# Patient Record
Sex: FEMALE | Race: WHITE | Employment: UNEMPLOYED | URBAN - METROPOLITAN AREA
[De-identification: names, ages, dates, MRNs, and addresses within clinical notes are randomized per-mention and may not be internally consistent; named-entity substitution may affect disease eponyms.]

---

## 2017-02-27 ENCOUNTER — HOSPITAL ENCOUNTER (OUTPATIENT)
Dept: LAB | Age: 62
Discharge: HOME | End: 2017-02-27
Payer: COMMERCIAL

## 2017-02-27 DIAGNOSIS — R73.09 ABNORMAL GLUCOSE: ICD-10-CM

## 2017-02-27 DIAGNOSIS — I10 ESSENTIAL HYPERTENSION WITH GOAL BLOOD PRESSURE LESS THAN 140/90: ICD-10-CM

## 2017-02-27 DIAGNOSIS — R79.89 ELEVATED LFTS: ICD-10-CM

## 2017-02-27 DIAGNOSIS — E78.5 HYPERLIPIDEMIA, UNSPECIFIED HYPERLIPIDEMIA TYPE: ICD-10-CM

## 2017-02-27 LAB
25(OH)D3 SERPL-MCNC: 26.6 NG/ML (ref 30–100)
ANION GAP BLD CALC-SCNC: 8 MMOL/L (ref 3–18)
APPEARANCE UR: CLEAR
BASOPHILS # BLD AUTO: 0.1 K/UL (ref 0–0.06)
BASOPHILS # BLD: 1 % (ref 0–2)
BILIRUB UR QL: NEGATIVE
BUN SERPL-MCNC: 12 MG/DL (ref 7–18)
BUN/CREAT SERPL: 16 (ref 12–20)
CALCIUM SERPL-MCNC: 9 MG/DL (ref 8.5–10.1)
CHLORIDE SERPL-SCNC: 99 MMOL/L (ref 100–108)
CHOLEST SERPL-MCNC: 230 MG/DL
CO2 SERPL-SCNC: 30 MMOL/L (ref 21–32)
COLOR UR: YELLOW
CREAT SERPL-MCNC: 0.73 MG/DL (ref 0.6–1.3)
CREAT UR-MCNC: 77.2 MG/DL (ref 30–125)
DIFFERENTIAL METHOD BLD: ABNORMAL
EOSINOPHIL # BLD: 0.3 K/UL (ref 0–0.4)
EOSINOPHIL NFR BLD: 4 % (ref 0–5)
EPITH CASTS URNS QL MICRO: NORMAL /LPF (ref 0–5)
ERYTHROCYTE [DISTWIDTH] IN BLOOD BY AUTOMATED COUNT: 12.2 % (ref 11.6–14.5)
GLUCOSE SERPL-MCNC: 111 MG/DL (ref 74–99)
GLUCOSE UR STRIP.AUTO-MCNC: NEGATIVE MG/DL
HBA1C MFR BLD: 6.8 % (ref 4.2–5.6)
HCT VFR BLD AUTO: 43.2 % (ref 35–45)
HDLC SERPL-MCNC: 68 MG/DL (ref 40–60)
HDLC SERPL: 3.4 {RATIO} (ref 0–5)
HGB BLD-MCNC: 14.3 G/DL (ref 12–16)
HGB UR QL STRIP: ABNORMAL
KETONES UR QL STRIP.AUTO: NEGATIVE MG/DL
LDLC SERPL CALC-MCNC: 134 MG/DL (ref 0–100)
LEUKOCYTE ESTERASE UR QL STRIP.AUTO: ABNORMAL
LIPID PROFILE,FLP: ABNORMAL
LYMPHOCYTES # BLD AUTO: 31 % (ref 21–52)
LYMPHOCYTES # BLD: 2.5 K/UL (ref 0.9–3.6)
MCH RBC QN AUTO: 30.2 PG (ref 24–34)
MCHC RBC AUTO-ENTMCNC: 33.1 G/DL (ref 31–37)
MCV RBC AUTO: 91.1 FL (ref 74–97)
MICROALBUMIN UR-MCNC: 0.8 MG/DL (ref 0–3)
MICROALBUMIN/CREAT UR-RTO: 10 MG/G (ref 0–30)
MONOCYTES # BLD: 0.5 K/UL (ref 0.05–1.2)
MONOCYTES NFR BLD AUTO: 6 % (ref 3–10)
NEUTS SEG # BLD: 4.7 K/UL (ref 1.8–8)
NEUTS SEG NFR BLD AUTO: 58 % (ref 40–73)
NITRITE UR QL STRIP.AUTO: NEGATIVE
PH UR STRIP: 6.5 [PH] (ref 5–8)
PLATELET # BLD AUTO: 246 K/UL (ref 135–420)
PMV BLD AUTO: 11.1 FL (ref 9.2–11.8)
POTASSIUM SERPL-SCNC: 3.7 MMOL/L (ref 3.5–5.5)
PROT UR STRIP-MCNC: NEGATIVE MG/DL
RBC # BLD AUTO: 4.74 M/UL (ref 4.2–5.3)
RBC #/AREA URNS HPF: NORMAL /HPF (ref 0–5)
SODIUM SERPL-SCNC: 137 MMOL/L (ref 136–145)
SP GR UR REFRACTOMETRY: 1.01 (ref 1–1.03)
TRIGL SERPL-MCNC: 140 MG/DL (ref ?–150)
UROBILINOGEN UR QL STRIP.AUTO: 0.2 EU/DL (ref 0.2–1)
VLDLC SERPL CALC-MCNC: 28 MG/DL
WBC # BLD AUTO: 8.1 K/UL (ref 4.6–13.2)
WBC URNS QL MICRO: NORMAL /HPF (ref 0–4)

## 2017-02-27 PROCEDURE — 85025 COMPLETE CBC W/AUTO DIFF WBC: CPT | Performed by: INTERNAL MEDICINE

## 2017-02-27 PROCEDURE — 83036 HEMOGLOBIN GLYCOSYLATED A1C: CPT | Performed by: INTERNAL MEDICINE

## 2017-02-27 PROCEDURE — 81001 URINALYSIS AUTO W/SCOPE: CPT | Performed by: INTERNAL MEDICINE

## 2017-02-27 PROCEDURE — 82306 VITAMIN D 25 HYDROXY: CPT | Performed by: INTERNAL MEDICINE

## 2017-02-27 PROCEDURE — 36415 COLL VENOUS BLD VENIPUNCTURE: CPT | Performed by: INTERNAL MEDICINE

## 2017-02-27 PROCEDURE — 80048 BASIC METABOLIC PNL TOTAL CA: CPT | Performed by: INTERNAL MEDICINE

## 2017-02-27 PROCEDURE — 80061 LIPID PANEL: CPT | Performed by: INTERNAL MEDICINE

## 2017-02-27 PROCEDURE — 82570 ASSAY OF URINE CREATININE: CPT | Performed by: INTERNAL MEDICINE

## 2017-02-27 PROCEDURE — 84445 ASSAY OF TSI GLOBULIN: CPT | Performed by: INTERNAL MEDICINE

## 2017-03-03 LAB — TSI ACT/NOR SER: 47 % (ref 0–139)

## 2017-03-21 ENCOUNTER — OFFICE VISIT (OUTPATIENT)
Dept: INTERNAL MEDICINE CLINIC | Age: 62
End: 2017-03-21

## 2017-03-21 VITALS
HEIGHT: 62 IN | HEART RATE: 67 BPM | WEIGHT: 187 LBS | SYSTOLIC BLOOD PRESSURE: 138 MMHG | DIASTOLIC BLOOD PRESSURE: 80 MMHG | TEMPERATURE: 97.9 F | BODY MASS INDEX: 34.41 KG/M2 | OXYGEN SATURATION: 98 %

## 2017-03-21 DIAGNOSIS — E78.5 HYPERLIPIDEMIA, UNSPECIFIED HYPERLIPIDEMIA TYPE: ICD-10-CM

## 2017-03-21 DIAGNOSIS — R79.89 ELEVATED LFTS: ICD-10-CM

## 2017-03-21 DIAGNOSIS — Z23 ENCOUNTER FOR IMMUNIZATION: ICD-10-CM

## 2017-03-21 DIAGNOSIS — I11.9 BENIGN HYPERTENSIVE HEART DISEASE WITHOUT HEART FAILURE: ICD-10-CM

## 2017-03-21 DIAGNOSIS — E55.9 VITAMIN D INSUFFICIENCY: ICD-10-CM

## 2017-03-21 DIAGNOSIS — K21.9 GASTROESOPHAGEAL REFLUX DISEASE WITHOUT ESOPHAGITIS: ICD-10-CM

## 2017-03-21 DIAGNOSIS — E11.9 CONTROLLED TYPE 2 DIABETES MELLITUS WITHOUT COMPLICATION, WITHOUT LONG-TERM CURRENT USE OF INSULIN (HCC): Primary | ICD-10-CM

## 2017-03-21 DIAGNOSIS — J45.20 MILD INTERMITTENT ASTHMA WITHOUT COMPLICATION: ICD-10-CM

## 2017-03-21 DIAGNOSIS — R00.2 PALPITATIONS: ICD-10-CM

## 2017-03-21 DIAGNOSIS — I47.1 PSVT (PAROXYSMAL SUPRAVENTRICULAR TACHYCARDIA) (HCC): ICD-10-CM

## 2017-03-21 RX ORDER — LORAZEPAM 1 MG/1
1 TABLET ORAL
Qty: 30 TAB | Refills: 0 | Status: SHIPPED | OUTPATIENT
Start: 2017-03-21 | End: 2017-06-23 | Stop reason: SDUPTHER

## 2017-03-21 NOTE — PROGRESS NOTES
1. Have you been to the ER, urgent care clinic or hospitalized since your last visit? YES. [facility], bronchitis    2. Have you seen or consulted any other health care providers outside of the [address] [place] [address] since your last visit (Include any pap smears or colon screening)? YES  Gyn, in September    Do you have an Advanced Directive? NO    Would you like information on Advanced Directives?  NO

## 2017-03-21 NOTE — PATIENT INSTRUCTIONS
Begin Vitamin D 2000 U daily. Learning About Diabetes Food Guidelines  Your Care Instructions  Meal planning is important to manage diabetes. It helps keep your blood sugar at a target level (which you set with your doctor). You don't have to eat special foods. You can eat what your family eats, including sweets once in a while. But you do have to pay attention to how often you eat and how much you eat of certain foods. You may want to work with a dietitian or a certified diabetes educator (CDE) to help you plan meals and snacks. A dietitian or CDE can also help you lose weight if that is one of your goals. What should you know about eating carbs? Managing the amount of carbohydrate (carbs) you eat is an important part of healthy meals when you have diabetes. Carbohydrate is found in many foods. · Learn which foods have carbs. And learn the amounts of carbs in different foods. ¨ Bread, cereal, pasta, and rice have about 15 grams of carbs in a serving. A serving is 1 slice of bread (1 ounce), ½ cup of cooked cereal, or 1/3 cup of cooked pasta or rice. ¨ Fruits have 15 grams of carbs in a serving. A serving is 1 small fresh fruit, such as an apple or orange; ½ of a banana; ½ cup of cooked or canned fruit; ½ cup of fruit juice; 1 cup of melon or raspberries; or 2 tablespoons of dried fruit. ¨ Milk and no-sugar-added yogurt have 15 grams of carbs in a serving. A serving is 1 cup of milk or 2/3 cup of no-sugar-added yogurt. ¨ Starchy vegetables have 15 grams of carbs in a serving. A serving is ½ cup of mashed potatoes or sweet potato; 1 cup winter squash; ½ of a small baked potato; ½ cup of cooked beans; or ½ cup cooked corn or green peas. · Learn how much carbs to eat each day and at each meal. A dietitian or CDE can teach you how to keep track of the amount of carbs you eat. This is called carbohydrate counting. · If you are not sure how to count carbohydrate grams, use the Plate Method to plan meals.  It is a good, quick way to make sure that you have a balanced meal. It also helps you spread carbs throughout the day. ¨ Divide your plate by types of foods. Put non-starchy vegetables on half the plate, meat or other protein food on one-quarter of the plate, and a grain or starchy vegetable in the final quarter of the plate. To this you can add a small piece of fruit and 1 cup of milk or yogurt, depending on how many carbs you are supposed to eat at a meal.  · Try to eat about the same amount of carbs at each meal. Do not \"save up\" your daily allowance of carbs to eat at one meal.  · Proteins have very little or no carbs per serving. Examples of proteins are beef, chicken, turkey, fish, eggs, tofu, cheese, cottage cheese, and peanut butter. A serving size of meat is 3 ounces, which is about the size of a deck of cards. Examples of meat substitute serving sizes (equal to 1 ounce of meat) are 1/4 cup of cottage cheese, 1 egg, 1 tablespoon of peanut butter, and ½ cup of tofu. How can you eat out and still eat healthy? · Learn to estimate the serving sizes of foods that have carbohydrate. If you measure food at home, it will be easier to estimate the amount in a serving of restaurant food. · If the meal you order has too much carbohydrate (such as potatoes, corn, or baked beans), ask to have a low-carbohydrate food instead. Ask for a salad or green vegetables. · If you use insulin, check your blood sugar before and after eating out to help you plan how much to eat in the future. · If you eat more carbohydrate at a meal than you had planned, take a walk or do other exercise. This will help lower your blood sugar. What else should you know? · Limit saturated fat, such as the fat from meat and dairy products. This is a healthy choice because people who have diabetes are at higher risk of heart disease. So choose lean cuts of meat and nonfat or low-fat dairy products.  Use olive or canola oil instead of butter or shortening when cooking. · Don't skip meals. Your blood sugar may drop too low if you skip meals and take insulin or certain medicines for diabetes. · Check with your doctor before you drink alcohol. Alcohol can cause your blood sugar to drop too low. Alcohol can also cause a bad reaction if you take certain diabetes medicines. Follow-up care is a key part of your treatment and safety. Be sure to make and go to all appointments, and call your doctor if you are having problems. It's also a good idea to know your test results and keep a list of the medicines you take. Where can you learn more? Go to http://yoselyn-merline.info/. Enter U205 in the search box to learn more about \"Learning About Diabetes Food Guidelines. \"  Current as of: May 23, 2016  Content Version: 11.1  © 2059-7093 [employer]. Care instructions adapted under license by [employer] (which disclaims liability or warranty for this information). If you have questions about a medical condition or this instruction, always ask your healthcare professional. [place] [address] any warranty or liability for your use of this information.

## 2017-03-22 ENCOUNTER — TELEPHONE (OUTPATIENT)
Dept: INTERNAL MEDICINE CLINIC | Age: 62
End: 2017-03-22

## 2017-03-22 NOTE — TELEPHONE ENCOUNTER
Patient was seen yesterday (3/21) and I discussed urinalysis results with her during visit. It showed moderate leukocyte esterase, negative nitrites and 2-4 WBCs. She denied any symptoms, so no further evaluation was felt to be needed. Please inquire if she is having symptoms now or other concerns she may have. Thank you.

## 2017-03-24 ENCOUNTER — TELEPHONE (OUTPATIENT)
Dept: INTERNAL MEDICINE CLINIC | Age: 62
End: 2017-03-24

## 2017-03-24 PROBLEM — E55.9 VITAMIN D INSUFFICIENCY: Status: ACTIVE | Noted: 2017-03-24

## 2017-03-25 NOTE — PROGRESS NOTES
HPI:   Elle Rosales is a 64y.o. year old female who presents today for evaluation of hypertension, hyperlipidemia, paroxysmal SVT, GERD, asthma, elevated transaminases, abnormal glucose, and atypical mycobacterial pneumonia. She reports that she is doing relatively well. She reports that she went to [facility] in 2/2017 and was diagnosed with bronchitis. She states that she has significantly improved with only a mild intermittent lingering cough. She also states that she is under a lot of stress with her mother being diagnosed with pulmonary metastases from a uterine mass and her recently being entered into hospice. She is otherwise without complaints. She has a history of hypertension, treated with metoprolol, and hydrochlorothiazide (+ potassium). She reports that she does not check her blood pressure at home. She does not exercise regularly, but denies any chest pain, shortness of breath at rest or with exertion, lightheadedness, or edema. 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 She has had approximately six severe episodes over the years, prompting presentation to the ED and treatment with IV Adenosine. She currently reports infrequent short episodes of palpitations and is being treated with metoprolol. She is followed by Dr. Radha Ruby. She had an echocardiogram (10/2005) showing normal LV size and function (EF 60-65%), and no valvular pathology. In 11/2012, she underwent an exercise stress echocardiogram, which was normal at maximal exercise. She has a history of hyperlipidemia, treated with simvastatin from 10/2012 to 3/2015 at which time she stopped taking it due to myalgias. She restarted it on 8/2015 and continued to take it without difficulty until 3/2016, when it was noticed that she had transaminase elevation (AST 85/ ) and it was discontinued.  Evaluation included hepatitis A, B, and C levels (negative), iron panel (normal), and RUQ ultrasound (3/23/2016) with limited sonographic window for the liver; only partially visualized but grossly unremarkable. Repeat hepatic panel (4/22/2016) showed AST 75 and ALT 98. Repeat lipid panel showed total chol 215/ / HDL 64/. In 5/13/2016, she had an abdominal CT scan showing the liver to be normal in size with normal parenchymal density; no discrete mass or ascites, and no intrahepatic biliary dilatation. She was subsequently instructed to restart simvastatin. However, she reports that she only took it for one week and then discontinued it. She states that she did not have any side effects, but felt it was not needed. She expressed that she does not wish to restart therapy with a statin. She has a history of asthma and allergic rhinitis and is followed by Dr. Amber Segura. She is receiving immunotherapy once per month, and reports that she has not required any inhalers recently. She states that she does not use Qvar daily, but will take it occasionally. She does use Claritin every day. She does have a history of abnormal glucose dating back to 2012. She denies any polyuria, polydipsia, nocturia, or blurry vision, and has no history of retinopathy, neuropathy, or nephropathy. 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 She was treated with Avelox, and repeat chest x-ray in 1/2012 showed complete resolution of the pneumonia. She denies any cough or shortness of breath. She does report frequent post nasal drainage. She had a screening colonoscopy in 12/2006 by Dr. Orion Cadena showing a 5 mm sessile polyp in the rectum (pathology: hyperplastic). She had a repeat colonoscopy in 12/2016 which showed moderate sigmoid diverticulosis and a 6 mm sessile ascending colon polyp (pathology: serrated adenoma). Follow-up recommended for 5 years.  She denies any abdominal pain, nausea, vomiting, melena, hematochezia, or change in bowel movements. She does take omeprazole occasionally for GERD symptoms. Past Medical History:   Diagnosis Date    Allergic rhinitis     Asthma     Cardiac stress echo, normal 11/02/2012    Normal maximal stress echo study. EF 60%. Ex time 9 min 45 sec. 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  Hyperlipidemia     Hypertension     Menopause     Plantar fasciitis     left    PSVT (paroxysmal supraventricular tachycardia) ([facility])     A-V dharmesh reentrant tachycardia     Past Surgical History:   Procedure Laterality Date    ENDOSCOPY, COLON, DIAGNOSTIC      polyp    HX CERVICAL POLYPECTOMY      HX CYST INCISION AND DRAINAGE Right 10 or more years    HX DILATION AND CURETTAGE      HX GYN      polyp on cervix    HX POLYPECTOMY      from rectum     Current Outpatient Prescriptions   Medication Sig    LORazepam (ATIVAN) 1 mg tablet Take 1 Tab by mouth every eight (8) hours as needed for Anxiety. Max Daily Amount: 3 mg.  albuterol (PROAIR HFA) 90 mcg/actuation inhaler inhale 2 puffs by mouth every 4 hours if needed for wheezing    metoprolol succinate (TOPROL-XL) 50 mg XL tablet take 1 tablet by mouth once daily    potassium chloride (K-DUR, KLOR-CON) 20 mEq tablet take 1 tablet by mouth once daily    carboxymethylcellulose sodium (REFRESH LIQUIGEL) 1 % dlgl Apply  to eye.  hydrochlorothiazide (HYDRODIURIL) 25 mg tablet take 1/2 tablet by mouth once daily    omeprazole (PRILOSEC) 20 mg capsule Take 1 Cap by mouth daily.  clotrimazole-betamethasone (LOTRISONE) topical cream Apply  to both ear canals and affected part of outer ear twice a day with a finger.  fluticasone (FLONASE) 50 mcg/actuation nasal spray 2 Sprays by Both Nostrils route daily.     beclomethasone (QVAR) 40 mcg/actuation inhaler Take 1 Puff by inhalation two (2) times a day.  MULTIVITAMIN PO Take 1 Tab by mouth every other day. No current facility-administered medications for this visit. Allergies and Intolerances: Allergies   Allergen Reactions    Altace [Ramipril] Cough    Penicillins Other (comments)     Hands peel    Sulfur Itching     Family History: She had two aunts who had breast cancer ( her mother's sister and father's sister). She has no FH of colon cancer. Her mother is still alive with HTN. Her father  form metastatic prostate cancer. Family History   Problem Relation Age of Onset   [employer] Arthritis-osteo Mother     Hypertension Mother           Cancer Father      bone cancer    Hypertension Sister     Hypertension Sister     Hypertension Sister     Breast Cancer Maternal Aunt     Breast Cancer Paternal Aunt     Diabetes Other     Stroke Other     Other Sister      twin sister - osteopenia and low vitamin D levels     Social History:   She  reports that she has never smoked. She has never used smokeless tobacco. She is  and has two sons. She was a homemaker, but worked part-time in . She now helps care for her grandchildren and for her elderly mother. History   Alcohol Use No     Immunization History:  Immunization History   Administered Date(s) Administered    Influenza Vaccine (Quad) PF 10/23/2015, 10/19/2016    Influenza Vaccine PF 2013, 10/03/2014    Influenza Vaccine Split 2011, 10/22/2012    Influenza Vaccine Whole 10/29/2010    PPD 2012    Pneumococcal Polysaccharide (PPSV-23) 2017    TB Skin Test (PPD) Intradermal 2014    TDAP Vaccine 2012       Review of Systems:   As above included in HPI.   Otherwise 11 point review of systems negative including constitutional, skin, HENT, eyes, respiratory, cardiovascular, gastrointestinal, genitourinary, musculoskeletal, endocrine, hematologic, allergy, and neurologic. Physical:   Vitals:   BP: 138/80   HR: 67  WT: 187 lb (84.8 kg)  BMI:  34.02 kg/m2    Exam:   Patient appears in no apparent distress. Affect is appropriate. HEENT --Anicteric sclerae, tympanic membranes normal,  ear canals normal.  PERRL, EOMI, conjunctiva and lids normal.   Sinuses were nontender, turbinates normal, hearing normal.  Oropharynx without  erythema, normal tongue, oral mucosa and tonsils. No cervical lymphadenopathy. No thyromegaly, JVD, or bruits. Carotid pulses 2+ with normal upstroke. Lungs --Clear to auscultation. No wheezing or rales. Heart --Regular rate and rhythm, no murmurs, rubs, gallops, or clicks. Breasts -- no masses, skin dimpling, asymmetry, nipple discharge, nodules, axillary lymphadenopathy. Chest wall --Nontender to palpation. PMI normal.  Abdomen -- Soft and nontender, no hepatosplenomegaly or masses. Extremities -- Without cyanosis, clubbing, edema. 2+ pulses equally and bilaterally.   Normal looking digits, ROM intact  Derm - no obvious abnormalities noted, no rash    Foot exam: tuning fork and microfilament test with normal sensation      Review of Data:  Labs:  Hospital Outpatient Visit on 02/27/2017   Component Date Value Ref Range Status    LIPID PROFILE 02/27/2017        Final    Cholesterol, total 02/27/2017 230* <200 MG/DL Final    Triglyceride 02/27/2017 140  <150 MG/DL Final    HDL Cholesterol 02/27/2017 68* 40 - 60 MG/DL Final    LDL, calculated 02/27/2017 134* 0 - 100 MG/DL Final    VLDL, calculated 02/27/2017 28  MG/DL Final    CHOL/HDL Ratio 02/27/2017 3.4  0 - 5.0   Final    Sodium 02/27/2017 137  136 - 145 mmol/L Final    Potassium 02/27/2017 3.7  3.5 - 5.5 mmol/L Final    Chloride 02/27/2017 99* 100 - 108 mmol/L Final    CO2 02/27/2017 30  21 - 32 mmol/L Final    Anion gap 02/27/2017 8  3.0 - 18 mmol/L Final    Glucose 02/27/2017 111* 74 - 99 mg/dL Final    BUN 02/27/2017 12  7.0 - 18 MG/DL Final    Creatinine 02/27/2017 0.73 0.6 - 1.3 MG/DL Final    BUN/Creatinine ratio 02/27/2017 16  12 - 20   Final    GFR est AA 02/27/2017 >60  >60 ml/min/1.73m2 Final    GFR est non-AA 02/27/2017 >60  >60 ml/min/1.73m2 Final    Calcium 02/27/2017 9.0  8.5 - 10.1 MG/DL Final    Hemoglobin A1c 02/27/2017 6.8* 4.2 - 5.6 % Final    WBC 02/27/2017 8.1  4.6 - 13.2 K/uL Final    RBC 02/27/2017 4.74  4.20 - 5.30 M/uL Final    HGB 02/27/2017 14.3  12.0 - 16.0 g/dL Final    HCT 02/27/2017 43.2  35.0 - 45.0 % Final    MCV 02/27/2017 91.1  74.0 - 97.0 FL Final    MCH 02/27/2017 30.2  24.0 - 34.0 PG Final    MCHC 02/27/2017 33.1  31.0 - 37.0 g/dL Final    RDW 02/27/2017 12.2  11.6 - 14.5 % Final    PLATELET 18/21/1439 542  135 - 420 K/uL Final    MPV 02/27/2017 11.1  9.2 - 11.8 FL Final    NEUTROPHILS 02/27/2017 58  40 - 73 % Final    LYMPHOCYTES 02/27/2017 31  21 - 52 % Final    MONOCYTES 02/27/2017 6  3 - 10 % Final    EOSINOPHILS 02/27/2017 4  0 - 5 % Final    BASOPHILS 02/27/2017 1  0 - 2 % Final    ABS. NEUTROPHILS 02/27/2017 4.7  1.8 - 8.0 K/UL Final    ABS. LYMPHOCYTES 02/27/2017 2.5  0.9 - 3.6 K/UL Final    ABS. MONOCYTES 02/27/2017 0.5  0.05 - 1.2 K/UL Final    ABS. EOSINOPHILS 02/27/2017 0.3  0.0 - 0.4 K/UL Final    ABS.  BASOPHILS 02/27/2017 0.1* 0.0 - 0.06 K/UL Final    DF 02/27/2017 AUTOMATED    Final    Thyroid Stim Immunoglobulin 02/27/2017 47  0 - 139 % Final    Vitamin D 25-Hydroxy 02/27/2017 26.6* 30 - 100 ng/mL Final    Microalbumin,urine random 02/27/2017 0.80  0 - 3.0 MG/DL Final    Creatinine, urine 02/27/2017 77.20  30 - 125 mg/dL Final    Microalbumin/Creat ratio (mg/g cre* 02/27/2017 10  0 - 30 mg/g Final    Color 02/27/2017 YELLOW    Final    Appearance 02/27/2017 CLEAR    Final    Specific gravity 02/27/2017 1.010  1.005 - 1.030   Final    pH (UA) 02/27/2017 6.5  5.0 - 8.0   Final    Protein 02/27/2017 NEGATIVE   NEG mg/dL Final    Glucose 02/27/2017 NEGATIVE   NEG mg/dL Final    Ketone 02/27/2017 NEGATIVE   NEG mg/dL Final    Bilirubin 02/27/2017 NEGATIVE   NEG   Final    Blood 02/27/2017 TRACE* NEG   Final    Urobilinogen 02/27/2017 0.2  0.2 - 1.0 EU/dL Final    Nitrites 02/27/2017 NEGATIVE   NEG   Final    Leukocyte Esterase 02/27/2017 MODERATE* NEG   Final    WBC 02/27/2017 2 to 4  0 - 4 /hpf Final    RBC 02/27/2017 1 to 3  0 - 5 /hpf Final    Epithelial cells 02/27/2017 1+  0 - 5 /lpf Final     Calculated 10 year ASCVD risk score:  8.0  %    Health Maintenance:  Screening:    Mammogram: negative (11/2016)   PAP smear: negative (10/2013) Dr. Jenni Spurling. Appointment scheduled 10/2016. Colorectal: colonoscopy (12/2016) serrated adenoma. Dr. Emerita Peralta. Due 2021. Depression: none   DM (HbA1c/FPG): HbA1c 68 (2/2017)   Hepatitis C: negative (3/2016)   Falls: one   DEXA: within normal limits (11/2015)   Glaucoma: unknown   Smoking: none   Vitamin D: 26.6 (2/2017)   Medicare Wellness: N/A      Impression:  Patient Active Problem List   Diagnosis Code    Allergic rhinitis J30.9    Colon polyps K63.5    PSVT (paroxysmal supraventricular tachycardia) (HCC) I47.1    Palpitations R00.2    Hyperlipidemia E78.5    Hypertensive heart disease  I11.9    Asthma J45.909    Bradycardia, sinus R00.1    Dizziness R42    Solitary cyst of breast N60.09    GERD (gastroesophageal reflux disease) K21.9    Elevated LFTs R79.89    Type 2 diabetes mellitus without complication, without long-term current use of insulin (HCC) E11.9    Vitamin D insufficiency E55.9       Plan:  1. Hypertension. Well controlled on current regimen of metoprolol and hydrochlorothiazide. Renal function normal with creatinine 0.73 / eGFR >60. Will repeat today. Continue to follow. 2. Hyperlipidemia. Patient not wishing to restart statin at this time. Did not restart as instructed in 5/2016 after CT scan results reviewed.  Based on current lipid panel, her calculated 10 year ASCVD risk is 8.0 %, which actually does place her in one of the four statin benefit groups as per new AHA/ACC guidelines (primary prevention: 10 year ASCVD risk >7.5%). Thus, would recommend treatment with statin at this time. Also, with new diagnosis of diabetes mellitus which is another indication for use. However, patient unwilling today. Wishing to attempt control through lifestyle modifications. Will recheck lipid panel at next visit and readdress. Will continue to follow. 3. Diabetes mellitus. Abnormal glucose has been present since 2012, although normal in 2/2016. HbA1c now increased to 6.8. Discussed importance of lifestyle modifications, including diet, exercise, and weight loss. Will refer for diabetes education. No evidence of microvascular complications. Not on Ace-I or statin. Will address eye exams at next visit. Foot exam normal and urine microalbumin/ creatinine ratio without evidence of microalbuminuria. Will recheck HbA1c in 3 months to reassess. 4. Elevated LFT's. Significantly improved. Unclear etiology, but doubt secondary to statin. Hepatitis panel and iron studies normal. RUQ ultrasound difficult secondary to body habitus, but abdominal CT scan showed normal liver parenchyma. Will continue to follow and further evaluation if worsens. Repeat at next visit. 5. AV dharmesh reentrant tachycardia. No recent episodes. On metoprolol and no significant palpitations recently. Followed by Dr. Sarina Murray. 6. Asthma/ allergies. Exacerbation one month prior with episode of bronchitis, but now improved. Discussed using Qvar daily and albuterol only as needed. Receiving monthly immunotherapy injections. Followed by Dr. Shraddha Brennan. 7. GERD. Symptoms generally controlled with prn omeprazole. Follow. 8. Health maintenance. Already received influenza vaccine. Will give pneumovax today given asthma history. Discussed Zoster vaccine. Will consider, but not wishing to receive today. Colonoscopy due 2021. Mammogram up to date. Will discuss eye exams at next visit.  Vitamin D level low. Will increase supplement to 2000 U daily and recheck next visit. Patient understands recommendations and agrees with plan. Follow-up in 3 months.

## 2017-03-25 NOTE — TELEPHONE ENCOUNTER
Please request most recent pap smear from Dr. Lindsey Ayala. Patient was scheduled 10/2016. Thank you.

## 2017-03-27 ENCOUNTER — HOSPITAL ENCOUNTER (OUTPATIENT)
Dept: LAB | Age: 62
Discharge: HOME | End: 2017-03-27
Payer: COMMERCIAL

## 2017-03-27 ENCOUNTER — TELEPHONE (OUTPATIENT)
Dept: INTERNAL MEDICINE CLINIC | Age: 62
End: 2017-03-27

## 2017-03-27 ENCOUNTER — LAB ONLY (OUTPATIENT)
Dept: INTERNAL MEDICINE CLINIC | Age: 62
End: 2017-03-27

## 2017-03-27 DIAGNOSIS — R30.9 PAIN PASSING URINE: ICD-10-CM

## 2017-03-27 DIAGNOSIS — R35.0 URINARY FREQUENCY: Primary | ICD-10-CM

## 2017-03-27 DIAGNOSIS — R35.0 URINARY FREQUENCY: ICD-10-CM

## 2017-03-27 PROCEDURE — 87086 URINE CULTURE/COLONY COUNT: CPT | Performed by: INTERNAL MEDICINE

## 2017-03-29 LAB
BACTERIA SPEC CULT: NORMAL
SERVICE CMNT-IMP: NORMAL

## 2017-03-30 ENCOUNTER — TELEPHONE (OUTPATIENT)
Dept: INTERNAL MEDICINE CLINIC | Age: 62
End: 2017-03-30

## 2017-04-04 ENCOUNTER — TELEPHONE (OUTPATIENT)
Dept: INTERNAL MEDICINE CLINIC | Age: 62
End: 2017-04-04

## 2017-04-04 NOTE — TELEPHONE ENCOUNTER
Pt called to spk to nurse about her recent urinalysis, she said she is still having some burning,  Valeriano

## 2017-04-04 NOTE — TELEPHONE ENCOUNTER
Dr. Leonela Wiggins, pt had urine culture done on 3/27/17 with No Growth noted. She is c/o urinary burning, please advise.

## 2017-04-05 NOTE — TELEPHONE ENCOUNTER
Regarding urinary burning, it could be from atrophic vaginitis and she could try an over the counter moisturizer such as Replens. If continues with symptoms, she will need to come in for appointment to be evaluated.

## 2017-04-05 NOTE — TELEPHONE ENCOUNTER
Spoke with patient, given message below and instructed that if recommendation below doesn't help then she needs to come in for an appt. Verbalized understanding.

## 2017-05-10 ENCOUNTER — OFFICE VISIT (OUTPATIENT)
Dept: CARDIOLOGY CLINIC | Age: 62
End: 2017-05-10

## 2017-05-10 VITALS
OXYGEN SATURATION: 98 % | BODY MASS INDEX: 33.13 KG/M2 | HEIGHT: 62 IN | WEIGHT: 180 LBS | SYSTOLIC BLOOD PRESSURE: 122 MMHG | HEART RATE: 72 BPM | DIASTOLIC BLOOD PRESSURE: 80 MMHG

## 2017-05-10 DIAGNOSIS — I11.9 BENIGN HYPERTENSIVE HEART DISEASE WITHOUT HEART FAILURE: ICD-10-CM

## 2017-05-10 DIAGNOSIS — R00.2 PALPITATIONS: Primary | ICD-10-CM

## 2017-05-10 DIAGNOSIS — I47.1 PSVT (PAROXYSMAL SUPRAVENTRICULAR TACHYCARDIA) (HCC): ICD-10-CM

## 2017-05-10 DIAGNOSIS — E78.5 HYPERLIPIDEMIA, UNSPECIFIED HYPERLIPIDEMIA TYPE: ICD-10-CM

## 2017-05-10 NOTE — PROGRESS NOTES
HPI: I saw Erica Chacon in my office today in cardiovascular evaluation regarding her past problems with palpitations and hypercholesterolemia. Ms. 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 She has had about six separate episodes of supraventricular tachycardia in the past, for which she has had to go to the emergency room and get adenosine intravenously in order to break her rhythm to sinus, but generally speaking on just Toprol 50 mg daily she does quite well and has had only occasional palpitations. She comes to the office today and relates that she is doing well without any real palpitations on her Toprol and she denies any other cardiovascular complaints. She did unfortunately pulled back muscle in trying to help her mother who has terminal cancer. Encounter Diagnoses   Name Primary?  Palpitations Yes    PSVT (paroxysmal supraventricular tachycardia) (HCC)     Hypertensive heart disease      Hyperlipidemia, unspecified hyperlipidemia type        Discussion: This lady appears to be doing about as well as we could expect that I really have no recommendations for change at this time. Her palpitations appear to be well-controlled on her Toprol therapy and I am not could make any changes at this time. She does have some history of hypercholesterolemia and her latest lipid profile which was completed on February 27, 2017 showed total cholesterol 230 with triglycerides of 140, HDL of 68, LDL of 134, and VLDL of 28. In view of her age and lack of significant risk factors by the current guidelines this would not normally be treated. She has been considered to be prediabetic and if we consider diabetic we probably would treat her cholesterol.   I did tell her that she could consider doing a coronary calcium score to help make that determination and gave her some information in that regard. Her blood pressure is very well-controlled today0 and her EKG is stable some sublingual plan see her again in several months or as needed if any new cardiovascular symptoms surface in the interim. PCP:   Geeta Allred MD       Past Medical History:   Diagnosis Date    Allergic rhinitis     Asthma     Cardiac stress echo, normal 11/02/2012    Normal maximal stress echo study. EF 60%. Ex time 9 min 45 sec. 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  Hyperlipidemia     Hypertension     Menopause     Plantar fasciitis     left    PSVT (paroxysmal supraventricular tachycardia) ([facility])     A-V dharmesh reentrant tachycardia         Past Surgical History:   Procedure Laterality Date    ENDOSCOPY, COLON, DIAGNOSTIC      polyp    HX CERVICAL POLYPECTOMY      HX CYST INCISION AND DRAINAGE Right 10 or more years    HX DILATION AND CURETTAGE      HX GYN      polyp on cervix    HX POLYPECTOMY      from rectum         Current Outpatient Rx   Name Route Sig Dispense Refill    POTASSIUM CHLORIDE SR 20 MEQ TAB, PARTICLES/CRYSTALS Oral Take 1 Tab by mouth daily. 30 Tab 11    METOPROLOL SR 50 MG 24 HR TAB Oral Take 1 Tab by mouth daily. 90 Tab 3    AZITHROMYCIN 250 MG TAB Oral Take 1 Tab by mouth for 5 days. Take two tablets today then one tablet daily 6 Tab 0    TRIAMCINOLONE ACETONIDE 55 MCG NASAL SPRAY AEROSOL Nasal 2 Sprays by Nasal route daily. Administer to nostrils.  HYDROCHLOROTHIAZIDE 25 MG TAB Oral Take 25 mg by mouth daily.  LORATADINE 10 MG TAB Oral Take 10 mg by mouth daily.  MULTIVITAMIN PO Oral Take  by mouth.            Allergies   Allergen Reactions    Altace [Ramipril] Cough    Penicillins Other (comments)     Hands peel    Sulfur Itching         Social History:   Social History   Substance Use Topics    Smoking status: Never Smoker    Smokeless tobacco: Never Used    Alcohol use No           Family history: family history includes Arthritis-osteo in her mother; Breast Cancer in her maternal aunt and paternal aunt; Cancer in her father; Diabetes in an other family member; Hypertension in her mother, sister, sister, and sister; Other in her sister; Stroke in an other family member. Review of Systems:    Constitutional: Negative. Respiratory: Positive for cough. Negative for sputum production, shortness of breath and wheezing. Cardiovascular: Negative. Gastrointestinal: Negative. Musculoskeletal: Negative. Physical Exam:   The patient is an alert, oriented, well developed, well nourished 58 y.o.  female who was in no acute distress at the time of my examination. Visit Vitals    /80    Pulse 72    Ht 5' 2\" (1.575 m)    Wt 81.6 kg (180 lb)    SpO2 98%    BMI 32.92 kg/m2      BP Readings from Last 3 Encounters:   05/10/17 122/80   03/21/17 138/80   10/18/16 114/62        Wt Readings from Last 3 Encounters:   05/10/17 81.6 kg (180 lb)   03/21/17 84.8 kg (187 lb)   10/18/16 83.3 kg (183 lb 9.6 oz)       HEENT: Conjuctiva white, mucosa moist, no pallor or cyanosis. Neck: Supple without masses, tenderness or thyromegaly. No jugular venous distention. Carotid upstrokes are full bilaterally, without bruits. Cardiovascular: Chest is symmetrical with good excursion. [place] is not displaced. No lifts, heaves or thrills. S1 and S2 are normal, without appreciable murmurs, rubs, clicks or gallops. Lungs: Clear to auscultation in all fields. Abdomen: Soft; no masses, tenderness or organomegaly. Extremities: No edema, with full peripheral pulses.      Review of Data: See PMH and Cardiology and Imaging sections for cardiac testing  Lab Results   Component Value Date/Time    Cholesterol, total 230 02/27/2017 09:00 AM    HDL Cholesterol 68 02/27/2017 09:00 AM    LDL, calculated 134 02/27/2017 09:00 AM    Triglyceride 140 02/27/2017 09:00 AM    CHOL/HDL Ratio 3.4 02/27/2017 09:00 AM       Results for orders placed or performed in visit on 05/10/17   AMB POC EKG ROUTINE W/ 12 LEADS, INTER & REP     Status: None    Narrative    Normal sinus rhythm, rate 72. There is mild diffuse ST-T flattening which is nonspecific and otherwise the tracing is within normal limits. Michael Redd D.O., F.A.C.C. [facility]  [facility]  [address] [place] [address]. [address]    PLEASE NOTE:  This document has been produced using voice recognition software. Unrecognized errors in transcription may be present.

## 2017-05-10 NOTE — MR AVS SNAPSHOT
Visit Information Date & Time Provider Department Dept. Phone Encounter #  
 5/10/2017  1:20 PM Blanca Andino DO [facility] [phone] Your Appointments 6/27/2017 10:30 AM  
Office Visit with Heath Ruffin MD  
[facility] Appt Note: ov 3mos. sarris [address], [address] [facility] [place] [address] [facility]  
  
   
 [address], [address]  
  
    
 11/21/2017  1:00 PM  
Follow Up with Blanca Andino DO [facility] ([facility]) Appt Note: 6 month f.up  
 Jostin Jenkins [id number] [phone] [address] [place] [address] [address] Upcoming Health Maintenance Date Due  
 EYE EXAM RETINAL OR DILATED Q1 3/26/1965 PAP AKA CERVICAL CYTOLOGY 10/18/2016 ZOSTER VACCINE AGE 60> 6/30/2017* INFLUENZA AGE 9 TO ADULT 8/1/2017 HEMOGLOBIN A1C Q6M 8/27/2017 MICROALBUMIN Q1 2/27/2018 LIPID PANEL Q1 2/27/2018 FOOT EXAM Q1 3/21/2018 BREAST CANCER SCRN MAMMOGRAM 11/28/2018 COLONOSCOPY 12/6/2021 DTaP/Tdap/Td series (2 - Td) 2/16/2022 *Topic was postponed. The date shown is not the original due date. Allergies as of 5/10/2017  Review Complete On: 5/10/2017 By: Blanca Andino DO Severity Noted Reaction Type Reactions Altace [Ramipril]  03/17/2011    Cough Penicillins    Other (comments) Hands peel Sulfur    Itching Current Immunizations  Reviewed on 3/17/2015 Name Date Influenza Vaccine (Quad) PF 10/19/2016, 10/23/2015 Influenza Vaccine PF 10/3/2014, 12/12/2013 Influenza Vaccine Split 10/22/2012, 11/2/2011 Influenza Vaccine Whole 10/29/2010 PPD 1/3/2012 Pneumococcal Polysaccharide (PPSV-23) 3/21/2017 TB Skin Test (PPD) Intradermal 2/12/2014 TDAP Vaccine 2/16/2012 Not reviewed this visit You Were Diagnosed With   
  
 Codes Comments Palpitations    -  Primary ICD-10-CM: R00.2 ICD-9-CM: 785.1 PSVT (paroxysmal supraventricular tachycardia) (HCC)     ICD-10-CM: I47.1 ICD-9-CM: 427.0 Benign hypertensive heart disease without heart failure     ICD-10-CM: I11.9 ICD-9-CM: 402.10 Hyperlipidemia, unspecified hyperlipidemia type     ICD-10-CM: E78.5 ICD-9-CM: 272.4 Vitals BP Pulse Height(growth percentile) Weight(growth percentile) LMP SpO2  
 122/80 72 5' 2\" (1.575 m) 180 lb (81.6 kg) (LMP Unknown) 98% BMI OB Status Smoking Status 32.92 kg/m2 Postmenopausal Never Smoker Vitals History BMI and BSA Data Body Mass Index Body Surface Area  
 32.92 kg/m 2 1.89 m 2 Preferred Pharmacy Pharmacy Name Phone [address], [address] [place] [address] [phone] Your Updated Medication List  
  
   
This list is accurate as of: 5/10/17  2:11 PM.  Always use your most recent med list.  
  
  
  
  
 albuterol 90 mcg/actuation inhaler Commonly known as:  PROAIR HFA  
inhale 2 puffs by mouth every 4 hours if needed for wheezing  
  
 clotrimazole-betamethasone topical cream  
Commonly known as:  Sg Yaneli Apply  to both ear canals and affected part of outer ear twice a day with a finger. fluticasone 50 mcg/actuation nasal spray Commonly known as:  [facility] 2 Sprays by Both Nostrils route daily. hydroCHLOROthiazide 25 mg tablet Commonly known as:  HYDRODIURIL  
take 1/2 tablet by mouth once daily LORazepam 1 mg tablet Commonly known as:  ATIVAN Take 1 Tab by mouth every eight (8) hours as needed for Anxiety. Max Daily Amount: 3 mg.  
  
 metoprolol succinate 50 mg XL tablet Commonly known as:  TOPROL-XL  
take 1 tablet by mouth once daily MULTIVITAMIN PO Take 1 Tab by mouth every other day. omeprazole 20 mg capsule Commonly known as:  PriLOSEC  
 Take 1 Cap by mouth daily. potassium chloride 20 mEq tablet Commonly known as:  K-DUR, KLOR-CON  
take 1 tablet by mouth once daily QVAR 40 mcg/actuation [employer] Generic drug:  beclomethasone Take 1 Puff by inhalation two (2) times a day. REFRESH LIQUIGEL 1 % Dlgl Generic drug:  carboxymethylcellulose sodium Apply  to eye. We Performed the Following AMB POC EKG ROUTINE W/ 12 LEADS, INTER & REP [62103 CPT(R)] Introducing [facility]! [employer] introduces [employer] patient portal. Now you can access parts of your medical record, email your doctor's office, and request medication refills online. 1. In your internet browser, go to [email or web address] 2. Click on the First Time User? Click Here link in the Sign In box. You will see the New Member Sign Up page. 3. Enter your Yugma Access Code exactly as it appears below. You will not need to use this code after youve completed the sign-up process. If you do not sign up before the expiration date, you must request a new code. · Yugma Access Code: [email or web address] Expires: 5/28/2017  9:39 AM 
 
4. Enter the last four digits of your Social Security Number (xxxx) and Date of Birth (mm/dd/yyyy) as indicated and click Submit. You will be taken to the next sign-up page. 5. Create a Yugma ID. This will be your Yugma login ID and cannot be changed, so think of one that is secure and easy to remember. 6. Create a Yugma password. You can change your password at any time. 7. Enter your Password Reset Question and Answer. This can be used at a later time if you forget your password. 8. Enter your e-mail address. You will receive e-mail notification when new information is available in [address] 9. Click Sign Up. You can now view and download portions of your medical record. 10. Click the Download Summary menu link to download a portable copy of your medical information. If you have questions, please visit the Frequently Asked Questions section of the [employer] website. Remember, [employer] is NOT to be used for urgent needs. For medical emergencies, dial 911. Now available from your iPhone and Android! Please provide this summary of care documentation to your next provider. Your primary care clinician is listed as Alana Fermin. If you have any questions after today's visit, please call [phone].

## 2017-05-10 NOTE — PROGRESS NOTES
1. Have you been to the ER, urgent care clinic since your last visit? Hospitalized since your last visit? No     2. Have you seen or consulted any other health care providers outside of the [address] [place] [address] since your last visit? Include any pap smears or colon screening.  No

## 2017-05-10 NOTE — PROGRESS NOTES
Review of Systems   Constitutional: Negative. Respiratory: Positive for cough. Negative for sputum production, shortness of breath and wheezing. Cardiovascular: Negative. Gastrointestinal: Negative. Musculoskeletal: Negative.

## 2017-05-19 RX ORDER — FLUTICASONE PROPIONATE 50 MCG
2 SPRAY, SUSPENSION (ML) NASAL DAILY
Qty: 1 BOTTLE | Refills: 5 | Status: SHIPPED | OUTPATIENT
Start: 2017-05-19 | End: 2018-07-21 | Stop reason: SDUPTHER

## 2017-05-30 ENCOUNTER — OFFICE VISIT (OUTPATIENT)
Dept: INTERNAL MEDICINE CLINIC | Age: 62
End: 2017-05-30

## 2017-05-30 VITALS
HEIGHT: 62 IN | WEIGHT: 182.2 LBS | BODY MASS INDEX: 33.53 KG/M2 | TEMPERATURE: 98.1 F | SYSTOLIC BLOOD PRESSURE: 138 MMHG | DIASTOLIC BLOOD PRESSURE: 82 MMHG | OXYGEN SATURATION: 96 % | RESPIRATION RATE: 14 BRPM | HEART RATE: 85 BPM

## 2017-05-30 DIAGNOSIS — M54.9 BILATERAL BACK PAIN, UNSPECIFIED BACK LOCATION, UNSPECIFIED CHRONICITY: Primary | ICD-10-CM

## 2017-05-30 NOTE — PROGRESS NOTES
HPI/History  Yon Proctor is a 58 y.o.  female who presents for evaluation. Pt reports mid to lower back pain for about a month or more. Discomfort is off/on and varies in severity. Coincides with caring for her mother which involves lifting and moving her. Movements exacerbate and currently right is more affected than left. No paresthesias. No radiation into legs. No bowel/urinary incontinence. No other GI or  sxs. Pt usually does not take analgesics and in general only uses tylenol very rarely. Heat seems to help. Denies any other associated sxs or complaints. Patient Active Problem List   Diagnosis Code    Allergic rhinitis J30.9    Colon polyps K63.5    PSVT (paroxysmal supraventricular tachycardia) ([facility]) I47.1    Palpitations R00.2    Hyperlipidemia E78.5    Hypertensive heart disease  I11.9    Asthma J45.909    Bradycardia, sinus R00.1    Dizziness R42    Solitary cyst of breast N60.09    GERD (gastroesophageal reflux disease) K21.9    Elevated LFTs R94.5    Type 2 diabetes mellitus without complication, without long-term current use of insulin (HCC) E11.9    Vitamin D insufficiency E55.9     Past Medical History:   Diagnosis Date    Allergic rhinitis     Asthma     Cardiac stress echo, normal 11/02/2012    Normal maximal stress echo study. EF 60%. Ex time 9 min 45 sec. 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     Hyperlipidemia     Hypertension     Menopause     Plantar fasciitis     left    PSVT (paroxysmal supraventricular tachycardia) ([facility])     A-V dharmesh reentrant tachycardia     Past Surgical History:   Procedure Laterality Date    ENDOSCOPY, COLON, DIAGNOSTIC      polyp    HX CERVICAL POLYPECTOMY      HX CYST INCISION AND DRAINAGE Right 10 or more years    HX DILATION AND CURETTAGE      HX GYN      polyp on cervix    HX POLYPECTOMY      from rectum     Social History     Social History    Marital status:      Spouse name: N/A    Number of children: N/A    Years of education: N/A     Occupational History    Not on file. Social History Main Topics    Smoking status: Never Smoker    Smokeless tobacco: Never Used    Alcohol use No    Drug use: No    Sexual activity: Not on file     Other Topics Concern    Not on file     Social History Narrative     Family History   Problem Relation Age of Onset    Arthritis-osteo Mother     Hypertension Mother           Cancer Father      bone cancer    Hypertension Sister     Hypertension Sister     Hypertension Sister     Breast Cancer Maternal Aunt     Breast Cancer Paternal Aunt     Diabetes Other     Stroke Other     Other Sister      twin sister - osteopenia and low vitamin D levels     Current Outpatient Prescriptions   Medication Sig    fluticasone (FLONASE) 50 mcg/actuation nasal spray 2 Sprays by Both Nostrils route daily.  LORazepam (ATIVAN) 1 mg tablet Take 1 Tab by mouth every eight (8) hours as needed for Anxiety. Max Daily Amount: 3 mg.  albuterol (PROAIR HFA) 90 mcg/actuation inhaler inhale 2 puffs by mouth every 4 hours if needed for wheezing    metoprolol succinate (TOPROL-XL) 50 mg XL tablet take 1 tablet by mouth once daily    potassium chloride (K-DUR, KLOR-CON) 20 mEq tablet take 1 tablet by mouth once daily    carboxymethylcellulose sodium (REFRESH LIQUIGEL) 1 % dlgl Apply  to eye.  hydrochlorothiazide (HYDRODIURIL) 25 mg tablet take 1/2 tablet by mouth once daily    clotrimazole-betamethasone (LOTRISONE) topical cream Apply  to both ear canals and affected part of outer ear twice a day with a finger.  beclomethasone (QVAR) 40 mcg/actuation inhaler Take 1 Puff by inhalation two (2) times a day.  MULTIVITAMIN PO Take 1 Tab by mouth every other day.     omeprazole (PRILOSEC) 20 mg capsule Take 1 Cap by mouth daily. No current facility-administered medications for this visit. Allergies   Allergen Reactions    Altace [Ramipril] Cough    Penicillins Other (comments)     Hands peel    Sulfur Itching       Review of Systems  Pt not needed prilosec as her reflux has not been an issue. No known adverse effects with NSAIDs in the past.  Chronic hx of right ear scaling and intermittently itchy, which is present today. No other issues or signs of infection. Aside from those included above and in HPI, remainder of ROS negative. Physical Examination  Visit Vitals    /82 (BP 1 Location: Right arm, BP Patient Position: Sitting)    Pulse 85    Temp 98.1 °F (36.7 °C) (Oral)    Resp 14    Ht 5' 2\" (1.575 m)    Wt 182 lb 3.2 oz (82.6 kg)    LMP  (LMP Unknown)    SpO2 96%    BMI 33.32 kg/m2     General - Alert and in no acute distress. Pt appears well, comfortable, and in good spirits. Pleasant, engaging. Nontoxic. Not anxious, non-diaphoretic. Mental status - Appropriate mood, behavior, speech content, dress, and thought processes. Ears - Right external meatus with some xerosis/scaling. Canal minimally edematous but no other signs of external or middle ear infection. No other external findings. Pulm - No tachypnea, retractions, or cyanosis. Good respiratory effort. Cardiovascular - Normal rate. Back - Indicates bilat mid to lower back paraspinal muscles as affected areas. No visible deformities or other findings, including rashes/lesions. No significant tenderness; no palpable deformities. Good ROM with occasional discomfort mostly with rotation. No other findings. Assessment and Plan  1. Mid to lower back pain, bilat - Coincides with caring for her mother which includes lifting and moving her mother. Pt will rest and try to avoid/limit these triggers if able and use assistance if possible. She will continue heat/ice.  Discussed light mobility and stretching and provided reference material. Can use NSAIDs with food +/- prilosec if needed or desired. Discussed course and prognosis, including chance of being a recurrent issue if she continues above. Pt happily agrees with plan. PLEASE NOTE:   This document has been produced using voice recognition software. Unrecognized errors in transcription may be present.     [employer] of [address] [place] [address]  [phone]  5/30/2017

## 2017-05-30 NOTE — MR AVS SNAPSHOT
Visit Information Date & Time Provider Department Dept. Phone Encounter #  
 5/30/2017  3:00 PM Jcarlos Davey [place] Internist of [facility] [id number] Your Appointments 6/27/2017 10:30 AM  
Office Visit with Vero Goodson MD  
Internist of [facility] [address] [facility] Appt Note: ov 3mos. sarris [address], [address] [place] [address] [place]  
  
   
 [address], [address]  
  
    
 11/21/2017  1:00 PM  
Follow Up with Tony Hilliard DO [facility] ([address]) Appt Note: 6 month f.up  
 [facility] [address] [address] [address] [phone] [address] [place] [address] St [address] Upcoming Health Maintenance Date Due  
 EYE EXAM RETINAL OR DILATED Q1 3/26/1965 PAP AKA CERVICAL CYTOLOGY 10/18/2016 ZOSTER VACCINE AGE 60> 6/30/2017* INFLUENZA AGE 9 TO ADULT 8/1/2017 HEMOGLOBIN A1C Q6M 8/27/2017 MICROALBUMIN Q1 2/27/2018 LIPID PANEL Q1 2/27/2018 FOOT EXAM Q1 3/21/2018 BREAST CANCER SCRN MAMMOGRAM 11/28/2018 COLONOSCOPY 12/6/2021 DTaP/Tdap/Td series (2 - Td) 2/16/2022 *Topic was postponed. The date shown is not the original due date. Allergies as of 5/30/2017  Review Complete On: 5/30/2017 By: Jayce Rodriguez Severity Noted Reaction Type Reactions Altace [Ramipril]  03/17/2011    Cough Penicillins    Other (comments) Hands peel Sulfur    Itching Current Immunizations  Reviewed on 3/17/2015 Name Date Influenza Vaccine (Quad) PF 10/19/2016, 10/23/2015 Influenza Vaccine PF 10/3/2014, 12/12/2013 Influenza Vaccine Split 10/22/2012, 11/2/2011 Influenza Vaccine Whole 10/29/2010 PPD 1/3/2012 Pneumococcal Polysaccharide (PPSV-23) 3/21/2017 TB Skin Test (PPD) Intradermal 2/12/2014 TDAP Vaccine 2/16/2012 Not reviewed this visit Vitals BP Pulse Temp Resp Height(growth percentile) Weight(growth percentile) 138/82 (BP 1 Location: Right arm, BP Patient Position: Sitting) 85 98.1 °F (36.7 °C) (Oral) 14 5' 2\" (1.575 m) 182 lb 3.2 oz (82.6 kg) LMP SpO2 BMI OB Status Smoking Status (LMP Unknown) 96% 33.32 kg/m2 Postmenopausal Never Smoker Vitals History BMI and BSA Data Body Mass Index Body Surface Area  
 33.32 kg/m 2 1.9 m 2 Preferred Pharmacy Pharmacy Name Phone [address], [address] [place] [address] [phone] Your Updated Medication List  
  
   
This list is accurate as of: 5/30/17  3:12 PM.  Always use your most recent med list.  
  
  
  
  
 albuterol 90 mcg/actuation inhaler Commonly known as:  PROAIR HFA  
inhale 2 puffs by mouth every 4 hours if needed for wheezing  
  
 clotrimazole-betamethasone topical cream  
Commonly known as:  [place] Apply  to both ear canals and affected part of outer ear twice a day with a finger. fluticasone 50 mcg/actuation nasal spray Commonly known as:  [place] Petite 2 Sprays by Both Nostrils route daily. hydroCHLOROthiazide 25 mg tablet Commonly known as:  HYDRODIURIL  
take 1/2 tablet by mouth once daily LORazepam 1 mg tablet Commonly known as:  ATIVAN Take 1 Tab by mouth every eight (8) hours as needed for Anxiety. Max Daily Amount: 3 mg.  
  
 metoprolol succinate 50 mg XL tablet Commonly known as:  TOPROL-XL  
take 1 tablet by mouth once daily MULTIVITAMIN PO Take 1 Tab by mouth every other day. omeprazole 20 mg capsule Commonly known as:  PriLOSEC Take 1 Cap by mouth daily. potassium chloride 20 mEq tablet Commonly known as:  K-DUR, KLOR-CON  
take 1 tablet by mouth once daily QVAR 40 mcg/actuation [email or web address] Generic drug:  beclomethasone Take 1 Puff by inhalation two (2) times a day. REFRESH LIQUIGEL 1 % Dlgl Generic drug:  carboxymethylcellulose sodium Apply  to eye. Introducing [facility] & HEALTH SERVICES! [employer] introduces [employer] patient portal. Now you can access parts of your medical record, email your doctor's office, and request medication refills online. 1. In your internet browser, go to [email or web address] 2. Click on the First Time User? Click Here link in the Sign In box. You will see the New Member Sign Up page. 3. Enter your [employer] Access Code exactly as it appears below. You will not need to use this code after youve completed the sign-up process. If you do not sign up before the expiration date, you must request a new code. · [employer] Access Code: [email or web address] Expires: 8/28/2017  3:12 PM 
 
4. Enter the last four digits of your Social Security Number (xxxx) and Date of Birth (mm/dd/yyyy) as indicated and click Submit. You will be taken to the next sign-up page. 5. Create a [employer] ID. This will be your [employer] login ID and cannot be changed, so think of one that is secure and easy to remember. 6. Create a [employer] password. You can change your password at any time. 7. Enter your Password Reset Question and Answer. This can be used at a later time if you forget your password. 8. Enter your e-mail address. You will receive e-mail notification when new information is available in [address] 9. Click Sign Up. You can now view and download portions of your medical record. 10. Click the Download Summary menu link to download a portable copy of your medical information. If you have questions, please visit the Frequently Asked Questions section of the [employer] website. Remember, [employer] is NOT to be used for urgent needs. For medical emergencies, dial 911. Now available from your iPhone and Android! Please provide this summary of care documentation to your next provider. Your primary care clinician is listed as Brenda Bañuelos.  If you have any questions after today's visit, please call [phone].

## 2017-05-30 NOTE — PROGRESS NOTES
1. Have you been to the ER, urgent care clinic or hospitalized since your last visit? NO.     2. Have you seen or consulted any other health care providers outside of the [address] [place] [address] since your last visit (Include any pap smears or colon screening)? NO      Do you have an Advanced Directive? NO    Would you like information on Advanced Directives?  NO

## 2017-06-23 ENCOUNTER — TELEPHONE (OUTPATIENT)
Dept: INTERNAL MEDICINE CLINIC | Age: 62
End: 2017-06-23

## 2017-06-23 DIAGNOSIS — E11.9 TYPE 2 DIABETES MELLITUS WITHOUT COMPLICATION, WITHOUT LONG-TERM CURRENT USE OF INSULIN (HCC): Primary | ICD-10-CM

## 2017-06-23 DIAGNOSIS — E55.9 VITAMIN D INSUFFICIENCY: ICD-10-CM

## 2017-06-23 DIAGNOSIS — E78.5 HYPERLIPIDEMIA, UNSPECIFIED HYPERLIPIDEMIA TYPE: ICD-10-CM

## 2017-06-23 DIAGNOSIS — I11.9 BENIGN HYPERTENSIVE HEART DISEASE WITHOUT HEART FAILURE: ICD-10-CM

## 2017-06-23 NOTE — TELEPHONE ENCOUNTER
Please explain to the patient that her HbA1c was increased at 3/2017 visit to 6.8 with a new diagnosis of diabetes mellitus. Wished to recheck in 3 months and if not improved, was going to start treatment with metformin. If not wishing to come in next week as scheduled due to the recent death of her mother, she should definitely make an appointment for 6 month follow-up in 9/2017.

## 2017-06-23 NOTE — TELEPHONE ENCOUNTER
Pt cancelled appmnt for 06/27/17, she said she was just recently seen and wants to know if it is necessary for her to come back so soon, she also stated tht since her mother just passed Elina Hernandez) she really doesn't feel up to seeing an dr's at this time, Apolonia Abebe

## 2017-06-26 NOTE — TELEPHONE ENCOUNTER
Pt did reschedule. Please update labs with a current date so she can go to [facility] a week before her ov with Dr. Geno Hurst. Says she has started watching her sugar intake and has lost a few pounds so she is hoping her next numbers will be better.

## 2017-06-27 RX ORDER — LORAZEPAM 1 MG/1
TABLET ORAL
Qty: 30 TAB | Refills: 0 | Status: SHIPPED | OUTPATIENT
Start: 2017-06-27 | End: 2017-12-01 | Stop reason: SDUPTHER

## 2017-06-27 NOTE — TELEPHONE ENCOUNTER
Reviewed report generated by the [place]. Does not demonstrate aberrancies or inconsistencies with regard to the prescribing of controlled medications to this patient by other providers. Last filled 3/21/2017 per .

## 2017-08-10 ENCOUNTER — TELEPHONE (OUTPATIENT)
Dept: INTERNAL MEDICINE CLINIC | Age: 62
End: 2017-08-10

## 2017-08-10 NOTE — TELEPHONE ENCOUNTER
Please call patient and remind her she needs to get labs done prior to her visit on [place] Aug 15th with Dr Karla Malave (see telephone encounters from 6/23/17). No evidence she has had them done.

## 2017-08-15 ENCOUNTER — TELEPHONE (OUTPATIENT)
Dept: INTERNAL MEDICINE CLINIC | Age: 62
End: 2017-08-15

## 2017-08-15 ENCOUNTER — OFFICE VISIT (OUTPATIENT)
Dept: INTERNAL MEDICINE CLINIC | Age: 62
End: 2017-08-15

## 2017-08-15 VITALS
HEART RATE: 78 BPM | OXYGEN SATURATION: 98 % | BODY MASS INDEX: 33.49 KG/M2 | SYSTOLIC BLOOD PRESSURE: 130 MMHG | TEMPERATURE: 98.1 F | WEIGHT: 182 LBS | DIASTOLIC BLOOD PRESSURE: 84 MMHG | HEIGHT: 62 IN

## 2017-08-15 DIAGNOSIS — R79.89 ELEVATED LFTS: ICD-10-CM

## 2017-08-15 DIAGNOSIS — I10 ESSENTIAL HYPERTENSION: Primary | ICD-10-CM

## 2017-08-15 DIAGNOSIS — I47.1 PSVT (PAROXYSMAL SUPRAVENTRICULAR TACHYCARDIA) (HCC): ICD-10-CM

## 2017-08-15 DIAGNOSIS — Z01.419 WELL WOMAN EXAM: ICD-10-CM

## 2017-08-15 DIAGNOSIS — E55.9 VITAMIN D INSUFFICIENCY: ICD-10-CM

## 2017-08-15 DIAGNOSIS — E11.9 TYPE 2 DIABETES MELLITUS WITHOUT COMPLICATION, WITHOUT LONG-TERM CURRENT USE OF INSULIN (HCC): ICD-10-CM

## 2017-08-15 DIAGNOSIS — M54.50 ACUTE RIGHT-SIDED LOW BACK PAIN WITHOUT SCIATICA: ICD-10-CM

## 2017-08-15 DIAGNOSIS — J45.20 MILD INTERMITTENT ASTHMA WITHOUT COMPLICATION: ICD-10-CM

## 2017-08-15 DIAGNOSIS — K21.9 GASTROESOPHAGEAL REFLUX DISEASE WITHOUT ESOPHAGITIS: ICD-10-CM

## 2017-08-15 DIAGNOSIS — E78.5 HYPERLIPIDEMIA, UNSPECIFIED HYPERLIPIDEMIA TYPE: ICD-10-CM

## 2017-08-15 NOTE — PATIENT INSTRUCTIONS
Back Stretches: Exercises  Your Care Instructions  Here are some examples of exercises for stretching your back. Start each exercise slowly. Ease off the exercise if you start to have pain. Your doctor or physical therapist will tell you when you can start these exercises and which ones will work best for you. How to do the exercises  Overhead stretch    1. Stand comfortably with your feet shoulder-width apart. 2. Looking straight ahead, raise both arms over your head and reach toward the ceiling. Do not allow your head to tilt back. 3. Hold for 15 to 30 seconds, then lower your arms to your sides. 4. Repeat 2 to 4 times. Side stretch    1. Stand comfortably with your feet shoulder-width apart. 2. Raise one arm over your head, and then lean to the other side. 3. Slide your hand down your leg as you let the weight of your arm gently stretch your side muscles. Hold for 15 to 30 seconds. 4. Repeat 2 to 4 times on each side. Press-up    1. Lie on your stomach, supporting your body with your forearms. 2. Press your elbows down into the floor to raise your upper back. As you do this, relax your stomach muscles and allow your back to arch without using your back muscles. As your press up, do not let your hips or pelvis come off the floor. 3. Hold for 15 to 30 seconds, then relax. 4. Repeat 2 to 4 times. Relax and rest    1. Lie on your back with a rolled towel under your neck and a pillow under your knees. Extend your arms comfortably to your sides. 2. Relax and breathe normally. 3. Remain in this position for about 10 minutes. 4. If you can, do this 2 or 3 times each day. Follow-up care is a key part of your treatment and safety. Be sure to make and go to all appointments, and call your doctor if you are having problems. It's also a good idea to know your test results and keep a list of the medicines you take. Where can you learn more? Go to http://yoselyn-merline.info/.   Enter F774 in the search box to learn more about \"Back Stretches: Exercises. \"  Current as of: March 21, 2017  Content Version: 11.3  © 5330-4500 [employer]. Care instructions adapted under license by [employer] (which disclaims liability or warranty for this information). If you have questions about a medical condition or this instruction, always ask your healthcare professional. [place] [address] any warranty or liability for your use of this information.

## 2017-08-15 NOTE — MR AVS SNAPSHOT
Visit Information Date & Time Provider Department Dept. Phone Encounter #  
 8/15/2017 11:30 AM Cecilia Beltran MD Internist of [address] [place] [address] [phone] Follow-up Instructions Return if symptoms worsen or fail to improve. Your Appointments 10/5/2017 12:30 PM  
Office Visit with Cecilia Beltran MD  
Internist of [facility] Appt Note: follow up  
 [address] [facility], [address] [facility] [address] [place]  
  
   
 [address]  
  
    
 11/21/2017  1:00 PM  
Follow Up with Marivel Eric DO [facility] ([place]) Appt Note: 6 month f.up  
 [facility] [address] [address] [address] [phone] [address] [place] [address] [address] Upcoming Health Maintenance Date Due  
 EYE EXAM RETINAL OR DILATED Q1 3/26/1965 ZOSTER VACCINE AGE 60> 1/26/2015 INFLUENZA AGE 9 TO ADULT 8/1/2017 HEMOGLOBIN A1C Q6M 8/27/2017 MICROALBUMIN Q1 2/27/2018 LIPID PANEL Q1 2/27/2018 FOOT EXAM Q1 3/21/2018 BREAST CANCER SCRN MAMMOGRAM 11/28/2018 PAP AKA CERVICAL CYTOLOGY 11/3/2021 COLONOSCOPY 12/6/2021 DTaP/Tdap/Td series (2 - Td) 2/16/2022 Allergies as of 8/15/2017  Review Complete On: 5/30/2017 By: AURORA Zhou Severity Noted Reaction Type Reactions Altace [Ramipril]  03/17/2011    Cough Penicillins    Other (comments) Hands peel Sulfur    Itching Current Immunizations  Reviewed on 3/17/2015 Name Date Influenza Vaccine (Quad) PF 10/19/2016, 10/23/2015 Influenza Vaccine PF 10/3/2014, 12/12/2013 Influenza Vaccine Split 10/22/2012, 11/2/2011 Influenza Vaccine Whole 10/29/2010 PPD 1/3/2012 Pneumococcal Polysaccharide (PPSV-23) 3/21/2017 TB Skin Test (PPD) Intradermal 2/12/2014 TDAP Vaccine 2/16/2012 Not reviewed this visit Vitals BP Pulse Temp Height(growth percentile) Weight(growth percentile) LMP  
 130/84 (BP 1 Location: Left arm, BP Patient Position: Sitting) 78 98.1 °F (36.7 °C) (Oral) 5' 2\" (1.575 m) 182 lb (82.6 kg) (LMP Unknown) SpO2 BMI OB Status Smoking Status 98% 33.29 kg/m2 Postmenopausal Never Smoker Vitals History BMI and BSA Data Body Mass Index Body Surface Area  
 33.29 kg/m 2 1.9 m 2 Preferred Pharmacy Pharmacy Name Phone [address], [address] [place] [address] [phone] Your Updated Medication List  
  
   
This list is accurate as of: 8/15/17 12:15 PM.  Always use your most recent med list.  
  
  
  
  
 albuterol 90 mcg/actuation inhaler Commonly known as:  PROAIR HFA  
inhale 2 puffs by mouth every 4 hours if needed for wheezing  
  
 clotrimazole-betamethasone topical cream  
Commonly known as:  [place] Apply  to both ear canals and affected part of outer ear twice a day with a finger. fluticasone 50 mcg/actuation nasal spray Commonly known as:  Rosana Hoot 2 Sprays by Both Nostrils route daily. hydroCHLOROthiazide 25 mg tablet Commonly known as:  HYDRODIURIL  
take 1/2 tablet by mouth once daily LORazepam 1 mg tablet Commonly known as:  ATIVAN  
TAKE 1 TABLET BY MOUTH EVERY 8 HOURS AS NEEDED FOR ANXIETY  
  
 metoprolol succinate 50 mg XL tablet Commonly known as:  TOPROL-XL  
take 1 tablet by mouth once daily MULTIVITAMIN PO Take 1 Tab by mouth every other day. omeprazole 20 mg capsule Commonly known as:  PriLOSEC Take 1 Cap by mouth daily. potassium chloride 20 mEq tablet Commonly known as:  K-DUR, KLOR-CON  
take 1 tablet by mouth once daily QVAR 40 mcg/actuation [employer] Generic drug:  beclomethasone Take 1 Puff by inhalation two (2) times a day. REFRESH LIQUIGEL 1 % Dlgl Generic drug:  carboxymethylcellulose sodium Apply  to eye. Follow-up Instructions Return if symptoms worsen or fail to improve. Patient Instructions Back Stretches: Exercises Your Care Instructions Here are some examples of exercises for stretching your back. Start each exercise slowly. Ease off the exercise if you start to have pain. Your doctor or physical therapist will tell you when you can start these exercises and which ones will work best for you. How to do the exercises Overhead stretch 1. Stand comfortably with your feet shoulder-width apart. 2. Looking straight ahead, raise both arms over your head and reach toward the ceiling. Do not allow your head to tilt back. 3. Hold for 15 to 30 seconds, then lower your arms to your sides. 4. Repeat 2 to 4 times. Side stretch 1. Stand comfortably with your feet shoulder-width apart. 2. Raise one arm over your head, and then lean to the other side. 3. Slide your hand down your leg as you let the weight of your arm gently stretch your side muscles. Hold for 15 to 30 seconds. 4. Repeat 2 to 4 times on each side. Press-up 1. Lie on your stomach, supporting your body with your forearms. 2. Press your elbows down into the floor to raise your upper back. As you do this, relax your stomach muscles and allow your back to arch without using your back muscles. As your press up, do not let your hips or pelvis come off the floor. 3. Hold for 15 to 30 seconds, then relax. 4. Repeat 2 to 4 times. Relax and rest 
 
1. Lie on your back with a rolled towel under your neck and a pillow under your knees. Extend your arms comfortably to your sides. 2. Relax and breathe normally. 3. Remain in this position for about 10 minutes. 4. If you can, do this 2 or 3 times each day. Follow-up care is a key part of your treatment and safety. Be sure to make and go to all appointments, and call your doctor if you are having problems.  It's also a good idea to know your test results and keep a list of the medicines you take. Where can you learn more? Go to http://yoselyn-merline.info/. Enter A481 in the search box to learn more about \"Back Stretches: Exercises. \" Current as of: March 21, 2017 Content Version: 11.3 © 4896-1128 [employer]. Care instructions adapted under license by [employer] (which disclaims liability or warranty for this information). If you have questions about a medical condition or this instruction, always ask your healthcare professional. [place] [address] any warranty or liability for your use of this information. Introducing \[facility]\"" & HEALTH SERVICES! [employer] introduces Ker[employer] patient portal. Now you can access parts of your medical record, email your doctor's office, and request medication refills online. 1. In your internet browser, go to [email or web address] 2. Click on the First Time User? Click Here link in the Sign In box. You will see the New Member Sign Up page. 3. Enter your Kerlink Access Code exactly as it appears below. You will not need to use this code after youve completed the sign-up process. If you do not sign up before the expiration date, you must request a new code. · Kerlink Access Code: [email or web address] Expires: 8/28/2017  3:12 PM 
 
4. Enter the last four digits of your Social Security Number (xxxx) and Date of Birth (mm/dd/yyyy) as indicated and click Submit. You will be taken to the next sign-up page. 5. Create a Reading Trailst ID. This will be your Kerlink login ID and cannot be changed, so think of one that is secure and easy to remember. 6. Create a Kerlink password. You can change your password at any time. 7. Enter your Password Reset Question and Answer. This can be used at a later time if you forget your password. 8. Enter your e-mail address. You will receive e-mail notification when new information is available in [address] 9. Click Sign Up. You can now view and download portions of your medical record. 10. Click the Download Summary menu link to download a portable copy of your medical information. If you have questions, please visit the Frequently Asked Questions section of the Z[employer]i website. Remember, [employer]i is NOT to be used for urgent needs. For medical emergencies, dial 911. Now available from your iPhone and Android! Please provide this summary of care documentation to your next provider. Your primary care clinician is listed as Herrera Proctor. If you have any questions after today's visit, please call [phone].

## 2017-08-15 NOTE — PROGRESS NOTES
1. Have you been to the ER, urgent care clinic or hospitalized since your last visit? YES, [facility] month for a urinary tract infection and was given doxycycline. 2. Have you seen or consulted any other health care providers outside of the [address] [place] [address] since your last visit (Include any pap smears or colon screening)? NO          Health Maintenance Due   Topic Date Due    EYE EXAM RETINAL OR DILATED Q1  03/26/1965    ZOSTER VACCINE AGE 60>  01/26/2015    INFLUENZA AGE 9 TO ADULT  08/01/2017    HEMOGLOBIN A1C Q6M  08/27/2017       Patient complains of right flank soreness that might be due to arthritis or holding a young child. Onset May 2017. Patient states she has not taken anything to eliminate the pain.

## 2017-08-19 NOTE — PROGRESS NOTES
HPI:   Jenn Craig is a 58y.o. year old female who presents today for evaluation of back pain. She has a history of hypertension, hyperlipidemia, paroxysmal SVT, GERD, asthma, elevated transaminases, abnormal glucose, and atypical mycobacterial pneumonia. She reports that she is doing relatively well. She states that she has been having some difficulty adjusting to the death of her mother, but realizes that it will take time. She states that for the last several weeks, she has been experiencing jayson right sided low back pain. She denies any radiation of the pain or symptoms of sciatica. She states that she noted the onset of the pain following lifting her 30 pound granddaughter. She states that she believes that the pain is exacerbated by her continuing to pick her up. She does report a recent fall in her garden, but states that she fell on her left side and bruised her left arm and leg. She states that she has been taking Tylenol for the discomfort with some relief. She denies any fevers, chills, weight change, saddle paresthesia, neurogenic bowel or bladder symptoms, or recent trauma. She has a history of hypertension, treated with metoprolol, and hydrochlorothiazide (+ potassium). She reports that she does not check her blood pressure at home. She does not exercise regularly, but denies any chest pain, shortness of breath at rest or with exertion, lightheadedness, or edema. 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 She has had approximately six severe episodes over the years, prompting presentation to the ED and treatment with IV Adenosine. She currently reports infrequent short episodes of palpitations and is being treated with metoprolol. She is followed by Dr. Ene Hutchinson. She had an echocardiogram (10/2005) showing normal LV size and function (EF 60-65%), and no valvular pathology.  In 11/2012, she underwent an exercise stress echocardiogram, which was normal at maximal exercise. She has a history of hyperlipidemia, treated with simvastatin from 10/2012 to 3/2015 at which time she stopped taking it due to myalgias. She restarted it on 8/2015 and continued to take it without difficulty until 3/2016, when it was noticed that she had transaminase elevation (AST 85/ ) and it was discontinued. Evaluation included hepatitis A, B, and C levels (negative), iron panel (normal), and RUQ ultrasound (3/23/2016) with limited sonographic window for the liver; only partially visualized but grossly unremarkable. Repeat hepatic panel (4/22/2016) showed AST 75 and ALT 98. Repeat lipid panel showed total chol 215/ / HDL 64/. In 5/2016, she had an abdominal CT scan showing the liver to be normal in size with normal parenchymal density; no discrete mass or ascites, and no intrahepatic biliary dilatation. She was subsequently instructed to restart simvastatin. However, she reports that she only took it for one week and then discontinued it. She states that she did not have any side effects, but felt it was not needed. She expressed that she does not wish to restart therapy with a statin. She has a history of asthma and allergic rhinitis and is followed by Dr. Saurabh Blair. She is receiving immunotherapy once per month, and reports that she has not required any inhalers recently. She states that she does not use Qvar daily, but will take it occasionally. She does use Claritin every day. She does have a history of abnormal glucose dating back to 2012. She denies any polyuria, polydipsia, nocturia, or blurry vision, and has no history of retinopathy, neuropathy, or nephropathy. 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 She was treated with Avelox, and repeat chest x-ray in 1/2012 showed complete resolution of the pneumonia.  She denies any cough or shortness of breath. She does report frequent post nasal drainage. She had a screening colonoscopy in 12/2006 by Dr. Ese Mendoza showing a 5 mm sessile polyp in the rectum (pathology: hyperplastic). She had a repeat colonoscopy in 12/2016 which showed moderate sigmoid diverticulosis and a 6 mm sessile ascending colon polyp (pathology: serrated adenoma). Follow-up recommended for 5 years. She denies any abdominal pain, nausea, vomiting, melena, hematochezia, or change in bowel movements. She does take omeprazole occasionally for GERD symptoms. Past Medical History:   Diagnosis Date    Allergic rhinitis     Asthma     Cardiac stress echo, normal 11/02/2012    Normal maximal stress echo study. EF 60%. Ex time 9 min 45 sec. 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  Hyperlipidemia     Hypertension     Menopause     Plantar fasciitis     left    PSVT (paroxysmal supraventricular tachycardia) ([facility])     A-V dharmesh reentrant tachycardia     Past Surgical History:   Procedure Laterality Date    ENDOSCOPY, COLON, DIAGNOSTIC      polyp    HX CERVICAL POLYPECTOMY      HX CYST INCISION AND DRAINAGE Right 10 or more years    HX DILATION AND CURETTAGE      HX GYN      polyp on cervix    HX POLYPECTOMY      from rectum     Current Outpatient Prescriptions   Medication Sig    LORazepam (ATIVAN) 1 mg tablet TAKE 1 TABLET BY MOUTH EVERY 8 HOURS AS NEEDED FOR ANXIETY    fluticasone (FLONASE) 50 mcg/actuation nasal spray 2 Sprays by Both Nostrils route daily.  metoprolol succinate (TOPROL-XL) 50 mg XL tablet take 1 tablet by mouth once daily    potassium chloride (K-DUR, KLOR-CON) 20 mEq tablet take 1 tablet by mouth once daily    carboxymethylcellulose sodium (REFRESH LIQUIGEL) 1 % dlgl Apply  to eye.     hydrochlorothiazide (HYDRODIURIL) 25 mg tablet take 1/2 tablet by mouth once daily    beclomethasone (QVAR) 40 mcg/actuation inhaler Take 1 Puff by inhalation two (2) times a day.  MULTIVITAMIN PO Take 1 Tab by mouth every other day.  albuterol (PROAIR HFA) 90 mcg/actuation inhaler inhale 2 puffs by mouth every 4 hours if needed for wheezing    omeprazole (PRILOSEC) 20 mg capsule Take 1 Cap by mouth daily.  clotrimazole-betamethasone (LOTRISONE) topical cream Apply  to both ear canals and affected part of outer ear twice a day with a finger. No current facility-administered medications for this visit. Allergies and Intolerances: Allergies   Allergen Reactions    Altace [Ramipril] Cough    Penicillins Other (comments)     Hands peel    Sulfur Itching     Family History: She had two aunts who had breast cancer ( her mother's sister and father's sister). She has no FH of colon cancer. Her mother is still alive with HTN. Her father  form metastatic prostate cancer. Family History   Problem Relation Age of Onset   [facility] Arthritis-osteo Mother     Hypertension Mother           Cancer Father      bone cancer    Hypertension Sister     Hypertension Sister     Hypertension Sister     Breast Cancer Maternal Aunt     Breast Cancer Paternal Aunt     Diabetes Other     Stroke Other     Other Sister      twin sister - osteopenia and low vitamin D levels     Social History:   She  reports that she has never smoked. She has never used smokeless tobacco. She is  and has two sons. She was a homemaker, but worked part-time in . She now helps care for her grandchildren.    History   Alcohol Use No     Immunization History:  Immunization History   Administered Date(s) Administered    Influenza Vaccine (Quad) PF 10/23/2015, 10/19/2016    Influenza Vaccine PF 2013, 10/03/2014    Influenza Vaccine Split 2011, 10/22/2012    Influenza Vaccine Whole 10/29/2010    PPD 01/03/2012    Pneumococcal Polysaccharide (PPSV-23) 03/21/2017    TB Skin Test (PPD) Intradermal 02/12/2014    TDAP Vaccine 02/16/2012       Review of Systems:   As above included in HPI. Otherwise 11 point review of systems negative including constitutional, skin, HENT, eyes, respiratory, cardiovascular, gastrointestinal, genitourinary, musculoskeletal, endocrine, hematologic, allergy, and neurologic. Physical:   Vitals:   BP: 130/84   HR: 78  WT: 182 lb (82.6 kg)  BMI:  33.29 kg/m2    Exam:   Pt appears well; alert and oriented x 3; appropriate affect. HEENT: PERRLA, anicteric, oropharynx clear, no JVD, adenopathy or thyromegaly. No carotid bruits or radiated murmur. Lungs: clear to auscultation, no wheezes, rhonchi, or rales. Heart: regular rate and rhythm. No murmur, rubs, gallops  Abdomen: soft, nontender, nondistended, normal bowel sounds, no hepatosplenomegaly or masses. Extremities: without edema. Pulses 1-2+ bilaterally. Back: right side with minimal tenderness in paraspinal muscle. No spinal tenderness. Negative straight leg raises. Review of Data:  Labs:  No visits with results within 1 Month(s) from this visit. Latest known visit with results is:    Hospital Outpatient Visit on 03/27/2017   Component Date Value Ref Range Status    Special Requests: 03/27/2017 NO SPECIAL REQUESTS    Final    Culture result: 03/27/2017 NO GROWTH 2 DAYS    Final     Calculated 10 year ASCVD risk score:  8.0  %    Health Maintenance:  Screening:    Mammogram: negative (11/2016)   PAP smear: negative (10/2016) with negative HPV. Followed by Dr. Aarti Musa. Colorectal: colonoscopy (12/2016) serrated adenoma. Dr. Le Gutierrez. Due 2021.    Depression: none   DM (HbA1c/FPG): HbA1c 68 (2/2017)   Hepatitis C: negative (3/2016)   Falls: one   DEXA: within normal limits (11/2015)   Glaucoma: regular eye exams with Dr. Radha Townsend (last 7/2017)   Smoking: none   Vitamin D: 26.6 (2/2017)   Medicare Wellness: N/A      Impression:  Patient Active Problem List   Diagnosis Code    Allergic rhinitis J30.9    Colon polyps K63.5    PSVT (paroxysmal supraventricular tachycardia) ([facility]) I47.1    Palpitations R00.2    Hyperlipidemia E78.5    Hypertensive heart disease  I11.9    Asthma J45.909    Bradycardia, sinus R00.1    Dizziness R42    Solitary cyst of breast N60.09    GERD (gastroesophageal reflux disease) K21.9    Elevated LFTs R94.5    Type 2 diabetes mellitus without complication, without long-term current use of insulin ([facility]) E11.9    Vitamin D insufficiency E55.9       Plan:  1. Right sided back pain. Appears to be muscle strain, and responding to Tylenol. Discussed proper ways of lifting her grandchild and performing back stretches to help with discomfort. Will reassess at next visit. 2. Hypertension. Well controlled on current regimen of metoprolol and hydrochlorothiazide. Renal function has normal with creatinine 0.73 / eGFR >60. Will reassess at physical next month. Continue to follow. 2. Hyperlipidemia. Patient not wishing to restart statin at this time. Did not restart as instructed in 5/2016 after CT scan results reviewed. Based on current lipid panel, her calculated 10 year ASCVD risk is 8.0 %, which actually does place her in one of the four statin benefit groups as per new AHA/ACC guidelines (primary prevention: 10 year ASCVD risk >7.5%). Thus, would recommend treatment with statin at this time. Also, with new diagnosis of diabetes mellitus which is another indication for use. However, patient unwilling today. Wishing to attempt control through lifestyle modifications. Will recheck lipid panel at next visit and readdress. Will continue to follow. 3. Diabetes mellitus. Abnormal glucose has been present since 2012, although normal in 2/2016. HbA1c had increased to 6.8. Discussed importance of lifestyle modifications, including diet, exercise, and weight loss. Will refer for diabetes education. No evidence of microvascular complications. Not on Ace-I or statin. Followed by Dr. Abeba Hassan for eye exams. Foot exam normal (3/2017) and urine microalbumin/ creatinine ratio without evidence of microalbuminuria. Will recheck HbA1c at physical next month. 4. Elevated LFT's. Significantly improved. Unclear etiology, but doubt secondary to statin. Hepatitis panel and iron studies normal. RUQ ultrasound difficult secondary to body habitus, but abdominal CT scan showed normal liver parenchyma. Will continue to follow and further evaluation if worsens. Repeat at next visit. 5. AV dharmesh reentrant tachycardia. No recent episodes. On metoprolol and no significant palpitations recently. Followed by Dr. Bishop Mayfield. 6. Asthma/ allergies. Exacerbation one month prior with episode of bronchitis, but now improved. Discussed using Qvar daily and albuterol only as needed. Receiving monthly immunotherapy injections. Followed by Dr. Jay Conroy. 7. GERD. Symptoms generally controlled with prn omeprazole. Follow. 8. Health maintenance. Will give influenza vaccine next month at physical. Already received pneumovax given asthma history. Will discuss Zoster vaccine at next visit. Colonoscopy due 2021. Mammogram up to date. Continue regular eye exams with Dr. Abeba Hassan. Vitamin D level has been low. Will recheck next visit. Patient understands recommendations and agrees with plan. Follow-up for physical in 10/2017.

## 2017-08-20 RX ORDER — HYDROCHLOROTHIAZIDE 25 MG/1
TABLET ORAL
Qty: 45 TAB | Refills: 3 | Status: SHIPPED | OUTPATIENT
Start: 2017-08-20 | End: 2018-10-01 | Stop reason: SDUPTHER

## 2017-09-20 ENCOUNTER — HOSPITAL ENCOUNTER (OUTPATIENT)
Dept: LAB | Age: 62
Discharge: HOME | End: 2017-09-20
Payer: COMMERCIAL

## 2017-09-20 DIAGNOSIS — E55.9 VITAMIN D INSUFFICIENCY: ICD-10-CM

## 2017-09-20 DIAGNOSIS — R79.89 ELEVATED LFTS: ICD-10-CM

## 2017-09-20 DIAGNOSIS — I10 ESSENTIAL HYPERTENSION: ICD-10-CM

## 2017-09-20 DIAGNOSIS — E11.9 TYPE 2 DIABETES MELLITUS WITHOUT COMPLICATION, WITHOUT LONG-TERM CURRENT USE OF INSULIN (HCC): ICD-10-CM

## 2017-09-20 DIAGNOSIS — E78.5 HYPERLIPIDEMIA, UNSPECIFIED HYPERLIPIDEMIA TYPE: ICD-10-CM

## 2017-09-20 LAB
25(OH)D3 SERPL-MCNC: 32.3 NG/ML (ref 30–100)
ALBUMIN SERPL-MCNC: 3.7 G/DL (ref 3.4–5)
ALBUMIN/GLOB SERPL: 0.9 {RATIO} (ref 0.8–1.7)
ALP SERPL-CCNC: 79 U/L (ref 45–117)
ALT SERPL-CCNC: 29 U/L (ref 13–56)
ANION GAP SERPL CALC-SCNC: 8 MMOL/L (ref 3–18)
APPEARANCE UR: CLEAR
AST SERPL-CCNC: 23 U/L (ref 15–37)
BACTERIA URNS QL MICRO: ABNORMAL /HPF
BASOPHILS # BLD: 0 K/UL (ref 0–0.06)
BASOPHILS NFR BLD: 0 % (ref 0–2)
BILIRUB SERPL-MCNC: 0.4 MG/DL (ref 0.2–1)
BILIRUB UR QL: NEGATIVE
BUN SERPL-MCNC: 11 MG/DL (ref 7–18)
BUN/CREAT SERPL: 16 (ref 12–20)
CALCIUM SERPL-MCNC: 8.8 MG/DL (ref 8.5–10.1)
CHLORIDE SERPL-SCNC: 100 MMOL/L (ref 100–108)
CHOLEST SERPL-MCNC: 236 MG/DL
CO2 SERPL-SCNC: 30 MMOL/L (ref 21–32)
COLOR UR: YELLOW
CREAT SERPL-MCNC: 0.67 MG/DL (ref 0.6–1.3)
CREAT UR-MCNC: 88.4 MG/DL (ref 30–125)
DIFFERENTIAL METHOD BLD: NORMAL
EOSINOPHIL # BLD: 0.3 K/UL (ref 0–0.4)
EOSINOPHIL NFR BLD: 3 % (ref 0–5)
EPITH CASTS URNS QL MICRO: ABNORMAL /LPF (ref 0–5)
ERYTHROCYTE [DISTWIDTH] IN BLOOD BY AUTOMATED COUNT: 12.2 % (ref 11.6–14.5)
EST. AVERAGE GLUCOSE BLD GHB EST-MCNC: 143 MG/DL
GLOBULIN SER CALC-MCNC: 3.9 G/DL (ref 2–4)
GLUCOSE SERPL-MCNC: 95 MG/DL (ref 74–99)
GLUCOSE UR STRIP.AUTO-MCNC: NEGATIVE MG/DL
HBA1C MFR BLD: 6.6 % (ref 4.2–5.6)
HCT VFR BLD AUTO: 42.4 % (ref 35–45)
HDLC SERPL-MCNC: 68 MG/DL (ref 40–60)
HDLC SERPL: 3.5 {RATIO} (ref 0–5)
HGB BLD-MCNC: 13.9 G/DL (ref 12–16)
HGB UR QL STRIP: ABNORMAL
KETONES UR QL STRIP.AUTO: NEGATIVE MG/DL
LDLC SERPL CALC-MCNC: 139 MG/DL (ref 0–100)
LEUKOCYTE ESTERASE UR QL STRIP.AUTO: ABNORMAL
LIPID PROFILE,FLP: ABNORMAL
LYMPHOCYTES # BLD: 2.8 K/UL (ref 0.9–3.6)
LYMPHOCYTES NFR BLD: 33 % (ref 21–52)
MCH RBC QN AUTO: 29.7 PG (ref 24–34)
MCHC RBC AUTO-ENTMCNC: 32.8 G/DL (ref 31–37)
MCV RBC AUTO: 90.6 FL (ref 74–97)
MICROALBUMIN UR-MCNC: 0.71 MG/DL (ref 0–3)
MICROALBUMIN/CREAT UR-RTO: 8 MG/G (ref 0–30)
MONOCYTES # BLD: 0.4 K/UL (ref 0.05–1.2)
MONOCYTES NFR BLD: 5 % (ref 3–10)
NEUTS SEG # BLD: 4.9 K/UL (ref 1.8–8)
NEUTS SEG NFR BLD: 59 % (ref 40–73)
NITRITE UR QL STRIP.AUTO: NEGATIVE
PH UR STRIP: 7.5 [PH] (ref 5–8)
PLATELET # BLD AUTO: 247 K/UL (ref 135–420)
PMV BLD AUTO: 11 FL (ref 9.2–11.8)
POTASSIUM SERPL-SCNC: 3.9 MMOL/L (ref 3.5–5.5)
PROT SERPL-MCNC: 7.6 G/DL (ref 6.4–8.2)
PROT UR STRIP-MCNC: NEGATIVE MG/DL
RBC # BLD AUTO: 4.68 M/UL (ref 4.2–5.3)
RBC #/AREA URNS HPF: ABNORMAL /HPF (ref 0–5)
SODIUM SERPL-SCNC: 138 MMOL/L (ref 136–145)
SP GR UR REFRACTOMETRY: 1.01 (ref 1–1.03)
TRIGL SERPL-MCNC: 145 MG/DL (ref ?–150)
TSH SERPL DL<=0.05 MIU/L-ACNC: 2.01 UIU/ML (ref 0.36–3.74)
UROBILINOGEN UR QL STRIP.AUTO: 0.2 EU/DL (ref 0.2–1)
VLDLC SERPL CALC-MCNC: 29 MG/DL
WBC # BLD AUTO: 8.4 K/UL (ref 4.6–13.2)
WBC URNS QL MICRO: ABNORMAL /HPF (ref 0–4)

## 2017-09-20 PROCEDURE — 84443 ASSAY THYROID STIM HORMONE: CPT | Performed by: INTERNAL MEDICINE

## 2017-09-20 PROCEDURE — 80061 LIPID PANEL: CPT | Performed by: INTERNAL MEDICINE

## 2017-09-20 PROCEDURE — 83036 HEMOGLOBIN GLYCOSYLATED A1C: CPT | Performed by: INTERNAL MEDICINE

## 2017-09-20 PROCEDURE — 81001 URINALYSIS AUTO W/SCOPE: CPT | Performed by: INTERNAL MEDICINE

## 2017-09-20 PROCEDURE — 36415 COLL VENOUS BLD VENIPUNCTURE: CPT | Performed by: INTERNAL MEDICINE

## 2017-09-20 PROCEDURE — 82043 UR ALBUMIN QUANTITATIVE: CPT | Performed by: INTERNAL MEDICINE

## 2017-09-20 PROCEDURE — 80053 COMPREHEN METABOLIC PANEL: CPT | Performed by: INTERNAL MEDICINE

## 2017-09-20 PROCEDURE — 82306 VITAMIN D 25 HYDROXY: CPT | Performed by: INTERNAL MEDICINE

## 2017-09-20 PROCEDURE — 85025 COMPLETE CBC W/AUTO DIFF WBC: CPT | Performed by: INTERNAL MEDICINE

## 2017-10-05 ENCOUNTER — OFFICE VISIT (OUTPATIENT)
Dept: INTERNAL MEDICINE CLINIC | Age: 62
End: 2017-10-05

## 2017-10-05 VITALS
DIASTOLIC BLOOD PRESSURE: 68 MMHG | BODY MASS INDEX: 33.49 KG/M2 | WEIGHT: 182 LBS | SYSTOLIC BLOOD PRESSURE: 116 MMHG | OXYGEN SATURATION: 97 % | RESPIRATION RATE: 14 BRPM | HEART RATE: 86 BPM | TEMPERATURE: 98.6 F | HEIGHT: 62 IN

## 2017-10-05 DIAGNOSIS — E66.09 CLASS 1 OBESITY DUE TO EXCESS CALORIES WITH SERIOUS COMORBIDITY AND BODY MASS INDEX (BMI) OF 33.0 TO 33.9 IN ADULT: ICD-10-CM

## 2017-10-05 DIAGNOSIS — I47.1 PSVT (PAROXYSMAL SUPRAVENTRICULAR TACHYCARDIA) (HCC): ICD-10-CM

## 2017-10-05 DIAGNOSIS — R79.89 ELEVATED LFTS: ICD-10-CM

## 2017-10-05 DIAGNOSIS — I11.9 BENIGN HYPERTENSIVE HEART DISEASE WITHOUT CONGESTIVE HEART FAILURE: ICD-10-CM

## 2017-10-05 DIAGNOSIS — J45.20 MILD INTERMITTENT ASTHMA WITHOUT COMPLICATION: ICD-10-CM

## 2017-10-05 DIAGNOSIS — I10 ESSENTIAL HYPERTENSION: ICD-10-CM

## 2017-10-05 DIAGNOSIS — E11.9 TYPE 2 DIABETES MELLITUS WITHOUT COMPLICATION, WITHOUT LONG-TERM CURRENT USE OF INSULIN (HCC): ICD-10-CM

## 2017-10-05 DIAGNOSIS — K21.9 GASTROESOPHAGEAL REFLUX DISEASE WITHOUT ESOPHAGITIS: ICD-10-CM

## 2017-10-05 DIAGNOSIS — R00.2 PALPITATIONS: ICD-10-CM

## 2017-10-05 DIAGNOSIS — Z23 ENCOUNTER FOR IMMUNIZATION: ICD-10-CM

## 2017-10-05 DIAGNOSIS — Z00.01 ENCOUNTER FOR ROUTINE ADULT PHYSICAL EXAM WITH ABNORMAL FINDINGS: Primary | ICD-10-CM

## 2017-10-05 DIAGNOSIS — E78.5 HYPERLIPIDEMIA, UNSPECIFIED HYPERLIPIDEMIA TYPE: ICD-10-CM

## 2017-10-05 NOTE — PROGRESS NOTES
Chief Complaint   Patient presents with    Complete Physical     Yearly physical with labs. Health Maintenance Due   Topic Date Due    ZOSTER VACCINE AGE 60>  01/26/2015    INFLUENZA AGE 9 TO ADULT  08/01/2017     1. Have you been to the ER, urgent care clinic or hospitalized since your last visit? NO.     2. Have you seen or consulted any other health care providers outside of the [address] [place] [address] since your last visit (Include any pap smears or colon screening)? YES, Patient states she went to her allergist, Dr. Cy Clemente, yesterday. Do you have an Advanced Directive? NO    Would you like information on Advanced Directives? YES    Patient states she has an appointment to get a mammogram next month November 2017 and has an GYN appointment this month October 2017. Patient given influenza vaccine, Fluarix, in right deltoid . Instructed patient to sit and wait 10-20 minutes before leaving the premises so that we can watch for any complications or adverse reactions. Patient given vaccine information statement handout before vaccine was given. Patient tolerated well without adverse reactions or complications.

## 2017-10-05 NOTE — PATIENT INSTRUCTIONS
Advance Care Planning: Care Instructions  Your Care Instructions  It can be hard to live with an illness that cannot be cured. But if your health is getting worse, you may want to make decisions about end-of-life care. Planning for the end of your life does not mean that you are giving up. It is a way to make sure that your wishes are met. Clearly stating your wishes can make it easier for your loved ones. Making plans while you are still able may also ease your mind and make your final days less stressful and more meaningful. Follow-up care is a key part of your treatment and safety. Be sure to make and go to all appointments, and call your doctor if you are having problems. It's also a good idea to know your test results and keep a list of the medicines you take. What can you do to plan for the end of life? · You can bring these issues up with your doctor. You do not need to wait until your doctor starts the conversation. You might start with \"I would not be willing to live with . Karri Araujo \" When you complete this sentence it helps your doctor understand your wishes. · Talk openly and honestly with your doctor. This is the best way to understand the decisions you will need to make as your health changes. Know that you can always change your mind. · Ask your doctor about commonly used life-support measures. These include tube feedings, breathing machines, and fluids given through a vein (IV). Understanding these treatments will help you decide whether you want them. · You may choose to have these life-supporting treatments for a limited time. This allows a trial period to see whether they will help you. You may also decide that you want your doctor to take only certain measures to keep you alive. It is important to spell out these conditions so that your doctor and family understand them. · Talk to your doctor about how long you are likely to live.  He or she may be able to give you an idea of what usually happens with your specific illness. · Think about preparing papers that state your wishes. This way there will not be any confusion about what you want. You can change your instructions at any time. Which papers should you prepare? Advance directives are legal papers that tell doctors how you want to be cared for at the end of your life. You do not need a  to write these papers. Ask your doctor or your state health department for information on how to write your advance directives. They may have the forms for each of these types of papers. Make sure your doctor has a copy of these on file, and give a copy to a family member or close friend. · Consider a do-not-resuscitate order (DNR). This order asks that no extra treatments be done if your heart stops or you stop breathing. Extra treatments may include cardiopulmonary resuscitation (CPR), electrical shock to restart your heart, or a machine to breathe for you. If you decide to have a DNR order, ask your doctor to explain and write it. Place the order in your home where everyone can easily see it. · Consider a living will. A living will explains your wishes about life support and other treatments at the end of your life if you become unable to speak for yourself. Living anthony tell doctors to use or not use treatments that would keep you alive. You must have one or two witnesses or a notary present when you sign this form. · Consider a durable power of  for health care. This allows you to name a person to make decisions about your care if you are not able to. Most people ask a close friend or family member. Talk to this person about the kinds of treatments you want and those that you do not want. Make sure this person understands your wishes. These legal papers are simple to change. Tell your doctor what you want to change, and ask him or her to make a note in your medical file. Give your family updated copies of the papers.   Where can you learn more?  Go to http://yoselyn-merline.info/. Enter P184 in the search box to learn more about \"Advance Care Planning: Care Instructions. \"  Current as of: November 17, 2016  Content Version: 11.3  © 7938-2059 [employer]. Care instructions adapted under license by [employer] (which disclaims liability or warranty for this information). If you have questions about a medical condition or this instruction, always ask your healthcare professional. [address] any warranty or liability for your use of this information. Advance Directives: Care Instructions  Your Care Instructions  An advance directive is a legal way to state your wishes at the end of your life. It tells your family and your doctor what to do if you can no longer say what you want. There are two main types of advance directives. You can change them any time that your wishes change. · A living will tells your family and your doctor your wishes about life support and other treatment. · A durable power of  for health care lets you name a person to make treatment decisions for you when you can't speak for yourself. This person is called a health care agent. If you do not have an advance directive, decisions about your medical care may be made by a doctor or a  who doesn't know you. It may help to think of an advance directive as a gift to the people who care for you. If you have one, they won't have to make tough decisions by themselves. Follow-up care is a key part of your treatment and safety. Be sure to make and go to all appointments, and call your doctor if you are having problems. It's also a good idea to know your test results and keep a list of the medicines you take. How can you care for yourself at home? · Discuss your wishes with your loved ones and your doctor. This way, there are no surprises. · Many states have a unique form.  Or you might use a universal form that has been approved by many states. This kind of form can sometimes be completed and stored online. Your electronic copy will then be available wherever you have a connection to the Internet. In most cases, doctors will respect your wishes even if you have a form from a different state. · You don't need a  to do an advance directive. But you may want to get legal advice. · Think about these questions when you prepare an advance directive:  ¨ Who do you want to make decisions about your medical care if you are not able to? Many people choose a family member or close friend. ¨ Do you know enough about life support methods that might be used? If not, talk to your doctor so you understand. ¨ What are you most afraid of that might happen? You might be afraid of having pain, losing your independence, or being kept alive by machines. ¨ Where would you prefer to die? Choices include your home, a hospital, or a nursing home. ¨ Would you like to have information about hospice care to support you and your family? ¨ Do you want to donate organs when you die? ¨ Do you want certain Gnosticism practices performed before you die? If so, put your wishes in the advance directive. · Read your advance directive every year, and make changes as needed. When should you call for help? Be sure to contact your doctor if you have any questions. Where can you learn more? Go to http://yoselyn-merline.info/. Enter R264 in the search box to learn more about \"Advance Directives: Care Instructions. \"  Current as of: November 17, 2016  Content Version: 11.3  © 6648-8127 [employer]. Care instructions adapted under license by [employer] (which disclaims liability or warranty for this information).  If you have questions about a medical condition or this instruction, always ask your healthcare professional. [address] any warranty or liability for your use of this information. Deciding About IV Fluids or Tube Feedings When You Have a Terminal Illness  Your Care Instructions  IV fluids and tube feedings can be used when you are no longer able to eat or drink by mouth. IV fluids are given through a needle placed in a vein. Liquid food can be given through a tube that goes down your nose into your stomach. Or it may be given through a tube that is surgically placed in your belly. You get to decide if you want to have IV fluids or tube feedings if you can no longer eat or drink on your own. It will not cure your illness, and it can be uncomfortable. But it may also make you feel better or live longer. Without IV fluids and tube feedings, your body will slow down naturally. Most likely, you will not feel hungry. And your doctor will take steps to keep you comfortable until you die. The decision about whether to have IV fluids and tube feedings is a personal one. You may decide that you would want one but not the other. Be sure to talk it over with your doctor and loved ones. Follow-up care is a key part of your treatment and safety. Be sure to make and go to all appointments, and call your doctor if you are having problems. It's also a good idea to know your test results and keep a list of the medicines you take. Why might you want IV fluids or tube feedings? · It may help you recover from a short illness or injury. · It may help improve the quality of your remaining time. · You believe that every possible step should be taken to preserve life, regardless of quality of life. · There is hope that there is or will soon be a cure for your condition. Why might you not want IV fluids or tube feedings? · It cannot cure a terminal illness. · It may prolong your life, but it would not make you more comfortable or increase the quality of the rest of your life. · There are more risks than benefits.  Risks include infection, pneumonia, and digestive problems such as diarrhea. · You do not wish to be kept alive artificially. When should you call for help? Be sure to contact your doctor if:  · You want more information about IV fluids and tube feedings. · You want to change your decision about receiving IV fluids and tube feedings. Where can you learn more? Go to http://yoselyn-merline.info/. Enter X888 in the search box to learn more about \"Deciding About IV Fluids or Tube Feedings When You Have a Terminal Illness. \"  Current as of: September 24, 2016  Content Version: 11.3  © 9040-1488 [employer]. Care instructions adapted under license by [employer] (which disclaims liability or warranty for this information). If you have questions about a medical condition or this instruction, always ask your healthcare professional. [address] any warranty or liability for your use of this information. Learning About Diabetes Food Guidelines  Your Care Instructions  Meal planning is important to manage diabetes. It helps keep your blood sugar at a target level (which you set with your doctor). You don't have to eat special foods. You can eat what your family eats, including sweets once in a while. But you do have to pay attention to how often you eat and how much you eat of certain foods. You may want to work with a dietitian or a certified diabetes educator (CDE) to help you plan meals and snacks. A dietitian or CDE can also help you lose weight if that is one of your goals. What should you know about eating carbs? Managing the amount of carbohydrate (carbs) you eat is an important part of healthy meals when you have diabetes. Carbohydrate is found in many foods. · Learn which foods have carbs. And learn the amounts of carbs in different foods. ¨ Bread, cereal, pasta, and rice have about 15 grams of carbs in a serving.  A serving is 1 slice of bread (1 ounce), ½ cup of cooked cereal, or 1/3 cup of cooked pasta or rice. ¨ Fruits have 15 grams of carbs in a serving. A serving is 1 small fresh fruit, such as an apple or orange; ½ of a banana; ½ cup of cooked or canned fruit; ½ cup of fruit juice; 1 cup of melon or raspberries; or 2 tablespoons of dried fruit. ¨ Milk and no-sugar-added yogurt have 15 grams of carbs in a serving. A serving is 1 cup of milk or 2/3 cup of no-sugar-added yogurt. ¨ Starchy vegetables have 15 grams of carbs in a serving. A serving is ½ cup of mashed potatoes or sweet potato; 1 cup winter squash; ½ of a small baked potato; ½ cup of cooked beans; or ½ cup cooked corn or green peas. · Learn how much carbs to eat each day and at each meal. A dietitian or CDE can teach you how to keep track of the amount of carbs you eat. This is called carbohydrate counting. · If you are not sure how to count carbohydrate grams, use the Plate Method to plan meals. It is a good, quick way to make sure that you have a balanced meal. It also helps you spread carbs throughout the day. ¨ Divide your plate by types of foods. Put non-starchy vegetables on half the plate, meat or other protein food on one-quarter of the plate, and a grain or starchy vegetable in the final quarter of the plate. To this you can add a small piece of fruit and 1 cup of milk or yogurt, depending on how many carbs you are supposed to eat at a meal.  · Try to eat about the same amount of carbs at each meal. Do not \"save up\" your daily allowance of carbs to eat at one meal.  · Proteins have very little or no carbs per serving. Examples of proteins are beef, chicken, turkey, fish, eggs, tofu, cheese, cottage cheese, and peanut butter. A serving size of meat is 3 ounces, which is about the size of a deck of cards. Examples of meat substitute serving sizes (equal to 1 ounce of meat) are 1/4 cup of cottage cheese, 1 egg, 1 tablespoon of peanut butter, and ½ cup of tofu. How can you eat out and still eat healthy?   · Learn to estimate the serving sizes of foods that have carbohydrate. If you measure food at home, it will be easier to estimate the amount in a serving of restaurant food. · If the meal you order has too much carbohydrate (such as potatoes, corn, or baked beans), ask to have a low-carbohydrate food instead. Ask for a salad or green vegetables. · If you use insulin, check your blood sugar before and after eating out to help you plan how much to eat in the future. · If you eat more carbohydrate at a meal than you had planned, take a walk or do other exercise. This will help lower your blood sugar. What else should you know? · Limit saturated fat, such as the fat from meat and dairy products. This is a healthy choice because people who have diabetes are at higher risk of heart disease. So choose lean cuts of meat and nonfat or low-fat dairy products. Use olive or canola oil instead of butter or shortening when cooking. · Don't skip meals. Your blood sugar may drop too low if you skip meals and take insulin or certain medicines for diabetes. · Check with your doctor before you drink alcohol. Alcohol can cause your blood sugar to drop too low. Alcohol can also cause a bad reaction if you take certain diabetes medicines. Follow-up care is a key part of your treatment and safety. Be sure to make and go to all appointments, and call your doctor if you are having problems. It's also a good idea to know your test results and keep a list of the medicines you take. Where can you learn more? Go to http://yoselyn-merline.info/. Enter G107 in the search box to learn more about \"Learning About Diabetes Food Guidelines. \"  Current as of: March 13, 2017  Content Version: 11.3  © 6900-8425 [employer]. Care instructions adapted under license by [employer] (which disclaims liability or warranty for this information).  If you have questions about a medical condition or this instruction, always ask your healthcare professional. [address] any warranty or liability for your use of this information. Learning About Meal Planning for Diabetes  Why plan your meals? Meal planning can be a key part of managing diabetes. Planning meals and snacks with the right balance of carbohydrate, protein, and fat can help you keep your blood sugar at the target level you set with your doctor. You don't have to eat special foods. You can eat what your family eats, including sweets once in a while. But you do have to pay attention to how often you eat and how much you eat of certain foods. You may want to work with a dietitian or a certified diabetes educator. He or she can give you tips and meal ideas and can answer your questions about meal planning. This health professional can also help you reach a healthy weight if that is one of your goals. What plan is right for you? Your dietitian or diabetes educator may suggest that you start with the plate format or carbohydrate counting. The plate format  The plate format is a simple way to help you manage how you eat. You plan meals by learning how much space each food should take on a plate. Using the plate format helps you spread carbohydrate throughout the day. It can make it easier to keep your blood sugar level within your target range. It also helps you see if you're eating healthy portion sizes. To use the plate format, you put non-starchy vegetables on half your plate. Add meat or meat substitutes on one-quarter of the plate. Put a grain or starchy vegetable (such as brown rice or a potato) on the final quarter of the plate. You can add a small piece of fruit and some low-fat or fat-free milk or yogurt, depending on your carbohydrate goal for each meal.  Here are some tips for using the plate format:  · Make sure that you are not using an oversized plate. A 9-inch plate is best. Many restaurants use larger plates.   · Get used to using the plate format at home. Then you can use it when you eat out. · Write down your questions about using the plate format. Talk to your doctor, a dietitian, or a diabetes educator about your concerns. Carbohydrate counting  With carbohydrate counting, you plan meals based on the amount of carbohydrate in each food. Carbohydrate raises blood sugar higher and more quickly than any other nutrient. It is found in desserts, breads and cereals, and fruit. It's also found in starchy vegetables such as potatoes and corn, grains such as rice and pasta, and milk and yogurt. Spreading carbohydrate throughout the day helps keep your blood sugar levels within your target range. Your daily amount depends on several things, including your weight, how active you are, which diabetes medicines you take, and what your goals are for your blood sugar levels. A registered dietitian or diabetes educator can help you plan how much carbohydrate to include in each meal and snack. A guideline for your daily amount of carbohydrate is:  · 45 to 60 grams at each meal. That's about the same as 3 to 4 carbohydrate servings. · 15 to 20 grams at each snack. That's about the same as 1 carbohydrate serving. The Nutrition Facts label on packaged foods tells you how much carbohydrate is in a serving of the food. First, look at the serving size on the food label. Is that the amount you eat in a serving? All of the nutrition information on a food label is based on that serving size. So if you eat more or less than that, you'll need to adjust the other numbers. Total carbohydrate is the next thing you need to look for on the label. If you count carbohydrate servings, one serving of carbohydrate is 15 grams. For foods that don't come with labels, such as fresh fruits and vegetables, you'll need a guide that lists carbohydrate in these foods. Ask your doctor, dietitian, or diabetes educator about books or other nutrition guides you can use.   If you take insulin, you need to know how many grams of carbohydrate are in a meal. This lets you know how much rapid-acting insulin to take before you eat. If you use an insulin pump, you get a constant rate of insulin during the day. So the pump must be programmed at meals to give you extra insulin to cover the rise in blood sugar after meals. When you know how much carbohydrate you will eat, you can take the right amount of insulin. Or, if you always use the same amount of insulin, you need to make sure that you eat the same amount of carbohydrate at meals. If you need more help to understand carbohydrate counting and food labels, ask your doctor, dietitian, or diabetes educator. How do you get started with meal planning? Here are some tips to get started:  · Plan your meals a week at a time. Don't forget to include snacks too. · Use cookbooks or online recipes to plan several main meals. Plan some quick meals for busy nights. You also can double some recipes that freeze well. Then you can save half for other busy nights when you don't have time to cook. · Make sure you have the ingredients you need for your recipes. If you're running low on basic items, put these items on your shopping list too. · List foods that you use to make breakfasts, lunches, and snacks. List plenty of fruits and vegetables. · Post this list on the refrigerator. Add to it as you think of more things you need. · Take the list to the store to do your weekly shopping. Follow-up care is a key part of your treatment and safety. Be sure to make and go to all appointments, and call your doctor if you are having problems. It's also a good idea to know your test results and keep a list of the medicines you take. Where can you learn more? Go to http://yoselyn-merline.info/. Will Márquez in the search box to learn more about \"Learning About Meal Planning for Diabetes. \"  Current as of: March 13, 2017  Content Version: 11.3  © 6314-8401 [employer]. Care instructions adapted under license by [employer] (which disclaims liability or warranty for this information). If you have questions about a medical condition or this instruction, always ask your healthcare professional. [address] any warranty or liability for your use of this information.

## 2017-10-05 NOTE — MR AVS SNAPSHOT
Visit Information Date & Time Provider Department Dept. Phone Encounter #  
 10/5/2017 12:30 PM Nicholas Abebe MD Internist of [address] [id number] Follow-up Instructions Return in about 3 months (around 1/5/2018), or if symptoms worsen or fail to improve. Your Appointments 11/21/2017  1:00 PM  
Follow Up with Joaquin Car DO [facility] ([address]) Appt Note: 6 month f.up  
 [address] [id number]  
[phone] [address]  
  
    
 1/25/2018 12:30 PM  
Office Visit with Nicholas Abbee MD  
Internist of [address] [facility] [address] [facility]) Appt Note: 3 month follow up  
 [address], [address] [place] [address] [place]  
  
   
 [address], [address] Upcoming Health Maintenance Date Due ZOSTER VACCINE AGE 60> 12/29/2017* HEMOGLOBIN A1C Q6M 3/20/2018 FOOT EXAM Q1 3/21/2018 EYE EXAM RETINAL OR DILATED Q1 6/20/2018 MICROALBUMIN Q1 9/20/2018 LIPID PANEL Q1 9/20/2018 BREAST CANCER SCRN MAMMOGRAM 11/28/2018 PAP AKA CERVICAL CYTOLOGY 11/3/2021 COLONOSCOPY 12/6/2021 DTaP/Tdap/Td series (2 - Td) 2/16/2022 *Topic was postponed. The date shown is not the original due date. Allergies as of 10/5/2017  Review Complete On: 10/5/2017 By: Earvin Large Severity Noted Reaction Type Reactions Altace [Ramipril]  03/17/2011    Cough Penicillins    Other (comments) Hands peel Sulfur    Itching Current Immunizations  Reviewed on 10/5/2017 Name Date Influenza Vaccine (Quad) PF 10/5/2017 12:43 PM, 10/19/2016, 10/23/2015 Influenza Vaccine PF 10/3/2014, 12/12/2013 Influenza Vaccine Split 10/22/2012, 11/2/2011 Influenza Vaccine Whole 10/29/2010 PPD 1/3/2012 Pneumococcal Polysaccharide (PPSV-23) 3/21/2017 TB Skin Test (PPD) Intradermal 2/12/2014 TDAP Vaccine 2/16/2012 Reviewed by Patria Medina on 10/5/2017 at 12:46 PM  
You Were Diagnosed With   
  
 Codes Comments Encounter for immunization    -  Primary ICD-10-CM: Z85 ICD-9-CM: V03.89 Vitals BP Pulse Temp Resp Height(growth percentile) Weight(growth percentile) 116/68 (BP 1 Location: Left arm, BP Patient Position: Sitting) 86 98.6 °F (37 °C) (Oral) 14 5' 2\" (1.575 m) 182 lb (82.6 kg) LMP SpO2 BMI OB Status Smoking Status (LMP Unknown) 97% 33.29 kg/m2 Postmenopausal Never Smoker Vitals History BMI and BSA Data Body Mass Index Body Surface Area  
 33.29 kg/m 2 1.9 m 2 Preferred Pharmacy Pharmacy Name Phone [address], [address] [place] [address] [phone] Your Updated Medication List  
  
   
This list is accurate as of: 10/5/17  1:18 PM.  Always use your most recent med list.  
  
  
  
  
 albuterol 90 mcg/actuation inhaler Commonly known as:  PROAIR HFA  
inhale 2 puffs by mouth every 4 hours if needed for wheezing  
  
 clotrimazole-betamethasone topical cream  
Commonly known as:  [place] Apply  to both ear canals and affected part of outer ear twice a day with a finger. fluticasone 50 mcg/actuation nasal spray Commonly known as:  [place] Peel 2 Sprays by Both Nostrils route daily. hydroCHLOROthiazide 25 mg tablet Commonly known as:  HYDRODIURIL  
take 1/2 tablet by mouth once daily LORazepam 1 mg tablet Commonly known as:  ATIVAN  
TAKE 1 TABLET BY MOUTH EVERY 8 HOURS AS NEEDED FOR ANXIETY  
  
 metoprolol succinate 50 mg XL tablet Commonly known as:  TOPROL-XL  
take 1 tablet by mouth once daily MULTIVITAMIN PO Take 1 Tab by mouth every other day. omeprazole 20 mg capsule Commonly known as:  PriLOSEC Take 1 Cap by mouth daily. potassium chloride 20 mEq tablet Commonly known as:  K-DUR, KLOR-CON  
 take 1 tablet by mouth once daily QVAR 40 mcg/actuation [employer] Generic drug:  beclomethasone Take 1 Puff by inhalation two (2) times a day. REFRESH LIQUIGEL 1 % Dlgl Generic drug:  carboxymethylcellulose sodium Apply  to eye. We Performed the Following INFLUENZA VIRUS VAC QUAD,SPLIT,PRESV FREE SYRINGE IM [id number] CPT(R)] Follow-up Instructions Return in about 3 months (around 1/5/2018), or if symptoms worsen or fail to improve. To-Do List   
 11/29/2017 10:30 AM  
  Appointment with SONYA PHILLIPS at [address] [place] [address] ([phone]) PAYMENT  For Non-Medicare patients - $15.00 will be collected from you at the time of your exam.  You will be billed $35.00 from the reading [facility]. OUTSIDE FILMS  - Any outside films related to the study being scheduled should be brought with you on the day of the exam.  If this cannot be done there may be a delay in the reading of the study. MEDICATIONS  - Patient must bring a complete list of all medications currently taking to include prescriptions, over-the-counter meds, herbals, vitamins & any dietary supplements  GENERAL INSTRUCTIONS  - On the day of your exam do not use any bath powder, deodorant or lotions on the armpit area. -Tenderness of breasts may cause an increase of discomfort during procedure. If you are experiencing breast tenderness on the day of your appointment and would like to reschedule, please call [phone]. Patient Instructions Advance Care Planning: Care Instructions Your Care Instructions It can be hard to live with an illness that cannot be cured. But if your health is getting worse, you may want to make decisions about end-of-life care. Planning for the end of your life does not mean that you are giving up. It is a way to make sure that your wishes are met.  Clearly stating your wishes can make it easier for your loved ones. Making plans while you are still able may also ease your mind and make your final days less stressful and more meaningful. Follow-up care is a key part of your treatment and safety. Be sure to make and go to all appointments, and call your doctor if you are having problems. It's also a good idea to know your test results and keep a list of the medicines you take. What can you do to plan for the end of life? · You can bring these issues up with your doctor. You do not need to wait until your doctor starts the conversation. You might start with \"I would not be willing to live with . [place] \" When you complete this sentence it helps your doctor understand your wishes. · Talk openly and honestly with your doctor. This is the best way to understand the decisions you will need to make as your health changes. Know that you can always change your mind. · Ask your doctor about commonly used life-support measures. These include tube feedings, breathing machines, and fluids given through a vein (IV). Understanding these treatments will help you decide whether you want them. · You may choose to have these life-supporting treatments for a limited time. This allows a trial period to see whether they will help you. You may also decide that you want your doctor to take only certain measures to keep you alive. It is important to spell out these conditions so that your doctor and family understand them. · Talk to your doctor about how long you are likely to live. He or she may be able to give you an idea of what usually happens with your specific illness. · Think about preparing papers that state your wishes. This way there will not be any confusion about what you want. You can change your instructions at any time. Which papers should you prepare?  
Advance directives are legal papers that tell doctors how you want to be cared for at the end of your life. You do not need a  to write these papers. Ask your doctor or your [facility] department for information on how to write your advance directives. They may have the forms for each of these types of papers. Make sure your doctor has a copy of these on file, and give a copy to a family member or close friend. · Consider a do-not-resuscitate order (DNR). This order asks that no extra treatments be done if your heart stops or you stop breathing. Extra treatments may include cardiopulmonary resuscitation (CPR), electrical shock to restart your heart, or a machine to breathe for you. If you decide to have a DNR order, ask your doctor to explain and write it. Place the order in your home where everyone can easily see it. · Consider a living will. A living will explains your wishes about life support and other treatments at the end of your life if you become unable to speak for yourself. Living anthony tell doctors to use or not use treatments that would keep you alive. You must have one or two witnesses or a notary present when you sign this form. · Consider a durable power of  for health care. This allows you to name a person to make decisions about your care if you are not able to. Most people ask a close friend or family member. Talk to this person about the kinds of treatments you want and those that you do not want. Make sure this person understands your wishes. These legal papers are simple to change. Tell your doctor what you want to change, and ask him or her to make a note in your medical file. Give your family updated copies of the papers. Where can you learn more? Go to http://yoselyn-merline.info/. Enter P184 in the search box to learn more about \"Advance Care Planning: Care Instructions. \" Current as of: November 17, 2016 Content Version: 11.3 © 3511-7383 [employer].  Care instructions adapted under license by [address] (which disclaims liability or warranty for this information). If you have questions about a medical condition or this instruction, always ask your healthcare professional. [address] any warranty or liability for your use of this information. Advance Directives: Care Instructions Your Care Instructions An advance directive is a legal way to state your wishes at the end of your life. It tells your family and your doctor what to do if you can no longer say what you want. There are two main types of advance directives. You can change them any time that your wishes change. · A living will tells your family and your doctor your wishes about life support and other treatment. · A durable power of  for health care lets you name a person to make treatment decisions for you when you can't speak for yourself. This person is called a health care agent. If you do not have an advance directive, decisions about your medical care may be made by a doctor or a  who doesn't know you. It may help to think of an advance directive as a gift to the people who care for you. If you have one, they won't have to make tough decisions by themselves. Follow-up care is a key part of your treatment and safety. Be sure to make and go to all appointments, and call your doctor if you are having problems. It's also a good idea to know your test results and keep a list of the medicines you take. How can you care for yourself at home? · Discuss your wishes with your loved ones and your doctor. This way, there are no surprises. · Many states have a unique form. Or you might use a universal form that has been approved by many states. This kind of form can sometimes be completed and stored online. Your electronic copy will then be available wherever you have a connection to the Internet.  In most cases, doctors will respect your wishes even if you have a form from a different state. · You don't need a  to do an advance directive. But you may want to get legal advice. · Think about these questions when you prepare an advance directive: ¨ Who do you want to make decisions about your medical care if you are not able to? Many people choose a family member or close friend. ¨ Do you know enough about life support methods that might be used? If not, talk to your doctor so you understand. ¨ What are you most afraid of that might happen? You might be afraid of having pain, losing your independence, or being kept alive by machines. ¨ Where would you prefer to die? Choices include your home, a hospital, or a nursing home. ¨ Would you like to have information about hospice care to support you and your family? ¨ Do you want to donate organs when you die? ¨ Do you want certain Mandaen practices performed before you die? If so, put your wishes in the advance directive. · Read your advance directive every year, and make changes as needed. When should you call for help? Be sure to contact your doctor if you have any questions. Where can you learn more? Go to http://yoselyn-merline.info/. Enter R264 in the search box to learn more about \"Advance Directives: Care Instructions. \" Current as of: November 17, 2016 Content Version: 11.3 © 0649-0631 [employer]. Care instructions adapted under license by [employer] (which disclaims liability or warranty for this information). If you have questions about a medical condition or this instruction, always ask your healthcare professional. [place] [address] any warranty or liability for your use of this information. Deciding About IV Fluids or Tube Feedings When You Have a Terminal Illness Your Care Instructions IV fluids and tube feedings can be used when you are no longer able to eat or drink by mouth. IV fluids are given through a needle placed in a vein. Liquid food can be given through a tube that goes down your nose into your stomach. Or it may be given through a tube that is surgically placed in your belly. You get to decide if you want to have IV fluids or tube feedings if you can no longer eat or drink on your own. It will not cure your illness, and it can be uncomfortable. But it may also make you feel better or live longer. Without IV fluids and tube feedings, your body will slow down naturally. Most likely, you will not feel hungry. And your doctor will take steps to keep you comfortable until you die. The decision about whether to have IV fluids and tube feedings is a personal one. You may decide that you would want one but not the other. Be sure to talk it over with your doctor and loved ones. Follow-up care is a key part of your treatment and safety. Be sure to make and go to all appointments, and call your doctor if you are having problems. It's also a good idea to know your test results and keep a list of the medicines you take. Why might you want IV fluids or tube feedings? · It may help you recover from a short illness or injury. · It may help improve the quality of your remaining time. · You believe that every possible step should be taken to preserve life, regardless of quality of life. · There is hope that there is or will soon be a cure for your condition. Why might you not want IV fluids or tube feedings? · It cannot cure a terminal illness. · It may prolong your life, but it would not make you more comfortable or increase the quality of the rest of your life. · There are more risks than benefits. Risks include infection, pneumonia, and digestive problems such as diarrhea. · You do not wish to be kept alive artificially. When should you call for help? Be sure to contact your doctor if: 
· You want more information about IV fluids and tube feedings. · You want to change your decision about receiving IV fluids and tube feedings. Where can you learn more? Go to http://yoselyn-merline.info/. Enter T947 in the search box to learn more about \"Deciding About IV Fluids or Tube Feedings When You Have a Terminal Illness. \" Current as of: September 24, 2016 Content Version: 11.3 © 0264-7372 [employer]. Care instructions adapted under license by [employer] (which disclaims liability or warranty for this information). If you have questions about a medical condition or this instruction, always ask your healthcare professional. [place] [address] any warranty or liability for your use of this information. Learning About Diabetes Food Guidelines Your Care Instructions Meal planning is important to manage diabetes. It helps keep your blood sugar at a target level (which you set with your doctor). You don't have to eat special foods. You can eat what your family eats, including sweets once in a while. But you do have to pay attention to how often you eat and how much you eat of certain foods. You may want to work with a dietitian or a certified diabetes educator (CDE) to help you plan meals and snacks. A dietitian or CDE can also help you lose weight if that is one of your goals. What should you know about eating carbs? Managing the amount of carbohydrate (carbs) you eat is an important part of healthy meals when you have diabetes. Carbohydrate is found in many foods. · Learn which foods have carbs. And learn the amounts of carbs in different foods. ¨ Bread, cereal, pasta, and rice have about 15 grams of carbs in a serving. A serving is 1 slice of bread (1 ounce), ½ cup of cooked cereal, or 1/3 cup of cooked pasta or rice. ¨ Fruits have 15 grams of carbs in a serving.  A serving is 1 small fresh fruit, such as an apple or orange; ½ of a banana; ½ cup of cooked or canned fruit; ½ cup of fruit juice; 1 cup of melon or raspberries; or 2 tablespoons of dried fruit. ¨ Milk and no-sugar-added yogurt have 15 grams of carbs in a serving. A serving is 1 cup of milk or 2/3 cup of no-sugar-added yogurt. ¨ Starchy vegetables have 15 grams of carbs in a serving. A serving is ½ cup of mashed potatoes or sweet potato; 1 cup winter squash; ½ of a small baked potato; ½ cup of cooked beans; or ½ cup cooked corn or green peas. · Learn how much carbs to eat each day and at each meal. A dietitian or CDE can teach you how to keep track of the amount of carbs you eat. This is called carbohydrate counting. · If you are not sure how to count carbohydrate grams, use the Plate Method to plan meals. It is a good, quick way to make sure that you have a balanced meal. It also helps you spread carbs throughout the day. ¨ Divide your plate by types of foods. Put non-starchy vegetables on half the plate, meat or other protein food on one-quarter of the plate, and a grain or starchy vegetable in the final quarter of the plate. To this you can add a small piece of fruit and 1 cup of milk or yogurt, depending on how many carbs you are supposed to eat at a meal. 
· Try to eat about the same amount of carbs at each meal. Do not \"save up\" your daily allowance of carbs to eat at one meal. 
· Proteins have very little or no carbs per serving. Examples of proteins are beef, chicken, turkey, fish, eggs, tofu, cheese, cottage cheese, and peanut butter. A serving size of meat is 3 ounces, which is about the size of a deck of cards. Examples of meat substitute serving sizes (equal to 1 ounce of meat) are 1/4 cup of cottage cheese, 1 egg, 1 tablespoon of peanut butter, and ½ cup of tofu. How can you eat out and still eat healthy? · Learn to estimate the serving sizes of foods that have carbohydrate. If you measure food at home, it will be easier to estimate the amount in a serving of restaurant food. · If the meal you order has too much carbohydrate (such as potatoes, corn, or baked beans), ask to have a low-carbohydrate food instead. Ask for a salad or green vegetables. · If you use insulin, check your blood sugar before and after eating out to help you plan how much to eat in the future. · If you eat more carbohydrate at a meal than you had planned, take a walk or do other exercise. This will help lower your blood sugar. What else should you know? · Limit saturated fat, such as the fat from meat and dairy products. This is a healthy choice because people who have diabetes are at higher risk of heart disease. So choose lean cuts of meat and nonfat or low-fat dairy products. Use olive or canola oil instead of butter or shortening when cooking. · Don't skip meals. Your blood sugar may drop too low if you skip meals and take insulin or certain medicines for diabetes. · Check with your doctor before you drink alcohol. Alcohol can cause your blood sugar to drop too low. Alcohol can also cause a bad reaction if you take certain diabetes medicines. Follow-up care is a key part of your treatment and safety. Be sure to make and go to all appointments, and call your doctor if you are having problems. It's also a good idea to know your test results and keep a list of the medicines you take. Where can you learn more? Go to http://yoselyn-merline.info/. Enter F577 in the search box to learn more about \"Learning About Diabetes Food Guidelines. \" Current as of: March 13, 2017 Content Version: 11.3 © 1770-9237 [employer]. Care instructions adapted under license by [employer] (which disclaims liability or warranty for this information). If you have questions about a medical condition or this instruction, always ask your healthcare professional. [address] any warranty or liability for your use of this information. Learning About Meal Planning for Diabetes Why plan your meals? Meal planning can be a key part of managing diabetes. Planning meals and snacks with the right balance of carbohydrate, protein, and fat can help you keep your blood sugar at the target level you set with your doctor. You don't have to eat special foods. You can eat what your family eats, including sweets once in a while. But you do have to pay attention to how often you eat and how much you eat of certain foods. You may want to work with a dietitian or a certified diabetes educator. He or she can give you tips and meal ideas and can answer your questions about meal planning. This health professional can also help you reach a healthy weight if that is one of your goals. What plan is right for you? Your dietitian or diabetes educator may suggest that you start with the plate format or carbohydrate counting. The plate format The plate format is a simple way to help you manage how you eat. You plan meals by learning how much space each food should take on a plate. Using the plate format helps you spread carbohydrate throughout the day. It can make it easier to keep your blood sugar level within your target range. It also helps you see if you're eating healthy portion sizes. To use the plate format, you put non-starchy vegetables on half your plate. Add meat or meat substitutes on one-quarter of the plate. Put a grain or starchy vegetable (such as brown rice or a potato) on the final quarter of the plate. You can add a small piece of fruit and some low-fat or fat-free milk or yogurt, depending on your carbohydrate goal for each meal. 
Here are some tips for using the plate format: · Make sure that you are not using an oversized plate. A 9-inch plate is best. Many restaurants use larger plates. · Get used to using the plate format at home. Then you can use it when you eat out. · Write down your questions about using the plate format.  Talk to your doctor, a dietitian, or a diabetes educator about your concerns. Carbohydrate counting With carbohydrate counting, you plan meals based on the amount of carbohydrate in each food. Carbohydrate raises blood sugar higher and more quickly than any other nutrient. It is found in desserts, breads and cereals, and fruit. It's also found in starchy vegetables such as potatoes and corn, grains such as rice and pasta, and milk and yogurt. Spreading carbohydrate throughout the day helps keep your blood sugar levels within your target range. Your daily amount depends on several things, including your weight, how active you are, which diabetes medicines you take, and what your goals are for your blood sugar levels. A registered dietitian or diabetes educator can help you plan how much carbohydrate to include in each meal and snack. A guideline for your daily amount of carbohydrate is: · 45 to 60 grams at each meal. That's about the same as 3 to 4 carbohydrate servings. · 15 to 20 grams at each snack. That's about the same as 1 carbohydrate serving. The Nutrition Facts label on packaged foods tells you how much carbohydrate is in a serving of the food. First, look at the serving size on the food label. Is that the amount you eat in a serving? All of the nutrition information on a food label is based on that serving size. So if you eat more or less than that, you'll need to adjust the other numbers. Total carbohydrate is the next thing you need to look for on the label. If you count carbohydrate servings, one serving of carbohydrate is 15 grams. For foods that don't come with labels, such as fresh fruits and vegetables, you'll need a guide that lists carbohydrate in these foods. Ask your doctor, dietitian, or diabetes educator about books or other nutrition guides you can use.  
If you take insulin, you need to know how many grams of carbohydrate are in a meal. This lets you know how much rapid-acting insulin to take before you eat. If you use an insulin pump, you get a constant rate of insulin during the day. So the pump must be programmed at meals to give you extra insulin to cover the rise in blood sugar after meals. When you know how much carbohydrate you will eat, you can take the right amount of insulin. Or, if you always use the same amount of insulin, you need to make sure that you eat the same amount of carbohydrate at meals. If you need more help to understand carbohydrate counting and food labels, ask your doctor, dietitian, or diabetes educator. How do you get started with meal planning? Here are some tips to get started: 
· Plan your meals a week at a time. Don't forget to include snacks too. · Use cookbooks or online recipes to plan several main meals. Plan some quick meals for busy nights. You also can double some recipes that freeze well. Then you can save half for other busy nights when you don't have time to cook. · Make sure you have the ingredients you need for your recipes. If you're running low on basic items, put these items on your shopping list too. · List foods that you use to make breakfasts, lunches, and snacks. List plenty of fruits and vegetables. · Post this list on the refrigerator. Add to it as you think of more things you need. · Take the list to the store to do your weekly shopping. Follow-up care is a key part of your treatment and safety. Be sure to make and go to all appointments, and call your doctor if you are having problems. It's also a good idea to know your test results and keep a list of the medicines you take. Where can you learn more? Go to http://yoselyn-merline.info/. Park Quintana in the search box to learn more about \"Learning About Meal Planning for Diabetes. \" Current as of: March 13, 2017 Content Version: 11.3 © 5300-5946 [employer].  Care instructions adapted under license by [address] (which disclaims liability or warranty for this information). If you have questions about a medical condition or this instruction, always ask your healthcare professional. [address] any warranty or liability for your use of this information. Introducing [facility]! Simona Andrews introduces [employer] patient portal. Now you can access parts of your medical record, email your doctor's office, and request medication refills online. 1. In your internet browser, go to [email or web address]. [email or web address] 2. Click on the First Time User? Click Here link in the Sign In box. You will see the New Member Sign Up page. 3. Enter your [employer] Access Code exactly as it appears below. You will not need to use this code after youve completed the sign-up process. If you do not sign up before the expiration date, you must request a new code. · [employer] Access Code: [email or web address] Expires: 12/31/2017  4:12 PM 
 
4. Enter the last four digits of your Social Security Number (xxxx) and Date of Birth (mm/dd/yyyy) as indicated and click Submit. You will be taken to the next sign-up page. 5. Create a [employer] ID. This will be your [employer] login ID and cannot be changed, so think of one that is secure and easy to remember. 6. Create a [employer] password. You can change your password at any time. 7. Enter your Password Reset Question and Answer. This can be used at a later time if you forget your password. 8. Enter your e-mail address. You will receive e-mail notification when new information is available in [address]. 9. Click Sign Up. You can now view and download portions of your medical record. 10. Click the Download Summary menu link to download a portable copy of your medical information. If you have questions, please visit the Frequently Asked Questions section of the [employer] website.  Remember, [employer] is NOT to be used for urgent needs. For medical emergencies, dial 911. Now available from your iPhone and Android! Please provide this summary of care documentation to your next provider. Your primary care clinician is listed as Michael Culp. If you have any questions after today's visit, please call [phone].

## 2017-10-08 NOTE — PROGRESS NOTES
HPI:   Severiano Hernandez is a 58y.o. year old female who presents today for evaluation of back pain. She has a history of hypertension, hyperlipidemia, paroxysmal SVT, diabetes mellitus, GERD, asthma, elevated transaminases, abnormal glucose, and atypical mycobacterial pneumonia. She reports that she is doing relatively well. She states that she has been under a lot of stress trying to sell her mother's home. She continues to have some difficulty adjusting to the death of her mother, but realizes that it will take time. She is otherwise without complaints. She has a history of hypertension, treated with metoprolol, and hydrochlorothiazide (+ potassium). She reports that she does not check her blood pressure at home. She does not exercise regularly, but denies any chest pain, shortness of breath at rest or with exertion, lightheadedness, or edema. 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 She has had approximately six severe episodes over the years, prompting presentation to the ED and treatment with IV Adenosine. She currently reports infrequent short episodes of palpitations and is being treated with metoprolol. She is followed by Dr. Karissa Conte. She had an echocardiogram (10/2005) showing normal LV size and function (EF 60-65%), and no valvular pathology. In 11/2012, she underwent an exercise stress echocardiogram, which was normal at maximal exercise. She has a history of hyperlipidemia, treated with simvastatin from 10/2012 to 3/2015 at which time she stopped taking it due to myalgias. She restarted it on 8/2015 and continued to take it without difficulty until 3/2016, when it was noticed that she had transaminase elevation (AST 85/ ) and it was discontinued.  Evaluation included hepatitis A, B, and C levels (negative), iron panel (normal), and RUQ ultrasound (3/23/2016) with limited sonographic window for the liver; only partially visualized but grossly unremarkable. Repeat hepatic panel (4/22/2016) showed AST 75 and ALT 98. Repeat lipid panel showed total chol 215/ / HDL 64/. In 5/2016, she had an abdominal CT scan showing the liver to be normal in size with normal parenchymal density; no discrete mass or ascites, and no intrahepatic biliary dilatation. She was subsequently instructed to restart simvastatin. However, she reports that she only took it for one week and then discontinued it. She states that she feels it was making her forgetful and she expresses that she does not wish to restart therapy with a statin. She has a history of diabetes mellitus, with impaired fasting glucose ranging from 103-111 since 2012, and HbA1c to 6.6-6.8 since 9/2016 (not checked previously). She denies any polyuria, polydipsia, nocturia, or blurry vision, and has no history of retinopathy, neuropathy, or nephropathy. She has regular eye exams with Dr. Brooke Desai. She has a history of asthma and allergic rhinitis and is followed by Dr. Vikram Torres. She is receiving immunotherapy once per month, and reports that she has not required any inhalers recently. She states that she does not use Qvar daily, but will take it occasionally. She does use Claritin every day. 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 She was treated with Avelox, and repeat chest x-ray in 1/2012 showed complete resolution of the pneumonia. She denies any cough or shortness of breath. She had a screening colonoscopy in 12/2006 by Dr. Bri Mercado showing a 5 mm sessile polyp in the rectum (pathology: hyperplastic). She had a repeat colonoscopy in 12/2016 which showed moderate sigmoid diverticulosis and a 6 mm sessile ascending colon polyp (pathology: serrated adenoma). Follow-up recommended for 5 years.  She denies any abdominal pain, nausea, vomiting, melena, hematochezia, or change in bowel movements. She does take omeprazole occasionally for GERD symptoms. Past Medical History:   Diagnosis Date    Allergic rhinitis     Asthma     Cardiac stress echo, normal 11/02/2012    Normal maximal stress echo study. EF 60%. Ex time 9 min 45 sec. 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  Hyperlipidemia     Hypertension     Menopause     Plantar fasciitis     left    PSVT (paroxysmal supraventricular tachycardia) ([facility])     A-V dharmesh reentrant tachycardia     Past Surgical History:   Procedure Laterality Date    ENDOSCOPY, COLON, DIAGNOSTIC      polyp    HX CERVICAL POLYPECTOMY      HX CYST INCISION AND DRAINAGE Right 10 or more years    HX DILATION AND CURETTAGE      HX GYN      polyp on cervix    HX POLYPECTOMY      from rectum     Current Outpatient Prescriptions   Medication Sig    hydroCHLOROthiazide (HYDRODIURIL) 25 mg tablet take 1/2 tablet by mouth once daily    LORazepam (ATIVAN) 1 mg tablet TAKE 1 TABLET BY MOUTH EVERY 8 HOURS AS NEEDED FOR ANXIETY    fluticasone (FLONASE) 50 mcg/actuation nasal spray 2 Sprays by Both Nostrils route daily.  albuterol (PROAIR HFA) 90 mcg/actuation inhaler inhale 2 puffs by mouth every 4 hours if needed for wheezing    metoprolol succinate (TOPROL-XL) 50 mg XL tablet take 1 tablet by mouth once daily    potassium chloride (K-DUR, KLOR-CON) 20 mEq tablet take 1 tablet by mouth once daily    carboxymethylcellulose sodium (REFRESH LIQUIGEL) 1 % dlgl Apply  to eye.  beclomethasone (QVAR) 40 mcg/actuation inhaler Take 1 Puff by inhalation two (2) times a day.  MULTIVITAMIN PO Take 1 Tab by mouth every other day.  omeprazole (PRILOSEC) 20 mg capsule Take 1 Cap by mouth daily.     clotrimazole-betamethasone (LOTRISONE) topical cream Apply  to both ear canals and affected part of outer ear twice a day with a finger. No current facility-administered medications for this visit. Allergies and Intolerances: Allergies   Allergen Reactions    Altace [Ramipril] Cough    Penicillins Other (comments)     Hands peel    Sulfur Itching     Family History: She had two aunts who had breast cancer (her mother's sister and father's sister). She has no FH of colon cancer. Her mother passed away from uterine cancer. Her father  from metastatic prostate cancer. Family History   Problem Relation Age of Onset   [address] [facility] Arthritis-osteo Mother     Hypertension Mother           Cancer Father      bone cancer    Hypertension Sister     Hypertension Sister     Hypertension Sister     Breast Cancer Maternal Aunt     Breast Cancer Paternal Aunt     Diabetes Other     Stroke Other     Other Sister      twin sister - osteopenia and low vitamin D levels     Social History:   She  reports that she has never smoked. She has never used smokeless tobacco. She is  and has two sons. She was a homemaker, but worked part-time in . She now helps care for her grandchildren. History   Alcohol Use No     Immunization History:  Immunization History   Administered Date(s) Administered    Influenza Vaccine (Quad) PF 10/23/2015, 10/19/2016, 10/05/2017    Influenza Vaccine PF 2013, 10/03/2014    Influenza Vaccine Split 2011, 10/22/2012    Influenza Vaccine Whole 10/29/2010    PPD 2012    Pneumococcal Polysaccharide (PPSV-23) 2017    TB Skin Test (PPD) Intradermal 2014    TDAP Vaccine 2012       Review of Systems:   As above included in HPI. Otherwise 11 point review of systems negative including constitutional, skin, HENT, eyes, respiratory, cardiovascular, gastrointestinal, genitourinary, musculoskeletal, endocrine, hematologic, allergy, and neurologic.     Physical:   Vitals:   BP: 116/68   HR: 86  WT: 182 lb (82.6 kg)  BMI:  33.29 kg/m2    Exam:   Patient appears in no apparent distress. Affect is appropriate. HEENT --Anicteric sclerae, tympanic membranes normal,  ear canals normal.  PERRL, EOMI, conjunctiva and lids normal.   Sinuses were nontender, turbinates normal, hearing normal.  Oropharynx without  erythema, normal tongue, oral mucosa and tonsils. No cervical lymphadenopathy. No thyromegaly, JVD, or bruits. Carotid pulses 2+ with normal upstroke. Lungs --Clear to auscultation. No wheezing or rales. Heart --Regular rate and rhythm, no murmurs, rubs, gallops, or clicks. Breasts -- no masses, skin dimpling, asymmetry, nipple discharge, nodules, axillary lymphadenopathy. Chest wall --Nontender to palpation. PMI normal.  Abdomen -- Soft and nontender, no hepatosplenomegaly or masses. Extremities -- Without cyanosis, clubbing, edema. 2+ pulses equally and bilaterally. Normal looking digits, ROM intact  Derm  no obvious abnormalities noted, no rash    Review of Data:  Labs:  Hospital Outpatient Visit on 09/20/2017   Component Date Value Ref Range Status    WBC 09/20/2017 8.4  4.6 - 13.2 K/uL Final    RBC 09/20/2017 4.68  4.20 - 5.30 M/uL Final    HGB 09/20/2017 13.9  12.0 - 16.0 g/dL Final    HCT 09/20/2017 42.4  35.0 - 45.0 % Final    MCV 09/20/2017 90.6  74.0 - 97.0 FL Final    MCH 09/20/2017 29.7  24.0 - 34.0 PG Final    MCHC 09/20/2017 32.8  31.0 - 37.0 g/dL Final    RDW 09/20/2017 12.2  11.6 - 14.5 % Final    PLATELET 41/78/3199 305  135 - 420 K/uL Final    MPV 09/20/2017 11.0  9.2 - 11.8 FL Final    NEUTROPHILS 09/20/2017 59  40 - 73 % Final    LYMPHOCYTES 09/20/2017 33  21 - 52 % Final    MONOCYTES 09/20/2017 5  3 - 10 % Final    EOSINOPHILS 09/20/2017 3  0 - 5 % Final    BASOPHILS 09/20/2017 0  0 - 2 % Final    ABS. NEUTROPHILS 09/20/2017 4.9  1.8 - 8.0 K/UL Final    ABS. LYMPHOCYTES 09/20/2017 2.8  0.9 - 3.6 K/UL Final    ABS.  MONOCYTES 09/20/2017 0.4  0.05 - 1.2 K/UL Final    ABS. EOSINOPHILS 09/20/2017 0.3  0.0 - 0.4 K/UL Final    ABS. BASOPHILS 09/20/2017 0.0  0.0 - 0.06 K/UL Final    DF 09/20/2017 AUTOMATED    Final    Hemoglobin A1c 09/20/2017 6.6* 4.2 - 5.6 % Final    Est. average glucose 09/20/2017 143  mg/dL Final    LIPID PROFILE 09/20/2017        Final    Cholesterol, total 09/20/2017 236* <200 MG/DL Final    Triglyceride 09/20/2017 145  <150 MG/DL Final    HDL Cholesterol 09/20/2017 68* 40 - 60 MG/DL Final    LDL, calculated 09/20/2017 139* 0 - 100 MG/DL Final    VLDL, calculated 09/20/2017 29  MG/DL Final    CHOL/HDL Ratio 09/20/2017 3.5  0 - 5.0   Final    Sodium 09/20/2017 138  136 - 145 mmol/L Final    Potassium 09/20/2017 3.9  3.5 - 5.5 mmol/L Final    Chloride 09/20/2017 100  100 - 108 mmol/L Final    CO2 09/20/2017 30  21 - 32 mmol/L Final    Anion gap 09/20/2017 8  3.0 - 18 mmol/L Final    Glucose 09/20/2017 95  74 - 99 mg/dL Final    BUN 09/20/2017 11  7.0 - 18 MG/DL Final    Creatinine 09/20/2017 0.67  0.6 - 1.3 MG/DL Final    BUN/Creatinine ratio 09/20/2017 16  12 - 20   Final    GFR est AA 09/20/2017 >60  >60 ml/min/1.73m2 Final    GFR est non-AA 09/20/2017 >60  >60 ml/min/1.73m2 Final    Calcium 09/20/2017 8.8  8.5 - 10.1 MG/DL Final    Bilirubin, total 09/20/2017 0.4  0.2 - 1.0 MG/DL Final    ALT (SGPT) 09/20/2017 29  13 - 56 U/L Final    AST (SGOT) 09/20/2017 23  15 - 37 U/L Final    Alk.  phosphatase 09/20/2017 79  45 - 117 U/L Final    Protein, total 09/20/2017 7.6  6.4 - 8.2 g/dL Final    Albumin 09/20/2017 3.7  3.4 - 5.0 g/dL Final    Globulin 09/20/2017 3.9  2.0 - 4.0 g/dL Final    A-G Ratio 09/20/2017 0.9  0.8 - 1.7   Final    TSH 09/20/2017 2.01  0.36 - 3.74 uIU/mL Final    Vitamin D 25-Hydroxy 09/20/2017 32.3  30 - 100 ng/mL Final    Microalbumin,urine random 09/20/2017 0.71  0 - 3.0 MG/DL Final    Creatinine, urine 09/20/2017 88.40  30 - 125 mg/dL Final    Microalbumin/Creat ratio (mg/g cre* 09/20/2017 8  0 - 30 mg/g Final    Color 09/20/2017 YELLOW    Final    Appearance 09/20/2017 CLEAR    Final    Specific gravity 09/20/2017 1.014  1.005 - 1.030   Final    pH (UA) 09/20/2017 7.5  5.0 - 8.0   Final    Protein 09/20/2017 NEGATIVE   NEG mg/dL Final    Glucose 09/20/2017 NEGATIVE   NEG mg/dL Final    Ketone 09/20/2017 NEGATIVE   NEG mg/dL Final    Bilirubin 09/20/2017 NEGATIVE   NEG   Final    Blood 09/20/2017 TRACE* NEG   Final    Urobilinogen 09/20/2017 0.2  0.2 - 1.0 EU/dL Final    Nitrites 09/20/2017 NEGATIVE   NEG   Final    Leukocyte Esterase 09/20/2017 TRACE* NEG   Final    WBC 09/20/2017 0 to 3  0 - 4 /hpf Final    RBC 09/20/2017 0 to 3  0 - 5 /hpf Final    Epithelial cells 09/20/2017 1+  0 - 5 /lpf Final    Bacteria 09/20/2017 FEW* NEG /hpf Final     Calculated 10 year ASCVD risk score:  8.4  %    Health Maintenance:  Screening:    Mammogram: negative (11/2016). Scheduled for 11/29/2017. PAP smear: negative (10/2016) with negative HPV. Followed by Dr. Vanessa Jones. Colorectal: colonoscopy (12/2016) serrated adenoma. Dr. Srinivas Hernandez. Due 2021.    Depression: none   DM (HbA1c/FPG): HbA1c 6.6 (9/2017)   Hepatitis C: negative (3/2016)   Falls: one   DEXA: within normal limits (11/2015)   Glaucoma: regular eye exams with Dr. Yohana Maher (last 7/2017)   Smoking: none   Vitamin D: 32.3 (9/2017)   Medicare Wellness: N/A      Impression:  Patient Active Problem List   Diagnosis Code    Benign hypertensive heart disease without congestive heart failure I11.9    Allergic rhinitis J30.9    Colon polyps K63.5    PSVT (paroxysmal supraventricular tachycardia) (HCC) I47.1    Palpitations R00.2    Hyperlipidemia E78.5    Acute right-sided low back pain without sciatica M54.5    Essential hypertension I10    Asthma J45.909    Bradycardia, sinus R00.1    Solitary cyst of breast N60.09    GERD (gastroesophageal reflux disease) K21.9    Elevated LFTs R79.89    Type 2 diabetes mellitus without complication, without long-term current use of insulin (HCC) E11.9    Vitamin D insufficiency E55.9       Plan:  1. Diabetes mellitus. Diagnosed in 9/2016 when HbA1c was checked and found to be elevated at 6.6. Repeat in 2/2017 increased to 6.8. Impaired fasting glucose has been present intermittently since 2012. Discussed importance of lifestyle modifications, including diet, exercise, and weight loss. Referred to diabetes educator following last visit but did not schedule. States that now would be willing to schedule since she has sold her mother's home. Will order again. No evidence of microvascular complications. Not on Ace-I or statin. ACE Inhibitor or ARB therapy not prescibed for medical reasons as blood pressure would not tolerate on current regimen. Will consider discontinuing hydrochlorothiazide and beginning Ace-I or ARB at next visit. Lipid lowering therapy not prescibed for patient reasons as currently she is not willing to resume. Will readdress at next visit. Continue regular eye exams with Dr. Irene Fofana. Foot exam normal (3/2017) and urine microalbumin/ creatinine ratio without evidence of microalbuminuria. Will recheck HbA1c in 3 months after attempt lifestyle modifications. If remains elevated, will begin metformin. 2. Hypertension. Well controlled on current regimen of metoprolol and hydrochlorothiazide. Will discuss discontinuing hydrochlorothiazide at next visit and beginning Ace-I or ARB for mirna-protective effect. Renal function normal with creatinine 0.67 / eGFR >60. Continue to follow. 3. Hyperlipidemia. Calculated 10 year ASCVD risk is 8.4% and newly diagnosed diabetic, which places her in one of the four statin benefit groups as per new AHA/ACC guidelines (primary prevention: diabetic age 43-69 with LDL ). Thus, would recommend treatment with high intensity dose statin at this time. Discussed benefits and risk of statin therapy with patient again today, but she remains unwilling to resume therapy.  Discussed that there has been no improvement in lipid panel since last checked in 2/2017. Regardless, explained that with diabetes mellitus, statin indicated for any LDL >70. Emphasized importance of lifestyle modifications, including diet, exercise, and weight loss. Will recheck lipid panel at next visit and readdress. Continue to follow. 4. Elevated LFT's. Resolved. Unclear etiology, but doubt secondary to statin. Hepatitis panel and iron studies normal. RUQ ultrasound difficult secondary to body habitus, but abdominal CT scan showed normal liver parenchyma. Will continue to follow. 5. AV dharmesh reentrant tachycardia. No recent episodes. On metoprolol and no significant palpitations recently. Followed by Dr. Denise Marshall. 6. Asthma, mild intermittent. Associated with allergies. Using Qvar daily and albuterol only as needed. Receiving monthly immunotherapy injections. Followed by Dr. Kole Miller. 7. GERD. Symptoms generally controlled with prn omeprazole. Follow. 8. Health maintenance. Will give influenza vaccine today. Already received pneumovax given asthma history. Addressed Zoster vaccine and wishing to obtain at next visit. Colonoscopy due 2021. Mammogram scheduled for 11/2017. Continue regular eye exams with Dr. Truman Larson. Vitamin D level normal. Will continue maintenance dose supplement. Total time: 40 minutes spent with the patient in face-to-face consultation of which greater than 50% was spent on counseling, answering questions and/or coordination of care. Complex medical review and management performed. Patient understands recommendations and agrees with plan. Follow-up in 3 months.

## 2017-11-02 RX ORDER — METOPROLOL SUCCINATE 50 MG/1
TABLET, EXTENDED RELEASE ORAL
Qty: 90 TAB | Refills: 3 | Status: SHIPPED | OUTPATIENT
Start: 2017-11-02 | End: 2018-10-01 | Stop reason: SDUPTHER

## 2017-11-21 ENCOUNTER — OFFICE VISIT (OUTPATIENT)
Dept: CARDIOLOGY CLINIC | Age: 62
End: 2017-11-21

## 2017-11-21 VITALS
HEART RATE: 70 BPM | SYSTOLIC BLOOD PRESSURE: 122 MMHG | BODY MASS INDEX: 33.68 KG/M2 | WEIGHT: 183 LBS | HEIGHT: 62 IN | OXYGEN SATURATION: 98 % | DIASTOLIC BLOOD PRESSURE: 74 MMHG

## 2017-11-21 DIAGNOSIS — I10 ESSENTIAL HYPERTENSION: ICD-10-CM

## 2017-11-21 DIAGNOSIS — R00.2 PALPITATIONS: Primary | ICD-10-CM

## 2017-11-21 DIAGNOSIS — E78.5 HYPERLIPIDEMIA, UNSPECIFIED HYPERLIPIDEMIA TYPE: ICD-10-CM

## 2017-11-21 DIAGNOSIS — I47.1 PSVT (PAROXYSMAL SUPRAVENTRICULAR TACHYCARDIA) (HCC): ICD-10-CM

## 2017-11-21 RX ORDER — POTASSIUM CHLORIDE 20 MEQ/1
TABLET, EXTENDED RELEASE ORAL
Qty: 90 TAB | Refills: 3 | Status: SHIPPED | OUTPATIENT
Start: 2017-11-21 | End: 2018-12-23 | Stop reason: SDUPTHER

## 2017-11-21 NOTE — PROGRESS NOTES
1. Have you been to the ER, urgent care clinic since your last visit? Hospitalized since your last visit?no    2. Have you seen or consulted any other health care providers outside of the [address] [place] [address] since your last visit? Include any pap smears or colon screening.  no

## 2017-11-21 NOTE — PROGRESS NOTES
Review of Systems   Constitutional: Negative for chills, fever, malaise/fatigue and weight loss. Respiratory: Positive for cough. Negative for hemoptysis, shortness of breath and wheezing. Cardiovascular: Positive for orthopnea. Negative for chest pain, palpitations and leg swelling. Gastrointestinal: Negative. Musculoskeletal: Positive for joint pain. Negative for falls. Neurological: Negative for dizziness.

## 2017-11-21 NOTE — PROGRESS NOTES
HPI: I saw Jolanta Brooks. Nadia Clark in my office today in cardiovascular evaluation regarding her past problems with palpitations and hypercholesterolemia. Ms. Nadia Clark is a pleasant, obese, 58year old white female with history of palpitations secondary to AV dharmesh reentrant tachycardia, whom I originally saw in consultation back in December of 1997. She has had about six separate episodes of supraventricular tachycardia in the past, for which she has had to go to the emergency room and get adenosine intravenously in order to break her rhythm to sinus, but generally speaking on just Toprol 50 mg daily she does quite well and has had only occasional palpitations. She comes in the office today and relates that she is doing reasonably well. She is had some problems with an upper respiratory tract infection for which she has been on a Z-Osvaldo recently, but from a cardiovascular vantage she is doing quite well with only rare palpitations. Encounter Diagnoses   Name Primary?  Palpitations Yes    History of PSVT (paroxysmal supraventricular tachycardia) (HCC)     Essential hypertension     Hyperlipidemia, unspecified hyperlipidemia type        Discussion: This patient appears to be doing about as well as we could expect and really of no recommendations for change at this time. She does have very transient palpitations from time to time, but for the most part her Toprol-XL seems to be taking care of her palpitation issues quite well. Her latest lipid profile which was completed on September 20, 2017 showed total cholesterol 236 with triglycerides 145, HDL 68, LDL of 139, and VLDL of 29 which I think is obviously suboptimal control.   She is currently going to be seeing a dietitian and will work on diet and some exercise but I suggested to her that she may wish to get a coronary calcium score to get a better feel for her long-term cardiovascular risk and to help decide on how aggressive to treat her cholesterol moving forward. She also had a hemoglobin A1c of 6.6 when completed back on September 20, 2017 and is seeing the dietitian mentioned above primarily for help with her diabetes but I suspect she will eventually need to be on an oral agent which would, of course, be managed by Dr. Tiki Mojica. Her blood pressure appears to be well-controlled today and she otherwise seems to be doing well some simply can plan to see her again in several months or as needed if any new cardiovascular symptoms surface in the interim. PCP:   Salvador Gomez MD       Past Medical History:   Diagnosis Date    Allergic rhinitis     Asthma     Cardiac stress echo, normal 11/02/2012    Normal maximal stress echo study. EF 60%. Ex time 9 min 45 sec. 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  Hyperlipidemia     Hypertension     Menopause     Plantar fasciitis     left    PSVT (paroxysmal supra[facility]ricular tachycardia) ([facility])     A-V dharmesh reentrant tachycardia         Past Surgical History:   Procedure Laterality Date    ENDOSCOPY, COLON, DIAGNOSTIC      polyp    HX CERVICAL POLYPECTOMY      HX CYST INCISION AND DRAINAGE Right 10 or more years    HX DILATION AND CURETTAGE      HX GYN      polyp on cervix    HX POLYPECTOMY      from rectum         Current Outpatient Rx   Name Route Sig Dispense Refill    POTASSIUM CHLORIDE SR 20 MEQ TAB, PARTICLES/CRYSTALS Oral Take 1 Tab by mouth daily. 30 Tab 11    METOPROLOL SR 50 MG 24 HR TAB Oral Take 1 Tab by mouth daily. 90 Tab 3    AZITHROMYCIN 250 MG TAB Oral Take 1 Tab by mouth for 5 days. Take two tablets today then one tablet daily 6 Tab 0    TRIAMCINOLONE ACETONIDE 55 MCG NASAL SPRAY AEROSOL Nasal 2 Sprays by Nasal route daily. Administer to nostrils.        HYDROCHLOROTHIAZIDE 25 MG TAB Oral Take 25 mg by mouth daily.  LORATADINE 10 MG TAB Oral Take 10 mg by mouth daily.  MULTIVITAMIN PO Oral Take  by mouth. Allergies   Allergen Reactions    Altace [Ramipril] Cough    Penicillins Other (comments)     Hands peel    Sulfur Itching         Social History:   Social History   Substance Use Topics    Smoking status: Never Smoker    Smokeless tobacco: Never Used    Alcohol use No           Family history: family history includes Arthritis-osteo in her mother; Breast Cancer in her maternal aunt and paternal aunt; Cancer in her father; Diabetes in an other family member; Hypertension in her mother, sister, sister, and sister; Other in her sister; Stroke in an other family member. Review of Systems:    Constitutional: Negative for chills, fever, malaise/fatigue and weight loss. Respiratory: Positive for cough. Negative for hemoptysis, shortness of breath and wheezing. Cardiovascular: Positive for orthopnea. Negative for chest pain, palpitations and leg swelling. Gastrointestinal: Negative. Musculoskeletal: Positive for joint pain. Negative for falls. Neurological: Negative for dizziness. Physical Exam:   The patient is an alert, oriented, well developed, well nourished 58 y.o.  female who was in no acute distress at the time of my examination. Visit Vitals    /74    Pulse 70    Ht 5' 2\" (1.575 m)    Wt 83 kg (183 lb)    SpO2 98%    BMI 33.47 kg/m2      BP Readings from Last 3 Encounters:   11/21/17 122/74   10/05/17 116/68   08/15/17 130/84        Wt Readings from Last 3 Encounters:   11/21/17 83 kg (183 lb)   10/05/17 82.6 kg (182 lb)   08/15/17 82.6 kg (182 lb)       HEENT: Conjuctiva white, mucosa moist, no pallor or cyanosis. Neck: Supple without masses, tenderness or thyromegaly. No jugular venous distention. Carotid upstrokes are full bilaterally, without bruits.    Cardiovascular: Chest is symmetrical with good excursion. [place] is not displaced. No lifts, heaves or thrills. S1 and S2 are normal, without appreciable murmurs, rubs, clicks or gallops. Lungs: Clear to auscultation in all fields. Abdomen: Soft; no masses, tenderness or organomegaly. Extremities: No edema, with full peripheral pulses. Review of Data: See PMH and Cardiology and Imaging sections for cardiac testing  Lab Results   Component Value Date/Time    Cholesterol, total 236 09/20/2017 09:50 AM    HDL Cholesterol 68 09/20/2017 09:50 AM    LDL, calculated 139 09/20/2017 09:50 AM    Triglyceride 145 09/20/2017 09:50 AM    CHOL/HDL Ratio 3.5 09/20/2017 09:50 AM       Results for orders placed or performed in visit on 05/10/17   AMB POC EKG ROUTINE W/ 12 LEADS, INTER & REP     Status: None    Narrative    Normal sinus rhythm, rate 72. There is mild diffuse ST-T flattening which is nonspecific and otherwise the tracing is within normal limits. Kristen Yu D.O., F.A.C.C. [facility]  [facility] [place]  [address] [place] [address]. [address] [address]    PLEASE NOTE:  This document has been produced using voice recognition software. Unrecognized errors in transcription may be present.

## 2017-11-29 ENCOUNTER — HOSPITAL ENCOUNTER (OUTPATIENT)
Dept: MAMMOGRAPHY | Age: 62
Discharge: HOME | End: 2017-11-29
Attending: INTERNAL MEDICINE
Payer: COMMERCIAL

## 2017-11-29 DIAGNOSIS — Z12.31 VISIT FOR SCREENING MAMMOGRAM: ICD-10-CM

## 2017-11-29 PROCEDURE — 77063 BREAST TOMOSYNTHESIS BI: CPT

## 2017-12-01 RX ORDER — LORAZEPAM 1 MG/1
TABLET ORAL
Qty: 30 TAB | Refills: 0 | OUTPATIENT
Start: 2017-12-01 | End: 2019-03-19 | Stop reason: SDUPTHER

## 2017-12-04 ENCOUNTER — HOSPITAL ENCOUNTER (OUTPATIENT)
Dept: NUTRITION | Age: 62
Discharge: HOME | End: 2017-12-04
Payer: COMMERCIAL

## 2017-12-04 PROCEDURE — 97802 MEDICAL NUTRITION INDIV IN: CPT

## 2017-12-04 NOTE — PROGRESS NOTES
[facility]  [address], [address] [facility], [address] [address]  Phone: [phone]  Fax: [phone]   Nutrition Assessment  Medical Nutrition Therapy   Outpatient Initial Evaluation         Patient Name: Michelle Cintron : 1955   Treatment Diagnosis: T2DM    Referral Source: Taryn Olivares MD [place] [facility]): 2017     Gender: female Age: 58 y.o. Ht: 62 in Wt:  181.2 lb  kg   BMI: 33.2 BMR   Male  BMR Female    Anthropometrics Assessment: Moderate abdominal adiposity is evident based on visual observation     Past Medical History includes: High cholesterol, high blood pressure     Pertinent Medications:     HCTZ, Potassium, Multivitamin   Biochemical Data:   Lab Results   Component Value Date/Time    Hemoglobin A1c 6.6 2017 09:50 AM     Lab Results   Component Value Date/Time    Cholesterol, total 236 2017 09:50 AM    HDL Cholesterol 68 2017 09:50 AM    LDL, calculated 139 2017 09:50 AM    VLDL, calculated 29 2017 09:50 AM    Triglyceride 145 2017 09:50 AM    CHOL/HDL Ratio 3.5 2017 09:50 AM     Lab Results   Component Value Date/Time    ALT (SGPT) 29 2017 09:50 AM    AST (SGOT) 23 2017 09:50 AM    Alk. phosphatase 79 2017 09:50 AM    Bilirubin, direct 0.1 2016 09:46 AM    Bilirubin, total 0.4 2017 09:50 AM     Lab Results   Component Value Date/Time    Creatinine 0.67 2017 09:50 AM     Lab Results   Component Value Date/Time    BUN 11 2017 09:50 AM     Lab Results   Component Value Date/Time    Microalbumin/Creat ratio (mg/g creat) 8 2017 09:50 AM    Microalbumin,urine random 0.71 2017 09:50 AM        Subjective/Assessment:   Pt is newly diagnosed with DM; no previous diet education given. Pt does not currently work, lives at home with her  and three cats.  Children and grandchildren are local. Pt recently had a fall and complained of knee pain and a heel spur. Her only physical activity is walking when she goes shopping, \"loves shopping\". There is a treadmill at home but only her  is using this. Pt is interested in learning how DM effects the body and what she is able to eat. She has heard that bread and sweets are bad. Provided education on how various carbohydrates can fit into a meal plan. She often needed redirecting as she spoke of other family members and their eating/health habits. Pt was receptive to education. She verbalized understanding and will most definitely benefit from follow up appointment to review progress and questions. Current Eating Patterns:  does the majority of the grocery shopping. Pt does the meal preparation/cooking. She typically only eats out on the weekends. Breakfast: larios/egg/cheese biscuit at "[employer]s; orange juice. Lunch: bowl of vegetable soup, ritz crackers, diet coke, slice of apple pie. Snack: gets the munchies, will eat chips, drink regular Ginger ale; Dinner: Sweet potato pudding, dressing, slice of apple pie. Estimate Needs   Calories: 1400  Protein: 123 Carbs: 123 Fat: 30   Kcal/day  g/day  g/day  g/day        percent: 35  35  30               Education & Recommendations provided: Educated pt on the pathophysiology of Type II Diabetes, insulin resistance and the rationale for dietary modifications and increased activity. Educated pt on carbohydrate food sources, counting carbohydrates, meal timing, and appropriate serving sizes.  Encouraged pt to avoid/limit fruit juice and sugary beverages   Handouts Provided: [x]  Carbohydrates  [x]  Protein  []  Fiber  []  Serving Sizes  [x]  Meal and Snack Ideas  []  Food Journals [x]  Diabetes  []  Cholesterol  []  Sodium  [x]  Gen Nutr Guidelines  []  SBGM Guidelines  []  Others:   Information Reviewed with: Pt   Readiness to Change Stage: []  Pre-contemplative    []  Contemplative  [x]  Preparation               []  Action                  [] Maintenance   Potential Barriers to Learning: []  Decline in memory    []  Language barrier   []  Other:  []  Emotional                  []  Limited mobility  []  Lack of motivation     [] Vision, hearing or cognitive impairment   Expected Compliance: Fair      Nutritional Goal - To promote lifestyle changes to result in:    [x]  Weight loss  [x]  Improved diabetic control  []  Decreased cholesterol levels  []  Decreased blood pressure  []  Weight maintenance []  Preventing any interactions associated with food allergies  []  Adequate weight gain toward goal weight  []  Other:        Patient Goals:  SMART goals 1. Follow consistent and appropriate meal timing; follow a structured timeline eating every 3-5 hours. 2. Focus on good sources of protein; Never eat carbs alone, always pair them with protein   Carb Limits: 30-35 gm at meals, 10-15 gm at snacks   3. Keep a snack with you if unable to eat a structured meal.  4.Limit fruit juice to 4 fl oz at breakfast, work towards replacing with whole fruit.       Dietitian Signature: Mingo Schmitt RDN Date: 12/4/2017   Follow-up: Fri, Dec 29 at 0930 Time: 9:02 AM

## 2017-12-11 ENCOUNTER — OFFICE VISIT (OUTPATIENT)
Dept: ORTHOPEDIC SURGERY | Age: 62
End: 2017-12-11

## 2017-12-11 VITALS
TEMPERATURE: 96.5 F | WEIGHT: 180 LBS | OXYGEN SATURATION: 100 % | DIASTOLIC BLOOD PRESSURE: 88 MMHG | BODY MASS INDEX: 33.13 KG/M2 | SYSTOLIC BLOOD PRESSURE: 142 MMHG | HEIGHT: 62 IN | HEART RATE: 71 BPM

## 2017-12-11 DIAGNOSIS — M17.11 PRIMARY OSTEOARTHRITIS OF RIGHT KNEE: Primary | ICD-10-CM

## 2017-12-11 DIAGNOSIS — M25.561 RIGHT KNEE PAIN, UNSPECIFIED CHRONICITY: ICD-10-CM

## 2017-12-11 RX ORDER — MELOXICAM 15 MG/1
15 TABLET ORAL
Qty: 30 TAB | Refills: 1 | Status: SHIPPED | OUTPATIENT
Start: 2017-12-11 | End: 2018-01-28 | Stop reason: SDDI

## 2017-12-11 RX ORDER — TRIAMCINOLONE ACETONIDE 40 MG/ML
40 INJECTION, SUSPENSION INTRA-ARTICULAR; INTRAMUSCULAR ONCE
Qty: 1 ML | Refills: 0
Start: 2017-12-11 | End: 2017-12-11

## 2017-12-11 NOTE — PROGRESS NOTES
Marilyn Ortiz  1955   Chief Complaint   Patient presents with    Knee Pain     RIGHT        HISTORY OF PRESENT ILLNESS  Marilyn Ortiz is a 58 y.o. female who presents today for evaluation of right knee pain. she rates her pain 8/10 today. Pain has been present since she had a fall down the steps on her porch. She states that she landed in her yard and had a scab. Patient describes the pain as aching and throbbing that is Constant in nature. She localizes the pain to the lateral aspect of her right knee. Symptoms are worse with prolonged walking and standing, Activity and is better with  Rest. Associated symptoms include stiffness, crackling noises, limping, Swelling. Since problem started, it: has worsened. Pain does not wake patient up at night but she has the most pain first thing in the morning. Has taken no recent medications. for the problem. Has tried following treatments: Injections:NO; Brace:NO; Therapy:NO; Cane/Crutch:NO       Allergies   Allergen Reactions    Altace [Ramipril] Cough    Penicillins Other (comments)     Hands peel    Sulfur Itching        Past Medical History:   Diagnosis Date    Allergic rhinitis     Asthma     Cardiac stress echo, normal 11/02/2012    Normal maximal stress echo study. EF 60%. Ex time 9 min 45 sec. 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  Hyperlipidemia     Hypertension     Menopause     Plantar fasciitis     left    PSVT ([facility]oxysmal supra[facility]ricular tachycardia) ([facility])     A-V dharmesh reentrant tachycardia      Social History     Social History    Marital status:      Spouse name: N/A    Number of children: N/A    Years of education: N/A     Occupational History    Not on file.      Social History Main Topics    Smoking status: Never Smoker    Smokeless tobacco: Never Used    Alcohol use No    Drug use: No    Sexual activity: Not Currently     Other Topics Concern    Not on file     Social History Narrative      Past Surgical History:   Procedure Laterality Date    ENDOSCOPY, COLON, DIAGNOSTIC      polyp    HX CERVICAL POLYPECTOMY      HX CYST INCISION AND DRAINAGE Right 10 or more years    HX DILATION AND CURETTAGE      HX GYN      polyp on cervix    HX POLYPECTOMY      from rectum      Family History   Problem Relation Age of Onset   [facility] Arthritis-osteo Mother     Hypertension Mother           Cancer Father      bone cancer    Hypertension Sister     Hypertension Sister     Hypertension Sister     Breast Cancer Maternal Aunt     Breast Cancer Paternal Aunt     Diabetes Other     Stroke Other     Other Sister      twin sister - osteopenia and low vitamin D levels      Current Outpatient Prescriptions   Medication Sig    triamcinolone acetonide (KENALOG) 40 mg/mL injection 1 mL by IntraMUSCular route once for 1 dose.  meloxicam (MOBIC) 15 mg tablet Take 1 Tab by mouth daily (with breakfast).  LORazepam (ATIVAN) 1 mg tablet TAKE 1 TABLET BY MOUTH EVERY 8 HOURS AS NEEDED FOR ANXIETY    potassium chloride (K-DUR, KLOR-CON) 20 mEq tablet take 1 tablet by mouth once daily    metoprolol succinate (TOPROL-XL) 50 mg XL tablet take 1 tablet by mouth once daily    hydroCHLOROthiazide (HYDRODIURIL) 25 mg tablet take 1/2 tablet by mouth once daily    fluticasone (FLONASE) 50 mcg/actuation nasal spray 2 Sprays by Both Nostrils route daily.  albuterol (PROAIR HFA) 90 mcg/actuation inhaler inhale 2 puffs by mouth every 4 hours if needed for wheezing    carboxymethylcellulose sodium (REFRESH LIQUIGEL) 1 % dlgl Apply  to eye.  clotrimazole-betamethasone (LOTRISONE) topical cream Apply  to both ear canals and affected part of outer ear twice a day with a finger.     beclomethasone (QVAR) 40 mcg/actuation inhaler Take 1 Puff by inhalation two (2) times a day.  MULTIVITAMIN PO Take 1 Tab by mouth every other day.  omeprazole (PRILOSEC) 20 mg capsule Take 1 Cap by mouth daily. No current facility-administered medications for this visit. REVIEW OF SYSTEM   Patient denies: Weight loss, Fever/Chills, HA, Visual changes, Fatigue, Chest pain, SOB, Abdominal pain, N/V/D/C, Blood in stool or urine, Edema. Pertinent positive as above in HPI. All others were negative    PHYSICAL EXAM:   Visit Vitals    /88    Pulse 71    Temp 96.5 °F (35.8 °C) (Oral)    Ht 5' 2\" (1.575 m)    Wt 180 lb (81.6 kg)    LMP  (LMP Unknown)    SpO2 100%    BMI 32.92 kg/m2     The patient is a well-developed, well-nourished female   in no acute distress. The patient is alert and oriented times three. The patient is alert and oriented times three. Mood and affect are normal.  LYMPHATIC: lymph nodes are not enlarged and are within normal limits  SKIN: normal in color and non tender to palpation. There are no bruises or abrasions noted. NEUROLOGICAL: Motor sensory exam is within normal limits. Reflexes are equal bilaterally. There is normal sensation to pinprick and light touch  MUSCULOSKELETAL:  Examination Right knee   Skin Intact   Range of motion 0-130   Effusion +   Medial joint line tenderness +   Lateral joint line tenderness -   Tenderness Pes Bursa -   Tenderness insertion MCL -   Tenderness insertion LCL -   Carolas -   Patella crepitus -   Patella grind -   Lachman -   Pivot shift -   Anterior drawer -   Posterior drawer -   Varus stress -   Valgus stress -   Neurovascular Intact   Calf Swelling and Tenderness to Palpation -   Garrett's Test -   Hamstring Cord Tightness -     Swollen tender prepatellar bursa  PROCEDURE: After sterile prep, 4 cc of Xylocaine and 1 cc of Kenalog were injected into the right knee.        [facility]  OFFICE PROCEDURE PROGRESS NOTE        Chart reviewed for the following:  Don Maciel MD, have reviewed the History, Physical and updated the Allergic reactions for [address] performed immediately prior to start of procedure:  Don Maciel MD, have performed the following reviews on Miky Coupe prior to the start of the procedure:            * Patient was identified by name and date of birth   * Agreement on procedure being performed was verified  * Risks and Benefits explained to the patient  * Procedure site verified and marked as necessary  * Patient was positioned for comfort  * Consent was signed and verified     Time: 9:43 AM    Date of procedure: 12/11/2017    Procedure performed by:  Selene Herman MD    Provider assisted by: (see medication administration)    How tolerated by patient: tolerated the procedure well with no complications    Comments: none      IMAGING: XR of the right knee dated 12/11/17 was reviewed and read: decreased joint space on the medial side. Moderate degenerative changes. IMPRESSION:      ICD-10-CM ICD-9-CM    1. Primary osteoarthritis of right knee M17.11 715.16 TRIAMCINOLONE ACETONIDE INJ      triamcinolone acetonide (KENALOG) 40 mg/mL injection      DRAIN/INJECT LARGE JOINT/BURSA      meloxicam (MOBIC) 15 mg tablet   2. Right knee pain, unspecified chronicity M25.561 719.46 AMB POC XRAY, KNEE; 1/2 VIEWS        PLAN:  1. I feel that the patient's pain is coming from the XR documented degenerative changes. Depending on how she responds to a cortisone injection, we may proceed with an MRI. Risk factors include: dm, htn  2. Yes cortisone injection indicated today RIGHT KNEE  3. No Physical/Occupational Therapy indicated today  4. No diagnostic test indicated today  5. No durable medical equipment indicated today  6. No referral indicated today   7. Yes medications indicated today MOBIC  8.  No Narcotic indicated today     RTC 4 weeks if pain continues  Follow-up Disposition: Not on File    Scribed by Katlyn Willis S [facility] [address]) as dictated by Arpita Padilla MD    I, Dr. Arpita Padilla, confirm that all documentation is accurate.     Arpita Padilla M.D.   [facility]

## 2017-12-29 ENCOUNTER — HOSPITAL ENCOUNTER (OUTPATIENT)
Dept: NUTRITION | Age: 62
Discharge: HOME | End: 2017-12-29
Payer: COMMERCIAL

## 2017-12-29 PROCEDURE — 97803 MED NUTRITION INDIV SUBSEQ: CPT

## 2017-12-29 NOTE — PROGRESS NOTES
NUTRITION  FOLLOW-UP TREATMENT NOTE  Patient Name: Nhi Meter         Date: 2017  : 1955    YES Patient  Verified  Diagnosis: T2DM   In time:  0930            Out time: 1000   Total Treatment Time (min):  30     SUBJECTIVE/ASSESSMENT  Changes in medication or medical history? Any new allergies, surgeries or procedures? NO    If yes, update Summary List   Pt admits to not following through with her goals during the holidays, difficult time with family and extra sweets all around. She is ready to get back on track. Discussed goals and strategies for putting them into practice. Reemphasized the importance of including protein with every meal/snack. Pt needed review of basics of healthy eatings and pairing of foods at meal for better BG control. She will continue to benefit from follow up sessions to keep accountable and guide towards healthier lifestyle changes. Pt verbalized understanding of information discussed. Will follow. Current Wt: 181.4 Previous Wt: 181. 2 Wt Change: Stable     Achievement of Goals: 1. Follow consistent and appropriate meal timing; follow a structured timeline eating every 3-5 hours. =continue  2. Focus on good sources of protein; Never eat carbs alone, always pair them with protein=continue   Carb Limits: 30-35 gm at meals, 10-15 gm at snacks   3. Keep a snack with you if unable to eat a structured meal.=not met, continue  4. Limit fruit juice to 4 fl oz at breakfast, work towards replacing with whole fruit. =not met, revised   New Patient Goals:  1. Include protein at every meal/snack. 2. Keep sweets as treats. Substitute fruit or [place] yogurt.   3. Replace fruit juice with whole fruit at breakfast.     Patient Education:  [x]  Review current plan with patient   []  Other:    Handouts/  Information Provided: []  Carbohydrates  []  Protein  []  Fiber  []  Serving Sizes  []  Fluids  []  General guidelines []  Diabetes  []  Cholesterol  []  Sodium  []  SBGM  []  Food Journals  []  Others:      PLAN  []  Continue on current plan []  Follow-up PRN   []  Discharge due to :    [x]  Next appt: Jan 19 at 1000     Dietitian: Koffi Reyes RDN    Date: 12/29/2017 Time: 11:51 AM

## 2018-01-11 ENCOUNTER — HOSPITAL ENCOUNTER (OUTPATIENT)
Dept: LAB | Age: 63
Discharge: HOME | End: 2018-01-11
Payer: COMMERCIAL

## 2018-01-11 DIAGNOSIS — E78.5 HYPERLIPIDEMIA, UNSPECIFIED HYPERLIPIDEMIA TYPE: ICD-10-CM

## 2018-01-11 DIAGNOSIS — I10 ESSENTIAL HYPERTENSION: ICD-10-CM

## 2018-01-11 DIAGNOSIS — E11.9 TYPE 2 DIABETES MELLITUS WITHOUT COMPLICATION, WITHOUT LONG-TERM CURRENT USE OF INSULIN (HCC): ICD-10-CM

## 2018-01-11 LAB
ANION GAP SERPL CALC-SCNC: 7 MMOL/L (ref 3–18)
BUN SERPL-MCNC: 13 MG/DL (ref 7–18)
BUN/CREAT SERPL: 21 (ref 12–20)
CALCIUM SERPL-MCNC: 9.2 MG/DL (ref 8.5–10.1)
CHLORIDE SERPL-SCNC: 100 MMOL/L (ref 100–108)
CHOLEST SERPL-MCNC: 235 MG/DL
CO2 SERPL-SCNC: 32 MMOL/L (ref 21–32)
CREAT SERPL-MCNC: 0.63 MG/DL (ref 0.6–1.3)
EST. AVERAGE GLUCOSE BLD GHB EST-MCNC: 140 MG/DL
GLUCOSE SERPL-MCNC: 108 MG/DL (ref 74–99)
HBA1C MFR BLD: 6.5 % (ref 4.2–5.6)
HDLC SERPL-MCNC: 73 MG/DL (ref 40–60)
HDLC SERPL: 3.2 {RATIO} (ref 0–5)
LDLC SERPL CALC-MCNC: 135 MG/DL (ref 0–100)
LIPID PROFILE,FLP: ABNORMAL
POTASSIUM SERPL-SCNC: 4.1 MMOL/L (ref 3.5–5.5)
SODIUM SERPL-SCNC: 139 MMOL/L (ref 136–145)
TRIGL SERPL-MCNC: 135 MG/DL (ref ?–150)
VLDLC SERPL CALC-MCNC: 27 MG/DL

## 2018-01-11 PROCEDURE — 80061 LIPID PANEL: CPT | Performed by: INTERNAL MEDICINE

## 2018-01-11 PROCEDURE — 80048 BASIC METABOLIC PNL TOTAL CA: CPT | Performed by: INTERNAL MEDICINE

## 2018-01-11 PROCEDURE — 36415 COLL VENOUS BLD VENIPUNCTURE: CPT | Performed by: INTERNAL MEDICINE

## 2018-01-11 PROCEDURE — 83036 HEMOGLOBIN GLYCOSYLATED A1C: CPT | Performed by: INTERNAL MEDICINE

## 2018-01-19 ENCOUNTER — HOSPITAL ENCOUNTER (OUTPATIENT)
Dept: NUTRITION | Age: 63
Discharge: HOME | End: 2018-01-19
Payer: COMMERCIAL

## 2018-01-19 PROCEDURE — 97803 MED NUTRITION INDIV SUBSEQ: CPT

## 2018-01-19 NOTE — PROGRESS NOTES
NUTRITION  FOLLOW-UP TREATMENT NOTE  Patient Name: Adolfo Camacho         Date: 2018  : 1955    YES Patient  Verified  Diagnosis: DM   In time:  1000             Out time:   1030   Total Treatment Time (min):   30     SUBJECTIVE/ASSESSMENT  Changes in medication or medical history? Any new allergies, surgeries or procedures? NO    If yes, update Summary List   Pt has had a few setbacks, including continued knee pain from fall in December, which has limited her physical exercise. Pt knows physical exercise will be beneficial, therefore, we put together a realistic goal for her. She has cut out orange juice and replaced it with whole fruit at breakfast. Discussed ways to add protein at breakfast, reiterating the importance of protein at each meal/snack. Pt continues to need review on CHO food sources and how they can fit as part of a healthy diet. Recent A1c draw in January was 6.5, which has gone down from 6.6. Pt is slowly making progress and will benefit from accountability and review at our meetings. Will follow later in February after meeting with her PCP. Current Wt: 181.4 Previous Wt: 182.4 Wt Change: +1     Achievement of Goals: 1. Include protein at every meal/snack.=continue  2. Keep sweets as treats. Substitute fruit or [place] yogurt.=continue  3. Replace fruit juice with whole fruit at breakfast.=met  New Patient Goals:  1. Exercise: Stationary bike 2-3 times/week for 15-20 minutes.      Patient Education:  [x]  Review current plan with patient   []  Other:    Handouts/  Information Provided: []  Carbohydrates  []  Protein  []  Fiber  []  Serving Sizes  []  Fluids  []  General guidelines []  Diabetes  []  Cholesterol  []  Sodium  []  SBGM  []  Food Journals  []  Others:      PLAN  []  Continue on current plan []  Follow-up PRN   []  Discharge due to :    [x]  Next appt:  at [address]     Dietitian: Koffi Reyes RDN    Date: 2018 Time: 10:58 AM

## 2018-01-22 ENCOUNTER — OFFICE VISIT (OUTPATIENT)
Dept: ORTHOPEDIC SURGERY | Age: 63
End: 2018-01-22

## 2018-01-22 VITALS
TEMPERATURE: 97.4 F | WEIGHT: 182 LBS | BODY MASS INDEX: 33.49 KG/M2 | HEIGHT: 62 IN | HEART RATE: 82 BPM | SYSTOLIC BLOOD PRESSURE: 148 MMHG | OXYGEN SATURATION: 95 % | DIASTOLIC BLOOD PRESSURE: 81 MMHG

## 2018-01-22 DIAGNOSIS — S83.241A ACUTE MEDIAL MENISCUS TEAR, RIGHT, INITIAL ENCOUNTER: Primary | ICD-10-CM

## 2018-01-22 DIAGNOSIS — M17.11 PRIMARY OSTEOARTHRITIS OF RIGHT KNEE: ICD-10-CM

## 2018-01-22 NOTE — PROGRESS NOTES
Beata Scott  1955   Chief Complaint   Patient presents with    Knee Pain     right knee pain        HISTORY OF PRESENT ILLNESS  Beata Scott is a 58 y.o. female who presents today for reevaluation of right knee pain. Patient rates pain as 8/10 today. At last OV, she had a cortisone injection in the right knee which provided limited relief. Reports pain that is worse in the morning and stiffness. Has pain behind the knee that she feels she has to stretch out to resolve. Patient denies any fever, chills, chest pain, shortness of breath or calf pain. There are no new illness or injuries to report since last seen in the office. There are no changes to medications, allergies, family or social history. PHYSICAL EXAM:   Visit Vitals    /81 (BP 1 Location: Left arm, BP Patient Position: Sitting)    Pulse 82    Temp 97.4 °F (36.3 °C)    Ht 5' 2\" (1.575 m)    Wt 182 lb (82.6 kg)    LMP  (LMP Unknown)    SpO2 95%    BMI 33.29 kg/m2     The patient is a well-developed, well-nourished female   in no acute distress. The patient is alert and oriented times three. The patient is alert and oriented times three. Mood and affect are normal.  LYMPHATIC: lymph nodes are not enlarged and are within normal limits  SKIN: normal in color and non tender to palpation. There are no bruises or abrasions noted. NEUROLOGICAL: Motor sensory exam is within normal limits. Reflexes are equal bilaterally.  There is normal sensation to pinprick and light touch  MUSCULOSKELETAL:  Examination Right knee   Skin Intact   Range of motion 0-130   Effusion +   Medial joint line tenderness +   Lateral joint line tenderness -   Tenderness Pes Bursa -   Tenderness insertion MCL -   Tenderness insertion LCL -   Carolas -   Patella crepitus -   Patella grind -   Lachman -   Pivot shift -   Anterior drawer -   Posterior drawer -   Varus stress -   Valgus stress -   Neurovascular Intact   Calf Swelling and Tenderness to Palpation -   Garrett's Test -   Hamstring Cord Tightness -      Swollen tender prepatellar bursa    IMAGING: XR of the right knee dated 12/11/17 was reviewed and read: decreased joint space on the medial side. Moderate degenerative changes. IMPRESSION:      ICD-10-CM ICD-9-CM    1. Acute medial meniscus tear, right, initial encounter S83.241A 836.0 MRI KNEE RT WO CONT   2. Primary osteoarthritis of right knee M17.11 715.16 MRI KNEE RT WO CONT        PLAN:   1. I am concerned that the patient could have a right meniscus tear. Risk factors include: dm, htn  2. No cortisone injection indicated today   3. No Physical/Occupational Therapy indicated today  4. Yes diagnostic test indicated today MRI R KNEE  5. No durable medical equipment indicated today  6. No referral indicated today   7. No medications indicated today  8. No Narcotic indicated today       RTC following MRI  Follow-up Disposition: Not on File    Scribed by Ty Man [address]) as dictated by Emmanuel Cross MD    I, Dr. Emmanuel Cross, confirm that all documentation is accurate.     Emmanuel Cross M.D.   [facility]

## 2018-01-25 ENCOUNTER — OFFICE VISIT (OUTPATIENT)
Dept: INTERNAL MEDICINE CLINIC | Age: 63
End: 2018-01-25

## 2018-01-25 VITALS
RESPIRATION RATE: 16 BRPM | TEMPERATURE: 98 F | OXYGEN SATURATION: 97 % | SYSTOLIC BLOOD PRESSURE: 138 MMHG | HEIGHT: 62 IN | WEIGHT: 181.6 LBS | BODY MASS INDEX: 33.42 KG/M2 | DIASTOLIC BLOOD PRESSURE: 68 MMHG | HEART RATE: 72 BPM

## 2018-01-25 DIAGNOSIS — E11.9 TYPE 2 DIABETES MELLITUS WITHOUT COMPLICATION, WITHOUT LONG-TERM CURRENT USE OF INSULIN (HCC): Primary | ICD-10-CM

## 2018-01-25 DIAGNOSIS — I10 ESSENTIAL HYPERTENSION: ICD-10-CM

## 2018-01-25 DIAGNOSIS — K21.9 GASTROESOPHAGEAL REFLUX DISEASE WITHOUT ESOPHAGITIS: ICD-10-CM

## 2018-01-25 DIAGNOSIS — J45.20 MILD INTERMITTENT ASTHMA WITHOUT COMPLICATION: ICD-10-CM

## 2018-01-25 DIAGNOSIS — E78.5 HYPERLIPIDEMIA, UNSPECIFIED HYPERLIPIDEMIA TYPE: ICD-10-CM

## 2018-01-25 DIAGNOSIS — I47.1 PSVT (PAROXYSMAL SUPRAVENTRICULAR TACHYCARDIA) (HCC): ICD-10-CM

## 2018-01-25 DIAGNOSIS — E55.9 VITAMIN D INSUFFICIENCY: ICD-10-CM

## 2018-01-25 DIAGNOSIS — M25.561 ACUTE PAIN OF RIGHT KNEE: ICD-10-CM

## 2018-01-25 DIAGNOSIS — R31.29 MICROSCOPIC HEMATURIA: ICD-10-CM

## 2018-01-25 NOTE — PROGRESS NOTES
Chief Complaint   Patient presents with    Diabetes     3 month follow up with labs. Health Maintenance Due   Topic Date Due    ZOSTER VACCINE AGE 60>  01/26/2015     1. Have you been to the ER, urgent care clinic or hospitalized since your last visit? NO.     2. Have you seen or consulted any other health care providers outside of the [address] [place] [address] since your last visit (Include any pap smears or colon screening)? YES, Patient states she went to see Dr. [facility]óme[facility] Escalante for a cortisone shot in her right knee.

## 2018-01-25 NOTE — PATIENT INSTRUCTIONS
Learning About Diabetes Food Guidelines  Your Care Instructions    Meal planning is important to manage diabetes. It helps keep your blood sugar at a target level (which you set with your doctor). You don't have to eat special foods. You can eat what your family eats, including sweets once in a while. But you do have to pay attention to how often you eat and how much you eat of certain foods. You may want to work with a dietitian or a certified diabetes educator (CDE) to help you plan meals and snacks. A dietitian or CDE can also help you lose weight if that is one of your goals. What should you know about eating carbs? Managing the amount of carbohydrate (carbs) you eat is an important part of healthy meals when you have diabetes. Carbohydrate is found in many foods. · Learn which foods have carbs. And learn the amounts of carbs in different foods. ¨ Bread, cereal, pasta, and rice have about 15 grams of carbs in a serving. A serving is 1 slice of bread (1 ounce), ½ cup of cooked cereal, or 1/3 cup of cooked pasta or rice. ¨ Fruits have 15 grams of carbs in a serving. A serving is 1 small fresh fruit, such as an apple or orange; ½ of a banana; ½ cup of cooked or canned fruit; ½ cup of fruit juice; 1 cup of melon or raspberries; or 2 tablespoons of dried fruit. ¨ Milk and no-sugar-added yogurt have 15 grams of carbs in a serving. A serving is 1 cup of milk or 2/3 cup of no-sugar-added yogurt. ¨ Starchy vegetables have 15 grams of carbs in a serving. A serving is ½ cup of mashed potatoes or sweet potato; 1 cup winter squash; ½ of a small baked potato; ½ cup of cooked beans; or ½ cup cooked corn or green peas. · Learn how much carbs to eat each day and at each meal. A dietitian or CDE can teach you how to keep track of the amount of carbs you eat. This is called carbohydrate counting. · If you are not sure how to count carbohydrate grams, use the Plate Method to plan meals.  It is a good, quick way to make sure that you have a balanced meal. It also helps you spread carbs throughout the day. ¨ Divide your plate by types of foods. Put non-starchy vegetables on half the plate, meat or other protein food on one-quarter of the plate, and a grain or starchy vegetable in the final quarter of the plate. To this you can add a small piece of fruit and 1 cup of milk or yogurt, depending on how many carbs you are supposed to eat at a meal.  · Try to eat about the same amount of carbs at each meal. Do not \"save up\" your daily allowance of carbs to eat at one meal.  · Proteins have very little or no carbs per serving. Examples of proteins are beef, chicken, turkey, fish, eggs, tofu, cheese, cottage cheese, and peanut butter. A serving size of meat is 3 ounces, which is about the size of a deck of cards. Examples of meat substitute serving sizes (equal to 1 ounce of meat) are 1/4 cup of cottage cheese, 1 egg, 1 tablespoon of peanut butter, and ½ cup of tofu. How can you eat out and still eat healthy? · Learn to estimate the serving sizes of foods that have carbohydrate. If you measure food at home, it will be easier to estimate the amount in a serving of restaurant food. · If the meal you order has too much carbohydrate (such as potatoes, corn, or baked beans), ask to have a low-carbohydrate food instead. Ask for a salad or green vegetables. · If you use insulin, check your blood sugar before and after eating out to help you plan how much to eat in the future. · If you eat more carbohydrate at a meal than you had planned, take a walk or do other exercise. This will help lower your blood sugar. What else should you know? · Limit saturated fat, such as the fat from meat and dairy products. This is a healthy choice because people who have diabetes are at higher risk of heart disease. So choose lean cuts of meat and nonfat or low-fat dairy products.  Use olive or canola oil instead of butter or shortening when cooking. · Don't skip meals. Your blood sugar may drop too low if you skip meals and take insulin or certain medicines for diabetes. · Check with your doctor before you drink alcohol. Alcohol can cause your blood sugar to drop too low. Alcohol can also cause a bad reaction if you take certain diabetes medicines. Follow-up care is a key part of your treatment and safety. Be sure to make and go to all appointments, and call your doctor if you are having problems. It's also a good idea to know your test results and keep a list of the medicines you take. Where can you learn more? Go to http://yoselyn-merline.info/. Enter L168 in the search box to learn more about \"Learning About Diabetes Food Guidelines. \"  Current as of: March 13, 2017  Content Version: 11.4  © 8431-8306 [employer]. Care instructions adapted under license by [employer] (which disclaims liability or warranty for this information). If you have questions about a medical condition or this instruction, always ask your healthcare professional. [address] any warranty or liability for your use of this information. Learning About Meal Planning for Diabetes  Why plan your meals? Meal planning can be a key part of managing diabetes. Planning meals and snacks with the right balance of carbohydrate, protein, and fat can help you keep your blood sugar at the target level you set with your doctor. You don't have to eat special foods. You can eat what your family eats, including sweets once in a while. But you do have to pay attention to how often you eat and how much you eat of certain foods. You may want to work with a dietitian or a certified diabetes educator. He or she can give you tips and meal ideas and can answer your questions about meal planning. This health professional can also help you reach a healthy weight if that is one of your goals. What plan is right for you?   Your dietitian or diabetes educator may suggest that you start with the plate format or carbohydrate counting. The plate format  The plate format is a simple way to help you manage how you eat. You plan meals by learning how much space each food should take on a plate. Using the plate format helps you spread carbohydrate throughout the day. It can make it easier to keep your blood sugar level within your target range. It also helps you see if you're eating healthy portion sizes. To use the plate format, you put non-starchy vegetables on half your plate. Add meat or meat substitutes on one-quarter of the plate. Put a grain or starchy vegetable (such as brown rice or a potato) on the final quarter of the plate. You can add a small piece of fruit and some low-fat or fat-free milk or yogurt, depending on your carbohydrate goal for each meal.  Here are some tips for using the plate format:  · Make sure that you are not using an oversized plate. A 9-inch plate is best. Many restaurants use larger plates. · Get used to using the plate format at home. Then you can use it when you eat out. · Write down your questions about using the plate format. Talk to your doctor, a dietitian, or a diabetes educator about your concerns. Carbohydrate counting  With carbohydrate counting, you plan meals based on the amount of carbohydrate in each food. Carbohydrate raises blood sugar higher and more quickly than any other nutrient. It is found in desserts, breads and cereals, and fruit. It's also found in starchy vegetables such as potatoes and corn, grains such as rice and pasta, and milk and yogurt. Spreading carbohydrate throughout the day helps keep your blood sugar levels within your target range. Your daily amount depends on several things, including your weight, how active you are, which diabetes medicines you take, and what your goals are for your blood sugar levels.  A registered dietitian or diabetes educator can help you plan how much carbohydrate to include in each meal and snack. A guideline for your daily amount of carbohydrate is:  · 45 to 60 grams at each meal. That's about the same as 3 to 4 carbohydrate servings. · 15 to 20 grams at each snack. That's about the same as 1 carbohydrate serving. The Nutrition Facts label on packaged foods tells you how much carbohydrate is in a serving of the food. First, look at the serving size on the food label. Is that the amount you eat in a serving? All of the nutrition information on a food label is based on that serving size. So if you eat more or less than that, you'll need to adjust the other numbers. Total carbohydrate is the next thing you need to look for on the label. If you count carbohydrate servings, one serving of carbohydrate is 15 grams. For foods that don't come with labels, such as fresh fruits and vegetables, you'll need a guide that lists carbohydrate in these foods. Ask your doctor, dietitian, or diabetes educator about books or other nutrition guides you can use. If you take insulin, you need to know how many grams of carbohydrate are in a meal. This lets you know how much rapid-acting insulin to take before you eat. If you use an insulin pump, you get a constant rate of insulin during the day. So the pump must be programmed at meals to give you extra insulin to cover the rise in blood sugar after meals. When you know how much carbohydrate you will eat, you can take the right amount of insulin. Or, if you always use the same amount of insulin, you need to make sure that you eat the same amount of carbohydrate at meals. If you need more help to understand carbohydrate counting and food labels, ask your doctor, dietitian, or diabetes educator. How do you get started with meal planning? Here are some tips to get started:  · Plan your meals a week at a time. Don't forget to include snacks too. · Use cookbooks or online recipes to plan several main meals.  Plan some quick meals for busy nights. You also can double some recipes that freeze well. Then you can save half for other busy nights when you don't have time to cook. · Make sure you have the ingredients you need for your recipes. If you're running low on basic items, put these items on your shopping list too. · List foods that you use to make breakfasts, lunches, and snacks. List plenty of fruits and vegetables. · Post this list on the refrigerator. Add to it as you think of more things you need. · Take the list to the store to do your weekly shopping. Follow-up care is a key part of your treatment and safety. Be sure to make and go to all appointments, and call your doctor if you are having problems. It's also a good idea to know your test results and keep a list of the medicines you take. Where can you learn more? Go to http://yoselynBoxermerline.info/. Airam Sanders in the search box to learn more about \"Learning About Meal Planning for Diabetes. \"  Current as of: March 13, 2017  Content Version: 11.4  © 2188-9641 [employer]. Care instructions adapted under license by [employer] (which disclaims liability or warranty for this information). If you have questions about a medical condition or this instruction, always ask your healthcare professional. [address] any warranty or liability for your use of this information. Learning About Healthy Weight  What is a healthy weight? A healthy weight is the weight at which you feel good about yourself and have energy for work and play. It's also one that lowers your risk for health problems. What can you do to stay at a healthy weight? It can be hard to stay at a healthy weight, especially when fast food, vending-machine snacks, and processed foods are so easy to find. And with your busy lifestyle, activity may be low on your list of things to do.  But staying at a healthy weight may be easier than you think.  Here are some dos and don'ts for staying at a healthy weight:  Do eat healthy foods  The kinds of foods you eat have a big impact on both your weight and your health. Reaching and staying at a healthy weight is not about going on a diet. It's about making healthier food choices every day and changing your diet for good. Healthy eating means eating a variety of foods so that you get all the nutrients you need. Your body needs protein, carbohydrate, and fats for energy. They keep your heart beating, your brain active, and your muscles working. On most days, try to eat from each food group. This means eating a variety of:  · Whole grains, such as whole wheat breads and pastas. · Fruits and vegetables. · Dairy products, such as low-fat milk, yogurt, and cheese. · Lean proteins, such as all types of fish, chicken without the skin, and beans. Don't have too much or too little of one thing. All foods, if eaten in moderation, can be part of healthy eating. Even sweets can be okay. If your favorite foods are high in fat, salt, sugar, or calories, limit how often you eat them. Eat smaller servings, or look for healthy substitutes. Do watch what you eat  Many people eat more than their bodies need. Part of staying at a healthy weight means learning how much food you really need from day to day and not eating more than that. Even with healthy foods, eating too much can make you gain weight. Having a well-balanced diet means that you eat enough, but not too much, and that your food gives you the nutrients you need to stay healthy. So listen to your body. Eat when you're hungry. Stop when you feel satisfied. It's a good idea to have healthy snacks ready for when you get hungry. Keep healthy snacks with you at work, in your car, and at home. If you have a healthy snack easily available, you'll be less likely to pick a candy bar or bag of chips from a vending machine instead.   Some healthy snacks you might want to keep on hand are fruit, low-fat yogurt, string cheese, low-fat microwave popcorn, raisins and other dried fruit, nuts, whole wheat crackers, pretzels, carrots, celery sticks, and broccoli. Do some physical activity  A big part of reaching and staying at a healthy weight is being active. When you're active, you burn calories. This makes it easier to reach and stay at a healthy weight. When you're active on a regular basis, your body burns more calories, even when you're at rest. Being active helps you lose fat and build lean muscle. Try to be active for at least 1 hour every day. This may sound like a lot, but it's okay to be active in smaller blocks of time that add up to 1 hour a day. Any activity that makes your heart beat faster and keeps it there for a while counts. A brisk walk, run, or swim will get your heart beating faster. So will climbing stairs, shooting baskets, or cycling. Even some household chores like vacuuming and mowing the lawn will get your heart rate up. Pick activities that you enjoy-ones that make your heart beat faster, your muscles stronger, and your muscles and joints more flexible. If you find more than one thing you like doing, do them all. You don't have to do the same thing every day. Don't diet  Diets don't work. Diets are temporary. Because you give up so much when you diet, you may be hungry and think about food all the time. And after you stop dieting, you also may overeat to make up for what you missed. Most people who diet end up gaining back the pounds they lost-and more. Remember that healthy bodies come in lots of shapes and sizes. Everyone can get healthier by eating better and being more active. Where can you learn more? Go to http://yoselyn-merline.info/. Enter 791 [id number] in the search box to learn more about \"Learning About Healthy Weight. \"  Current as of: October 13, 2016  Content Version: 11.4  © 6721-7258 [employer].  Care instructions adapted under license by [employer] (which disclaims liability or warranty for this information). If you have questions about a medical condition or this instruction, always ask your healthcare professional. [address] any warranty or liability for your use of this information. Body Mass Index: Care Instructions  Your Care Instructions    Body mass index (BMI) can help you see if your weight is raising your risk for health problems. It uses a formula to compare how much you weigh with how tall you are. · A BMI lower than 18.5 is considered underweight. · A BMI between 18.5 and 24.9 is considered healthy. · A BMI between 25 and 29.9 is considered overweight. A BMI of 30 or higher is considered obese. If your BMI is in the normal range, it means that you have a lower risk for weight-related health problems. If your BMI is in the overweight or obese range, you may be at increased risk for weight-related health problems, such as high blood pressure, heart disease, stroke, arthritis or joint pain, and diabetes. If your BMI is in the underweight range, you may be at increased risk for health problems such as fatigue, lower protection (immunity) against illness, muscle loss, bone loss, hair loss, and hormone problems. BMI is just one measure of your risk for weight-related health problems. You may be at higher risk for health problems if you are not active, you eat an unhealthy diet, or you drink too much alcohol or use tobacco products. Follow-up care is a key part of your treatment and safety. Be sure to make and go to all appointments, and call your doctor if you are having problems. It's also a good idea to know your test results and keep a list of the medicines you take. How can you care for yourself at home? · Practice healthy eating habits. This includes eating plenty of fruits, vegetables, whole grains, lean protein, and low-fat dairy.   · If your doctor recommends it, get more exercise. Walking is a good choice. Bit by bit, increase the amount you walk every day. Try for at least 30 minutes on most days of the week. · Do not smoke. Smoking can increase your risk for health problems. If you need help quitting, talk to your doctor about stop-smoking programs and medicines. These can increase your chances of quitting for good. · Limit alcohol to 2 drinks a day for men and 1 drink a day for women. Too much alcohol can cause health problems. If you have a BMI higher than 25  · Your doctor may do other tests to check your risk for weight-related health problems. This may include measuring the distance around your waist. A waist measurement of more than 40 inches in men or 35 inches in women can increase the risk of weight-related health problems. · Talk with your doctor about steps you can take to stay healthy or improve your health. You may need to make lifestyle changes to lose weight and stay healthy, such as changing your diet and getting regular exercise. If you have a BMI lower than 18.5  · Your doctor may do other tests to check your risk for health problems. · Talk with your doctor about steps you can take to stay healthy or improve your health. You may need to make lifestyle changes to gain or maintain weight and stay healthy, such as getting more healthy foods in your diet and doing exercises to build muscle. Where can you learn more? Go to http://yoselyn-merline.info/. Enter S176 in the search box to learn more about \"Body Mass Index: Care Instructions. \"  Current as of: October 13, 2016  Content Version: 11.4  © 9118-0673 [employer]. Care instructions adapted under license by [employer] (which disclaims liability or warranty for this information).  If you have questions about a medical condition or this instruction, always ask your healthcare professional. [place] [address] any warranty or liability for your use of this information.

## 2018-01-25 NOTE — MR AVS SNAPSHOT
[facility] 
 
 
 [address] [facility], [facility] 200 [facility] 
[phone] Patient: Lacie Mcmahan MRN: [id number] Visit Information Date & Time Provider Department Dept. Phone Encounter #  
 1/25/2018 12:30 PM Benja Jackson MD [facility] [phone] [id number] Follow-up Instructions Return in about 3 months (around 4/25/2018), or if symptoms worsen or fail to improve. Your Appointments 2/1/2018 11:20 AM  
DIAG TEST F/U with Melissa Alvarez MD  
[address] [facility], [address] and [facility] Appt Note: MRI RESULTS IN  01-29-18/PAT WILL BRING [address], [address] 200 [facility]  
[phone] [address] [facility]  
  
    
 5/3/2018 11:00 AM  
Office Visit with Benja Jackson MD  
[facility] Appt Note: 3 month f/u labs at [facility] [address], [address]  
  
   
 [address] [facility] Upcoming Health Maintenance Date Due ZOSTER VACCINE AGE 60> 1/26/2015 FOOT EXAM Q1 3/21/2018 EYE EXAM RETINAL OR DILATED Q1 6/20/2018 HEMOGLOBIN A1C Q6M 7/11/2018 MICROALBUMIN Q1 9/20/2018 LIPID PANEL Q1 1/11/2019 BREAST CANCER SCRN MAMMOGRAM 11/29/2019 PAP AKA CERVICAL CYTOLOGY 11/3/2021 COLONOSCOPY 12/6/2021 DTaP/Tdap/Td series (2 - Td) 2/16/2022 Allergies as of 1/25/2018  Review Complete On: 1/25/2018 By: Suzie Marie Severity Noted Reaction Type Reactions Altace [Ramipril]  03/17/2011    Cough Penicillins    Other (comments) Hands peel Sulfur    Itching Current Immunizations  Reviewed on 10/5/2017 Name Date Influenza Vaccine (Quad) PF 10/5/2017 12:43 PM, 10/19/2016, 10/23/2015 Influenza Vaccine PF 10/3/2014, 12/12/2013 Influenza Vaccine Split 10/22/2012, 11/2/2011 Influenza Vaccine Whole 10/29/2010 PPD 1/3/2012 Pneumococcal Polysaccharide (PPSV-23) 3/21/2017 TB Skin Test (PPD) Intradermal 2/12/2014 TDAP Vaccine 2/16/2012 Not reviewed this visit Vitals BP Pulse Temp Resp Height(growth percentile) Weight(growth percentile)  
 138/68 (BP 1 Location: Right arm, BP Patient Position: Sitting) 72 98 °F (36.7 °C) (Oral) 16 5' 2\" (1.575 m) 181 lb 9.6 oz (82.4 kg) LMP SpO2 BMI OB Status Smoking Status (LMP Unknown) 97% 33.22 kg/m2 Postmenopausal Never Smoker Vitals History BMI and BSA Data Body Mass Index Body Surface Area  
 33.22 kg/m 2 1.9 m 2 Preferred Pharmacy Pharmacy Name Phone [address], [address] [place], [place] [phone] Your Updated Medication List  
  
   
This list is accurate as of: 1/25/18  1:31 PM.  Always use your most recent med list.  
  
  
  
  
 albuterol 90 mcg/actuation inhaler Commonly known as:  PROAIR HFA  
inhale 2 puffs by mouth every 4 hours if needed for wheezing  
  
 clotrimazole-betamethasone topical cream  
Commonly known as:  Korene Childs Apply  to both ear canals and affected part of outer ear twice a day with a finger. fluticasone 50 mcg/actuation nasal spray Commonly known as:  Delmon Pickup 2 Sprays by Both Nostrils route daily. hydroCHLOROthiazide 25 mg tablet Commonly known as:  HYDRODIURIL  
take 1/2 tablet by mouth once daily LORazepam 1 mg tablet Commonly known as:  ATIVAN  
TAKE 1 TABLET BY MOUTH EVERY 8 HOURS AS NEEDED FOR ANXIETY  
  
 meloxicam 15 mg tablet Commonly known as:  MOBIC Take 1 Tab by mouth daily (with breakfast). metoprolol succinate 50 mg XL tablet Commonly known as:  TOPROL-XL  
take 1 tablet by mouth once daily MULTIVITAMIN PO Take 1 Tab by mouth every other day. omeprazole 20 mg capsule Commonly known as:  PriLOSEC  
 Take 1 Cap by mouth daily. potassium chloride 20 mEq tablet Commonly known as:  K-DUR, KLOR-CON  
take 1 tablet by mouth once daily QVAR 40 mcg/actuation Apprity Generic drug:  beclomethasone Take 1 Puff by inhalation two (2) times a day. REFRESH LIQUIGEL 1 % Dlgl Generic drug:  carboxymethylcellulose sodium Apply  to eye. Follow-up Instructions Return in about 3 months (around 4/25/2018), or if symptoms worsen or fail to improve. To-Do List   
 01/29/2018 5:00 PM  
  Appointment with [facility] MRI RM 2 at [address] [place] [address] ([phone]) GENERAL INSTRUCTIONS  Bring information (ID card) if you have any medically implanted devices. You will be required to lie still while the procedure is being performed. Remove any jewelry (including body piercing, hairpins) prior to MRI. If you have had a creatinine level drawn within the past 30 days, please bring most recent results to your appt. Bring any films, CD's, and reports related to your study with you on the day of your exam.  This only includes studies done outside of [address] [place] [address], [place], and [place]. Bring a complete list of all medications you are currently taking to include prescriptions, over-the-counter meds, herbals, vitamins & any dietary supplements. If you were given medications for claustrophobia or anxiety, please arrange to have someone drive you to your appointment. QUESTIONS  Notify the MRI Department if you have any questions concerning your study. [place] [address] [place] - [phone]  
  
 02/16/2018 10:00 AM  
  Appointment with Martine Fletcher at [facility] Patient Instructions Learning About Diabetes Food Guidelines Your Care Instructions Meal planning is important to manage diabetes. It helps keep your blood sugar at a target level (which you set with your doctor).  You don't have to eat special foods. You can eat what your family eats, including sweets once in a while. But you do have to pay attention to how often you eat and how much you eat of certain foods. You may want to work with a dietitian or a certified diabetes educator (CDE) to help you plan meals and snacks. A dietitian or CDE can also help you lose weight if that is one of your goals. What should you know about eating carbs? Managing the amount of carbohydrate (carbs) you eat is an important part of healthy meals when you have diabetes. Carbohydrate is found in many foods. · Learn which foods have carbs. And learn the amounts of carbs in different foods. ¨ Bread, cereal, pasta, and rice have about 15 grams of carbs in a serving. A serving is 1 slice of bread (1 ounce), ½ cup of cooked cereal, or 1/3 cup of cooked pasta or rice. ¨ Fruits have 15 grams of carbs in a serving. A serving is 1 small fresh fruit, such as an apple or orange; ½ of a banana; ½ cup of cooked or canned fruit; ½ cup of fruit juice; 1 cup of melon or raspberries; or 2 tablespoons of dried fruit. ¨ Milk and no-sugar-added yogurt have 15 grams of carbs in a serving. A serving is 1 cup of milk or 2/3 cup of no-sugar-added yogurt. ¨ Starchy vegetables have 15 grams of carbs in a serving. A serving is ½ cup of mashed potatoes or sweet potato; 1 cup winter squash; ½ of a small baked potato; ½ cup of cooked beans; or ½ cup cooked corn or green peas. · Learn how much carbs to eat each day and at each meal. A dietitian or CDE can teach you how to keep track of the amount of carbs you eat. This is called carbohydrate counting. · If you are not sure how to count carbohydrate grams, use the Plate Method to plan meals. It is a good, quick way to make sure that you have a balanced meal. It also helps you spread carbs throughout the day. ¨ Divide your plate by types of foods.  Put non-starchy vegetables on half the plate, meat or other protein food on one-quarter of the plate, and a grain or starchy vegetable in the final quarter of the plate. To this you can add a small piece of fruit and 1 cup of milk or yogurt, depending on how many carbs you are supposed to eat at a meal. 
· Try to eat about the same amount of carbs at each meal. Do not \"save up\" your daily allowance of carbs to eat at one meal. 
· Proteins have very little or no carbs per serving. Examples of proteins are beef, chicken, turkey, fish, eggs, tofu, cheese, cottage cheese, and peanut butter. A serving size of meat is 3 ounces, which is about the size of a deck of cards. Examples of meat substitute serving sizes (equal to 1 ounce of meat) are 1/4 cup of cottage cheese, 1 egg, 1 tablespoon of peanut butter, and ½ cup of tofu. How can you eat out and still eat healthy? · Learn to estimate the serving sizes of foods that have carbohydrate. If you measure food at home, it will be easier to estimate the amount in a serving of restaurant food. · If the meal you order has too much carbohydrate (such as potatoes, corn, or baked beans), ask to have a low-carbohydrate food instead. Ask for a salad or green vegetables. · If you use insulin, check your blood sugar before and after eating out to help you plan how much to eat in the future. · If you eat more carbohydrate at a meal than you had planned, take a walk or do other exercise. This will help lower your blood sugar. What else should you know? · Limit saturated fat, such as the fat from meat and dairy products. This is a healthy choice because people who have diabetes are at higher risk of heart disease. So choose lean cuts of meat and nonfat or low-fat dairy products. Use olive or canola oil instead of butter or shortening when cooking. · Don't skip meals. Your blood sugar may drop too low if you skip meals and take insulin or certain medicines for diabetes. · Check with your doctor before you drink alcohol. Alcohol can cause your blood sugar to drop too low. Alcohol can also cause a bad reaction if you take certain diabetes medicines. Follow-up care is a key part of your treatment and safety. Be sure to make and go to all appointments, and call your doctor if you are having problems. It's also a good idea to know your test results and keep a list of the medicines you take. Where can you learn more? Go to http://yoselyn-merline.info/. Enter W232 in the search box to learn more about \"Learning About Diabetes Food Guidelines. \" Current as of: March 13, 2017 Content Version: 11.4 © 0094-8754 [employer]. Care instructions adapted under license by [employer] (which disclaims liability or warranty for this information). If you have questions about a medical condition or this instruction, always ask your healthcare professional. [place] [address] any warranty or liability for your use of this information. Learning About Meal Planning for Diabetes Why plan your meals? Meal planning can be a key part of managing diabetes. Planning meals and snacks with the right balance of carbohydrate, protein, and fat can help you keep your blood sugar at the target level you set with your doctor. You don't have to eat special foods. You can eat what your family eats, including sweets once in a while. But you do have to pay attention to how often you eat and how much you eat of certain foods. You may want to work with a dietitian or a certified diabetes educator. He or she can give you tips and meal ideas and can answer your questions about meal planning. This health professional can also help you reach a healthy weight if that is one of your goals. What plan is right for you? Your dietitian or diabetes educator may suggest that you start with the plate format or carbohydrate counting. The plate format The plate format is a simple way to help you manage how you eat. You plan meals by learning how much space each food should take on a plate. Using the plate format helps you spread carbohydrate throughout the day. It can make it easier to keep your blood sugar level within your target range. It also helps you see if you're eating healthy portion sizes. To use the plate format, you put non-starchy vegetables on half your plate. Add meat or meat substitutes on one-quarter of the plate. Put a grain or starchy vegetable (such as brown rice or a potato) on the final quarter of the plate. You can add a small piece of fruit and some low-fat or fat-free milk or yogurt, depending on your carbohydrate goal for each meal. 
Here are some tips for using the plate format: · Make sure that you are not using an oversized plate. A 9-inch plate is best. Many restaurants use larger plates. · Get used to using the plate format at home. Then you can use it when you eat out. · Write down your questions about using the plate format. Talk to your doctor, a dietitian, or a diabetes educator about your concerns. Carbohydrate counting With carbohydrate counting, you plan meals based on the amount of carbohydrate in each food. Carbohydrate raises blood sugar higher and more quickly than any other nutrient. It is found in desserts, breads and cereals, and fruit. It's also found in starchy vegetables such as potatoes and corn, grains such as rice and pasta, and milk and yogurt. Spreading carbohydrate throughout the day helps keep your blood sugar levels within your target range. Your daily amount depends on several things, including your weight, how active you are, which diabetes medicines you take, and what your goals are for your blood sugar levels. A registered dietitian or diabetes educator can help you plan how much carbohydrate to include in each meal and snack. A guideline for your daily amount of carbohydrate is: · 45 to 60 grams at each meal. That's about the same as 3 to 4 carbohydrate servings. · 15 to 20 grams at each snack. That's about the same as 1 carbohydrate serving. The Nutrition Facts label on packaged foods tells you how much carbohydrate is in a serving of the food. First, look at the serving size on the food label. Is that the amount you eat in a serving? All of the nutrition information on a food label is based on that serving size. So if you eat more or less than that, you'll need to adjust the other numbers. Total carbohydrate is the next thing you need to look for on the label. If you count carbohydrate servings, one serving of carbohydrate is 15 grams. For foods that don't come with labels, such as fresh fruits and vegetables, you'll need a guide that lists carbohydrate in these foods. Ask your doctor, dietitian, or diabetes educator about books or other nutrition guides you can use. If you take insulin, you need to know how many grams of carbohydrate are in a meal. This lets you know how much rapid-acting insulin to take before you eat. If you use an insulin pump, you get a constant rate of insulin during the day. So the pump must be programmed at meals to give you extra insulin to cover the rise in blood sugar after meals. When you know how much carbohydrate you will eat, you can take the right amount of insulin. Or, if you always use the same amount of insulin, you need to make sure that you eat the same amount of carbohydrate at meals. If you need more help to understand carbohydrate counting and food labels, ask your doctor, dietitian, or diabetes educator. How do you get started with meal planning? Here are some tips to get started: 
· Plan your meals a week at a time. Don't forget to include snacks too. · Use cookbooks or online recipes to plan several main meals. Plan some quick meals for busy nights.  You also can double some recipes that freeze well. Then you can save half for other busy nights when you don't have time to cook. · Make sure you have the ingredients you need for your recipes. If you're running low on basic items, put these items on your shopping list too. · List foods that you use to make breakfasts, lunches, and snacks. List plenty of fruits and vegetables. · Post this list on the refrigerator. Add to it as you think of more things you need. · Take the list to the store to do your weekly shopping. Follow-up care is a key part of your treatment and safety. Be sure to make and go to all appointments, and call your doctor if you are having problems. It's also a good idea to know your test results and keep a list of the medicines you take. Where can you learn more? Go to http://yoselynCiRBAmerline.info/. Andre Sampson in the search box to learn more about \"Learning About Meal Planning for Diabetes. \" Current as of: March 13, 2017 Content Version: 11.4 © 1661-8741 [employer]. Care instructions adapted under license by [employer] (which disclaims liability or warranty for this information). If you have questions about a medical condition or this instruction, always ask your healthcare professional. [address] any warranty or liability for your use of this information. Learning About Healthy Weight What is a healthy weight? A healthy weight is the weight at which you feel good about yourself and have energy for work and play. It's also one that lowers your risk for health problems. What can you do to stay at a healthy weight? It can be hard to stay at a healthy weight, especially when fast food, vending-machine snacks, and processed foods are so easy to find. And with your busy lifestyle, activity may be low on your list of things to do. But staying at a healthy weight may be easier than you think. Here are some dos and don'ts for staying at a healthy weight: Do eat healthy foods The kinds of foods you eat have a big impact on both your weight and your health. Reaching and staying at a healthy weight is not about going on a diet. It's about making healthier food choices every day and changing your diet for good. Healthy eating means eating a variety of foods so that you get all the nutrients you need. Your body needs protein, carbohydrate, and fats for energy. They keep your heart beating, your brain active, and your muscles working. On most days, try to eat from each food group. This means eating a variety of: · Whole grains, such as whole wheat breads and pastas. · Fruits and vegetables. · Dairy products, such as low-fat milk, yogurt, and cheese. · Lean proteins, such as all types of fish, chicken without the skin, and beans. Don't have too much or too little of one thing. All foods, if eaten in moderation, can be part of healthy eating. Even sweets can be okay. If your favorite foods are high in fat, salt, sugar, or calories, limit how often you eat them. Eat smaller servings, or look for healthy substitutes. Do watch what you eat Many people eat more than their bodies need. Part of staying at a healthy weight means learning how much food you really need from day to day and not eating more than that. Even with healthy foods, eating too much can make you gain weight. Having a well-balanced diet means that you eat enough, but not too much, and that your food gives you the nutrients you need to stay healthy. So listen to your body. Eat when you're hungry. Stop when you feel satisfied. It's a good idea to have healthy snacks ready for when you get hungry. Keep healthy snacks with you at work, in your car, and at home. If you have a healthy snack easily available, you'll be less likely to pick a candy bar or bag of chips from a vending machine instead.  
Some healthy snacks you might want to keep on hand are fruit, low-fat yogurt, string cheese, low-fat microwave popcorn, raisins and other dried fruit, nuts, whole wheat crackers, pretzels, carrots, celery sticks, and broccoli. Do some physical activity A big part of reaching and staying at a healthy weight is being active. When you're active, you burn calories. This makes it easier to reach and stay at a healthy weight. When you're active on a regular basis, your body burns more calories, even when you're at rest. Being active helps you lose fat and build lean muscle. Try to be active for at least 1 hour every day. This may sound like a lot, but it's okay to be active in smaller blocks of time that add up to 1 hour a day. Any activity that makes your heart beat faster and keeps it there for a while counts. A brisk walk, run, or swim will get your heart beating faster. So will climbing stairs, shooting baskets, or cycling. Even some household chores like vacuuming and mowing the lawn will get your heart rate up. Pick activities that you enjoy-ones that make your heart beat faster, your muscles stronger, and your muscles and joints more flexible. If you find more than one thing you like doing, do them all. You don't have to do the same thing every day. Don't diet Diets don't work. Diets are temporary. Because you give up so much when you diet, you may be hungry and think about food all the time. And after you stop dieting, you also may overeat to make up for what you missed. Most people who diet end up gaining back the pounds they lost-and more. Remember that healthy bodies come in lots of shapes and sizes. Everyone can get healthier by eating better and being more active. Where can you learn more? Go to http://yoselyn-merline.info/. Enter 486 0985 in the search box to learn more about \"Learning About Healthy Weight. \" Current as of: October 13, 2016 Content Version: 11.4 © 5893-8905 [employer], [employer].  Care instructions adapted under license by [address] (which disclaims liability or warranty for this information). If you have questions about a medical condition or this instruction, always ask your healthcare professional. [address] any warranty or liability for your use of this information. Body Mass Index: Care Instructions Your Care Instructions Body mass index (BMI) can help you see if your weight is raising your risk for health problems. It uses a formula to compare how much you weigh with how tall you are. · A BMI lower than 18.5 is considered underweight. · A BMI between 18.5 and 24.9 is considered healthy. · A BMI between 25 and 29.9 is considered overweight. A BMI of 30 or higher is considered obese. If your BMI is in the normal range, it means that you have a lower risk for weight-related health problems. If your BMI is in the overweight or obese range, you may be at increased risk for weight-related health problems, such as high blood pressure, heart disease, stroke, arthritis or joint pain, and diabetes. If your BMI is in the underweight range, you may be at increased risk for health problems such as fatigue, lower protection (immunity) against illness, muscle loss, bone loss, hair loss, and hormone problems. BMI is just one measure of your risk for weight-related health problems. You may be at higher risk for health problems if you are not active, you eat an unhealthy diet, or you drink too much alcohol or use tobacco products. Follow-up care is a key part of your treatment and safety. Be sure to make and go to all appointments, and call your doctor if you are having problems. It's also a good idea to know your test results and keep a list of the medicines you take. How can you care for yourself at home? · Practice healthy eating habits. This includes eating plenty of fruits, vegetables, whole grains, lean protein, and low-fat dairy. · If your doctor recommends it, get more exercise. Walking is a good choice. Bit by bit, increase the amount you walk every day. Try for at least 30 minutes on most days of the week. · Do not smoke. Smoking can increase your risk for health problems. If you need help quitting, talk to your doctor about stop-smoking programs and medicines. These can increase your chances of quitting for good. · Limit alcohol to 2 drinks a day for men and 1 drink a day for women. Too much alcohol can cause health problems. If you have a BMI higher than 25 · Your doctor may do other tests to check your risk for weight-related health problems. This may include measuring the distance around your waist. A waist measurement of more than 40 inches in men or 35 inches in women can increase the risk of weight-related health problems. · Talk with your doctor about steps you can take to stay healthy or improve your health. You may need to make lifestyle changes to lose weight and stay healthy, such as changing your diet and getting regular exercise. If you have a BMI lower than 18.5 · Your doctor may do other tests to check your risk for health problems. · Talk with your doctor about steps you can take to stay healthy or improve your health. You may need to make lifestyle changes to gain or maintain weight and stay healthy, such as getting more healthy foods in your diet and doing exercises to build muscle. Where can you learn more? Go to http://[email or web address]-merline.info/. Enter S176 in the search box to learn more about \"Body Mass Index: Care Instructions. \" Current as of: October 13, 2016 Content Version: 11.4 © 4826-5779 [employer]. Care instructions adapted under license by [employer] (which disclaims liability or warranty for this information).  If you have questions about a medical condition or this instruction, always ask your healthcare professional. [employer] disclaims any warranty or liability for your use of this information. Introducing [facility]! Marlene Meade introduces GIVINGtrax patient portal. Now you can access parts of your medical record, email your doctor's office, and request medication refills online. 1. In your internet browser, go to [email or web address] 2. Click on the First Time User? Click Here link in the Sign In box. You will see the New Member Sign Up page. 3. Enter your GIVINGtrax Access Code exactly as it appears below. You will not need to use this code after youve completed the sign-up process. If you do not sign up before the expiration date, you must request a new code. · GIVINGtrax Access Code: [email or web address] Expires: 4/19/2018 10:00 AM 
 
4. Enter the last four digits of your Social Security Number (xxxx) and Date of Birth (mm/dd/yyyy) as indicated and click Submit. You will be taken to the next sign-up page. 5. Create a GIVINGtrax ID. This will be your GIVINGtrax login ID and cannot be changed, so think of one that is secure and easy to remember. 6. Create a GIVINGtrax password. You can change your password at any time. 7. Enter your Password Reset Question and Answer. This can be used at a later time if you forget your password. 8. Enter your e-mail address. You will receive e-mail notification when new information is available in [address] 9. Click Sign Up. You can now view and download portions of your medical record. 10. Click the Download Summary menu link to download a portable copy of your medical information. If you have questions, please visit the Frequently Asked Questions section of the GIVINGtrax website. Remember, GIVINGtrax is NOT to be used for urgent needs. For medical emergencies, dial 911. Now available from your iPhone and Android! Please provide this summary of care documentation to your next provider. Your primary care clinician is listed as Amanda Campos. If you have any questions after today's visit, please call [phone].

## 2018-01-28 PROBLEM — R31.29 MICROSCOPIC HEMATURIA: Status: ACTIVE | Noted: 2018-01-28

## 2018-01-28 PROBLEM — M25.561 ACUTE PAIN OF RIGHT KNEE: Status: ACTIVE | Noted: 2018-01-28

## 2018-01-28 NOTE — PROGRESS NOTES
HPI:   Ever Sicard is a 58y.o. year old female who presents today for evaluation of back pain. She has a history of hypertension, hyperlipidemia, paroxysmal SVT, diabetes mellitus, GERD, asthma, elevated transaminases, abnormal glucose, and atypical mycobacterial pneumonia. She reports that she is doing relatively well. She reports that she referred by her gynecologist for evaluation by Dr. Lalo Benitez for microscopic hematuria, and urine cytology was negative. However, she states that she refused his recommendations to undergo a CT urogram or cystoscopy. She denies any dysuria, gross hematuria, or flank pain. She also reports that she fell down the steps of her porch approximately two months ago, and has had difficulty with right knee pain and swelling since that time. She was evaluated by Dr. Richmond Chu in 12/2017, and right knee xray showed decreased medial joint space, and moderate degenerative changes. SHe received a cortisone injection, but did not notice any improvement. She is now scheduled to undergo an MRI of the right knee on 1/29/2018. She is otherwise without complaints. She has a history of hypertension, treated with metoprolol, and hydrochlorothiazide (+ potassium). She reports that she does not check her blood pressure at home. She does not exercise regularly, but denies any chest pain, shortness of breath at rest or with exertion, lightheadedness, or edema. 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 She has had approximately six severe episodes over the years, prompting presentation to the ED and treatment with IV Adenosine. She currently reports infrequent short episodes of palpitations and is being treated with metoprolol. She is followed by Dr. Jonathan Bejarano. She had an echocardiogram (10/2005) showing normal LV size and function (EF 60-65%), and no valvular pathology.  In 11/2012, she underwent an exercise stress echocardiogram, which was normal at maximal exercise. She has a history of hyperlipidemia, treated with simvastatin from 10/2012 to 3/2015 at which time she stopped taking it due to myalgias. She restarted it on 8/2015 and continued to take it without difficulty until 3/2016, when it was noticed that she had transaminase elevation (AST 85/ ) and it was discontinued. Evaluation included hepatitis A, B, and C levels (negative), iron panel (normal), and RUQ ultrasound (3/23/2016) with limited sonographic window for the liver; only partially visualized but grossly unremarkable. Repeat hepatic panel (4/22/2016) showed AST 75 and ALT 98. Repeat lipid panel showed total chol 215/ / HDL 64/. In 5/2016, she had an abdominal CT scan showing the liver to be normal in size with normal parenchymal density; no discrete mass or ascites, and no intrahepatic biliary dilatation. She was subsequently instructed to restart simvastatin. However, she reports that she only took it for one week and then discontinued it. She states that she feels it was making her forgetful and she expresses that she does not wish to restart therapy with a statin. She has a history of diabetes mellitus, with impaired fasting glucose ranging from 103-111 since 2012, and HbA1c to 6.6-6.8 since 9/2016 (not checked previously). She denies any polyuria, polydipsia, nocturia, or blurry vision, and has no history of retinopathy, neuropathy, or nephropathy. She has regular eye exams with Dr. Alita Dance. She has a history of asthma and allergic rhinitis and is followed by Dr. David Ojeda. She is receiving immunotherapy once per month, and reports that she has not required any inhalers recently. She states that she does not use Qvar daily, but will take it occasionally. She does use Claritin every day. 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  She was treated with Avelox, and repeat chest x-ray in 1/2012 showed complete resolution of the pneumonia. She denies any cough or shortness of breath. She had a screening colonoscopy in 12/2006 by Dr. Kelvin Al showing a 5 mm sessile polyp in the rectum (pathology: hyperplastic). She had a repeat colonoscopy in 12/2016 which showed moderate sigmoid diverticulosis and a 6 mm sessile ascending colon polyp (pathology: serrated adenoma). Follow-up recommended for 5 years. She denies any abdominal pain, nausea, vomiting, melena, hematochezia, or change in bowel movements. She does take omeprazole occasionally for GERD symptoms. Past Medical History:   Diagnosis Date    Allergic rhinitis     Asthma     Cardiac stress echo, normal 11/02/2012    Normal maximal stress echo study. EF 60%. Ex time 9 min 45 sec. 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  Hyperlipidemia     Hypertension     Menopause     Plantar fasciitis     left    PSVT (paroxysmal supraventricular tachycardia) (HCC)     A-V dharmesh reentrant tachycardia     Past Surgical History:   Procedure Laterality Date    ENDOSCOPY, COLON, DIAGNOSTIC      polyp    HX CERVICAL POLYPECTOMY      HX CYST INCISION AND DRAINAGE Right 10 or more years    HX DILATION AND CURETTAGE      HX GYN      polyp on cervix    HX POLYPECTOMY      from rectum     Current Outpatient Prescriptions   Medication Sig    meloxicam (MOBIC) 15 mg tablet Take 1 Tab by mouth daily (with breakfast).     LORazepam (ATIVAN) 1 mg tablet TAKE 1 TABLET BY MOUTH EVERY 8 HOURS AS NEEDED FOR ANXIETY    potassium chloride (K-DUR, KLOR-CON) 20 mEq tablet take 1 tablet by mouth once daily    metoprolol succinate (TOPROL-XL) 50 mg XL tablet take 1 tablet by mouth once daily    hydroCHLOROthiazide (HYDRODIURIL) 25 mg tablet take 1/2 tablet by mouth once daily    fluticasone (FLONASE) 50 mcg/actuation nasal spray 2 Sprays by Both Nostrils route daily.  albuterol (PROAIR HFA) 90 mcg/actuation inhaler inhale 2 puffs by mouth every 4 hours if needed for wheezing    carboxymethylcellulose sodium (REFRESH LIQUIGEL) 1 % dlgl Apply  to eye.  omeprazole (PRILOSEC) 20 mg capsule Take 1 Cap by mouth daily.  clotrimazole-betamethasone (LOTRISONE) topical cream Apply  to both ear canals and affected part of outer ear twice a day with a finger.  beclomethasone (QVAR) 40 mcg/actuation inhaler Take 1 Puff by inhalation two (2) times a day.  MULTIVITAMIN PO Take 1 Tab by mouth every other day. No current facility-administered medications for this visit. Allergies and Intolerances: Allergies   Allergen Reactions    Altace [Ramipril] Cough    Penicillins Other (comments)     Hands peel    Sulfur Itching     Family History: She had two aunts who had breast cancer (her mother's sister and father's sister). She has no FH of colon cancer. Her mother passed away from uterine cancer. Her father  from metastatic prostate cancer. Family History   Problem Relation Age of Onset   Elfida Traci Arthritis-osteo Mother     Hypertension Mother           Cancer Father      bone cancer    Hypertension Sister     Hypertension Sister     Hypertension Sister     Breast Cancer Maternal Aunt     Breast Cancer Paternal Aunt     Diabetes Other     Stroke Other     Other Sister      twin sister - osteopenia and low vitamin D levels     Social History:   She  reports that she has never smoked. She has never used smokeless tobacco. She is  and has two sons. She was a homemaker, but worked part-time in . She now helps care for her grandchildren.    History   Alcohol Use No     Immunization History:  Immunization History   Administered Date(s) Administered    Influenza Vaccine (Quad) PF 10/23/2015, 10/19/2016, 10/05/2017    Influenza Vaccine PF 12/12/2013, 10/03/2014    Influenza Vaccine Split 11/02/2011, 10/22/2012    Influenza Vaccine Whole 10/29/2010    PPD 01/03/2012    Pneumococcal Polysaccharide (PPSV-23) 03/21/2017    TB Skin Test (PPD) Intradermal 02/12/2014    TDAP Vaccine 02/16/2012       Review of Systems:   As above included in HPI. Otherwise 11 point review of systems negative including constitutional, skin, HENT, eyes, respiratory, cardiovascular, gastrointestinal, genitourinary, musculoskeletal, endocrine, hematologic, allergy, and neurologic. Physical:   Vitals:   BP: 138/68   HR: 72  WT: 181 lb 9.6 oz (82.4 kg)  BMI:  33.22 kg/m2    Exam:   Pt appears well; alert and oriented x 3; appropriate affect. HEENT: PERRLA, anicteric, oropharynx clear, no JVD, adenopathy or thyromegaly. No carotid bruits or radiated murmur. Lungs: clear to auscultation, no wheezes, rhonchi, or rales. Heart: regular rate and rhythm. No murmur, rubs, gallops  Abdomen: soft, nontender, nondistended, normal bowel sounds, no hepatosplenomegaly or masses. Extremities: without edema. Pulses 1-2+ bilaterally. Right knee without erythema or warmth.     Review of Data:  Labs:  Hospital Outpatient Visit on 01/11/2018   Component Date Value Ref Range Status    Hemoglobin A1c 01/11/2018 6.5* 4.2 - 5.6 % Final    Est. average glucose 01/11/2018 140  mg/dL Final    LIPID PROFILE 01/11/2018        Final    Cholesterol, total 01/11/2018 235* <200 MG/DL Final    Triglyceride 01/11/2018 135  <150 MG/DL Final    HDL Cholesterol 01/11/2018 73* 40 - 60 MG/DL Final    LDL, calculated 01/11/2018 135* 0 - 100 MG/DL Final    VLDL, calculated 01/11/2018 27  MG/DL Final    CHOL/HDL Ratio 01/11/2018 3.2  0 - 5.0   Final    Sodium 01/11/2018 139  136 - 145 mmol/L Final    Potassium 01/11/2018 4.1  3.5 - 5.5 mmol/L Final    Chloride 01/11/2018 100  100 - 108 mmol/L Final    CO2 01/11/2018 32  21 - 32 mmol/L Final    Anion gap 01/11/2018 7  3.0 - 18 mmol/L Final    Glucose 01/11/2018 108* 74 - 99 mg/dL Final    BUN 01/11/2018 13  7.0 - 18 MG/DL Final    Creatinine 01/11/2018 0.63  0.6 - 1.3 MG/DL Final    BUN/Creatinine ratio 01/11/2018 21* 12 - 20   Final    GFR est AA 01/11/2018 >60  >60 ml/min/1.73m2 Final    GFR est non-AA 01/11/2018 >60  >60 ml/min/1.73m2 Final    Calcium 01/11/2018 9.2  8.5 - 10.1 MG/DL Final     Calculated 10 year ASCVD risk score:  34.9  %    Health Maintenance:  Screening:    Mammogram: negative (11/2017)   PAP smear: negative (10/2016) with negative HPV. Followed by Dr. Marcy Christine. Colorectal: colonoscopy (12/2016) serrated adenoma. Dr. nAa Berman. Due 2021. Depression: none   DM (HbA1c/FPG): HbA1c 6.5 (1/2018)   Hepatitis C: negative (3/2016)   Falls: one   DEXA: within normal limits (11/2015)   Glaucoma: regular eye exams with Dr. Germain Aldana (last 7/2017)   Smoking: none   Vitamin D: 32.3 (9/2017)   Medicare Wellness: N/A      Impression:  Patient Active Problem List   Diagnosis Code    Benign hypertensive heart disease without congestive heart failure I11.9    Allergic rhinitis J30.9    Colon polyps K63.5    PSVT (paroxysmal supraventricular tachycardia) ([facility]) I47.1    Palpitations R00.2    Hyperlipidemia E78.5    Essential hypertension I10    Asthma J45.909    GERD (gastroesophageal reflux disease) K21.9    Type 2 diabetes mellitus without complication, without long-term current use of insulin ([facility]) E11.9    Vitamin D insufficiency E55.9       Plan:  1. Diabetes mellitus. Diagnosed in 9/2016 when HbA1c was checked and found to be elevated at 6.6. Repeat in 2/2017 increased to 6.8. Impaired fasting glucose has been present intermittently since 2012. Discussed importance of lifestyle modifications, including diet, exercise, and weight loss. She has been meeting with the diabetes educator since her last visit and HbA1c now improved to 6.5. No evidence of microvascular complications.  Not on ARB or statin. ACE Inhibitor or ARB therapy not prescibed for medical reasons as blood pressure would not tolerate on current regimen. Also with reported cough with Ace-I. Will consider discontinuing hydrochlorothiazide and beginning ARB at next visit. Lipid lowering therapy not prescibed for patient reasons as currently she is not willing to resume. Will readdress at next visit. Continue regular eye exams with Dr. Valeriy Rosales. Foot exam normal (3/2017) and urine microalbumin/ creatinine ratio without evidence of microalbuminuria. Given improvement in HbA1c, will recheck HbA1c in 3 months after continued attempt lifestyle modifications. If does not continue to improve, will begin metformin. 2. Hypertension. Well controlled on current regimen of metoprolol and hydrochlorothiazide. Will discuss discontinuing hydrochlorothiazide at next visit and beginning ARB for mirna-protective effect. Renal function normal with creatinine 0.63 / eGFR >60. Continue to follow. 3. Hyperlipidemia. Calculated 10 year ASCVD risk is 34.9% and newly diagnosed diabetic, which places her in one of the four statin benefit groups as per new AHA/ACC guidelines (primary prevention: diabetic age 43-69 with LDL ). Thus, would recommend treatment with high intensity dose statin at this time. Discussed benefits and risk of statin therapy with patient again today, but she remains unwilling to resume therapy. Discussed that there has been no improvement in lipid panel since last checked in 2/2017. Regardless, explained that with diabetes mellitus, statin indicated for any LDL >70. Emphasized importance of lifestyle modifications, including diet, exercise, and weight loss. Will recheck lipid panel at next visit and continue to readdress. Continue to follow. 4. Elevated LFT's. Resolved. Unclear etiology, but doubt secondary to statin.  Hepatitis panel and iron studies normal. RUQ ultrasound difficult secondary to body habitus, but abdominal CT scan showed normal liver parenchyma. Will continue to follow. 5. AV dharmesh reentrant tachycardia. No recent episodes. On metoprolol and no significant palpitations recently. Followed by Dr. Ronan Peralta. 6. Asthma, mild intermittent. Associated with allergies. Using Qvar daily and albuterol only as needed. Receiving monthly immunotherapy injections. Followed by Dr. Jason Braswell. 7. GERD. Symptoms generally controlled with prn omeprazole. Follow. 8. Microscopic hematuria. Patient refusing further evaluation with cystoscopy or CT urogram. Urine cytology negative. Did have abdominal CT scan in 5/2016 where kidneys appeared normal. Recommended that she complete evaluation with Dr. Kasandra Lehman. Will repeat urinalysis at next visit. 9. Right knee pain. Did not respond to cortisone injection. Plan for right knee MRI next week. Being followed by Dr. Fredrick Sanchez. 10. Health maintenance. Already received influenza vaccine. Already received pneumovax given asthma history. Will hold off on Zoster vaccine until Shingrix available. Colonoscopy due 2021. Mammogram up to date. Continue regular eye exams with Dr. Lidia Chan. Vitamin D level normal. Will continue maintenance dose supplement. Patient understands recommendations and agrees with plan. Follow-up in 3 months.

## 2018-01-29 ENCOUNTER — HOSPITAL ENCOUNTER (OUTPATIENT)
Age: 63
Discharge: HOME | End: 2018-01-29
Attending: ORTHOPAEDIC SURGERY
Payer: COMMERCIAL

## 2018-01-29 DIAGNOSIS — S83.241A ACUTE MEDIAL MENISCUS TEAR, RIGHT, INITIAL ENCOUNTER: ICD-10-CM

## 2018-01-29 DIAGNOSIS — M17.11 PRIMARY OSTEOARTHRITIS OF RIGHT KNEE: ICD-10-CM

## 2018-01-29 PROCEDURE — 73721 MRI JNT OF LWR EXTRE W/O DYE: CPT

## 2018-02-01 ENCOUNTER — OFFICE VISIT (OUTPATIENT)
Dept: ORTHOPEDIC SURGERY | Age: 63
End: 2018-02-01

## 2018-02-01 VITALS
HEART RATE: 72 BPM | DIASTOLIC BLOOD PRESSURE: 88 MMHG | HEIGHT: 62 IN | WEIGHT: 182.4 LBS | BODY MASS INDEX: 33.57 KG/M2 | OXYGEN SATURATION: 98 % | SYSTOLIC BLOOD PRESSURE: 143 MMHG

## 2018-02-01 DIAGNOSIS — M17.11 PRIMARY OSTEOARTHRITIS OF RIGHT KNEE: ICD-10-CM

## 2018-02-01 DIAGNOSIS — S83.241A ACUTE MEDIAL MENISCUS TEAR, RIGHT, INITIAL ENCOUNTER: Primary | ICD-10-CM

## 2018-02-01 DIAGNOSIS — S83.281A ACUTE LATERAL MENISCUS TEAR OF RIGHT KNEE, INITIAL ENCOUNTER: ICD-10-CM

## 2018-02-01 DIAGNOSIS — M71.21 BAKER'S CYST, RIGHT: ICD-10-CM

## 2018-02-01 NOTE — MR AVS SNAPSHOT
[address], [address] [facility] 
[phone] Patient: Ariadna Negrete MRN: [id number] RJO:[birth year] Visit Information Date & Time Provider Department Dept. Phone Encounter #  
 2/1/2018 11:20 AM Reji Perez, [address] [facility] [address] [facility] [id number] Your Appointments 5/3/2018 11:00 AM  
Office Visit with Jean Marie Hawkins MD  
[facility] [address]) Appt Note: 3 month f/u labs at [facility] [address], [address] [id number] [address]  
  
   
 [address], [address] Upcoming Health Maintenance Date Due ZOSTER VACCINE AGE 60> 2/28/2019* FOOT EXAM Q1 3/21/2018 EYE EXAM RETINAL OR DILATED Q1 6/20/2018 HEMOGLOBIN A1C Q6M 7/11/2018 MICROALBUMIN Q1 9/20/2018 LIPID PANEL Q1 1/11/2019 BREAST CANCER SCRN MAMMOGRAM 11/29/2019 PAP AKA CERVICAL CYTOLOGY 11/3/2021 COLONOSCOPY 12/6/2021 DTaP/Tdap/Td series (2 - Td) 2/16/2022 *Topic was postponed. The date shown is not the original due date. Allergies as of 2/1/2018  Review Complete On: 2/1/2018 By: Reji Perez MD  
  
 Severity Noted Reaction Type Reactions Altace [Ramipril]  03/17/2011    Cough Penicillins    Other (comments) Hands peel Sulfur    Itching Current Immunizations  Reviewed on 10/5/2017 Name Date Influenza Vaccine (Quad) PF 10/5/2017 12:43 PM, 10/19/2016, 10/23/2015 Influenza Vaccine PF 10/3/2014, 12/12/2013 Influenza Vaccine Split 10/22/2012, 11/2/2011 Influenza Vaccine Whole 10/29/2010 PPD 1/3/2012 Pneumococcal Polysaccharide (PPSV-23) 3/21/2017 TB Skin Test (PPD) Intradermal 2/12/2014 TDAP Vaccine 2/16/2012 Not reviewed this visit You Were Diagnosed With   
  
 Codes Comments Acute medial meniscus tear, right, initial encounter    -  Primary ICD-10-CM: N78.438O ICD-9-CM: 836.0 Acute lateral meniscus tear of right knee, initial encounter     ICD-10-CM: B73.788Z ICD-9-CM: 836.1 Baker's cyst, right     ICD-10-CM: M71.21 
ICD-9-CM: 727.51 Primary osteoarthritis of right knee     ICD-10-CM: M17.11 ICD-9-CM: 715.16 Vitals BP Pulse Height(growth percentile) Weight(growth percentile) LMP SpO2  
 143/88 (BP 1 Location: Left arm) 72 5' 2\" (1.575 m) 182 lb 6.4 oz (82.7 kg) (LMP Unknown) 98% BMI OB Status Smoking Status 33.36 kg/m2 Postmenopausal Never Smoker BMI and BSA Data Body Mass Index Body Surface Area  
 33.36 kg/m 2 1.9 m 2 Preferred Pharmacy Pharmacy Name Phone [address], [address] [place] [address] [phone] Your Updated Medication List  
  
   
This list is accurate as of: 2/1/18 11:59 PM.  Always use your most recent med list.  
  
  
  
  
 albuterol 90 mcg/actuation inhaler Commonly known as:  PROAIR HFA  
inhale 2 puffs by mouth every 4 hours if needed for wheezing  
  
 clotrimazole-betamethasone topical cream  
Commonly known as:  Tessie Denver Apply  to both ear canals and affected part of outer ear twice a day with a finger. fluticasone 50 mcg/actuation nasal spray Commonly known as:  Sweta Kwabena 2 Sprays by Both Nostrils route daily. hydroCHLOROthiazide 25 mg tablet Commonly known as:  HYDRODIURIL  
take 1/2 tablet by mouth once daily LORazepam 1 mg tablet Commonly known as:  ATIVAN  
TAKE 1 TABLET BY MOUTH EVERY 8 HOURS AS NEEDED FOR ANXIETY  
  
 metoprolol succinate 50 mg XL tablet Commonly known as:  TOPROL-XL  
take 1 tablet by mouth once daily MULTIVITAMIN PO Take 1 Tab by mouth every other day. omeprazole 20 mg capsule Commonly known as:  PriLOSEC Take 1 Cap by mouth daily. potassium chloride 20 mEq tablet Commonly known as:  K-DUR, KLOR-CON  
take 1 tablet by mouth once daily QVAR 40 mcg/actuation [employer] Generic drug:  beclomethasone Take 1 Puff by inhalation two (2) times a day. REFRESH LIQUIGEL 1 % Dlgl Generic drug:  carboxymethylcellulose sodium Apply  to eye. To-Do List   
 02/23/2018 10:00 AM  
  Appointment with Rosie Daniel at [address] [facility]! Amee Marks introduces [employer] patient portal. Now you can access parts of your medical record, email your doctor's office, and request medication refills online. 1. In your internet browser, go to [email or web address] 2. Click on the First Time User? Click Here link in the Sign In box. You will see the New Member Sign Up page. 3. Enter your [employer] Access Code exactly as it appears below. You will not need to use this code after youve completed the sign-up process. If you do not sign up before the expiration date, you must request a new code. · [employer] Access Code: [email or web address] Expires: 4/19/2018 10:00 AM 
 
4. Enter the last four digits of your Social Security Number (xxxx) and Date of Birth (mm/dd/yyyy) as indicated and click Submit. You will be taken to the next sign-up page. 5. Create a [employer] ID. This will be your [employer] login ID and cannot be changed, so think of one that is secure and easy to remember. 6. Create a [employer] password. You can change your password at any time. 7. Enter your Password Reset Question and Answer. This can be used at a later time if you forget your password. 8. Enter your e-mail address. You will receive e-mail notification when new information is available in [address] 9. Click Sign Up. You can now view and download portions of your medical record. 10. Click the Download Summary menu link to download a portable copy of your medical information.  
 
If you have questions, please visit the Frequently Asked Questions section of the [employer]. Remember, [employer] is NOT to be used for urgent needs. For medical emergencies, dial 911. Now available from your iPhone and Android! Please provide this summary of care documentation to your next provider. Your primary care clinician is listed as Osvaldo Sarkar. If you have any questions after today's visit, please call [phone].

## 2018-02-01 NOTE — PROGRESS NOTES
Amy Jimenez  1955   Chief Complaint   Patient presents with    Knee Pain     right        HISTORY OF PRESENT ILLNESS  Amy Jimenez is a 58 y.o. female who presents today for reevaluation of right knee pain and to review MRI results. Patient rates pain as 6/10 today. She states that she has stiffness. It has been slowing her down. She had a fall down stairs onto a sidewalk which made the pain worse. Reports having problems going up and down the stairs. She has knots over her knee. Patient denies any fever, chills, chest pain, shortness of breath or calf pain. There are no new illness or injuries to report since last seen in the office. There are no changes to medications, allergies, family or social history. PHYSICAL EXAM:   Visit Vitals    /88 (BP 1 Location: Left arm)    Pulse 72    Ht 5' 2\" (1.575 m)    Wt 182 lb 6.4 oz (82.7 kg)    LMP  (LMP Unknown)    SpO2 98%    BMI 33.36 kg/m2     The patient is a well-developed, well-nourished female   in no acute distress. The patient is alert and oriented times three. The patient is alert and oriented times three. Mood and affect are normal.  LYMPHATIC: lymph nodes are not enlarged and are within normal limits  SKIN: normal in color and non tender to palpation. There are no bruises or abrasions noted. NEUROLOGICAL: Motor sensory exam is within normal limits. Reflexes are equal bilaterally.  There is normal sensation to pinprick and light touch  MUSCULOSKELETAL:  Examination Right knee   Skin Intact   Range of motion 0-130   Effusion +   Medial joint line tenderness +   Lateral joint line tenderness -   Tenderness Pes Bursa -   Tenderness insertion MCL -   Tenderness insertion LCL -   Carolas -   Patella crepitus -   Patella grind -   Lachman -   Pivot shift -   Anterior drawer -   Posterior drawer -   Varus stress -   Valgus stress -   Neurovascular Intact   Calf Swelling and Tenderness to Palpation -   Garrett's Test -   Hamstring Cord Tightness -      Swollen tender prepatellar bursa    IMAGING: MRI of the right knee dated 1/29/18 was reviewed and read:   IMPRESSION:  1. Complete tear of posterior horn and root of the medial meniscus. 2. Tear of the body and posterior horn of the lateral meniscus. 3. Tricompartmental osteoarthritis most prominent in medial and patellofemoral compartments. Moderate joint effusion. Small Baker's cyst.  4. Areas of patchy marrow edema in the tibial plateau may represent  edema/inflammation related to degenerative change. XR of the right knee dated 12/11/17 was reviewed and read: decreased joint space on the medial side. Moderate degenerative changes. IMPRESSION:      ICD-10-CM ICD-9-CM    1. Acute medial meniscus tear, right, initial encounter S83.241A 836.0    2. Acute lateral meniscus tear of right knee, initial encounter S83.281A 836.1    3. Baker's cyst, right M71.21 727.51    4. Primary osteoarthritis of right knee M17.11 715.16         PLAN:   1. I discussed the results of the MRI and the treatment options with the patient. Discussed future knee replacement and arthroscopy. She will come back when she's ready. Risk factors include: dm, htn  2. No cortisone injection indicated today   3. No Physical/Occupational Therapy indicated today  4. No diagnostic test indicated today   5. No durable medical equipment indicated today  6. No referral indicated today   7. No medications indicated today  8. No Narcotic indicated today       RTC prn  Follow-up Disposition: Not on File    Scribed by Dayan Bhandari [address]) as dictated by Cy Crowley MD    I, Dr. Cy Crowley, confirm that all documentation is accurate.     Cy Crowley M.D.   [facility]

## 2018-02-02 ENCOUNTER — TELEPHONE (OUTPATIENT)
Dept: INTERNAL MEDICINE CLINIC | Age: 63
End: 2018-02-02

## 2018-02-02 RX ORDER — ROSUVASTATIN CALCIUM 10 MG/1
10 TABLET, COATED ORAL DAILY
Qty: 90 TAB | Refills: 2 | Status: SHIPPED | OUTPATIENT
Start: 2018-02-02 | End: 2018-05-03 | Stop reason: ALTCHOICE

## 2018-02-02 NOTE — TELEPHONE ENCOUNTER
Order for Crestor 10 mg daily sent to [facility]. Please let patient know. Will recheck lipid panel prior to next visit with other labs. Please ask her to schedule lab appointment.

## 2018-02-02 NOTE — TELEPHONE ENCOUNTER
Pt called and stated tht she is now interested in taking the cholesterol pill crestor mayra she discussed with Flako

## 2018-02-05 NOTE — TELEPHONE ENCOUNTER
Called and left a message on patient's answering service to give us a call back regarding Dr. Arden Todd' note.

## 2018-02-23 ENCOUNTER — TELEPHONE (OUTPATIENT)
Dept: INTERNAL MEDICINE CLINIC | Age: 63
End: 2018-02-23

## 2018-02-23 ENCOUNTER — APPOINTMENT (OUTPATIENT)
Dept: NUTRITION | Age: 63
End: 2018-02-23

## 2018-02-23 NOTE — TELEPHONE ENCOUNTER
Called and spoke with patient. Having difficulty with right knee since fall and diagnosed with new meniscus tears. Currently receiving physical therapy. However, reports that having pain in the posterior thighs of both legs. Not sure if due to physical therapy or due to rosuvastatin, which she started on 2/2/2018. No muscle weakness or other symptoms. Discussed holding rosuvastatin for 2 weeks to see if symptoms improve. If no improvement, then told to restart rosuvastatin since symptoms likely not due to medication. If improved, instructed to call to discuss. Patient agreeable to plan.

## 2018-02-23 NOTE — TELEPHONE ENCOUNTER
On Crestor- legs hurt- muscles feel knotted in the back of her legs-legs hurt to walk- I mentioned cramping- she's not sure if thats the issue- Please advise

## 2018-03-23 ENCOUNTER — HOSPITAL ENCOUNTER (OUTPATIENT)
Age: 63
Discharge: HOME | End: 2018-03-23
Attending: ORTHOPAEDIC SURGERY
Payer: COMMERCIAL

## 2018-03-23 DIAGNOSIS — M17.12 PRIMARY OSTEOARTHRITIS OF LEFT KNEE: ICD-10-CM

## 2018-03-23 DIAGNOSIS — S80.02XD CONTUSION OF LEFT KNEE, SUBSEQUENT ENCOUNTER: ICD-10-CM

## 2018-03-23 PROCEDURE — 73721 MRI JNT OF LWR EXTRE W/O DYE: CPT

## 2018-04-12 RX ORDER — SIMVASTATIN 20 MG/1
TABLET, FILM COATED ORAL
Qty: 30 TAB | Refills: 6 | Status: SHIPPED | OUTPATIENT
Start: 2018-04-12 | End: 2018-12-17 | Stop reason: SDUPTHER

## 2018-04-20 ENCOUNTER — HOSPITAL ENCOUNTER (OUTPATIENT)
Dept: PHYSICAL THERAPY | Age: 63
Discharge: HOME | End: 2018-04-20
Payer: COMMERCIAL

## 2018-04-20 PROCEDURE — 97530 THERAPEUTIC ACTIVITIES: CPT

## 2018-04-20 PROCEDURE — 97162 PT EVAL MOD COMPLEX 30 MIN: CPT

## 2018-04-20 NOTE — PROGRESS NOTES
PT DAILY TREATMENT NOTE/KNEE EVAL 3-    Patient Name: Aftab Watson  Date:2018  : 1955  [x]  Patient  Verified  Payor: BLUE CROSS / Plan: [employer] [facility] / Product Type: PPO /    In time: 8:47  Out time: 9:30  Total Treatment Time (min): 43  Total Timed Codes (min): 25  1:1 Treatment Time ( only): 37   Visit #: 1 of     Treatment Area: Primary osteoarthritis of left knee [M17.12]    SUBJECTIVE  Pain Level (0-10 scale): 3/10  []constant []intermittent []improving []worsening []no change since onset    Any medication changes, allergies to medications, adverse drug reactions, diagnosis change, or new procedure performed?: [x] No    [] Yes (see summary sheet for update)  Subjective functional status/changes:     PLOF: Ind with all mobility, living with , 1 story house, 3 steps  With no rail  Limitations to PLOF: chronic arthirtis  Mechanism of Injury: tear of med meniscus of Right knee  Current symptoms/Complaints: about 1 month ago, 2 days after the shot for her Left knee,  pain shooting from foot up that went away; fell in  (pt tried to jump off the step to not step on Timmy Faverysonia) and still have pain and stiffness with Rigth  Previous Treatment/Compliance: shot for Left knee  PMHx/Surgical Hx:    Work Hx:   Living Situation:   Pt Goals: \"get stronger\"  Barriers: []pain []financial []time []transportation []other  Motivation: yes  Substance use: []Alcohol []Tobacco []other:   FABQ Score: []low []elevate  Cognition: A & O x 4    Other:    OBJECTIVE/EXAMINATION  Domestic Life:   Activity/Recreational Limitations: riding bike  Mobility:   Self Care:     18 min [x]Eval                  []Re-Eval       8 min Therapeutic Exercise:  [] See flow sheet : HEP   Rationale: increase ROM and increase strength to improve the patients ability to perform ADL with ease    17 min Therapeutic Activity:  []  See flow sheet :Pt edu within scope of practice on prognosis, POC, knee anatomy, modalities use, positioning     Rationale: increase ROM, increase strength, improve coordination, improve balance and increase proprioception  to improve the patients ability to perform ADLs/amb with ease and safety           With   [] TE   [] TA   [] neuro   [] other: Patient Education: [x] Review HEP    [] Progressed/Changed HEP based on:   [] positioning   [] body mechanics   [] transfers   [] heat/ice application    [] other:      Other Objective/Functional Measures:     Physical Therapy Evaluation - Knee    Posture: [] Varus    [] Valgus    [] Recurvatum        [] Tibial Torsion    [] Foot Supination    [] Foot Pronation    Describe:    Gait:  [] Normal    [] Abnormal    [] Antalgic    [] NWB    Device:    Describe:    ROM / Strength  [] Unable to assess                  AROM                      PROM                   Strength (1-5)    Left Right Left Right Left Right   Hip Flexion     3+ 3+    Extension     2+ 2+    Abduction     3 3    Adduction         Knee Flexion 105 118 118  3+ 3+    Extension -3 0 0  3 4-   Ankle Plantarflexion          Dorsiflexion             Flexibility: [] Unable to assess at this time  Hamstrings:    (L) Tightness= [] WNL   [] Min   [x] Mod   [] Severe    (R) Tightness= [] WNL   [] Min   [x] Mod   [] Severe  Quadriceps:    (L) Tightness= [] WNL   [] Min   [] Mod   [x] Severe    (R) Tightness= [] WNL   [] Min   [] Mod   [x] Severe  Gastroc:      (L) Tightness= [] WNL   [x] Min   [] Mod   [] Severe    (R) Tightness= [] WNL   [x] Min   [] Mod   [] Severe  Other:    Palpation:   Neg/Pos  Neg/Pos  Neg/Pos   Joint Line  Quad tendon  Patellar ligament    Patella  Fibular head  Pes Anserinus    Tibial tubercle  Hamstring tendons  Infrapatellar fat pad      Optional Tests:  Patellar Positioning (Static)   []L []R Normal []L []R Lateral   []L []R Marshall Lien      []L []R Medial   []L []R Baja    Patellar Tracking   []L []R Glide (Lat)   []L []R Tilt (Lat)     []L []R Glide (Med)  []L []R Tilt (Med)      []L []R Tile (Inf)     Patellar Mobility WNL with mobility but pain   []L []R Hypermobile []L []R Hypomobile         Girth Measurements: min puffiness at med region of Rightpatella    Cm at  Cm above joint line   Cm at   Cm below joint line  Cm at joint line   Left        Right           Lachmans  [] Neg    [] Pos Posterior Drawer [x] Neg    [] Pos  Pivot Shift  [] Neg    [] Pos Posterior Sag  [] Neg    [] Pos  ARACELY   [] Neg    [] Pos Bryanna's Test [] Neg    [] Pos  ALRI   [] Neg    [] Pos Squat   [] Neg    [x] Pos  Valgus@ 0 Degrees [x] Neg    [] Pos Carola-Thomas [] Neg    [] Pos  Valgus@ 30 Degrees [] Neg    [] Pos Patellar Apprehension [] Neg    [] Pos  Varus@ 0 Degrees [x] Neg    [] Pos Fernandez's Compression [] Neg    [] Pos  Varus@ 30 Degrees [] Neg    [] Pos Ely's Test  [] Neg    [] Pos  Apley's Compression [] Neg    [] Pos Kenisha's Test  [] Neg    [] Pos  Apley's Distraction [] Neg    [x] Pos Stroke Test  [] Neg    [] Pos   Anterior Drawer [x] Neg    [] Pos Fluctuation Test [] Neg    [] Pos  Other:                  [] Neg    [] Pos                 Other tests/comments:    BP: 118/76 mmHg; HR: 71 bpm   Romberg with EC on firm floor: 30 sec with good balance    Pain Level (0-10 scale) post treatment: 1-2/10    ASSESSMENT/Changes in Function: see POC    Patient will continue to benefit from skilled PT services to modify and progress therapeutic interventions, address functional mobility deficits, address ROM deficits, address strength deficits, analyze and address soft tissue restrictions, analyze and cue movement patterns, analyze and modify body mechanics/ergonomics, assess and modify postural abnormalities, address imbalance/dizziness and instruct in home and community integration to attain remaining goals.      [x]  See Plan of Care  []  See progress note/recertification  []  See Discharge Summary         Progress towards goals / Updated goals:  See POC    PLAN  [x]  Upgrade activities as tolerated     [x]  Continue plan of care  []  Update interventions per flow sheet       []  Discharge due to:_  []  Other:_    Naldo Almodovar 4/20/2018  8:49 AM

## 2018-04-20 NOTE — PROGRESS NOTES
[facility] [employer]  [address] [place] [address]  [phone] fax    Plan of Care/ Statement of Necessity for Physical Therapy Services    Patient name: Martha Thrasher Start of Care: 2018   Referral source: Valentine Coffey MD : 1955    Medical Diagnosis: Primary osteoarthritis of left knee [M17.12]   Onset Date: about 5 months ago, chronic arthritis    Treatment Diagnosis: B knees pain   Prior Hospitalization: see medical history Provider#: [id number]   Medications: Verified on Patient summary List    Comorbidities: diabetes, arthritis, HTN, asthma, heel bone spurs B   Prior Level of Function: Ind with all mobility, living with , 1 story house, 3 steps with no rail     The Plan of Care and following information is based on the information from the initial evaluation. Assessment/ david information: Ms. Matteo Vincent is a 62 y/o F pt with CC of B knees pain. Pt had chronic arthritis with Left knee and occasional swelling. Pt also fell in Dec and injury her Right knee. Imaging done including MRI showing meniscus tear, chondral loss of Left knee and complete tear of posterior horn and root of med meniscus; tear of body and posterior horn of lat meniscus, tricompartmental OA of Right knee . Pt present with min decreased AROM, poor strength of knees and hips, severe tightness of B LEs, Gait balance deemed good minus but poor gait pattern with flexed knees, decreased speed, and antalgic pattern . Pt would benefit from skilled PT to address these deficits above to improve the ability to amb comfortably and safely.     Evaluation Complexity History HIGH Complexity :3+ comorbidities / personal factors will impact the outcome/ POC ; Examination MEDIUM Complexity : 3 Standardized tests and measures addressing body structure, function, activity limitation and / or participation in recreation  ;Presentation MEDIUM Complexity : Evolving with changing characteristics ;Clinical Decision Making MEDIUM Complexity : FOTO score of 26-74  Overall Complexity Rating: MEDIUM  Problem List: pain affecting function, decrease ROM, decrease strength, edema affecting function, impaired gait/ balance, decrease ADL/ functional abilitiies, decrease activity tolerance, decrease flexibility/ joint mobility and decrease transfer abilities   Treatment Plan may include any combination of the following: Therapeutic exercise, Therapeutic activities, Neuromuscular re-education, Physical agent/modality, Gait/balance training, Manual therapy, Patient education, Self Care training, Functional mobility training, Home safety training and Stair training  Patient / Family readiness to learn indicated by: asking questions, trying to perform skills and interest  Persons(s) to be included in education: patient (P)  Barriers to Learning/Limitations: None  Patient Goal (s): get stronger  Patient Self Reported Health Status: fair  Rehabilitation Potential: fair    Short term goals: To be accomplished within 1 week   1. Pt will be independent with HEP to maintain progression. Long term goals: To be accomplished within 6 weeks   1. Pt will improve FOTO score by 21 points to 51/100 to show improvement with functional mobility performance. 2. Pt will have B knees AROM 0-125 degrees to amb household and community with normal and safe pattern. 3. Pt will have B knees and hips strength at least 4+/5 to be able to amb longer distance and navigate stair safely. 4. Pt will report no more than 2/10 pain to improve her QOL. Frequency / Duration: Patient to be seen 2-3 times per week for 6 weeks.     Patient/ CarPatient/ Caregiver education and instruction: Diagnosis, prognosis, self care, activity modification, brace/ splint application and exercises   [x]  Plan of care has been reviewed with ROSEMARY Balderas, PT 4/20/2018 9:32 AM    ________________________________________________________________________    I certify that the above Therapy Services are being furnished while the patient is under my care. I agree with the treatment plan and certify that this therapy is necessary.     Physician's Signature:____________________  Date:____________Time: _________    Please sign and return to [facility] [address]  [phone] fax

## 2018-04-23 ENCOUNTER — HOSPITAL ENCOUNTER (OUTPATIENT)
Dept: PHYSICAL THERAPY | Age: 63
Discharge: HOME | End: 2018-04-23
Payer: COMMERCIAL

## 2018-04-23 PROCEDURE — 97110 THERAPEUTIC EXERCISES: CPT

## 2018-04-23 NOTE — PROGRESS NOTES
PT DAILY TREATMENT NOTE     Patient Name: Caty Ferreira  Date:2018  : 1955  [x]  Patient  Verified  Payor: BLUE CROSS / Plan: [employer] [facility] [place] / Product Type: PPO /    In time: 4:59  Out time: 5:45  Total Treatment Time (min): 46  Visit #: 2 of     Treatment Area: Bilateral knee pain [M25.561, M25.562]    SUBJECTIVE  Pain Level (0-10 scale): 10  Any medication changes, allergies to medications, adverse drug reactions, diagnosis change, or new procedure performed?: [x] No    [] Yes (see summary sheet for update)  Subjective functional status/changes:   [] No changes reported  Pt reports having a sciatic type pain last night going down her left leg making it hard to get in bed. She notes tightness on the top of her left knee and that she has been walking differently and slower because of the pain. She is scared to have a surgery to her knees and states that is what the MD wants to do. She follows up with him on . She hasn't tried her exercises yet. Pt wants to be able to walk longer distances for shopping and is used to walking faster than she does now.      OBJECTIVE    46 min Therapeutic Exercise:  [x] See flow sheet :   Rationale: increase ROM and increase strength to improve the patients ability to ambulate with improved gait pattern and decreased pain       With   [] TE   [] TA   [] neuro   [] other: Patient Education: [x] Review HEP    [] Progressed/Changed HEP based on:   [] positioning   [] body mechanics   [] transfers   [] heat/ice application    [] other:      Other Objective/Functional Measures:   Very pleasant but talkative making it difficult to count reps  No increased pain during session  Educated that walking different can sometimes cause sciatic type pains  Cues with clams to stay on her side to prevent TFL compensation  Encouraged to work on exercises at home for max benefit of PT     Pain Level (0-10 scale) post treatment: -5/10    ASSESSMENT/Changes in Function: Initiated exercise program per POC. Pt has not yet been compliant with her HEP and has more left knee compared to right knee pain. She has decreased hip strength and decrease in knee extension during ambulation. Will continue to work on hip/knee strength and mobility to improve ease of walking to shop with decreased pain. Patient will continue to benefit from skilled PT services to modify and progress therapeutic interventions, address functional mobility deficits, address ROM deficits, address strength deficits, analyze and address soft tissue restrictions, analyze and cue movement patterns, analyze and modify body mechanics/ergonomics, assess and modify postural abnormalities, address imbalance/dizziness and instruct in home and community integration to attain remaining goals. Progress towards goals / Updated goals:   Short term goals: To be accomplished within 1 week  1. Pt will be independent with HEP to maintain progression. Long term goals: To be accomplished within 6 weeks  1. Pt will improve FOTO score by 21 points to 51/100 to show improvement with functional mobility performance. 2. Pt will have B knees AROM 0-125 degrees to amb household and community with normal and safe pattern. 3. Pt will have B knees and hips strength at least 4+/5 to be able to amb longer distance and navigate stair safely. 4. Pt will report no more than 2/10 pain to improve her QOL.     PLAN  [x]  Upgrade activities as tolerated     [x]  Continue plan of care  []  Update interventions per flow sheet       []  Discharge due to:_  []  Other:_      Mingo Tomas PTA 4/23/2018  5:13 PM    Future Appointments  Date Time Provider Ankit Edmond   4/25/2018 2:30 PM Jay Herrera PTA [facility]   4/30/2018 1:00 PM Jay Herrera PTA [facility]   5/2/2018 3:30 PM Arnoldo Mirza Gail [facility]   5/3/2018 11:00 AM Yhoannes Reddy MD [facility] [address]   5/7/2018 3:00 PM Jay Herrera PTA [facility] 5/9/2018 3:00 PM Martha Sierra, PTA [facility]   5/14/2018 3:00 PM Martha Sierra, PTA [facility]   5/16/2018 3:30 PM Dexter Matt, PT [facility]

## 2018-04-24 ENCOUNTER — HOSPITAL ENCOUNTER (OUTPATIENT)
Dept: LAB | Age: 63
Discharge: HOME | End: 2018-04-24
Payer: COMMERCIAL

## 2018-04-24 DIAGNOSIS — E78.5 HYPERLIPIDEMIA, UNSPECIFIED HYPERLIPIDEMIA TYPE: ICD-10-CM

## 2018-04-24 DIAGNOSIS — I10 ESSENTIAL HYPERTENSION: ICD-10-CM

## 2018-04-24 DIAGNOSIS — E11.9 TYPE 2 DIABETES MELLITUS WITHOUT COMPLICATION, WITHOUT LONG-TERM CURRENT USE OF INSULIN (HCC): ICD-10-CM

## 2018-04-24 DIAGNOSIS — R31.29 MICROSCOPIC HEMATURIA: ICD-10-CM

## 2018-04-24 LAB
ANION GAP SERPL CALC-SCNC: 4 MMOL/L (ref 3–18)
APPEARANCE UR: CLEAR
BACTERIA URNS QL MICRO: ABNORMAL /HPF
BILIRUB UR QL: NEGATIVE
BUN SERPL-MCNC: 12 MG/DL (ref 7–18)
BUN/CREAT SERPL: 16 (ref 12–20)
CALCIUM SERPL-MCNC: 8.8 MG/DL (ref 8.5–10.1)
CHLORIDE SERPL-SCNC: 100 MMOL/L (ref 100–108)
CHOLEST SERPL-MCNC: 172 MG/DL
CO2 SERPL-SCNC: 34 MMOL/L (ref 21–32)
COLOR UR: YELLOW
CREAT SERPL-MCNC: 0.73 MG/DL (ref 0.6–1.3)
EPITH CASTS URNS QL MICRO: ABNORMAL /LPF (ref 0–5)
EST. AVERAGE GLUCOSE BLD GHB EST-MCNC: 148 MG/DL
GLUCOSE SERPL-MCNC: 119 MG/DL (ref 74–99)
GLUCOSE UR STRIP.AUTO-MCNC: NEGATIVE MG/DL
HBA1C MFR BLD: 6.8 % (ref 4.2–5.6)
HDLC SERPL-MCNC: 71 MG/DL (ref 40–60)
HDLC SERPL: 2.4 {RATIO} (ref 0–5)
HGB UR QL STRIP: ABNORMAL
KETONES UR QL STRIP.AUTO: NEGATIVE MG/DL
LDLC SERPL CALC-MCNC: 72.4 MG/DL (ref 0–100)
LEUKOCYTE ESTERASE UR QL STRIP.AUTO: ABNORMAL
LIPID PROFILE,FLP: ABNORMAL
NITRITE UR QL STRIP.AUTO: NEGATIVE
PH UR STRIP: 6.5 [PH] (ref 5–8)
POTASSIUM SERPL-SCNC: 3.8 MMOL/L (ref 3.5–5.5)
PROT UR STRIP-MCNC: NEGATIVE MG/DL
RBC #/AREA URNS HPF: ABNORMAL /HPF (ref 0–5)
SODIUM SERPL-SCNC: 138 MMOL/L (ref 136–145)
SP GR UR REFRACTOMETRY: 1.01 (ref 1–1.03)
TRIGL SERPL-MCNC: 143 MG/DL (ref ?–150)
UROBILINOGEN UR QL STRIP.AUTO: 0.2 EU/DL (ref 0.2–1)
VLDLC SERPL CALC-MCNC: 28.6 MG/DL
WBC URNS QL MICRO: ABNORMAL /HPF (ref 0–4)

## 2018-04-24 PROCEDURE — 80061 LIPID PANEL: CPT | Performed by: INTERNAL MEDICINE

## 2018-04-24 PROCEDURE — 81001 URINALYSIS AUTO W/SCOPE: CPT | Performed by: INTERNAL MEDICINE

## 2018-04-24 PROCEDURE — 80048 BASIC METABOLIC PNL TOTAL CA: CPT | Performed by: INTERNAL MEDICINE

## 2018-04-24 PROCEDURE — 36415 COLL VENOUS BLD VENIPUNCTURE: CPT | Performed by: INTERNAL MEDICINE

## 2018-04-24 PROCEDURE — 83036 HEMOGLOBIN GLYCOSYLATED A1C: CPT | Performed by: INTERNAL MEDICINE

## 2018-04-25 ENCOUNTER — HOSPITAL ENCOUNTER (OUTPATIENT)
Dept: PHYSICAL THERAPY | Age: 63
Discharge: HOME | End: 2018-04-25
Payer: COMMERCIAL

## 2018-04-25 PROCEDURE — 97110 THERAPEUTIC EXERCISES: CPT

## 2018-04-25 NOTE — PROGRESS NOTES
PT DAILY TREATMENT NOTE     Patient Name: Aftab Watson  Date:2018  : 1955  [x]  Patient  Verified  Payor: BLUE CROSS / Plan:  [facility] / Product Type: PPO /    In time:230  Out time:310  Total Treatment Time (min): 40  Visit #: 3 of     Treatment Area: Bilateral knee pain [M25.561, M25.562]    SUBJECTIVE  Pain Level (0-10 scale): 4/10  Any medication changes, allergies to medications, adverse drug reactions, diagnosis change, or new procedure performed?: [x] No    [] Yes (see summary sheet for update)  Subjective functional status/changes:   [] No changes reported  Pt stated that both of her knees are hurting her today    OBJECTIVE     40 min Therapeutic Exercise:  [x] See flow sheet :   Rationale: increase ROM and increase strength to improve the patients ability to increase ease with ADls    With   [x] TE   [] TA   [] neuro   [] other: Patient Education: [x] Review HEP    [] Progressed/Changed HEP based on:   [] positioning   [] body mechanics   [] transfers   [] heat/ice application    [] other:      Other Objective/Functional Measures:   Pt needed cueing to count exercises  No complaint of increased pain with exercises  Did report some popping with LAQ     Pain Level (0-10 scale) post treatment: 0/10    ASSESSMENT/Changes in Function:   Pt is slowly progressing toward goals. Pt cont to report moderate pain levels. Pt cont with decreased AROM in B knees. Strength is increasing bilaterally    Patient will continue to benefit from skilled PT services to modify and progress therapeutic interventions, address functional mobility deficits, address ROM deficits and address strength deficits to attain remaining goals. [x]  See Plan of Care  []  See progress note/recertification  []  See Discharge Summary         Progress towards goals / Updated goals:  Short term goals: To be accomplished within 1 week  1. Pt will be independent with HEP to maintain progression.   Long term goals: To be accomplished within 6 weeks  1. Pt will improve FOTO score by 21 points to 51/100 to show improvement with functional mobility performance. 2. Pt will have B knees AROM 0-125 degrees to amb household and community with normal and safe pattern. 3. Pt will have B knees and hips strength at least 4+/5 to be able to amb longer distance and navigate stair safely. 4. Pt will report no more than 2/10 pain to improve her QOL.     PLAN  []  Upgrade activities as tolerated     [x]  Continue plan of care  []  Update interventions per flow sheet       []  Discharge due to:_  []  Other:_      Leanna Angelucci, PTA 4/25/2018  2:35 PM    Future Appointments  Date Time Provider Ankit Tavarezi   4/30/2018 1:00 PM Leanna Angelucci, PTA [facility]   5/2/2018 3:30 PM Arnoldo Tomass [facility]   5/3/2018 11:00 AM Maria Luisa Gonzalez MD [address]   5/7/2018 3:00 PM Leanna Angelucci, PTA [facility]   5/9/2018 3:00 PM Leanna Angelucci, PTA [facility]   5/14/2018 3:00 PM Leanna Angelucci, PTA [facility]   5/16/2018 3:30 PM Idalia Spurling, PT [facility]

## 2018-04-30 ENCOUNTER — HOSPITAL ENCOUNTER (OUTPATIENT)
Dept: PHYSICAL THERAPY | Age: 63
Discharge: HOME | End: 2018-04-30
Payer: COMMERCIAL

## 2018-04-30 PROCEDURE — 97110 THERAPEUTIC EXERCISES: CPT

## 2018-04-30 NOTE — PROGRESS NOTES
PT DAILY TREATMENT NOTE     Patient Name: Lucas Common  Date:2018  : 1955  [x]  Patient  Verified  Payor: BLUE CROSS / Plan: [employer] [facility] / Product Type: PPO /    In time:100  Out time:134  Total Treatment Time (min): 34  Visit #: 4 of     Treatment Area: Bilateral knee pain [M25.561, M25.562]    SUBJECTIVE  Pain Level (0-10 scale): 2-3/10  Any medication changes, allergies to medications, adverse drug reactions, diagnosis change, or new procedure performed?: [x] No    [] Yes (see summary sheet for update)  Subjective functional status/changes:   [] No changes reported  Pt stated that her left knee hurts a little more than the right    OBJECTIVE    34 min Therapeutic Exercise:  [x] See flow sheet :   Rationale: increase ROM and increase strength to improve the patients ability to increase ease with ADLs    With   [x] TE   [] TA   [] neuro   [] other: Patient Education: [x] Review HEP    [] Progressed/Changed HEP based on:   [] positioning   [] body mechanics   [] transfers   [] heat/ice application    [] other:      Other Objective/Functional Measures:   Pt cont to complain of popping in the knees with exercises  Mini squats increased left knee pain, but once technique was corrected pain decreased  No difficulty with increases made today     Pain Level (0-10 scale) post treatment: 0/10    ASSESSMENT/Changes in Function:   Pt cont to slowly progress toward goals. Pt is to return to MD in 4 weeks to see if surgery is necessary. Pt cont with decreased AROM and strength in B knees. Pt cont with mild pain of 2-3/10. Pt reported that she cont to have difficulty and increased pain with stairs    Patient will continue to benefit from skilled PT services to modify and progress therapeutic interventions, address functional mobility deficits, address ROM deficits and address strength deficits to attain remaining goals.      [x]  See Plan of Care  []  See progress note/recertification  []  See Discharge Summary         Progress towards goals / Updated goals:  Short term goals: To be accomplished within 1 week  1. Pt will be independent with HEP to maintain progression. Long term goals: To be accomplished within 6 weeks  1. Pt will improve FOTO score by 21 points to 51/100 to show improvement with functional mobility performance. 2. Pt will have B knees AROM 0-125 degrees to amb household and community with normal and safe pattern. 3. Pt will have B knees and hips strength at least 4+/5 to be able to amb longer distance and navigate stair safely. 4. Pt will report no more than 2/10 pain to improve her QOL.     PLAN  []  Upgrade activities as tolerated     [x]  Continue plan of care  []  Update interventions per flow sheet       []  Discharge due to:_  []  Other:_      Lorna Perry PTA 4/30/2018  1:02 PM    Future Appointments  Date Time Provider Ankit Edmond   5/2/2018 3:30 PM Arnoldo Perdomo [facility]   5/3/2018 11:00 AM Mathieu Kent MD [facility]   5/7/2018 3:00 PM Lorna Perry PTA [facility]   5/9/2018 3:00 PM Lorna Perry PTA [facility]   5/14/2018 3:00 PM Lorna Perry PTA [facility]   5/16/2018 3:30 PM Burke Alan [facility]

## 2018-05-02 ENCOUNTER — HOSPITAL ENCOUNTER (OUTPATIENT)
Dept: PHYSICAL THERAPY | Age: 63
Discharge: HOME | End: 2018-05-02
Payer: COMMERCIAL

## 2018-05-02 PROCEDURE — 97110 THERAPEUTIC EXERCISES: CPT

## 2018-05-02 NOTE — PROGRESS NOTES
PT DAILY TREATMENT NOTE     Patient Name: Shaina Seek  Date:2018  : 1955  [x]  Patient  Verified  Payor: BLUE CROSS / Plan: [employer] [facility] / Product Type: PPO /    In time: 4:05  Out time:4:41  Total Treatment Time (min): 36  Visit #: 5 of     Treatment Area: Bilateral knee pain [M25.561, M25.562]    SUBJECTIVE  Pain Level (0-10 scale): 2/10  Any medication changes, allergies to medications, adverse drug reactions, diagnosis change, or new procedure performed?: [x] No    [] Yes (see summary sheet for update)  Subjective functional status/changes:   [] No changes reported  Pt reports she isn't sure yet if she is going to do the surgery when she follows up with the MD in 4.5 weeks. She feels like her pain is improving and that she is walking better. She does notice stiffness still at times. She doesn't have buckling of her knee like she used to. OBJECTIVE    36 min Therapeutic Exercise:  [x] See flow sheet :   Rationale: increase ROM and increase strength to improve the patients ability to ambulate          With   [] TE   [] TA   [] neuro   [] other: Patient Education: [x] Review HEP    [] Progressed/Changed HEP based on:   [] positioning   [] body mechanics   [] transfers   [] heat/ice application    [] other:      Other Objective/Functional Measures: FOTO: 45  No increased pain during session  Cues to keep hips stacked during clamshells and S/L abduction  Educated on importance of doing exercises for progression in therapy    Pain Level (0-10 scale) post treatment: 0/10    ASSESSMENT/Changes in Function: Pt progressing in therapy with decreasing pain levels. She continues to have stiffness with prolonged sitting. She has not been compliant with her HEP but has been educated on its importance for progression in conjunction with therapy. Will continue working on B knee strength and mobility for ease of ambulation with decreased pain and decreased fall risk.      Patient will continue to benefit from skilled PT services to modify and progress therapeutic interventions, address functional mobility deficits, address ROM deficits, address strength deficits, analyze and address soft tissue restrictions, analyze and cue movement patterns, analyze and modify body mechanics/ergonomics, assess and modify postural abnormalities, address imbalance/dizziness and instruct in home and community integration to attain remaining goals. Progress towards goals / Updated goals:  Short term goals: To be accomplished within 1 week  1. Pt will be independent with HEP to maintain progression. Not met; only doing HEP occasionally (5/2/18)  Long term goals: To be accomplished within 6 weeks  1. Pt will improve FOTO score by 21 points to 51/100 to show improvement with functional mobility performance. Progressing 15 point improvement (5/2/18)  2. Pt will have B knees AROM 0-125 degrees to amb household and community with normal and safe pattern. 3. Pt will have B knees and hips strength at least 4+/5 to be able to amb longer distance and navigate stair safely. 4. Pt will report no more than 2/10 pain to improve her QOL.  Progressing 2/10 (5/2/18)    PLAN  [x]  Upgrade activities as tolerated     [x]  Continue plan of care  []  Update interventions per flow sheet       []  Discharge due to:_  []  Other:_      Gricelda Alonso PTA 5/2/2018  4:10 PM    Future Appointments  Date Time Provider Ankit Feli   5/3/2018 11:00 AM Emy Sanchez MD [facility]   5/7/2018 3:00 PM Elli Johnson PTA [facility]   5/9/2018 3:00 PM Elli Johnson PTA [facility]   5/14/2018 3:00 PM Elli Johnson PTA [facility]   5/16/2018 3:30 PM Karri Bullock, PT [facility]

## 2018-05-03 ENCOUNTER — HOSPITAL ENCOUNTER (OUTPATIENT)
Dept: LAB | Age: 63
Discharge: HOME | End: 2018-05-03
Payer: COMMERCIAL

## 2018-05-03 ENCOUNTER — OFFICE VISIT (OUTPATIENT)
Dept: INTERNAL MEDICINE CLINIC | Age: 63
End: 2018-05-03

## 2018-05-03 VITALS
DIASTOLIC BLOOD PRESSURE: 62 MMHG | HEART RATE: 73 BPM | SYSTOLIC BLOOD PRESSURE: 124 MMHG | RESPIRATION RATE: 16 BRPM | HEIGHT: 62 IN | WEIGHT: 181.2 LBS | OXYGEN SATURATION: 98 % | TEMPERATURE: 99.1 F | BODY MASS INDEX: 33.34 KG/M2

## 2018-05-03 DIAGNOSIS — M25.562 CHRONIC PAIN OF BOTH KNEES: ICD-10-CM

## 2018-05-03 DIAGNOSIS — D12.2 ADENOMATOUS POLYP OF ASCENDING COLON: ICD-10-CM

## 2018-05-03 DIAGNOSIS — E78.5 HYPERLIPIDEMIA, UNSPECIFIED HYPERLIPIDEMIA TYPE: ICD-10-CM

## 2018-05-03 DIAGNOSIS — M25.561 CHRONIC PAIN OF BOTH KNEES: ICD-10-CM

## 2018-05-03 DIAGNOSIS — I10 ESSENTIAL HYPERTENSION: ICD-10-CM

## 2018-05-03 DIAGNOSIS — R30.0 DYSURIA: ICD-10-CM

## 2018-05-03 DIAGNOSIS — R82.90 ABNORMAL URINALYSIS: Primary | ICD-10-CM

## 2018-05-03 DIAGNOSIS — K21.9 GASTROESOPHAGEAL REFLUX DISEASE WITHOUT ESOPHAGITIS: ICD-10-CM

## 2018-05-03 DIAGNOSIS — J45.20 MILD INTERMITTENT ASTHMA WITHOUT COMPLICATION: ICD-10-CM

## 2018-05-03 DIAGNOSIS — J30.1 SEASONAL ALLERGIC RHINITIS DUE TO POLLEN: ICD-10-CM

## 2018-05-03 DIAGNOSIS — R82.90 ABNORMAL URINALYSIS: ICD-10-CM

## 2018-05-03 DIAGNOSIS — E11.9 TYPE 2 DIABETES MELLITUS WITHOUT COMPLICATION, WITHOUT LONG-TERM CURRENT USE OF INSULIN (HCC): ICD-10-CM

## 2018-05-03 DIAGNOSIS — I47.1 PSVT (PAROXYSMAL SUPRAVENTRICULAR TACHYCARDIA) (HCC): ICD-10-CM

## 2018-05-03 DIAGNOSIS — G89.29 CHRONIC PAIN OF BOTH KNEES: ICD-10-CM

## 2018-05-03 LAB
APPEARANCE UR: ABNORMAL
BACTERIA URNS QL MICRO: NEGATIVE /HPF
BILIRUB UR QL: NEGATIVE
CAOX CRY URNS QL MICRO: ABNORMAL
COLOR UR: ABNORMAL
EPITH CASTS URNS QL MICRO: ABNORMAL /LPF (ref 0–5)
GLUCOSE UR STRIP.AUTO-MCNC: NEGATIVE MG/DL
HGB UR QL STRIP: NEGATIVE
KETONES UR QL STRIP.AUTO: ABNORMAL MG/DL
LEUKOCYTE ESTERASE UR QL STRIP.AUTO: ABNORMAL
NITRITE UR QL STRIP.AUTO: NEGATIVE
PH UR STRIP: 5.5 [PH] (ref 5–8)
PROT UR STRIP-MCNC: NEGATIVE MG/DL
RBC #/AREA URNS HPF: 0 /HPF (ref 0–5)
SP GR UR REFRACTOMETRY: 1.03 (ref 1–1.03)
UROBILINOGEN UR QL STRIP.AUTO: 0.2 EU/DL (ref 0.2–1)
WBC URNS QL MICRO: ABNORMAL /HPF (ref 0–4)

## 2018-05-03 PROCEDURE — 87086 URINE CULTURE/COLONY COUNT: CPT | Performed by: INTERNAL MEDICINE

## 2018-05-03 PROCEDURE — 81001 URINALYSIS AUTO W/SCOPE: CPT | Performed by: INTERNAL MEDICINE

## 2018-05-03 RX ORDER — METFORMIN HYDROCHLORIDE 500 MG/1
500 TABLET ORAL 2 TIMES DAILY WITH MEALS
Qty: 60 TAB | Refills: 5 | Status: SHIPPED | OUTPATIENT
Start: 2018-05-03 | End: 2018-08-15 | Stop reason: SDDI

## 2018-05-03 NOTE — MR AVS SNAPSHOT
[facility] 
 
 
 [address], [address] [facility] 
[phone] Patient: Mihir Nuñez MRN: [id number] Visit Information Date & Time Provider Department Dept. Phone Encounter #  
 5/3/2018 11:00 AM Eleonora Eng MD [facility] [phone] [id number] Follow-up Instructions Return in about 3 months (around 8/3/2018), or if symptoms worsen or fail to improve. Your Appointments 8/15/2018 10:30 AM  
Office Visit with Eleonora Eng MD  
[facility]) Appt Note: 3 month follow up  
 [address], [address] [place] [address] [address]  
  
   
 [address], [address] Upcoming Health Maintenance Date Due ZOSTER VACCINE AGE 60> 2/28/2019* EYE EXAM RETINAL OR DILATED Q1 6/20/2018 Influenza Age 5 to Adult 8/1/2018 MICROALBUMIN Q1 9/20/2018 HEMOGLOBIN A1C Q6M 10/24/2018 LIPID PANEL Q1 4/24/2019 FOOT EXAM Q1 5/3/2019 BREAST CANCER SCRN MAMMOGRAM 11/29/2019 PAP AKA CERVICAL CYTOLOGY 11/3/2021 COLONOSCOPY 12/6/2021 DTaP/Tdap/Td series (2 - Td) 2/16/2022 *Topic was postponed. The date shown is not the original due date. Allergies as of 5/3/2018  Review Complete On: 5/3/2018 By: Kaila Alexander Severity Noted Reaction Type Reactions Altace [Ramipril]  03/17/2011    Cough Penicillins    Other (comments) Hands peel Sulfur    Itching Current Immunizations  Reviewed on 10/5/2017 Name Date Influenza Vaccine (Quad) PF 10/5/2017 12:43 PM, 10/19/2016, 10/23/2015 Influenza Vaccine PF 10/3/2014, 12/12/2013 Influenza Vaccine Split 10/22/2012, 11/2/2011 Influenza Vaccine Whole 10/29/2010 PPD 1/3/2012 Pneumococcal Polysaccharide (PPSV-23) 3/21/2017 TB Skin Test (PPD) Intradermal 2/12/2014 TDAP Vaccine 2/16/2012 Not reviewed this visit You Were Diagnosed With   
  
 Codes Comments Abnormal urinalysis    -  Primary ICD-10-CM: R82.90 ICD-9-CM: 791.9 Dysuria     ICD-10-CM: R30.0 ICD-9-CM: 990. 1 Vitals BP Pulse Temp Resp Height(growth percentile) Weight(growth percentile) 124/62 (BP 1 Location: Left arm, BP Patient Position: Sitting) 73 99.1 °F (37.3 °C) (Oral) 16 5' 2\" (1.575 m) 181 lb 3.2 oz (82.2 kg) LMP SpO2 BMI OB Status Smoking Status (LMP Unknown) 98% 33.14 kg/m2 Postmenopausal Never Smoker Vitals History BMI and BSA Data Body Mass Index Body Surface Area  
 33.14 kg/m 2 1.9 m 2 Preferred Pharmacy Pharmacy Name Phone [address], [address] [place] [address] [phone] Your Updated Medication List  
  
   
This list is accurate as of 5/3/18 12:04 PM.  Always use your most recent med list.  
  
  
  
  
 albuterol 90 mcg/actuation inhaler Commonly known as:  PROAIR HFA  
inhale 2 puffs by mouth every 4 hours if needed for wheezing  
  
 clotrimazole-betamethasone topical cream  
Commonly known as:  Suzanne Loffler Apply  to both ear canals and affected part of outer ear twice a day with a finger. fluticasone 50 mcg/actuation nasal spray Commonly known as:  Arh Rikki 2 Sprays by Both Nostrils route daily. hydroCHLOROthiazide 25 mg tablet Commonly known as:  HYDRODIURIL  
take 1/2 tablet by mouth once daily LORazepam 1 mg tablet Commonly known as:  ATIVAN  
TAKE 1 TABLET BY MOUTH EVERY 8 HOURS AS NEEDED FOR ANXIETY  
  
 metFORMIN 500 mg tablet Commonly known as:  GLUCOPHAGE Take 1 Tab by mouth two (2) times daily (with meals). metoprolol succinate 50 mg XL tablet Commonly known as:  TOPROL-XL  
take 1 tablet by mouth once daily MULTIVITAMIN PO Take 1 Tab by mouth every other day. omeprazole 20 mg capsule Commonly known as:  PriLOSEC Take 1 Cap by mouth daily. potassium chloride 20 mEq tablet Commonly known as:  K-DUR, KLOR-CON  
take 1 tablet by mouth once daily QVAR 40 mcg/actuation [place] Generic drug:  beclomethasone Take 1 Puff by inhalation two (2) times a day. REFRESH LIQUIGEL 1 % Dlgl Generic drug:  carboxymethylcellulose sodium Apply  to eye.  
  
 simvastatin 20 mg tablet Commonly known as:  ZOCOR  
take 1 tablet by mouth at bedtime Prescriptions Sent to Pharmacy Refills  
 metFORMIN (GLUCOPHAGE) 500 mg tablet 5 Sig: Take 1 Tab by mouth two (2) times daily (with meals). Class: Normal  
 Pharmacy: [facility], [address] [place] [address] #: [phone] Route: Oral  
  
Follow-up Instructions Return in about 3 months (around 8/3/2018), or if symptoms worsen or fail to improve. To-Do List   
 05/03/2018 Microbiology:  CULTURE, URINE   
  
 05/03/2018 Lab:  URINALYSIS W/MICROSCOPIC   
  
 05/07/2018 3:00 PM  
  Appointment with Giuliana Leyva PTA at [facility] [address] [place] [address] ([phone])  
  
 05/09/2018 3:00 PM  
  Appointment with Giuliana Leyva PTA at [facility] [address] [place] [address] ([phone])  
  
 05/14/2018 3:00 PM  
  Appointment with Giuliana Leyva PTA at [facility] PT [address] [place] [address] ([phone])  
  
 05/16/2018 3:30 PM  
  Appointment with Melquiades To PT at [address] [place] [address] ([phone]) Patient Instructions Begin metformin 500 mg twice per day with meals. Advance Directives: Care Instructions Your Care Instructions An advance directive is a legal way to state your wishes at the end of your life. It tells your family and your doctor what to do if you can no longer say what you want. There are two main types of advance directives. You can change them any time that your wishes change. · A living will tells your family and your doctor your wishes about life support and other treatment.  
· A durable power of  for health care lets you name a person to make treatment decisions for you when you can't speak for yourself. This person is called a health care agent. If you do not have an advance directive, decisions about your medical care may be made by a doctor or a  who doesn't know you. It may help to think of an advance directive as a gift to the people who care for you. If you have one, they won't have to make tough decisions by themselves. Follow-up care is a key part of your treatment and safety. Be sure to make and go to all appointments, and call your doctor if you are having problems. It's also a good idea to know your test results and keep a list of the medicines you take. How can you care for yourself at home? · Discuss your wishes with your loved ones and your doctor. This way, there are no surprises. · Many states have a unique form. Or you might use a universal form that has been approved by many states. This kind of form can sometimes be completed and stored online. Your electronic copy will then be available wherever you have a connection to the Internet. In most cases, doctors will respect your wishes even if you have a form from a different state. · You don't need a  to do an advance directive. But you may want to get legal advice. · Think about these questions when you prepare an advance directive: ¨ Who do you want to make decisions about your medical care if you are not able to? Many people choose a family member or close friend. ¨ Do you know enough about life support methods that might be used? If not, talk to your doctor so you understand. ¨ What are you most afraid of that might happen? You might be afraid of having pain, losing your independence, or being kept alive by machines. ¨ Where would you prefer to die? Choices include your home, a hospital, or a nursing home. ¨ Would you like to have information about hospice care to support you and your family? ¨ Do you want to donate organs when you die? ¨ Do you want certain Presybeterian practices performed before you die? If so, put your wishes in the advance directive. · Read your advance directive every year, and make changes as needed. When should you call for help? Be sure to contact your doctor if you have any questions. Where can you learn more? Go to http://yoselyn-merline.info/. Enter R264 in the search box to learn more about \"Advance Directives: Care Instructions. \" Current as of: September 24, 2016 Content Version: 11.4 © 2663-3808 Sprinkle. Care instructions adapted under license by TheShoppingPro (which disclaims liability or warranty for this information). If you have questions about a medical condition or this instruction, always ask your healthcare professional. [address] any warranty or liability for your use of this information. Cedrick Rossi 1726 What is a living will? A living will is a legal form you use to write down the kind of care you want at the end of your life. It is used by the health professionals who will treat you if you aren't able to decide for yourself. If you put your wishes in writing, your loved ones and others will know what kind of care you want. They won't need to guess. This can ease your mind and be helpful to others. A living will is not the same as an estate or property will. An estate will explains what you want to happen with your money and property after you die. Is a living will a legal document? A living will is a legal document. Each state has its own laws about living anthony. If you move to another state, make sure that your living will is legal in the state where you now live. Or you might use a universal form that has been approved by many states. This kind of form can sometimes be completed and stored online. Your electronic copy will then be available wherever you have a connection to the Internet.  In most cases, doctors will respect your wishes even if you have a form from a different state. · You don't need an  to complete a living will. But legal advice can be helpful if your state's laws are unclear, your health history is complicated, or your family can't agree on what should be in your living will. · You can change your living will at any time. Some people find that their wishes about end-of-life care change as their health changes. · In addition to making a living will, think about completing a medical power of  form. This form lets you name the person you want to make end-of-life treatment decisions for you (your \"health care agent\") if you're not able to. Many hospitals and nursing homes will give you the forms you need to complete a living will and a medical power of . · Your living will is used only if you can't make or communicate decisions for yourself anymore. If you become able to make decisions again, you can accept or refuse any treatment, no matter what you wrote in your living will. · Your state may offer an online registry. This is a place where you can store your living will online so the doctors and nurses who need to treat you can find it right away. What should you think about when creating a living will? Talk about your end-of-life wishes with your family members and your doctor. Let them know what you want. That way the people making decisions for you won't be surprised by your choices. Think about these questions as you make your living will: · Do you know enough about life support methods that might be used? If not, talk to your doctor so you know what might be done if you can't breathe on your own, your heart stops, or you're unable to swallow. · What things would you still want to be able to do after you receive life-support methods? Would you want to be able to walk? To speak? To eat on your own? To live without the help of machines? · If you have a choice, where do you want to be cared for? In your home? At a hospital or nursing home? · Do you want certain Sikh practices performed if you become very ill? · If you have a choice at the end of your life, where would you prefer to die? At home? In a hospital or nursing home? Somewhere else? · Would you prefer to be buried or cremated? · Do you want your organs to be donated after you die? What should you do with your living will? · Make sure that your family members and your health care agent have copies of your living will. · Give your doctor a copy of your living will to keep in your medical record. If you have more than one doctor, make sure that each one has a copy. · You may want to put a copy of your living will where it can be easily found. Where can you learn more? Go to http://yoselyn-merline.info/. Enter O889 in the search box to learn more about \"Learning About Living Perrosaroj. \" Current as of: September 24, 2016 Content Version: 11.4 © 0870-1994 Tripvi. Care instructions adapted under license by Vivino (which disclaims liability or warranty for this information). If you have questions about a medical condition or this instruction, always ask your healthcare professional. [address] any warranty or liability for your use of this information. Learning About Diabetes Food Guidelines Your Care Instructions Meal planning is important to manage diabetes. It helps keep your blood sugar at a target level (which you set with your doctor). You don't have to eat special foods. You can eat what your family eats, including sweets once in a while. But you do have to pay attention to how often you eat and how much you eat of certain foods. You may want to work with a dietitian or a certified diabetes educator (CDE) to help you plan meals and snacks.  A dietitian or CDE can also help you lose weight if that is one of your goals. What should you know about eating carbs? Managing the amount of carbohydrate (carbs) you eat is an important part of healthy meals when you have diabetes. Carbohydrate is found in many foods. · Learn which foods have carbs. And learn the amounts of carbs in different foods. ¨ Bread, cereal, pasta, and rice have about 15 grams of carbs in a serving. A serving is 1 slice of bread (1 ounce), ½ cup of cooked cereal, or 1/3 cup of cooked pasta or rice. ¨ Fruits have 15 grams of carbs in a serving. A serving is 1 small fresh fruit, such as an apple or orange; ½ of a banana; ½ cup of cooked or canned fruit; ½ cup of fruit juice; 1 cup of melon or raspberries; or 2 tablespoons of dried fruit. ¨ Milk and no-sugar-added yogurt have 15 grams of carbs in a serving. A serving is 1 cup of milk or 2/3 cup of no-sugar-added yogurt. ¨ Starchy vegetables have 15 grams of carbs in a serving. A serving is ½ cup of mashed potatoes or sweet potato; 1 cup winter squash; ½ of a small baked potato; ½ cup of cooked beans; or ½ cup cooked corn or green peas. · Learn how much carbs to eat each day and at each meal. A dietitian or CDE can teach you how to keep track of the amount of carbs you eat. This is called carbohydrate counting. · If you are not sure how to count carbohydrate grams, use the Plate Method to plan meals. It is a good, quick way to make sure that you have a balanced meal. It also helps you spread carbs throughout the day. ¨ Divide your plate by types of foods. Put non-starchy vegetables on half the plate, meat or other protein food on one-quarter of the plate, and a grain or starchy vegetable in the final quarter of the plate.  To this you can add a small piece of fruit and 1 cup of milk or yogurt, depending on how many carbs you are supposed to eat at a meal. 
· Try to eat about the same amount of carbs at each meal. Do not \"save up\" your daily allowance of carbs to eat at one meal. 
· Proteins have very little or no carbs per serving. Examples of proteins are beef, chicken, turkey, fish, eggs, tofu, cheese, cottage cheese, and peanut butter. A serving size of meat is 3 ounces, which is about the size of a deck of cards. Examples of meat substitute serving sizes (equal to 1 ounce of meat) are 1/4 cup of cottage cheese, 1 egg, 1 tablespoon of peanut butter, and ½ cup of tofu. How can you eat out and still eat healthy? · Learn to estimate the serving sizes of foods that have carbohydrate. If you measure food at home, it will be easier to estimate the amount in a serving of restaurant food. · If the meal you order has too much carbohydrate (such as potatoes, corn, or baked beans), ask to have a low-carbohydrate food instead. Ask for a salad or green vegetables. · If you use insulin, check your blood sugar before and after eating out to help you plan how much to eat in the future. · If you eat more carbohydrate at a meal than you had planned, take a walk or do other exercise. This will help lower your blood sugar. What else should you know? · Limit saturated fat, such as the fat from meat and dairy products. This is a healthy choice because people who have diabetes are at higher risk of heart disease. So choose lean cuts of meat and nonfat or low-fat dairy products. Use olive or canola oil instead of butter or shortening when cooking. · Don't skip meals. Your blood sugar may drop too low if you skip meals and take insulin or certain medicines for diabetes. · Check with your doctor before you drink alcohol. Alcohol can cause your blood sugar to drop too low. Alcohol can also cause a bad reaction if you take certain diabetes medicines. Follow-up care is a key part of your treatment and safety.  Be sure to make and go to all appointments, and call your doctor if you are having problems. It's also a good idea to know your test results and keep a list of the medicines you take. Where can you learn more? Go to http://yoselyn-merline.info/. Enter N995 in the search box to learn more about \"Learning About Diabetes Food Guidelines. \" Current as of: March 13, 2017 Content Version: 11.4 © 7082-7506 Centrobit Agora. Care instructions adapted under license by ViewsIQ (which disclaims liability or warranty for this information). If you have questions about a medical condition or this instruction, always ask your healthcare professional. [place] [address] any warranty or liability for your use of this information. Learning About Meal Planning for Diabetes Why plan your meals? Meal planning can be a key part of managing diabetes. Planning meals and snacks with the right balance of carbohydrate, protein, and fat can help you keep your blood sugar at the target level you set with your doctor. You don't have to eat special foods. You can eat what your family eats, including sweets once in a while. But you do have to pay attention to how often you eat and how much you eat of certain foods. You may want to work with a dietitian or a certified diabetes educator. He or she can give you tips and meal ideas and can answer your questions about meal planning. This health professional can also help you reach a healthy weight if that is one of your goals. What plan is right for you? Your dietitian or diabetes educator may suggest that you start with the plate format or carbohydrate counting. The plate format The plate format is a simple way to help you manage how you eat. You plan meals by learning how much space each food should take on a plate. Using the plate format helps you spread carbohydrate throughout the day. It can make it easier to keep your blood sugar level within your target range.  It also helps you see if you're eating healthy portion sizes. To use the plate format, you put non-starchy vegetables on half your plate. Add meat or meat substitutes on one-quarter of the plate. Put a grain or starchy vegetable (such as brown rice or a potato) on the final quarter of the plate. You can add a small piece of fruit and some low-fat or fat-free milk or yogurt, depending on your carbohydrate goal for each meal. 
Here are some tips for using the plate format: · Make sure that you are not using an oversized plate. A 9-inch plate is best. Many restaurants use larger plates. · Get used to using the plate format at home. Then you can use it when you eat out. · Write down your questions about using the plate format. Talk to your doctor, a dietitian, or a diabetes educator about your concerns. Carbohydrate counting With carbohydrate counting, you plan meals based on the amount of carbohydrate in each food. Carbohydrate raises blood sugar higher and more quickly than any other nutrient. It is found in desserts, breads and cereals, and fruit. It's also found in starchy vegetables such as potatoes and corn, grains such as rice and pasta, and milk and yogurt. Spreading carbohydrate throughout the day helps keep your blood sugar levels within your target range. Your daily amount depends on several things, including your weight, how active you are, which diabetes medicines you take, and what your goals are for your blood sugar levels. A registered dietitian or diabetes educator can help you plan how much carbohydrate to include in each meal and snack. A guideline for your daily amount of carbohydrate is: · 45 to 60 grams at each meal. That's about the same as 3 to 4 carbohydrate servings. · 15 to 20 grams at each snack. That's about the same as 1 carbohydrate serving. The Nutrition Facts label on packaged foods tells you how much carbohydrate is in a serving of the food.  First, look at the serving size on the food label. Is that the amount you eat in a serving? All of the nutrition information on a food label is based on that serving size. So if you eat more or less than that, you'll need to adjust the other numbers. Total carbohydrate is the next thing you need to look for on the label. If you count carbohydrate servings, one serving of carbohydrate is 15 grams. For foods that don't come with labels, such as fresh fruits and vegetables, you'll need a guide that lists carbohydrate in these foods. Ask your doctor, dietitian, or diabetes educator about books or other nutrition guides you can use. If you take insulin, you need to know how many grams of carbohydrate are in a meal. This lets you know how much rapid-acting insulin to take before you eat. If you use an insulin pump, you get a constant rate of insulin during the day. So the pump must be programmed at meals to give you extra insulin to cover the rise in blood sugar after meals. When you know how much carbohydrate you will eat, you can take the right amount of insulin. Or, if you always use the same amount of insulin, you need to make sure that you eat the same amount of carbohydrate at meals. If you need more help to understand carbohydrate counting and food labels, ask your doctor, dietitian, or diabetes educator. How do you get started with meal planning? Here are some tips to get started: 
· Plan your meals a week at a time. Don't forget to include snacks too. · Use cookbooks or online recipes to plan several main meals. Plan some quick meals for busy nights. You also can double some recipes that freeze well. Then you can save half for other busy nights when you don't have time to cook. · Make sure you have the ingredients you need for your recipes. If you're running low on basic items, put these items on your shopping list too. · List foods that you use to make breakfasts, lunches, and snacks. List plenty of fruits and vegetables. · Post this list on the refrigerator. Add to it as you think of more things you need. · Take the list to the store to do your weekly shopping. Follow-up care is a key part of your treatment and safety. Be sure to make and go to all appointments, and call your doctor if you are having problems. It's also a good idea to know your test results and keep a list of the medicines you take. Where can you learn more? Go to http://yoselyn-merline.info/. Marie Hutchinson in the search box to learn more about \"Learning About Meal Planning for Diabetes. \" Current as of: March 13, 2017 Content Version: 11.4 © 1988-5884 Samfind. Care instructions adapted under license by KAICORE (which disclaims liability or warranty for this information). If you have questions about a medical condition or this instruction, always ask your healthcare professional. [place] [address] any warranty or liability for your use of this information. Introducing [facility]! Shyla Calero introduces [facility] patient portal. Now you can access parts of your medical record, email your doctor's office, and request medication refills online. 1. In your internet browser, go to [email or web address] 2. Click on the First Time User? Click Here link in the Sign In box. You will see the New Member Sign Up page. 3. Enter your [facility] Access Code exactly as it appears below. You will not need to use this code after youve completed the sign-up process. If you do not sign up before the expiration date, you must request a new code. · [facility] Access Code: [email or web address] Expires: 7/18/2018  4:09 PM 
 
4. Enter the last four digits of your Social Security Number (xxxx) and Date of Birth (mm/dd/yyyy) as indicated and click Submit. You will be taken to the next sign-up page. 5. Create a [employer] ID.  This will be your [employer] login ID and cannot be changed, so think of one that is secure and easy to remember. 6. Create a [employer] password. You can change your password at any time. 7. Enter your Password Reset Question and Answer. This can be used at a later time if you forget your password. 8. Enter your e-mail address. You will receive e-mail notification when new information is available in [address] 9. Click Sign Up. You can now view and download portions of your medical record. 10. Click the Download Summary menu link to download a portable copy of your medical information. If you have questions, please visit the Frequently Asked Questions section of the [employer] website. Remember, [employer] is NOT to be used for urgent needs. For medical emergencies, dial 911. Now available from your iPhone and Android! Please provide this summary of care documentation to your next provider. Your primary care clinician is listed as Jenel Mohs. If you have any questions after today's visit, please call [phone].

## 2018-05-03 NOTE — PATIENT INSTRUCTIONS
Begin metformin 500 mg twice per day with meals. Advance Directives: Care Instructions  Your Care Instructions  An advance directive is a legal way to state your wishes at the end of your life. It tells your family and your doctor what to do if you can no longer say what you want. There are two main types of advance directives. You can change them any time that your wishes change. · A living will tells your family and your doctor your wishes about life support and other treatment. · A durable power of  for health care lets you name a person to make treatment decisions for you when you can't speak for yourself. This person is called a health care agent. If you do not have an advance directive, decisions about your medical care may be made by a doctor or a  who doesn't know you. It may help to think of an advance directive as a gift to the people who care for you. If you have one, they won't have to make tough decisions by themselves. Follow-up care is a key part of your treatment and safety. Be sure to make and go to all appointments, and call your doctor if you are having problems. It's also a good idea to know your test results and keep a list of the medicines you take. How can you care for yourself at home? · Discuss your wishes with your loved ones and your doctor. This way, there are no surprises. · Many states have a unique form. Or you might use a universal form that has been approved by many states. This kind of form can sometimes be completed and stored online. Your electronic copy will then be available wherever you have a connection to the Internet. In most cases, doctors will respect your wishes even if you have a form from a different state. · You don't need a  to do an advance directive. But you may want to get legal advice.   · Think about these questions when you prepare an advance directive:  ¨ Who do you want to make decisions about your medical care if you are not able to? Many people choose a family member or close friend. ¨ Do you know enough about life support methods that might be used? If not, talk to your doctor so you understand. ¨ What are you most afraid of that might happen? You might be afraid of having pain, losing your independence, or being kept alive by machines. ¨ Where would you prefer to die? Choices include your home, a hospital, or a nursing home. ¨ Would you like to have information about hospice care to support you and your family? ¨ Do you want to donate organs when you die? ¨ Do you want certain Mandaeism practices performed before you die? If so, put your wishes in the advance directive. · Read your advance directive every year, and make changes as needed. When should you call for help? Be sure to contact your doctor if you have any questions. Where can you learn more? Go to http://yoselynLocal Geek PC Repairmerline.info/. Enter R264 in the search box to learn more about \"Advance Directives: Care Instructions. \"  Current as of: September 24, 2016  Content Version: 11.4  © 2925-5154 [employer]. Care instructions adapted under license by [employer] (which disclaims liability or warranty for this information). If you have questions about a medical condition or this instruction, always ask your healthcare professional. [address] any warranty or liability for your use of this information. Learning About Living Perroy  What is a living will? A living will is a legal form you use to write down the kind of care you want at the end of your life. It is used by the health professionals who will treat you if you aren't able to decide for yourself. If you put your wishes in writing, your loved ones and others will know what kind of care you want. They won't need to guess. This can ease your mind and be helpful to others. A living will is not the same as an estate or property will.  An estate will explains what you want to happen with your money and property after you die. Is a living will a legal document? A living will is a legal document. Each state has its own laws about living anthony. If you move to another state, make sure that your living will is legal in the state where you now live. Or you might use a universal form that has been approved by many states. This kind of form can sometimes be completed and stored online. Your electronic copy will then be available wherever you have a connection to the Internet. In most cases, doctors will respect your wishes even if you have a form from a different state. · You don't need an  to complete a living will. But legal advice can be helpful if your state's laws are unclear, your health history is complicated, or your family can't agree on what should be in your living will. · You can change your living will at any time. Some people find that their wishes about end-of-life care change as their health changes. · In addition to making a living will, think about completing a medical power of  form. This form lets you name the person you want to make end-of-life treatment decisions for you (your \"health care agent\") if you're not able to. Many hospitals and nursing homes will give you the forms you need to complete a living will and a medical power of . · Your living will is used only if you can't make or communicate decisions for yourself anymore. If you become able to make decisions again, you can accept or refuse any treatment, no matter what you wrote in your living will. · Your state may offer an online registry. This is a place where you can store your living will online so the doctors and nurses who need to treat you can find it right away. What should you think about when creating a living will? Talk about your end-of-life wishes with your family members and your doctor. Let them know what you want.  That way the people making decisions for you won't be surprised by your choices. Think about these questions as you make your living will:  · Do you know enough about life support methods that might be used? If not, talk to your doctor so you know what might be done if you can't breathe on your own, your heart stops, or you're unable to swallow. · What things would you still want to be able to do after you receive life-support methods? Would you want to be able to walk? To speak? To eat on your own? To live without the help of machines? · If you have a choice, where do you want to be cared for? In your home? At a hospital or nursing home? · Do you want certain Congregational practices performed if you become very ill? · If you have a choice at the end of your life, where would you prefer to die? At home? In a hospital or nursing home? Somewhere else? · Would you prefer to be buried or cremated? · Do you want your organs to be donated after you die? What should you do with your living will? · Make sure that your family members and your health care agent have copies of your living will. · Give your doctor a copy of your living will to keep in your medical record. If you have more than one doctor, make sure that each one has a copy. · You may want to put a copy of your living will where it can be easily found. Where can you learn more? Go to http://yoselyn-merline.info/. Enter O732 in the search box to learn more about \"Learning About Living Perrosaroj. \"  Current as of: September 24, 2016  Content Version: 11.4  © 0046-1074 [employer]. Care instructions adapted under license by [employer] (which disclaims liability or warranty for this information). If you have questions about a medical condition or this instruction, always ask your healthcare professional. [address] any warranty or liability for your use of this information.        Learning About Diabetes Food Guidelines  Your Care Instructions    Meal planning is important to manage diabetes. It helps keep your blood sugar at a target level (which you set with your doctor). You don't have to eat special foods. You can eat what your family eats, including sweets once in a while. But you do have to pay attention to how often you eat and how much you eat of certain foods. You may want to work with a dietitian or a certified diabetes educator (CDE) to help you plan meals and snacks. A dietitian or CDE can also help you lose weight if that is one of your goals. What should you know about eating carbs? Managing the amount of carbohydrate (carbs) you eat is an important part of healthy meals when you have diabetes. Carbohydrate is found in many foods. · Learn which foods have carbs. And learn the amounts of carbs in different foods. ¨ Bread, cereal, pasta, and rice have about 15 grams of carbs in a serving. A serving is 1 slice of bread (1 ounce), ½ cup of cooked cereal, or 1/3 cup of cooked pasta or rice. ¨ Fruits have 15 grams of carbs in a serving. A serving is 1 small fresh fruit, such as an apple or orange; ½ of a banana; ½ cup of cooked or canned fruit; ½ cup of fruit juice; 1 cup of melon or raspberries; or 2 tablespoons of dried fruit. ¨ Milk and no-sugar-added yogurt have 15 grams of carbs in a serving. A serving is 1 cup of milk or 2/3 cup of no-sugar-added yogurt. ¨ Starchy vegetables have 15 grams of carbs in a serving. A serving is ½ cup of mashed potatoes or sweet potato; 1 cup winter squash; ½ of a small baked potato; ½ cup of cooked beans; or ½ cup cooked corn or green peas. · Learn how much carbs to eat each day and at each meal. A dietitian or CDE can teach you how to keep track of the amount of carbs you eat. This is called carbohydrate counting. · If you are not sure how to count carbohydrate grams, use the Plate Method to plan meals.  It is a good, quick way to make sure that you have a balanced meal. It also helps you spread carbs throughout the day. ¨ Divide your plate by types of foods. Put non-starchy vegetables on half the plate, meat or other protein food on one-quarter of the plate, and a grain or starchy vegetable in the final quarter of the plate. To this you can add a small piece of fruit and 1 cup of milk or yogurt, depending on how many carbs you are supposed to eat at a meal.  · Try to eat about the same amount of carbs at each meal. Do not \"save up\" your daily allowance of carbs to eat at one meal.  · Proteins have very little or no carbs per serving. Examples of proteins are beef, chicken, turkey, fish, eggs, tofu, cheese, cottage cheese, and peanut butter. A serving size of meat is 3 ounces, which is about the size of a deck of cards. Examples of meat substitute serving sizes (equal to 1 ounce of meat) are 1/4 cup of cottage cheese, 1 egg, 1 tablespoon of peanut butter, and ½ cup of tofu. How can you eat out and still eat healthy? · Learn to estimate the serving sizes of foods that have carbohydrate. If you measure food at home, it will be easier to estimate the amount in a serving of restaurant food. · If the meal you order has too much carbohydrate (such as potatoes, corn, or baked beans), ask to have a low-carbohydrate food instead. Ask for a salad or green vegetables. · If you use insulin, check your blood sugar before and after eating out to help you plan how much to eat in the future. · If you eat more carbohydrate at a meal than you had planned, take a walk or do other exercise. This will help lower your blood sugar. What else should you know? · Limit saturated fat, such as the fat from meat and dairy products. This is a healthy choice because people who have diabetes are at higher risk of heart disease. So choose lean cuts of meat and nonfat or low-fat dairy products. Use olive or canola oil instead of butter or shortening when cooking. · Don't skip meals.  Your blood sugar may drop too low if you skip meals and take insulin or certain medicines for diabetes. · Check with your doctor before you drink alcohol. Alcohol can cause your blood sugar to drop too low. Alcohol can also cause a bad reaction if you take certain diabetes medicines. Follow-up care is a key part of your treatment and safety. Be sure to make and go to all appointments, and call your doctor if you are having problems. It's also a good idea to know your test results and keep a list of the medicines you take. Where can you learn more? Go to http://yoselyn-merline.info/. Enter J212 in the search box to learn more about \"Learning About Diabetes Food Guidelines. \"  Current as of: March 13, 2017  Content Version: 11.4  © 3222-6310 [employer]. Care instructions adapted under license by [employer] (which disclaims liability or warranty for this information). If you have questions about a medical condition or this instruction, always ask your healthcare professional. [place] [address] any warranty or liability for your use of this information. Learning About Meal Planning for Diabetes  Why plan your meals? Meal planning can be a key part of managing diabetes. Planning meals and snacks with the right balance of carbohydrate, protein, and fat can help you keep your blood sugar at the target level you set with your doctor. You don't have to eat special foods. You can eat what your family eats, including sweets once in a while. But you do have to pay attention to how often you eat and how much you eat of certain foods. You may want to work with a dietitian or a certified diabetes educator. He or she can give you tips and meal ideas and can answer your questions about meal planning. This health professional can also help you reach a healthy weight if that is one of your goals. What plan is right for you?   Your dietitian or diabetes educator may suggest that you start with the plate format or carbohydrate counting. The plate format  The plate format is a simple way to help you manage how you eat. You plan meals by learning how much space each food should take on a plate. Using the plate format helps you spread carbohydrate throughout the day. It can make it easier to keep your blood sugar level within your target range. It also helps you see if you're eating healthy portion sizes. To use the plate format, you put non-starchy vegetables on half your plate. Add meat or meat substitutes on one-quarter of the plate. Put a grain or starchy vegetable (such as brown rice or a potato) on the final quarter of the plate. You can add a small piece of fruit and some low-fat or fat-free milk or yogurt, depending on your carbohydrate goal for each meal.  Here are some tips for using the plate format:  · Make sure that you are not using an oversized plate. A 9-inch plate is best. Many restaurants use larger plates. · Get used to using the plate format at home. Then you can use it when you eat out. · Write down your questions about using the plate format. Talk to your doctor, a dietitian, or a diabetes educator about your concerns. Carbohydrate counting  With carbohydrate counting, you plan meals based on the amount of carbohydrate in each food. Carbohydrate raises blood sugar higher and more quickly than any other nutrient. It is found in desserts, breads and cereals, and fruit. It's also found in starchy vegetables such as potatoes and corn, grains such as rice and pasta, and milk and yogurt. Spreading carbohydrate throughout the day helps keep your blood sugar levels within your target range. Your daily amount depends on several things, including your weight, how active you are, which diabetes medicines you take, and what your goals are for your blood sugar levels.  A registered dietitian or diabetes educator can help you plan how much carbohydrate to include in each meal and snack.  A guideline for your daily amount of carbohydrate is:  · 45 to 60 grams at each meal. That's about the same as 3 to 4 carbohydrate servings. · 15 to 20 grams at each snack. That's about the same as 1 carbohydrate serving. The Nutrition Facts label on packaged foods tells you how much carbohydrate is in a serving of the food. First, look at the serving size on the food label. Is that the amount you eat in a serving? All of the nutrition information on a food label is based on that serving size. So if you eat more or less than that, you'll need to adjust the other numbers. Total carbohydrate is the next thing you need to look for on the label. If you count carbohydrate servings, one serving of carbohydrate is 15 grams. For foods that don't come with labels, such as fresh fruits and vegetables, you'll need a guide that lists carbohydrate in these foods. Ask your doctor, dietitian, or diabetes educator about books or other nutrition guides you can use. If you take insulin, you need to know how many grams of carbohydrate are in a meal. This lets you know how much rapid-acting insulin to take before you eat. If you use an insulin pump, you get a constant rate of insulin during the day. So the pump must be programmed at meals to give you extra insulin to cover the rise in blood sugar after meals. When you know how much carbohydrate you will eat, you can take the right amount of insulin. Or, if you always use the same amount of insulin, you need to make sure that you eat the same amount of carbohydrate at meals. If you need more help to understand carbohydrate counting and food labels, ask your doctor, dietitian, or diabetes educator. How do you get started with meal planning? Here are some tips to get started:  · Plan your meals a week at a time. Don't forget to include snacks too. · Use cookbooks or online recipes to plan several main meals. Plan some quick meals for busy nights.  You also can double some recipes that freeze well. Then you can save half for other busy nights when you don't have time to cook. · Make sure you have the ingredients you need for your recipes. If you're running low on basic items, put these items on your shopping list too. · List foods that you use to make breakfasts, lunches, and snacks. List plenty of fruits and vegetables. · Post this list on the refrigerator. Add to it as you think of more things you need. · Take the list to the store to do your weekly shopping. Follow-up care is a key part of your treatment and safety. Be sure to make and go to all appointments, and call your doctor if you are having problems. It's also a good idea to know your test results and keep a list of the medicines you take. Where can you learn more? Go to http://yoselyn-merline.info/. Marie Hutchinson in the search box to learn more about \"Learning About Meal Planning for Diabetes. \"  Current as of: March 13, 2017  Content Version: 11.4  © 8443-6871 Healthwise, Incorporated. Care instructions adapted under license by [employer] (which disclaims liability or warranty for this information). If you have questions about a medical condition or this instruction, always ask your healthcare professional. [address] any warranty or liability for your use of this information.

## 2018-05-03 NOTE — PROGRESS NOTES
Chief Complaint   Patient presents with    Diabetes     3 month follow up with lab results. Health Maintenance Due   Topic Date Due    FOOT EXAM Q1  03/21/2018     1. Have you been to the ER, urgent care clinic or hospitalized since your last visit? NO.     2. Have you seen or consulted any other health care providers outside of the [address] [place] [address] since your last visit (Include any pap smears or colon screening)? NO      Do you have an Advanced Directive? NO    Would you like information on Advanced Directives? YES

## 2018-05-05 LAB
BACTERIA SPEC CULT: ABNORMAL
SERVICE CMNT-IMP: ABNORMAL

## 2018-05-06 PROBLEM — G89.29 CHRONIC PAIN OF BOTH KNEES: Status: ACTIVE | Noted: 2018-01-28

## 2018-05-06 PROBLEM — M25.562 CHRONIC PAIN OF BOTH KNEES: Status: ACTIVE | Noted: 2018-01-28

## 2018-05-07 ENCOUNTER — TELEPHONE (OUTPATIENT)
Dept: INTERNAL MEDICINE CLINIC | Age: 63
End: 2018-05-07

## 2018-05-07 ENCOUNTER — HOSPITAL ENCOUNTER (OUTPATIENT)
Dept: PHYSICAL THERAPY | Age: 63
Discharge: HOME | End: 2018-05-07
Payer: COMMERCIAL

## 2018-05-07 PROCEDURE — 97110 THERAPEUTIC EXERCISES: CPT

## 2018-05-07 NOTE — PROGRESS NOTES
PT DAILY TREATMENT NOTE     Patient Name: Jl Grissom  Date:2018  : 1955  [x]  Patient  Verified  Payor: BLUE CROSS / Plan:  [facility] / Product Type: PPO /    In time:312  Out time:346  Total Treatment Time (min): 34  Visit #: 6 of     Treatment Area: Bilateral knee pain [M25.561, M25.562]    SUBJECTIVE  Pain Level (0-10 scale): 2/10  Any medication changes, allergies to medications, adverse drug reactions, diagnosis change, or new procedure performed?: [x] No    [] Yes (see summary sheet for update)  Subjective functional status/changes:   [] No changes reported  Pt stated that she is doing pretty good today    OBJECTIVE    34 min Therapeutic Exercise:  [x] See flow sheet :   Rationale: increase ROM and increase strength to improve the patients ability to increase ease with aDLs    With   [x] TE   [] TA   [] neuro   [] other: Patient Education: [x] Review HEP    [] Progressed/Changed HEP based on:   [] positioning   [] body mechanics   [] transfers   [] heat/ice application    [] other:      Other Objective/Functional Measures:   Pt had no complaint of increased pain during session  Pt was 12 minutes late for session  Pt had no difficulty with exercises     Pain Level (0-10 scale) post treatment: 0/10    ASSESSMENT/Changes in Function:   Pt cont to slowly progress toward goals. Pain level cont to decrease. Pt cont with decreased AROM in B knees, but is improving. B knee and hip strength is slowly improving    Patient will continue to benefit from skilled PT services to modify and progress therapeutic interventions, address functional mobility deficits, address ROM deficits and address strength deficits to attain remaining goals. [x]  See Plan of Care  []  See progress note/recertification  []  See Discharge Summary         Progress towards goals / Updated goals:  Short term goals: To be accomplished within 1 week  1.  Pt will be independent with HEP to maintain progression. Not met; only doing HEP occasionally (5/2/18)  Long term goals: To be accomplished within 6 weeks  1. Pt will improve FOTO score by 21 points to 51/100 to show improvement with functional mobility performance. Progressing 15 point improvement (5/2/18)  2. Pt will have B knees AROM 0-125 degrees to amb household and community with normal and safe pattern. 3. Pt will have B knees and hips strength at least 4+/5 to be able to amb longer distance and navigate stair safely. 4. Pt will report no more than 2/10 pain to improve her QOL.  Progressing 2/10 (5/2/18)    PLAN  []  Upgrade activities as tolerated     [x]  Continue plan of care  []  Update interventions per flow sheet       []  Discharge due to:_  []  Other:_      Mayuri Gutiérrez PTA 5/7/2018  3:12 PM    Future Appointments  Date Time Provider Ankit Edmond   5/9/2018 3:00 PM Mayuri Gutiérrez PTA [facility]   5/14/2018 3:00 PM Mayuri Gutiérrez PTA [facility]   5/16/2018 3:30 PM Inna Birch PT [facility]   8/15/2018 10:30 AM Heath Young MD [facility]

## 2018-05-07 NOTE — PROGRESS NOTES
HPI:   Annalisa Pino is a 61y.o. year old female who presents today for evaluation of back pain. She has a history of hypertension, hyperlipidemia, paroxysmal SVT, diabetes mellitus, GERD, asthma, elevated transaminases, abnormal glucose, and atypical mycobacterial pneumonia. She reports that she is doing relatively well and is otherwise without complaints. She has a history of hypertension, treated with metoprolol, and hydrochlorothiazide (+ potassium). She reports that she does not check her blood pressure at home. She does not exercise regularly, but denies any chest pain, shortness of breath at rest or with exertion, lightheadedness, or edema. 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 She has had approximately six severe episodes over the years, prompting presentation to the ED and treatment with IV Adenosine. She currently reports infrequent short episodes of palpitations and is being treated with metoprolol. She is followed by Dr. Kelvin Betts. She had an echocardiogram (10/2005) showing normal LV size and function (EF 60-65%), and no valvular pathology. In 11/2012, she underwent an exercise stress echocardiogram, which was normal at maximal exercise. She has a history of hyperlipidemia, treated with simvastatin from 10/2012 to 3/2015 at which time she stopped taking it due to myalgias. She restarted it on 8/2015 and continued to take it without difficulty until 3/2016, when it was noticed that she had transaminase elevation (AST 85/ ) and it was discontinued. Evaluation included hepatitis A, B, and C levels (negative), iron panel (normal), and RUQ ultrasound (3/23/2016) with limited sonographic window for the liver; only partially visualized but grossly unremarkable. Repeat hepatic panel (4/22/2016) showed AST 75 and ALT 98. Repeat lipid panel showed total chol 215/ / HDL 64/.   In 5/2016, she had an abdominal CT scan showing the liver to be normal in size with normal parenchymal density; no discrete mass or ascites, and no intrahepatic biliary dilatation. She was subsequently instructed to restart simvastatin, but chose not to since she felt it was making her forgetful. She agreed to trial of rosuvastatin 10 mg and started it in 2/2018. However, after two weeks of therapy, she discontinued it since she thought it was causing her leg pain. She subsequently contacted Dr. Pedro Gordon office and asked to begin simvastatin 20 mg daily in 4/2018. She has been taking it without difficulty since restarting. She has a history of diabetes mellitus, with impaired fasting glucose ranging from 103-111 since 2012, and HbA1c to 6.6-6.8 since 9/2016 (not checked previously). She denies any polyuria, polydipsia, nocturia, or blurry vision, and has no history of retinopathy, neuropathy, or nephropathy. She has regular eye exams with Dr. Radha Townsend. She has a history of asthma and allergic rhinitis and is followed by Dr. Saurabh Blair. She is receiving immunotherapy once per month, and reports that she has not required any inhalers recently. She states that she does not use Qvar daily, but will take it occasionally. She does use Claritin every day. In 10/2017, she fell down the steps of her porch and had difficulty with right knee pain and swelling. She was evaluated by Dr. Santos Sullivan in 12/2017, and right knee xray showed decreased medial joint space, and moderate degenerative changes. She received a cortisone injection, but did not notice any improvement.  She underwent an MRI of the right knee (1/29/2018) which showed complete tear of posterior horn and root of the medial meniscus; tear of the body and posterior horn of the lateral meniscus; tricompartmental osteoarthritis most prominent in medial and patellofemoral compartments; moderate joint effusion; small Baker's cyst. It was recommended that she consider knee replacement and arthroscopy. However, she decided to seek a second opinion and was evaluated by Dr. Juvencio Yao. She also underwent an MRI of her left knee (3/23/2018) showing radial tear posterior horn medial meniscus with extrusion of the body. Also  radial tear in the mid body; lateral meniscus intrasubstance degeneration and probable small undersurface tear of the posterior horn; medial and patellofemoral compartments moderate chondral loss; s. ubchondral bone marrow edema in the medial tibial plateau, likely reactive. She is currently undergoing physical therapy for her bilateral knees and feels it may be helping. 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 She was treated with Avelox, and repeat chest x-ray in 1/2012 showed complete resolution of the pneumonia. She denies any cough or shortness of breath. She had a screening colonoscopy in 12/2006 by Dr. Sola Albarado showing a 5 mm sessile polyp in the rectum (pathology: hyperplastic). She had a repeat colonoscopy in 12/2016 which showed moderate sigmoid diverticulosis and a 6 mm sessile ascending colon polyp (pathology: serrated adenoma). Follow-up recommended for 5 years. She denies any abdominal pain, nausea, vomiting, melena, hematochezia, or change in bowel movements. She does take omeprazole occasionally for GERD symptoms. In 12/2017, she was referred by her gynecologist for evaluation by Dr. Mckenna Sexton for microscopic hematuria, and urine cytology was negative. However, she states that she refused his recommendations to undergo a CT urogram or cystoscopy. She denies any dysuria, gross hematuria, or flank pain. Past Medical History:   Diagnosis Date    Allergic rhinitis     Asthma     Cardiac stress echo, normal 11/02/2012    Normal maximal stress echo study. EF 60%. Ex time 9 min 45 sec.    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  Hyperlipidemia     Hypertension     Menopause     Plantar fasciitis     left    PSVT (paroxysmal supraventricular tachycardia) (HCC)     A-V dharmesh reentrant tachycardia     Past Surgical History:   Procedure Laterality Date    ENDOSCOPY, COLON, DIAGNOSTIC      polyp    HX CERVICAL POLYPECTOMY      HX CYST INCISION AND DRAINAGE Right 10 or more years    HX DILATION AND CURETTAGE      HX GYN      polyp on cervix    HX POLYPECTOMY      from rectum     Current Outpatient Prescriptions   Medication Sig    metFORMIN (GLUCOPHAGE) 500 mg tablet Take 1 Tab by mouth two (2) times daily (with meals).  simvastatin (ZOCOR) 20 mg tablet take 1 tablet by mouth at bedtime    potassium chloride (K-DUR, KLOR-CON) 20 mEq tablet take 1 tablet by mouth once daily    metoprolol succinate (TOPROL-XL) 50 mg XL tablet take 1 tablet by mouth once daily    hydroCHLOROthiazide (HYDRODIURIL) 25 mg tablet take 1/2 tablet by mouth once daily    fluticasone (FLONASE) 50 mcg/actuation nasal spray 2 Sprays by Both Nostrils route daily.  carboxymethylcellulose sodium (REFRESH LIQUIGEL) 1 % dlgl Apply  to eye.  clotrimazole-betamethasone (LOTRISONE) topical cream Apply  to both ear canals and affected part of outer ear twice a day with a finger.  MULTIVITAMIN PO Take 1 Tab by mouth every other day.  LORazepam (ATIVAN) 1 mg tablet TAKE 1 TABLET BY MOUTH EVERY 8 HOURS AS NEEDED FOR ANXIETY    albuterol (PROAIR HFA) 90 mcg/actuation inhaler inhale 2 puffs by mouth every 4 hours if needed for wheezing    omeprazole (PRILOSEC) 20 mg capsule Take 1 Cap by mouth daily.  beclomethasone (QVAR) 40 mcg/actuation inhaler Take 1 Puff by inhalation two (2) times a day. No current facility-administered medications for this visit.       Allergies and Intolerances: Allergies   Allergen Reactions    Altace [Ramipril] Cough    Penicillins Other (comments)     Hands peel    Sulfur Itching     Family History: She had two aunts who had breast cancer (her mother's sister and father's sister). She has no FH of colon cancer. Her mother passed away from uterine cancer. Her father  from metastatic prostate cancer. Family History   Problem Relation Age of Onset   [address] [facility] Arthritis-osteo Mother     Hypertension Mother           Cancer Father      bone cancer    Hypertension Sister     Hypertension Sister     Hypertension Sister     Breast Cancer Maternal Aunt     Breast Cancer Paternal Aunt     Diabetes Other     Stroke Other     Other Sister      twin sister - osteopenia and low vitamin D levels     Social History:   She  reports that she has never smoked. She has never used smokeless tobacco. She is  and has two sons. She was a homemaker, but worked part-time in . She now helps care for her grandchildren. History   Alcohol Use No     Immunization History:  Immunization History   Administered Date(s) Administered    Influenza Vaccine (Quad) PF 10/23/2015, 10/19/2016, 10/05/2017    Influenza Vaccine PF 2013, 10/03/2014    Influenza Vaccine Split 2011, 10/22/2012    Influenza Vaccine Whole 10/29/2010    PPD 2012    Pneumococcal Polysaccharide (PPSV-23) 2017    TB Skin Test (PPD) Intradermal 2014    TDAP Vaccine 2012       Review of Systems:   As above included in HPI. Otherwise 11 point review of systems negative including constitutional, skin, HENT, eyes, respiratory, cardiovascular, gastrointestinal, genitourinary, musculoskeletal, endocrine, hematologic, allergy, and neurologic. Physical:   Vitals:   BP: 124/62   HR: 73  WT: 181 lb 3.2 oz (82.2 kg)  BMI:  33.14 kg/m2    Exam:   Pt appears well; alert and oriented x 3; appropriate affect.     HEENT: PERRLA, anicteric, oropharynx clear, no JVD, adenopathy or thyromegaly. No carotid bruits or radiated murmur. Lungs: clear to auscultation, no wheezes, rhonchi, or rales. Heart: regular rate and rhythm. No murmur, rubs, gallops  Abdomen: soft, nontender, nondistended, normal bowel sounds, no hepatosplenomegaly or masses. Extremities: without edema. Pulses 1-2+ bilaterally. Right knee without erythema or warmth. Foot exam: tuning fork and microfilament test with normal sensation; normal dorsalis pedis and posterior tibial pulses bilaterally.     Review of Data:  Labs:  Hospital Outpatient Visit on 04/24/2018   Component Date Value Ref Range Status    Hemoglobin A1c 04/24/2018 6.8* 4.2 - 5.6 % Final    Est. average glucose 04/24/2018 148  mg/dL Final    Sodium 04/24/2018 138  136 - 145 mmol/L Final    Potassium 04/24/2018 3.8  3.5 - 5.5 mmol/L Final    Chloride 04/24/2018 100  100 - 108 mmol/L Final    CO2 04/24/2018 34* 21 - 32 mmol/L Final    Anion gap 04/24/2018 4  3.0 - 18 mmol/L Final    Glucose 04/24/2018 119* 74 - 99 mg/dL Final    BUN 04/24/2018 12  7.0 - 18 MG/DL Final    Creatinine 04/24/2018 0.73  0.6 - 1.3 MG/DL Final    BUN/Creatinine ratio 04/24/2018 16  12 - 20   Final    GFR est AA 04/24/2018 >60  >60 ml/min/1.73m2 Final    GFR est non-AA 04/24/2018 >60  >60 ml/min/1.73m2 Final    Calcium 04/24/2018 8.8  8.5 - 10.1 MG/DL Final    LIPID PROFILE 04/24/2018        Final    Cholesterol, total 04/24/2018 172  <200 MG/DL Final    Triglyceride 04/24/2018 143  <150 MG/DL Final    HDL Cholesterol 04/24/2018 71* 40 - 60 MG/DL Final    LDL, calculated 04/24/2018 72.4  0 - 100 MG/DL Final    VLDL, calculated 04/24/2018 28.6  MG/DL Final    CHOL/HDL Ratio 04/24/2018 2.4  0 - 5.0   Final    Color 04/24/2018 YELLOW    Final    Appearance 04/24/2018 CLEAR    Final    Specific gravity 04/24/2018 1.015  1.005 - 1.030   Final    pH (UA) 04/24/2018 6.5  5.0 - 8.0   Final    Protein 04/24/2018 NEGATIVE   NEG mg/dL Final    Glucose 04/24/2018 NEGATIVE   NEG mg/dL Final    Ketone 04/24/2018 NEGATIVE   NEG mg/dL Final    Bilirubin 04/24/2018 NEGATIVE   NEG   Final    Blood 04/24/2018 SMALL* NEG   Final    Urobilinogen 04/24/2018 0.2  0.2 - 1.0 EU/dL Final    Nitrites 04/24/2018 NEGATIVE   NEG   Final    Leukocyte Esterase 04/24/2018 LARGE* NEG   Final    WBC 04/24/2018 11 to 20  0 - 4 /hpf Final    RBC 04/24/2018 0 to 4  0 - 5 /hpf Final    Epithelial cells 04/24/2018 2+  0 - 5 /lpf Final    Bacteria 04/24/2018 2+* NEG /hpf Final     Calculated 10 year ASCVD risk score:  34.9  %    Health Maintenance:  Screening:    Mammogram: negative (11/2017)   PAP smear: negative (10/2016) with negative HPV. Followed by Dr. Jenny Garcia. Colorectal: colonoscopy (12/2016) serrated adenoma. Dr. Carlota Feldman. Due 2021. Depression: none   DM (HbA1c/FPG): HbA1c 6.8 (4/2018)   Hepatitis C: negative (3/2016)   Falls: one   DEXA: within normal limits (11/2015)   Glaucoma: regular eye exams with Dr. Livia Barnard (last 6/2017)   Smoking: none   Vitamin D: 32.3 (9/2017)   Medicare Wellness: N/A      Impression:  Patient Active Problem List   Diagnosis Code    Benign hypertensive heart disease without congestive heart failure I11.9    Allergic rhinitis J30.9    Colon polyps K63.5    PSVT (paroxysmal supraventricular tachycardia) (HCC) I47.1    Hyperlipidemia E78.5    Essential hypertension I10    Asthma J45.909    GERD (gastroesophageal reflux disease) K21.9    Type 2 diabetes mellitus without complication, without long-term current use of insulin (HCC) E11.9    Vitamin D insufficiency E55.9    Microscopic hematuria R31.29    Acute pain of right knee M25.561       Plan:  1. Diabetes mellitus. Diagnosed in 9/2016 when HbA1c was checked and found to be elevated at 6.6. Repeat in 2/2017 increased to 6.8. Impaired fasting glucose has been present intermittently since 2012. Discussed importance of lifestyle modifications, including diet, exercise, and weight loss.  She has met several times with the diabetes educator since her last visit. However, HbA1c now increased to 6.8. Will begin metformin 500 mg bid. No evidence of microvascular complications. Not on ARB or statin. ACE Inhibitor or ARB therapy not prescibed for medical reasons as blood pressure would not tolerate on current regimen. Also with reported cough with Ace-I. Discussed discontinuing hydrochlorothiazide and beginning ARB, but patient not wishing to change regimen currently. Has resumed taking simvastatin. Continue regular eye exams with Dr. Nicolas Burger. Foot exam normal today and urine microalbumin/ creatinine ratio without evidence of microalbuminuria. 2. Hypertension. Well controlled on current regimen of metoprolol and hydrochlorothiazide. Will discuss discontinuing hydrochlorothiazide at next visit and beginning ARB for mirna-protective effect. Renal function normal with creatinine 0.73 / eGFR >60. Continue to follow. 3. Hyperlipidemia. Restarted simvastatin 20 mg daily one month ago. On moderate intensity dose with LDL 72 , indicative of good control. Continue to follow. 4. Elevated LFT's. Resolved. Unclear etiology, but doubt secondary to statin. Hepatitis panel and iron studies normal. RUQ ultrasound difficult secondary to body habitus, but abdominal CT scan showed normal liver parenchyma. Will continue to follow. 5. AV dharmesh reentrant tachycardia. No recent episodes. On metoprolol and no significant palpitations recently. Followed by Dr. Krysta Bernard. 6. Asthma, mild intermittent. Associated with allergies. Using Qvar daily and albuterol only as needed. Receiving monthly immunotherapy injections. Followed by Dr. Margretta Boas. 7. GERD. Symptoms generally controlled with prn omeprazole. Follow. 8. Microscopic hematuria. Patient refusing further evaluation with cystoscopy or CT urogram. Urine cytology negative.  Did have abdominal CT scan in 5/2016 where kidneys appeared normal. Recommended that she complete evaluation with Dr. Alannah Florez. Repeat urinalysis with evidence of possible infection. Will repeat with urine culture. 9. Bilateral knee pain. Did not respond to cortisone injection. Had both right and left knee MRI showing variable degrees of menisci tears and osteoarthritis of knee joint. Now being followed by Dr. Leeroy Manzano. Undergoing physical therapy. 10. Obesity. Emphasized importance of lifestyle modifications, including diet, exercise, and weight loss. Did meet with dietician for diabetes education and weight loss. Will readdress next visit. 11. Health maintenance. Already received influenza vaccine. Already received pneumovax given asthma history. Will discuss Shingrix next visit. Colonoscopy due 2021. Mammogram up to date. Continue regular eye exams with Dr. Liset Macario. Vitamin D level normal. Will continue maintenance dose supplement. Total time: 40 minutes spent with the patient in face-to-face consultation of which greater than 50% was spent on counseling, answering questions and/or coordination of care. Complex medical review and management performed. Patient understands recommendations and agrees with plan. Follow-up in 3 months.

## 2018-05-08 NOTE — TELEPHONE ENCOUNTER
Please let patient know that repeat urinalysis and urine culture from her visit did not show signs of infection. The organism that was isolated is found normally in the vagina and was likely a contaminant since she was not having any symptoms. No treatment is needed. Thanks.

## 2018-05-10 ENCOUNTER — HOSPITAL ENCOUNTER (OUTPATIENT)
Dept: PHYSICAL THERAPY | Age: 63
Discharge: HOME | End: 2018-05-10
Payer: COMMERCIAL

## 2018-05-10 PROCEDURE — 97110 THERAPEUTIC EXERCISES: CPT

## 2018-05-10 NOTE — PROGRESS NOTES
PT DAILY TREATMENT NOTE     Patient Name: Trupti Hernadez  Date:5/10/2018  : 1955  [x]  Patient  Verified  Payor: BLUE CROSS / Plan: [facility] / Product Type: PPO /    In time:1100  Out time:1138  Total Treatment Time (min): 38  Visit #: 7 of     Treatment Area: There are no admission diagnoses documented for this encounter. SUBJECTIVE  Pain Level (0-10 scale): 3-4/10  Any medication changes, allergies to medications, adverse drug reactions, diagnosis change, or new procedure performed?: [x] No    [] Yes (see summary sheet for update)  Subjective functional status/changes:   [] No changes reported  Pt stated that she had a catch in her left knee today that was really painful. She massaged it and it resolved.      OBJECTIVE    Modality rationale: decrease pain and increase tissue extensibility to improve the patients ability to increase ease with ADLs   Min Type Additional Details    [] Estim:  []Unatt       []IFC  []Premod                        []Other:  []w/ice   []w/heat  Position:  Location:    [] Estim: []Att    []TENS instruct  []NMES                    []Other:  []w/US   []w/ice   []w/heat  Position:  Location:    []  Traction: [] Cervical       []Lumbar                       [] Prone          []Supine                       []Intermittent   []Continuous Lbs:  [] before manual  [] after manual    []  Ultrasound: []Continuous   [] Pulsed                           []1MHz   []3MHz W/cm2:  Location:    []  Iontophoresis with dexamethasone         Location: [] Take home patch   [] In clinic   10 []  Ice     [x]  heat  []  Ice massage  []  Laser   []  Anodyne Position:supine  Location:left knee    []  Laser with stim  []  Other:  Position:  Location:    []  Vasopneumatic Device Pressure:       [] lo [] med [] hi   Temperature: [] lo [] med [] hi   [x] Skin assessment post-treatment:  [x]intact []redness- no adverse reaction    []redness  adverse reaction:     28 min Therapeutic Exercise:  [x] See flow sheet :   Rationale: increase ROM and increase strength to improve the patients ability to increase ease with ADLs    With   [x] TE   [] TA   [] neuro   [] other: Patient Education: [x] Review HEP    [] Progressed/Changed HEP based on:   [] positioning   [] body mechanics   [] transfers   [] heat/ice application    [] other:      Other Objective/Functional Measures:   Pt was very talkative today and hard to keep on task  No complaint of increased pain with exercises  Reported some popping in her right knee with LAQ     Pain Level (0-10 scale) post treatment: 0/10    ASSESSMENT/Changes in Function:   Pt is slowly progressing toward goals. Pt cont with mild pain in left knee. Range of motion is improving in both knees. Cont with decreased strength in B knees and hips. Patient will continue to benefit from skilled PT services to modify and progress therapeutic interventions, address functional mobility deficits, address ROM deficits and address strength deficits to attain remaining goals. [x]  See Plan of Care  []  See progress note/recertification  []  See Discharge Summary         Progress towards goals / Updated goals:  Short term goals: To be accomplished within 1 week  1. Pt will be independent with HEP to maintain progression. Not met; only doing HEP occasionally (5/2/18)  Long term goals: To be accomplished within 6 weeks  1. Pt will improve FOTO score by 21 points to 51/100 to show improvement with functional mobility performance. Progressing 15 point improvement (5/2/18)  2. Pt will have B knees AROM 0-125 degrees to amb household and community with normal and safe pattern. 3. Pt will have B knees and hips strength at least 4+/5 to be able to amb longer distance and navigate stair safely. 4. Pt will report no more than 2/10 pain to improve her QOL.  Progressing 2/10 (5/2/18)    PLAN  []  Upgrade activities as tolerated     [x]  Continue plan of care  []  Update interventions per flow sheet       []  Discharge due to:_  []  Other:_      Nishant Li PTA 5/10/2018  10:58 AM    Future Appointments  Date Time Provider Ankit Tavarezi   5/10/2018 11:00 AM Nishant Li PTA [id number]   5/14/2018 3:00 PM Nishant Li PTA [facility]   5/16/2018 3:30 PM Idalmis Melton PT [id number]   8/15/2018 10:30 AM Lorie Sales MD [facility]

## 2018-05-14 ENCOUNTER — HOSPITAL ENCOUNTER (OUTPATIENT)
Dept: PHYSICAL THERAPY | Age: 63
Discharge: HOME | End: 2018-05-14
Payer: COMMERCIAL

## 2018-05-14 PROCEDURE — 97110 THERAPEUTIC EXERCISES: CPT

## 2018-05-14 NOTE — PROGRESS NOTES
PT DAILY TREATMENT NOTE     Patient Name: Noel Bence  Date:2018  : 1955  [x]  Patient  Verified  Payor: BLUE CROSS / Plan: [employer] [facility] [place] / Product Type: PPO /    In time:300  Out time:340  Total Treatment Time (min): 40  Visit #: 8 of     Treatment Area: Bilateral knee pain [M25.561, M25.562]    SUBJECTIVE  Pain Level (0-10 scale): 2/10  Any medication changes, allergies to medications, adverse drug reactions, diagnosis change, or new procedure performed?: [x] No    [] Yes (see summary sheet for update)  Subjective functional status/changes:   [] No changes reported  Pt stated that she is doing better today    OBJECTIVE    Modality rationale: decrease pain and increase tissue extensibility to improve the patients ability to increase ease with ADLs   Min Type Additional Details    [] Estim:  []Unatt       []IFC  []Premod                        []Other:  []w/ice   []w/heat  Position:  Location:    [] Estim: []Att    []TENS instruct  []NMES                    []Other:  []w/US   []w/ice   []w/heat  Position:  Location:    []  Traction: [] Cervical       []Lumbar                       [] Prone          []Supine                       []Intermittent   []Continuous Lbs:  [] before manual  [] after manual    []  Ultrasound: []Continuous   [] Pulsed                           []1MHz   []3MHz W/cm2:  Location:    []  Iontophoresis with dexamethasone         Location: [] Take home patch   [] In clinic   10 []  Ice     [x]  heat  []  Ice massage  []  Laser   []  Anodyne Position:supine  Location:B knees    []  Laser with stim  []  Other:  Position:  Location:    []  Vasopneumatic Device Pressure:       [] lo [] med [] hi   Temperature: [] lo [] med [] hi   [x] Skin assessment post-treatment:  [x]intact []redness- no adverse reaction    []redness  adverse reaction:     30 min Therapeutic Exercise:  [x] See flow sheet :   Rationale: increase ROM and increase strength to improve the patients ability to increase ease with ADLs    With   [x] TE   [] TA   [] neuro   [] other: Patient Education: [x] Review HEP    [] Progressed/Changed HEP based on:   [] positioning   [] body mechanics   [] transfers   [] heat/ice application    [] other:      Other Objective/Functional Measures:   Pt had no difficulty with increases made today  No complaint of increased pain during session  Did report knees popping with mini squats     Pain Level (0-10 scale) post treatment: 0/10    ASSESSMENT/Changes in Function:   Pt is slowly progressing toward goals. Pt cont with decreased strength in B knees, but is improving. AROM in B knees is improving    Patient will continue to benefit from skilled PT services to modify and progress therapeutic interventions, address functional mobility deficits, address ROM deficits and address strength deficits to attain remaining goals. []  See Plan of Care  []  See progress note/recertification  []  See Discharge Summary         Progress towards goals / Updated goals:  Short term goals: To be accomplished within 1 week  1. Pt will be independent with HEP to maintain progression. Not met; only doing HEP occasionally (5/2/18)  Long term goals: To be accomplished within 6 weeks  1. Pt will improve FOTO score by 21 points to 51/100 to show improvement with functional mobility performance. Progressing 15 point improvement (5/2/18)  2. Pt will have B knees AROM 0-125 degrees to amb household and community with normal and safe pattern. 3. Pt will have B knees and hips strength at least 4+/5 to be able to amb longer distance and navigate stair safely. 4. Pt will report no more than 2/10 pain to improve her QOL.  Progressing 2/10 (5/2/18)    PLAN  []  Upgrade activities as tolerated     [x]  Continue plan of care  []  Update interventions per flow sheet       []  Discharge due to:_  []  Other:_      Cayla Miramontes, PTA 5/14/2018  3:02 PM    Future Appointments  Date Time Provider Department Center   5/16/2018 3:30 PM John Fraser, PT [facility]   8/15/2018 10:30 AM Eleonora Eng MD [facility]

## 2018-05-16 ENCOUNTER — HOSPITAL ENCOUNTER (OUTPATIENT)
Dept: PHYSICAL THERAPY | Age: 63
Discharge: HOME | End: 2018-05-16
Payer: COMMERCIAL

## 2018-05-16 NOTE — PROGRESS NOTES
[facility] [address]  [phone] fax    Physical Therapy Progress Note  Patient name: Kj Armstrong Start of Care: 2018   Referral source: Augusto Dunham MD : 1955                          Medical Diagnosis: Primary osteoarthritis of left knee [M17.12] Onset Date: about 5 months ago, chronic arthritis                          Treatment Diagnosis: B knees pain   Prior Hospitalization: see medical history Provider#: [id number]   Medications: Verified on Patient summary List    Comorbidities: diabetes, arthritis, HTN, asthma, heel bone spurs B   Prior Level of Function: Ind with all mobility, living with , 1 story house, 3 steps with no rail                    Visits from Start of Care: 9    Missed Visits: 2    Established Goals:         Excellent           Good         Limited           None  [x] Increased ROM   []  [x]  []  []  [x] Increased Strength  []  [x]  []  []  [x] Increased Mobility  []  [x]  []  []   [x] Decreased Pain   []  [x]  []  []  [] Decreased Swelling  []  []  []  []    Key Functional Changes:   Functional Gains: mobility, less pain, more loose, ride stationary bike, more ROM    Functional Deficits: pain on the lateral/anterior left knee with stepping down, stiffness  Pain                   Best: 2/10                          Worst: 7-8/10    Current goals:  Short term goals: To be accomplished within 1 week  1. Pt will be independent with HEP to maintain progression. Not met; only doing HEP occasionally (18)  Long term goals: To be accomplished within 6 weeks  1. Pt will improve FOTO score by 21 points to 51/100 to show improvement with functional mobility performance. Progressing 16 point improvement 18. 2. Pt will have B knees AROM 0-125 degrees to amb household and community with normal and safe pattern. Right 1-125 degs, left 2-108 degs 2018  3.  Pt will have B knees and hips strength at least 4+/5 to be able to amb longer distance and navigate stair safely. Not met, Right hip ABD 4/5, left hip ABD 4-/5; right hip EXT 3/5, left 3+/5; B hip flex 3+/5; right knee EXT 3/5, left knee EXT 4/5, right knee flex 4+/5, left knee flex 4+/5 5/16/2018  4. Pt will report no more than 2/10 pain to improve her QOL. Not met, 2/10 at best 5/16/2018    Updated Goals: to be achieved in 4 weeks:  1. Pt will improve FOTO score by 21 points to 51/100 to show improvement with functional mobility performance. 2. Pt will have left knee AROM 0-115 degrees to amb household and community with normal and safe pattern. 3. Pt will improve MMT left hip ABD to 4/5, right hip/knee EXT to 3+/5, B hip flex to 4-/5 to improve ease of mobility. 4. Pt will report having minimal to no increased pain in the left anterior/lateral left knee when stepping down from a step at home to improve pain with stepping. ASSESSMENT/RECOMMENDATIONS:  Pt reports improvements in mobility, pain, tightness and ROM since starting therapy. Pt states she has notices pain in the left knee when stepping down with the left knee and has swelling in both knees. Pt reports having a MD appointment in 2 weeks. We will plan on having pt continue therapy at this time to and have the pt follow up with the MD with her appointment in 2 weeks.    [x]Continue therapy per initial plan/protocol at a frequency of  2 x per week for 4 weeks  []Continue therapy with the following recommended changes:_____________________      _____________________________________________________________________  []Discontinue therapy progressing towards or have reached established goals  []Discontinue therapy due to lack of appreciable progress towards goals  []Discontinue therapy due to lack of attendance or compliance  []Await Physician's recommendations/decisions regarding therapy  []Other:________________________________________________________________    Thank you for this referral.   radha Mcraebrittany, PT 5/16/2018 5:30 PM    NOTE TO PHYSICIAN:  PLEASE COMPLETE THE ORDERS BELOW AND   FAX TO [facility] [place] [facility]: [phone]  If you are unable to process this request in 24 hours please contact our office: [phone]    ? I have read the above report and request that my patient continue as recommended. ? I have read the above report and request that my patient continue therapy with the following changes/special instructions:____________________________________  ? I have read the above report and request that my patient be discharged from therapy.     Physicians signature: ______________________________Date: ______Time:______

## 2018-05-16 NOTE — PROGRESS NOTES
PT DAILY TREATMENT NOTE     Patient Name: Evan Dawson  Date:2018  : 1955  [x]  Patient  Verified  Payor: BLUE CROSS / Plan:  [facility] [place] / Product Type: PPO /    In time: 3:30   Out time: 4;25  Total Treatment Time (min): 55  Visit #: 9 of     Treatment Area: Bilateral knee pain [M25.561, M25.562]    SUBJECTIVE  Pain Level (0-10 scale): 3  Any medication changes, allergies to medications, adverse drug reactions, diagnosis change, or new procedure performed?: [] No    [] Yes (see summary sheet for update)  Subjective functional status/changes:   [] No changes reported  Pt reports she feels she has seen improvement with therapy interventions.      OBJECTIVE    Modality rationale: decrease pain and increase tissue extensibility to improve the patients ability to increase ease with ADLs   Min Type Additional Details    [] Estim:  []Unatt       []IFC  []Premod                        []Other:  []w/ice   []w/heat  Position:  Location:    [] Estim: []Att    []TENS instruct  []NMES                    []Other:  []w/US   []w/ice   []w/heat  Position:  Location:    []  Traction: [] Cervical       []Lumbar                       [] Prone          []Supine                       []Intermittent   []Continuous Lbs:  [] before manual  [] after manual    []  Ultrasound: []Continuous   [] Pulsed                           []1MHz   []3MHz W/cm2:  Location:    []  Iontophoresis with dexamethasone         Location: [] Take home patch   [] In clinic   10 []  Ice     [x]  heat  []  Ice massage  []  Laser   []  Anodyne Position:supine  Location:B knees    []  Laser with stim  []  Other:  Position:  Location:    []  Vasopneumatic Device Pressure:       [] lo [] med [] hi   Temperature: [] lo [] med [] hi   [x] Skin assessment post-treatment:  [x]intact []redness- no adverse reaction    []redness  adverse reaction:     45 min Therapeutic Exercise:  [x] See flow sheet : exercises, goal reassessment Rationale: increase ROM and increase strength to improve the patients ability to increase ease with ADLs    With   [x] TE   [] TA   [] neuro   [] other: Patient Education: [x] Review HEP    [] Progressed/Changed HEP based on:   [] positioning   [] body mechanics   [] transfers   [] heat/ice application    [] other:      Other Objective/Functional Measures: See goals below. Functional Gains: mobility, less pain, more loose, ride stationary bike, more ROM    Functional Deficits: pain on the lateral/anterior left knee with stepping down, stiffness  Pain        Best: 2/10     Worst: 7-8/10    Pain Level (0-10 scale) post treatment: 1    ASSESSMENT/Changes in Function: Improvement in pain reported post session. See PN. Pt reports improvements in mobility, pain, tightness and ROM since starting therapy. Pt states she has notices pain in the left knee when stepping down with the left knee and has swelling in both knees. Pt reports having a MD appointment in 2 weeks. We will plan on having pt continue therapy at this time to and have the pt follow up with the MD with her appointment in 2 weeks. Patient will continue to benefit from skilled PT services to modify and progress therapeutic interventions, address functional mobility deficits, address ROM deficits, address strength deficits, analyze and address soft tissue restrictions, analyze and cue movement patterns, analyze and modify body mechanics/ergonomics, address imbalance/dizziness and instruct in home and community integration to attain remaining goals. []  See Plan of Care  [x]  See progress note/recertification  []  See Discharge Summary         Progress towards goals / Updated goals:  Short term goals: To be accomplished within 1 week  1. Pt will be independent with HEP to maintain progression. Not met; only doing HEP occasionally (5/2/18)  Long term goals: To be accomplished within 6 weeks  1.  Pt will improve FOTO score by 21 points to 51/100 to show improvement with functional mobility performance. Progressing 16 point improvement 5/16/18. 2. Pt will have B knees AROM 0-125 degrees to amb household and community with normal and safe pattern. Right 1-125 degs, left 2-108 degs 5/16/2018  3. Pt will have B knees and hips strength at least 4+/5 to be able to amb longer distance and navigate stair safely. Not met, Right hip ABD 4/5, left hip ABD 4-/5; right hip EXT 3/5, left 3+/5; B hip flex 3+/5; right knee EXT 3/5, left knee EXT 4/5, right knee flex 4+/5, left knee flex 4+/5 5/16/2018  4. Pt will report no more than 2/10 pain to improve her QOL.  Not met, 2/10 at best 5/16/2018    PLAN  [x]  Upgrade activities as tolerated     [x]  Continue plan of care  [x]  Update interventions per flow sheet       []  Discharge due to:_  []  Other:_      David Balderas PT 5/16/2018  3:45 PM    Future Appointments  Date Time Provider Ankit Edmond   8/15/2018 10:30 AM Yohannes Reddy MD [facility]

## 2018-05-22 ENCOUNTER — TELEPHONE (OUTPATIENT)
Dept: INTERNAL MEDICINE CLINIC | Age: 63
End: 2018-05-22

## 2018-05-22 NOTE — TELEPHONE ENCOUNTER
Please let the patient know that her urinalysis and culture did not show an infection when checked on 5/3/2018. Please ask her to drop off a new sample for urinalysis and culture if having symptoms.

## 2018-05-22 NOTE — TELEPHONE ENCOUNTER
Pt calling, says she recently had a urine test and is now having burning. Wants to know if BS will call in medication. Tried to get her to come in and do new test but she refused until Dr. Omi Huff responds to message.

## 2018-05-23 ENCOUNTER — HOSPITAL ENCOUNTER (OUTPATIENT)
Dept: PHYSICAL THERAPY | Age: 63
Discharge: HOME | End: 2018-05-23
Payer: COMMERCIAL

## 2018-05-23 PROCEDURE — 97110 THERAPEUTIC EXERCISES: CPT

## 2018-05-23 NOTE — PROGRESS NOTES
PT DAILY TREATMENT NOTE     Patient Name: John Clarke  Date:2018  : 1955  [x]  Patient  Verified  Payor: BLUE CROSS / Plan:  [facility] [place] / Product Type: PPO /    In time:3:36  Out time: 4:31  Total Treatment Time (min): 55  Visit #:  10 of     Treatment Area: Bilateral knee pain [M25.561, M25.562]    SUBJECTIVE  Pain Level (0-10 scale): 0/10  Any medication changes, allergies to medications, adverse drug reactions, diagnosis change, or new procedure performed?: [x] No    [] Yes (see summary sheet for update)  Subjective functional status/changes:   [] No changes reported  Pt reports no pain today in her knees. She has more pain in her left ankle from the heel spurs. She follows up with the MD next week and would like to just hold her final session since she can do the exercises at home. She isn't sure if she should do surgery but notes stairs are her biggest difficulty so she goes up with the right and down with the left.      OBJECTIVE    Modality rationale: decrease pain and increase tissue extensibility to improve the patients ability to improve ease of shopping   Min Type Additional Details    [] Estim:  []Unatt       []IFC  []Premod                        []Other:  []w/ice   []w/heat  Position:  Location:    [] Estim: []Att    []TENS instruct  []NMES                    []Other:  []w/US   []w/ice   []w/heat  Position:  Location:    []  Traction: [] Cervical       []Lumbar                       [] Prone          []Supine                       []Intermittent   []Continuous Lbs:  [] before manual  [] after manual    []  Ultrasound: []Continuous   [] Pulsed                           []1MHz   []3MHz W/cm2:  Location:    []  Iontophoresis with dexamethasone         Location: [] Take home patch   [] In clinic   15 []  Ice     [x]  heat  []  Ice massage  []  Laser   []  Anodyne Position:seated  Location: B knees    []  Laser with stim  []  Other:  Position:  Location:    [] Vasopneumatic Device Pressure:       [] lo [] med [] hi   Temperature: [] lo [] med [] hi   [x] Skin assessment post-treatment:  [x]intact []redness- no adverse reaction    []redness  adverse reaction:     40 min Therapeutic Exercise:  [x] See flow sheet :   Rationale: increase ROM and increase strength to improve the patients ability to ambulate long periods for shopping with family          With   [] TE   [] TA   [] neuro   [] other: Patient Education: [x] Review HEP    [] Progressed/Changed HEP based on:   [] positioning   [] body mechanics   [] transfers   [] heat/ice application    [] other:      Other Objective/Functional Measures:   Educated on continuing with up with the good down with the bad for stair negotiation  Instructed to follow up with MD regarding ankle/heel pain on the left that it could be contributing to altered gait mechanics and increase in knee pain  Reviewed exercises for home program and provided with BTB  Discussed use of moist heat for pain relief from arthritic stiffness  Instructed to call therapy after MD appt to either schedule continued appts or D/C pending MD follow up      Pain Level (0-10 scale) post treatment: 1/10    ASSESSMENT/Changes in Function: Pt made good progress towards goals with decrease in knee pain bilaterally. She has more pain in the left ankle from heel spurs which could be contributing to altered gait mechanics causing knee pain. Pt to continue progressing independently with stretching and strengthening in order to maintain decreased pain for prolonged walking in order to shop.      Patient will continue to benefit from skilled PT services to modify and progress therapeutic interventions, address functional mobility deficits, address ROM deficits, address strength deficits, analyze and address soft tissue restrictions, analyze and cue movement patterns, analyze and modify body mechanics/ergonomics, assess and modify postural abnormalities, address imbalance/dizziness and instruct in home and community integration to attain remaining goals.     Updated Goals: to be achieved in 4 weeks:  1. Pt will improve FOTO score by 21 points to 51/100 to show improvement with functional mobility performance. 2. Pt will have left knee AROM 0-115 degrees to amb household and community with normal and safe pattern. 3. Pt will improve MMT left hip ABD to 4/5, right hip/knee EXT to 3+/5, B hip flex to 4-/5 to improve ease of mobility. 4. Pt will report having minimal to no increased pain in the left anterior/lateral left knee when stepping down from a step at home to improve pain with stepping.      PLAN  [x]  Upgrade activities as tolerated     [x]  Continue plan of care  []  Update interventions per flow sheet       []  Discharge due to:_  []  Other:_      Melia Solomon PTA 5/23/2018  3:54 PM    Future Appointments  Date Time Provider Ankit Edmond   5/25/2018 4:30 PM Yola Rodrigues [facility]   8/15/2018 10:30 AM Najma Thomas MD [facility]

## 2018-05-25 ENCOUNTER — APPOINTMENT (OUTPATIENT)
Dept: PHYSICAL THERAPY | Age: 63
End: 2018-05-25
Payer: COMMERCIAL

## 2018-05-25 ENCOUNTER — HOSPITAL ENCOUNTER (OUTPATIENT)
Dept: LAB | Age: 63
Discharge: HOME | End: 2018-05-25
Payer: COMMERCIAL

## 2018-05-25 ENCOUNTER — LAB ONLY (OUTPATIENT)
Dept: INTERNAL MEDICINE CLINIC | Age: 63
End: 2018-05-25

## 2018-05-25 DIAGNOSIS — N89.8 VAGINAL ITCHING: Primary | ICD-10-CM

## 2018-05-25 DIAGNOSIS — N89.8 VAGINAL ITCHING: ICD-10-CM

## 2018-05-25 LAB
APPEARANCE UR: CLEAR
BACTERIA URNS QL MICRO: ABNORMAL /HPF
BILIRUB UR QL: NEGATIVE
CAOX CRY URNS QL MICRO: ABNORMAL
COLOR UR: YELLOW
EPITH CASTS URNS QL MICRO: ABNORMAL /LPF (ref 0–5)
GLUCOSE UR STRIP.AUTO-MCNC: NEGATIVE MG/DL
HGB UR QL STRIP: ABNORMAL
KETONES UR QL STRIP.AUTO: NEGATIVE MG/DL
LEUKOCYTE ESTERASE UR QL STRIP.AUTO: ABNORMAL
NITRITE UR QL STRIP.AUTO: NEGATIVE
PH UR STRIP: 6.5 [PH] (ref 5–8)
PROT UR STRIP-MCNC: NEGATIVE MG/DL
RBC #/AREA URNS HPF: ABNORMAL /HPF (ref 0–5)
SP GR UR REFRACTOMETRY: 1.02 (ref 1–1.03)
UROBILINOGEN UR QL STRIP.AUTO: 0.2 EU/DL (ref 0.2–1)
WBC URNS QL MICRO: ABNORMAL /HPF (ref 0–4)

## 2018-05-25 PROCEDURE — 81001 URINALYSIS AUTO W/SCOPE: CPT | Performed by: INTERNAL MEDICINE

## 2018-05-25 PROCEDURE — 87086 URINE CULTURE/COLONY COUNT: CPT | Performed by: INTERNAL MEDICINE

## 2018-05-25 NOTE — TELEPHONE ENCOUNTER
Called patient and verified full name and date of birth. I informed the patient of Dr. Yecenia Henry' note and patient states that she is still having a little burning and some itching. But also states that she has been drinking a lot of water lately and it has been helping. Instructed the patient to come in today and drop off a urine sample.

## 2018-05-27 LAB
BACTERIA SPEC CULT: ABNORMAL
SERVICE CMNT-IMP: ABNORMAL

## 2018-05-29 ENCOUNTER — TELEPHONE (OUTPATIENT)
Dept: INTERNAL MEDICINE CLINIC | Age: 63
End: 2018-05-29

## 2018-05-29 RX ORDER — LEVOFLOXACIN 500 MG/1
500 TABLET, FILM COATED ORAL DAILY
Qty: 3 TAB | Refills: 0 | Status: SHIPPED | OUTPATIENT
Start: 2018-05-29 | End: 2018-06-01

## 2018-05-30 ENCOUNTER — DOCUMENTATION ONLY (OUTPATIENT)
Dept: INTERNAL MEDICINE CLINIC | Age: 63
End: 2018-05-30

## 2018-06-05 RX ORDER — ALBUTEROL SULFATE 90 UG/1
AEROSOL, METERED RESPIRATORY (INHALATION)
Qty: 1 INHALER | Refills: 5 | Status: SHIPPED | OUTPATIENT
Start: 2018-06-05 | End: 2019-06-12 | Stop reason: SDUPTHER

## 2018-06-11 ENCOUNTER — TELEPHONE (OUTPATIENT)
Dept: INTERNAL MEDICINE CLINIC | Age: 63
End: 2018-06-11

## 2018-06-11 NOTE — TELEPHONE ENCOUNTER
Pt has but bites around her ankles that are itching a lot. What do you recommend?     [facility] [place]

## 2018-06-11 NOTE — TELEPHONE ENCOUNTER
Would recommend Calamine lotion to help with pruritus. She may also use hydrocortisone 1% if having local reaction to bites. Both medications are over the counter.

## 2018-07-21 RX ORDER — FLUTICASONE PROPIONATE 50 MCG
SPRAY, SUSPENSION (ML) NASAL
Qty: 16 G | Refills: 5 | Status: SHIPPED | OUTPATIENT
Start: 2018-07-21 | End: 2020-01-06

## 2018-08-06 ENCOUNTER — HOSPITAL ENCOUNTER (OUTPATIENT)
Dept: LAB | Age: 63
Discharge: HOME | End: 2018-08-06
Payer: COMMERCIAL

## 2018-08-06 ENCOUNTER — APPOINTMENT (OUTPATIENT)
Dept: INTERNAL MEDICINE CLINIC | Age: 63
End: 2018-08-06

## 2018-08-06 ENCOUNTER — HOSPITAL ENCOUNTER (OUTPATIENT)
Dept: PHYSICAL THERAPY | Age: 63
Discharge: HOME | End: 2018-08-06
Payer: COMMERCIAL

## 2018-08-06 DIAGNOSIS — E78.5 HYPERLIPIDEMIA, UNSPECIFIED HYPERLIPIDEMIA TYPE: ICD-10-CM

## 2018-08-06 DIAGNOSIS — E11.9 TYPE 2 DIABETES MELLITUS WITHOUT COMPLICATION, WITHOUT LONG-TERM CURRENT USE OF INSULIN (HCC): ICD-10-CM

## 2018-08-06 DIAGNOSIS — I10 ESSENTIAL HYPERTENSION: ICD-10-CM

## 2018-08-06 LAB
ALBUMIN SERPL-MCNC: 4.1 G/DL (ref 3.4–5)
ALBUMIN/GLOB SERPL: 1.2 {RATIO} (ref 0.8–1.7)
ALP SERPL-CCNC: 81 U/L (ref 45–117)
ALT SERPL-CCNC: 31 U/L (ref 13–56)
ANION GAP SERPL CALC-SCNC: 7 MMOL/L (ref 3–18)
APPEARANCE UR: CLEAR
AST SERPL-CCNC: 22 U/L (ref 15–37)
BACTERIA URNS QL MICRO: NEGATIVE /HPF
BASOPHILS # BLD: 0.1 K/UL (ref 0–0.1)
BASOPHILS NFR BLD: 1 % (ref 0–2)
BILIRUB SERPL-MCNC: 0.3 MG/DL (ref 0.2–1)
BILIRUB UR QL: NEGATIVE
BUN SERPL-MCNC: 14 MG/DL (ref 7–18)
BUN/CREAT SERPL: 21 (ref 12–20)
CALCIUM SERPL-MCNC: 9.1 MG/DL (ref 8.5–10.1)
CHLORIDE SERPL-SCNC: 104 MMOL/L (ref 100–108)
CHOLEST SERPL-MCNC: 167 MG/DL
CO2 SERPL-SCNC: 31 MMOL/L (ref 21–32)
COLOR UR: YELLOW
CREAT SERPL-MCNC: 0.68 MG/DL (ref 0.6–1.3)
DIFFERENTIAL METHOD BLD: NORMAL
EOSINOPHIL # BLD: 0.4 K/UL (ref 0–0.4)
EOSINOPHIL NFR BLD: 5 % (ref 0–5)
EPITH CASTS URNS QL MICRO: ABNORMAL /LPF (ref 0–5)
ERYTHROCYTE [DISTWIDTH] IN BLOOD BY AUTOMATED COUNT: 12.1 % (ref 11.6–14.5)
EST. AVERAGE GLUCOSE BLD GHB EST-MCNC: 151 MG/DL
GLOBULIN SER CALC-MCNC: 3.4 G/DL (ref 2–4)
GLUCOSE SERPL-MCNC: 106 MG/DL (ref 74–99)
GLUCOSE UR STRIP.AUTO-MCNC: NEGATIVE MG/DL
HBA1C MFR BLD: 6.9 % (ref 4.2–5.6)
HCT VFR BLD AUTO: 42.2 % (ref 35–45)
HDLC SERPL-MCNC: 73 MG/DL (ref 40–60)
HDLC SERPL: 2.3 {RATIO} (ref 0–5)
HGB BLD-MCNC: 14.1 G/DL (ref 12–16)
HGB UR QL STRIP: ABNORMAL
KETONES UR QL STRIP.AUTO: NEGATIVE MG/DL
LDLC SERPL CALC-MCNC: 74 MG/DL (ref 0–100)
LEUKOCYTE ESTERASE UR QL STRIP.AUTO: ABNORMAL
LIPID PROFILE,FLP: ABNORMAL
LYMPHOCYTES # BLD: 2.5 K/UL (ref 0.9–3.6)
LYMPHOCYTES NFR BLD: 30 % (ref 21–52)
MCH RBC QN AUTO: 30.7 PG (ref 24–34)
MCHC RBC AUTO-ENTMCNC: 33.4 G/DL (ref 31–37)
MCV RBC AUTO: 91.9 FL (ref 74–97)
MONOCYTES # BLD: 0.5 K/UL (ref 0.05–1.2)
MONOCYTES NFR BLD: 6 % (ref 3–10)
NEUTS SEG # BLD: 4.9 K/UL (ref 1.8–8)
NEUTS SEG NFR BLD: 58 % (ref 40–73)
NITRITE UR QL STRIP.AUTO: NEGATIVE
PH UR STRIP: 6.5 [PH] (ref 5–8)
PLATELET # BLD AUTO: 236 K/UL (ref 135–420)
PMV BLD AUTO: 10.7 FL (ref 9.2–11.8)
POTASSIUM SERPL-SCNC: 4.2 MMOL/L (ref 3.5–5.5)
PROT SERPL-MCNC: 7.5 G/DL (ref 6.4–8.2)
PROT UR STRIP-MCNC: NEGATIVE MG/DL
RBC # BLD AUTO: 4.59 M/UL (ref 4.2–5.3)
RBC #/AREA URNS HPF: ABNORMAL /HPF (ref 0–5)
SODIUM SERPL-SCNC: 142 MMOL/L (ref 136–145)
SP GR UR REFRACTOMETRY: 1.03 (ref 1–1.03)
TRIGL SERPL-MCNC: 100 MG/DL (ref ?–150)
TSH SERPL DL<=0.05 MIU/L-ACNC: 2.1 UIU/ML (ref 0.36–3.74)
UROBILINOGEN UR QL STRIP.AUTO: 0.2 EU/DL (ref 0.2–1)
VLDLC SERPL CALC-MCNC: 20 MG/DL
WBC # BLD AUTO: 8.4 K/UL (ref 4.6–13.2)
WBC URNS QL MICRO: ABNORMAL /HPF (ref 0–4)

## 2018-08-06 PROCEDURE — 80053 COMPREHEN METABOLIC PANEL: CPT | Performed by: INTERNAL MEDICINE

## 2018-08-06 PROCEDURE — 84443 ASSAY THYROID STIM HORMONE: CPT | Performed by: INTERNAL MEDICINE

## 2018-08-06 PROCEDURE — 97110 THERAPEUTIC EXERCISES: CPT

## 2018-08-06 PROCEDURE — 83036 HEMOGLOBIN GLYCOSYLATED A1C: CPT | Performed by: INTERNAL MEDICINE

## 2018-08-06 PROCEDURE — 85025 COMPLETE CBC W/AUTO DIFF WBC: CPT | Performed by: INTERNAL MEDICINE

## 2018-08-06 PROCEDURE — 97140 MANUAL THERAPY 1/> REGIONS: CPT

## 2018-08-06 PROCEDURE — 80061 LIPID PANEL: CPT | Performed by: INTERNAL MEDICINE

## 2018-08-06 PROCEDURE — 97161 PT EVAL LOW COMPLEX 20 MIN: CPT

## 2018-08-06 PROCEDURE — 36415 COLL VENOUS BLD VENIPUNCTURE: CPT | Performed by: INTERNAL MEDICINE

## 2018-08-06 PROCEDURE — 82306 VITAMIN D 25 HYDROXY: CPT | Performed by: INTERNAL MEDICINE

## 2018-08-06 PROCEDURE — 81001 URINALYSIS AUTO W/SCOPE: CPT | Performed by: INTERNAL MEDICINE

## 2018-08-06 NOTE — PROGRESS NOTES
PT DAILY TREATMENT NOTE/FOOT AND ANKLE EVAL 3-16    Patient Name: Tiki Guzman  Date:2018  : 1955  [x]  Patient  Verified  Payor: BLUE CROSS / Plan: [employer] [facility] [place] / Product Type: PPO /    In time:1021  Out time:1125  Total Treatment Time (min): 64  Total Timed Codes (min): 30  1:1 Treatment Time ( only): 64   Visit #: 1 of 12    Treatment Area: Pain in left foot [M79.672]    SUBJECTIVE  Pain Level (0-10 scale): 5  []constant [x]intermittent []improving []worsening []no change since onset    Any medication changes, allergies to medications, adverse drug reactions, diagnosis change, or new procedure performed?: [x] No    [] Yes (see summary sheet for update)  Subjective functional status/changes:     Subjective: Pt c/o left heel pain that has gone on for years with recent onset of pain and swelling for the last couple of months. Pt also reports (B) knee pain with pain that goes down the back of the legs that she feels may affect her feet. Pt reports receiving a cortisone injection two weeks ago that seems to have helped with the pain for a while. Pain increased this weekend while at the beach. Pain is eased with the use of a soft shoe. Chief Complaint/: Heel pain and swelling    Worse with:  walking    Better with:  rest    Onset: last couple of months    Mechanism of Injury: unknown    PMHx/Surgical Hx: OA, HTN, Asthma    PLOF: Long term heel pain on and off that has worsened over the last few months    Goal: Make it feel better, learn some stretches    FOTO:  12 visits    What type of work do you do? Retired    Living Situation: Lives at home with  with 3 steps coming into the home    What type of daily activities/hobbies? Walk in [place], [email or web address] riding, have a stationary bike and TM at home    Limitations to Activity/PLOF: Don't walk as much, I just stay off of it.      Current Health Status:  Fair    Barriers: [x]pain []financial []time []transportation []other    Motivation: High    Substance use: []Alcohol []Tobacco []other:     FABQ Score: []low []elevate    Cognition: A & O x 3    Other:    Risk For Falls:   []No  []low []elevate     Balance:  Good                          OBJECTIVE/EXAMINATION  Balance: WNL for romberg EO/EC, Minor issue with balance during ambulation due to guarding at left foot   Gait: Slow guarded Left antalgic gait w/decr toe off, Decr mobility (B) Innominate with limited (B) pelvic sway, Decr (B) Hip Flx, Left Lx lateral shift  - Posture:    - - Left Lx lateral shift  - - Depressed Left Crest  - - Decr mobility Left Innominate in stork stance  - - (+) (B) Slump Test   - - (+) Left Piriformis test  - - Depressed Left Sacral base  - AROM   Ankle DF in Long Sitting Right 9*   Left 1*    HS 90/90 Right 147   Left 145  - TTP Left Tib Ant/Peroneals/lateral heel        34 min [x]Eval                  []Re-Eval       16 min Therapeutic Exercise:  [x] See flow sheet : Develop and instruct HEP   Rationale: increase ROM, increase strength and improve coordination to improve the patients ability to tolerate increased activity levels    14 min Manual Therapy:  Inf glide right sacral base, Left lateral shift correction, (B) Lx Rot mobs, (B) Innominate p/a mobs, (B) LE LAD   Rationale: decrease pain, increase ROM, increase tissue extensibility and increase postural awareness to tolerate increased activity levels      With   [x] TE   [] TA   [] neuro   [x] other: Patient Education: [x] Review HEP    [] Progressed/Changed HEP based on:   [] positioning   [] body mechanics   [] transfers   [] heat/ice application    [x] other: Pt instructed to use CP on heel to aid with decreasing inflammation      Other Objective/Functional Measures:   - Negative lateral shift after manual  - - Lateral shift corrected  - Improved pelvic alignment and mobility   - Increased pace and mobility with gait  - Decr pain reported and 0/10 after treatment  - Pt demo good understanding of HEP with use of CP      Pain Level (0-10 scale) post treatment: 0    ASSESSMENT/Changes in Function:      Patient will continue to benefit from skilled PT services to modify and progress therapeutic interventions, address functional mobility deficits, address ROM deficits, address strength deficits, analyze and address soft tissue restrictions, analyze and cue movement patterns and analyze and modify body mechanics/ergonomics to attain remaining goals. []  See Plan of Care  []  See progress note/recertification  []  See Discharge Summary         Progress towards goals / Updated goals:  1. Pt will be compliant and independent with HEP in order to facilitate PT sessions and aid with self management   Eval Status:  Initiated   Current Status:  2. Pt to tolerate 30 min or more of TE and/or Interventions w/o increased s/s   Eval Status:  Initiated   Current Status:    1. Pt will report 50% improvement or better with involvement to show a significant increase in function   Eval Status:  Initiated   Current Status:  2. Pt will decreased pain at 2/10 or better to perform usual daily activity w/little discomfort    Eval Status:  Initiated   Current Status:  3. Pt will ambulate on TM for 1/4 mile or more w/o added pain for progress with moderate community ambulation and return to [place] walking     Eval Status:  Initiated   Current Status:  4. Pt will have a negative left slump test to show decreased neural tension Left LE to allow decreased pain and improved mobility with normal ambulation     Eval Status:  Initiated   Current Status:  5.   Pt will improve FOTO score to 56 in 12 visits to show significant improvement in function for progress to independent community ambulation   Eval Status: 44   Current Status:      PLAN  [x]  Upgrade activities as tolerated     [x]  Continue plan of care  []  Update interventions per flow sheet       []  Discharge due to:_  []  Other:_      Pérez Sandoval, PT 8/6/2018  10:20 AM

## 2018-08-06 NOTE — PROGRESS NOTES
[facility] [address], [address] [place] [address], [address] [place], [place] [address]y [phone]  [phone] fax      Plan of Care/ Statement of Necessity for Physical Therapy Services    Patient name: Lopez Gutierrez Start of Care: 2018   Referral source: Tati Walker MD : 1955    Medical Diagnosis: Pain in left foot [M79.672]   Onset Date:Long standing pain with recent onset about 2 months ago    Treatment Diagnosis: Pelvic Obliquity, Neural tension (B) LE Left > Right, limited left ankle mobility   Prior Hospitalization: see medical history Provider#: [id number]   Medications: Verified on Patient summary List    Comorbidities: OA, HTN, Asthma   Prior Level of Function:  Long term heel pain on and off that has worsened over the last few months     The Plan of Care and following information is based on the information from the initial evaluation. Assessment/ key information: Patient is a 61 y.o. female referred to PT with the above Dx. Patient seen today for c/o left heel pain that has gone on for years with recent onset of pain and swelling for the last couple of months. Pt also reports (B) knee pain with pain that goes down the back of the legs that she feels may affect her feet. Pt reports receiving a cortisone injection two weeks ago that seems to help with the pain for a little while. Pain increased this weekend while at the beach. Pain is eased with the use of a soft shoe. Patient presents to PT with an impaired gait, decreased balance, decreased strength, decreased flexibility, and decreased mobility. Patient s/s appear to be consistent w/ diagnosis. Patient demonstrates the potential to make functional gains within a reasonable time frame. Patient will benefit from skilled PT to address impairments and improve functional mobility, strength, gait and balance for an improved quality of life.   Fall Risk Assessment: Patient demonstrates no Fall Risk   Evaluation Complexity History MEDIUM  Complexity : 1-2 comorbidities / personal factors will impact the outcome/ POC ; Examination MEDIUM Complexity : 3 Standardized tests and measures addressing body structure, function, activity limitation and / or participation in recreation  ;Presentation LOW Complexity : Stable, uncomplicated  ;Clinical Decision Making MEDIUM Complexity : FOTO score of 26-74  Overall Complexity Rating: LOW   Problem List: pain affecting function, decrease ROM, decrease strength, edema affecting function, impaired gait/ balance, decrease ADL/ functional abilitiies, decrease activity tolerance, decrease flexibility/ joint mobility, decrease transfer abilities and other FOTO = 44   Treatment Plan may include any combination of the following: Therapeutic exercise, Therapeutic activities, Neuromuscular re-education, Physical agent/modality, Gait/balance training, Manual therapy, Patient education, Self Care training, Functional mobility training and Stair training  Patient / Family readiness to learn indicated by: asking questions, trying to perform skills and interest  Persons(s) to be included in education: patient (P)  Barriers to Learning/Limitations: None  Patient Goal (s): Make it feel better, learn some stretches  Patient Self Reported Health Status: fair  Rehabilitation Potential: good    Short Term Goals: To be accomplished in 5 treatments:  1. Pt will be compliant and independent with HEP in order to facilitate PT sessions and aid with self management                        Eval Status:  Initiated                        Current Status:  2. Pt to tolerate 30 min or more of TE and/or Interventions w/o increased s/s                        Eval Status:  Initiated                        Current Status:     Long Term Goals: To be accomplished in 10 treatments:  Progress towards goals / Updated goals:  1.   Pt will report 50% improvement or better with involvement to show a significant increase in function                        Eval Status:  Initiated                        Current Status:  2. Pt will decreased pain at 2/10 or better to perform usual daily activity w/little discomfort                         Eval Status:  Initiated                        Current Status:  3. Pt will ambulate on TM for 1/4 mile or more w/o added pain for progress with moderate community ambulation and return to [place] walking                          Eval Status:  Initiated                        Current Status:  4. Pt will have a negative left slump test to show decreased neural tension Left LE to allow decreased pain and improved mobility with normal ambulation                          Eval Status:  Initiated                        Current Status:  5. Pt will improve FOTO score to 56 in 12 visits to show significant improvement in function for progress to independent community ambulation                        Eval Status: 44                        Current Status:     Frequency / Duration: Patient to be seen 2-3 times per week for 10 treatments. Patient/ Caregiver education and instruction: Diagnosis, prognosis, self care, activity modification and exercises   Comments:  Patient provided w/HEP   [x]  Plan of care has been reviewed with ROSEMARY Rodriguez PT 8/6/2018 10:19 AM  ________________________________________________________________________    I certify that the above Therapy Services are being furnished while the patient is under my care. I agree with the treatment plan and certify that this therapy is necessary.     Physician's Signature:____________Date:_________TIME:________    [employer], Date and Time must be completed for valid certification **      Please sign and return to In . Nixon Ksawerejarvis [address], [address] [place] [address], [address] [place], [place] [address]y [phone]  [phone] fax

## 2018-08-07 LAB — 25(OH)D3 SERPL-MCNC: 28.3 NG/ML (ref 30–100)

## 2018-08-15 ENCOUNTER — OFFICE VISIT (OUTPATIENT)
Dept: INTERNAL MEDICINE CLINIC | Age: 63
End: 2018-08-15

## 2018-08-15 VITALS
SYSTOLIC BLOOD PRESSURE: 124 MMHG | HEART RATE: 76 BPM | WEIGHT: 181.6 LBS | DIASTOLIC BLOOD PRESSURE: 72 MMHG | TEMPERATURE: 98.4 F | RESPIRATION RATE: 14 BRPM | OXYGEN SATURATION: 96 % | HEIGHT: 62 IN | BODY MASS INDEX: 33.42 KG/M2

## 2018-08-15 DIAGNOSIS — I10 ESSENTIAL HYPERTENSION: ICD-10-CM

## 2018-08-15 DIAGNOSIS — E66.9 OBESITY (BMI 30.0-34.9): ICD-10-CM

## 2018-08-15 DIAGNOSIS — K21.9 GASTROESOPHAGEAL REFLUX DISEASE WITHOUT ESOPHAGITIS: ICD-10-CM

## 2018-08-15 DIAGNOSIS — D12.2 ADENOMATOUS POLYP OF ASCENDING COLON: ICD-10-CM

## 2018-08-15 DIAGNOSIS — R31.29 MICROSCOPIC HEMATURIA: ICD-10-CM

## 2018-08-15 DIAGNOSIS — E78.5 HYPERLIPIDEMIA, UNSPECIFIED HYPERLIPIDEMIA TYPE: ICD-10-CM

## 2018-08-15 DIAGNOSIS — E55.9 VITAMIN D INSUFFICIENCY: ICD-10-CM

## 2018-08-15 DIAGNOSIS — J45.20 MILD INTERMITTENT ASTHMA WITHOUT COMPLICATION: ICD-10-CM

## 2018-08-15 DIAGNOSIS — Z00.01 ENCOUNTER FOR ROUTINE ADULT PHYSICAL EXAM WITH ABNORMAL FINDINGS: Primary | ICD-10-CM

## 2018-08-15 DIAGNOSIS — I47.1 AVNRT (AV NODAL RE-ENTRY TACHYCARDIA) (HCC): ICD-10-CM

## 2018-08-15 DIAGNOSIS — M25.562 CHRONIC PAIN OF BOTH KNEES: ICD-10-CM

## 2018-08-15 DIAGNOSIS — E11.9 TYPE 2 DIABETES MELLITUS WITHOUT COMPLICATION, WITHOUT LONG-TERM CURRENT USE OF INSULIN (HCC): ICD-10-CM

## 2018-08-15 DIAGNOSIS — M25.561 CHRONIC PAIN OF BOTH KNEES: ICD-10-CM

## 2018-08-15 DIAGNOSIS — G89.29 CHRONIC PAIN OF BOTH KNEES: ICD-10-CM

## 2018-08-15 RX ORDER — METFORMIN HYDROCHLORIDE 500 MG/1
500 TABLET, EXTENDED RELEASE ORAL
Qty: 90 TAB | Refills: 2 | Status: SHIPPED | OUTPATIENT
Start: 2018-08-15 | End: 2019-03-19 | Stop reason: SDUPTHER

## 2018-08-15 NOTE — PROGRESS NOTES
Chief Complaint   Patient presents with    Diabetes     3 month follow up with labs     Health Maintenance Due   Topic Date Due    EYE EXAM RETINAL OR DILATED Q1  06/20/2018    Influenza Age 5 to Adult  08/01/2018     1. Have you been to the ER, urgent care clinic or hospitalized since your last visit? NO.     2. Have you seen or consulted any other health care providers outside of the [address] [place] [address] since your last visit (Include any pap smears or colon screening)? NO      Do you have an Advanced Directive? NO    Would you like information on Advanced Directives?  NO

## 2018-08-15 NOTE — MR AVS SNAPSHOT
[address] [facility] 
 
 
 [address], [address] [place] [address] [facility] 
[phone] Patient: Nadine Portillo MRN: [id number] QAN:[id number] Visit Information Date & Time Provider Department Dept. Phone Encounter #  
 8/15/2018 10:30 AM Loly Quispe MD [facility] [phone] [id number] Follow-up Instructions Return in about 3 months (around 11/15/2018), or if symptoms worsen or fail to improve. Your Appointments 11/14/2018  9:35 AM  
LAB with [facility] NURSE VISIT [facility] ([address] [place] [address]) Appt Note: lab  
 [email or web address] [id number] [address] [place]  
  
   
 [address], [address]  
  
    
 11/21/2018 10:30 AM  
Office Visit with Loly Quispe MD  
[facility] [address] Appt Note: 3 month follow up  
 [address], [address] [id number] [address] [place]  
  
   
 [address], [address] Upcoming Health Maintenance Date Due  
 EYE EXAM RETINAL OR DILATED Q1 6/20/2018 Influenza Age 5 to Adult 8/1/2018 ZOSTER VACCINE AGE 60> 2/28/2019* MICROALBUMIN Q1 9/20/2018 HEMOGLOBIN A1C Q6M 2/6/2019 FOOT EXAM Q1 5/3/2019 LIPID PANEL Q1 8/6/2019 BREAST CANCER SCRN MAMMOGRAM 11/29/2019 PAP AKA CERVICAL CYTOLOGY 11/3/2021 COLONOSCOPY 12/6/2021 DTaP/Tdap/Td series (2 - Td) 2/16/2022 *Topic was postponed. The date shown is not the original due date. Allergies as of 8/15/2018  Review Complete On: 8/15/2018 By: Cirilo Herrera Severity Noted Reaction Type Reactions Altace [Ramipril]  03/17/2011    Cough Penicillins    Other (comments) Hands peel Sulfur    Itching Current Immunizations  Reviewed on 10/5/2017 Name Date Influenza Vaccine (Quad) PF 10/5/2017 12:43 PM, 10/19/2016, 10/23/2015 Influenza Vaccine PF 10/3/2014, 12/12/2013 Influenza Vaccine Split 10/22/2012, 11/2/2011 Influenza Vaccine Whole 10/29/2010 PPD 1/3/2012 Pneumococcal Polysaccharide (PPSV-23) 3/21/2017 TB Skin Test (PPD) Intradermal 2/12/2014 TDAP Vaccine 2/16/2012 Not reviewed this visit Vitals BP Pulse Temp Resp Height(growth percentile) Weight(growth percentile) 124/72 (BP 1 Location: Left arm, BP Patient Position: Sitting) 76 98.4 °F (36.9 °C) (Oral) 14 5' 2\" (1.575 m) 181 lb 9.6 oz (82.4 kg) LMP SpO2 BMI OB Status Smoking Status (LMP Unknown) 96% 33.22 kg/m2 Postmenopausal Never Smoker Vitals History BMI and BSA Data Body Mass Index Body Surface Area  
 33.22 kg/m 2 1.9 m 2 Preferred Pharmacy Pharmacy Name Phone [address] [phone] Your Updated Medication List  
  
   
This list is accurate as of 8/15/18 11:46 AM.  Always use your most recent med list.  
  
  
  
  
 clotrimazole-betamethasone topical cream  
Commonly known as:  Morris Scriver Apply  to both ear canals and affected part of outer ear twice a day with a finger. fluticasone 50 mcg/actuation nasal spray Commonly known as:  Domnick Means INSTILL 2 SPRAYS IN EACH NOSTRIL DAILY  
  
 hydroCHLOROthiazide 25 mg tablet Commonly known as:  HYDRODIURIL  
take 1/2 tablet by mouth once daily LORazepam 1 mg tablet Commonly known as:  ATIVAN  
TAKE 1 TABLET BY MOUTH EVERY 8 HOURS AS NEEDED FOR ANXIETY  
  
 metFORMIN  mg tablet Commonly known as:  GLUCOPHAGE XR Take 1 Tab by mouth daily (with dinner). metoprolol succinate 50 mg XL tablet Commonly known as:  TOPROL-XL  
take 1 tablet by mouth once daily MULTIVITAMIN PO Take 1 Tab by mouth every other day. omeprazole 20 mg capsule Commonly known as:  PriLOSEC Take 1 Cap by mouth daily. potassium chloride 20 mEq tablet Commonly known as:  K-DUR, KLOR-CON  
 take 1 tablet by mouth once daily PROAIR HFA 90 mcg/actuation inhaler Generic drug:  albuterol  
inhale 2 puffs by mouth every 4 hours if needed for wheezing QVAR 40 mcg/actuation [employer] Generic drug:  beclomethasone Take 1 Puff by inhalation two (2) times a day. REFRESH LIQUIGEL 1 % Dlgl Generic drug:  carboxymethylcellulose sodium Apply  to eye.  
  
 simvastatin 20 mg tablet Commonly known as:  ZOCOR  
take 1 tablet by mouth at bedtime  
  
 varicella-zoster recombinant (PF) 50 mcg/0.5 mL Susr injection Commonly known as:  SHINGRIX  
0.5 mL by IntraMUSCular route once for 1 dose. Repeat x 1 dose in 2-6 months Prescriptions Printed Refills  
 varicella-zoster recombinant, PF, (SHINGRIX) 50 mcg/0.5 mL susr injection 1 Si.5 mL by IntraMUSCular route once for 1 dose. Repeat x 1 dose in 2-6 months Class: Print Route: IntraMUSCular Prescriptions Sent to Pharmacy Refills  
 metFORMIN ER (GLUCOPHAGE XR) 500 mg tablet 2 Sig: Take 1 Tab by mouth daily (with dinner). Class: Normal  
 Pharmacy: [facility], [address] [place] [address] #: [phone] Route: Oral  
  
Follow-up Instructions Return in about 3 months (around 11/15/2018), or if symptoms worsen or fail to improve. To-Do List   
 08/15/2018  3:30 PM  
  Appointment with Tu Grimaldo PT at [facility] [address] [facility] ([phone]) Patient Instructions Begin metformin  mg with dinner. Begin Vitamin D3 2000 U daily. Learning About Diabetes Food Guidelines Your Care Instructions Meal planning is important to manage diabetes. It helps keep your blood sugar at a target level (which you set with your doctor). You don't have to eat special foods. You can eat what your family eats, including sweets once in a while. But you do have to pay attention to how often you eat and how much you eat of certain foods. You may want to work with a dietitian or a certified diabetes educator (CDE) to help you plan meals and snacks. A dietitian or CDE can also help you lose weight if that is one of your goals. What should you know about eating carbs? Managing the amount of carbohydrate (carbs) you eat is an important part of healthy meals when you have diabetes. Carbohydrate is found in many foods. · Learn which foods have carbs. And learn the amounts of carbs in different foods. ¨ Bread, cereal, pasta, and rice have about 15 grams of carbs in a serving. A serving is 1 slice of bread (1 ounce), ½ cup of cooked cereal, or 1/3 cup of cooked pasta or rice. ¨ Fruits have 15 grams of carbs in a serving. A serving is 1 small fresh fruit, such as an apple or orange; ½ of a banana; ½ cup of cooked or canned fruit; ½ cup of fruit juice; 1 cup of melon or raspberries; or 2 tablespoons of dried fruit. ¨ Milk and no-sugar-added yogurt have 15 grams of carbs in a serving. A serving is 1 cup of milk or 2/3 cup of no-sugar-added yogurt. ¨ Starchy vegetables have 15 grams of carbs in a serving. A serving is ½ cup of mashed potatoes or sweet potato; 1 cup winter squash; ½ of a small baked potato; ½ cup of cooked beans; or ½ cup cooked corn or green peas. · Learn how much carbs to eat each day and at each meal. A dietitian or CDE can teach you how to keep track of the amount of carbs you eat. This is called carbohydrate counting. · If you are not sure how to count carbohydrate grams, use the Plate Method to plan meals. It is a good, quick way to make sure that you have a balanced meal. It also helps you spread carbs throughout the day. ¨ Divide your plate by types of foods. Put non-starchy vegetables on half the plate, meat or other protein food on one-quarter of the plate, and a grain or starchy vegetable in the final quarter of the plate.  To this you can add a small piece of fruit and 1 cup of milk or yogurt, depending on how many carbs you are supposed to eat at a meal. 
· Try to eat about the same amount of carbs at each meal. Do not \"save up\" your daily allowance of carbs to eat at one meal. 
· Proteins have very little or no carbs per serving. Examples of proteins are beef, chicken, turkey, fish, eggs, tofu, cheese, cottage cheese, and peanut butter. A serving size of meat is 3 ounces, which is about the size of a deck of cards. Examples of meat substitute serving sizes (equal to 1 ounce of meat) are 1/4 cup of cottage cheese, 1 egg, 1 tablespoon of peanut butter, and ½ cup of tofu. How can you eat out and still eat healthy? · Learn to estimate the serving sizes of foods that have carbohydrate. If you measure food at home, it will be easier to estimate the amount in a serving of restaurant food. · If the meal you order has too much carbohydrate (such as potatoes, corn, or baked beans), ask to have a low-carbohydrate food instead. Ask for a salad or green vegetables. · If you use insulin, check your blood sugar before and after eating out to help you plan how much to eat in the future. · If you eat more carbohydrate at a meal than you had planned, take a walk or do other exercise. This will help lower your blood sugar. What else should you know? · Limit saturated fat, such as the fat from meat and dairy products. This is a healthy choice because people who have diabetes are at higher risk of heart disease. So choose lean cuts of meat and nonfat or low-fat dairy products. Use olive or canola oil instead of butter or shortening when cooking. · Don't skip meals. Your blood sugar may drop too low if you skip meals and take insulin or certain medicines for diabetes. · Check with your doctor before you drink alcohol. Alcohol can cause your blood sugar to drop too low. Alcohol can also cause a bad reaction if you take certain diabetes medicines. Follow-up care is a key part of your treatment and safety.  Be sure to make and go to all appointments, and call your doctor if you are having problems. It's also a good idea to know your test results and keep a list of the medicines you take. Where can you learn more? Go to http://yoselyn-merline.info/. Enter V926 in the search box to learn more about \"Learning About Diabetes Food Guidelines. \" Current as of: December 7, 2017 Content Version: 11.7 © 2056-8418 [employer]. Care instructions adapted under license by [employer] (which disclaims liability or warranty for this information). If you have questions about a medical condition or this instruction, always ask your healthcare professional. [address] any warranty or liability for your use of this information. Learning About Meal Planning for Diabetes Why plan your meals? Meal planning can be a key part of managing diabetes. Planning meals and snacks with the right balance of carbohydrate, protein, and fat can help you keep your blood sugar at the target level you set with your doctor. You don't have to eat special foods. You can eat what your family eats, including sweets once in a while. But you do have to pay attention to how often you eat and how much you eat of certain foods. You may want to work with a dietitian or a certified diabetes educator. He or she can give you tips and meal ideas and can answer your questions about meal planning. This health professional can also help you reach a healthy weight if that is one of your goals. What plan is right for you? Your dietitian or diabetes educator may suggest that you start with the plate format or carbohydrate counting. The plate format The plate format is a simple way to help you manage how you eat. You plan meals by learning how much space each food should take on a plate. Using the plate format helps you spread carbohydrate throughout the day.  It can make it easier to keep your blood sugar level within your target range. It also helps you see if you're eating healthy portion sizes. To use the plate format, you put non-starchy vegetables on half your plate. Add meat or meat substitutes on one-quarter of the plate. Put a grain or starchy vegetable (such as brown rice or a potato) on the final quarter of the plate. You can add a small piece of fruit and some low-fat or fat-free milk or yogurt, depending on your carbohydrate goal for each meal. 
Here are some tips for using the plate format: · Make sure that you are not using an oversized plate. A 9-inch plate is best. Many restaurants use larger plates. · Get used to using the plate format at home. Then you can use it when you eat out. · Write down your questions about using the plate format. Talk to your doctor, a dietitian, or a diabetes educator about your concerns. Carbohydrate counting With carbohydrate counting, you plan meals based on the amount of carbohydrate in each food. Carbohydrate raises blood sugar higher and more quickly than any other nutrient. It is found in desserts, breads and cereals, and fruit. It's also found in starchy vegetables such as potatoes and corn, grains such as rice and pasta, and milk and yogurt. Spreading carbohydrate throughout the day helps keep your blood sugar levels within your target range. Your daily amount depends on several things, including your weight, how active you are, which diabetes medicines you take, and what your goals are for your blood sugar levels. A registered dietitian or diabetes educator can help you plan how much carbohydrate to include in each meal and snack. A guideline for your daily amount of carbohydrate is: · 45 to 60 grams at each meal. That's about the same as 3 to 4 carbohydrate servings. · 15 to 20 grams at each snack. That's about the same as 1 carbohydrate serving.  
The Nutrition Facts label on packaged foods tells you how much carbohydrate is in a serving of the food. First, look at the serving size on the food label. Is that the amount you eat in a serving? All of the nutrition information on a food label is based on that serving size. So if you eat more or less than that, you'll need to adjust the other numbers. Total carbohydrate is the next thing you need to look for on the label. If you count carbohydrate servings, one serving of carbohydrate is 15 grams. For foods that don't come with labels, such as fresh fruits and vegetables, you'll need a guide that lists carbohydrate in these foods. Ask your doctor, dietitian, or diabetes educator about books or other nutrition guides you can use. If you take insulin, you need to know how many grams of carbohydrate are in a meal. This lets you know how much rapid-acting insulin to take before you eat. If you use an insulin pump, you get a constant rate of insulin during the day. So the pump must be programmed at meals to give you extra insulin to cover the rise in blood sugar after meals. When you know how much carbohydrate you will eat, you can take the right amount of insulin. Or, if you always use the same amount of insulin, you need to make sure that you eat the same amount of carbohydrate at meals. If you need more help to understand carbohydrate counting and food labels, ask your doctor, dietitian, or diabetes educator. How do you get started with meal planning? Here are some tips to get started: 
· Plan your meals a week at a time. Don't forget to include snacks too. · Use cookbooks or online recipes to plan several main meals. Plan some quick meals for busy nights. You also can double some recipes that freeze well. Then you can save half for other busy nights when you don't have time to cook. · Make sure you have the ingredients you need for your recipes. If you're running low on basic items, put these items on your shopping list too. · List foods that you use to make breakfasts, lunches, and snacks. List plenty of fruits and vegetables. · Post this list on the refrigerator. Add to it as you think of more things you need. · Take the list to the store to do your weekly shopping. Follow-up care is a key part of your treatment and safety. Be sure to make and go to all appointments, and call your doctor if you are having problems. It's also a good idea to know your test results and keep a list of the medicines you take. Where can you learn more? Go to http://yoselyn-merline.info/. Ramon [place]ocks in the search box to learn more about \"Learning About Meal Planning for Diabetes. \" Current as of: December 7, 2017 Content Version: 11.7 © 8857-4851 [employer]. Care instructions adapted under license by [employer] (which disclaims liability or warranty for this information). If you have questions about a medical condition or this instruction, always ask your healthcare professional. [address] any warranty or liability for your use of this information. Introducing [facility]! Lisa Garcia introduces [employer] patient portal. Now you can access parts of your medical record, email your doctor's office, and request medication refills online. 1. In your internet browser, go to [email or web address] 2. Click on the First Time User? Click Here link in the Sign In box. You will see the New Member Sign Up page. 3. Enter your [employer] Access Code exactly as it appears below. You will not need to use this code after youve completed the sign-up process. If you do not sign up before the expiration date, you must request a new code. · [employer] Access Code: [email or web address] Expires: 10/25/2018  2:35 PM 
 
4. Enter the last four digits of your Social Security Number (xxxx) and Date of Birth (mm/dd/yyyy) as indicated and click Submit.  You will be taken to the next sign-up page. 5. Create a Fara ID. This will be your Fara login ID and cannot be changed, so think of one that is secure and easy to remember. 6. Create a Fara password. You can change your password at any time. 7. Enter your Password Reset Question and Answer. This can be used at a later time if you forget your password. 8. Enter your e-mail address. You will receive e-mail notification when new information is available in [address]. 9. Click Sign Up. You can now view and download portions of your medical record. 10. Click the Download Summary menu link to download a portable copy of your medical information. If you have questions, please visit the Frequently Asked Questions section of the Fara website. Remember, Fara is NOT to be used for urgent needs. For medical emergencies, dial 911. Now available from your iPhone and Android! Please provide this summary of care documentation to your next provider. Your primary care clinician is listed as Titi Guadalupe. If you have any questions after today's visit, please call [phone].

## 2018-08-15 NOTE — PATIENT INSTRUCTIONS
Begin metformin  mg with dinner. Begin Vitamin D3 2000 U daily. Learning About Diabetes Food Guidelines  Your Care Instructions    Meal planning is important to manage diabetes. It helps keep your blood sugar at a target level (which you set with your doctor). You don't have to eat special foods. You can eat what your family eats, including sweets once in a while. But you do have to pay attention to how often you eat and how much you eat of certain foods. You may want to work with a dietitian or a certified diabetes educator (CDE) to help you plan meals and snacks. A dietitian or CDE can also help you lose weight if that is one of your goals. What should you know about eating carbs? Managing the amount of carbohydrate (carbs) you eat is an important part of healthy meals when you have diabetes. Carbohydrate is found in many foods. · Learn which foods have carbs. And learn the amounts of carbs in different foods. ¨ Bread, cereal, pasta, and rice have about 15 grams of carbs in a serving. A serving is 1 slice of bread (1 ounce), ½ cup of cooked cereal, or 1/3 cup of cooked pasta or rice. ¨ Fruits have 15 grams of carbs in a serving. A serving is 1 small fresh fruit, such as an apple or orange; ½ of a banana; ½ cup of cooked or canned fruit; ½ cup of fruit juice; 1 cup of melon or raspberries; or 2 tablespoons of dried fruit. ¨ Milk and no-sugar-added yogurt have 15 grams of carbs in a serving. A serving is 1 cup of milk or 2/3 cup of no-sugar-added yogurt. ¨ Starchy vegetables have 15 grams of carbs in a serving. A serving is ½ cup of mashed potatoes or sweet potato; 1 cup winter squash; ½ of a small baked potato; ½ cup of cooked beans; or ½ cup cooked corn or green peas. · Learn how much carbs to eat each day and at each meal. A dietitian or CDE can teach you how to keep track of the amount of carbs you eat. This is called carbohydrate counting.   · If you are not sure how to count carbohydrate grams, use the Plate Method to plan meals. It is a good, quick way to make sure that you have a balanced meal. It also helps you spread carbs throughout the day. ¨ Divide your plate by types of foods. Put non-starchy vegetables on half the plate, meat or other protein food on one-quarter of the plate, and a grain or starchy vegetable in the final quarter of the plate. To this you can add a small piece of fruit and 1 cup of milk or yogurt, depending on how many carbs you are supposed to eat at a meal.  · Try to eat about the same amount of carbs at each meal. Do not \"save up\" your daily allowance of carbs to eat at one meal.  · Proteins have very little or no carbs per serving. Examples of proteins are beef, chicken, turkey, fish, eggs, tofu, cheese, cottage cheese, and peanut butter. A serving size of meat is 3 ounces, which is about the size of a deck of cards. Examples of meat substitute serving sizes (equal to 1 ounce of meat) are 1/4 cup of cottage cheese, 1 egg, 1 tablespoon of peanut butter, and ½ cup of tofu. How can you eat out and still eat healthy? · Learn to estimate the serving sizes of foods that have carbohydrate. If you measure food at home, it will be easier to estimate the amount in a serving of restaurant food. · If the meal you order has too much carbohydrate (such as potatoes, corn, or baked beans), ask to have a low-carbohydrate food instead. Ask for a salad or green vegetables. · If you use insulin, check your blood sugar before and after eating out to help you plan how much to eat in the future. · If you eat more carbohydrate at a meal than you had planned, take a walk or do other exercise. This will help lower your blood sugar. What else should you know? · Limit saturated fat, such as the fat from meat and dairy products. This is a healthy choice because people who have diabetes are at higher risk of heart disease.  So choose lean cuts of meat and nonfat or low-fat dairy products. Use olive or canola oil instead of butter or shortening when cooking. · Don't skip meals. Your blood sugar may drop too low if you skip meals and take insulin or certain medicines for diabetes. · Check with your doctor before you drink alcohol. Alcohol can cause your blood sugar to drop too low. Alcohol can also cause a bad reaction if you take certain diabetes medicines. Follow-up care is a key part of your treatment and safety. Be sure to make and go to all appointments, and call your doctor if you are having problems. It's also a good idea to know your test results and keep a list of the medicines you take. Where can you learn more? Go to http://yoselyn-merline.info/. Enter L925 in the search box to learn more about \"Learning About Diabetes Food Guidelines. \"  Current as of: December 7, 2017  Content Version: 11.7  © 6300-0855 [employer]. Care instructions adapted under license by [employer] (which disclaims liability or warranty for this information). If you have questions about a medical condition or this instruction, always ask your healthcare professional. [address] any warranty or liability for your use of this information. Learning About Meal Planning for Diabetes  Why plan your meals? Meal planning can be a key part of managing diabetes. Planning meals and snacks with the right balance of carbohydrate, protein, and fat can help you keep your blood sugar at the target level you set with your doctor. You don't have to eat special foods. You can eat what your family eats, including sweets once in a while. But you do have to pay attention to how often you eat and how much you eat of certain foods. You may want to work with a dietitian or a certified diabetes educator. He or she can give you tips and meal ideas and can answer your questions about meal planning.  This health professional can also help you reach a healthy weight if that is one of your goals. What plan is right for you? Your dietitian or diabetes educator may suggest that you start with the plate format or carbohydrate counting. The plate format  The plate format is a simple way to help you manage how you eat. You plan meals by learning how much space each food should take on a plate. Using the plate format helps you spread carbohydrate throughout the day. It can make it easier to keep your blood sugar level within your target range. It also helps you see if you're eating healthy portion sizes. To use the plate format, you put non-starchy vegetables on half your plate. Add meat or meat substitutes on one-quarter of the plate. Put a grain or starchy vegetable (such as brown rice or a potato) on the final quarter of the plate. You can add a small piece of fruit and some low-fat or fat-free milk or yogurt, depending on your carbohydrate goal for each meal.  Here are some tips for using the plate format:  · Make sure that you are not using an oversized plate. A 9-inch plate is best. Many restaurants use larger plates. · Get used to using the plate format at home. Then you can use it when you eat out. · Write down your questions about using the plate format. Talk to your doctor, a dietitian, or a diabetes educator about your concerns. Carbohydrate counting  With carbohydrate counting, you plan meals based on the amount of carbohydrate in each food. Carbohydrate raises blood sugar higher and more quickly than any other nutrient. It is found in desserts, breads and cereals, and fruit. It's also found in starchy vegetables such as potatoes and corn, grains such as rice and pasta, and milk and yogurt. Spreading carbohydrate throughout the day helps keep your blood sugar levels within your target range.   Your daily amount depends on several things, including your weight, how active you are, which diabetes medicines you take, and what your goals are for your blood sugar levels. A registered dietitian or diabetes educator can help you plan how much carbohydrate to include in each meal and snack. A guideline for your daily amount of carbohydrate is:  · 45 to 60 grams at each meal. That's about the same as 3 to 4 carbohydrate servings. · 15 to 20 grams at each snack. That's about the same as 1 carbohydrate serving. The Nutrition Facts label on packaged foods tells you how much carbohydrate is in a serving of the food. First, look at the serving size on the food label. Is that the amount you eat in a serving? All of the nutrition information on a food label is based on that serving size. So if you eat more or less than that, you'll need to adjust the other numbers. Total carbohydrate is the next thing you need to look for on the label. If you count carbohydrate servings, one serving of carbohydrate is 15 grams. For foods that don't come with labels, such as fresh fruits and vegetables, you'll need a guide that lists carbohydrate in these foods. Ask your doctor, dietitian, or diabetes educator about books or other nutrition guides you can use. If you take insulin, you need to know how many grams of carbohydrate are in a meal. This lets you know how much rapid-acting insulin to take before you eat. If you use an insulin pump, you get a constant rate of insulin during the day. So the pump must be programmed at meals to give you extra insulin to cover the rise in blood sugar after meals. When you know how much carbohydrate you will eat, you can take the right amount of insulin. Or, if you always use the same amount of insulin, you need to make sure that you eat the same amount of carbohydrate at meals. If you need more help to understand carbohydrate counting and food labels, ask your doctor, dietitian, or diabetes educator. How do you get started with meal planning? Here are some tips to get started:  · Plan your meals a week at a time. Don't forget to include snacks too.   · Use cookbooks or online recipes to plan several main meals. Plan some quick meals for busy nights. You also can double some recipes that freeze well. Then you can save half for other busy nights when you don't have time to cook. · Make sure you have the ingredients you need for your recipes. If you're running low on basic items, put these items on your shopping list too. · List foods that you use to make breakfasts, lunches, and snacks. List plenty of fruits and vegetables. · Post this list on the refrigerator. Add to it as you think of more things you need. · Take the list to the store to do your weekly shopping. Follow-up care is a key part of your treatment and safety. Be sure to make and go to all appointments, and call your doctor if you are having problems. It's also a good idea to know your test results and keep a list of the medicines you take. Where can you learn more? Go to http://yoselyn-merline.info/. Justice Mckeon in the search box to learn more about \"Learning About Meal Planning for Diabetes. \"  Current as of: December 7, 2017  Content Version: 11.7  © 6323-6400 [employer]. Care instructions adapted under license by [employer] (which disclaims liability or warranty for this information). If you have questions about a medical condition or this instruction, always ask your healthcare professional. [place] [address] any warranty or liability for your use of this information.

## 2018-08-16 ENCOUNTER — HOSPITAL ENCOUNTER (OUTPATIENT)
Dept: PHYSICAL THERAPY | Age: 63
Discharge: HOME | End: 2018-08-16
Payer: COMMERCIAL

## 2018-08-16 PROCEDURE — 97110 THERAPEUTIC EXERCISES: CPT

## 2018-08-16 NOTE — PROGRESS NOTES
PT DAILY TREATMENT NOTE - [facility]     Patient Name: Chidi Aguilar  Date:2018  : 1955  [x]  Patient  Verified  Payor: BLUE CROSS / Plan: [facility] / Product Type: PPO /    In time:4:00  Out time:4:40  Total Treatment Time (min): 40  Total Timed Codes (min): 40  1:1 Treatment Time ( only): 30   Visit #: 2 of 10    Treatment Area: Pain in left foot [M79.672]    SUBJECTIVE  Pain Level (0-10 scale): 2/10  Any medication changes, allergies to medications, adverse drug reactions, diagnosis change, or new procedure performed?: [x] No    [] Yes (see summary sheet for update)  Subjective functional status/changes:   [] No changes reported   I went to the other clinic thinking I was coming to this clinic so I transferred. OBJECTIVE  10 [x]  Ice     []  heat  []  Ice massage  []  Laser   []  Anodyne Position:long sitting  Location:L ankle    []  Laser with stim  []  Other:  Position:  Location:    []  Vasopneumatic Device Pressure:       [] lo [] med [] hi   Temperature: [] lo [] med [] hi   [x] Skin assessment post-treatment:  [x]intact [x]redness- no adverse reaction    []redness  adverse reaction:       30 min Therapeutic Exercise:  [x] See flow sheet :   Rationale: increase ROM, increase strength, improve coordination and improve balance to improve the patients ability to return to biking program.          With   [x] TE   [] TA   [] neuro   [] other: Patient Education: [x] Review HEP    [] Progressed/Changed HEP based on:   [] positioning   [] body mechanics   [] transfers   [] heat/ice application    [] other:      Other Objective/Functional Measures: Pt with good relief after icing for discomfort of L heel. Pain Level (0-10 scale) post treatment:  2/10    ASSESSMENT/Changes in Function: Pt required moderate VCs with exercises and with education of why most of the exercises were being administered.  Pt was given rationale of why she was being asked to perform standing BLE stretching and strengthening. Reports she will need a few more visits and then will do exercise and icing on her own 2/2 high insurance co-pay. Patient will continue to benefit from skilled PT services to address functional mobility deficits, address ROM deficits, address strength deficits, analyze and address soft tissue restrictions, analyze and cue movement patterns and analyze and modify body mechanics/ergonomics to attain remaining goals. [x]  See Plan of Care  []  See progress note/recertification  []  See Discharge Summary         Progress towards goals / Updated goals:  1. Pt will be compliant and independent with HEP in order to facilitate PT sessions and aid with self management                        Eval Status:  Initiated                        Current Status:  2. Pt to tolerate 30 min or more of TE and/or Interventions w/o increased s/s                        Eval Status:  Initiated                        Current Status:     1. Pt will report 50% improvement or better with involvement to show a significant increase in function                        Eval Status:  Initiated                        Current Status:  2. Pt will decreased pain at 2/10 or better to perform usual daily activity w/little discomfort                         Eval Status:  Initiated                        Current Status:  3. Pt will ambulate on TM for 1/4 mile or more w/o added pain for progress with moderate community ambulation and return to [facility] walking                          Eval Status:  Initiated                        Current Status:  4. Pt will have a negative left slump test to show decreased neural tension Left LE to allow decreased pain and improved mobility with normal ambulation                          Eval Status:  Initiated                        Current Status:  5.   Pt will improve FOTO score to 56 in 12 visits to show significant improvement in function for progress to independent community ambulation Eval Status: 44                        Current Status:          PLAN  [x]  Upgrade activities as tolerated     [x]  Continue plan of care  []  Update interventions per flow sheet       []  Discharge due to:_  []  Other:_      Elizabeth March 8/16/2018  4:34 PM    Future Appointments  Date Time Provider Ankit Edmond   11/14/2018 9:35 AM IOC NURSE VISIT [facility]   11/21/2018 10:30 AM Chichi Cox MD [facility]

## 2018-08-18 PROBLEM — E66.9 OBESITY (BMI 30.0-34.9): Status: ACTIVE | Noted: 2018-08-18

## 2018-08-21 ENCOUNTER — HOSPITAL ENCOUNTER (OUTPATIENT)
Dept: PHYSICAL THERAPY | Age: 63
Discharge: HOME | End: 2018-08-21
Payer: COMMERCIAL

## 2018-08-21 PROCEDURE — 97110 THERAPEUTIC EXERCISES: CPT

## 2018-08-21 PROCEDURE — 97164 PT RE-EVAL EST PLAN CARE: CPT

## 2018-08-21 NOTE — PROGRESS NOTES
PT DISCHARGE DAILY NOTE AND [id number]    Date:2018  Patient name: Wolf Millan Start of Care: 18   Referral source: Liliane Sheriff MD : 1955   Medical/Treatment Diagnosis: Pain in left foot [M79.672] Onset Date:Long standing pain with recent onset about 2 months ago           Prior Hospitalization: see medical history Provider#: [id number]   Medications: Verified on Patient Summary List    Comorbidities: OA, HTN, Asthma   Prior Level of Function:  Long term heel pain on and off that has worsened over the last few months    Visits from Start of Care: 3     Missed Visits: 0    Reporting Period : 18 to 18    [x]  Patient  Verified  Payor: BLUE CROSS / Plan: [facility] / Product Type: PPO /    In time:2:30  Out time:3:02  Total Treatment Time (min): 32  Total Timed Codes (min): 22  1:1 Treatment Time (MC only): 20   Visit #: 3 of 12    SUBJECTIVE  Pain Level (0-10 scale): 5/10  Any medication changes, allergies to medications, adverse drug reactions, diagnosis change, or new procedure performed?: [x] No    [] Yes (see summary sheet for update)  Subjective functional status/changes:   [] No changes reported  Pt reports that she is still having the heel pain, but this is not a great time in her life to have therapy. She is too busy. She would like to DC from therapy with an HEP today.       OBJECTIVE      10 min []Eval                  [x]Re-Eval       22 min Therapeutic Exercise:  [x] See flow sheet :   Rationale: increase ROM and increase strength to improve the patients ability to improve ease of prolonged WB with daily tasks          With   [] TE   [] TA   [] neuro   [] other: Patient Education: [x] Review HEP    [] Progressed/Changed HEP based on:   [] positioning   [] body mechanics   [] transfers   [] heat/ice application    [] other:      Other Objective/Functional Measures:     Slump Test (-) B  Pt reported minor pain over left SI with bridges, left AI corrected with self-MET  No pain following correction   Gave and reviewed final HEP     FOTO 46    Pain Level (0-10 scale) post treatment: 3/10    Summary of Care:  Goal: Pt will be compliant and independent with HEP in order to facilitate PT sessions and aid with self management  Status at last note/certification: Goal met. Status at discharge: met    Goal: Pt to tolerate 30 min or more of TE and/or Interventions w/o increased s/s  Status at last note/certification: Goal met. Status at discharge: met    Goal: Pt will report 50% improvement or better with involvement to show a significant increase in function  Status at last note/certification: Goal met. Pt reports 80% improvement with therapy   Status at discharge: met    Goal: Pt will decreased pain at 2/10 or better to perform usual daily activity w/little discomfort   Status at last note/certification: Progressing. Max pain 6/10, average pain 4/10, least 2/10    Status at discharge: not met    Goal: Pt will ambulate on TM for 1/4 mile or more w/o added pain for progress with moderate community ambulation and return to [facility] walking     Status at last note/certification: Goal met. Pt was able to walk for a couple hours without pain increase  Status at discharge: met    Goal: Pt will have a negative left slump test to show decreased neural tension Left LE to allow decreased pain and improved mobility with normal ambulation    Status at last note/certification: Goal met. Status at discharge: met    Goal: Pt will improve FOTO score to 56 in 12 visits to show significant improvement in function for progress to independent community ambulation  Status at last note/certification: Slowly progressing. Improved 2 points. Status at discharge: not met    ASSESSMENT/Changes in Function:   Pt has made slow, steady progress in therapy despite only attending therapy for 3 sessions. She has met 4/6 initial goals and is slowly progressing towards pain and FOTO goals.   Pt has requested self-DC to continue rehab independently with HEP d/t busy schedule. She was given updated HEP to address remaining deficits and is DC at this time.       Thank you for this referral!     PLAN  [x]Discontinue therapy: [x]Patient has reached or is progressing toward set goals      [x]Patient is non-compliant or has abdicated      []Due to lack of appreciable progress towards set goals    Victorino Desai, PT 8/21/2018  2:47 PM

## 2018-08-27 NOTE — PROGRESS NOTES
[facility]  [address] [place] [address]  [phone] fax    Physical Therapy Discharge Summary    Patient name: Martha Thrasher Start of Care: 2018   Referral source: Valentine Coffey MD : 1955                          Medical Diagnosis: Primary osteoarthritis of left knee [M17.12] Onset Date: about 5 months ago, chronic arthritis                          Treatment Diagnosis: B knees pain   Prior Hospitalization: see medical history Provider#: [id number]   Medications: Verified on Patient summary List    Comorbidities: diabetes, arthritis, HTN, asthma, heel bone spurs B   Prior Level of Function: Ind with all mobility, living with , 1 story house, 3 steps with no rail. Visits from Start of Care: 10                                                                             Missed Visits: 3      Reporting Period : 2018 to 2018    Summary of Care:  Goal: Pt will improve FOTO score by 21 points to 51/100 to show improvement with functional mobility performance. Status at last note/certification: Progressing 16 point improvement 18. Status at discharge: not met    Goal: Pt will have left knee AROM 0-115 degrees to amb household and community with normal and safe pattern. Status at last note/certification: Right 1-125 degs, left 2-108 degs 2018  Status at discharge: not met    Goal: Pt will improve MMT left hip ABD to 4/5, right hip/knee EXT to 3+/5, B hip flex to 4-/5 to improve ease of mobility. Status at last note/certification: Not met, Right hip ABD 4/5, left hip ABD 4-/5; right hip EXT 3/5, left 3+/5; B hip flex 3+/5; right knee EXT 3/5, left knee EXT 4/5, right knee flex 4+/5, left knee flex 4+/5 2018  Status at discharge: not met    Goal: Pt will report having minimal to no increased pain in the left anterior/lateral left knee when stepping down from a step at home to improve pain with stepping. Status at last note/certification: not met  Status at discharge: not met      ASSESSMENT/RECOMMENDATIONS: pt making limited progress, request to hold PT to follow up with MD. Pt didn't report back considering cont PT.    [x]Discontinue therapy: []Patient has reached or is progressing toward set goals      [x]Patient is non-compliant or has abdicated      [x]Due to lack of appreciable progress towards set goals    Shanice Hart, PT 8/27/2018 2:15 PM

## 2018-10-01 RX ORDER — HYDROCHLOROTHIAZIDE 25 MG/1
TABLET ORAL
Qty: 45 TAB | Refills: 3 | Status: SHIPPED | OUTPATIENT
Start: 2018-10-01 | End: 2018-11-29

## 2018-10-01 RX ORDER — METOPROLOL SUCCINATE 50 MG/1
TABLET, EXTENDED RELEASE ORAL
Qty: 90 TAB | Refills: 3 | Status: SHIPPED | OUTPATIENT
Start: 2018-10-01 | End: 2018-10-31 | Stop reason: SDUPTHER

## 2018-10-26 ENCOUNTER — CLINICAL SUPPORT (OUTPATIENT)
Dept: INTERNAL MEDICINE CLINIC | Age: 63
End: 2018-10-26

## 2018-10-26 DIAGNOSIS — Z23 ENCOUNTER FOR IMMUNIZATION: Primary | ICD-10-CM

## 2018-10-31 ENCOUNTER — OFFICE VISIT (OUTPATIENT)
Dept: CARDIOLOGY CLINIC | Age: 63
End: 2018-10-31

## 2018-10-31 VITALS
BODY MASS INDEX: 33.31 KG/M2 | WEIGHT: 181 LBS | HEART RATE: 73 BPM | HEIGHT: 62 IN | DIASTOLIC BLOOD PRESSURE: 72 MMHG | SYSTOLIC BLOOD PRESSURE: 118 MMHG | OXYGEN SATURATION: 96 %

## 2018-10-31 DIAGNOSIS — I11.9 BENIGN HYPERTENSIVE HEART DISEASE WITHOUT CONGESTIVE HEART FAILURE: Primary | ICD-10-CM

## 2018-10-31 DIAGNOSIS — E78.5 HYPERLIPIDEMIA, UNSPECIFIED HYPERLIPIDEMIA TYPE: ICD-10-CM

## 2018-10-31 DIAGNOSIS — I47.1 AVNRT (AV NODAL RE-ENTRY TACHYCARDIA) (HCC): ICD-10-CM

## 2018-10-31 RX ORDER — METOPROLOL SUCCINATE 50 MG/1
TABLET, EXTENDED RELEASE ORAL
Qty: 90 TAB | Refills: 3 | Status: SHIPPED | OUTPATIENT
Start: 2018-10-31 | End: 2020-01-20

## 2018-10-31 NOTE — PROGRESS NOTES
HPI:  I saw Horace . Kurtis Vickers in my office today in cardiovascular evaluation regarding her past problems with palpitations and hypercholesterolemia. Ms. 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 She has had about six separate episodes of supraventricular tachycardia in the past, for which she has had to go to the emergency room and get adenosine intravenously in order to break her rhythm to sinus, but generally speaking on just Toprol 50 mg daily she does quite well and has had only occasional palpitations. 
  
She comes in today relates that she is continued to do quite well. She has not had any significant palpitations in the past year and seems to be continued to tolerate her Toprol XL well. Encounter Diagnoses Name Primary?  Benign hypertensive heart disease without congestive heart failure Yes  AVNRT (AV dharmesh re-entry tachycardia) ([address]  Hyperlipidemia, unspecified hyperlipidemia type Discussion: This patient appears to be doing about as well as we could expect and really of no recommendations for change at this time. She is not having any significant palpitations issues issues and appears to be tolerating her Toprol-XL well which I will discontinue over the next year. Her latest lipid profile which was completed on August 6, 2018 showed total cholesterol of 167, triglycerides of 100, HDL 73, LDL of 74, and VLDL of 70 which I think is reasonably good control on Zocor 20 mg daily. Her latest hemoglobin A1c was 6.9 on August 6, 2018 which suggests that her diabetes mellitus is also reasonably well controlled. Her blood pressure appears to be well-controlled today and she otherwise is doing well some sublingual plan to see her again in a year or sooner if any new cardiovascular symptoms surface in the interim. PCP:   Angelo Lund MD  
 
 
Past Medical History:  
Diagnosis Date  Allergic rhinitis  Asthma  Cardiac stress echo, normal 11/02/2012 Normal maximal stress echo study. EF 60%. Ex time 9 min 45 sec.  Chondromalacia patella  Colon polyps  Diabetes mellitus ([address]  GERD (gastroesophageal reflux disease)  History of pneumonia 01/2012 AFB smear positive. 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  Hyperlipidemia  Hypertension  Menopause  Plantar fasciitis   
 left  PSVT (paroxysmal supraventricular tachycardia) ([address] A-V dharmesh reentrant tachycardia Past Surgical History:  
Procedure Laterality Date  ENDOSCOPY, COLON, DIAGNOSTIC    
 polyp  HX CERVICAL POLYPECTOMY  HX CYST INCISION AND DRAINAGE Right 10 or more years  HX DILATION AND CURETTAGE    
 HX GYN    
 polyp on cervix  HX POLYPECTOMY    
 from rectum Current Outpatient Medications Medication Sig  
 hydroCHLOROthiazide (HYDRODIURIL) 25 mg tablet take 1/2 tablet by mouth once daily  metoprolol succinate (TOPROL-XL) 50 mg XL tablet take 1 tablet by mouth once daily  metFORMIN ER (GLUCOPHAGE XR) 500 mg tablet Take 1 Tab by mouth daily (with dinner).  fluticasone (FLONASE) 50 mcg/actuation nasal spray INSTILL 2 SPRAYS IN EACH NOSTRIL DAILY  PROAIR HFA 90 mcg/actuation inhaler inhale 2 puffs by mouth every 4 hours if needed for wheezing  simvastatin (ZOCOR) 20 mg tablet take 1 tablet by mouth at bedtime  LORazepam (ATIVAN) 1 mg tablet TAKE 1 TABLET BY MOUTH EVERY 8 HOURS AS NEEDED FOR ANXIETY  potassium chloride (K-DUR, KLOR-CON) 20 mEq tablet take 1 tablet by mouth once daily  carboxymethylcellulose sodium (REFRESH LIQUIGEL) 1 % dlgl Apply  to eye.  omeprazole (PRILOSEC) 20 mg capsule Take 1 Cap by mouth daily.   
 clotrimazole-betamethasone (LOTRISONE) topical cream Apply  to both ear canals and affected part of outer ear twice a day with a finger.  beclomethasone (QVAR) 40 mcg/actuation inhaler Take 1 Puff by inhalation two (2) times a day.  MULTIVITAMIN PO Take 1 Tab by mouth every other day. No current facility-administered medications for this visit. Allergies Allergen Reactions  Altace [Ramipril] Cough  Penicillins Other (comments) Hands peel  Sulfur Itching Social History:  
Social History Tobacco Use  Smoking status: Never Smoker  Smokeless tobacco: Never Used Substance Use Topics  Alcohol use: No  
 
   
 
Family history: family history includes Arthritis-osteo in her mother; Breast Cancer in her maternal aunt and paternal aunt; Cancer in her father; Diabetes in an other family member; Hypertension in her mother, sister, sister, and sister; Other in her sister; Stroke in an other family member. Review of Systems:   
Constitutional: Negative. Respiratory: Positive for cough. Negative for hemoptysis, shortness of breath and wheezing. Cardiovascular: Negative. Gastrointestinal: Negative. Musculoskeletal: Positive for joint pain and myalgias. Negative for falls. Neurological: Negative for dizziness. Physical Exam:  
The patient is an alert, oriented, well developed, well nourished 61 y.o.  female who was in no acute distress at the time of my examination. There were no vitals taken for this visit. BP Readings from Last 3 Encounters:  
08/15/18 124/72  
05/03/18 124/62  
02/01/18 143/88 Wt Readings from Last 3 Encounters:  
08/15/18 82.4 kg (181 lb 9.6 oz) 05/03/18 82.2 kg (181 lb 3.2 oz) 02/01/18 82.7 kg (182 lb 6.4 oz) HEENT: Conjuctiva white, mucosa moist, no pallor or cyanosis. Neck: Supple without masses, tenderness or thyromegaly. No jugular venous distention. Carotid upstrokes are full bilaterally, without bruits. Cardiovascular: Chest is symmetrical with good excursion. Jal is not displaced. No lifts, heaves or thrills. S1 and S2 are normal, without appreciable murmurs, rubs, clicks or gallops. Lungs: Clear to auscultation in all fields. Abdomen: Soft; no masses, tenderness or organomegaly. Extremities: No edema, with full peripheral pulses. Review of Data: See PMH and Cardiology and Imaging sections for cardiac testing Lab Results Component Value Date/Time Cholesterol, total 167 08/06/2018 11:43 AM  
 HDL Cholesterol 73 (H) 08/06/2018 11:43 AM  
 LDL, calculated 74 08/06/2018 11:43 AM  
 Triglyceride 100 08/06/2018 11:43 AM  
 CHOL/HDL Ratio 2.3 08/06/2018 11:43 AM  
 
 
Results for orders placed or performed in visit on 05/10/17 AMB POC EKG ROUTINE W/ 12 LEADS, INTER & REP     Status: None Narrative Normal sinus rhythm, rate 72. There is mild diffuse ST-T flattening which is nonspecific and otherwise the tracing is within normal limits. Frederick Villalpando D.O., F.A.C.C. [facility] [place] [address] [facility] [facility] [address] Daphney Phillip [phone] PLEASE NOTE:  This document has been produced using voice recognition software. Unrecognized errors in transcription may be present.

## 2018-11-08 ENCOUNTER — TELEPHONE (OUTPATIENT)
Dept: INTERNAL MEDICINE CLINIC | Age: 63
End: 2018-11-08

## 2018-11-14 ENCOUNTER — LAB ONLY (OUTPATIENT)
Dept: INTERNAL MEDICINE CLINIC | Age: 63
End: 2018-11-14

## 2018-11-14 ENCOUNTER — HOSPITAL ENCOUNTER (OUTPATIENT)
Dept: LAB | Age: 63
Discharge: HOME | End: 2018-11-14
Payer: COMMERCIAL

## 2018-11-14 DIAGNOSIS — R31.29 MICROSCOPIC HEMATURIA: ICD-10-CM

## 2018-11-14 DIAGNOSIS — Z00.01 ENCOUNTER FOR ROUTINE ADULT PHYSICAL EXAM WITH ABNORMAL FINDINGS: ICD-10-CM

## 2018-11-14 DIAGNOSIS — E11.9 TYPE 2 DIABETES MELLITUS WITHOUT COMPLICATION, WITHOUT LONG-TERM CURRENT USE OF INSULIN (HCC): ICD-10-CM

## 2018-11-14 DIAGNOSIS — J45.20 MILD INTERMITTENT ASTHMA WITHOUT COMPLICATION: ICD-10-CM

## 2018-11-14 DIAGNOSIS — K21.9 GASTROESOPHAGEAL REFLUX DISEASE WITHOUT ESOPHAGITIS: ICD-10-CM

## 2018-11-14 DIAGNOSIS — I47.1 AVNRT (AV NODAL RE-ENTRY TACHYCARDIA) (HCC): ICD-10-CM

## 2018-11-14 DIAGNOSIS — I10 ESSENTIAL HYPERTENSION: ICD-10-CM

## 2018-11-14 DIAGNOSIS — E11.9 TYPE 2 DIABETES MELLITUS WITHOUT COMPLICATION, WITHOUT LONG-TERM CURRENT USE OF INSULIN (HCC): Primary | ICD-10-CM

## 2018-11-14 DIAGNOSIS — D12.2 ADENOMATOUS POLYP OF ASCENDING COLON: ICD-10-CM

## 2018-11-14 DIAGNOSIS — E66.9 OBESITY (BMI 30.0-34.9): ICD-10-CM

## 2018-11-14 DIAGNOSIS — E55.9 VITAMIN D INSUFFICIENCY: ICD-10-CM

## 2018-11-14 DIAGNOSIS — G89.29 CHRONIC PAIN OF BOTH KNEES: ICD-10-CM

## 2018-11-14 DIAGNOSIS — M25.561 CHRONIC PAIN OF BOTH KNEES: ICD-10-CM

## 2018-11-14 DIAGNOSIS — E78.5 HYPERLIPIDEMIA, UNSPECIFIED HYPERLIPIDEMIA TYPE: ICD-10-CM

## 2018-11-14 DIAGNOSIS — M25.562 CHRONIC PAIN OF BOTH KNEES: ICD-10-CM

## 2018-11-14 LAB
AMORPH CRY URNS QL MICRO: ABNORMAL
ANION GAP SERPL CALC-SCNC: 9 MMOL/L (ref 3–18)
APPEARANCE UR: ABNORMAL
BACTERIA URNS QL MICRO: ABNORMAL /HPF
BILIRUB UR QL: NEGATIVE
BUN SERPL-MCNC: 13 MG/DL (ref 7–18)
BUN/CREAT SERPL: 20 (ref 12–20)
CALCIUM SERPL-MCNC: 8.9 MG/DL (ref 8.5–10.1)
CHLORIDE SERPL-SCNC: 104 MMOL/L (ref 100–108)
CO2 SERPL-SCNC: 29 MMOL/L (ref 21–32)
COLOR UR: YELLOW
CREAT SERPL-MCNC: 0.65 MG/DL (ref 0.6–1.3)
EPITH CASTS URNS QL MICRO: ABNORMAL /LPF (ref 0–5)
EST. AVERAGE GLUCOSE BLD GHB EST-MCNC: 157 MG/DL
GLUCOSE SERPL-MCNC: 121 MG/DL (ref 74–99)
GLUCOSE UR STRIP.AUTO-MCNC: NEGATIVE MG/DL
HBA1C MFR BLD: 7.1 % (ref 4.2–5.6)
HGB UR QL STRIP: ABNORMAL
KETONES UR QL STRIP.AUTO: NEGATIVE MG/DL
LEUKOCYTE ESTERASE UR QL STRIP.AUTO: ABNORMAL
NITRITE UR QL STRIP.AUTO: NEGATIVE
PH UR STRIP: 7 [PH] (ref 5–8)
POTASSIUM SERPL-SCNC: 4.1 MMOL/L (ref 3.5–5.5)
PROT UR STRIP-MCNC: NEGATIVE MG/DL
RBC #/AREA URNS HPF: ABNORMAL /HPF (ref 0–5)
SODIUM SERPL-SCNC: 142 MMOL/L (ref 136–145)
SP GR UR REFRACTOMETRY: 1.02 (ref 1–1.03)
UROBILINOGEN UR QL STRIP.AUTO: 0.2 EU/DL (ref 0.2–1)
WBC URNS QL MICRO: ABNORMAL /HPF (ref 0–4)

## 2018-11-14 PROCEDURE — 82043 UR ALBUMIN QUANTITATIVE: CPT

## 2018-11-14 PROCEDURE — 80048 BASIC METABOLIC PNL TOTAL CA: CPT

## 2018-11-14 PROCEDURE — 36415 COLL VENOUS BLD VENIPUNCTURE: CPT

## 2018-11-14 PROCEDURE — 83036 HEMOGLOBIN GLYCOSYLATED A1C: CPT

## 2018-11-14 PROCEDURE — 82306 VITAMIN D 25 HYDROXY: CPT

## 2018-11-14 PROCEDURE — 81001 URINALYSIS AUTO W/SCOPE: CPT

## 2018-11-15 LAB
25(OH)D3 SERPL-MCNC: 36.8 NG/ML (ref 30–100)
CREAT UR-MCNC: 175 MG/DL (ref 30–125)
MICROALBUMIN UR-MCNC: 1.2 MG/DL (ref 0–3)
MICROALBUMIN/CREAT UR-RTO: 7 MG/G (ref 0–30)

## 2018-11-29 ENCOUNTER — OFFICE VISIT (OUTPATIENT)
Dept: INTERNAL MEDICINE CLINIC | Age: 63
End: 2018-11-29

## 2018-11-29 VITALS
HEART RATE: 74 BPM | OXYGEN SATURATION: 95 % | BODY MASS INDEX: 33.68 KG/M2 | DIASTOLIC BLOOD PRESSURE: 84 MMHG | SYSTOLIC BLOOD PRESSURE: 140 MMHG | HEIGHT: 62 IN | WEIGHT: 183 LBS | RESPIRATION RATE: 16 BRPM | TEMPERATURE: 98.2 F

## 2018-11-29 DIAGNOSIS — E11.9 TYPE 2 DIABETES MELLITUS WITHOUT COMPLICATION, WITHOUT LONG-TERM CURRENT USE OF INSULIN (HCC): ICD-10-CM

## 2018-11-29 DIAGNOSIS — E55.9 VITAMIN D INSUFFICIENCY: ICD-10-CM

## 2018-11-29 DIAGNOSIS — I10 ESSENTIAL HYPERTENSION: Primary | ICD-10-CM

## 2018-11-29 DIAGNOSIS — E78.5 HYPERLIPIDEMIA, UNSPECIFIED HYPERLIPIDEMIA TYPE: ICD-10-CM

## 2018-11-29 DIAGNOSIS — J45.20 MILD INTERMITTENT ASTHMA WITHOUT COMPLICATION: ICD-10-CM

## 2018-11-29 DIAGNOSIS — E66.9 OBESITY (BMI 30.0-34.9): ICD-10-CM

## 2018-11-29 DIAGNOSIS — Z91.199 NONCOMPLIANCE: ICD-10-CM

## 2018-11-29 DIAGNOSIS — K21.9 GASTROESOPHAGEAL REFLUX DISEASE WITHOUT ESOPHAGITIS: ICD-10-CM

## 2018-11-29 DIAGNOSIS — R31.29 MICROSCOPIC HEMATURIA: ICD-10-CM

## 2018-11-29 DIAGNOSIS — M25.561 CHRONIC PAIN OF BOTH KNEES: ICD-10-CM

## 2018-11-29 DIAGNOSIS — M25.562 CHRONIC PAIN OF BOTH KNEES: ICD-10-CM

## 2018-11-29 DIAGNOSIS — I47.1 AVNRT (AV NODAL RE-ENTRY TACHYCARDIA) (HCC): ICD-10-CM

## 2018-11-29 DIAGNOSIS — G89.29 CHRONIC PAIN OF BOTH KNEES: ICD-10-CM

## 2018-11-29 RX ORDER — HYDROCHLOROTHIAZIDE 12.5 MG/1
12.5 TABLET ORAL DAILY
Qty: 90 TAB | Refills: 3 | Status: SHIPPED | OUTPATIENT
Start: 2018-11-29 | End: 2019-11-15 | Stop reason: SDUPTHER

## 2018-11-29 NOTE — PATIENT INSTRUCTIONS
Start metformin  mg with dinner. Please monitor and record your blood pressure every morning and call office if greater than 130/80. Learning About Diabetes Food Guidelines Your Care Instructions Meal planning is important to manage diabetes. It helps keep your blood sugar at a target level (which you set with your doctor). You don't have to eat special foods. You can eat what your family eats, including sweets once in a while. But you do have to pay attention to how often you eat and how much you eat of certain foods. You may want to work with a dietitian or a certified diabetes educator (CDE) to help you plan meals and snacks. A dietitian or CDE can also help you lose weight if that is one of your goals. What should you know about eating carbs? Managing the amount of carbohydrate (carbs) you eat is an important part of healthy meals when you have diabetes. Carbohydrate is found in many foods. · Learn which foods have carbs. And learn the amounts of carbs in different foods. ? Bread, cereal, pasta, and rice have about 15 grams of carbs in a serving. A serving is 1 slice of bread (1 ounce), ½ cup of cooked cereal, or 1/3 cup of cooked pasta or rice. ? Fruits have 15 grams of carbs in a serving. A serving is 1 small fresh fruit, such as an apple or orange; ½ of a banana; ½ cup of cooked or canned fruit; ½ cup of fruit juice; 1 cup of melon or raspberries; or 2 tablespoons of dried fruit. ? Milk and no-sugar-added yogurt have 15 grams of carbs in a serving. A serving is 1 cup of milk or 2/3 cup of no-sugar-added yogurt. ? Starchy vegetables have 15 grams of carbs in a serving. A serving is ½ cup of mashed potatoes or sweet potato; 1 cup winter squash; ½ of a small baked potato; ½ cup of cooked beans; or ½ cup cooked corn or green peas. · Learn how much carbs to eat each day and at each meal. A dietitian or CDE can teach you how to keep track of the amount of carbs you eat.  This is called carbohydrate counting. · If you are not sure how to count carbohydrate grams, use the Plate Method to plan meals. It is a good, quick way to make sure that you have a balanced meal. It also helps you spread carbs throughout the day. ? Divide your plate by types of foods. Put non-starchy vegetables on half the plate, meat or other protein food on one-quarter of the plate, and a grain or starchy vegetable in the final quarter of the plate. To this you can add a small piece of fruit and 1 cup of milk or yogurt, depending on how many carbs you are supposed to eat at a meal. 
· Try to eat about the same amount of carbs at each meal. Do not \"save up\" your daily allowance of carbs to eat at one meal. 
· Proteins have very little or no carbs per serving. Examples of proteins are beef, chicken, turkey, fish, eggs, tofu, cheese, cottage cheese, and peanut butter. A serving size of meat is 3 ounces, which is about the size of a deck of cards. Examples of meat substitute serving sizes (equal to 1 ounce of meat) are 1/4 cup of cottage cheese, 1 egg, 1 tablespoon of peanut butter, and ½ cup of tofu. How can you eat out and still eat healthy? · Learn to estimate the serving sizes of foods that have carbohydrate. If you measure food at home, it will be easier to estimate the amount in a serving of restaurant food. · If the meal you order has too much carbohydrate (such as potatoes, corn, or baked beans), ask to have a low-carbohydrate food instead. Ask for a salad or green vegetables. · If you use insulin, check your blood sugar before and after eating out to help you plan how much to eat in the future. · If you eat more carbohydrate at a meal than you had planned, take a walk or do other exercise. This will help lower your blood sugar. What else should you know? · Limit saturated fat, such as the fat from meat and dairy products.  This is a healthy choice because people who have diabetes are at higher risk of heart disease. So choose lean cuts of meat and nonfat or low-fat dairy products. Use olive or canola oil instead of butter or shortening when cooking. · Don't skip meals. Your blood sugar may drop too low if you skip meals and take insulin or certain medicines for diabetes. · Check with your doctor before you drink alcohol. Alcohol can cause your blood sugar to drop too low. Alcohol can also cause a bad reaction if you take certain diabetes medicines. Follow-up care is a key part of your treatment and safety. Be sure to make and go to all appointments, and call your doctor if you are having problems. It's also a good idea to know your test results and keep a list of the medicines you take. Where can you learn more? Go to http://yoselyn-merline.info/. Enter C265 in the search box to learn more about \"Learning About Diabetes Food Guidelines. \" Current as of: December 7, 2017 Content Version: 11.8 © 4950-9362 [employer]. Care instructions adapted under license by [employer] (which disclaims liability or warranty for this information). If you have questions about a medical condition or this instruction, always ask your healthcare professional. [address] any warranty or liability for your use of this information. Learning About Meal Planning for Diabetes Why plan your meals? Meal planning can be a key part of managing diabetes. Planning meals and snacks with the right balance of carbohydrate, protein, and fat can help you keep your blood sugar at the target level you set with your doctor. You don't have to eat special foods. You can eat what your family eats, including sweets once in a while. But you do have to pay attention to how often you eat and how much you eat of certain foods. You may want to work with a dietitian or a certified diabetes educator.  He or she can give you tips and meal ideas and can answer your questions about meal planning. This health professional can also help you reach a healthy weight if that is one of your goals. What plan is right for you? Your dietitian or diabetes educator may suggest that you start with the plate format or carbohydrate counting. The plate format The plate format is a simple way to help you manage how you eat. You plan meals by learning how much space each food should take on a plate. Using the plate format helps you spread carbohydrate throughout the day. It can make it easier to keep your blood sugar level within your target range. It also helps you see if you're eating healthy portion sizes. To use the plate format, you put non-starchy vegetables on half your plate. Add meat or meat substitutes on one-quarter of the plate. Put a grain or starchy vegetable (such as brown rice or a potato) on the final quarter of the plate. You can add a small piece of fruit and some low-fat or fat-free milk or yogurt, depending on your carbohydrate goal for each meal. 
Here are some tips for using the plate format: · Make sure that you are not using an oversized plate. A 9-inch plate is best. Many restaurants use larger plates. · Get used to using the plate format at home. Then you can use it when you eat out. · Write down your questions about using the plate format. Talk to your doctor, a dietitian, or a diabetes educator about your concerns. Carbohydrate counting With carbohydrate counting, you plan meals based on the amount of carbohydrate in each food. Carbohydrate raises blood sugar higher and more quickly than any other nutrient. It is found in desserts, breads and cereals, and fruit. It's also found in starchy vegetables such as potatoes and corn, grains such as rice and pasta, and milk and yogurt. Spreading carbohydrate throughout the day helps keep your blood sugar levels within your target range.  
Your daily amount depends on several things, including your weight, how active you are, which diabetes medicines you take, and what your goals are for your blood sugar levels. A registered dietitian or diabetes educator can help you plan how much carbohydrate to include in each meal and snack. A guideline for your daily amount of carbohydrate is: · 45 to 60 grams at each meal. That's about the same as 3 to 4 carbohydrate servings. · 15 to 20 grams at each snack. That's about the same as 1 carbohydrate serving. The Nutrition Facts label on packaged foods tells you how much carbohydrate is in a serving of the food. First, look at the serving size on the food label. Is that the amount you eat in a serving? All of the nutrition information on a food label is based on that serving size. So if you eat more or less than that, you'll need to adjust the other numbers. Total carbohydrate is the next thing you need to look for on the label. If you count carbohydrate servings, one serving of carbohydrate is 15 grams. For foods that don't come with labels, such as fresh fruits and vegetables, you'll need a guide that lists carbohydrate in these foods. Ask your doctor, dietitian, or diabetes educator about books or other nutrition guides you can use. If you take insulin, you need to know how many grams of carbohydrate are in a meal. This lets you know how much rapid-acting insulin to take before you eat. If you use an insulin pump, you get a constant rate of insulin during the day. So the pump must be programmed at meals to give you extra insulin to cover the rise in blood sugar after meals. When you know how much carbohydrate you will eat, you can take the right amount of insulin. Or, if you always use the same amount of insulin, you need to make sure that you eat the same amount of carbohydrate at meals. If you need more help to understand carbohydrate counting and food labels, ask your doctor, dietitian, or diabetes educator. How do you get started with meal planning? Here are some tips to get started: 
· Plan your meals a week at a time. Don't forget to include snacks too. · Use cookbooks or online recipes to plan several main meals. Plan some quick meals for busy nights. You also can double some recipes that freeze well. Then you can save half for other busy nights when you don't have time to cook. · Make sure you have the ingredients you need for your recipes. If you're running low on basic items, put these items on your shopping list too. · List foods that you use to make breakfasts, lunches, and snacks. List plenty of fruits and vegetables. · Post this list on the refrigerator. Add to it as you think of more things you need. · Take the list to the store to do your weekly shopping. Follow-up care is a key part of your treatment and safety. Be sure to make and go to all appointments, and call your doctor if you are having problems. It's also a good idea to know your test results and keep a list of the medicines you take. Where can you learn more? Go to http://yoselyn-merline.info/. Miranda Carpenter in the search box to learn more about \"Learning About Meal Planning for Diabetes. \" Current as of: December 7, 2017 Content Version: 11.8 © 7649-3913 [employer]. Care instructions adapted under license by [employer] (which disclaims liability or warranty for this information). If you have questions about a medical condition or this instruction, always ask your healthcare professional. [address] any warranty or liability for your use of this information. DASH Diet: Care Instructions Your Care Instructions The DASH diet is an eating plan that can help lower your blood pressure. DASH stands for Dietary Approaches to Stop Hypertension. Hypertension is high blood pressure.  
The DASH diet focuses on eating foods that are high in calcium, potassium, and magnesium. These nutrients can lower blood pressure. The foods that are highest in these nutrients are fruits, vegetables, low-fat dairy products, nuts, seeds, and legumes. But taking calcium, potassium, and magnesium supplements instead of eating foods that are high in those nutrients does not have the same effect. The DASH diet also includes whole grains, fish, and poultry. The DASH diet is one of several lifestyle changes your doctor may recommend to lower your high blood pressure. Your doctor may also want you to decrease the amount of sodium in your diet. Lowering sodium while following the DASH diet can lower blood pressure even further than just the DASH diet alone. Follow-up care is a key part of your treatment and safety. Be sure to make and go to all appointments, and call your doctor if you are having problems. It's also a good idea to know your test results and keep a list of the medicines you take. How can you care for yourself at home? Following the DASH diet · Eat 4 to 5 servings of fruit each day. A serving is 1 medium-sized piece of fruit, ½ cup chopped or canned fruit, 1/4 cup dried fruit, or 4 ounces (½ cup) of fruit juice. Choose fruit more often than fruit juice. · Eat 4 to 5 servings of vegetables each day. A serving is 1 cup of lettuce or raw leafy vegetables, ½ cup of chopped or cooked vegetables, or 4 ounces (½ cup) of vegetable juice. Choose vegetables more often than vegetable juice. · Get 2 to 3 servings of low-fat and fat-free dairy each day. A serving is 8 ounces of milk, 1 cup of yogurt, or 1 ½ ounces of cheese. · Eat 6 to 8 servings of grains each day. A serving is 1 slice of bread, 1 ounce of dry cereal, or ½ cup of cooked rice, pasta, or cooked cereal. Try to choose whole-grain products as much as possible. · Limit lean meat, poultry, and fish to 2 servings each day. A serving is 3 ounces, about the size of a deck of cards. · Eat 4 to 5 servings of nuts, seeds, and legumes (cooked dried beans, lentils, and split peas) each week. A serving is 1/3 cup of nuts, 2 tablespoons of seeds, or ½ cup of cooked beans or peas. · Limit fats and oils to 2 to 3 servings each day. A serving is 1 teaspoon of vegetable oil or 2 tablespoons of salad dressing. · Limit sweets and added sugars to 5 servings or less a week. A serving is 1 tablespoon jelly or jam, ½ cup sorbet, or 1 cup of lemonade. · Eat less than 2,300 milligrams (mg) of sodium a day. If you limit your sodium to 1,500 mg a day, you can lower your blood pressure even more. Tips for success · Start small. Do not try to make dramatic changes to your diet all at once. You might feel that you are missing out on your favorite foods and then be more likely to not follow the plan. Make small changes, and stick with them. Once those changes become habit, add a few more changes. · Try some of the following: ? Make it a goal to eat a fruit or vegetable at every meal and at snacks. This will make it easy to get the recommended amount of fruits and vegetables each day. ? Try yogurt topped with fruit and nuts for a snack or healthy dessert. ? Add lettuce, tomato, cucumber, and onion to sandwiches. ? Combine a ready-made pizza crust with low-fat mozzarella cheese and lots of vegetable toppings. Try using tomatoes, squash, spinach, broccoli, carrots, cauliflower, and onions. ? Have a variety of cut-up vegetables with a low-fat dip as an appetizer instead of chips and dip. ? Sprinkle sunflower seeds or chopped almonds over salads. Or try adding chopped walnuts or almonds to cooked vegetables. ? Try some vegetarian meals using beans and peas. Add garbanzo or kidney beans to salads. Make burritos and tacos with mashed ko beans or black beans. Where can you learn more? Go to http://yoselyn-merline.info/.  
Enter O560 in the search box to learn more about \"DASH Diet: Care Instructions. \" Current as of: December 6, 2017 Content Version: 11.8 © 4439-9007 [employer]wise, [facility]. Care instructions adapted under license by [employer] (which disclaims liability or warranty for this information). If you have questions about a medical condition or this instruction, always ask your healthcare professional. [place] [address] any warranty or liability for your use of this information.

## 2018-11-29 NOTE — PROGRESS NOTES
Chief Complaint Patient presents with  Diabetes 3 month follow up with lab results. Health Maintenance Due Topic Date Due  Shingrix Vaccine Age 50> (1 of 2) 03/26/2005 1. Have you been to the ER, urgent care clinic or hospitalized since your last visit? YES. [facility] Nov[facility], 2018 for sinus issues. 2. Have you seen or consulted any other health care providers outside of the [address] [place] [address] since your last visit (Include any pap smears or colon screening)?  NO

## 2018-12-01 ENCOUNTER — HOSPITAL ENCOUNTER (OUTPATIENT)
Dept: MAMMOGRAPHY | Age: 63
Discharge: HOME | End: 2018-12-01
Attending: INTERNAL MEDICINE
Payer: COMMERCIAL

## 2018-12-01 DIAGNOSIS — Z12.31 VISIT FOR SCREENING MAMMOGRAM: ICD-10-CM

## 2018-12-01 PROCEDURE — 77063 BREAST TOMOSYNTHESIS BI: CPT

## 2018-12-02 PROBLEM — Z91.199 NONCOMPLIANCE: Status: ACTIVE | Noted: 2018-12-02

## 2018-12-02 NOTE — PROGRESS NOTES
HPI:  
Jared Culver is a 61y.o. year old female who presents today for a physical exam. She has a history of hypertension, hyperlipidemia, paroxysmal SVT, diabetes mellitus, GERD, asthma, elevated transaminases, and atypical mycobacterial pneumonia. She reports that she is doing relatively well. She reports that she went to [facility] last week due to headache and post nasal drainage and was prescribed Cefuroxime for a sinus infection. She states that she feels better today. She admits that she has not been taking metformin as prescribed for unclear reasons. She is otherwise without complaints and feeling well. She has a history of hypertension, treated with metoprolol and hydrochlorothiazide (+ potassium). She reports that she does not check her blood pressure at home. She does not exercise regularly, but denies any chest pain, shortness of breath at rest or with exertion, lightheadedness, or edema. 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 She has had approximately six severe episodes over the years, prompting presentation to the ED and treatment with IV Adenosine. She currently reports infrequent short episodes of palpitations and is being treated with metoprolol. She is followed by Dr. Kris So. She had an echocardiogram (10/2005) showing normal LV size and function (EF 60-65%), and no valvular pathology. In 11/2012, she underwent an exercise stress echocardiogram, which was normal at maximal exercise. She has a history of hyperlipidemia, treated with simvastatin from 10/2012 to 3/2015 at which time she stopped taking it due to myalgias. She restarted it on 8/2015 and continued to take it without difficulty until 3/2016, when it was noticed that she had transaminase elevation (AST 85/ ) and it was discontinued.  Evaluation included hepatitis A, B, and C levels (negative), iron panel (normal), and RUQ ultrasound (3/23/2016) with limited sonographic window for the liver; only partially visualized but grossly unremarkable. Repeat hepatic panel (4/22/2016) showed AST 75 and ALT 98. Repeat lipid panel showed total chol 215/ / HDL 64/. In 5/2016, she had an abdominal CT scan showing the liver to be normal in size with normal parenchymal density; no discrete mass or ascites, and no intrahepatic biliary dilatation. She was subsequently instructed to restart simvastatin, but chose not to since she felt it was making her forgetful. She agreed to trial of rosuvastatin 10 mg and started it in 2/2018. However, after two weeks of therapy, she discontinued it since she thought it was causing her leg pain. She subsequently contacted Dr. Valentine Richard office and asked to begin simvastatin 20 mg daily in 4/2018. She has been taking it without difficulty since restarting. She has a history of diabetes mellitus, with impaired fasting glucose ranging from 103-111 since 2012, and HbA1c to 6.6-6.8 since 9/2016 (not checked previously). She denies any polyuria, polydipsia, nocturia, or blurry vision, and has no history of retinopathy, neuropathy, or nephropathy. She has regular eye exams with Dr. Rich Jorge. She has a history of asthma and allergic rhinitis and is followed by Dr. Regla Patton. She is receiving immunotherapy once per month, and reports that she has not required any inhalers recently. She states that she does not use Qvar daily, but will take it occasionally. She does use Claritin every day. In 10/2017, she fell down the steps of her porch and had difficulty with right knee pain and swelling. She was evaluated by Dr. Tyree Tejeda in 12/2017, and right knee xray showed decreased medial joint space, and moderate degenerative changes. She received a cortisone injection, but did not notice any improvement.  She underwent an MRI of the right knee (1/29/2018) which showed complete tear of posterior horn and root of the medial meniscus; tear of the body and posterior horn of the lateral meniscus; tricompartmental osteoarthritis most prominent in medial and patellofemoral compartments; moderate joint effusion; small Baker's cyst. It was recommended that she consider knee replacement and arthroscopy. However, she decided to seek a second opinion and was evaluated by Dr. Elena Melgar. She also underwent an MRI of her left knee (3/23/2018) showing radial tear posterior horn medial meniscus with extrusion of the body. Also 
radial tear in the mid body; lateral meniscus intrasubstance degeneration and probable small undersurface tear of the posterior horn; medial and patellofemoral compartments moderate chondral loss; s. ubchondral bone marrow edema in the medial tibial plateau, likely reactive. She is completed physical therapy for her bilateral knees and feels that it may have helped. 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 She was treated with Avelox, and repeat chest x-ray in 1/2012 showed complete resolution of the pneumonia. She denies any cough or shortness of breath. She had a screening colonoscopy in 12/2006 by Dr. Carlotta Cristobal showing a 5 mm sessile polyp in the rectum (pathology: hyperplastic). She had a repeat colonoscopy in 12/2016 which showed moderate sigmoid diverticulosis and a 6 mm sessile ascending colon polyp (pathology: serrated adenoma). Follow-up recommended for 5 years. She denies any abdominal pain, nausea, vomiting, melena, hematochezia, or change in bowel movements. She does take omeprazole occasionally for GERD symptoms. In 12/2017, she was referred by her gynecologist for evaluation by Dr. James Silva for microscopic hematuria, and urine cytology was negative.  However, she states that she refused his recommendations to undergo a CT urogram or cystoscopy. She denies any dysuria, gross hematuria, or flank pain. Past Medical History:  
Diagnosis Date  Allergic rhinitis  Asthma  Cardiac stress echo, normal 11/02/2012 Normal maximal stress echo study. EF 60%. Ex time 9 min 45 sec.  Chondromalacia patella  Colon polyps  Diabetes mellitus ([address]  GERD (gastroesophageal reflux disease)  History of pneumonia 01/2012 AFB smear positive. 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  Hyperlipidemia  Hypertension  Menopause  Plantar fasciitis   
 left  PSVT (paroxysmal supraventricular tachycardia) ([address] A-V dharmesh reentrant tachycardia Past Surgical History:  
Procedure Laterality Date  ENDOSCOPY, COLON, DIAGNOSTIC    
 polyp  HX CERVICAL POLYPECTOMY  HX CYST INCISION AND DRAINAGE Right 10 or more years  HX DILATION AND CURETTAGE    
 HX GYN    
 polyp on cervix  HX POLYPECTOMY    
 from rectum Current Outpatient Medications Medication Sig  
 hydroCHLOROthiazide (HYDRODIURIL) 12.5 mg tablet Take 1 Tab by mouth daily.  metoprolol succinate (TOPROL-XL) 50 mg XL tablet take 1 tablet by mouth once daily  fluticasone (FLONASE) 50 mcg/actuation nasal spray INSTILL 2 SPRAYS IN EACH NOSTRIL DAILY  simvastatin (ZOCOR) 20 mg tablet take 1 tablet by mouth at bedtime  LORazepam (ATIVAN) 1 mg tablet TAKE 1 TABLET BY MOUTH EVERY 8 HOURS AS NEEDED FOR ANXIETY  potassium chloride (K-DUR, KLOR-CON) 20 mEq tablet take 1 tablet by mouth once daily  clotrimazole-betamethasone (LOTRISONE) topical cream Apply  to both ear canals and affected part of outer ear twice a day with a finger.  MULTIVITAMIN PO Take 1 Tab by mouth every other day.  metFORMIN ER (GLUCOPHAGE XR) 500 mg tablet Take 1 Tab by mouth daily (with dinner).  PROAIR HFA 90 mcg/actuation inhaler inhale 2 puffs by mouth every 4 hours if needed for wheezing  carboxymethylcellulose sodium (REFRESH LIQUIGEL) 1 % dlgl Apply  to eye.  omeprazole (PRILOSEC) 20 mg capsule Take 1 Cap by mouth daily.  beclomethasone (QVAR) 40 mcg/actuation inhaler Take 1 Puff by inhalation two (2) times a day. No current facility-administered medications for this visit. Allergies and Intolerances: Allergies Allergen Reactions  Altace [Ramipril] Cough  Penicillins Other (comments) Hands peel  Sulfur Itching Family History: She had two aunts who had breast cancer (her mother's sister and father's sister). She has no FH of colon cancer. Her mother passed away from uterine cancer. Her father  from metastatic prostate cancer. Family History Problem Relation Age of Onset [facility] Arthritis-osteo Mother  Hypertension Mother  Cancer Father   
     bone cancer  Hypertension Sister  Hypertension Sister  Hypertension Sister  Breast Cancer Maternal Aunt  Breast Cancer Paternal Aunt  Diabetes Other  Stroke Other  Other Sister   
     twin sister - osteopenia and low vitamin D levels Social History: She  reports that  has never smoked. she has never used smokeless tobacco. She is  and has two sons. She was a homemaker, but worked part-time in . She now helps care for her grandchildren. Social History Substance and Sexual Activity Alcohol Use No  
 
Immunization History: 
Immunization History Administered Date(s) Administered  Influenza Vaccine (Quad) PF 10/23/2015, 10/19/2016, 10/05/2017, 10/26/2018  Influenza Vaccine PF 2013, 10/03/2014  Influenza Vaccine Split 2011, 10/22/2012  Influenza Vaccine Whole 10/29/2010  PPD 2012  Pneumococcal Polysaccharide (PPSV-23) 2017  TB Skin Test (PPD) Intradermal 2014  TDAP Vaccine 2012 Review of Systems: As above included in HPI. Otherwise 11 point review of systems negative including constitutional, skin, HENT, eyes, respiratory, cardiovascular, gastrointestinal, genitourinary, musculoskeletal, endocrine, hematologic, allergy, and neurologic. Physical:  
Vitals:  
BP: 140/84; repeat 118/70 left arm HR: 74 
WT: 183 lb (83 kg) BMI:  33.22 kg/m2 Exam:  
Patient appears in no apparent distress. Affect is appropriate. HEENT: PERRLA, anicteric, oropharynx clear, no JVD, adenopathy or thyromegaly. No carotid bruits or radiated murmur. Lungs: clear to auscultation, no wheezes, rhonchi, or rales. Heart: regular rate and rhythm. No murmur, rubs, gallops Abdomen: soft, nontender, nondistended, normal bowel sounds, no hepatosplenomegaly or masses. Extremities: without edema. Pulses 1-2+ bilaterally. Review of Data: 
Labs: Hospital Outpatient Visit on 11/14/2018 Component Date Value Ref Range Status  Hemoglobin A1c 11/14/2018 7.1* 4.2 - 5.6 % Final  
 Est. average glucose 11/14/2018 157  mg/dL Final  
 Sodium 11/14/2018 142  136 - 145 mmol/L Final  
 Potassium 11/14/2018 4.1  3.5 - 5.5 mmol/L Final  
 Chloride 11/14/2018 104  100 - 108 mmol/L Final  
 CO2 11/14/2018 29  21 - 32 mmol/L Final  
 Anion gap 11/14/2018 9  3.0 - 18 mmol/L Final  
 Glucose 11/14/2018 121* 74 - 99 mg/dL Final  
 BUN 11/14/2018 13  7.0 - 18 MG/DL Final  
 Creatinine 11/14/2018 0.65  0.6 - 1.3 MG/DL Final  
 BUN/Creatinine ratio 11/14/2018 20  12 - 20   Final  
 GFR est AA 11/14/2018 >60  >60 ml/min/1.73m2 Final  
 GFR est non-AA 11/14/2018 >60  >60 ml/min/1.73m2 Final  
 Calcium 11/14/2018 8.9  8.5 - 10.1 MG/DL Final  
 Vitamin D 25-Hydroxy 11/14/2018 36.8  30 - 100 ng/mL Final  
 Color 11/14/2018 YELLOW    Final  
 Appearance 11/14/2018 CLOUDY    Final  
 Specific gravity 11/14/2018 1.019  1.005 - 1.030   Final  
 pH (UA) 11/14/2018 7.0  5.0 - 8.0   Final  
 Protein 11/14/2018 NEGATIVE   NEG mg/dL Final  
  Glucose 11/14/2018 NEGATIVE   NEG mg/dL Final  
 Ketone 11/14/2018 NEGATIVE   NEG mg/dL Final  
 Bilirubin 11/14/2018 NEGATIVE   NEG   Final  
 Blood 11/14/2018 TRACE* NEG   Final  
 Urobilinogen 11/14/2018 0.2  0.2 - 1.0 EU/dL Final  
 Nitrites 11/14/2018 NEGATIVE   NEG   Final  
 Leukocyte Esterase 11/14/2018 TRACE* NEG   Final  
 WBC 11/14/2018 2 to 4  0 - 4 /hpf Final  
 RBC 11/14/2018 2 to 3  0 - 5 /hpf Final  
 Epithelial cells 11/14/2018 2+  0 - 5 /lpf Final  
 Bacteria 11/14/2018 1+* NEG /hpf Final  
 Amorphous Crystals 11/14/2018 2+* NEG Final  
 Microalbumin,urine random 11/14/2018 1.20  0 - 3.0 MG/DL Final  
 Creatinine, urine 11/14/2018 175.00* 30 - 125 mg/dL Final  
 Microalbumin/Creat ratio (mg/g cre* 11/14/2018 7  0 - 30 mg/g Final  
 
Health Maintenance: 
Screening:  
 Mammogram: negative (11/2017). Scheduled 12/1/2018. PAP smear: negative (10/2016) with negative HPV. Followed by Dr. Tato Currie. Colorectal: colonoscopy (12/2016) serrated adenoma. Dr. Shai García. Due 2021. Depression: none DM (HbA1c/FPG): HbA1c 7.1 (11/2018) Hepatitis C: negative (3/2016) Falls: one DEXA: within normal limits (11/2015) Glaucoma: regular eye exams with Dr. Mo Bill (last 9/2018) Smoking: none Vitamin D: 36.8 (11/2018) Medicare Wellness: N/A Impression: 
Patient Active Problem List  
Diagnosis Code  Benign hypertensive heart disease without congestive heart failure I11.9  Allergic rhinitis J30.9  Colon polyps K63.5  AVNRT (AV dharmesh re-entry tachycardia) (HCC) I47.1  Hyperlipidemia E78.5  Essential hypertension I10  
 Asthma J45.909  GERD (gastroesophageal reflux disease) K21.9  Type 2 diabetes mellitus without complication, without long-term current use of insulin (HCC) E11.9  Vitamin D insufficiency E55.9  Microscopic hematuria R31.29  
 Chronic pain of both knees M25.561, M25.562, G89.29  
 Obesity (BMI 30.0-34. 9) E66.9 Plan: 1. Diabetes mellitus. Diagnosed in 9/2016 when HbA1c was checked and found to be elevated at 6.6. Has been steadily increasing, and now 7.1 today. Impaired fasting glucose has been present intermittently since 2012. She has met several times with the diabetes educator, but her HbA1c continues to worsen. Metformin 500 mg bid ordered in 5/2018, but patient discontinued after 2 weeks for unclear reasons. Prescribed metformin  mg at dinner in 8/2018, but she reports today that she did not start. Stressed importance today and patient stating she will begin. No evidence of microvascular complications. Not on ARB or statin. ACE Inhibitor or ARB therapy not prescibed for medical reasons as blood pressure would not tolerate on current regimen. Also with reported cough with Ace-I. Discussed discontinuing hydrochlorothiazide and beginning ARB, but patient not wishing to change regimen currently. Has resumed taking simvastatin. Continue regular eye exams with Dr. Gonzalez Goodson. Foot exam normal (5/2018) and urine microalbumin/ creatinine ratio without evidence of microalbuminuria. 2. Hypertension. Well controlled on current regimen of metoprolol XL 50 mg daily and hydrochlorothiazide 12.5 mg daily. Discussed discontinuing hydrochlorothiazide and beginning ARB for mirna-protective effect, but patient not willing. Renal function normal with creatinine 0.65 / eGFR >60. Continue to follow. 3. Hyperlipidemia. On moderate intensity dose simvastatin 20 mg daily with LDL 74 , indicative of good control. Continue to follow. 4. Elevated LFT's. Resolved. Unclear etiology, but doubt secondary to statin. Hepatitis panel and iron studies normal. RUQ ultrasound difficult secondary to body habitus, but abdominal CT scan showed normal liver parenchyma. Will continue to follow. 5. AV dharmesh reentrant tachycardia. No recent episodes. On metoprolol and no significant palpitations recently. Followed by Dr. Jayna Cervantes. 6. Asthma, mild intermittent. Associated with allergies. Patient states that she is not using Qvar. Using albuterol only as needed. Receiving monthly immunotherapy injections. Followed by Dr. Deepti Marks. 7. GERD. Symptoms generally controlled with prn omeprazole. She states that she is currently not taking it. Follow. 8. Microscopic hematuria. Patient refusing further evaluation with cystoscopy or CT urogram. Urine cytology negative. Did have abdominal CT scan in 5/2016 where kidneys appeared normal. Recommended that she complete evaluation with Dr. Justice Martines. Repeat urinalysis with evidence trace blood and 2-4 RBCs. 9. Bilateral knee pain. Did not respond to cortisone injection. Had both right and left knee MRI showing variable degrees of menisci tears and osteoarthritis of knee joint. Now being followed by Dr. Deja Michelle. 10. Obesity. Emphasized importance of lifestyle modifications, including diet, exercise, and weight loss. Weight actually increased two pounds since last visit. Will readdress next visit. 11. Health maintenance. Already received influenza vaccine. Given script for Shingrix vaccine. Already received Pneumovax given asthma history. Colonoscopy due 2021. Mammogram scheduled. Continue regular eye exams with Dr. Robles Edward. Vitamin D level normal. Continue maintenance dose supplement. Patient understands recommendations and agrees with plan. Follow-up in 3 months.

## 2018-12-04 ENCOUNTER — TELEPHONE (OUTPATIENT)
Dept: INTERNAL MEDICINE CLINIC | Age: 63
End: 2018-12-04

## 2018-12-04 NOTE — TELEPHONE ENCOUNTER
Pt calling, says she had to go to [facility] on Sunday. Says he had an allergic reaction to Cefuroxime Axetil 250 mg    Says they gave her prednisone. She is allergic to Sulfur and penicillin. [facility] to get copy of the note.

## 2018-12-17 RX ORDER — SIMVASTATIN 20 MG/1
TABLET, FILM COATED ORAL
Qty: 30 TAB | Refills: 6 | Status: SHIPPED | OUTPATIENT
Start: 2018-12-17 | End: 2019-09-04 | Stop reason: SDUPTHER

## 2018-12-24 RX ORDER — POTASSIUM CHLORIDE 20 MEQ/1
TABLET, EXTENDED RELEASE ORAL
Qty: 90 TAB | Refills: 3 | Status: SHIPPED | OUTPATIENT
Start: 2018-12-24 | End: 2020-01-13

## 2019-03-12 ENCOUNTER — APPOINTMENT (OUTPATIENT)
Dept: INTERNAL MEDICINE CLINIC | Age: 64
End: 2019-03-12

## 2019-03-12 ENCOUNTER — HOSPITAL ENCOUNTER (OUTPATIENT)
Dept: LAB | Age: 64
Discharge: HOME | End: 2019-03-12
Payer: COMMERCIAL

## 2019-03-12 DIAGNOSIS — E11.9 TYPE 2 DIABETES MELLITUS WITHOUT COMPLICATION, WITHOUT LONG-TERM CURRENT USE OF INSULIN (HCC): ICD-10-CM

## 2019-03-12 DIAGNOSIS — R31.29 MICROSCOPIC HEMATURIA: ICD-10-CM

## 2019-03-12 DIAGNOSIS — K21.9 GASTROESOPHAGEAL REFLUX DISEASE WITHOUT ESOPHAGITIS: ICD-10-CM

## 2019-03-12 DIAGNOSIS — Z91.199 NONCOMPLIANCE: ICD-10-CM

## 2019-03-12 DIAGNOSIS — E66.9 OBESITY (BMI 30.0-34.9): ICD-10-CM

## 2019-03-12 DIAGNOSIS — J45.20 MILD INTERMITTENT ASTHMA WITHOUT COMPLICATION: ICD-10-CM

## 2019-03-12 DIAGNOSIS — M25.562 CHRONIC PAIN OF BOTH KNEES: ICD-10-CM

## 2019-03-12 DIAGNOSIS — E55.9 VITAMIN D INSUFFICIENCY: ICD-10-CM

## 2019-03-12 DIAGNOSIS — E78.5 HYPERLIPIDEMIA, UNSPECIFIED HYPERLIPIDEMIA TYPE: ICD-10-CM

## 2019-03-12 DIAGNOSIS — I10 ESSENTIAL HYPERTENSION: ICD-10-CM

## 2019-03-12 DIAGNOSIS — G89.29 CHRONIC PAIN OF BOTH KNEES: ICD-10-CM

## 2019-03-12 DIAGNOSIS — M25.561 CHRONIC PAIN OF BOTH KNEES: ICD-10-CM

## 2019-03-12 DIAGNOSIS — I47.1 AVNRT (AV NODAL RE-ENTRY TACHYCARDIA) (HCC): ICD-10-CM

## 2019-03-12 LAB
APPEARANCE UR: CLEAR
BACTERIA URNS QL MICRO: NEGATIVE /HPF
BILIRUB UR QL: NEGATIVE
COLOR UR: YELLOW
EPITH CASTS URNS QL MICRO: ABNORMAL /LPF (ref 0–5)
GLUCOSE UR STRIP.AUTO-MCNC: NEGATIVE MG/DL
HGB UR QL STRIP: ABNORMAL
KETONES UR QL STRIP.AUTO: NEGATIVE MG/DL
LEUKOCYTE ESTERASE UR QL STRIP.AUTO: NEGATIVE
NITRITE UR QL STRIP.AUTO: NEGATIVE
PH UR STRIP: 6.5 [PH] (ref 5–8)
PROT UR STRIP-MCNC: NEGATIVE MG/DL
RBC #/AREA URNS HPF: ABNORMAL /HPF (ref 0–5)
SP GR UR REFRACTOMETRY: 1.02 (ref 1–1.03)
UROBILINOGEN UR QL STRIP.AUTO: 0.2 EU/DL (ref 0.2–1)
WBC URNS QL MICRO: ABNORMAL /HPF (ref 0–4)

## 2019-03-12 PROCEDURE — 81001 URINALYSIS AUTO W/SCOPE: CPT

## 2019-03-12 PROCEDURE — 80061 LIPID PANEL: CPT

## 2019-03-12 PROCEDURE — 80053 COMPREHEN METABOLIC PANEL: CPT

## 2019-03-12 PROCEDURE — 36415 COLL VENOUS BLD VENIPUNCTURE: CPT

## 2019-03-12 PROCEDURE — 83036 HEMOGLOBIN GLYCOSYLATED A1C: CPT

## 2019-03-13 LAB
ALBUMIN SERPL-MCNC: 4.3 G/DL (ref 3.4–5)
ALBUMIN/GLOB SERPL: 1.3 {RATIO} (ref 0.8–1.7)
ALP SERPL-CCNC: 84 U/L (ref 45–117)
ALT SERPL-CCNC: 32 U/L (ref 13–56)
ANION GAP SERPL CALC-SCNC: 7 MMOL/L (ref 3–18)
AST SERPL-CCNC: 22 U/L (ref 15–37)
BILIRUB SERPL-MCNC: 0.3 MG/DL (ref 0.2–1)
BUN SERPL-MCNC: 14 MG/DL (ref 7–18)
BUN/CREAT SERPL: 19 (ref 12–20)
CALCIUM SERPL-MCNC: 8.7 MG/DL (ref 8.5–10.1)
CHLORIDE SERPL-SCNC: 101 MMOL/L (ref 100–108)
CHOLEST SERPL-MCNC: 174 MG/DL
CO2 SERPL-SCNC: 31 MMOL/L (ref 21–32)
CREAT SERPL-MCNC: 0.73 MG/DL (ref 0.6–1.3)
EST. AVERAGE GLUCOSE BLD GHB EST-MCNC: 160 MG/DL
GLOBULIN SER CALC-MCNC: 3.3 G/DL (ref 2–4)
GLUCOSE SERPL-MCNC: 135 MG/DL (ref 74–99)
HBA1C MFR BLD: 7.2 % (ref 4.2–5.6)
HDLC SERPL-MCNC: 75 MG/DL (ref 40–60)
HDLC SERPL: 2.3 {RATIO} (ref 0–5)
LDLC SERPL CALC-MCNC: 74.8 MG/DL (ref 0–100)
LIPID PROFILE,FLP: ABNORMAL
POTASSIUM SERPL-SCNC: 3.8 MMOL/L (ref 3.5–5.5)
PROT SERPL-MCNC: 7.6 G/DL (ref 6.4–8.2)
SODIUM SERPL-SCNC: 139 MMOL/L (ref 136–145)
TRIGL SERPL-MCNC: 121 MG/DL (ref ?–150)
VLDLC SERPL CALC-MCNC: 24.2 MG/DL

## 2019-03-19 ENCOUNTER — HOSPITAL ENCOUNTER (OUTPATIENT)
Dept: GENERAL RADIOLOGY | Age: 64
Discharge: HOME | End: 2019-03-19
Payer: COMMERCIAL

## 2019-03-19 ENCOUNTER — OFFICE VISIT (OUTPATIENT)
Dept: INTERNAL MEDICINE CLINIC | Age: 64
End: 2019-03-19

## 2019-03-19 VITALS
WEIGHT: 183 LBS | HEIGHT: 62 IN | OXYGEN SATURATION: 97 % | DIASTOLIC BLOOD PRESSURE: 84 MMHG | TEMPERATURE: 97.8 F | HEART RATE: 73 BPM | SYSTOLIC BLOOD PRESSURE: 148 MMHG | RESPIRATION RATE: 18 BRPM | BODY MASS INDEX: 33.68 KG/M2

## 2019-03-19 DIAGNOSIS — K21.9 GASTROESOPHAGEAL REFLUX DISEASE WITHOUT ESOPHAGITIS: ICD-10-CM

## 2019-03-19 DIAGNOSIS — E66.9 OBESITY (BMI 30.0-34.9): ICD-10-CM

## 2019-03-19 DIAGNOSIS — J45.20 MILD INTERMITTENT ASTHMA WITHOUT COMPLICATION: ICD-10-CM

## 2019-03-19 DIAGNOSIS — M54.2 NECK PAIN: ICD-10-CM

## 2019-03-19 DIAGNOSIS — M25.562 CHRONIC PAIN OF BOTH KNEES: ICD-10-CM

## 2019-03-19 DIAGNOSIS — F41.9 ANXIETY: ICD-10-CM

## 2019-03-19 DIAGNOSIS — E11.9 TYPE 2 DIABETES MELLITUS WITHOUT COMPLICATION, WITHOUT LONG-TERM CURRENT USE OF INSULIN (HCC): ICD-10-CM

## 2019-03-19 DIAGNOSIS — I10 ESSENTIAL HYPERTENSION: Primary | ICD-10-CM

## 2019-03-19 DIAGNOSIS — Z91.199 NONCOMPLIANCE: ICD-10-CM

## 2019-03-19 DIAGNOSIS — R31.29 MICROSCOPIC HEMATURIA: ICD-10-CM

## 2019-03-19 DIAGNOSIS — G89.29 CHRONIC PAIN OF BOTH KNEES: ICD-10-CM

## 2019-03-19 DIAGNOSIS — I11.9 BENIGN HYPERTENSIVE HEART DISEASE WITHOUT CONGESTIVE HEART FAILURE: ICD-10-CM

## 2019-03-19 DIAGNOSIS — M25.561 CHRONIC PAIN OF BOTH KNEES: ICD-10-CM

## 2019-03-19 DIAGNOSIS — I47.1 AVNRT (AV NODAL RE-ENTRY TACHYCARDIA) (HCC): ICD-10-CM

## 2019-03-19 DIAGNOSIS — D12.2 ADENOMATOUS POLYP OF ASCENDING COLON: ICD-10-CM

## 2019-03-19 DIAGNOSIS — E78.5 HYPERLIPIDEMIA, UNSPECIFIED HYPERLIPIDEMIA TYPE: ICD-10-CM

## 2019-03-19 PROCEDURE — 72040 X-RAY EXAM NECK SPINE 2-3 VW: CPT

## 2019-03-19 RX ORDER — LORAZEPAM 1 MG/1
TABLET ORAL
Qty: 30 TAB | Refills: 0 | Status: SHIPPED | OUTPATIENT
Start: 2019-03-19 | End: 2020-08-18 | Stop reason: SDUPTHER

## 2019-03-19 RX ORDER — LOSARTAN POTASSIUM 25 MG/1
25 TABLET ORAL DAILY
Qty: 90 TAB | Refills: 2 | Status: SHIPPED | OUTPATIENT
Start: 2019-03-19 | End: 2019-10-08 | Stop reason: SDUPTHER

## 2019-03-19 RX ORDER — IBUPROFEN 800 MG/1
TABLET ORAL
Refills: 0 | COMMUNITY
Start: 2019-02-11 | End: 2019-03-19

## 2019-03-19 RX ORDER — METFORMIN HYDROCHLORIDE 500 MG/1
500 TABLET, EXTENDED RELEASE ORAL
Qty: 90 TAB | Refills: 2 | Status: SHIPPED | OUTPATIENT
Start: 2019-03-19 | End: 2019-12-13 | Stop reason: SDUPTHER

## 2019-03-19 NOTE — PATIENT INSTRUCTIONS
Please monitor and record your blood pressure every morning and evening  for the next 2 weeks two hours after taking your medications. Please deliver record of readings to our office for review. Begin losartan 25 mg daily for blood pressure. Continue hydrochlorothiazide and metoprolol. Restart metformin  mg daily.

## 2019-03-19 NOTE — PROGRESS NOTES
Jhonatan January presents today for Chief Complaint Patient presents with  Follow-up  Labs  
  completed on 3-12-19  Hypertension  Diabetes Patient states that she stopped taking her metformin x 2 weeks ago.  Medication Refill RX request for ativan Depression Screening: 
3 most recent PHQ Screens 3/19/2019 Little interest or pleasure in doing things Not at all Feeling down, depressed, irritable, or hopeless Not at all Total Score PHQ 2 0 Trouble falling or staying asleep, or sleeping too much - Feeling tired or having little energy - Poor appetite, weight loss, or overeating - Feeling bad about yourself - or that you are a failure or have let yourself or your family down - Trouble concentrating on things such as school, work, reading, or watching TV - Moving or speaking so slowly that other people could have noticed; or the opposite being so fidgety that others notice - Thoughts of being better off dead, or hurting yourself in some way -  
PHQ 9 Score - How difficult have these problems made it for you to do your work, take care of your home and get along with others - Learning Assessment: 
Learning Assessment 3/19/2019 PRIMARY LEARNER Patient HIGHEST LEVEL OF EDUCATION - PRIMARY LEARNER  GRADUATED HIGH SCHOOL OR GED  
BARRIERS PRIMARY LEARNER NONE  
CO-LEARNER CAREGIVER No  
PRIMARY LANGUAGE ENGLISH  
LEARNER PREFERENCE PRIMARY DEMONSTRATION  
ANSWERED BY patient RELATIONSHIP SELF Abuse Screening: 
Abuse Screening Questionnaire 3/19/2019 Do you ever feel afraid of your partner? Jj Montes Are you in a relationship with someone who physically or mentally threatens you? Jj Montes Is it safe for you to go home? Tona Lutz Fall Risk Fall Risk Assessment, last 12 mths 3/19/2019 Able to walk? Yes Fall in past 12 months? No  
 
 
 
 
Coordination of Care: 1. Have you been to the ER, urgent care clinic since your last visit? Hospitalized since your last visit? no 
 
2. Have you seen or consulted any other health care providers outside of the [address] [place] [address] since your last visit? Include any pap smears or colon screening. yes

## 2019-03-23 PROBLEM — F41.9 ANXIETY: Status: ACTIVE | Noted: 2019-03-23

## 2019-03-23 PROBLEM — M54.2 NECK PAIN: Status: ACTIVE | Noted: 2019-03-23
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
## 2019-03-25 ENCOUNTER — TELEPHONE (OUTPATIENT)
Dept: INTERNAL MEDICINE CLINIC | Age: 64
End: 2019-03-25

## 2019-03-26 NOTE — TELEPHONE ENCOUNTER
Please let the patient know that her cervical spine x-rays showed degenerative disc disease and arthritis, particularly at the levels of C4/5 and C5/6. Please see if she would be interested in proceeding with physical therapy to help with her neck pain.

## 2019-03-26 NOTE — TELEPHONE ENCOUNTER
Called patient and verified full name and date of birth. Informed patient of Dr. Yecenia Henry' message/ orders. Patient states she will wait a little bit for the physical therapy, because she isn't hurting at this time. She also states she will call Dr. Yecenia Henry if it gets bad.

## 2019-04-03 ENCOUNTER — TELEPHONE (OUTPATIENT)
Dept: INTERNAL MEDICINE CLINIC | Age: 64
End: 2019-04-03

## 2019-04-11 NOTE — TELEPHONE ENCOUNTER
Reviewed blood pressure readings dropped off by patient. For 3/20-4/2/19, blood pressure has ranged from 126-135/ 74-83 with addition of losartan 25 mg daily to metoprolol succinate 50 mg daily and hydrochlorothiazide 12.5 mg daily. Much improved. Please let patient know that her blood pressure control is much improved since adding losartan 25 mg daily. She should continue taking this with metoprolol and hydrochlorothiazide. Please ask her to continue to monitor with goal <130/80.

## 2019-04-12 NOTE — TELEPHONE ENCOUNTER
Called patient and verified full name and date of birth. Patient given instructions and verbalized understanding without questions or concerns.

## 2019-06-12 RX ORDER — ALBUTEROL SULFATE 90 UG/1
AEROSOL, METERED RESPIRATORY (INHALATION)
Qty: 1 INHALER | Refills: 5 | Status: SHIPPED | OUTPATIENT
Start: 2019-06-12 | End: 2020-09-19

## 2019-06-25 ENCOUNTER — LAB ONLY (OUTPATIENT)
Dept: INTERNAL MEDICINE CLINIC | Age: 64
End: 2019-06-25

## 2019-06-25 ENCOUNTER — HOSPITAL ENCOUNTER (OUTPATIENT)
Dept: LAB | Age: 64
Discharge: HOME | End: 2019-06-25
Payer: COMMERCIAL

## 2019-06-25 DIAGNOSIS — F41.9 ANXIETY: ICD-10-CM

## 2019-06-25 DIAGNOSIS — M25.561 CHRONIC PAIN OF BOTH KNEES: ICD-10-CM

## 2019-06-25 DIAGNOSIS — J45.20 MILD INTERMITTENT ASTHMA WITHOUT COMPLICATION: ICD-10-CM

## 2019-06-25 DIAGNOSIS — E11.9 TYPE 2 DIABETES MELLITUS WITHOUT COMPLICATION, WITHOUT LONG-TERM CURRENT USE OF INSULIN (HCC): Primary | ICD-10-CM

## 2019-06-25 DIAGNOSIS — R31.29 MICROSCOPIC HEMATURIA: ICD-10-CM

## 2019-06-25 DIAGNOSIS — E78.5 HYPERLIPIDEMIA, UNSPECIFIED HYPERLIPIDEMIA TYPE: ICD-10-CM

## 2019-06-25 DIAGNOSIS — K21.9 GASTROESOPHAGEAL REFLUX DISEASE WITHOUT ESOPHAGITIS: ICD-10-CM

## 2019-06-25 DIAGNOSIS — D12.2 ADENOMATOUS POLYP OF ASCENDING COLON: ICD-10-CM

## 2019-06-25 DIAGNOSIS — I10 ESSENTIAL HYPERTENSION: ICD-10-CM

## 2019-06-25 DIAGNOSIS — E11.9 TYPE 2 DIABETES MELLITUS WITHOUT COMPLICATION, WITHOUT LONG-TERM CURRENT USE OF INSULIN (HCC): ICD-10-CM

## 2019-06-25 DIAGNOSIS — M54.2 NECK PAIN: ICD-10-CM

## 2019-06-25 DIAGNOSIS — M25.562 CHRONIC PAIN OF BOTH KNEES: ICD-10-CM

## 2019-06-25 DIAGNOSIS — Z91.199 NONCOMPLIANCE: ICD-10-CM

## 2019-06-25 DIAGNOSIS — E66.9 OBESITY (BMI 30.0-34.9): ICD-10-CM

## 2019-06-25 DIAGNOSIS — I11.9 BENIGN HYPERTENSIVE HEART DISEASE WITHOUT CONGESTIVE HEART FAILURE: ICD-10-CM

## 2019-06-25 DIAGNOSIS — G89.29 CHRONIC PAIN OF BOTH KNEES: ICD-10-CM

## 2019-06-25 DIAGNOSIS — I47.1 AVNRT (AV NODAL RE-ENTRY TACHYCARDIA) (HCC): ICD-10-CM

## 2019-06-25 LAB
ALBUMIN SERPL-MCNC: 4 G/DL (ref 3.4–5)
ALBUMIN/GLOB SERPL: 1.2 {RATIO} (ref 0.8–1.7)
ALP SERPL-CCNC: 79 U/L (ref 45–117)
ALT SERPL-CCNC: 30 U/L (ref 13–56)
ANION GAP SERPL CALC-SCNC: 7 MMOL/L (ref 3–18)
AST SERPL-CCNC: 23 U/L (ref 15–37)
BILIRUB SERPL-MCNC: 0.5 MG/DL (ref 0.2–1)
BUN SERPL-MCNC: 13 MG/DL (ref 7–18)
BUN/CREAT SERPL: 20 (ref 12–20)
CALCIUM SERPL-MCNC: 9.2 MG/DL (ref 8.5–10.1)
CHLORIDE SERPL-SCNC: 100 MMOL/L (ref 100–108)
CO2 SERPL-SCNC: 30 MMOL/L (ref 21–32)
CREAT SERPL-MCNC: 0.66 MG/DL (ref 0.6–1.3)
GLOBULIN SER CALC-MCNC: 3.4 G/DL (ref 2–4)
GLUCOSE SERPL-MCNC: 118 MG/DL (ref 74–99)
POTASSIUM SERPL-SCNC: 3.9 MMOL/L (ref 3.5–5.5)
PROT SERPL-MCNC: 7.4 G/DL (ref 6.4–8.2)
SODIUM SERPL-SCNC: 137 MMOL/L (ref 136–145)

## 2019-06-25 PROCEDURE — 83036 HEMOGLOBIN GLYCOSYLATED A1C: CPT

## 2019-06-25 PROCEDURE — 80053 COMPREHEN METABOLIC PANEL: CPT

## 2019-06-25 PROCEDURE — 82043 UR ALBUMIN QUANTITATIVE: CPT

## 2019-06-25 PROCEDURE — 36415 COLL VENOUS BLD VENIPUNCTURE: CPT

## 2019-06-26 LAB
CREAT UR-MCNC: 51 MG/DL (ref 30–125)
EST. AVERAGE GLUCOSE BLD GHB EST-MCNC: 137 MG/DL
HBA1C MFR BLD: 6.4 % (ref 4.2–5.6)
MICROALBUMIN UR-MCNC: <0.5 MG/DL (ref 0–3)
MICROALBUMIN/CREAT UR-RTO: NORMAL MG/G (ref 0–30)

## 2019-07-02 ENCOUNTER — OFFICE VISIT (OUTPATIENT)
Dept: INTERNAL MEDICINE CLINIC | Age: 64
End: 2019-07-02

## 2019-07-02 VITALS
TEMPERATURE: 98.2 F | WEIGHT: 182 LBS | DIASTOLIC BLOOD PRESSURE: 82 MMHG | BODY MASS INDEX: 33.49 KG/M2 | HEIGHT: 62 IN | RESPIRATION RATE: 14 BRPM | SYSTOLIC BLOOD PRESSURE: 140 MMHG | HEART RATE: 75 BPM | OXYGEN SATURATION: 95 %

## 2019-07-02 DIAGNOSIS — J45.20 MILD INTERMITTENT ASTHMA WITHOUT COMPLICATION: ICD-10-CM

## 2019-07-02 DIAGNOSIS — G89.29 CHRONIC PAIN OF BOTH KNEES: ICD-10-CM

## 2019-07-02 DIAGNOSIS — L60.0 INGROWN RIGHT GREATER TOENAIL: ICD-10-CM

## 2019-07-02 DIAGNOSIS — I10 ESSENTIAL HYPERTENSION: ICD-10-CM

## 2019-07-02 DIAGNOSIS — H93.8X1 EAR FULLNESS, RIGHT: ICD-10-CM

## 2019-07-02 DIAGNOSIS — R05.9 COUGH: ICD-10-CM

## 2019-07-02 DIAGNOSIS — M25.561 CHRONIC PAIN OF BOTH KNEES: ICD-10-CM

## 2019-07-02 DIAGNOSIS — E78.5 HYPERLIPIDEMIA, UNSPECIFIED HYPERLIPIDEMIA TYPE: ICD-10-CM

## 2019-07-02 DIAGNOSIS — F41.9 ANXIETY: ICD-10-CM

## 2019-07-02 DIAGNOSIS — J30.1 SEASONAL ALLERGIC RHINITIS DUE TO POLLEN: ICD-10-CM

## 2019-07-02 DIAGNOSIS — E66.9 OBESITY (BMI 30.0-34.9): ICD-10-CM

## 2019-07-02 DIAGNOSIS — M25.562 CHRONIC PAIN OF BOTH KNEES: ICD-10-CM

## 2019-07-02 DIAGNOSIS — K21.9 GASTROESOPHAGEAL REFLUX DISEASE WITHOUT ESOPHAGITIS: ICD-10-CM

## 2019-07-02 DIAGNOSIS — I47.1 AVNRT (AV NODAL RE-ENTRY TACHYCARDIA) (HCC): ICD-10-CM

## 2019-07-02 DIAGNOSIS — E11.9 TYPE 2 DIABETES MELLITUS WITHOUT COMPLICATION, WITHOUT LONG-TERM CURRENT USE OF INSULIN (HCC): Primary | ICD-10-CM

## 2019-07-02 DIAGNOSIS — E55.9 VITAMIN D INSUFFICIENCY: ICD-10-CM

## 2019-07-02 NOTE — PROGRESS NOTES
Chief Complaint   Patient presents with    Hypertension     3 month follow up with lab results. Patient states she just took her blood pressure medication within the last 45 minutes. Dr. Tigre Cole notified of reading. Health Maintenance Due   Topic Date Due    FOOT EXAM Q1  05/03/2019     1. Have you been to the ER, urgent care clinic or hospitalized since your last visit? NO.     2. Have you seen or consulted any other health care providers outside of the [address] [place] [address] since your last visit (Include any pap smears or colon screening)? NO      Do you have an Advanced Directive? NO    Would you like information on Advanced Directives?  NO    Learning Assessment 3/19/2019   PRIMARY LEARNER Patient   HIGHEST LEVEL OF EDUCATION - PRIMARY LEARNER  GRADUATED HIGH SCHOOL OR GED   BARRIERS PRIMARY LEARNER NONE   CO-LEARNER CAREGIVER No   PRIMARY LANGUAGE ENGLISH   LEARNER PREFERENCE PRIMARY DEMONSTRATION   ANSWERED BY patient   RELATIONSHIP SELF

## 2019-07-02 NOTE — PATIENT INSTRUCTIONS
Please take losartan 25 mg every day with metoprolol and hydrochlorothiazide. Learning About Diabetes Food Guidelines Your Care Instructions Meal planning is important to manage diabetes. It helps keep your blood sugar at a target level (which you set with your doctor). You don't have to eat special foods. You can eat what your family eats, including sweets once in a while. But you do have to pay attention to how often you eat and how much you eat of certain foods. You may want to work with a dietitian or a certified diabetes educator (CDE) to help you plan meals and snacks. A dietitian or CDE can also help you lose weight if that is one of your goals. What should you know about eating carbs? Managing the amount of carbohydrate (carbs) you eat is an important part of healthy meals when you have diabetes. Carbohydrate is found in many foods. · Learn which foods have carbs. And learn the amounts of carbs in different foods. ? Bread, cereal, pasta, and rice have about 15 grams of carbs in a serving. A serving is 1 slice of bread (1 ounce), ½ cup of cooked cereal, or 1/3 cup of cooked pasta or rice. ? Fruits have 15 grams of carbs in a serving. A serving is 1 small fresh fruit, such as an apple or orange; ½ of a banana; ½ cup of cooked or canned fruit; ½ cup of fruit juice; 1 cup of melon or raspberries; or 2 tablespoons of dried fruit. ? Milk and no-sugar-added yogurt have 15 grams of carbs in a serving. A serving is 1 cup of milk or 2/3 cup of no-sugar-added yogurt. ? Starchy vegetables have 15 grams of carbs in a serving. A serving is ½ cup of mashed potatoes or sweet potato; 1 cup winter squash; ½ of a small baked potato; ½ cup of cooked beans; or ½ cup cooked corn or green peas. · Learn how much carbs to eat each day and at each meal. A dietitian or CDE can teach you how to keep track of the amount of carbs you eat. This is called carbohydrate counting. · If you are not sure how to count carbohydrate grams, use the Plate Method to plan meals. It is a good, quick way to make sure that you have a balanced meal. It also helps you spread carbs throughout the day. ? Divide your plate by types of foods. Put non-starchy vegetables on half the plate, meat or other protein food on one-quarter of the plate, and a grain or starchy vegetable in the final quarter of the plate. To this you can add a small piece of fruit and 1 cup of milk or yogurt, depending on how many carbs you are supposed to eat at a meal. 
· Try to eat about the same amount of carbs at each meal. Do not \"save up\" your daily allowance of carbs to eat at one meal. 
· Proteins have very little or no carbs per serving. Examples of proteins are beef, chicken, turkey, fish, eggs, tofu, cheese, cottage cheese, and peanut butter. A serving size of meat is 3 ounces, which is about the size of a deck of cards. Examples of meat substitute serving sizes (equal to 1 ounce of meat) are 1/4 cup of cottage cheese, 1 egg, 1 tablespoon of peanut butter, and ½ cup of tofu. How can you eat out and still eat healthy? · Learn to estimate the serving sizes of foods that have carbohydrate. If you measure food at home, it will be easier to estimate the amount in a serving of restaurant food. · If the meal you order has too much carbohydrate (such as potatoes, corn, or baked beans), ask to have a low-carbohydrate food instead. Ask for a salad or green vegetables. · If you use insulin, check your blood sugar before and after eating out to help you plan how much to eat in the future. · If you eat more carbohydrate at a meal than you had planned, take a walk or do other exercise. This will help lower your blood sugar. What else should you know? · Limit saturated fat, such as the fat from meat and dairy products.  This is a healthy choice because people who have diabetes are at higher risk of heart disease. So choose lean cuts of meat and nonfat or low-fat dairy products. Use olive or canola oil instead of butter or shortening when cooking. · Don't skip meals. Your blood sugar may drop too low if you skip meals and take insulin or certain medicines for diabetes. · Check with your doctor before you drink alcohol. Alcohol can cause your blood sugar to drop too low. Alcohol can also cause a bad reaction if you take certain diabetes medicines. Follow-up care is a key part of your treatment and safety. Be sure to make and go to all appointments, and call your doctor if you are having problems. It's also a good idea to know your test results and keep a list of the medicines you take. Where can you learn more? Go to http://yoselyn-merline.info/. Enter K219 in the search box to learn more about \"Learning About Diabetes Food Guidelines. \" Current as of: July 25, 2018 Content Version: 11.9 © 6649-2653 [employer]. Care instructions adapted under license by [employer] (which disclaims liability or warranty for this information). If you have questions about a medical condition or this instruction, always ask your healthcare professional. [address] any warranty or liability for your use of this information. DASH Diet: Care Instructions Your Care Instructions The DASH diet is an eating plan that can help lower your blood pressure. DASH stands for Dietary Approaches to Stop Hypertension. Hypertension is high blood pressure. The DASH diet focuses on eating foods that are high in calcium, potassium, and magnesium. These nutrients can lower blood pressure. The foods that are highest in these nutrients are fruits, vegetables, low-fat dairy products, nuts, seeds, and legumes.  But taking calcium, potassium, and magnesium supplements instead of eating foods that are high in those nutrients does not have the same effect. The DASH diet also includes whole grains, fish, and poultry. The DASH diet is one of several lifestyle changes your doctor may recommend to lower your high blood pressure. Your doctor may also want you to decrease the amount of sodium in your diet. Lowering sodium while following the DASH diet can lower blood pressure even further than just the DASH diet alone. Follow-up care is a key part of your treatment and safety. Be sure to make and go to all appointments, and call your doctor if you are having problems. It's also a good idea to know your test results and keep a list of the medicines you take. How can you care for yourself at home? Following the DASH diet · Eat 4 to 5 servings of fruit each day. A serving is 1 medium-sized piece of fruit, ½ cup chopped or canned fruit, 1/4 cup dried fruit, or 4 ounces (½ cup) of fruit juice. Choose fruit more often than fruit juice. · Eat 4 to 5 servings of vegetables each day. A serving is 1 cup of lettuce or raw leafy vegetables, ½ cup of chopped or cooked vegetables, or 4 ounces (½ cup) of vegetable juice. Choose vegetables more often than vegetable juice. · Get 2 to 3 servings of low-fat and fat-free dairy each day. A serving is 8 ounces of milk, 1 cup of yogurt, or 1 ½ ounces of cheese. · Eat 6 to 8 servings of grains each day. A serving is 1 slice of bread, 1 ounce of dry cereal, or ½ cup of cooked rice, pasta, or cooked cereal. Try to choose whole-grain products as much as possible. · Limit lean meat, poultry, and fish to 2 servings each day. A serving is 3 ounces, about the size of a deck of cards. · Eat 4 to 5 servings of nuts, seeds, and legumes (cooked dried beans, lentils, and split peas) each week. A serving is 1/3 cup of nuts, 2 tablespoons of seeds, or ½ cup of cooked beans or peas. · Limit fats and oils to 2 to 3 servings each day. A serving is 1 teaspoon of vegetable oil or 2 tablespoons of salad dressing. · Limit sweets and added sugars to 5 servings or less a week. A serving is 1 tablespoon jelly or jam, ½ cup sorbet, or 1 cup of lemonade. · Eat less than 2,300 milligrams (mg) of sodium a day. If you limit your sodium to 1,500 mg a day, you can lower your blood pressure even more. Tips for success · Start small. Do not try to make dramatic changes to your diet all at once. You might feel that you are missing out on your favorite foods and then be more likely to not follow the plan. Make small changes, and stick with them. Once those changes become habit, add a few more changes. · Try some of the following: ? Make it a goal to eat a fruit or vegetable at every meal and at snacks. This will make it easy to get the recommended amount of fruits and vegetables each day. ? Try yogurt topped with fruit and nuts for a snack or healthy dessert. ? Add lettuce, tomato, cucumber, and onion to sandwiches. ? Combine a ready-made pizza crust with low-fat mozzarella cheese and lots of vegetable toppings. Try using tomatoes, squash, spinach, broccoli, carrots, cauliflower, and onions. ? Have a variety of cut-up vegetables with a low-fat dip as an appetizer instead of chips and dip. ? Sprinkle sunflower seeds or chopped almonds over salads. Or try adding chopped walnuts or almonds to cooked vegetables. ? Try some vegetarian meals using beans and peas. Add garbanzo or kidney beans to salads. Make burritos and tacos with mashed ko beans or black beans. Where can you learn more? Go to http://yoselyn-merline.info/. Enter X482 in the search box to learn more about \"DASH Diet: Care Instructions. \" Current as of: July 22, 2018 Content Version: 11.9 © 9060-5017 [employer]. Care instructions adapted under license by [employer] (which disclaims liability or warranty for this information).  If you have questions about a medical condition or this instruction, always ask your healthcare professional. [address] any warranty or liability for your use of this information. Body Mass Index: Care Instructions Your Care Instructions Body mass index (BMI) can help you see if your weight is raising your risk for health problems. It uses a formula to compare how much you weigh with how tall you are. · A BMI lower than 18.5 is considered underweight. · A BMI between 18.5 and 24.9 is considered healthy. · A BMI between 25 and 29.9 is considered overweight. A BMI of 30 or higher is considered obese. If your BMI is in the normal range, it means that you have a lower risk for weight-related health problems. If your BMI is in the overweight or obese range, you may be at increased risk for weight-related health problems, such as high blood pressure, heart disease, stroke, arthritis or joint pain, and diabetes. If your BMI is in the underweight range, you may be at increased risk for health problems such as fatigue, lower protection (immunity) against illness, muscle loss, bone loss, hair loss, and hormone problems. BMI is just one measure of your risk for weight-related health problems. You may be at higher risk for health problems if you are not active, you eat an unhealthy diet, or you drink too much alcohol or use tobacco products. Follow-up care is a key part of your treatment and safety. Be sure to make and go to all appointments, and call your doctor if you are having problems. It's also a good idea to know your test results and keep a list of the medicines you take. How can you care for yourself at home? · Practice healthy eating habits. This includes eating plenty of fruits, vegetables, whole grains, lean protein, and low-fat dairy. · If your doctor recommends it, get more exercise. Walking is a good choice. Bit by bit, increase the amount you walk every day. Try for at least 30 minutes on most days of the week. · Do not smoke. Smoking can increase your risk for health problems. If you need help quitting, talk to your doctor about stop-smoking programs and medicines. These can increase your chances of quitting for good. · Limit alcohol to 2 drinks a day for men and 1 drink a day for women. Too much alcohol can cause health problems. If you have a BMI higher than 25 · Your doctor may do other tests to check your risk for weight-related health problems. This may include measuring the distance around your waist. A waist measurement of more than 40 inches in men or 35 inches in women can increase the risk of weight-related health problems. · Talk with your doctor about steps you can take to stay healthy or improve your health. You may need to make lifestyle changes to lose weight and stay healthy, such as changing your diet and getting regular exercise. If you have a BMI lower than 18.5 · Your doctor may do other tests to check your risk for health problems. · Talk with your doctor about steps you can take to stay healthy or improve your health. You may need to make lifestyle changes to gain or maintain weight and stay healthy, such as getting more healthy foods in your diet and doing exercises to build muscle. Where can you learn more? Go to http://yoselyn-merline.info/. Enter S176 in the search box to learn more about \"Body Mass Index: Care Instructions. \" Current as of: June 25, 2018 Content Version: 11.9 © 9554-2339 [employer]. Care instructions adapted under license by [employer] (which disclaims liability or warranty for this information). If you have questions about a medical condition or this instruction, always ask your healthcare professional. [place] [address] any warranty or liability for your use of this information.

## 2019-07-04 PROBLEM — M54.2 NECK PAIN: Status: RESOLVED | Noted: 2019-03-23 | Resolved: 2019-07-04

## 2019-07-04 NOTE — PROGRESS NOTES
HPI:   Isaiah Hutchinson is a 59y.o. year old female who presents today for a routine visit and for evaluation of hypertension, hyperlipidemia, paroxysmal SVT, diabetes mellitus, GERD, asthma, elevated transaminases, and atypical mycobacterial pneumonia. She reports that she is doing relatively well. She reports that she has been having increasing difficulty with right ear fullness, post nasal drainage, and cough. She states that she continues to receive weekly immunotherapy with Dr. Selina Clements, but has noticed increasing symptoms over the last several weeks. She denies any fever, chills, or purulent drainage. She has not tried any medications to manage her symptoms. She is otherwise without complaints and feeling generally well. She has a history of hypertension, treated with metoprolol and hydrochlorothiazide (+ potassium). She reports that she does not check her blood pressure at home. She does not exercise regularly, but denies any chest pain, shortness of breath at rest or with exertion, lightheadedness, or edema. 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 She has had approximately six severe episodes over the years, prompting presentation to the ED and treatment with IV Adenosine. She currently reports infrequent short episodes of palpitations and is being treated with metoprolol. She is followed by Dr. Anthony Collins. She had an echocardiogram (10/2005) showing normal LV size and function (EF 60-65%), and no valvular pathology. In 11/2012, she underwent an exercise stress echocardiogram, which was normal at maximal exercise. She has a history of hyperlipidemia, treated with simvastatin from 10/2012 to 3/2015 at which time she stopped taking it due to myalgias. She restarted it on 8/2015 and continued to take it without difficulty until 3/2016, when it was noticed that she had transaminase elevation (AST 85/ ) and it was discontinued.  Evaluation included hepatitis A, B, and C levels (negative), iron panel (normal), and RUQ ultrasound (3/23/2016) with limited sonographic window for the liver; only partially visualized but grossly unremarkable. Repeat hepatic panel (4/22/2016) showed AST 75 and ALT 98. Repeat lipid panel showed total chol 215/ / HDL 64/. In 5/2016, she had an abdominal CT scan showing the liver to be normal in size with normal parenchymal density; no discrete mass or ascites, and no intrahepatic biliary dilatation. She was subsequently instructed to restart simvastatin, but chose not to since she felt it was making her forgetful. She agreed to trial of rosuvastatin 10 mg and started it in 2/2018. However, after two weeks of therapy, she discontinued it since she thought it was causing her leg pain. She subsequently contacted Dr. Curt Tomas office and asked to begin simvastatin 20 mg daily in 4/2018. She has been taking it without difficulty since restarting. She has a history of diabetes mellitus, with impaired fasting glucose ranging from 103-111 since 2012, and HbA1c to 6.6-6.8 since 9/2016 (not checked previously). She was prescribed metformin ER last visit for an increase in her HbA1c to 7.1, but she reports that she took it for only one week and discontinued for unclear reasons. She was not experiencing any side effects. She denies any polyuria, polydipsia, nocturia, or blurry vision, and has no history of retinopathy, neuropathy, or nephropathy. She has regular eye exams with Dr. Jaqui Brown. She has a history of asthma and allergic rhinitis and is followed by Dr. Alida Hilton. She is receiving immunotherapy once per month, and reports that she has not required any inhalers recently. She states that she does not use Qvar daily, but will take it occasionally. She does use Claritin every day. In 10/2017, she fell down the steps of her porch and had difficulty with right knee pain and swelling.  She was evaluated by  Edwena Barthel in 12/2017, and right knee xray showed decreased medial joint space, and moderate degenerative changes. She received a cortisone injection, but did not notice any improvement. She underwent an MRI of the right knee (1/29/2018) which showed complete tear of posterior horn and root of the medial meniscus; tear of the body and posterior horn of the lateral meniscus; tricompartmental osteoarthritis most prominent in medial and patellofemoral compartments; moderate joint effusion; small Baker's cyst. It was recommended that she consider knee replacement and arthroscopy. However, she decided to seek a second opinion and was evaluated by  [facility]. She also underwent an MRI of her left knee (3/23/2018) showing radial tear posterior horn medial meniscus with extrusion of the body. Also  radial tear in the mid body; lateral meniscus intrasubstance degeneration and probable small undersurface tear of the posterior horn; medial and patellofemoral compartments moderate chondral loss; s. ubchondral bone marrow edema in the medial tibial plateau, likely reactive. She is completed physical therapy for her bilateral knees and feels that it may have helped. 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 She was treated with Avelox, and repeat chest x-ray in 1/2012 showed complete resolution of the pneumonia. She denies any cough or shortness of breath. She had a screening colonoscopy in 12/2006 by Dr. Valentina Patel showing a 5 mm sessile polyp in the rectum (pathology: hyperplastic). She had a repeat colonoscopy in 12/2016 which showed moderate sigmoid diverticulosis and a 6 mm sessile ascending colon polyp (pathology: serrated adenoma). Follow-up recommended for 5 years. She denies any abdominal pain, nausea, vomiting, melena, hematochezia, or change in bowel movements. She does take omeprazole occasionally for GERD symptoms.     In 12/2017, she was referred by her gynecologist for evaluation by Dr. Eliot Green for microscopic hematuria, and urine cytology was negative. However, she states that she refused his recommendations to undergo a CT urogram or cystoscopy. She denies any dysuria, gross hematuria, or flank pain. Past Medical History:   Diagnosis Date    Allergic rhinitis     Asthma     Cardiac stress echo, normal 11/02/2012    Normal maximal stress echo study. EF 60%. Ex time 9 min 45 sec. 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  Hyperlipidemia     Hypertension     Menopause     Plantar fasciitis     left    PSVT [facility]oxysmal [facility]ricular tachycardia) ([facility]     A-V dharmesh reentrant tachycardia     Past Surgical History:   Procedure Laterality Date    ENDOSCOPY, COLON, DIAGNOSTIC      polyp    HX CERVICAL POLYPECTOMY      HX CYST INCISION AND DRAINAGE Right 10 or more years    HX DILATION AND CURETTAGE      HX GYN      polyp on cervix    HX POLYPECTOMY      from rectum     Current Outpatient Medications   Medication Sig    PROAIR HFA 90 mcg/actuation inhaler inhale 2 puffs by mouth every 4 hours if needed for wheezing    LORazepam (ATIVAN) 1 mg tablet TAKE 1 TABLET BY MOUTH EVERY 8 HOURS AS NEEDED FOR ANXIETY    metFORMIN ER (GLUCOPHAGE XR) 500 mg tablet Take 1 Tab by mouth daily (with dinner).  losartan (COZAAR) 25 mg tablet Take 1 Tab by mouth daily.  potassium chloride (K-DUR, KLOR-CON) 20 mEq tablet take 1 tablet by mouth once daily    simvastatin (ZOCOR) 20 mg tablet take 1 tablet by mouth at bedtime    hydroCHLOROthiazide (HYDRODIURIL) 12.5 mg tablet Take 1 Tab by mouth daily.     metoprolol succinate (TOPROL-XL) 50 mg XL tablet take 1 tablet by mouth once daily    fluticasone (FLONASE) 50 mcg/actuation nasal spray INSTILL 2 SPRAYS IN EACH NOSTRIL DAILY    clotrimazole-betamethasone (LOTRISONE) topical cream Apply  to both ear canals and affected part of outer ear twice a day with a finger.  MULTIVITAMIN PO Take 1 Tab by mouth every other day.  carboxymethylcellulose sodium (REFRESH LIQUIGEL) 1 % dlgl ophthalmic solution Apply  to eye. No current facility-administered medications for this visit. Allergies and Intolerances: Allergies   Allergen Reactions    Altace [Ramipril] Cough    Penicillins Other (comments)     Hands peel    Sulfur Itching     Family History: She had two aunts who had breast cancer (her mother's sister and father's sister). She has no FH of colon cancer. Her mother passed away from uterine cancer. Her father  from metastatic prostate cancer. Family History   Problem Relation Age of Onset   [facility] Arthritis-osteo Mother     Hypertension Mother              Cancer Father         bone cancer    Hypertension Sister     Hypertension Sister     Hypertension Sister     Breast Cancer Maternal Aunt     Breast Cancer Paternal Aunt     Diabetes Other     Stroke Other     Other Sister         twin sister - osteopenia and low vitamin D levels     Social History:   She  reports that she has never smoked. She has never used smokeless tobacco. She is  and has two sons. She was a homemaker, but worked part-time in . She now helps care for her grandchildren.    Social History     Substance and Sexual Activity   Alcohol Use No     Immunization History:  Immunization History   Administered Date(s) Administered    Influenza Vaccine (Quad) PF 10/23/2015, 10/19/2016, 10/05/2017, 10/26/2018    Influenza Vaccine PF 2013, 10/03/2014    Influenza Vaccine Split 2011, 10/22/2012    Influenza Vaccine Whole 10/29/2010    PPD 2012    Pneumococcal Polysaccharide (PPSV-23) 2017    TB Skin Test (PPD) Intradermal 2014    TDAP Vaccine 02/16/2012       Review of Systems:   As above included in HPI. Otherwise 11 point review of systems negative including constitutional, skin, HENT, eyes, respiratory, cardiovascular, gastrointestinal, genitourinary, musculoskeletal, endocrine, hematologic, allergy, and neurologic. Physical:   Vitals:   BP: 140/82  HR: 75  WT: 182 lb (82.6 kg)  BMI:  33.29 kg/m2    Exam:   Patient appears in no apparent distress. Affect is appropriate. HEENT: PERRLA, anicteric, oropharynx clear, no JVD, adenopathy or thyromegaly. No carotid bruits or radiated murmur. Lungs: clear to auscultation, no wheezes, rhonchi, or rales. Heart: regular rate and rhythm. No murmur, rubs, gallops  Abdomen: soft, nontender, nondistended, normal bowel sounds, no hepatosplenomegaly or masses. Extremities: without edema. Pulses 1-2+ bilaterally. Review of Data:  Labs:  Hospital Outpatient Visit on 06/25/2019   Component Date Value Ref Range Status    Hemoglobin A1c 06/25/2019 6.4* 4.2 - 5.6 % Final    Est. average glucose 06/25/2019 137  mg/dL Final    Sodium 06/25/2019 137  136 - 145 mmol/L Final    Potassium 06/25/2019 3.9  3.5 - 5.5 mmol/L Final    Chloride 06/25/2019 100  100 - 108 mmol/L Final    CO2 06/25/2019 30  21 - 32 mmol/L Final    Anion gap 06/25/2019 7  3.0 - 18 mmol/L Final    Glucose 06/25/2019 118* 74 - 99 mg/dL Final    BUN 06/25/2019 13  7.0 - 18 MG/DL Final    Creatinine 06/25/2019 0.66  0.6 - 1.3 MG/DL Final    BUN/Creatinine ratio 06/25/2019 20  12 - 20   Final    GFR est AA 06/25/2019 >60  >60 ml/min/1.73m2 Final    GFR est non-AA 06/25/2019 >60  >60 ml/min/1.73m2 Final    Calcium 06/25/2019 9.2  8.5 - 10.1 MG/DL Final    Bilirubin, total 06/25/2019 0.5  0.2 - 1.0 MG/DL Final    ALT (SGPT) 06/25/2019 30  13 - 56 U/L Final    AST (SGOT) 06/25/2019 23  15 - 37 U/L Final    Alk.  phosphatase 06/25/2019 79  45 - 117 U/L Final    Protein, total 06/25/2019 7.4  6.4 - 8.2 g/dL Final    Albumin 06/25/2019 4.0  3.4 - 5.0 g/dL Final    Globulin 06/25/2019 3.4  2.0 - 4.0 g/dL Final    A-G Ratio 06/25/2019 1.2  0.8 - 1.7   Final    Microalbumin,urine random 06/25/2019 <0.50  0 - 3.0 MG/DL Final    Creatinine, urine 06/25/2019 51.00  30 - 125 mg/dL Final    Microalbumin/Creat ratio (mg/g cre* 06/25/2019 Cannot calculate ratio due to microalbumin result outside reportable range. 0 - 30 mg/g Final     Health Maintenance:  Screening:    Mammogram: negative (12/2018). PAP smear: negative (10/2016) with negative HPV. Followed by Dr. Jonathan Nelson. Colorectal: colonoscopy (12/2016) serrated adenoma. Dr. Bev Kinney. Due 2021. Depression: none   DM (HbA1c/FPG): HbA1c 6.4 (6/2019)   Hepatitis C: negative (3/2016)   Falls: one   DEXA: within normal limits (11/2015)   Glaucoma: regular eye exams with Dr. Jose C Gonzalez (last 9/2018)   Smoking: none   Vitamin D: 36.8 (11/2018)   Medicare Wellness: N/A      Impression:  Patient Active Problem List   Diagnosis Code    Benign hypertensive heart disease without congestive heart failure I11.9    Allergic rhinitis J30.9    Colon polyps K63.5    AVNRT (AV dharmesh re-entry tachycardia) ([facility] [address] I47.1    Hyperlipidemia E78.5    Essential hypertension I10    Asthma J45.909    GERD (gastroesophageal reflux disease) K21.9    Type 2 diabetes mellitus without complication, without long-term current use of insulin (HCC) E11.9    Vitamin D insufficiency E55.9    Microscopic hematuria R31.29    Chronic pain of both knees M25.561, M25.562, G89.29    Obesity (BMI 30.0-34. 9) E66.9    Noncompliance Z91.19    Neck pain M54.2    Anxiety F41.9       Plan:  1. Diabetes mellitus. Diagnosed in 9/2016 when HbA1c was checked and found to be elevated at 6.6. Had been steadily increasing and reached 7.2 last visit with a fasting glucose of 135. Impaired fasting glucose had been present intermittently since 2012.  Agreeable to starting treatment with metformin  mg at dinner in 3/2019 and repeat HbA1c 6.4 today. No evidence of microvascular complications. On statin. Started on losartan at last visit, but patient admits to not taking it, and blood pressure remains elevated. Stressed importance of initiating. Continue regular eye exams with Dr. Pat Sanches. Foot exam normal today, but patient having difficulty with ingrown toenails so will refer for diabetes foot care with podiatry. Urine microalbumin/ creatinine ratio without evidence of microalbuminuria today. 2. Hypertension. Blood pressure remains elevated on current regimen of metoprolol XL 50 mg daily and hydrochlorothiazide 12.5 mg daily. Prescribed losartan 25 mg daily at last visit, but did not initiate. Stressed importance today and patient agreed to initiate. Asked to monitor her blood pressure at home and bring readings to office for review. Renal function remains normal with creatinine 0.66 / eGFR >60. Continue to follow. 3. Hyperlipidemia. On moderate intensity dose simvastatin 20 mg daily with LDL 74 and HDL 75 (3/2019), indicative of good control. Continue to follow. 4. Elevated LFT's. Resolved. Unclear etiology, but doubt secondary to statin. Hepatitis panel and iron studies normal. RUQ ultrasound difficult secondary to body habitus, but abdominal CT scan showed normal liver parenchyma. Will continue to follow. 5. AV dharmesh reentrant tachycardia. No recent episodes. On metoprolol and no significant palpitations recently. Followed by Dr. Phoenix Campbell. 6. Asthma, mild intermittent. Associated with allergies. Patient states that she is not using Qvar. Using albuterol only as needed. Receiving monthly immunotherapy injections. Followed by Dr. Santiago Fontenot. 7. Right ear fullness/postnasal drainage. Advised patient to begin Claritin and Flonase. Requesting referral to ENT. Referral placed for Dr. Triston Vora. 8. GERD. Symptoms generally controlled with omeprazole as needed. COntinue to follow. 9. Microscopic hematuria.  Patient refusing further evaluation with cystoscopy or CT urogram. Urine cytology negative. Did have abdominal CT scan in 5/2016 where kidneys appeared normal. Recommended that she complete evaluation with Dr. Clara Keith. Repeat urinalysis at last visit with evidence trace blood and only 1-2 RBCs. 10. Bilateral knee pain. Did not respond to cortisone injection. Had both right and left knee MRI showing variable degrees of menisci tears and osteoarthritis of knee joint. Now being followed by  [facility] and reports relatively quiescent. 11. Obesity. Emphasized importance of lifestyle modifications, including diet, exercise, and weight loss. Weight unchanged. Will readdress next visit. 12. Health maintenance. Already received script for Shingrix vaccine. Given Pneumovax given asthma history. Colonoscopy due 2021. Mammogram up to date. Continue regular eye exams with Dr. Paty Camara. Vitamin D level normal. Continue maintenance dose supplement. Patient understands recommendations and agrees with plan. Follow-up in 3 months for physical and to reassess blood pressure.

## 2019-09-09 RX ORDER — SIMVASTATIN 20 MG/1
TABLET, FILM COATED ORAL
Qty: 30 TAB | Refills: 6 | Status: SHIPPED | OUTPATIENT
Start: 2019-09-09 | End: 2019-09-09 | Stop reason: SDUPTHER

## 2019-09-09 RX ORDER — SIMVASTATIN 20 MG/1
TABLET, FILM COATED ORAL
Qty: 30 TAB | Refills: 6 | Status: SHIPPED | OUTPATIENT
Start: 2019-09-09 | End: 2020-10-15

## 2019-10-01 ENCOUNTER — HOSPITAL ENCOUNTER (OUTPATIENT)
Dept: LAB | Age: 64
Discharge: HOME | End: 2019-10-01
Payer: COMMERCIAL

## 2019-10-01 ENCOUNTER — APPOINTMENT (OUTPATIENT)
Dept: INTERNAL MEDICINE CLINIC | Age: 64
End: 2019-10-01

## 2019-10-01 DIAGNOSIS — E66.9 OBESITY (BMI 30.0-34.9): ICD-10-CM

## 2019-10-01 DIAGNOSIS — F41.9 ANXIETY: ICD-10-CM

## 2019-10-01 DIAGNOSIS — K21.9 GASTROESOPHAGEAL REFLUX DISEASE WITHOUT ESOPHAGITIS: ICD-10-CM

## 2019-10-01 DIAGNOSIS — L60.0 INGROWN RIGHT GREATER TOENAIL: ICD-10-CM

## 2019-10-01 DIAGNOSIS — G89.29 CHRONIC PAIN OF BOTH KNEES: ICD-10-CM

## 2019-10-01 DIAGNOSIS — E55.9 VITAMIN D INSUFFICIENCY: ICD-10-CM

## 2019-10-01 DIAGNOSIS — J30.1 SEASONAL ALLERGIC RHINITIS DUE TO POLLEN: ICD-10-CM

## 2019-10-01 DIAGNOSIS — M25.562 CHRONIC PAIN OF BOTH KNEES: ICD-10-CM

## 2019-10-01 DIAGNOSIS — J45.20 MILD INTERMITTENT ASTHMA WITHOUT COMPLICATION: ICD-10-CM

## 2019-10-01 DIAGNOSIS — I47.1 AVNRT (AV NODAL RE-ENTRY TACHYCARDIA) (HCC): ICD-10-CM

## 2019-10-01 DIAGNOSIS — E11.9 TYPE 2 DIABETES MELLITUS WITHOUT COMPLICATION, WITHOUT LONG-TERM CURRENT USE OF INSULIN (HCC): Primary | ICD-10-CM

## 2019-10-01 DIAGNOSIS — E78.5 HYPERLIPIDEMIA, UNSPECIFIED HYPERLIPIDEMIA TYPE: ICD-10-CM

## 2019-10-01 DIAGNOSIS — M25.561 CHRONIC PAIN OF BOTH KNEES: ICD-10-CM

## 2019-10-01 DIAGNOSIS — I10 ESSENTIAL HYPERTENSION: ICD-10-CM

## 2019-10-01 DIAGNOSIS — E11.9 TYPE 2 DIABETES MELLITUS WITHOUT COMPLICATION, WITHOUT LONG-TERM CURRENT USE OF INSULIN (HCC): ICD-10-CM

## 2019-10-01 DIAGNOSIS — R05.9 COUGH: ICD-10-CM

## 2019-10-01 DIAGNOSIS — H93.8X1 EAR FULLNESS, RIGHT: ICD-10-CM

## 2019-10-01 LAB
ALBUMIN SERPL-MCNC: 4 G/DL (ref 3.4–5)
ALBUMIN/GLOB SERPL: 1.1 {RATIO} (ref 0.8–1.7)
ALP SERPL-CCNC: 83 U/L (ref 45–117)
ALT SERPL-CCNC: 47 U/L (ref 13–56)
ANION GAP SERPL CALC-SCNC: 7 MMOL/L (ref 3–18)
APPEARANCE UR: CLEAR
AST SERPL-CCNC: 37 U/L (ref 10–38)
BACTERIA URNS QL MICRO: ABNORMAL /HPF
BASOPHILS # BLD: 0 K/UL (ref 0–0.1)
BASOPHILS NFR BLD: 1 % (ref 0–2)
BILIRUB SERPL-MCNC: 0.5 MG/DL (ref 0.2–1)
BILIRUB UR QL: NEGATIVE
BUN SERPL-MCNC: 12 MG/DL (ref 7–18)
BUN/CREAT SERPL: 16 (ref 12–20)
CALCIUM SERPL-MCNC: 9.1 MG/DL (ref 8.5–10.1)
CHLORIDE SERPL-SCNC: 100 MMOL/L (ref 100–111)
CO2 SERPL-SCNC: 30 MMOL/L (ref 21–32)
COLOR UR: YELLOW
CREAT SERPL-MCNC: 0.73 MG/DL (ref 0.6–1.3)
DIFFERENTIAL METHOD BLD: NORMAL
EOSINOPHIL # BLD: 0.2 K/UL (ref 0–0.4)
EOSINOPHIL NFR BLD: 4 % (ref 0–5)
EPITH CASTS URNS QL MICRO: ABNORMAL /LPF (ref 0–5)
ERYTHROCYTE [DISTWIDTH] IN BLOOD BY AUTOMATED COUNT: 12.6 % (ref 11.6–14.5)
EST. AVERAGE GLUCOSE BLD GHB EST-MCNC: 151 MG/DL
GLOBULIN SER CALC-MCNC: 3.5 G/DL (ref 2–4)
GLUCOSE SERPL-MCNC: 118 MG/DL (ref 74–99)
GLUCOSE UR STRIP.AUTO-MCNC: NEGATIVE MG/DL
HBA1C MFR BLD: 6.9 % (ref 4.2–5.6)
HCT VFR BLD AUTO: 42.5 % (ref 35–45)
HGB BLD-MCNC: 13.7 G/DL (ref 12–16)
HGB UR QL STRIP: ABNORMAL
KETONES UR QL STRIP.AUTO: NEGATIVE MG/DL
LEUKOCYTE ESTERASE UR QL STRIP.AUTO: ABNORMAL
LYMPHOCYTES # BLD: 2.1 K/UL (ref 0.9–3.6)
LYMPHOCYTES NFR BLD: 34 % (ref 21–52)
MAGNESIUM SERPL-MCNC: 1.8 MG/DL (ref 1.6–2.6)
MCH RBC QN AUTO: 30 PG (ref 24–34)
MCHC RBC AUTO-ENTMCNC: 32.2 G/DL (ref 31–37)
MCV RBC AUTO: 93.2 FL (ref 74–97)
MONOCYTES # BLD: 0.5 K/UL (ref 0.05–1.2)
MONOCYTES NFR BLD: 8 % (ref 3–10)
NEUTS SEG # BLD: 3.3 K/UL (ref 1.8–8)
NEUTS SEG NFR BLD: 53 % (ref 40–73)
NITRITE UR QL STRIP.AUTO: NEGATIVE
PH UR STRIP: 5.5 [PH] (ref 5–8)
PLATELET # BLD AUTO: 235 K/UL (ref 135–420)
PMV BLD AUTO: 10.4 FL (ref 9.2–11.8)
POTASSIUM SERPL-SCNC: 4 MMOL/L (ref 3.5–5.5)
PROT SERPL-MCNC: 7.5 G/DL (ref 6.4–8.2)
PROT UR STRIP-MCNC: NEGATIVE MG/DL
RBC # BLD AUTO: 4.56 M/UL (ref 4.2–5.3)
RBC #/AREA URNS HPF: ABNORMAL /HPF (ref 0–5)
SODIUM SERPL-SCNC: 137 MMOL/L (ref 136–145)
SP GR UR REFRACTOMETRY: 1.02 (ref 1–1.03)
TSH SERPL DL<=0.05 MIU/L-ACNC: 2.29 UIU/ML (ref 0.36–3.74)
UROBILINOGEN UR QL STRIP.AUTO: 0.2 EU/DL (ref 0.2–1)
WBC # BLD AUTO: 6.3 K/UL (ref 4.6–13.2)
WBC URNS QL MICRO: ABNORMAL /HPF (ref 0–4)

## 2019-10-01 PROCEDURE — 36415 COLL VENOUS BLD VENIPUNCTURE: CPT

## 2019-10-01 PROCEDURE — 80053 COMPREHEN METABOLIC PANEL: CPT

## 2019-10-01 PROCEDURE — 85025 COMPLETE CBC W/AUTO DIFF WBC: CPT

## 2019-10-01 PROCEDURE — 84443 ASSAY THYROID STIM HORMONE: CPT

## 2019-10-01 PROCEDURE — 83036 HEMOGLOBIN GLYCOSYLATED A1C: CPT

## 2019-10-01 PROCEDURE — 82043 UR ALBUMIN QUANTITATIVE: CPT

## 2019-10-01 PROCEDURE — 80061 LIPID PANEL: CPT

## 2019-10-01 PROCEDURE — 81001 URINALYSIS AUTO W/SCOPE: CPT

## 2019-10-01 PROCEDURE — 82306 VITAMIN D 25 HYDROXY: CPT

## 2019-10-01 PROCEDURE — 83735 ASSAY OF MAGNESIUM: CPT

## 2019-10-02 LAB
25(OH)D3 SERPL-MCNC: 32.2 NG/ML (ref 30–100)
CHOLEST SERPL-MCNC: 155 MG/DL
CREAT UR-MCNC: 154 MG/DL (ref 30–125)
HDLC SERPL-MCNC: 61 MG/DL (ref 40–60)
HDLC SERPL: 2.5 {RATIO} (ref 0–5)
LDLC SERPL CALC-MCNC: 71.2 MG/DL (ref 0–100)
LIPID PROFILE,FLP: ABNORMAL
MICROALBUMIN UR-MCNC: 1.09 MG/DL (ref 0–3)
MICROALBUMIN/CREAT UR-RTO: 7 MG/G (ref 0–30)
TRIGL SERPL-MCNC: 114 MG/DL (ref ?–150)
VLDLC SERPL CALC-MCNC: 22.8 MG/DL

## 2019-10-08 ENCOUNTER — OFFICE VISIT (OUTPATIENT)
Dept: INTERNAL MEDICINE CLINIC | Age: 64
End: 2019-10-08

## 2019-10-08 ENCOUNTER — HOSPITAL ENCOUNTER (OUTPATIENT)
Dept: LAB | Age: 64
Discharge: HOME | End: 2019-10-08
Payer: COMMERCIAL

## 2019-10-08 VITALS
SYSTOLIC BLOOD PRESSURE: 138 MMHG | HEART RATE: 69 BPM | TEMPERATURE: 98.2 F | HEIGHT: 62 IN | DIASTOLIC BLOOD PRESSURE: 88 MMHG | OXYGEN SATURATION: 99 % | RESPIRATION RATE: 16 BRPM | WEIGHT: 184 LBS | BODY MASS INDEX: 33.86 KG/M2

## 2019-10-08 DIAGNOSIS — J30.1 SEASONAL ALLERGIC RHINITIS DUE TO POLLEN: ICD-10-CM

## 2019-10-08 DIAGNOSIS — G89.29 CHRONIC PAIN OF BOTH KNEES: ICD-10-CM

## 2019-10-08 DIAGNOSIS — M25.561 CHRONIC PAIN OF BOTH KNEES: ICD-10-CM

## 2019-10-08 DIAGNOSIS — E78.5 HYPERLIPIDEMIA, UNSPECIFIED HYPERLIPIDEMIA TYPE: ICD-10-CM

## 2019-10-08 DIAGNOSIS — J45.20 MILD INTERMITTENT ASTHMA WITHOUT COMPLICATION: ICD-10-CM

## 2019-10-08 DIAGNOSIS — Z00.01 ENCOUNTER FOR ROUTINE ADULT PHYSICAL EXAM WITH ABNORMAL FINDINGS: Primary | ICD-10-CM

## 2019-10-08 DIAGNOSIS — R82.90 ABNORMAL URINALYSIS: ICD-10-CM

## 2019-10-08 DIAGNOSIS — E66.9 OBESITY (BMI 30.0-34.9): ICD-10-CM

## 2019-10-08 DIAGNOSIS — R30.0 DYSURIA: ICD-10-CM

## 2019-10-08 DIAGNOSIS — R31.29 MICROSCOPIC HEMATURIA: ICD-10-CM

## 2019-10-08 DIAGNOSIS — I47.1 AVNRT (AV NODAL RE-ENTRY TACHYCARDIA) (HCC): ICD-10-CM

## 2019-10-08 DIAGNOSIS — K21.9 GASTROESOPHAGEAL REFLUX DISEASE WITHOUT ESOPHAGITIS: ICD-10-CM

## 2019-10-08 DIAGNOSIS — Z91.199 NONCOMPLIANCE: ICD-10-CM

## 2019-10-08 DIAGNOSIS — I10 ESSENTIAL HYPERTENSION: ICD-10-CM

## 2019-10-08 DIAGNOSIS — I11.9 BENIGN HYPERTENSIVE HEART DISEASE WITHOUT CONGESTIVE HEART FAILURE: ICD-10-CM

## 2019-10-08 DIAGNOSIS — E11.9 TYPE 2 DIABETES MELLITUS WITHOUT COMPLICATION, WITHOUT LONG-TERM CURRENT USE OF INSULIN (HCC): ICD-10-CM

## 2019-10-08 DIAGNOSIS — M25.562 CHRONIC PAIN OF BOTH KNEES: ICD-10-CM

## 2019-10-08 DIAGNOSIS — Z23 ENCOUNTER FOR IMMUNIZATION: ICD-10-CM

## 2019-10-08 PROCEDURE — 87086 URINE CULTURE/COLONY COUNT: CPT

## 2019-10-08 PROCEDURE — 81001 URINALYSIS AUTO W/SCOPE: CPT

## 2019-10-08 RX ORDER — OMEPRAZOLE 40 MG/1
40 CAPSULE, DELAYED RELEASE ORAL DAILY
Refills: 0 | COMMUNITY
Start: 2019-08-05 | End: 2020-01-21

## 2019-10-08 RX ORDER — LOSARTAN POTASSIUM 25 MG/1
25 TABLET ORAL DAILY
Qty: 90 TAB | Refills: 2 | Status: SHIPPED | OUTPATIENT
Start: 2019-10-08 | End: 2020-06-25

## 2019-10-08 RX ORDER — CLOTRIMAZOLE AND BETAMETHASONE DIPROPIONATE 10; .64 MG/G; MG/G
CREAM TOPICAL
Qty: 45 G | Refills: 3 | Status: SHIPPED | OUTPATIENT
Start: 2019-10-08

## 2019-10-08 RX ORDER — GLUCOSAMINE SULFATE 1500 MG
2000 POWDER IN PACKET (EA) ORAL DAILY
COMMUNITY

## 2019-10-08 NOTE — PATIENT INSTRUCTIONS
Restart losartan 25 mg daily for blood pressure. Take metformin every day with dinner. DASH Diet: Care Instructions Your Care Instructions The DASH diet is an eating plan that can help lower your blood pressure. DASH stands for Dietary Approaches to Stop Hypertension. Hypertension is high blood pressure. The DASH diet focuses on eating foods that are high in calcium, potassium, and magnesium. These nutrients can lower blood pressure. The foods that are highest in these nutrients are fruits, vegetables, low-fat dairy products, nuts, seeds, and legumes. But taking calcium, potassium, and magnesium supplements instead of eating foods that are high in those nutrients does not have the same effect. The DASH diet also includes whole grains, fish, and poultry. The DASH diet is one of several lifestyle changes your doctor may recommend to lower your high blood pressure. Your doctor may also want you to decrease the amount of sodium in your diet. Lowering sodium while following the DASH diet can lower blood pressure even further than just the DASH diet alone. Follow-up care is a key part of your treatment and safety. Be sure to make and go to all appointments, and call your doctor if you are having problems. It's also a good idea to know your test results and keep a list of the medicines you take. How can you care for yourself at home? Following the DASH diet · Eat 4 to 5 servings of fruit each day. A serving is 1 medium-sized piece of fruit, ½ cup chopped or canned fruit, 1/4 cup dried fruit, or 4 ounces (½ cup) of fruit juice. Choose fruit more often than fruit juice. · Eat 4 to 5 servings of vegetables each day. A serving is 1 cup of lettuce or raw leafy vegetables, ½ cup of chopped or cooked vegetables, or 4 ounces (½ cup) of vegetable juice. Choose vegetables more often than vegetable juice. · Get 2 to 3 servings of low-fat and fat-free dairy each day.  A serving is 8 ounces of milk, 1 cup of yogurt, or 1 ½ ounces of cheese. · Eat 6 to 8 servings of grains each day. A serving is 1 slice of bread, 1 ounce of dry cereal, or ½ cup of cooked rice, pasta, or cooked cereal. Try to choose whole-grain products as much as possible. · Limit lean meat, poultry, and fish to 2 servings each day. A serving is 3 ounces, about the size of a deck of cards. · Eat 4 to 5 servings of nuts, seeds, and legumes (cooked dried beans, lentils, and split peas) each week. A serving is 1/3 cup of nuts, 2 tablespoons of seeds, or ½ cup of cooked beans or peas. · Limit fats and oils to 2 to 3 servings each day. A serving is 1 teaspoon of vegetable oil or 2 tablespoons of salad dressing. · Limit sweets and added sugars to 5 servings or less a week. A serving is 1 tablespoon jelly or jam, ½ cup sorbet, or 1 cup of lemonade. · Eat less than 2,300 milligrams (mg) of sodium a day. If you limit your sodium to 1,500 mg a day, you can lower your blood pressure even more. Tips for success · Start small. Do not try to make dramatic changes to your diet all at once. You might feel that you are missing out on your favorite foods and then be more likely to not follow the plan. Make small changes, and stick with them. Once those changes become habit, add a few more changes. · Try some of the following: ? Make it a goal to eat a fruit or vegetable at every meal and at snacks. This will make it easy to get the recommended amount of fruits and vegetables each day. ? Try yogurt topped with fruit and nuts for a snack or healthy dessert. ? Add lettuce, tomato, cucumber, and onion to sandwiches. ? Combine a ready-made pizza crust with low-fat mozzarella cheese and lots of vegetable toppings. Try using tomatoes, squash, spinach, broccoli, carrots, cauliflower, and onions. ? Have a variety of cut-up vegetables with a low-fat dip as an appetizer instead of chips and dip. ? Sprinkle sunflower seeds or chopped almonds over salads. Or try adding chopped walnuts or almonds to cooked vegetables. ? Try some vegetarian meals using beans and peas. Add garbanzo or kidney beans to salads. Make burritos and tacos with mashed ko beans or black beans. Where can you learn more? Go to http://yoselyn-merline.info/. Enter G662 in the search box to learn more about \"DASH Diet: Care Instructions. \" Current as of: April 9, 2019 Content Version: 12.2 © 8907-8380 [employer]. Care instructions adapted under license by [employer] (which disclaims liability or warranty for this information). If you have questions about a medical condition or this instruction, always ask your healthcare professional. [address] any warranty or liability for your use of this information. Learning About Meal Planning for Diabetes Why plan your meals? Meal planning can be a key part of managing diabetes. Planning meals and snacks with the right balance of carbohydrate, protein, and fat can help you keep your blood sugar at the target level you set with your doctor. You don't have to eat special foods. You can eat what your family eats, including sweets once in a while. But you do have to pay attention to how often you eat and how much you eat of certain foods. You may want to work with a dietitian or a certified diabetes educator. He or she can give you tips and meal ideas and can answer your questions about meal planning. This health professional can also help you reach a healthy weight if that is one of your goals. What plan is right for you? Your dietitian or diabetes educator may suggest that you start with the plate format or carbohydrate counting. The plate format The plate format is a simple way to help you manage how you eat. You plan meals by learning how much space each food should take on a plate.  Using the plate format helps you spread carbohydrate throughout the day. It can make it easier to keep your blood sugar level within your target range. It also helps you see if you're eating healthy portion sizes. To use the plate format, you put non-starchy vegetables on half your plate. Add meat or meat substitutes on one-quarter of the plate. Put a grain or starchy vegetable (such as brown rice or a potato) on the final quarter of the plate. You can add a small piece of fruit and some low-fat or fat-free milk or yogurt, depending on your carbohydrate goal for each meal. 
Here are some tips for using the plate format: · Make sure that you are not using an oversized plate. A 9-inch plate is best. Many restaurants use larger plates. · Get used to using the plate format at home. Then you can use it when you eat out. · Write down your questions about using the plate format. Talk to your doctor, a dietitian, or a diabetes educator about your concerns. Carbohydrate counting With carbohydrate counting, you plan meals based on the amount of carbohydrate in each food. Carbohydrate raises blood sugar higher and more quickly than any other nutrient. It is found in desserts, breads and cereals, and fruit. It's also found in starchy vegetables such as potatoes and corn, grains such as rice and pasta, and milk and yogurt. Spreading carbohydrate throughout the day helps keep your blood sugar levels within your target range. Your daily amount depends on several things, including your weight, how active you are, which diabetes medicines you take, and what your goals are for your blood sugar levels. A registered dietitian or diabetes educator can help you plan how much carbohydrate to include in each meal and snack. A guideline for your daily amount of carbohydrate is: · 45 to 60 grams at each meal. That's about the same as 3 to 4 carbohydrate servings. · 15 to 20 grams at each snack.  That's about the same as 1 carbohydrate serving. The Nutrition Facts label on packaged foods tells you how much carbohydrate is in a serving of the food. First, look at the serving size on the food label. Is that the amount you eat in a serving? All of the nutrition information on a food label is based on that serving size. So if you eat more or less than that, you'll need to adjust the other numbers. Total carbohydrate is the next thing you need to look for on the label. If you count carbohydrate servings, one serving of carbohydrate is 15 grams. For foods that don't come with labels, such as fresh fruits and vegetables, you'll need a guide that lists carbohydrate in these foods. Ask your doctor, dietitian, or diabetes educator about books or other nutrition guides you can use. If you take insulin, you need to know how many grams of carbohydrate are in a meal. This lets you know how much rapid-acting insulin to take before you eat. If you use an insulin pump, you get a constant rate of insulin during the day. So the pump must be programmed at meals to give you extra insulin to cover the rise in blood sugar after meals. When you know how much carbohydrate you will eat, you can take the right amount of insulin. Or, if you always use the same amount of insulin, you need to make sure that you eat the same amount of carbohydrate at meals. If you need more help to understand carbohydrate counting and food labels, ask your doctor, dietitian, or diabetes educator. How do you get started with meal planning? Here are some tips to get started: 
· Plan your meals a week at a time. Don't forget to include snacks too. · Use cookbooks or online recipes to plan several main meals. Plan some quick meals for busy nights. You also can double some recipes that freeze well. Then you can save half for other busy nights when you don't have time to cook. · Make sure you have the ingredients you need for your recipes.  If you're running low on basic items, put these items on your shopping list too. · List foods that you use to make breakfasts, lunches, and snacks. List plenty of fruits and vegetables. · Post this list on the refrigerator. Add to it as you think of more things you need. · Take the list to the store to do your weekly shopping. Follow-up care is a key part of your treatment and safety. Be sure to make and go to all appointments, and call your doctor if you are having problems. It's also a good idea to know your test results and keep a list of the medicines you take. Where can you learn more? Go to http://yoselynAcquaintablemerline.info/. Andreina Brine in the search box to learn more about \"Learning About Meal Planning for Diabetes. \" Current as of: April 16, 2019 Content Version: 12.2 © 8562-4954 [employer]. Care instructions adapted under license by [employer] (which disclaims liability or warranty for this information). If you have questions about a medical condition or this instruction, always ask your healthcare professional. [address] any warranty or liability for your use of this information. Vaccine Information Statement Influenza (Flu) Vaccine (Inactivated or Recombinant): What You Need to Know Many Vaccine Information Statements are available in Bengali and other languages. See www.immunize.org/vis Hojas de información sobre vacunas están disponibles en español y en muchos otros idiomas. Visite www.immunize.org/vis 1. Why get vaccinated? Influenza vaccine can prevent influenza (flu). Flu is a contagious disease that spreads around the [place] every year, usually between October and May. Anyone can get the flu, but it is more dangerous for some people.  Infants and young children, people 72years of age and older, pregnant women, and people with certain health conditions or a weakened immune system are at greatest risk of flu complications. Pneumonia, bronchitis, sinus infections and ear infections are examples of flu-related complications. If you have a medical condition, such as heart disease, cancer or diabetes, flu can make it worse. Flu can cause fever and chills, sore throat, muscle aches, fatigue, cough, headache, and runny or stuffy nose. Some people may have vomiting and diarrhea, though this is more common in children than adults. Each year thousands of people in the [facility] die from flu, and many more are hospitalized. Flu vaccine prevents millions of illnesses and flu-related visits to the doctor each year. 2. Influenza vaccines CDC recommends everyone 10months of age and older get vaccinated every flu season. Children 6 months through 6years of age may need 2 doses during a single flu season. Everyone else needs only 1 dose each flu season. It takes about 2 weeks for protection to develop after vaccination. There are many flu viruses, and they are always changing. Each year a new flu vaccine is made to protect against three or four viruses that are likely to cause disease in the upcoming flu season. Even when the vaccine doesnt exactly match these viruses, it may still provide some protection. Influenza vaccine does not cause flu. Influenza vaccine may be given at the same time as other vaccines. 3. Talk with your health care provider Tell your vaccine provider if the person getting the vaccine: 
 Has had an allergic reaction after a previous dose of influenza vaccine, or has any severe, life-threatening allergies.  Has ever had Guillain-Barré Syndrome (also called GBS). In some cases, your health care provider may decide to postpone influenza vaccination to a future visit. People with minor illnesses, such as a cold, may be vaccinated. People who are moderately or severely ill should usually wait until they recover before getting influenza vaccine. Your health care provider can give you more information. 4. Risks of a reaction  Soreness, redness, and swelling where shot is given, fever, muscle aches, and headache can happen after influenza vaccine.  There may be a very small increased risk of Guillain-Barré Syndrome (GBS) after inactivated influenza vaccine (the flu shot). Marisol Days children who get the flu shot along with pneumococcal vaccine (PCV13), and/or DTaP vaccine at the same time might be slightly more likely to have a seizure caused by fever. Tell your health care provider if a child who is getting flu vaccine has ever had a seizure. People sometimes faint after medical procedures, including vaccination. Tell your provider if you feel dizzy or have vision changes or ringing in the ears. As with any medicine, there is a very remote chance of a vaccine causing a severe allergic reaction, other serious injury, or death. 5. What if there is a serious problem? An allergic reaction could occur after the vaccinated person leaves the clinic. If you see signs of a severe allergic reaction (hives, swelling of the face and throat, difficulty breathing, a fast heartbeat, dizziness, or weakness), call 9-1-1 and get the person to the nearest hospital. 
 
For other signs that concern you, call your health care provider. Adverse reactions should be reported to the Vaccine Adverse Event Reporting System (VAERS). Your health care provider will usually file this report, or you can do it yourself. Visit the VAERS website at www.vaers. hhs.gov or call 0-862.157.2052. VAERS is only for reporting reactions, and VAERS staff do not give medical advice. 6. The National Vaccine Injury Compensation Program 
 
The [facility] Vaccine Injury Compensation Program (VICP) is a federal program that was created to compensate people who may have been injured by certain vaccines.  Visit the VICP website at www.hrsa.gov/vaccinecompensation or call 2-150.550.4115 to learn about the program and about filing a claim. There is a time limit to file a claim for compensation. 7. How can I learn more?  Ask your health care provider.  Call your local or state health department.  Contact the Centers for Disease Control and Prevention (CDC): 
- Call 9-373.449.5282 (6-641-JSB-INFO) or 
- Visit CDCs influenza website at www.cdc.gov/flu Vaccine Information Statement (Interim) Inactivated Influenza Vaccine 8/15/2019 
[address] [id number]-[id number] Department of [facility] and [facility] Centers for Disease Control and Prevention Office Use Only

## 2019-10-08 NOTE — PROGRESS NOTES
Health Maintenance Due   Topic Date Due    Influenza Age 5 to Adult  08/01/2019     Patient given influenza vaccine, FLUARIX, in right deltoid, per verbal order from Dr. Julissa Beaulieu with read back. Instructed patient to sit and wait 10-20 minutes before leaving the premises so that we can watch for any complications or adverse reactions. Patient given vaccine information statement handout before vaccine was given. Patient tolerated well without adverse reactions or complications. 1. Have you been to the ER, urgent care clinic or hospitalized since your last visit? NO.     2. Have you seen or consulted any other health care providers outside of the [address] [place] [address] since your last visit (Include any pap smears or colon screening)? YES, ENT last seen 2 months ago. Learning Assessment 10/8/2019   PRIMARY LEARNER Patient   HIGHEST LEVEL OF EDUCATION - PRIMARY LEARNER  GRADUATED HIGH SCHOOL OR GED   BARRIERS PRIMARY LEARNER NONE   CO-LEARNER CAREGIVER No   PRIMARY LANGUAGE ENGLISH   LEARNER PREFERENCE PRIMARY DEMONSTRATION   ANSWERED BY patient   RELATIONSHIP SELF     Abuse Screening Questionnaire 10/8/2019   Do you ever feel afraid of your partner? N   Are you in a relationship with someone who physically or mentally threatens you? N   Is it safe for you to go home?  Y     3 most recent PHQ Screens 10/8/2019   Little interest or pleasure in doing things Not at all   Feeling down, depressed, irritable, or hopeless Not at all   Total Score PHQ 2 0   Trouble falling or staying asleep, or sleeping too much -   Feeling tired or having little energy -   Poor appetite, weight loss, or overeating -   Feeling bad about yourself - or that you are a failure or have let yourself or your family down -   Trouble concentrating on things such as school, work, reading, or watching TV -   Moving or speaking so slowly that other people could have noticed; or the opposite being so fidgety that others notice -   Thoughts of being better off dead, or hurting yourself in some way -   PHQ 9 Score -   How difficult have these problems made it for you to do your work, take care of your home and get along with others -     Fall Risk Assessment, last 12 mths 10/8/2019   Able to walk? Yes   Fall in past 12 months?  No

## 2019-10-11 ENCOUNTER — TELEPHONE (OUTPATIENT)
Dept: INTERNAL MEDICINE CLINIC | Age: 64
End: 2019-10-11

## 2019-10-11 LAB
BACTERIA SPEC CULT: ABNORMAL
BACTERIA SPEC CULT: ABNORMAL
SERVICE CMNT-IMP: ABNORMAL

## 2019-10-11 NOTE — TELEPHONE ENCOUNTER
Called and spoke to patient about message below. Patient verbalized understanding with no additional questions.

## 2019-10-11 NOTE — TELEPHONE ENCOUNTER
No visits with results within 2 Day(s) from this visit. Latest known visit with results is:   Hospital Outpatient Visit on 10/08/2019   Component Date Value Ref Range Status    Special Requests: 10/08/2019 NO SPECIAL REQUESTS    Preliminary    Culture result: 10/08/2019 [id number] COLONIES/mL STREPTOCOCCI, BETA HEMOLYTIC GROUP B*   Preliminary    Culture result: 10/08/2019     Preliminary                    Value:<10,000  COLONIES/mL  MIXED GRAM POSITIVE MIRIAM, PROBABLE SKIN/GENITAL CONTAMINATION.  Color 10/08/2019 YELLOW    Final    Appearance 10/08/2019 CLEAR    Final    Specific gravity 10/08/2019 1.023  1.005 - 1.030   Final    pH (UA) 10/08/2019 6.5  5.0 - 8.0   Final    Protein 10/08/2019 NEGATIVE   NEG mg/dL Final    Glucose 10/08/2019 NEGATIVE   NEG mg/dL Final    Ketone 10/08/2019 NEGATIVE   NEG mg/dL Final    Bilirubin 10/08/2019 NEGATIVE   NEG   Final    Blood 10/08/2019 SMALL* NEG   Final    Urobilinogen 10/08/2019 0.2  0.2 - 1.0 EU/dL Final    Nitrites 10/08/2019 NEGATIVE   NEG   Final    Leukocyte Esterase 10/08/2019 NEGATIVE   NEG   Final    WBC 10/08/2019 0 to 2  0 - 4 /hpf Final    RBC 10/08/2019 3 to 5  0 - 5 /hpf Final    Epithelial cells 10/08/2019 FEW  0 - 5 /lpf Final    Bacteria 10/08/2019 NEGATIVE   NEG /hpf Final     Please let the patient know that her repeat urinalysis and urine culture did not show evidence of infection. Please let her know that there was still microscopic evidence of blood in her urine. Would recommend that she follow-up with urology (Dr. Lonnie Baker) and proceed with the evaluation that he had recommended.

## 2019-10-12 NOTE — PROGRESS NOTES
HPI:   José Curran is a 59y.o. year old female who presents today for a physical exam and for evaluation of hypertension, hyperlipidemia, paroxysmal SVT, diabetes mellitus, GERD, asthma, elevated transaminases, and atypical mycobacterial pneumonia. She reports that she is doing relatively well. At her last visit, she reported that she has been having increasing difficulty with right ear fullness, post nasal drainage, hoarseness, and cough, and was referred to Dr. Bernard Neff. She states that he performed a nasal laryngoscopy and found evidence of laryngopharyngeal reflux. She was started on daily omeprazole, and she states that she has noticed some improvement. She states that she stopped taking losartan approximately 4 weeks ago since she heard it had been recalled. She has not been monitoring her blood pressure. She also describes some mild dysuria and vaginal itching, but denies any fever, chills, gross hematuria, or flank pain. She is otherwise without new complaints and feeling generally well. She has a history of hypertension, treated with metoprolol and hydrochlorothiazide (+ potassium). She reports that she does not check her blood pressure at home. She does not exercise regularly, but denies any chest pain, shortness of breath at rest or with exertion, lightheadedness, or edema. 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 She has had approximately six severe episodes over the years, prompting presentation to the ED and treatment with IV Adenosine. She currently reports infrequent short episodes of palpitations and is being treated with metoprolol. She is followed by Dr. Alireza Bradford. She had an echocardiogram (10/2005) showing normal LV size and function (EF 60-65%), and no valvular pathology. In 11/2012, she underwent an exercise stress echocardiogram, which was normal at maximal exercise.  She has a history of hyperlipidemia, treated with simvastatin from 10/2012 to 3/2015 at which time she stopped taking it due to myalgias. She restarted it on 8/2015 and continued to take it without difficulty until 3/2016, when it was noticed that she had transaminase elevation (AST 85/ ) and it was discontinued. Evaluation included hepatitis A, B, and C levels (negative), iron panel (normal), and RUQ ultrasound (3/23/2016) with limited sonographic window for the liver; only partially visualized but grossly unremarkable. Repeat hepatic panel (4/22/2016) showed AST 75 and ALT 98. Repeat lipid panel showed total chol 215/ / HDL 64/. In 5/2016, she had an abdominal CT scan showing the liver to be normal in size with normal parenchymal density; no discrete mass or ascites, and no intrahepatic biliary dilatation. She was subsequently instructed to restart simvastatin, but chose not to since she felt it was making her forgetful. She agreed to trial of rosuvastatin 10 mg and started it in 2/2018. However, after two weeks of therapy, she discontinued it since she thought it was causing her leg pain. She subsequently contacted Dr. Tinajero [facility] office and asked to begin simvastatin 20 mg daily in 4/2018. She has been taking it without difficulty since restarting. She has a history of diabetes mellitus, with impaired fasting glucose ranging from 103-111 since 2012, and HbA1c to 6.6-6.8 since 9/2016 (not checked previously). She was prescribed metformin ER last visit for an increase in her HbA1c to 7.1, but she reports that she took it for only one week and discontinued for unclear reasons. She was not experiencing any side effects. She denies any polyuria, polydipsia, nocturia, or blurry vision, and has no history of retinopathy, neuropathy, or nephropathy. She has regular eye exams with Dr. Sara Morton. She has a history of asthma and allergic rhinitis and is followed by Dr. Werner Alanis.  She is receiving immunotherapy once per month, and reports that she has not required any inhalers recently. She states that she does not use Qvar daily, but will take it occasionally. She does use Claritin every day. In 10/2017, she fell down the steps of her porch and had difficulty with right knee pain and swelling. She was evaluated by Dr. Marshall Mendoza in 12/2017, and right knee xray showed decreased medial joint space, and moderate degenerative changes. She received a cortisone injection, but did not notice any improvement. She underwent an MRI of the right knee (1/29/2018) which showed complete tear of posterior horn and root of the medial meniscus; tear of the body and posterior horn of the lateral meniscus; tricompartmental osteoarthritis most prominent in medial and patellofemoral compartments; moderate joint effusion; small Baker's cyst. It was recommended that she consider knee replacement and arthroscopy. However, she decided to seek a second opinion and was evaluated by Dr. Amy Mayorga. She also underwent an MRI of her left knee (3/23/2018) showing radial tear posterior horn medial meniscus with extrusion of the body. Also a radial tear in the mid body; lateral meniscus intrasubstance degeneration and probable small undersurface tear of the posterior horn; medial and patellofemoral compartments moderate chondral loss; s. ubchondral bone marrow edema in the medial tibial plateau, likely reactive. She is completed physical therapy for her bilateral knees and feels that it may have helped. 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 She was treated with Avelox, and repeat chest x-ray in 1/2012 showed complete resolution of the pneumonia. She denies any cough or shortness of breath. She had a screening colonoscopy in 12/2006 by Dr. Shazia Heart showing a 5 mm sessile polyp in the rectum (pathology: hyperplastic).  She had a repeat colonoscopy in 12/2016 which showed moderate sigmoid diverticulosis and a 6 mm sessile ascending colon polyp (pathology: serrated adenoma). Follow-up recommended for 5 years. She denies any abdominal pain, nausea, vomiting, melena, hematochezia, or change in bowel movements. She does take omeprazole occasionally for GERD symptoms. In 12/2017, she was referred by her gynecologist for evaluation by Dr. Sherin Osborn for microscopic hematuria, and urine cytology was negative. However, she states that she refused his recommendations to undergo a CT urogram or cystoscopy. She denies any dysuria, gross hematuria, or flank pain. Past Medical History:   Diagnosis Date    Allergic rhinitis     Asthma     Cardiac stress echo, normal 11/02/2012    Normal maximal stress echo study. EF 60%. Ex time 9 min 45 sec. 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  Hyperlipidemia     Hypertension     Menopause     Plantar fasciitis     left    PSVT ([facility]oxysmal supraventricular tachycardia) ([facility])     A-V dharmesh reentrant tachycardia     Past Surgical History:   Procedure Laterality Date    ENDOSCOPY, COLON, DIAGNOSTIC      polyp    HX CERVICAL POLYPECTOMY      HX CYST INCISION AND DRAINAGE Right 10 or more years    HX DILATION AND CURETTAGE      HX GYN      polyp on cervix    HX POLYPECTOMY      from rectum     Current Outpatient Medications   Medication Sig    losartan (COZAAR) 25 mg tablet Take 1 Tab by mouth daily.  clotrimazole-betamethasone (LOTRISONE) topical cream Apply  to both ear canals and affected part of outer ear twice a day with a finger.  cholecalciferol (VITAMIN D3) 1,000 unit cap Take 1,000 Units by mouth daily.     simvastatin (ZOCOR) 20 mg tablet take 1 tablet by mouth at bedtime    PROAIR HFA 90 mcg/actuation inhaler inhale 2 puffs by mouth every 4 hours if needed for wheezing    LORazepam (ATIVAN) 1 mg tablet TAKE 1 TABLET BY MOUTH EVERY 8 HOURS AS NEEDED FOR ANXIETY    metFORMIN ER (GLUCOPHAGE XR) 500 mg tablet Take 1 Tab by mouth daily (with dinner).  potassium chloride (K-DUR, KLOR-CON) 20 mEq tablet take 1 tablet by mouth once daily    hydroCHLOROthiazide (HYDRODIURIL) 12.5 mg tablet Take 1 Tab by mouth daily.  metoprolol succinate (TOPROL-XL) 50 mg XL tablet take 1 tablet by mouth once daily    fluticasone (FLONASE) 50 mcg/actuation nasal spray INSTILL 2 SPRAYS IN EACH NOSTRIL DAILY    omeprazole (PRILOSEC) 40 mg capsule Take 40 mg by mouth daily.  carboxymethylcellulose sodium (REFRESH LIQUIGEL) 1 % dlgl ophthalmic solution Apply  to eye. No current facility-administered medications for this visit. Allergies and Intolerances: Allergies   Allergen Reactions    Altace [Ramipril] Cough    Penicillins Other (comments)     Hands peel    Sulfur Itching     Family History: She had two aunts who had breast cancer (her mother's sister and father's sister). She has no FH of colon cancer. Her mother passed away from uterine cancer. Her father  from metastatic prostate cancer. Family History   Problem Relation Age of Onset   [address] [facility] Arthritis-osteo Mother     Hypertension Mother              Cancer Father         bone cancer    Hypertension Sister     Hypertension Sister     Hypertension Sister     Breast Cancer Maternal Aunt     Breast Cancer Paternal Aunt     Diabetes Other     Stroke Other     Other Sister         twin sister - osteopenia and low vitamin D levels     Social History:   She  reports that she has never smoked. She has never used smokeless tobacco. She is  and has two sons. She was a homemaker, but worked part-time in . She now helps care for her grandchildren.    Social History     Substance and Sexual Activity   Alcohol Use No     Immunization History:  Immunization History   Administered Date(s) Administered    Influenza Vaccine (Quad) PF 10/23/2015, 10/19/2016, 10/05/2017, 10/26/2018, 10/08/2019    Influenza Vaccine PF 12/12/2013, 10/03/2014    Influenza Vaccine Split 11/02/2011, 10/22/2012    Influenza Vaccine Whole 10/29/2010    PPD 01/03/2012    Pneumococcal Polysaccharide (PPSV-23) 03/21/2017    TB Skin Test (PPD) Intradermal 02/12/2014    TDAP Vaccine 02/16/2012       Review of Systems:   As above included in HPI. Otherwise 11 point review of systems negative including constitutional, skin, HENT, eyes, respiratory, cardiovascular, gastrointestinal, genitourinary, musculoskeletal, endocrine, hematologic, allergy, and neurologic. Physical:   Vitals:   BP: 138/88  HR: 69  WT: 184 lb (83.5 kg)  BMI:  33.65 kg/m2    Exam:   Patient appears in no apparent distress. Affect is appropriate. HEENT --Anicteric sclerae, tympanic membranes normal,  ear canals normal.  PERRL, EOMI, conjunctiva and lids normal.   Sinuses were nontender, turbinates normal, hearing normal.  Oropharynx without  erythema, normal tongue, oral mucosa and tonsils. No cervical lymphadenopathy. No thyromegaly, JVD, or bruits. Carotid pulses 2+ with normal upstroke. Lungs --Clear to auscultation. No wheezing or rales. Heart --Regular rate and rhythm, no murmurs, rubs, gallops, or clicks. Chest wall --Nontender to palpation. PMI normal.  Abdomen -- Soft and nontender, no hepatosplenomegaly or masses. Extremities -- Without cyanosis, clubbing, edema. 2+ pulses equally and bilaterally.   Normal looking digits, ROM intact  Neuro -- CN 2-12 intact, strength 5/5 with intact soft touch in all extremities  Derm  no obvious abnormalities noted, no rash      Review of Data:  Labs:  Hospital Outpatient Visit on 10/01/2019   Component Date Value Ref Range Status    WBC 10/01/2019 6.3  4.6 - 13.2 K/uL Final    RBC 10/01/2019 4.56  4.20 - 5.30 M/uL Final    HGB 10/01/2019 13.7  12.0 - 16.0 g/dL Final    HCT 10/01/2019 42.5  35.0 - 45.0 % Final    MCV 10/01/2019 93.2  74.0 - 97.0 FL Final    MCH 10/01/2019 30.0  24.0 - 34.0 PG Final    MCHC 10/01/2019 32.2  31.0 - 37.0 g/dL Final    RDW 10/01/2019 12.6  11.6 - 14.5 % Final    PLATELET 13/46/7119 289  135 - 420 K/uL Final    MPV 10/01/2019 10.4  9.2 - 11.8 FL Final    NEUTROPHILS 10/01/2019 53  40 - 73 % Final    LYMPHOCYTES 10/01/2019 34  21 - 52 % Final    MONOCYTES 10/01/2019 8  3 - 10 % Final    EOSINOPHILS 10/01/2019 4  0 - 5 % Final    BASOPHILS 10/01/2019 1  0 - 2 % Final    ABS. NEUTROPHILS 10/01/2019 3.3  1.8 - 8.0 K/UL Final    ABS. LYMPHOCYTES 10/01/2019 2.1  0.9 - 3.6 K/UL Final    ABS. MONOCYTES 10/01/2019 0.5  0.05 - 1.2 K/UL Final    ABS. EOSINOPHILS 10/01/2019 0.2  0.0 - 0.4 K/UL Final    ABS.  BASOPHILS 10/01/2019 0.0  0.0 - 0.1 K/UL Final    DF 10/01/2019 AUTOMATED    Final    Hemoglobin A1c 10/01/2019 6.9* 4.2 - 5.6 % Final    Est. average glucose 10/01/2019 151  mg/dL Final    LIPID PROFILE 10/01/2019        Final    Cholesterol, total 10/01/2019 155  <200 MG/DL Final    Triglyceride 10/01/2019 114  <150 MG/DL Final    HDL Cholesterol 10/01/2019 61* 40 - 60 MG/DL Final    LDL, calculated 10/01/2019 71.2  0 - 100 MG/DL Final    VLDL, calculated 10/01/2019 22.8  MG/DL Final    CHOL/HDL Ratio 10/01/2019 2.5  0 - 5.0   Final    Magnesium 10/01/2019 1.8  1.6 - 2.6 mg/dL Final    Sodium 10/01/2019 137  136 - 145 mmol/L Final    Potassium 10/01/2019 4.0  3.5 - 5.5 mmol/L Final    Chloride 10/01/2019 100  100 - 111 mmol/L Final    CO2 10/01/2019 30  21 - 32 mmol/L Final    Anion gap 10/01/2019 7  3.0 - 18 mmol/L Final    Glucose 10/01/2019 118* 74 - 99 mg/dL Final    BUN 10/01/2019 12  7.0 - 18 MG/DL Final    Creatinine 10/01/2019 0.73  0.6 - 1.3 MG/DL Final    BUN/Creatinine ratio 10/01/2019 16  12 - 20   Final    GFR est AA 10/01/2019 >60  >60 ml/min/1.73m2 Final    GFR est non-AA 10/01/2019 >60  >60 ml/min/1.73m2 Final    Calcium 10/01/2019 9.1  8.5 - 10.1 MG/DL Final    Bilirubin, total 10/01/2019 0.5  0.2 - 1.0 MG/DL Final    ALT (SGPT) 10/01/2019 47  13 - 56 U/L Final    AST (SGOT) 10/01/2019 37  10 - 38 U/L Final    Alk. phosphatase 10/01/2019 83  45 - 117 U/L Final    Protein, total 10/01/2019 7.5  6.4 - 8.2 g/dL Final    Albumin 10/01/2019 4.0  3.4 - 5.0 g/dL Final    Globulin 10/01/2019 3.5  2.0 - 4.0 g/dL Final    A-G Ratio 10/01/2019 1.1  0.8 - 1.7   Final    TSH 10/01/2019 2.29  0.36 - 3.74 uIU/mL Final    Vitamin D 25-Hydroxy 10/01/2019 32.2  30 - 100 ng/mL Final    Microalbumin,urine random 10/01/2019 1.09  0 - 3.0 MG/DL Final    Creatinine, urine 10/01/2019 154.00* 30 - 125 mg/dL Final    Microalbumin/Creat ratio (mg/g cre* 10/01/2019 7  0 - 30 mg/g Final    Color 10/01/2019 YELLOW    Final    Appearance 10/01/2019 CLEAR    Final    Specific gravity 10/01/2019 1.016  1.005 - 1.030   Final    pH (UA) 10/01/2019 5.5  5.0 - 8.0   Final    Protein 10/01/2019 NEGATIVE   NEG mg/dL Final    Glucose 10/01/2019 NEGATIVE   NEG mg/dL Final    Ketone 10/01/2019 NEGATIVE   NEG mg/dL Final    Bilirubin 10/01/2019 NEGATIVE   NEG   Final    Blood 10/01/2019 SMALL* NEG   Final    Urobilinogen 10/01/2019 0.2  0.2 - 1.0 EU/dL Final    Nitrites 10/01/2019 NEGATIVE   NEG   Final    Leukocyte Esterase 10/01/2019 MODERATE* NEG   Final    WBC 10/01/2019 6 to 8  0 - 4 /hpf Final    RBC 10/01/2019 1 to 3  0 - 5 /hpf Final    Epithelial cells 10/01/2019 FEW  0 - 5 /lpf Final    Bacteria 10/01/2019 FEW* NEG /hpf Final     Health Maintenance:  Screening:    Mammogram: negative (12/2018). PAP smear: negative (10/2016) with negative HPV. Followed by Dr. Cuauhtemoc Valerio. Colorectal: colonoscopy (12/2016) serrated adenoma. Dr. Amanda Howell. Due 12/2021.    Depression: none   DM (HbA1c/FPG): HbA1c 6.9 (10/2019)   Hepatitis C: negative (3/2016)   Falls: one   DEXA: within normal limits (11/2015)   Glaucoma: regular eye exams with Dr. Quinones Marking (last 9/2019)   Smoking: none   Vitamin D: 32.2 (10/2019)    Medicare Wellness: N/A      Impression:  Patient Active Problem List   Diagnosis Code    Benign hypertensive heart disease without congestive heart failure I11.9    Allergic rhinitis J30.9    Colon polyps K63.5    AVNRT (AV dharmesh re-entry tachycardia) ([facility] [address] I47.1    Hyperlipidemia E78.5    Essential hypertension I10    Asthma J45.909    GERD (gastroesophageal reflux disease) K21.9    Type 2 diabetes mellitus without complication, without long-term current use of insulin ([facility]) E11.9    Vitamin D insufficiency E55.9    Microscopic hematuria R31.29    Chronic pain of both knees M25.561, M25.562, G89.29    Obesity (BMI 30.0-34. 9) E66.9    Noncompliance Z91.19    Anxiety F41.9       Plan:  1. Diabetes mellitus. Diagnosed in 9/2016 when HbA1c was checked and found to be elevated at 6.6. Had been steadily increasing and reached 7.2 last visit with a fasting glucose of 135. Impaired fasting glucose had been present intermittently since 2012. Agreeable to starting treatment with metformin  mg at dinner in 3/2019 and repeat HbA1c today worsened to 6.9 from last visit at 6.4. She acknowledges that she may forget to take her dose at times. No evidence of microvascular complications. On statin. Started on losartan at last visit, and patient states that she is no longer taking it as heard it had been recalled. Blood pressure remains elevated today. Stressed importance of initiating and compliance. Continue regular eye exams with Dr. Mary Lou Gordon. Foot exam normal (7/2019). Urine microalbumin/ creatinine ratio remains without evidence of microalbuminuria today. 2. Hypertension. Blood pressure remains elevated on current regimen of metoprolol XL 50 mg daily and hydrochlorothiazide 12.5 mg daily. Prescribed losartan 25 mg daily at last visit, but no longer taking. Stressed importance today and patient again agreed to initiate.  Asked to monitor her blood pressure at home and bring readings to office for review. Renal function remains normal with creatinine 0.73 / eGFR >60. Continue to follow. 3. Hyperlipidemia. On moderate intensity dose simvastatin 20 mg daily with LDL 71 and HDL 61 today, indicative of good control. Continue to follow. 4. Elevated LFT's. Resolved. Unclear etiology, but doubt secondary to statin. Hepatitis panel and iron studies normal. RUQ ultrasound difficult secondary to body habitus, but abdominal CT scan showed normal liver parenchyma. Will continue to follow. 5. AV dharmesh reentrant tachycardia. No recent episodes. On metoprolol and no significant palpitations recently. Followed by Dr. Lopez Devi. 6. Asthma, mild intermittent. Associated with allergies. Patient states that she is not using Qvar. Using albuterol only as needed. Receiving monthly immunotherapy injections. Followed by Dr. Dora Serna. 7. Laryngopharyngeal reflux. Evaluated by Dr. Gonzalo Beverly, and noted on laryngoscopic exam. Started on omeprazole daily and feels that it may be helpfiul. 8. GERD. Symptoms generally controlled with omeprazole. Had only been using prn, but now on daily dose as discussed. Continue to follow. 9. Microscopic hematuria. Patient refusing further evaluation with cystoscopy or CT urogram. Urine cytology negative. Did have abdominal CT scan in 5/2016 where kidneys appeared normal. Recommended that she complete evaluation with Dr. Jose Fagan. Repeat urinalysis with evidence small blood and 1-2 RBCs. 10. Bilateral knee pain. Did not respond to cortisone injection. Had both right and left knee MRI showing variable degrees of menisci tears and osteoarthritis of knee joint. Now being followed by Dr. Germania Rangel and reports relatively quiescent. 11. Abnormal urinalysis. Patient with nonspecific symptoms, but states mild intermittent dysuria. Will repeat with urine culture and treat only if positive. 12. Obesity.  Emphasized importance of lifestyle modifications, including diet, exercise, and weight loss. Weight unchanged. Will readdress next visit. 13. Health maintenance. Will give influenza vaccine today. Already received script for Shingrix vaccine. Given Pneumovax given asthma history. Colonoscopy due 2021. Mammogram up to date. Continue regular eye exams with Dr. Sabino Hurtado. Vitamin D level normal. Continue maintenance dose supplement. Patient understands recommendations and agrees with plan. Follow-up in 3 months. [facility] Outpatient Visit on 10/08/2019   Component Date Value Ref Range Status    Special Requests: 10/08/2019 NO SPECIAL REQUESTS    Final    Culture result: 10/08/2019 [id number] COLONIES/mL STREPTOCOCCI, BETA HEMOLYTIC GROUP B*   Final    Culture result: 10/08/2019     Final                    Value:<10,000  COLONIES/mL  MIXED GRAM POSITIVE MIRIAM, PROBABLE SKIN/GENITAL CONTAMINATION.  Color 10/08/2019 YELLOW    Final    Appearance 10/08/2019 CLEAR    Final    Specific gravity 10/08/2019 1.023  1.005 - 1.030   Final    pH (UA) 10/08/2019 6.5  5.0 - 8.0   Final    Protein 10/08/2019 NEGATIVE   NEG mg/dL Final    Glucose 10/08/2019 NEGATIVE   NEG mg/dL Final    Ketone 10/08/2019 NEGATIVE   NEG mg/dL Final    Bilirubin 10/08/2019 NEGATIVE   NEG   Final    Blood 10/08/2019 SMALL* NEG   Final    Urobilinogen 10/08/2019 0.2  0.2 - 1.0 EU/dL Final    Nitrites 10/08/2019 NEGATIVE   NEG   Final    Leukocyte Esterase 10/08/2019 NEGATIVE   NEG   Final    WBC 10/08/2019 0 to 2  0 - 4 /hpf Final    RBC 10/08/2019 3 to 5  0 - 5 /hpf Final    Epithelial cells 10/08/2019 FEW  0 - 5 /lpf Final    Bacteria 10/08/2019 NEGATIVE   NEG /hpf Final       Urine culture did not show evidence of infection, but evidence of microscopic hematuria. Will advise that she follow-up with urology (Dr. Amy Brown) and proceed with the evaluation that he had recommended.

## 2019-10-29 ENCOUNTER — OFFICE VISIT (OUTPATIENT)
Dept: CARDIOLOGY CLINIC | Age: 64
End: 2019-10-29

## 2019-10-29 VITALS
SYSTOLIC BLOOD PRESSURE: 160 MMHG | BODY MASS INDEX: 34.04 KG/M2 | HEIGHT: 62 IN | DIASTOLIC BLOOD PRESSURE: 90 MMHG | HEART RATE: 69 BPM | OXYGEN SATURATION: 98 % | WEIGHT: 185 LBS

## 2019-10-29 DIAGNOSIS — R00.2 PALPITATIONS: ICD-10-CM

## 2019-10-29 DIAGNOSIS — I47.1 PSVT (PAROXYSMAL SUPRAVENTRICULAR TACHYCARDIA) (HCC): Primary | ICD-10-CM

## 2019-10-29 DIAGNOSIS — I10 ESSENTIAL HYPERTENSION: ICD-10-CM

## 2019-10-29 DIAGNOSIS — E78.5 HYPERLIPIDEMIA, UNSPECIFIED HYPERLIPIDEMIA TYPE: ICD-10-CM

## 2019-10-29 DIAGNOSIS — I47.1 AVNRT (AV NODAL RE-ENTRY TACHYCARDIA) (HCC): ICD-10-CM

## 2019-10-29 NOTE — PROGRESS NOTES
HPI:   I saw Faina . Madelynn Bloch in my office today in cardiovascular evaluation regarding her past problems with palpitations and hypercholesterolemia. Ms. 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 She has had about six separate episodes of supraventricular tachycardia in the past, for which she has had to go to the emergency room and get adenosine intravenously in order to break her rhythm to sinus, but generally speaking on just Toprol 50 mg daily she does quite well and has had only occasional palpitations. She comes in today and relates that she is continuing to do well. She continues on her Toprol-XL 50 mg daily and is having no palpitations at all at this time. Encounter Diagnoses   Name Primary?  Palpitations     History of PSVT (paroxysmal supraventricular tachycardia) ([facility]) Yes    AVNRT (AV dharmesh re-entry tachycardia) ([facility] [address] resolved s/p ablation therapy     Essential hypertension     Hyperlipidemia, unspecified hyperlipidemia type        Discussion: This patient appears to be doing about as well as we could expect and I have no recommendations for change at this time. She is really having no significant palpitations on her Toprol-XL and I would simply continue that medication the current dosage moving forward. She does have an elevated A1c at 0.9 when checked again on October 1, 2019 and is being treated for diabetes with metformin currently which is being managed through Dr. Jared Brooke office. Her latest lipid profile which was completed on October 1, 2019 was reasonably good with total cholesterol 155, triglycerides 114, HDL 61, LDL of 71.2, VLDL 22.8 which I think is reasonable control on only Zocor 20 mg daily. Her blood pressure is somewhat elevated today and was 157/88 when checked by my staff 160/90 when checked by myself.   She is having her blood pressure medication managed through Dr. Nilam Briseno office so she will be following up with her in that regard and since the patient is otherwise doing well I will simply plan to see her again in a year. PCP:   Rhoda Dickson MD       Past Medical History:   Diagnosis Date    Allergic rhinitis     Asthma     Cardiac stress echo, normal 11/02/2012    Normal maximal stress echo study. EF 60%. Ex time 9 min 45 sec. 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  Hyperlipidemia     Hypertension     Menopause     Plantar fasciitis     left    PSVT (paroxysmal supraventricular tachycardia) ([facility])     A-V dharmesh reentrant tachycardia         Past Surgical History:   Procedure Laterality Date    ENDOSCOPY, COLON, DIAGNOSTIC      polyp    HX CERVICAL POLYPECTOMY      HX CYST INCISION AND DRAINAGE Right 10 or more years    HX DILATION AND CURETTAGE      HX GYN      polyp on cervix    HX POLYPECTOMY      from rectum     Current Outpatient Medications   Medication Sig    losartan (COZAAR) 25 mg tablet Take 1 Tab by mouth daily.  clotrimazole-betamethasone (LOTRISONE) topical cream Apply  to both ear canals and affected part of outer ear twice a day with a finger.  omeprazole (PRILOSEC) 40 mg capsule Take 40 mg by mouth daily.  cholecalciferol (VITAMIN D3) 1,000 unit cap Take 1,000 Units by mouth daily.  simvastatin (ZOCOR) 20 mg tablet take 1 tablet by mouth at bedtime    PROAIR HFA 90 mcg/actuation inhaler inhale 2 puffs by mouth every 4 hours if needed for wheezing    LORazepam (ATIVAN) 1 mg tablet TAKE 1 TABLET BY MOUTH EVERY 8 HOURS AS NEEDED FOR ANXIETY    metFORMIN ER (GLUCOPHAGE XR) 500 mg tablet Take 1 Tab by mouth daily (with dinner).     potassium chloride (K-DUR, KLOR-CON) 20 mEq tablet take 1 tablet by mouth once daily    hydroCHLOROthiazide (HYDRODIURIL) 12.5 mg tablet Take 1 Tab by mouth daily.  metoprolol succinate (TOPROL-XL) 50 mg XL tablet take 1 tablet by mouth once daily    fluticasone (FLONASE) 50 mcg/actuation nasal spray INSTILL 2 SPRAYS IN EACH NOSTRIL DAILY    carboxymethylcellulose sodium (REFRESH LIQUIGEL) 1 % dlgl ophthalmic solution Apply  to eye. No current facility-administered medications for this visit. Allergies   Allergen Reactions    Altace [Ramipril] Cough    Penicillins Other (comments)     Hands peel    Sulfur Itching         Social History:   Social History     Tobacco Use    Smoking status: Never Smoker    Smokeless tobacco: Never Used   Substance Use Topics    Alcohol use: No           Family history: family history includes Arthritis-osteo in her mother; Breast Cancer in her maternal aunt and paternal aunt; Cancer in her father; Diabetes in an other family member; Hypertension in her mother, sister, sister, and sister; Other in her sister; Stroke in an other family member. Review of Systems:    Constitutional: Negative. Respiratory: Positive for cough. Negative for hemoptysis, shortness of breath and wheezing. Cardiovascular: Negative. Gastrointestinal: Negative. Musculoskeletal: Positive for joint pain and myalgias. Negative for falls. Neurological: Negative for dizziness. Physical Exam:   The patient is an alert, oriented, well developed, well nourished 59 y.o.  female who was in no acute distress at the time of my examination.   Visit Vitals  /90 (BP 1 Location: Right arm, BP Patient Position: Sitting)   Pulse 69   Ht 5' 2\" (1.575 m)   Wt 83.9 kg (185 lb)   SpO2 98%   BMI 33.84 kg/m²      BP Readings from Last 3 Encounters:   10/29/19 160/90   10/08/19 138/88   07/02/19 140/82        Wt Readings from Last 3 Encounters:   10/29/19 83.9 kg (185 lb)   10/08/19 83.5 kg (184 lb) 07/02/19 82.6 kg (182 lb)       HEENT: Conjuctiva white, mucosa moist, no pallor or cyanosis. Neck: Supple without masses, tenderness or thyromegaly. No jugular venous distention. Carotid upstrokes are full bilaterally, without bruits. Cardiovascular: Chest is symmetrical with good excursion. [place] is not displaced. No lifts, heaves or thrills. S1 and S2 are normal, without appreciable murmurs, rubs, clicks or gallops. Lungs: Clear to auscultation in all fields. Abdomen: Soft; no masses, tenderness or organomegaly. Extremities: No edema, with full peripheral pulses. Review of Data: See PMH and Cardiology and Imaging sections for cardiac testing  Lab Results   Component Value Date/Time    Cholesterol, total 155 10/01/2019 09:56 AM    HDL Cholesterol 61 (H) 10/01/2019 09:56 AM    LDL, calculated 71.2 10/01/2019 09:56 AM    Triglyceride 114 10/01/2019 09:56 AM    CHOL/HDL Ratio 2.5 10/01/2019 09:56 AM       Results for orders placed or performed in visit on 10/29/19   AMB POC EKG ROUTINE W/ 12 LEADS, INTER & REP     Status: None    Narrative    Normal sinus rhythm rate 69. Diffuse ST-T flattening with some very mild T wave inversions anteriorly which is nonspecific. Compared to EKG of October 31, 2018 the ST-T changes are slightly less marked. Olive Simmons D.O., F.A.C.C. [facility]  [facility]  [address] [place] [address]. [address]    PLEASE NOTE:  This document has been produced using voice recognition software. Unrecognized errors in transcription may be present.

## 2019-10-30 ENCOUNTER — TELEPHONE (OUTPATIENT)
Dept: INTERNAL MEDICINE CLINIC | Age: 64
End: 2019-10-30

## 2019-10-30 NOTE — TELEPHONE ENCOUNTER
Called patient and verified full name and date of birth. I informed patient of her appointment tomorrow morning and she verbalized understanding.

## 2019-10-30 NOTE — TELEPHONE ENCOUNTER
Patient stating she has a sore spot on her left breast. She doesn't know if maybe she hit it or something. No bruising or anything. She called to schedule a mammogram but they told her she isn't due until December. Please advise.

## 2019-10-31 ENCOUNTER — OFFICE VISIT (OUTPATIENT)
Dept: INTERNAL MEDICINE CLINIC | Age: 64
End: 2019-10-31

## 2019-10-31 VITALS
BODY MASS INDEX: 33.86 KG/M2 | HEIGHT: 62 IN | TEMPERATURE: 98.1 F | WEIGHT: 184 LBS | OXYGEN SATURATION: 98 % | HEART RATE: 64 BPM | RESPIRATION RATE: 14 BRPM | SYSTOLIC BLOOD PRESSURE: 132 MMHG | DIASTOLIC BLOOD PRESSURE: 78 MMHG

## 2019-10-31 DIAGNOSIS — N64.4 BREAST TENDERNESS IN FEMALE: Primary | ICD-10-CM

## 2019-10-31 DIAGNOSIS — J30.1 SEASONAL ALLERGIC RHINITIS DUE TO POLLEN: ICD-10-CM

## 2019-10-31 DIAGNOSIS — I10 ESSENTIAL HYPERTENSION: ICD-10-CM

## 2019-10-31 DIAGNOSIS — I47.1 AVNRT (AV NODAL RE-ENTRY TACHYCARDIA) (HCC): ICD-10-CM

## 2019-10-31 DIAGNOSIS — J45.21 MILD INTERMITTENT ASTHMA WITH ACUTE EXACERBATION: ICD-10-CM

## 2019-10-31 DIAGNOSIS — R31.29 MICROSCOPIC HEMATURIA: ICD-10-CM

## 2019-10-31 DIAGNOSIS — E78.5 HYPERLIPIDEMIA, UNSPECIFIED HYPERLIPIDEMIA TYPE: ICD-10-CM

## 2019-10-31 DIAGNOSIS — N63.0 BREAST NODULE: ICD-10-CM

## 2019-10-31 DIAGNOSIS — E66.9 OBESITY (BMI 30.0-34.9): ICD-10-CM

## 2019-10-31 DIAGNOSIS — E11.9 TYPE 2 DIABETES MELLITUS WITHOUT COMPLICATION, WITHOUT LONG-TERM CURRENT USE OF INSULIN (HCC): ICD-10-CM

## 2019-10-31 NOTE — PROGRESS NOTES
Chief Complaint   Patient presents with    Breast pain     Patient complains of left breast pain. Patient states she doesn't know how it started it just came on suddenly. Patient states she is taking Systaine eye drops now and took her B/P medication Toprol this morning, her blood pressure is 140/80. Health Maintenance Due   Topic Date Due    Shingrix Vaccine Age 50> (1 of 2) 03/26/2005     1. Have you been to the ER, urgent care clinic or hospitalized since your last visit? YES. [facility] Oct [facility], 2019 for asthma problems and is on prednisone now. 2. Have you seen or consulted any other health care providers outside of the [address] [place] [address] since your last visit (Include any pap smears or colon screening)? NO      Learning Assessment 10/8/2019   PRIMARY LEARNER Patient   HIGHEST LEVEL OF EDUCATION - PRIMARY LEARNER  GRADUATED HIGH SCHOOL OR GED   BARRIERS PRIMARY LEARNER NONE   CO-LEARNER CAREGIVER No   PRIMARY LANGUAGE ENGLISH   LEARNER PREFERENCE PRIMARY DEMONSTRATION   ANSWERED BY patient   RELATIONSHIP SELF     Abuse Screening Questionnaire 10/31/2019   Do you ever feel afraid of your partner? N   Are you in a relationship with someone who physically or mentally threatens you? N   Is it safe for you to go home?  Y     3 most recent PHQ Screens 10/31/2019   Little interest or pleasure in doing things Not at all   Feeling down, depressed, irritable, or hopeless Not at all   Total Score PHQ 2 0   Trouble falling or staying asleep, or sleeping too much -   Feeling tired or having little energy -   Poor appetite, weight loss, or overeating -   Feeling bad about yourself - or that you are a failure or have let yourself or your family down -   Trouble concentrating on things such as school, work, reading, or watching TV -   Moving or speaking so slowly that other people could have noticed; or the opposite being so fidgety that others notice -   Thoughts of being better off dead, or hurting yourself in some way -   PHQ 9 Score -   How difficult have these problems made it for you to do your work, take care of your home and get along with others -     Fall Risk Assessment, last 12 mths 10/31/2019   Able to walk? Yes   Fall in past 12 months?  No

## 2019-11-04 NOTE — PROGRESS NOTES
HPI:   Joanne Fam is a 59y.o. year old female who presents today for an acute visit. She has a history of hypertension, hyperlipidemia, paroxysmal SVT, diabetes mellitus, GERD, asthma, elevated transaminases, and atypical mycobacterial pneumonia. She reports difficulty with left breast pain that has been present for a few weeks. She states that she has noticed tenderness and pain in the medial aspect of her left breast. She states that she brought this to the attention of Dr. Evelyn Molina  at her recent visit given the location on the left chest, and she states that he did not believe it to be cardiac. She does describe tenderness to palpation over the medial left breast, but denies any redness, skin changes, nipple discharge, fever or chills. Her last mammogram was negative in 12/2018. She does have a strong family history of breast cancer, diagnosed in her maternal aunt at age 48 and her paternal aunt at age 79. She also reports that she was having increased difficulty with cough and dyspnea causing her to present to [facility], and she was prescribed a Medrol dose pack for an asthma exacerbation. She was also instructed to use her albuterol inhaler as needed. She reports today that she is longterm through the dose pack and has noted significant improvement. She is otherwise without new complaints and feeling generally well. She has a history of hypertension, treated with metoprolol and hydrochlorothiazide (+ potassium). She reports that she does not check her blood pressure at home. She does not exercise regularly, but denies any chest pain, shortness of breath at rest or with exertion, lightheadedness, or edema. 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 She has had approximately six severe episodes over the years, prompting presentation to the ED and treatment with IV Adenosine.  She currently reports infrequent short episodes of palpitations and is being treated with metoprolol. She is followed by Dr. Ilene Valdivia. She had an echocardiogram (10/2005) showing normal LV size and function (EF 60-65%), and no valvular pathology. In 11/2012, she underwent an exercise stress echocardiogram, which was normal at maximal exercise. She has a history of hyperlipidemia, treated with simvastatin from 10/2012 to 3/2015 at which time she stopped taking it due to myalgias. She restarted it on 8/2015 and continued to take it without difficulty until 3/2016, when it was noticed that she had transaminase elevation (AST 85/ ) and it was discontinued. Evaluation included hepatitis A, B, and C levels (negative), iron panel (normal), and RUQ ultrasound (3/23/2016) with limited sonographic window for the liver; only partially visualized but grossly unremarkable. Repeat hepatic panel (4/22/2016) showed AST 75 and ALT 98. Repeat lipid panel showed total chol 215/ / HDL 64/. In 5/2016, she had an abdominal CT scan showing the liver to be normal in size with normal parenchymal density; no discrete mass or ascites, and no intrahepatic biliary dilatation. She was subsequently instructed to restart simvastatin, but chose not to since she felt it was making her forgetful. She agreed to trial of rosuvastatin 10 mg and started it in 2/2018. However, after two weeks of therapy, she discontinued it since she thought it was causing her leg pain. She subsequently contacted Dr. Radha Abarca office and asked to begin simvastatin 20 mg daily in 4/2018. She has been taking it without difficulty since restarting. She has a history of diabetes mellitus, with impaired fasting glucose ranging from 103-111 since 2012, and HbA1c to 6.6-6.8 since 9/2016 (not checked previously). She was prescribed metformin ER last visit for an increase in her HbA1c to 7.1, but she reports that she took it for only one week and discontinued for unclear reasons.  She was not experiencing any side effects. She denies any polyuria, polydipsia, nocturia, or blurry vision, and has no history of retinopathy, neuropathy, or nephropathy. She has regular eye exams with Dr. Gianluca Evans. She has a history of asthma and allergic rhinitis and is followed by Dr. James Sanchez. She is receiving immunotherapy once per month, and reports that she has not required any inhalers recently. She states that she does not use Qvar daily, but will take it occasionally. She does use Claritin every day. In 7/2019, she reported increasing difficulty with right ear fullness, post nasal drainage, hoarseness, and cough, and was referred to Dr. Sergio Costello. He performed a nasal laryngoscopy and found evidence of laryngopharyngeal reflux. She was started on daily omeprazole, and she states that she has noticed some improvement. In 10/2017, she fell down the steps of her porch and had difficulty with right knee pain and swelling. She was evaluated by Dr. Yuli Menchaca in 12/2017, and right knee xray showed decreased medial joint space, and moderate degenerative changes. She received a cortisone injection, but did not notice any improvement. She underwent an MRI of the right knee (1/29/2018) which showed complete tear of posterior horn and root of the medial meniscus; tear of the body and posterior horn of the lateral meniscus; tricompartmental osteoarthritis most prominent in medial and patellofemoral compartments; moderate joint effusion; small Baker's cyst. It was recommended that she consider knee replacement and arthroscopy. However, she decided to seek a second opinion and was evaluated by Dr. Aure Calero. She also underwent an MRI of her left knee (3/23/2018) showing radial tear posterior horn medial meniscus with extrusion of the body.  Also a radial tear in the mid body; lateral meniscus intrasubstance degeneration and probable small undersurface tear of the posterior horn; medial and patellofemoral compartments moderate chondral loss; s. ubchondral bone marrow edema in the medial tibial plateau, likely reactive. She is completed physical therapy for her bilateral knees and feels that it may have helped. 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 She was treated with Avelox, and repeat chest x-ray in 1/2012 showed complete resolution of the pneumonia. She denies any cough or shortness of breath. She had a screening colonoscopy in 12/2006 by Dr. Pasquale Hastings showing a 5 mm sessile polyp in the rectum (pathology: hyperplastic). She had a repeat colonoscopy in 12/2016 which showed moderate sigmoid diverticulosis and a 6 mm sessile ascending colon polyp (pathology: serrated adenoma). Follow-up recommended for 5 years. She denies any abdominal pain, nausea, vomiting, melena, hematochezia, or change in bowel movements. She does take omeprazole occasionally for GERD symptoms. In 12/2017, she was referred by her gynecologist for evaluation by Dr. Blayne Doss for microscopic hematuria, and urine cytology was negative. However, she states that she refused his recommendations to undergo a CT urogram or cystoscopy. She denies any dysuria, gross hematuria, or flank pain. Past Medical History:   Diagnosis Date    Allergic rhinitis     Asthma     Cardiac stress echo, normal 11/02/2012    Normal maximal stress echo study. EF 60%. Ex time 9 min 45 sec. 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     Hyperlipidemia     Hypertension     Menopause     Plantar fasciitis     left    PSVT (paroxysmal supraventricular tachycardia) ([facility])     A-V dharmesh reentrant tachycardia     Past Surgical History:   Procedure Laterality Date    ENDOSCOPY, COLON, DIAGNOSTIC      polyp  HX CERVICAL POLYPECTOMY      HX CYST INCISION AND DRAINAGE Right 10 or more years    HX DILATION AND CURETTAGE      HX GYN      polyp on cervix    HX POLYPECTOMY      from rectum     Current Outpatient Medications   Medication Sig    losartan (COZAAR) 25 mg tablet Take 1 Tab by mouth daily.  clotrimazole-betamethasone (LOTRISONE) topical cream Apply  to both ear canals and affected part of outer ear twice a day with a finger.  omeprazole (PRILOSEC) 40 mg capsule Take 40 mg by mouth daily.  cholecalciferol (VITAMIN D3) 1,000 unit cap Take 1,000 Units by mouth daily.  simvastatin (ZOCOR) 20 mg tablet take 1 tablet by mouth at bedtime    PROAIR HFA 90 mcg/actuation inhaler inhale 2 puffs by mouth every 4 hours if needed for wheezing    LORazepam (ATIVAN) 1 mg tablet TAKE 1 TABLET BY MOUTH EVERY 8 HOURS AS NEEDED FOR ANXIETY    metFORMIN ER (GLUCOPHAGE XR) 500 mg tablet Take 1 Tab by mouth daily (with dinner).  potassium chloride (K-DUR, KLOR-CON) 20 mEq tablet take 1 tablet by mouth once daily    hydroCHLOROthiazide (HYDRODIURIL) 12.5 mg tablet Take 1 Tab by mouth daily.  metoprolol succinate (TOPROL-XL) 50 mg XL tablet take 1 tablet by mouth once daily    fluticasone (FLONASE) 50 mcg/actuation nasal spray INSTILL 2 SPRAYS IN EACH NOSTRIL DAILY    carboxymethylcellulose sodium (REFRESH LIQUIGEL) 1 % dlgl ophthalmic solution Apply  to eye. No current facility-administered medications for this visit. Allergies and Intolerances: Allergies   Allergen Reactions    Altace [Ramipril] Cough    Penicillins Other (comments)     Hands peel    Sulfur Itching     Family History: She had two aunts who had breast cancer (her mother's sister and father's sister). She has no FH of colon cancer. Her mother passed away from uterine cancer. Her father  from metastatic prostate cancer.    Family History   Problem Relation Age of Onset   Pamella Adams Arthritis-osteo Mother     Hypertension Mother  Cancer Father         bone cancer    Hypertension Sister     Hypertension Sister     Hypertension Sister     Breast Cancer Maternal Aunt     Breast Cancer Paternal Aunt     Diabetes Other     Stroke Other     Other Sister         twin sister - osteopenia and low vitamin D levels     Social History:   She  reports that she has never smoked. She has never used smokeless tobacco. She is  and has two sons. She was a homemaker, but worked part-time in . She now helps care for her grandchildren. Social History     Substance and Sexual Activity   Alcohol Use No     Immunization History:  Immunization History   Administered Date(s) Administered    Influenza Vaccine (Quad) PF 10/23/2015, 10/19/2016, 10/05/2017, 10/26/2018, 10/08/2019    Influenza Vaccine PF 12/12/2013, 10/03/2014    Influenza Vaccine Split 11/02/2011, 10/22/2012    Influenza Vaccine Whole 10/29/2010    PPD 01/03/2012    Pneumococcal Polysaccharide (PPSV-23) 03/21/2017    TB Skin Test (PPD) Intradermal 02/12/2014    TDAP Vaccine 02/16/2012       Review of Systems:   As above included in HPI. Otherwise 11 point review of systems negative including constitutional, skin, HENT, eyes, respiratory, cardiovascular, gastrointestinal, genitourinary, musculoskeletal, endocrine, hematologic, allergy, and neurologic. Physical:   Vitals:   BP: 132/78  HR: 64  WT: 184 lb (83.5 kg)  BMI:  33.65 kg/m2    Exam:   Patient appears in no apparent distress. Affect is appropriate. HEENT: PERRLA, anicteric, oropharynx clear, no JVD, adenopathy or thyromegaly. No carotid bruits or radiated murmur. Lungs: clear to auscultation, no wheezes, rhonchi, or rales.   Breast: right breast without tenderness or palpable nodules; left breast with tenderness to palpation over the medial aspect at 7:00 location, palpable cord like nodules in linear pattern extending over approximately 2-3 cm; no nipple discharge or skin changes; no palpable axillary lymphadenopathy  Heart: regular rate and rhythm. No murmur, rubs, gallops  Abdomen: soft, nontender, nondistended, normal bowel sounds, no hepatosplenomegaly or masses. Extremities: without edema. Review of Data:  Labs:  Hospital Outpatient Visit on 10/08/2019   Component Date Value Ref Range Status    Special Requests: 10/08/2019 NO SPECIAL REQUESTS    Final    Culture result: 10/08/2019 [id number] COLONIES/mL STREPTOCOCCI, BETA HEMOLYTIC GROUP B*   Final    Culture result: 10/08/2019     Final                    Value:<10,000  COLONIES/mL  MIXED GRAM POSITIVE MIRIAM, PROBABLE SKIN/GENITAL CONTAMINATION.       Color 10/08/2019 YELLOW    Final    Appearance 10/08/2019 CLEAR    Final    Specific gravity 10/08/2019 1.023  1.005 - 1.030   Final    pH (UA) 10/08/2019 6.5  5.0 - 8.0   Final    Protein 10/08/2019 NEGATIVE   NEG mg/dL Final    Glucose 10/08/2019 NEGATIVE   NEG mg/dL Final    Ketone 10/08/2019 NEGATIVE   NEG mg/dL Final    Bilirubin 10/08/2019 NEGATIVE   NEG   Final    Blood 10/08/2019 SMALL* NEG   Final    Urobilinogen 10/08/2019 0.2  0.2 - 1.0 EU/dL Final    Nitrites 10/08/2019 NEGATIVE   NEG   Final    Leukocyte Esterase 10/08/2019 NEGATIVE   NEG   Final    WBC 10/08/2019 0 to 2  0 - 4 /hpf Final    RBC 10/08/2019 3 to 5  0 - 5 /hpf Final    Epithelial cells 10/08/2019 FEW  0 - 5 /lpf Final    Bacteria 10/08/2019 NEGATIVE   NEG /hpf Final   Hospital Outpatient Visit on 10/01/2019   Component Date Value Ref Range Status    WBC 10/01/2019 6.3  4.6 - 13.2 K/uL Final    RBC 10/01/2019 4.56  4.20 - 5.30 M/uL Final    HGB 10/01/2019 13.7  12.0 - 16.0 g/dL Final    HCT 10/01/2019 42.5  35.0 - 45.0 % Final    MCV 10/01/2019 93.2  74.0 - 97.0 FL Final    MCH 10/01/2019 30.0  24.0 - 34.0 PG Final    MCHC 10/01/2019 32.2  31.0 - 37.0 g/dL Final    RDW 10/01/2019 12.6  11.6 - 14.5 % Final    PLATELET 33/54/9446 515  135 - 420 K/uL Final    MPV 10/01/2019 10.4  9.2 - 11.8 FL Final    NEUTROPHILS 10/01/2019 53  40 - 73 % Final    LYMPHOCYTES 10/01/2019 34  21 - 52 % Final    MONOCYTES 10/01/2019 8  3 - 10 % Final    EOSINOPHILS 10/01/2019 4  0 - 5 % Final    BASOPHILS 10/01/2019 1  0 - 2 % Final    ABS. NEUTROPHILS 10/01/2019 3.3  1.8 - 8.0 K/UL Final    ABS. LYMPHOCYTES 10/01/2019 2.1  0.9 - 3.6 K/UL Final    ABS. MONOCYTES 10/01/2019 0.5  0.05 - 1.2 K/UL Final    ABS. EOSINOPHILS 10/01/2019 0.2  0.0 - 0.4 K/UL Final    ABS. BASOPHILS 10/01/2019 0.0  0.0 - 0.1 K/UL Final    DF 10/01/2019 AUTOMATED    Final    Hemoglobin A1c 10/01/2019 6.9* 4.2 - 5.6 % Final    Est. average glucose 10/01/2019 151  mg/dL Final    LIPID PROFILE 10/01/2019        Final    Cholesterol, total 10/01/2019 155  <200 MG/DL Final    Triglyceride 10/01/2019 114  <150 MG/DL Final    HDL Cholesterol 10/01/2019 61* 40 - 60 MG/DL Final    LDL, calculated 10/01/2019 71.2  0 - 100 MG/DL Final    VLDL, calculated 10/01/2019 22.8  MG/DL Final    CHOL/HDL Ratio 10/01/2019 2.5  0 - 5.0   Final    Magnesium 10/01/2019 1.8  1.6 - 2.6 mg/dL Final    Sodium 10/01/2019 137  136 - 145 mmol/L Final    Potassium 10/01/2019 4.0  3.5 - 5.5 mmol/L Final    Chloride 10/01/2019 100  100 - 111 mmol/L Final    CO2 10/01/2019 30  21 - 32 mmol/L Final    Anion gap 10/01/2019 7  3.0 - 18 mmol/L Final    Glucose 10/01/2019 118* 74 - 99 mg/dL Final    BUN 10/01/2019 12  7.0 - 18 MG/DL Final    Creatinine 10/01/2019 0.73  0.6 - 1.3 MG/DL Final    BUN/Creatinine ratio 10/01/2019 16  12 - 20   Final    GFR est AA 10/01/2019 >60  >60 ml/min/1.73m2 Final    GFR est non-AA 10/01/2019 >60  >60 ml/min/1.73m2 Final    Calcium 10/01/2019 9.1  8.5 - 10.1 MG/DL Final    Bilirubin, total 10/01/2019 0.5  0.2 - 1.0 MG/DL Final    ALT (SGPT) 10/01/2019 47  13 - 56 U/L Final    AST (SGOT) 10/01/2019 37  10 - 38 U/L Final    Alk.  phosphatase 10/01/2019 83  45 - 117 U/L Final    Protein, total 10/01/2019 7.5  6.4 - 8.2 g/dL Final    Albumin 10/01/2019 4.0  3.4 - 5.0 g/dL Final    Globulin 10/01/2019 3.5  2.0 - 4.0 g/dL Final    A-G Ratio 10/01/2019 1.1  0.8 - 1.7   Final    TSH 10/01/2019 2.29  0.36 - 3.74 uIU/mL Final    Vitamin D 25-Hydroxy 10/01/2019 32.2  30 - 100 ng/mL Final    Microalbumin,urine random 10/01/2019 1.09  0 - 3.0 MG/DL Final    Creatinine, urine 10/01/2019 154.00* 30 - 125 mg/dL Final    Microalbumin/Creat ratio (mg/g cre* 10/01/2019 7  0 - 30 mg/g Final    Color 10/01/2019 YELLOW    Final    Appearance 10/01/2019 CLEAR    Final    Specific gravity 10/01/2019 1.016  1.005 - 1.030   Final    pH (UA) 10/01/2019 5.5  5.0 - 8.0   Final    Protein 10/01/2019 NEGATIVE   NEG mg/dL Final    Glucose 10/01/2019 NEGATIVE   NEG mg/dL Final    Ketone 10/01/2019 NEGATIVE   NEG mg/dL Final    Bilirubin 10/01/2019 NEGATIVE   NEG   Final    Blood 10/01/2019 SMALL* NEG   Final    Urobilinogen 10/01/2019 0.2  0.2 - 1.0 EU/dL Final    Nitrites 10/01/2019 NEGATIVE   NEG   Final    Leukocyte Esterase 10/01/2019 MODERATE* NEG   Final    WBC 10/01/2019 6 to 8  0 - 4 /hpf Final    RBC 10/01/2019 1 to 3  0 - 5 /hpf Final    Epithelial cells 10/01/2019 FEW  0 - 5 /lpf Final    Bacteria 10/01/2019 FEW* NEG /hpf Final     Health Maintenance:  Screening:    Mammogram: negative (12/2018). PAP smear: negative (10/2016) with negative HPV. Followed by Dr. Molly Elias. Colorectal: colonoscopy (12/2016) serrated adenoma. Dr. Ryan Galvan. Due 12/2021.    Depression: none   DM (HbA1c/FPG): HbA1c 6.9 (10/2019)   Hepatitis C: negative (3/2016)   Falls: one   DEXA: within normal limits (11/2015)   Glaucoma: regular eye exams with Dr. Felix Corado (last 9/2019)   Smoking: none   Vitamin D: 32.2 (10/2019)    Medicare Wellness: N/A      Impression:  Patient Active Problem List   Diagnosis Code    Benign hypertensive heart disease without congestive heart failure I11.9    Allergic rhinitis J30.9    Colon polyps K63.5    AVNRT (AV dharmesh re-entry tachycardia) ([facility]) I47.1    Hyperlipidemia E78.5    Essential hypertension I10    Asthma J45.909    GERD (gastroesophageal reflux disease) K21.9    Type 2 diabetes mellitus without complication, without long-term current use of insulin ([facility]) E11.9    Vitamin D insufficiency E55.9    Microscopic hematuria R31.29    Chronic pain of both knees M25.561, M25.562, G89.29    Obesity (BMI 30.0-34. 9) E66.9    Noncompliance Z91.19    Anxiety F41.9       Plan:  Left breast pain and nodules. Patient presents with new onset left breast pain for approximately two weeks. Exam with evidence of tenderness over the medial aspect of the left breast at the 7 o'clock position; Palpable cord like nodules also evident in this area. There is no overlying skin changes and no nipple discharge. There's no axillary lymphadenopathy. Last mammogram 12/2018. Positive family history for breast cancer in a maternal aunt in her 46s and in a paternal aunt. Will proceed with left diagnostic mammogram +/- left breast ultrasound. Further recommendations pending review of these studies. Other issues:  1. Diabetes mellitus. Diagnosed in 9/2016 when HbA1c was checked and found to be elevated at 6.6. Had been steadily increasing and reached 7.2 last visit with a fasting glucose of 135. Impaired fasting glucose had been present intermittently since 2012. Agreeable to starting treatment with metformin  mg at dinner in 3/2019 and repeat HbA1c today worsened to 6.9 from last visit at 6.4. She acknowledges that she may forget to take her dose at times. No evidence of microvascular complications. On statin. Started on losartan at last visit, and patient states that she is no longer taking it as heard it had been recalled. Blood pressure remains elevated today. Stressed importance of initiating and compliance. Continue regular eye exams with Dr. Kim Seaman. Foot exam normal (7/2019).  Urine microalbumin/ creatinine ratio remains without evidence of microalbuminuria today. 2. Hypertension. Blood pressure with improved control today and patient states that she has restarted losartan 25 mg daily in addition to her current regimen of metoprolol XL 50 mg daily and hydrochlorothiazide 12.5 mg daily. Asked to continue to monitor her blood pressure at home and bring readings to office for review. Renal function remains normal with creatinine 0.73 / eGFR >60. Continue to follow. 3. Hyperlipidemia. On moderate intensity dose simvastatin 20 mg daily with LDL 71 and HDL 61 today, indicative of good control. Continue to follow. 4. Elevated LFT's. Resolved. Unclear etiology, but doubt secondary to statin. Hepatitis panel and iron studies normal. RUQ ultrasound difficult secondary to body habitus, but abdominal CT scan showed normal liver parenchyma. Will continue to follow. 5. AV dharmesh reentrant tachycardia. No recent episodes. On metoprolol and no significant palpitations recently. Followed by Dr. Evelyn Molina. 6. Asthma, mild intermittent. Associated with allergies. Patient was not using Qvar and using albuterol only as needed. Recent exacerbation requiring treatment with oral steroids. Now improved, and has several more days of Medrol. Advised that should resume Qvar after completing. Receiving monthly immunotherapy injections with Dr. Buddy Howell. 7. Laryngopharyngeal reflux. Evaluated by Dr. Monique Villareal, and noted on laryngoscopic exam. Started on omeprazole daily and feels that it may be helpfiul. 8. GERD. Symptoms generally controlled with omeprazole. Had only been using prn, but now on daily dose as discussed. Continue to follow. 9. Microscopic hematuria. Patient refusing further evaluation with cystoscopy or CT urogram. Urine cytology negative. Did have abdominal CT scan in 5/2016 where kidneys appeared normal. Recommended that she complete evaluation with Dr. Silvia Valencia. Repeat urinalysis with evidence small blood and 3-5 RBCs in 10/2019.     10. Bilateral knee pain. Did not respond to cortisone injection. Had both right and left knee MRI showing variable degrees of menisci tears and osteoarthritis of knee joint. Now being followed by Dr. Starla Chavis and reports relatively quiescent. 11. Obesity. Emphasized importance of lifestyle modifications, including diet, exercise, and weight loss. Weight unchanged. Will readdress next visit. 12. Health maintenance. Already received influenza vaccine. Already received script for Shingrix vaccine. Given Pneumovax given asthma history. Colonoscopy due 2021. Mammogram as above. Continue regular eye exams with Dr. Erika Turner. Vitamin D level normal. Continue maintenance dose supplement. Patient understands recommendations and agrees with plan. Follow-up as previously scheduled.

## 2019-11-13 ENCOUNTER — HOSPITAL ENCOUNTER (OUTPATIENT)
Dept: ULTRASOUND IMAGING | Age: 64
Discharge: HOME | End: 2019-11-13
Attending: INTERNAL MEDICINE
Payer: COMMERCIAL

## 2019-11-13 ENCOUNTER — HOSPITAL ENCOUNTER (OUTPATIENT)
Dept: MAMMOGRAPHY | Age: 64
Discharge: HOME | End: 2019-11-13
Attending: INTERNAL MEDICINE
Payer: COMMERCIAL

## 2019-11-13 DIAGNOSIS — N63.0 BREAST NODULE: ICD-10-CM

## 2019-11-13 DIAGNOSIS — N64.4 BREAST TENDERNESS IN FEMALE: ICD-10-CM

## 2019-11-13 PROCEDURE — 76642 ULTRASOUND BREAST LIMITED: CPT

## 2019-11-13 PROCEDURE — 77062 BREAST TOMOSYNTHESIS BI: CPT

## 2019-11-15 RX ORDER — HYDROCHLOROTHIAZIDE 12.5 MG/1
TABLET ORAL
Qty: 90 TAB | Refills: 3 | Status: SHIPPED | OUTPATIENT
Start: 2019-11-15 | End: 2020-11-27

## 2019-11-18 ENCOUNTER — TELEPHONE (OUTPATIENT)
Dept: INTERNAL MEDICINE CLINIC | Age: 64
End: 2019-11-18

## 2019-11-18 NOTE — TELEPHONE ENCOUNTER
If new eye symptoms occurring only over last few days, needs another eye exam. Normal exam one month ago does not mean that something new has not developed. Please advise her to contact Dr. Fadi Mcneil and get urgent evaluation.

## 2019-11-18 NOTE — TELEPHONE ENCOUNTER
Pt states starting 3 days ago she is seeing streak of light when she moves her eyes to the right then it goes away. Had eyes examined about 1 month ago and everything was good, no change in eyeglasses or anything. Thinks she is taking too much medicine and it is being caused by a medicine. She just doesn't know what to stop taking.   Please advise her at [facility] [phone] or [place] [phone]

## 2019-11-18 NOTE — TELEPHONE ENCOUNTER
Called patient and verified full name and date of birth. Informed patient of Dr. Ruben Mosley message and she verbalized understanding and states she will call Dr. Acosta Garza and ask him to re-evaluate her eyes for floaters or whatever is going wrong.

## 2020-01-06 RX ORDER — FLUTICASONE PROPIONATE 50 MCG
SPRAY, SUSPENSION (ML) NASAL
Qty: 16 G | Refills: 5 | Status: SHIPPED | OUTPATIENT
Start: 2020-01-06 | End: 2021-03-02

## 2020-01-13 RX ORDER — POTASSIUM CHLORIDE 20 MEQ/1
TABLET, EXTENDED RELEASE ORAL
Qty: 90 TAB | Refills: 3 | Status: SHIPPED | OUTPATIENT
Start: 2020-01-13 | End: 2020-01-20

## 2020-01-14 ENCOUNTER — HOSPITAL ENCOUNTER (OUTPATIENT)
Dept: LAB | Age: 65
Discharge: HOME | End: 2020-01-14
Payer: COMMERCIAL

## 2020-01-14 LAB
ANION GAP SERPL CALC-SCNC: 6 MMOL/L (ref 3–18)
BUN SERPL-MCNC: 11 MG/DL (ref 7–18)
BUN/CREAT SERPL: 16 (ref 12–20)
CALCIUM SERPL-MCNC: 9 MG/DL (ref 8.5–10.1)
CHLORIDE SERPL-SCNC: 101 MMOL/L (ref 100–111)
CHOLEST SERPL-MCNC: 185 MG/DL
CO2 SERPL-SCNC: 29 MMOL/L (ref 21–32)
CREAT SERPL-MCNC: 0.68 MG/DL (ref 0.6–1.3)
EST. AVERAGE GLUCOSE BLD GHB EST-MCNC: 151 MG/DL
GLUCOSE SERPL-MCNC: 118 MG/DL (ref 74–99)
HBA1C MFR BLD: 6.9 % (ref 4.2–5.6)
HDLC SERPL-MCNC: 69 MG/DL (ref 40–60)
HDLC SERPL: 2.7 {RATIO} (ref 0–5)
LDLC SERPL CALC-MCNC: 86.4 MG/DL (ref 0–100)
LIPID PROFILE,FLP: ABNORMAL
MAGNESIUM SERPL-MCNC: 1.8 MG/DL (ref 1.6–2.6)
POTASSIUM SERPL-SCNC: 3.9 MMOL/L (ref 3.5–5.5)
SODIUM SERPL-SCNC: 136 MMOL/L (ref 136–145)
TRIGL SERPL-MCNC: 148 MG/DL (ref ?–150)
VLDLC SERPL CALC-MCNC: 29.6 MG/DL

## 2020-01-14 PROCEDURE — 83735 ASSAY OF MAGNESIUM: CPT

## 2020-01-14 PROCEDURE — 36415 COLL VENOUS BLD VENIPUNCTURE: CPT

## 2020-01-14 PROCEDURE — 80048 BASIC METABOLIC PNL TOTAL CA: CPT

## 2020-01-14 PROCEDURE — 83036 HEMOGLOBIN GLYCOSYLATED A1C: CPT

## 2020-01-14 PROCEDURE — 80061 LIPID PANEL: CPT

## 2020-01-20 RX ORDER — METOPROLOL SUCCINATE 50 MG/1
TABLET, EXTENDED RELEASE ORAL
Qty: 90 TAB | Refills: 3 | Status: SHIPPED | OUTPATIENT
Start: 2020-01-20 | End: 2021-01-19

## 2020-01-20 RX ORDER — POTASSIUM CHLORIDE 20 MEQ/1
TABLET, EXTENDED RELEASE ORAL
Qty: 90 TAB | Refills: 3 | Status: SHIPPED | OUTPATIENT
Start: 2020-01-20 | End: 2021-04-27 | Stop reason: SDUPTHER

## 2020-01-21 ENCOUNTER — OFFICE VISIT (OUTPATIENT)
Dept: INTERNAL MEDICINE CLINIC | Age: 65
End: 2020-01-21

## 2020-01-21 VITALS
WEIGHT: 187 LBS | TEMPERATURE: 98 F | RESPIRATION RATE: 16 BRPM | OXYGEN SATURATION: 98 % | HEART RATE: 70 BPM | DIASTOLIC BLOOD PRESSURE: 80 MMHG | HEIGHT: 62 IN | SYSTOLIC BLOOD PRESSURE: 122 MMHG | BODY MASS INDEX: 34.41 KG/M2

## 2020-01-21 DIAGNOSIS — I10 ESSENTIAL HYPERTENSION: Primary | ICD-10-CM

## 2020-01-21 DIAGNOSIS — I47.1 AVNRT (AV NODAL RE-ENTRY TACHYCARDIA) (HCC): ICD-10-CM

## 2020-01-21 DIAGNOSIS — E11.9 TYPE 2 DIABETES MELLITUS WITHOUT COMPLICATION, WITHOUT LONG-TERM CURRENT USE OF INSULIN (HCC): ICD-10-CM

## 2020-01-21 DIAGNOSIS — J30.1 SEASONAL ALLERGIC RHINITIS DUE TO POLLEN: ICD-10-CM

## 2020-01-21 DIAGNOSIS — F41.9 ANXIETY: ICD-10-CM

## 2020-01-21 DIAGNOSIS — E78.5 HYPERLIPIDEMIA, UNSPECIFIED HYPERLIPIDEMIA TYPE: ICD-10-CM

## 2020-01-21 DIAGNOSIS — K21.9 GASTROESOPHAGEAL REFLUX DISEASE WITHOUT ESOPHAGITIS: ICD-10-CM

## 2020-01-21 DIAGNOSIS — M25.561 CHRONIC PAIN OF BOTH KNEES: ICD-10-CM

## 2020-01-21 DIAGNOSIS — M25.562 CHRONIC PAIN OF BOTH KNEES: ICD-10-CM

## 2020-01-21 DIAGNOSIS — Z91.199 NONCOMPLIANCE: ICD-10-CM

## 2020-01-21 DIAGNOSIS — E55.9 VITAMIN D INSUFFICIENCY: ICD-10-CM

## 2020-01-21 DIAGNOSIS — G89.29 CHRONIC PAIN OF BOTH KNEES: ICD-10-CM

## 2020-01-21 DIAGNOSIS — E66.9 OBESITY (BMI 30.0-34.9): ICD-10-CM

## 2020-01-21 DIAGNOSIS — J45.30 MILD PERSISTENT ASTHMA WITHOUT COMPLICATION: ICD-10-CM

## 2020-01-21 RX ORDER — MONTELUKAST SODIUM 10 MG/1
10 TABLET ORAL DAILY
COMMUNITY
Start: 2020-01-18 | End: 2020-05-05 | Stop reason: SDDI

## 2020-01-21 NOTE — PROGRESS NOTES
Chief Complaint   Patient presents with    Hypertension     3 month follow up with lab review. There are no preventive care reminders to display for this patient. 1. Have you been to the ER, urgent care clinic or hospitalized since your last visit? NO.     2. Have you seen or consulted any other health care providers outside of the [address] [place] [address] since your last visit (Include any pap smears or colon screening)? NO      Do you have an Advanced Directive? NO    Would you like information on Advanced Directives? NO    Learning Assessment 10/8/2019   PRIMARY LEARNER Patient   HIGHEST LEVEL OF EDUCATION - PRIMARY LEARNER  GRADUATED HIGH SCHOOL OR GED   BARRIERS PRIMARY LEARNER NONE   CO-LEARNER CAREGIVER No   PRIMARY LANGUAGE ENGLISH   LEARNER PREFERENCE PRIMARY DEMONSTRATION   ANSWERED BY patient   RELATIONSHIP SELF     Abuse Screening Questionnaire 1/21/2020   Do you ever feel afraid of your partner? N   Are you in a relationship with someone who physically or mentally threatens you? N   Is it safe for you to go home?  Y     3 most recent PHQ Screens 1/21/2020   Little interest or pleasure in doing things Not at all   Feeling down, depressed, irritable, or hopeless Not at all   Total Score PHQ 2 0   Trouble falling or staying asleep, or sleeping too much -   Feeling tired or having little energy -   Poor appetite, weight loss, or overeating -   Feeling bad about yourself - or that you are a failure or have let yourself or your family down -   Trouble concentrating on things such as school, work, reading, or watching TV -   Moving or speaking so slowly that other people could have noticed; or the opposite being so fidgety that others notice -   Thoughts of being better off dead, or hurting yourself in some way -   PHQ 9 Score -   How difficult have these problems made it for you to do your work, take care of your home and get along with others -     Fall Risk Assessment, last 12 mths 1/21/2020   Able to walk? Yes   Fall in past 12 months?  No

## 2020-01-21 NOTE — PATIENT INSTRUCTIONS
Learning About Meal Planning for Diabetes Why plan your meals? Meal planning can be a key part of managing diabetes. Planning meals and snacks with the right balance of carbohydrate, protein, and fat can help you keep your blood sugar at the target level you set with your doctor. You don't have to eat special foods. You can eat what your family eats, including sweets once in a while. But you do have to pay attention to how often you eat and how much you eat of certain foods. You may want to work with a dietitian or a certified diabetes educator. He or she can give you tips and meal ideas and can answer your questions about meal planning. This health professional can also help you reach a healthy weight if that is one of your goals. What plan is right for you? Your dietitian or diabetes educator may suggest that you start with the plate format or carbohydrate counting. The plate format The plate format is a simple way to help you manage how you eat. You plan meals by learning how much space each food should take on a plate. Using the plate format helps you spread carbohydrate throughout the day. It can make it easier to keep your blood sugar level within your target range. It also helps you see if you're eating healthy portion sizes. To use the plate format, you put non-starchy vegetables on half your plate. Add meat or meat substitutes on one-quarter of the plate. Put a grain or starchy vegetable (such as brown rice or a potato) on the final quarter of the plate. You can add a small piece of fruit and some low-fat or fat-free milk or yogurt, depending on your carbohydrate goal for each meal. 
Here are some tips for using the plate format: · Make sure that you are not using an oversized plate. A 9-inch plate is best. Many restaurants use larger plates. · Get used to using the plate format at home. Then you can use it when you eat out. · Write down your questions about using the plate format. Talk to your doctor, a dietitian, or a diabetes educator about your concerns. Carbohydrate counting With carbohydrate counting, you plan meals based on the amount of carbohydrate in each food. Carbohydrate raises blood sugar higher and more quickly than any other nutrient. It is found in desserts, breads and cereals, and fruit. It's also found in starchy vegetables such as potatoes and corn, grains such as rice and pasta, and milk and yogurt. Spreading carbohydrate throughout the day helps keep your blood sugar levels within your target range. Your daily amount depends on several things, including your weight, how active you are, which diabetes medicines you take, and what your goals are for your blood sugar levels. A registered dietitian or diabetes educator can help you plan how much carbohydrate to include in each meal and snack. A guideline for your daily amount of carbohydrate is: · 45 to 60 grams at each meal. That's about the same as 3 to 4 carbohydrate servings. · 15 to 20 grams at each snack. That's about the same as 1 carbohydrate serving. The Nutrition Facts label on packaged foods tells you how much carbohydrate is in a serving of the food. First, look at the serving size on the food label. Is that the amount you eat in a serving? All of the nutrition information on a food label is based on that serving size. So if you eat more or less than that, you'll need to adjust the other numbers. Total carbohydrate is the next thing you need to look for on the label. If you count carbohydrate servings, one serving of carbohydrate is 15 grams. For foods that don't come with labels, such as fresh fruits and vegetables, you'll need a guide that lists carbohydrate in these foods. Ask your doctor, dietitian, or diabetes educator about books or other nutrition guides you can use.  
If you take insulin, you need to know how many grams of carbohydrate are in a meal. This lets you know how much rapid-acting insulin to take before you eat. If you use an insulin pump, you get a constant rate of insulin during the day. So the pump must be programmed at meals to give you extra insulin to cover the rise in blood sugar after meals. When you know how much carbohydrate you will eat, you can take the right amount of insulin. Or, if you always use the same amount of insulin, you need to make sure that you eat the same amount of carbohydrate at meals. If you need more help to understand carbohydrate counting and food labels, ask your doctor, dietitian, or diabetes educator. How do you get started with meal planning? Here are some tips to get started: 
· Plan your meals a week at a time. Don't forget to include snacks too. · Use cookbooks or online recipes to plan several main meals. Plan some quick meals for busy nights. You also can double some recipes that freeze well. Then you can save half for other busy nights when you don't have time to cook. · Make sure you have the ingredients you need for your recipes. If you're running low on basic items, put these items on your shopping list too. · List foods that you use to make breakfasts, lunches, and snacks. List plenty of fruits and vegetables. · Post this list on the refrigerator. Add to it as you think of more things you need. · Take the list to the store to do your weekly shopping. Follow-up care is a key part of your treatment and safety. Be sure to make and go to all appointments, and call your doctor if you are having problems. It's also a good idea to know your test results and keep a list of the medicines you take. Where can you learn more? Go to http://yoselyn-merline.info/. Terrence Ochoa in the search box to learn more about \"Learning About Meal Planning for Diabetes. \" Current as of: April 16, 2019 Content Version: 12.2 © 2456-6441 [employer].  Care instructions adapted under license by [address] (which disclaims liability or warranty for this information). If you have questions about a medical condition or this instruction, always ask your healthcare professional. [address] any warranty or liability for your use of this information. Body Mass Index: Care Instructions Your Care Instructions Body mass index (BMI) can help you see if your weight is raising your risk for health problems. It uses a formula to compare how much you weigh with how tall you are. · A BMI lower than 18.5 is considered underweight. · A BMI between 18.5 and 24.9 is considered healthy. · A BMI between 25 and 29.9 is considered overweight. A BMI of 30 or higher is considered obese. If your BMI is in the normal range, it means that you have a lower risk for weight-related health problems. If your BMI is in the overweight or obese range, you may be at increased risk for weight-related health problems, such as high blood pressure, heart disease, stroke, arthritis or joint pain, and diabetes. If your BMI is in the underweight range, you may be at increased risk for health problems such as fatigue, lower protection (immunity) against illness, muscle loss, bone loss, hair loss, and hormone problems. BMI is just one measure of your risk for weight-related health problems. You may be at higher risk for health problems if you are not active, you eat an unhealthy diet, or you drink too much alcohol or use tobacco products. Follow-up care is a key part of your treatment and safety. Be sure to make and go to all appointments, and call your doctor if you are having problems. It's also a good idea to know your test results and keep a list of the medicines you take. How can you care for yourself at home? · Practice healthy eating habits. This includes eating plenty of fruits, vegetables, whole grains, lean protein, and low-fat dairy. · If your doctor recommends it, get more exercise. Walking is a good choice. Bit by bit, increase the amount you walk every day. Try for at least 30 minutes on most days of the week. · Do not smoke. Smoking can increase your risk for health problems. If you need help quitting, talk to your doctor about stop-smoking programs and medicines. These can increase your chances of quitting for good. · Limit alcohol to 2 drinks a day for men and 1 drink a day for women. Too much alcohol can cause health problems. If you have a BMI higher than 25 · Your doctor may do other tests to check your risk for weight-related health problems. This may include measuring the distance around your waist. A waist measurement of more than 40 inches in men or 35 inches in women can increase the risk of weight-related health problems. · Talk with your doctor about steps you can take to stay healthy or improve your health. You may need to make lifestyle changes to lose weight and stay healthy, such as changing your diet and getting regular exercise. If you have a BMI lower than 18.5 · Your doctor may do other tests to check your risk for health problems. · Talk with your doctor about steps you can take to stay healthy or improve your health. You may need to make lifestyle changes to gain or maintain weight and stay healthy, such as getting more healthy foods in your diet and doing exercises to build muscle. Where can you learn more? Go to http://yoselyn-merline.info/. Enter S176 in the search box to learn more about \"Body Mass Index: Care Instructions. \" Current as of: March 28, 2019 Content Version: 12.2 © 2098-7028 [employer]. Care instructions adapted under license by [employer] (which disclaims liability or warranty for this information).  If you have questions about a medical condition or this instruction, always ask your healthcare professional. [employer] disclaims any warranty or liability for your use of this information.

## 2020-01-25 NOTE — PROGRESS NOTES
HPI:   Wolf Millan is a 59y.o. year old female who presents today for a routine visit. She has a history of hypertension, hyperlipidemia, paroxysmal SVT, diabetes mellitus, GERD, asthma, elevated transaminases, and atypical mycobacterial pneumonia. She was last seen in 10/2019 for left breast pain that had been present for a few weeks. She was concerned given a strong family history of breast cancer, diagnosed in her maternal aunt at age 48 and her paternal aunt at age 79. She underwent a bilateral 3D mammogram and left breast ultrasound (2019) and no abnormalities were found. She reports resolution of discomfort today. She does complain of some increased breathlessness over the last few weeks, and has been using her albuterol inhaler almost daily. She states that she was started on Singulair by Dr. Isaiah Harris without significant improvement. She denies any fever, chills, or worsening cough. She is otherwise without new complaints and feeling generally well. She has a history of hypertension, treated with metoprolol and hydrochlorothiazide (+ potassium). She reports that she does not check her blood pressure at home. She does not exercise regularly, but denies any chest pain, shortness of breath at rest or with exertion, lightheadedness, or edema. She does have a history of palpitations secondary to AV dharmesh reentrant tachycardia, dating back to 1997. She has had approximately six severe episodes over the years, prompting presentation to the ED and treatment with IV Adenosine. She currently reports infrequent short episodes of palpitations and is being treated with metoprolol. She is followed by Dr. Robert Pan. She had an echocardiogram (10/2005) showing normal LV size and function (EF 60-65%), and no valvular pathology. In 2012, she underwent an exercise stress echocardiogram, which was normal at maximal exercise.  She has a history of hyperlipidemia, treated with simvastatin from 10/2012 to 3/2015 at which time she stopped taking it due to myalgias. She restarted it on 8/2015 and continued to take it without difficulty until 3/2016, when it was noticed that she had transaminase elevation (AST 85/ ) and it was discontinued. Evaluation included hepatitis A, B, and C levels (negative), iron panel (normal), and RUQ ultrasound (3/23/2016) with limited sonographic window for the liver; only partially visualized but grossly unremarkable. Repeat hepatic panel (4/22/2016) showed AST 75 and ALT 98. Repeat lipid panel showed total chol 215/ / HDL 64/. In 5/2016, she had an abdominal CT scan showing the liver to be normal in size with normal parenchymal density; no discrete mass or ascites, and no intrahepatic biliary dilatation. She was subsequently instructed to restart simvastatin, but chose not to since she felt it was making her forgetful. She agreed to trial of rosuvastatin 10 mg and started it in 2/2018. However, after two weeks of therapy, she discontinued it since she thought it was causing her leg pain. She subsequently contacted Dr. Mariah Blackwell office and asked to begin simvastatin 20 mg daily in 4/2018. She has been taking it without difficulty since restarting. She has a history of diabetes mellitus, with impaired fasting glucose ranging from 103-111 since 2012, and HbA1c to 6.6-6.8 since 9/2016 (not checked previously). She was prescribed metformin ER last visit for an increase in her HbA1c to 7.1, but she reports that she took it for only one week and discontinued for unclear reasons. She was not experiencing any side effects. She denies any polyuria, polydipsia, nocturia, or blurry vision, and has no history of retinopathy, neuropathy, or nephropathy. She has regular eye exams with Dr. Heaven Carrera. She has a history of asthma and allergic rhinitis and is followed by Dr. Eli Daniel. She is receiving immunotherapy once per month, and reports that she has not required any inhalers recently. She states that she does not use Qvar daily, but will take it occasionally. She does use Claritin every day. In 7/2019, she reported increasing difficulty with right ear fullness, post nasal drainage, hoarseness, and cough, and was referred to Dr. Devaughn Daigle. He performed a nasal laryngoscopy and found evidence of laryngopharyngeal reflux. She was started on daily omeprazole, and she states that she has noticed some improvement. In 10/2017, she fell down the steps of her porch and had difficulty with right knee pain and swelling. She was evaluated by Dr. Mayuri Rowan in 12/2017, and right knee xray showed decreased medial joint space, and moderate degenerative changes. She received a cortisone injection, but did not notice any improvement. She underwent an MRI of the right knee (1/29/2018) which showed complete tear of posterior horn and root of the medial meniscus; tear of the body and posterior horn of the lateral meniscus; tricompartmental osteoarthritis most prominent in medial and patellofemoral compartments; moderate joint effusion; small Baker's cyst. It was recommended that she consider knee replacement and arthroscopy. However, she decided to seek a second opinion and was evaluated by Dr. Man Galarza. She also underwent an MRI of her left knee (3/23/2018) showing radial tear posterior horn medial meniscus with extrusion of the body. Also a radial tear in the mid body; lateral meniscus intrasubstance degeneration and probable small undersurface tear of the posterior horn; medial and patellofemoral compartments moderate chondral loss; s. ubchondral bone marrow edema in the medial tibial plateau, likely reactive. She is completed physical therapy for her bilateral knees and feels that it may have helped. 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  She was treated with Avelox, and repeat chest x-ray in 1/2012 showed complete resolution of the pneumonia. She denies any cough or shortness of breath. She had a screening colonoscopy in 12/2006 by Dr. Eleuterio Méndez showing a 5 mm sessile polyp in the rectum (pathology: hyperplastic). She had a repeat colonoscopy in 12/2016 which showed moderate sigmoid diverticulosis and a 6 mm sessile ascending colon polyp (pathology: serrated adenoma). Follow-up recommended for 5 years. She denies any abdominal pain, nausea, vomiting, melena, hematochezia, or change in bowel movements. She does take omeprazole occasionally for GERD symptoms. In 12/2017, she was referred by her gynecologist for evaluation by Dr. Jose Francisco Aponte for microscopic hematuria, and urine cytology was negative. However, she states that she refused his recommendations to undergo a CT urogram or cystoscopy. She denies any dysuria, gross hematuria, or flank pain. Past Medical History:   Diagnosis Date    Allergic rhinitis     Asthma     Cardiac stress echo, normal 11/02/2012    Normal maximal stress echo study. EF 60%. Ex time 9 min 45 sec. 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  Hyperlipidemia     Hypertension     Menopause     Plantar fasciitis     left    PSVT (paroxysmal supraventricular tachycardia) (HCC)     A-V dharmesh reentrant tachycardia     Past Surgical History:   Procedure Laterality Date    ENDOSCOPY, COLON, DIAGNOSTIC      polyp    HX CERVICAL POLYPECTOMY      HX CYST INCISION AND DRAINAGE Right 10 or more years    HX DILATION AND CURETTAGE      HX GYN      polyp on cervix    HX POLYPECTOMY      from rectum     Current Outpatient Medications   Medication Sig    beclomethasone (QVAR) 40 mcg/actuation aero Take 1 Puff by inhalation two (2) times a day.     potassium chloride (K-DUR, KLOR-CON) 20 mEq tablet take 1 tablet by mouth once daily    metoprolol succinate (TOPROL-XL) 50 mg XL tablet take 1 tablet by mouth once daily    fluticasone propionate (FLONASE) 50 mcg/actuation nasal spray instill 2 sprays into each nostril once daily    metFORMIN ER (GLUCOPHAGE XR) 500 mg tablet take 1 tablet by mouth once daily with dinner    hydroCHLOROthiazide (HYDRODIURIL) 12.5 mg tablet take 1 tablet by mouth once daily    losartan (COZAAR) 25 mg tablet Take 1 Tab by mouth daily.  clotrimazole-betamethasone (LOTRISONE) topical cream Apply  to both ear canals and affected part of outer ear twice a day with a finger.  cholecalciferol (VITAMIN D3) 1,000 unit cap Take 1,000 Units by mouth daily.  simvastatin (ZOCOR) 20 mg tablet take 1 tablet by mouth at bedtime    PROAIR HFA 90 mcg/actuation inhaler inhale 2 puffs by mouth every 4 hours if needed for wheezing    LORazepam (ATIVAN) 1 mg tablet TAKE 1 TABLET BY MOUTH EVERY 8 HOURS AS NEEDED FOR ANXIETY    carboxymethylcellulose sodium (REFRESH LIQUIGEL) 1 % dlgl ophthalmic solution Apply  to eye.  montelukast (SINGULAIR) 10 mg tablet 10 mg daily. No current facility-administered medications for this visit. Allergies and Intolerances: Allergies   Allergen Reactions    Altace [Ramipril] Cough    Penicillins Other (comments)     Hands peel    Sulfur Itching     Family History: She had two aunts who had breast cancer (her mother's sister and father's sister). She has no FH of colon cancer. Her mother passed away from uterine cancer. Her father  from metastatic prostate cancer.    Family History   Problem Relation Age of Onset   [facility] Arthritis-osteo Mother     Hypertension Mother              Cancer Father         bone cancer    Hypertension Sister     Hypertension Sister     Hypertension Sister     Breast Cancer Maternal Aunt     Breast Cancer Paternal Aunt     Diabetes Other     Stroke Other     Other Sister         twin sister - osteopenia and low vitamin D levels     Social History:   She  reports that she has never smoked. She has never used smokeless tobacco. She is  and has two sons. She was a homemaker, but worked part-time in . She now helps care for her grandchildren. Social History     Substance and Sexual Activity   Alcohol Use No     Immunization History:  Immunization History   Administered Date(s) Administered    Influenza Vaccine (Quad) PF 10/23/2015, 10/19/2016, 10/05/2017, 10/26/2018, 10/08/2019    Influenza Vaccine PF 12/12/2013, 10/03/2014    Influenza Vaccine Split 11/02/2011, 10/22/2012    Influenza Vaccine Whole 10/29/2010    PPD 01/03/2012    Pneumococcal Polysaccharide (PPSV-23) 03/21/2017    TB Skin Test (PPD) Intradermal 02/12/2014    TDAP Vaccine 02/16/2012       Review of Systems:   As above included in HPI. Otherwise 11 point review of systems negative including constitutional, skin, HENT, eyes, respiratory, cardiovascular, gastrointestinal, genitourinary, musculoskeletal, endocrine, hematologic, allergy, and neurologic. Physical:   Vitals:   BP: 122/80  HR: 70  WT: 187 lb (84.8 kg)  BMI:  34.20 kg/m2    Exam:   Patient appears in no apparent distress. Affect is appropriate. HEENT: PERRLA, anicteric, oropharynx clear, no JVD, adenopathy or thyromegaly. No carotid bruits or radiated murmur. Lungs: clear to auscultation, no wheezes, rhonchi, or rales. Heart: regular rate and rhythm. No murmur, rubs, gallops  Abdomen: soft, nontender, nondistended, normal bowel sounds, no hepatosplenomegaly or masses. Extremities: without edema. Pulses 1-2+ bilaterally.     Review of Data:  Labs:  Hospital Outpatient Visit on 01/14/2020   Component Date Value Ref Range Status    Sodium 01/14/2020 136  136 - 145 mmol/L Final    Potassium 01/14/2020 3.9  3.5 - 5.5 mmol/L Final    Chloride 01/14/2020 101  100 - 111 mmol/L Final    CO2 01/14/2020 29  21 - 32 mmol/L Final    Anion gap 01/14/2020 6 3.0 - 18 mmol/L Final    Glucose 01/14/2020 118* 74 - 99 mg/dL Final    BUN 01/14/2020 11  7.0 - 18 MG/DL Final    Creatinine 01/14/2020 0.68  0.6 - 1.3 MG/DL Final    BUN/Creatinine ratio 01/14/2020 16  12 - 20   Final    GFR est AA 01/14/2020 >60  >60 ml/min/1.73m2 Final    GFR est non-AA 01/14/2020 >60  >60 ml/min/1.73m2 Final    Calcium 01/14/2020 9.0  8.5 - 10.1 MG/DL Final    LIPID PROFILE 01/14/2020        Final    Cholesterol, total 01/14/2020 185  <200 MG/DL Final    Triglyceride 01/14/2020 148  <150 MG/DL Final    HDL Cholesterol 01/14/2020 69* 40 - 60 MG/DL Final    LDL, calculated 01/14/2020 86.4  0 - 100 MG/DL Final    VLDL, calculated 01/14/2020 29.6  MG/DL Final    CHOL/HDL Ratio 01/14/2020 2.7  0 - 5.0   Final    Magnesium 01/14/2020 1.8  1.6 - 2.6 mg/dL Final    Hemoglobin A1c 01/14/2020 6.9* 4.2 - 5.6 % Final    Est. average glucose 01/14/2020 151  mg/dL Final     Health Maintenance:  Screening:    Mammogram: negative (11/2019). PAP smear: negative (10/2016) with negative HPV. Followed by Dr. Darrick Negron. Scheduled 3/2020. Colorectal: colonoscopy (12/2016) serrated adenoma. Dr. Josefa Farley. Due 12/2021.    Depression: none   DM (HbA1c/FPG): HbA1c 6.9 (1/2020)   Hepatitis C: negative (3/2016)   Falls: one   DEXA: within normal limits (11/2015)   Glaucoma: regular eye exams with Dr. Bharath Jackson (last 9/2019)   Smoking: none   Vitamin D: 32.2 (10/2019)    Medicare Wellness: N/A      Impression:  Patient Active Problem List   Diagnosis Code    Benign hypertensive heart disease without congestive heart failure I11.9    Allergic rhinitis J30.9    Colon polyps K63.5    AVNRT (AV dharmesh re-entry tachycardia) ([facility] [address] I47.1    Hyperlipidemia E78.5    Essential hypertension I10    Asthma J45.909    GERD (gastroesophageal reflux disease) K21.9    Type 2 diabetes mellitus without complication, without long-term current use of insulin ([facility]) E11.9    Vitamin D insufficiency E55.9    Microscopic hematuria R31.29    Chronic pain of both knees M25.561, M25.562, G89.29    Obesity (BMI 30.0-34. 9) E66.9    Noncompliance Z91.19    Anxiety F41.9       Plan:  1. Diabetes mellitus. Diagnosed in 9/2016 when HbA1c was checked and found to be elevated at 6.6. Had been steadily increasing and reached 7.2 last visit with a fasting glucose of 135. Impaired fasting glucose had been present intermittently since 2012. Agreeable to starting treatment with metformin  mg at dinner in 3/2019 and repeat HbA1c today stable at 6.9. No evidence of microvascular complications. On statin. Started on losartan, and now taking consistently. Blood pressure controlled today. Stressed importance of compliance. Continue regular eye exams with Dr. Samir Dickey. Foot exam normal (7/2019). Urine microalbumin/ creatinine ratio remains without evidence of microalbuminuria (10/2019). 2. Hypertension. Blood pressure with improved control today on losartan 25 mg daily in addition to her current regimen of metoprolol XL 50 mg daily and hydrochlorothiazide 12.5 mg daily. Renal function remains normal with creatinine 0.68/ eGFR >60. Continue to follow. 3. Hyperlipidemia. On moderate intensity dose simvastatin 20 mg daily with LDL 86 and HDL 69 today, indicative of good control. Continue to follow. 4. Elevated LFT's. Resolved. Unclear etiology, but doubt secondary to statin. Hepatitis panel and iron studies normal. RUQ ultrasound difficult secondary to body habitus, but abdominal CT scan showed normal liver parenchyma. Will continue to follow. 5. AV dharmesh reentrant tachycardia. No recent episodes. On metoprolol and no significant palpitations recently. Followed by Dr. Shakir Moody. 6. Asthma, mild intermittent. Associated with allergies. Patient not using Qvar and finding increasing difficulty with frequent use of albuterol. Advised to resume Qvar and new script sent to pharmacy. Receiving monthly immunotherapy injections with Dr. Teri Hopson.    7. Laryngopharyngeal reflux. Evaluated by Dr. Timmy Ahumada, and noted on laryngoscopic exam. Started on omeprazole daily but states that decided to discontinue as did not help with cough. Follow. 8. GERD. Symptoms generally controlled with omeprazole. Had started daily dose as recommended by Dr. Timmy Ahumada, but patient states that she discontinued. 9. Microscopic hematuria. Patient refusing further evaluation with cystoscopy or CT urogram. Urine cytology negative. Did have abdominal CT scan in 5/2016 where kidneys appeared normal. Recommended that she complete evaluation with Dr. Aleksandar Martinez. Repeat urinalysis with evidence small blood and 3-5 RBCs in 10/2019. 10. Bilateral knee pain. Did not respond to cortisone injection. Had both right and left knee MRI showing variable degrees of menisci tears and osteoarthritis of knee joint. Now being followed by Dr. Chris Dickey and reports relatively quiescent. 11. Left breast pain and nodules. Evaluated for new onset left breast pain and tenderness in 10/2019. Positive family history for breast cancer in a maternal aunt in her 46s and in a paternal aunt. Underwent bilateral mammogram and left breast ultrasound, and no abnormalities were found. Reports resolution of discomfort today. 12. Obesity. Emphasized importance of lifestyle modifications, including diet, exercise, and weight loss. Weight increased three pounds. Will readdress next visit. 13. Health maintenance. Already received influenza vaccine. Already received script for Shingrix vaccine. Given Pneumovax given asthma history. Colonoscopy due 2021. Mammogram as above. Continue regular eye exams with Dr. Zi Victor. Vitamin D level normal. Continue maintenance dose supplement. Patient understands recommendations and agrees with plan. Follow-up in 3 months.

## 2020-02-07 ENCOUNTER — TELEPHONE (OUTPATIENT)
Dept: INTERNAL MEDICINE CLINIC | Age: 65
End: 2020-02-07

## 2020-02-10 NOTE — TELEPHONE ENCOUNTER
Pt calling again. Said her oxygen was 91% at [facility] when she was diagnosed with the flu. Said she is wheezing a little bit but does have an inhaler. Said she still feels a little weak in the mornings mostly. Doesn't know if all this normal with the flu. Doesn't know if she needs to follow-up with Dr Shannan Amaro, [facility] told her to. Chest xray neg for pneumonia. Doesn't know what type of flu she was diagnosed with. Has been taking Tamiflu and they gave her codeine cough syrup but hasn't used it but a few times. Please advise her at [phone] about whether she should be seen for followup.

## 2020-02-11 ENCOUNTER — OFFICE VISIT (OUTPATIENT)
Dept: INTERNAL MEDICINE CLINIC | Age: 65
End: 2020-02-11

## 2020-02-11 VITALS
BODY MASS INDEX: 33.49 KG/M2 | DIASTOLIC BLOOD PRESSURE: 62 MMHG | WEIGHT: 182 LBS | HEART RATE: 78 BPM | HEIGHT: 62 IN | TEMPERATURE: 98.5 F | SYSTOLIC BLOOD PRESSURE: 110 MMHG | RESPIRATION RATE: 16 BRPM | OXYGEN SATURATION: 96 %

## 2020-02-11 DIAGNOSIS — I47.1 AVNRT (AV NODAL RE-ENTRY TACHYCARDIA) (HCC): ICD-10-CM

## 2020-02-11 DIAGNOSIS — E66.9 OBESITY (BMI 30.0-34.9): ICD-10-CM

## 2020-02-11 DIAGNOSIS — J10.1 INFLUENZA A: Primary | ICD-10-CM

## 2020-02-11 DIAGNOSIS — K21.9 GASTROESOPHAGEAL REFLUX DISEASE WITHOUT ESOPHAGITIS: ICD-10-CM

## 2020-02-11 DIAGNOSIS — F41.9 ANXIETY: ICD-10-CM

## 2020-02-11 DIAGNOSIS — J45.31 MILD PERSISTENT ASTHMA WITH ACUTE EXACERBATION: ICD-10-CM

## 2020-02-11 DIAGNOSIS — E78.5 HYPERLIPIDEMIA, UNSPECIFIED HYPERLIPIDEMIA TYPE: ICD-10-CM

## 2020-02-11 DIAGNOSIS — I10 ESSENTIAL HYPERTENSION: ICD-10-CM

## 2020-02-11 DIAGNOSIS — E11.9 TYPE 2 DIABETES MELLITUS WITHOUT COMPLICATION, WITHOUT LONG-TERM CURRENT USE OF INSULIN (HCC): ICD-10-CM

## 2020-02-11 RX ORDER — PREDNISONE 20 MG/1
40 TABLET ORAL DAILY
Qty: 10 TAB | Refills: 0 | Status: SHIPPED | OUTPATIENT
Start: 2020-02-11 | End: 2020-02-16

## 2020-02-11 NOTE — PATIENT INSTRUCTIONS
Influenza (Flu): Care Instructions Your Care Instructions Influenza (flu) is an infection in the lungs and breathing passages. It is caused by the influenza virus. There are different strains, or types, of the flu virus from year to year. Unlike the common cold, the flu comes on suddenly and the symptoms, such as a cough, congestion, fever, chills, fatigue, aches, and pains, are more severe. These symptoms may last up to 10 days. Although the flu can make you feel very sick, it usually doesn't cause serious health problems. Home treatment is usually all you need for flu symptoms. But your doctor may prescribe antiviral medicine to prevent other health problems, such as pneumonia, from developing. Older people and those who have a long-term health condition, such as lung disease, are most at risk for having pneumonia or other health problems. Follow-up care is a key part of your treatment and safety. Be sure to make and go to all appointments, and call your doctor if you are having problems. It's also a good idea to know your test results and keep a list of the medicines you take. How can you care for yourself at home? · Get plenty of rest. 
· Drink plenty of fluids, enough so that your urine is light yellow or clear like water. If you have kidney, heart, or liver disease and have to limit fluids, talk with your doctor before you increase the amount of fluids you drink. · Take an over-the-counter pain medicine if needed, such as acetaminophen (Tylenol), ibuprofen (Advil, Motrin), or naproxen (Aleve), to relieve fever, headache, and muscle aches. Read and follow all instructions on the label. No one younger than 20 should take aspirin. It has been linked to Reye syndrome, a serious illness. · Do not smoke. Smoking can make the flu worse. If you need help quitting, talk to your doctor about stop-smoking programs and medicines. These can increase your chances of quitting for good. · Breathe moist air from a hot shower or from a sink filled with hot water to help clear a stuffy nose. · Before you use cough and cold medicines, check the label. These medicines may not be safe for young children or for people with certain health problems. · If the skin around your nose and lips becomes sore, put some petroleum jelly on the area. · To ease coughing: ? Drink fluids to soothe a scratchy throat. ? Suck on cough drops or plain hard candy. ? Take an over-the-counter cough medicine that contains dextromethorphan to help you get some sleep. Read and follow all instructions on the label. ? Raise your head at night with an extra pillow. This may help you rest if coughing keeps you awake. · Take any prescribed medicine exactly as directed. Call your doctor if you think you are having a problem with your medicine. To avoid spreading the flu · Wash your hands regularly, and keep your hands away from your face. · Stay home from school, work, and other public places until you are feeling better and your fever has been gone for at least 24 hours. The fever needs to have gone away on its own without the help of medicine. · Ask people living with you to talk to their doctors about preventing the flu. They may get antiviral medicine to keep from getting the flu from you. · To prevent the flu in the future, get a flu vaccine every fall. Encourage people living with you to get the vaccine. · Cover your mouth when you cough or sneeze. When should you call for help? Call 911 anytime you think you may need emergency care.  For example, call if: 
  · You have severe trouble breathing.  
 Call your doctor now or seek immediate medical care if: 
  · You have new or worse trouble breathing.  
  · You seem to be getting much sicker.  
  · You feel very sleepy or confused.  
  · You have a new or higher fever.  
  · You get a new rash.  
 Watch closely for changes in your health, and be sure to contact your doctor if: 
  · You begin to get better and then get worse.  
  · You are not getting better after 1 week. Where can you learn more? Go to http://yoselyn-merline.info/. Enter H003 in the search box to learn more about \"Influenza (Flu): Care Instructions. \" Current as of: June 9, 2019 Content Version: 12.2 © 0003-1347 [employer]. Care instructions adapted under license by [employer] (which disclaims liability or warranty for this information). If you have questions about a medical condition or this instruction, always ask your healthcare professional. [place] [address] any warranty or liability for your use of this information. Learning About Asthma What is asthma? Asthma is a long-term condition that affects your breathing. It causes the airways that lead to the lungs to swell. People with asthma may have asthma attacks. During an asthma attack, the airways tighten and become narrower. This makes it hard to breathe, and you may wheeze or cough. If you have a bad asthma attack, you may need emergency care. Asthma affects people in different ways. Some people only have asthma attacks during allergy season, or when they breathe in cold air, or when they exercise. Others have many bad attacks that send them to the doctor often. What are the symptoms? Symptoms of asthma can be mild or severe. You may have mild attacks now and then, you may have severe symptoms every day, or you may have something in between. How often you have symptoms can also change. When you have asthma, you may: · Wheeze, making a loud or soft whistling noise when you breathe in and out. · Cough a lot. · Feel tightness in your chest. 
· Feel short of breath. · Have trouble sleeping because of coughing or having a hard time breathing. · Get tired quickly during exercise. Your symptoms may be worse at night. How can you prevent asthma attacks? Certain things can make asthma symptoms worse. These are called triggers. When you are around a trigger, an asthma attack is more likely. Common triggers include: · Cigarette smoke or air pollution. · Things you are allergic to, such as: 
? Pollen, mold, or dust mites. ? Pet hair, skin, or saliva. · Illnesses, like colds, flu, or pneumonia. · Exercise. · Dry, cold air. Here are some ways to avoid a few common triggers: · Do not smoke or allow others to smoke around you. If you need help quitting, talk to your doctor about stop-smoking programs and medicines. These can increase your chances of quitting for good. · If there is a lot of pollution, pollen, or dust outside, stay at home and keep your windows closed. Use an air conditioner or air filter in your home. Check your local weather report or newspaper for air quality and pollen reports. · Get the flu vaccine every year. Talk to your doctor about getting a pneumococcal shot. Wash your hands often to prevent infections. · Avoid exercising outdoors in cold weather. If you are outdoors in cold weather, wear a scarf around your face and breathe through your nose. How is asthma treated? There are two parts to treating asthma, which are outlined in your asthma action plan. The goals are to: 
· Control asthma over the long term. The asthma action plan tells you which medicine you may need to take every day. This is called a controller medicine. It helps to reduce the swelling of the airways and prevent asthma attacks. · Treat asthma attacks when they occur. The asthma action plan tells you what to do when you have an asthma attack. It helps you identify triggers that can cause your attacks. You use quick-relief medicine during an attack. The asthma plan also helps you track your symptoms and know how well the treatment is working. Follow-up care is a key part of your treatment and safety.  Be sure to make and go to all appointments, and call your doctor if you are having problems. It's also a good idea to know your test results and keep a list of the medicines you take. Where can you learn more? Go to http://yoselyn-merline.info/. Enter 9187 [id number] in the search box to learn more about \"Learning About Asthma. \" Current as of: June 9, 2019 Content Version: 12.2 © 8293-3356 [employer]. Care instructions adapted under license by [employer] (which disclaims liability or warranty for this information). If you have questions about a medical condition or this instruction, always ask your healthcare professional. [address] any warranty or liability for your use of this information.

## 2020-02-11 NOTE — PROGRESS NOTES
Chief Complaint   Patient presents with    ED Follow-up     [facility] up 2/7/2020 from the flu. Patient states she still has a cough and diarrhea. There are no preventive care reminders to display for this patient. 1. Have you been to the ER, urgent care clinic or hospitalized since your last visit? YES.     2. Have you seen or consulted any other health care providers outside of the [address] [place] [address] since your last visit (Include any pap smears or colon screening)? NO      Learning Assessment 10/8/2019   PRIMARY LEARNER Patient   HIGHEST LEVEL OF EDUCATION - PRIMARY LEARNER  GRADUATED HIGH SCHOOL OR GED   BARRIERS PRIMARY LEARNER NONE   CO-LEARNER CAREGIVER No   PRIMARY LANGUAGE ENGLISH   LEARNER PREFERENCE PRIMARY DEMONSTRATION   ANSWERED BY patient   RELATIONSHIP SELF     Abuse Screening Questionnaire 2/11/2020   Do you ever feel afraid of your partner? N   Are you in a relationship with someone who physically or mentally threatens you? N   Is it safe for you to go home? Y     3 most recent PHQ Screens 2/11/2020   Little interest or pleasure in doing things Not at all   Feeling down, depressed, irritable, or hopeless Not at all   Total Score PHQ 2 0   Trouble falling or staying asleep, or sleeping too much -   Feeling tired or having little energy -   Poor appetite, weight loss, or overeating -   Feeling bad about yourself - or that you are a failure or have let yourself or your family down -   Trouble concentrating on things such as school, work, reading, or watching TV -   Moving or speaking so slowly that other people could have noticed; or the opposite being so fidgety that others notice -   Thoughts of being better off dead, or hurting yourself in some way -   PHQ 9 Score -   How difficult have these problems made it for you to do your work, take care of your home and get along with others -     Fall Risk Assessment, last 12 mths 2/11/2020   Able to walk?  Yes   Fall in past 15 months?  No

## 2020-02-16 NOTE — PROGRESS NOTES
HPI:   Ana Maria Culver is a 59y.o. year old female who presents today for an acute visit. She has a history of hypertension, hyperlipidemia, paroxysmal SVT, diabetes mellitus, GERD, asthma, elevated transaminases, and atypical mycobacterial pneumonia. She reports that on 2/5/2020, she developed chills and a nonproductive cough. She states that she began to worsen with increasing cough, myalgias, and fatigue. She presented to [facility] for evaluation on 2/7/2020, and tested positive for influenza A. Other testing included WBC 5.9 (20% lymphocytes), Hb 13.4/ Hct 39.5, Na 136, K 3.7, creatinine 0.8/ eGFR >60, chest x-ray negative, and oxygen sat 94%. She was treated with Tamiflu 75 mg bid for 5 days. She presents today and reports that she is continuing to have a nonproductive cough and is experiencing difficulty taking a deep breath. She states that she is continuing to use her albuterol inhaler intermittently, but did not start Qvar following her last visit due to the cost. She denies any further fevers or chills, and states that she is feeling better overall. She is also having some mild loose stools, but denies any abdominal pain. She is otherwise without new complaints. She has a history of hypertension, treated with metoprolol and hydrochlorothiazide (+ potassium). She reports that she does not check her blood pressure at home. She does not exercise regularly, but denies any chest pain, shortness of breath at rest or with exertion, lightheadedness, or edema. 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 She has had approximately six severe episodes over the years, prompting presentation to the ED and treatment with IV Adenosine. She currently reports infrequent short episodes of palpitations and is being treated with metoprolol. She is followed by Dr. Gurdeep Sanchez.  She had an echocardiogram (10/2005) showing normal LV size and function (EF 60-65%), and no valvular pathology. In 11/2012, she underwent an exercise stress echocardiogram, which was normal at maximal exercise. She has a history of hyperlipidemia, treated with simvastatin from 10/2012 to 3/2015 at which time she stopped taking it due to myalgias. She restarted it on 8/2015 and continued to take it without difficulty until 3/2016, when it was noticed that she had transaminase elevation (AST 85/ ) and it was discontinued. Evaluation included hepatitis A, B, and C levels (negative), iron panel (normal), and RUQ ultrasound (3/23/2016) with limited sonographic window for the liver; only partially visualized but grossly unremarkable. Repeat hepatic panel (4/22/2016) showed AST 75 and ALT 98. Repeat lipid panel showed total chol 215/ / HDL 64/. In 5/2016, she had an abdominal CT scan showing the liver to be normal in size with normal parenchymal density; no discrete mass or ascites, and no intrahepatic biliary dilatation. She was subsequently instructed to restart simvastatin, but chose not to since she felt it was making her forgetful. She agreed to trial of rosuvastatin 10 mg and started it in 2/2018. However, after two weeks of therapy, she discontinued it since she thought it was causing her leg pain. She subsequently contacted Dr. Jessica Pack office and asked to begin simvastatin 20 mg daily in 4/2018. She has been taking it without difficulty since restarting. She has a history of diabetes mellitus, with impaired fasting glucose ranging from 103-111 since 2012, and HbA1c to 6.6-6.8 since 9/2016 (not checked previously). She was prescribed metformin ER last visit for an increase in her HbA1c to 7.1, but she reports that she took it for only one week and discontinued for unclear reasons. She was not experiencing any side effects. She denies any polyuria, polydipsia, nocturia, or blurry vision, and has no history of retinopathy, neuropathy, or nephropathy.  She has regular eye exams with Dr. Kris Recinos. She has a history of asthma and allergic rhinitis and is followed by Dr. Trung Pastor. She is receiving immunotherapy once per month, and reports that she has not required any inhalers recently. She states that she does not use Qvar daily, but will take it occasionally. She does use Claritin every day. In 7/2019, she reported increasing difficulty with right ear fullness, post nasal drainage, hoarseness, and cough, and was referred to Dr. Candelaria Balderrama. He performed a nasal laryngoscopy and found evidence of laryngopharyngeal reflux. She was started on daily omeprazole, and she states that she has noticed some improvement. In 10/2017, she fell down the steps of her porch and had difficulty with right knee pain and swelling. She was evaluated by Dr. Kayleen Cadena in 12/2017, and right knee xray showed decreased medial joint space, and moderate degenerative changes. She received a cortisone injection, but did not notice any improvement. She underwent an MRI of the right knee (1/29/2018) which showed complete tear of posterior horn and root of the medial meniscus; tear of the body and posterior horn of the lateral meniscus; tricompartmental osteoarthritis most prominent in medial and patellofemoral compartments; moderate joint effusion; small Baker's cyst. It was recommended that she consider knee replacement and arthroscopy. However, she decided to seek a second opinion and was evaluated by Dr. Jerry Alcantara. She also underwent an MRI of her left knee (3/23/2018) showing radial tear posterior horn medial meniscus with extrusion of the body. Also a radial tear in the mid body; lateral meniscus intrasubstance degeneration and probable small undersurface tear of the posterior horn; medial and patellofemoral compartments moderate chondral loss; s. ubchondral bone marrow edema in the medial tibial plateau, likely reactive.  She is completed physical therapy for her bilateral knees and feels that it may have helped. 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 She was treated with Avelox, and repeat chest x-ray in 1/2012 showed complete resolution of the pneumonia. She denies any cough or shortness of breath. She had a screening colonoscopy in 12/2006 by Dr. Sonal Henao showing a 5 mm sessile polyp in the rectum (pathology: hyperplastic). She had a repeat colonoscopy in 12/2016 which showed moderate sigmoid diverticulosis and a 6 mm sessile ascending colon polyp (pathology: serrated adenoma). Follow-up recommended for 5 years. She denies any abdominal pain, nausea, vomiting, melena, hematochezia, or change in bowel movements. She does take omeprazole occasionally for GERD symptoms. In 12/2017, she was referred by her gynecologist for evaluation by Dr. Kylie Ulloa for microscopic hematuria, and urine cytology was negative. However, she states that she refused his recommendations to undergo a CT urogram or cystoscopy. She denies any dysuria, gross hematuria, or flank pain. Past Medical History:   Diagnosis Date    Allergic rhinitis     Asthma     Cardiac stress echo, normal 11/02/2012    Normal maximal stress echo study. EF 60%. Ex time 9 min 45 sec. 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     Hyperlipidemia     Hypertension     Menopause     Plantar fasciitis     left    PSVT (paroxysmal supraventricular tachycardia) ([facility])     A-V dharmesh reentrant tachycardia     Past Surgical History:   Procedure Laterality Date    ENDOSCOPY, COLON, DIAGNOSTIC      polyp    HX CERVICAL POLYPECTOMY      HX CYST INCISION AND DRAINAGE Right 10 or more years    HX DILATION AND CURETTAGE      HX GYN      polyp on cervix    HX POLYPECTOMY      from rectum     Current Outpatient Medications   Medication Sig    predniSONE (DELTASONE) 20 mg tablet Take 40 mg by mouth daily for 5 days. Take with food. Indications: worsening asthma    potassium chloride (K-DUR, KLOR-CON) 20 mEq tablet take 1 tablet by mouth once daily    metoprolol succinate (TOPROL-XL) 50 mg XL tablet take 1 tablet by mouth once daily    fluticasone propionate (FLONASE) 50 mcg/actuation nasal spray instill 2 sprays into each nostril once daily    metFORMIN ER (GLUCOPHAGE XR) 500 mg tablet take 1 tablet by mouth once daily with dinner    hydroCHLOROthiazide (HYDRODIURIL) 12.5 mg tablet take 1 tablet by mouth once daily    losartan (COZAAR) 25 mg tablet Take 1 Tab by mouth daily.  clotrimazole-betamethasone (LOTRISONE) topical cream Apply  to both ear canals and affected part of outer ear twice a day with a finger.  cholecalciferol (VITAMIN D3) 1,000 unit cap Take 1,000 Units by mouth daily.  simvastatin (ZOCOR) 20 mg tablet take 1 tablet by mouth at bedtime    PROAIR HFA 90 mcg/actuation inhaler inhale 2 puffs by mouth every 4 hours if needed for wheezing    LORazepam (ATIVAN) 1 mg tablet TAKE 1 TABLET BY MOUTH EVERY 8 HOURS AS NEEDED FOR ANXIETY    carboxymethylcellulose sodium (REFRESH LIQUIGEL) 1 % dlgl ophthalmic solution Apply  to eye.  montelukast (SINGULAIR) 10 mg tablet 10 mg daily. No current facility-administered medications for this visit. Allergies and Intolerances: Allergies   Allergen Reactions    Altace [Ramipril] Cough    Penicillins Other (comments)     Hands peel    Sulfur Itching     Family History: She had two aunts who had breast cancer (her mother's sister and father's sister). She has no FH of colon cancer. Her mother passed away from uterine cancer. Her father  from metastatic prostate cancer.    Family History   Problem Relation Age of Onset   Cheryl Alarcon Arthritis-osteo Mother     Hypertension Mother             Cheryl Alarcon Cancer Father         bone cancer    Hypertension Sister     Hypertension Sister     Hypertension Sister     Breast Cancer Maternal Aunt     Breast Cancer Paternal Aunt     Diabetes Other     Stroke Other     Other Sister         twin sister - osteopenia and low vitamin D levels     Social History:   She  reports that she has never smoked. She has never used smokeless tobacco. She is  and has two sons. She was a homemaker, but worked part-time in . She now helps care for her grandchildren. Social History     Substance and Sexual Activity   Alcohol Use No     Immunization History:  Immunization History   Administered Date(s) Administered    Influenza Vaccine (Quad) PF 10/23/2015, 10/19/2016, 10/05/2017, 10/26/2018, 10/08/2019    Influenza Vaccine PF 12/12/2013, 10/03/2014    Influenza Vaccine Split 11/02/2011, 10/22/2012    Influenza Vaccine Whole 10/29/2010    PPD 01/03/2012    Pneumococcal Polysaccharide (PPSV-23) 03/21/2017    TB Skin Test (PPD) Intradermal 02/12/2014    TDAP Vaccine 02/16/2012       Review of Systems:   As above included in HPI. Otherwise 11 point review of systems negative including constitutional, skin, HENT, eyes, respiratory, cardiovascular, gastrointestinal, genitourinary, musculoskeletal, endocrine, hematologic, allergy, and neurologic. Physical:   Vitals:   BP: 110/62  HR: 78  WT: 182 lb (82.6 kg)  BMI:  33.29 kg/m2  O2: 96%  T: 98.5 F    Exam:   Patient appears in no apparent distress. Affect is appropriate. HEENT: PERRLA, anicteric, oropharynx clear, no JVD, adenopathy or thyromegaly. No carotid bruits or radiated murmur. Lungs: adequate air movement with expiratory wheezing. Deep breaths notably triggering cough. Heart: regular rate and rhythm. No murmur, rubs, gallops  Abdomen: soft, nontender, nondistended, normal bowel sounds, no hepatosplenomegaly or masses. Extremities: without edema. Pulses 1-2+ bilaterally.     Review of Data:  Labs:  Hospital Outpatient Visit on 01/14/2020   Component Date Value Ref Range Status    Sodium 01/14/2020 136  136 - 145 mmol/L Final    Potassium 01/14/2020 3.9  3.5 - 5.5 mmol/L Final    Chloride 01/14/2020 101  100 - 111 mmol/L Final    CO2 01/14/2020 29  21 - 32 mmol/L Final    Anion gap 01/14/2020 6  3.0 - 18 mmol/L Final    Glucose 01/14/2020 118* 74 - 99 mg/dL Final    BUN 01/14/2020 11  7.0 - 18 MG/DL Final    Creatinine 01/14/2020 0.68  0.6 - 1.3 MG/DL Final    BUN/Creatinine ratio 01/14/2020 16  12 - 20   Final    GFR est AA 01/14/2020 >60  >60 ml/min/1.73m2 Final    GFR est non-AA 01/14/2020 >60  >60 ml/min/1.73m2 Final    Calcium 01/14/2020 9.0  8.5 - 10.1 MG/DL Final    LIPID PROFILE 01/14/2020        Final    Cholesterol, total 01/14/2020 185  <200 MG/DL Final    Triglyceride 01/14/2020 148  <150 MG/DL Final    HDL Cholesterol 01/14/2020 69* 40 - 60 MG/DL Final    LDL, calculated 01/14/2020 86.4  0 - 100 MG/DL Final    VLDL, calculated 01/14/2020 29.6  MG/DL Final    CHOL/HDL Ratio 01/14/2020 2.7  0 - 5.0   Final    Magnesium 01/14/2020 1.8  1.6 - 2.6 mg/dL Final    Hemoglobin A1c 01/14/2020 6.9* 4.2 - 5.6 % Final    Est. average glucose 01/14/2020 151  mg/dL Final     Health Maintenance:  Screening:    Mammogram: negative (11/2019). PAP smear: negative (10/2016) with negative HPV. Followed by Dr. Heather Cavazos. Scheduled 3/2020. Colorectal: colonoscopy (12/2016) serrated adenoma. Dr. Angelina Wiley. Due 12/2021.    Depression: none   DM (HbA1c/FPG): HbA1c 6.9 (1/2020)   Hepatitis C: negative (3/2016)   Falls: one   DEXA: within normal limits (11/2015)   Glaucoma: regular eye exams with Dr. Irene Fofana (last 9/2019)   Smoking: none   Vitamin D: 32.2 (10/2019)    Medicare Wellness: N/A      Impression:  Patient Active Problem List   Diagnosis Code    Benign hypertensive heart disease without congestive heart failure I11.9    Allergic rhinitis J30.9    Colon polyps K63.5    AVNRT (AV dharmesh re-entry tachycardia) ([facility]) I47.1    Hyperlipidemia E78.5    Essential hypertension I10    Asthma J45.909    GERD (gastroesophageal reflux disease) K21.9    Type 2 diabetes mellitus without complication, without long-term current use of insulin ([facility]) E11.9    Vitamin D insufficiency E55.9    Microscopic hematuria R31.29    Chronic pain of both knees M25.561, M25.562, G89.29    Obesity (BMI 30.0-34. 9) E66.9    Noncompliance Z91.19    Anxiety F41.9       Plan:  Influenza A. Patient developed fever, chills, nonproductive cough, and myalgias, and evaluation at [facility] revealed positive testing for influenza A. Treated with Tamiflu x 5 days with improvement, but cough persisting with difficulty taking a deep breath without coughing. Exam with evidence of expiratory wheezing and cough triggered with inspiration. Otherwise clinically improving, so not consistent with secondary pneumonia. Exam and clinical symptoms indicative of exacerbation of asthma, and will treat with prednisone 40 mg daily x 5 days. Advised to continue with albuterol inhaler as patient declined prescription for nebulizer. Discussed symptoms and signs to watch for regarding development of secondary pneumonia, and advised to call if not improving. Other issues. 1. Diabetes mellitus. Diagnosed in 9/2016 when HbA1c was checked and found to be elevated at 6.6. Had been steadily increasing and reached 7.2 last visit with a fasting glucose of 135. Impaired fasting glucose had been present intermittently since 2012. Agreeable to starting treatment with metformin  mg at dinner in 3/2019 and repeat HbA1c today stable at 6.9. No evidence of microvascular complications. On statin. Started on losartan, and now taking consistently. Blood pressure controlled today. Stressed importance of compliance. Continue regular eye exams with Dr. Gutierrez Diop. Foot exam normal (7/2019).  Urine microalbumin/ creatinine ratio remains without evidence of microalbuminuria (10/2019). 2. Hypertension. Blood pressure with improved control today on losartan 25 mg daily in addition to her current regimen of metoprolol XL 50 mg daily and hydrochlorothiazide 12.5 mg daily. Renal function remains normal with creatinine 0.68/ eGFR >60. Continue to follow. 3. Hyperlipidemia. On moderate intensity dose simvastatin 20 mg daily with LDL 86 and HDL 69 today, indicative of good control. Continue to follow. 4. Elevated LFT's. Resolved. Unclear etiology, but doubt secondary to statin. Hepatitis panel and iron studies normal. RUQ ultrasound difficult secondary to body habitus, but abdominal CT scan showed normal liver parenchyma. Will continue to follow. 5. AV dharmesh reentrant tachycardia. No recent episodes. On metoprolol and no significant palpitations recently. Followed by Dr. Alix Edwards. 6. Asthma, mild persistent. Associated with allergies. Patient not using Qvar and finding increasing difficulty with frequent use of albuterol. Advised to resume Qvar at last visit, but too expensive and did not fill. States that she was previously given samples by Dr. Eryn Reeder, and wishing to wait for follow-up with him to request. Receiving monthly immunotherapy injections with Dr. Eryn Reeder. 7. Laryngopharyngeal reflux. Evaluated by Dr. Timmy Ahumada, and noted on laryngoscopic exam. Started on omeprazole daily but states that decided to discontinue as did not help with cough. Follow. 8. GERD. Symptoms generally controlled with omeprazole. Had started daily dose as recommended by Dr. Timmy Ahumada, but patient states that she discontinued. 9. Microscopic hematuria. Patient refusing further evaluation with cystoscopy or CT urogram. Urine cytology negative. Did have abdominal CT scan in 5/2016 where kidneys appeared normal. Recommended that she complete evaluation with Dr. Aleksandar Martinez. Repeat urinalysis with evidence small blood and 3-5 RBCs in 10/2019. 10. Bilateral knee pain.  Did not respond to cortisone injection. Had both right and left knee MRI showing variable degrees of menisci tears and osteoarthritis of knee joint. Now being followed by Dr. Pepper Finders and reports relatively quiescent. 11. Left breast pain and nodules. Evaluated for new onset left breast pain and tenderness in 10/2019. Positive family history for breast cancer in a maternal aunt in her 46s and in a paternal aunt. Underwent bilateral mammogram and left breast ultrasound, and no abnormalities were found. Reports resolution of discomfort. 12. Obesity. Emphasized importance of lifestyle modifications, including diet, exercise, and weight loss. Weight decreased five pounds which patient attributes to acute illness. Will readdress next visit. 13. Health maintenance. Already received influenza vaccine. Already received script for Shingrix vaccine. Given Pneumovax given asthma history. Colonoscopy due 2021. Mammogram as above. Continue regular eye exams with Dr. Allen Prince. Vitamin D level normal. Continue maintenance dose supplement. Patient understands recommendations and agrees with plan. Follow-up as previously scheduled.

## 2020-03-24 ENCOUNTER — TELEPHONE (OUTPATIENT)
Dept: INTERNAL MEDICINE CLINIC | Age: 65
End: 2020-03-24

## 2020-03-24 NOTE — TELEPHONE ENCOUNTER
No, she does not need to receive Prevnar 13 now. Recommendations is to just obtain pneumovax 23 before age 72 for her risk group.

## 2020-03-25 NOTE — TELEPHONE ENCOUNTER
Called patient and verified full name and date of birth. Informed patient of Dr. Lopez Medal message and she verbalized understanding. Informed patient that I will send her in the mail, [employer] activation letter and she will set up soon.

## 2020-03-30 ENCOUNTER — TELEPHONE (OUTPATIENT)
Dept: INTERNAL MEDICINE CLINIC | Age: 65
End: 2020-03-30

## 2020-03-30 RX ORDER — MECLIZINE HCL 12.5 MG 12.5 MG/1
12.5 TABLET ORAL
Qty: 30 TAB | Refills: 0 | Status: SHIPPED | OUTPATIENT
Start: 2020-03-30 | End: 2020-04-09

## 2020-03-30 NOTE — TELEPHONE ENCOUNTER
Called and discussed with patient. Reports that awoke several days ago with vertigo (\"room spinning\") and states that it persisted throughout the day for 2 days. States since then, it has been improving and now noting symptoms only at night or with position change. Admits to nasal congestion and post nasal drainage, and on Flonase, Zyrtec, and started on Singulair by Dr. Lari Goltz. Does admit to history of vertigo in the past many years ago and symptoms similar. Denies any visual changes, focal neurologic deficits, or headache. Advised that symptoms likely BPV. Will prescribe meclizine to use for severe symptoms. Script sent to [employer]. Evaluated by Dr. Myrna Roa in 8/2019 and followed regularly by Dr. Lari Goltz. Advised to call back or present to ED for worsening.

## 2020-03-30 NOTE — TELEPHONE ENCOUNTER
Pt  joséys  For the past  4 days while laying down and upon waking up she gets dizzy  - please advise  She said she started singular recently

## 2020-04-27 ENCOUNTER — TELEPHONE (OUTPATIENT)
Dept: INTERNAL MEDICINE CLINIC | Age: 65
End: 2020-04-27

## 2020-04-27 NOTE — TELEPHONE ENCOUNTER
Pt calling to let Dr. Juan Vo know she has shingles.  Says she went to see Dr. Jazmine Pacheco today and got the dx

## 2020-04-28 ENCOUNTER — TELEPHONE (OUTPATIENT)
Dept: INTERNAL MEDICINE CLINIC | Age: 65
End: 2020-04-28

## 2020-04-28 NOTE — TELEPHONE ENCOUNTER
Patient saw another doctor for shingles and was prescribed Valacyclovir HCL 1 gm. She wants to make sure this is ok for her to use.

## 2020-04-28 NOTE — TELEPHONE ENCOUNTER
Yes, it will help decrease the duration and severity of the rash. Would recommend that she begin today.

## 2020-04-28 NOTE — TELEPHONE ENCOUNTER
Called and spoke with patient gave her message below. She verbalized understanding. Patient also states that she wants to hold her metformin due to it causing stomach issues. She states she is trying to limit sweets and loose weight. She reports she doesn't want to be dealing with the stomach issues while she is dealing with the shingles.

## 2020-04-29 ENCOUNTER — HOSPITAL ENCOUNTER (OUTPATIENT)
Dept: LAB | Age: 65
Discharge: HOME | End: 2020-04-29
Payer: COMMERCIAL

## 2020-04-29 ENCOUNTER — LAB ONLY (OUTPATIENT)
Dept: INTERNAL MEDICINE CLINIC | Age: 65
End: 2020-04-29

## 2020-04-29 DIAGNOSIS — I10 ESSENTIAL HYPERTENSION: ICD-10-CM

## 2020-04-29 DIAGNOSIS — E11.9 TYPE 2 DIABETES MELLITUS WITHOUT COMPLICATION, WITHOUT LONG-TERM CURRENT USE OF INSULIN (HCC): ICD-10-CM

## 2020-04-29 DIAGNOSIS — E78.5 HYPERLIPIDEMIA, UNSPECIFIED HYPERLIPIDEMIA TYPE: ICD-10-CM

## 2020-04-29 DIAGNOSIS — F41.9 ANXIETY: ICD-10-CM

## 2020-04-29 DIAGNOSIS — E55.9 VITAMIN D INSUFFICIENCY: ICD-10-CM

## 2020-04-29 DIAGNOSIS — M25.562 CHRONIC PAIN OF BOTH KNEES: ICD-10-CM

## 2020-04-29 DIAGNOSIS — E66.9 OBESITY (BMI 30.0-34.9): ICD-10-CM

## 2020-04-29 DIAGNOSIS — M25.561 CHRONIC PAIN OF BOTH KNEES: ICD-10-CM

## 2020-04-29 DIAGNOSIS — K21.9 GASTROESOPHAGEAL REFLUX DISEASE WITHOUT ESOPHAGITIS: ICD-10-CM

## 2020-04-29 DIAGNOSIS — Z91.199 NONCOMPLIANCE: ICD-10-CM

## 2020-04-29 DIAGNOSIS — J45.30 MILD PERSISTENT ASTHMA WITHOUT COMPLICATION: ICD-10-CM

## 2020-04-29 DIAGNOSIS — G89.29 CHRONIC PAIN OF BOTH KNEES: ICD-10-CM

## 2020-04-29 DIAGNOSIS — J30.1 SEASONAL ALLERGIC RHINITIS DUE TO POLLEN: ICD-10-CM

## 2020-04-29 DIAGNOSIS — I47.1 AVNRT (AV NODAL RE-ENTRY TACHYCARDIA) (HCC): ICD-10-CM

## 2020-04-29 LAB
ALBUMIN SERPL-MCNC: 3.9 G/DL (ref 3.4–5)
ALBUMIN/GLOB SERPL: 1 {RATIO} (ref 0.8–1.7)
ALP SERPL-CCNC: 78 U/L (ref 45–117)
ALT SERPL-CCNC: 31 U/L (ref 13–56)
ANION GAP SERPL CALC-SCNC: 4 MMOL/L (ref 3–18)
AST SERPL-CCNC: 18 U/L (ref 10–38)
BILIRUB SERPL-MCNC: 0.5 MG/DL (ref 0.2–1)
BUN SERPL-MCNC: 16 MG/DL (ref 7–18)
BUN/CREAT SERPL: 24 (ref 12–20)
CALCIUM SERPL-MCNC: 8.7 MG/DL (ref 8.5–10.1)
CHLORIDE SERPL-SCNC: 102 MMOL/L (ref 100–111)
CHOLEST SERPL-MCNC: 178 MG/DL
CO2 SERPL-SCNC: 30 MMOL/L (ref 21–32)
CREAT SERPL-MCNC: 0.68 MG/DL (ref 0.6–1.3)
EST. AVERAGE GLUCOSE BLD GHB EST-MCNC: 169 MG/DL
GLOBULIN SER CALC-MCNC: 3.8 G/DL (ref 2–4)
GLUCOSE SERPL-MCNC: 116 MG/DL (ref 74–99)
HBA1C MFR BLD: 7.5 % (ref 4.2–5.6)
HDLC SERPL-MCNC: 74 MG/DL (ref 40–60)
HDLC SERPL: 2.4 {RATIO} (ref 0–5)
LDLC SERPL CALC-MCNC: 83.6 MG/DL (ref 0–100)
LIPID PROFILE,FLP: ABNORMAL
MAGNESIUM SERPL-MCNC: 1.9 MG/DL (ref 1.6–2.6)
POTASSIUM SERPL-SCNC: 4.1 MMOL/L (ref 3.5–5.5)
PROT SERPL-MCNC: 7.7 G/DL (ref 6.4–8.2)
SODIUM SERPL-SCNC: 136 MMOL/L (ref 136–145)
TRIGL SERPL-MCNC: 102 MG/DL (ref ?–150)
VLDLC SERPL CALC-MCNC: 20.4 MG/DL

## 2020-04-29 PROCEDURE — 80053 COMPREHEN METABOLIC PANEL: CPT

## 2020-04-29 PROCEDURE — 83036 HEMOGLOBIN GLYCOSYLATED A1C: CPT

## 2020-04-29 PROCEDURE — 36415 COLL VENOUS BLD VENIPUNCTURE: CPT

## 2020-04-29 PROCEDURE — 80061 LIPID PANEL: CPT

## 2020-04-29 PROCEDURE — 83735 ASSAY OF MAGNESIUM: CPT

## 2020-05-05 ENCOUNTER — TELEPHONE (OUTPATIENT)
Dept: INTERNAL MEDICINE CLINIC | Age: 65
End: 2020-05-05

## 2020-05-05 ENCOUNTER — VIRTUAL VISIT (OUTPATIENT)
Dept: INTERNAL MEDICINE CLINIC | Age: 65
End: 2020-05-05

## 2020-05-05 DIAGNOSIS — E78.5 HYPERLIPIDEMIA, UNSPECIFIED HYPERLIPIDEMIA TYPE: ICD-10-CM

## 2020-05-05 DIAGNOSIS — M25.562 CHRONIC PAIN OF BOTH KNEES: ICD-10-CM

## 2020-05-05 DIAGNOSIS — G89.29 CHRONIC PAIN OF BOTH KNEES: ICD-10-CM

## 2020-05-05 DIAGNOSIS — M25.561 CHRONIC PAIN OF BOTH KNEES: ICD-10-CM

## 2020-05-05 DIAGNOSIS — J45.30 MILD PERSISTENT ASTHMA WITHOUT COMPLICATION: ICD-10-CM

## 2020-05-05 DIAGNOSIS — Z91.199 NONCOMPLIANCE: ICD-10-CM

## 2020-05-05 DIAGNOSIS — E11.9 TYPE 2 DIABETES MELLITUS WITHOUT COMPLICATION, WITHOUT LONG-TERM CURRENT USE OF INSULIN (HCC): ICD-10-CM

## 2020-05-05 DIAGNOSIS — E66.9 OBESITY (BMI 30.0-34.9): ICD-10-CM

## 2020-05-05 DIAGNOSIS — I47.1 AVNRT (AV NODAL RE-ENTRY TACHYCARDIA) (HCC): ICD-10-CM

## 2020-05-05 DIAGNOSIS — I10 ESSENTIAL HYPERTENSION: Primary | ICD-10-CM

## 2020-05-06 NOTE — PROGRESS NOTES
Nisa Casillas is a 72 y.o. female evaluated via telephone on 5/5/2020. Consent:  She and/or health care decision maker is aware that she may receive a bill for this telephone service, depending on her insurance coverage, and has provided verbal consent to proceed: Yes      Documentation:  I communicated with the patient and/or health care decision maker about recent medical status and lab results. She reports that she developed a rash on the right side of her trunk around the waistline on 4/27/2020. She states that she was evaluated by Dr. Paul Garcia and diagnosed with shingles. She was treated with Valtrex, and reports today that she will be taking her last dose tomorrow. She states that the rash is improving and she denies experiencing any pain associated with it. She admits that she has been noncompliant with her diabetic diet and consuming a large amount of sweets. She also reports that she stopped taking her metformin on 4/27/2020 when she started Valtrex since she was concerned about taking too much medication. She also reports that she has recently started immunotherapy with Dr. Fredrick Sneed. Lab results from 4/29/2020 were discussed, and she was informed of the following:  Renal and liver function are normal. Elevated BUN/creatinine ratio 24. Lipid panel with total chol 178, HDL 74, and LDL 83, which is reasonably well controlled on current dose of simvastatin. HbA1c is increased to 7.5, which is poorly controlled on metformin  mg daily. Details of this discussion including any medical advice provided: Advised:  1. Restart metformin  mg with dinner. Recommended avoiding concentrated sweets, and limiting carbohydrates. Also recommended increasing exercise and weight loss. 2. Increase fluid intake. 3. Obtain Shingrix vaccine once shingles rash completely resolved. 4. Continue to monitor blood pressure.   5. Counseled patient regarding strict mitigation measures for COVID-19, including social distancing and diligent hand washing, as recommended by the CDC. 6. Perform Medicare Wellness visit at next scheduled appointment. Patient understands recommendations and agrees with plan. Follow-up in 3 months. I affirm this is a Patient Initiated Episode with a Patient who has not had a related appointment within my department in the past 7 days or scheduled within the next 24 hours.     Total Time: minutes: 11-20 minutes    Note: not billable if this call serves to triage the patient into an appointment for the relevant concern      Blane Meckel, MD

## 2020-06-25 RX ORDER — LOSARTAN POTASSIUM 25 MG/1
TABLET ORAL
Qty: 90 TAB | Refills: 2 | Status: SHIPPED | OUTPATIENT
Start: 2020-06-25 | End: 2021-03-20

## 2020-08-06 ENCOUNTER — HOSPITAL ENCOUNTER (OUTPATIENT)
Dept: LAB | Age: 65
Discharge: HOME | End: 2020-08-06
Payer: MEDICARE

## 2020-08-06 ENCOUNTER — LAB ONLY (OUTPATIENT)
Dept: INTERNAL MEDICINE CLINIC | Age: 65
End: 2020-08-06

## 2020-08-06 DIAGNOSIS — M25.561 CHRONIC PAIN OF BOTH KNEES: ICD-10-CM

## 2020-08-06 DIAGNOSIS — E11.9 TYPE 2 DIABETES MELLITUS WITHOUT COMPLICATION, WITHOUT LONG-TERM CURRENT USE OF INSULIN (HCC): ICD-10-CM

## 2020-08-06 DIAGNOSIS — E78.5 HYPERLIPIDEMIA, UNSPECIFIED HYPERLIPIDEMIA TYPE: ICD-10-CM

## 2020-08-06 DIAGNOSIS — Z91.199 NONCOMPLIANCE: ICD-10-CM

## 2020-08-06 DIAGNOSIS — I47.1 AVNRT (AV NODAL RE-ENTRY TACHYCARDIA) (HCC): ICD-10-CM

## 2020-08-06 DIAGNOSIS — I10 ESSENTIAL HYPERTENSION: ICD-10-CM

## 2020-08-06 DIAGNOSIS — G89.29 CHRONIC PAIN OF BOTH KNEES: ICD-10-CM

## 2020-08-06 DIAGNOSIS — J45.30 MILD PERSISTENT ASTHMA WITHOUT COMPLICATION: ICD-10-CM

## 2020-08-06 DIAGNOSIS — M25.562 CHRONIC PAIN OF BOTH KNEES: ICD-10-CM

## 2020-08-06 DIAGNOSIS — E66.9 OBESITY (BMI 30.0-34.9): ICD-10-CM

## 2020-08-06 LAB
ALBUMIN SERPL-MCNC: 3.9 G/DL (ref 3.4–5)
ALBUMIN/GLOB SERPL: 1.1 {RATIO} (ref 0.8–1.7)
ALP SERPL-CCNC: 80 U/L (ref 45–117)
ALT SERPL-CCNC: 51 U/L (ref 13–56)
ANION GAP SERPL CALC-SCNC: 6 MMOL/L (ref 3–18)
AST SERPL-CCNC: 33 U/L (ref 10–38)
BILIRUB SERPL-MCNC: 0.5 MG/DL (ref 0.2–1)
BUN SERPL-MCNC: 12 MG/DL (ref 7–18)
BUN/CREAT SERPL: 15 (ref 12–20)
CALCIUM SERPL-MCNC: 9.1 MG/DL (ref 8.5–10.1)
CHLORIDE SERPL-SCNC: 100 MMOL/L (ref 100–111)
CHOLEST SERPL-MCNC: 168 MG/DL
CO2 SERPL-SCNC: 30 MMOL/L (ref 21–32)
CREAT SERPL-MCNC: 0.78 MG/DL (ref 0.6–1.3)
CREAT UR-MCNC: 75 MG/DL (ref 30–125)
EST. AVERAGE GLUCOSE BLD GHB EST-MCNC: 137 MG/DL
GLOBULIN SER CALC-MCNC: 3.5 G/DL (ref 2–4)
GLUCOSE SERPL-MCNC: 113 MG/DL (ref 74–99)
HBA1C MFR BLD: 6.4 % (ref 4.2–5.6)
HDLC SERPL-MCNC: 72 MG/DL (ref 40–60)
HDLC SERPL: 2.3 {RATIO} (ref 0–5)
LDLC SERPL CALC-MCNC: 68.8 MG/DL (ref 0–100)
LIPID PROFILE,FLP: ABNORMAL
MAGNESIUM SERPL-MCNC: 1.6 MG/DL (ref 1.6–2.6)
MICROALBUMIN UR-MCNC: 0.74 MG/DL (ref 0–3)
MICROALBUMIN/CREAT UR-RTO: 10 MG/G (ref 0–30)
POTASSIUM SERPL-SCNC: 3.7 MMOL/L (ref 3.5–5.5)
PROT SERPL-MCNC: 7.4 G/DL (ref 6.4–8.2)
SODIUM SERPL-SCNC: 136 MMOL/L (ref 136–145)
TRIGL SERPL-MCNC: 136 MG/DL (ref ?–150)
VLDLC SERPL CALC-MCNC: 27.2 MG/DL

## 2020-08-06 PROCEDURE — 80061 LIPID PANEL: CPT

## 2020-08-06 PROCEDURE — 83735 ASSAY OF MAGNESIUM: CPT

## 2020-08-06 PROCEDURE — 82043 UR ALBUMIN QUANTITATIVE: CPT

## 2020-08-06 PROCEDURE — 83036 HEMOGLOBIN GLYCOSYLATED A1C: CPT

## 2020-08-06 PROCEDURE — 80053 COMPREHEN METABOLIC PANEL: CPT

## 2020-08-06 PROCEDURE — 36415 COLL VENOUS BLD VENIPUNCTURE: CPT

## 2020-08-13 ENCOUNTER — OFFICE VISIT (OUTPATIENT)
Dept: INTERNAL MEDICINE CLINIC | Age: 65
End: 2020-08-13

## 2020-08-13 VITALS
OXYGEN SATURATION: 96 % | DIASTOLIC BLOOD PRESSURE: 68 MMHG | HEART RATE: 74 BPM | SYSTOLIC BLOOD PRESSURE: 128 MMHG | RESPIRATION RATE: 16 BRPM | TEMPERATURE: 98.8 F

## 2020-08-13 DIAGNOSIS — G89.29 CHRONIC PAIN OF BOTH KNEES: ICD-10-CM

## 2020-08-13 DIAGNOSIS — Z71.89 ADVANCED DIRECTIVES, COUNSELING/DISCUSSION: ICD-10-CM

## 2020-08-13 DIAGNOSIS — Z23 ENCOUNTER FOR IMMUNIZATION: ICD-10-CM

## 2020-08-13 DIAGNOSIS — Z00.00 WELCOME TO MEDICARE PREVENTIVE VISIT: ICD-10-CM

## 2020-08-13 DIAGNOSIS — Z91.199 NONCOMPLIANCE: ICD-10-CM

## 2020-08-13 DIAGNOSIS — E78.5 HYPERLIPIDEMIA, UNSPECIFIED HYPERLIPIDEMIA TYPE: ICD-10-CM

## 2020-08-13 DIAGNOSIS — M25.562 CHRONIC PAIN OF BOTH KNEES: ICD-10-CM

## 2020-08-13 DIAGNOSIS — K21.9 GASTROESOPHAGEAL REFLUX DISEASE WITHOUT ESOPHAGITIS: ICD-10-CM

## 2020-08-13 DIAGNOSIS — E11.9 TYPE 2 DIABETES MELLITUS WITHOUT COMPLICATION, WITHOUT LONG-TERM CURRENT USE OF INSULIN (HCC): ICD-10-CM

## 2020-08-13 DIAGNOSIS — E55.9 VITAMIN D DEFICIENCY, UNSPECIFIED: ICD-10-CM

## 2020-08-13 DIAGNOSIS — J30.1 SEASONAL ALLERGIC RHINITIS DUE TO POLLEN: ICD-10-CM

## 2020-08-13 DIAGNOSIS — E66.9 OBESITY (BMI 30.0-34.9): ICD-10-CM

## 2020-08-13 DIAGNOSIS — J45.30 MILD PERSISTENT ASTHMA WITHOUT COMPLICATION: ICD-10-CM

## 2020-08-13 DIAGNOSIS — I47.1 AVNRT (AV NODAL RE-ENTRY TACHYCARDIA) (HCC): ICD-10-CM

## 2020-08-13 DIAGNOSIS — I10 ESSENTIAL HYPERTENSION: Primary | ICD-10-CM

## 2020-08-13 DIAGNOSIS — M25.561 CHRONIC PAIN OF BOTH KNEES: ICD-10-CM

## 2020-08-13 RX ORDER — PNEUMOCOCCAL 13-VALENT CONJUGATE VACCINE 2.2; 2.2; 2.2; 2.2; 2.2; 4.4; 2.2; 2.2; 2.2; 2.2; 2.2; 2.2; 2.2 UG/.5ML; UG/.5ML; UG/.5ML; UG/.5ML; UG/.5ML; UG/.5ML; UG/.5ML; UG/.5ML; UG/.5ML; UG/.5ML; UG/.5ML; UG/.5ML; UG/.5ML
0.5 INJECTION, SUSPENSION INTRAMUSCULAR ONCE
Qty: 0.5 ML | Refills: 0 | Status: SHIPPED | OUTPATIENT
Start: 2020-08-13 | End: 2020-08-13 | Stop reason: CLARIF

## 2020-08-13 NOTE — ACP (ADVANCE CARE PLANNING)
Advance Care Planning       Advance Care Planning (ACP) Physician/NP/PA (Provider) Conversation        Date of ACP Conversation: 8/13/2020    Conversation Conducted with:   Patient with Decision Making Nicole Paige Maker:    Current Designated Health Care Decision Maker:   (If there is a valid [place] named in the [address]" box in the ACP activity, but it is not visible above, be sure to open that field and then select the health care decision maker relationship (ie \"primary\") in the blank space to the right of the name.)    Note: Assess and validate information in current ACP documents, as indicated. If no Authorized Decision Maker has previously been identified, then patient chooses [place]:  \"Who would you like to name as your primary health care decision-maker? \"    Name: Kiah Dejesus   Relationship:  Phone number: [phone]  [place] this person be reached easily? \" YES  \"Who would you like to name as your back-up decision maker? \"   Name: Jeane Pham  Relationship: son Phone number: [phone]  [place] this person be reached easily? \" YES    Note: If the relationship of these Decision-Makers to the patient does NOT follow your state's Next of Kin hierarchy, recommend that patient complete ACP document that meets state-specific requirements to allow them to act on the patient's behalf when appropriate. Care Preferences:    Hospitalization: \"If your health worsens and it becomes clear that your chance of recovery is unlikely, what would your preference be regarding hospitalization? \"  If the patient would want hospitalization, answer \"yes\". If the patient would prefer comfort-focused treatment without hospitalization, answer \"no\". yes      Ventilation:   \"If you were in your present state of health and suddenly became very ill and were unable to breathe on your own, what would your preference be about the use of a ventilator (breathing machine) if it was available to you? \"    If patient would desire the use of a ventilator (breathing machine), answer \"yes\", if not answer \"no\":yes    \"If your health worsens and it becomes clear that your chance of recovery is unlikely, what would your preference be about the use of a ventilator (breathing machine) if it was available to you? \"   no      Resuscitation:  \"CPR works best to restart the heart when there is a sudden event, like a heart attack, in someone who is otherwise healthy. Unfortunately, CPR does not typically restart the heart for people who have serious health conditions or who are very sick. \"    \"In the event your heart stopped as a result of an underlying serious health condition, would you want attempts to be made to restart your heart (answer \"yes\" for attempt to resuscitate) or would you prefer a natural death (answer \"no\" for do not attempt to resuscitate)? \"   yes    NOTE: If the patient has a valid advance directive AND provides care preference(s) that are inconsistent with that prior directive, advise the patient to consider either: creating a new advance directive that complies with state-specific requirements; or, if that is not possible, orally revoking that prior directive in accordance with state-specific requirements, which must be documented in the EHR.     Conversation Outcomes / Follow-Up Plan:   Recommended completion of Advance Directive      Length of Voluntary ACP Conversation in minutes:  16 minutes      Ivory Garcia MD

## 2020-08-13 NOTE — PROGRESS NOTES
Jc Ibarra 1955 female who presents for routine immunizations. PCV13 Right Deltoid  Patient denies any symptoms , reactions or allergies that would exclude them from being immunized today. Risks and adverse reactions were discussed and the VIS was given to them. All questions were addressed. Patient was advised to wait min post injection. There were no reactions observed.     Tomas Phalen, LPN

## 2020-08-16 PROBLEM — Z91.199 NONCOMPLIANCE: Status: RESOLVED | Noted: 2018-12-02 | Resolved: 2020-08-16

## 2020-08-16 NOTE — PROGRESS NOTES
HPI:   Lindsay Hardwick is a 72y.o. year old female who presents today for a routine visit. She has a history of hypertension, hyperlipidemia, paroxysmal SVT, diabetes mellitus, GERD, asthma, elevated transaminases, and atypical mycobacterial pneumonia. She reports that she is doing reasonably well. She states that she recovered completely from her episode of Shingles in 4/2020, and denies any residual pain. She has been wearing a mask when outside, although she admits that she has been going to restaurants and outlet malls with her sisters. She does report some intermittent loose stools after meals which responds to Imodium. She denies any abdominal pain, nausea, vomiting, melena, or hematochezia. She is otherwise without new complaints and feeling generally well. She has a history of hypertension, treated with metoprolol and hydrochlorothiazide (+ potassium). She reports that she does not check her blood pressure at home. She does not exercise regularly, but denies any chest pain, shortness of breath at rest or with exertion, lightheadedness, or edema. 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 She has had approximately six severe episodes over the years, prompting presentation to the ED and treatment with IV Adenosine. She currently reports infrequent short episodes of palpitations and is being treated with metoprolol. She is followed by Dr. Viry Newsome. She had an echocardiogram (10/2005) showing normal LV size and function (EF 60-65%), and no valvular pathology. In 11/2012, she underwent an exercise stress echocardiogram, which was normal at maximal exercise. She has a history of hyperlipidemia, treated with simvastatin from 10/2012 to 3/2015 at which time she stopped taking it due to myalgias.  She restarted it on 8/2015 and continued to take it without difficulty until 3/2016, when it was noticed that she had transaminase elevation (AST 85/ ALT 143) and it was discontinued. Evaluation included hepatitis A, B, and C levels (negative), iron panel (normal), and RUQ ultrasound (3/23/2016) with limited sonographic window for the liver; only partially visualized but grossly unremarkable. Repeat hepatic panel (4/22/2016) showed AST 75 and ALT 98. Repeat lipid panel showed total chol 215/ / HDL 64/. In 5/2016, she had an abdominal CT scan showing the liver to be normal in size with normal parenchymal density; no discrete mass or ascites, and no intrahepatic biliary dilatation. She was subsequently instructed to restart simvastatin, but chose not to since she felt it was making her forgetful. She agreed to trial of rosuvastatin 10 mg and started it in 2/2018. However, after two weeks of therapy, she discontinued it since she thought it was causing her leg pain. She subsequently contacted Dr. Stephanie Richmond office and asked to begin simvastatin 20 mg daily in 4/2018. She has been taking it without difficulty since restarting. She has a history of diabetes mellitus, with impaired fasting glucose ranging from 103-111 since 2012, and HbA1c to 6.6-6.8 since 9/2016 (not checked previously). She was prescribed metformin ER last visit for an increase in her HbA1c to 7.1, but she reports that she took it for only one week and discontinued for unclear reasons. She was not experiencing any side effects. She denies any polyuria, polydipsia, nocturia, or blurry vision, and has no history of retinopathy, neuropathy, or nephropathy. She has regular eye exams with Dr. Rajni Wise. She has a history of asthma and allergic rhinitis and is followed by Dr. Elisabeth Oseguera. She is receiving immunotherapy once per month, and reports that she has not required any inhalers recently. She states that she does not use Qvar daily, but will take it occasionally. She does use Claritin every day.   In 7/2019, she reported increasing difficulty with right ear fullness, post nasal drainage, hoarseness, and cough, and was referred to Dr. Herber Patel. He performed a nasal laryngoscopy and found evidence of laryngopharyngeal reflux. She was started on daily omeprazole, and she states that she has noticed some improvement. In 10/2017, she fell down the steps of her porch and had difficulty with right knee pain and swelling. She was evaluated by Dr. Samanta Gardner in 12/2017, and right knee xray showed decreased medial joint space, and moderate degenerative changes. She received a cortisone injection, but did not notice any improvement. She underwent an MRI of the right knee (1/29/2018) which showed complete tear of posterior horn and root of the medial meniscus; tear of the body and posterior horn of the lateral meniscus; tricompartmental osteoarthritis most prominent in medial and patellofemoral compartments; moderate joint effusion; small Baker's cyst. It was recommended that she consider knee replacement and arthroscopy. However, she decided to seek a second opinion and was evaluated by Dr. Apolinar Cobian. She also underwent an MRI of her left knee (3/23/2018) showing radial tear posterior horn medial meniscus with extrusion of the body. Also a radial tear in the mid body; lateral meniscus intrasubstance degeneration and probable small undersurface tear of the posterior horn; medial and patellofemoral compartments moderate chondral loss; s. ubchondral bone marrow edema in the medial tibial plateau, likely reactive. She is completed physical therapy for her bilateral knees and feels that it may have helped. 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 She was treated with Avelox, and repeat chest x-ray in 1/2012 showed complete resolution of the pneumonia. She denies any cough or shortness of breath. She had a screening colonoscopy in 12/2006 by Dr. Herminio Moreno showing a 5 mm sessile polyp in the rectum (pathology: hyperplastic).  She had a repeat colonoscopy in 12/2016 which showed moderate sigmoid diverticulosis and a 6 mm sessile ascending colon polyp (pathology: serrated adenoma). Follow-up recommended for 5 years. She denies any abdominal pain, nausea, vomiting, melena, hematochezia, or change in bowel movements. She does take omeprazole occasionally for GERD symptoms. In 12/2017, she was referred by her gynecologist for evaluation by Dr. Amy Brown for microscopic hematuria, and urine cytology was negative. However, she states that she refused his recommendations to undergo a CT urogram or cystoscopy. She denies any dysuria, gross hematuria, or flank pain. Past Medical History:   Diagnosis Date    Allergic rhinitis     Asthma     Cardiac stress echo, normal 11/02/2012    Normal maximal stress echo study. EF 60%. Ex time 9 min 45 sec. 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     Hyperlipidemia     Hypertension     Menopause     Plantar fasciitis     left    PSVT (paroxysmal supraventricular tachycardia) (HCC)     A-V dharmesh reentrant tachycardia     Past Surgical History:   Procedure Laterality Date    ENDOSCOPY, COLON, DIAGNOSTIC      polyp    HX CERVICAL POLYPECTOMY      HX CYST INCISION AND DRAINAGE Right 10 or more years    HX DILATION AND CURETTAGE      HX GYN      polyp on cervix    HX POLYPECTOMY      from rectum     Current Outpatient Medications   Medication Sig    losartan (COZAAR) 25 mg tablet take 1 tablet by mouth once daily    potassium chloride (K-DUR, KLOR-CON) 20 mEq tablet take 1 tablet by mouth once daily    metoprolol succinate (TOPROL-XL) 50 mg XL tablet take 1 tablet by mouth once daily    fluticasone propionate (FLONASE) 50 mcg/actuation nasal spray instill 2 sprays into each nostril once daily    metFORMIN ER (GLUCOPHAGE XR) 500 mg tablet take 1 tablet by mouth once daily with dinner    hydroCHLOROthiazide (HYDRODIURIL) 12.5 mg tablet take 1 tablet by mouth once daily    clotrimazole-betamethasone (LOTRISONE) topical cream Apply  to both ear canals and affected part of outer ear twice a day with a finger.  cholecalciferol (VITAMIN D3) 1,000 unit cap Take 1,000 Units by mouth daily.  simvastatin (ZOCOR) 20 mg tablet take 1 tablet by mouth at bedtime    PROAIR HFA 90 mcg/actuation inhaler inhale 2 puffs by mouth every 4 hours if needed for wheezing    LORazepam (ATIVAN) 1 mg tablet TAKE 1 TABLET BY MOUTH EVERY 8 HOURS AS NEEDED FOR ANXIETY    carboxymethylcellulose sodium (REFRESH LIQUIGEL) 1 % dlgl ophthalmic solution Apply  to eye. No current facility-administered medications for this visit. Allergies and Intolerances: Allergies   Allergen Reactions    Altace [Ramipril] Cough    Penicillins Other (comments)     Hands peel    Sulfur Itching     Family History: She had two aunts who had breast cancer (her mother's sister and father's sister). She has no FH of colon cancer. Her mother passed away from uterine cancer. Her father  from metastatic prostate cancer. Family History   Problem Relation Age of Onset   [address] [facility] Arthritis-osteo Mother     Hypertension Mother              Cancer Father         bone cancer    Hypertension Sister     Hypertension Sister     Hypertension Sister     Breast Cancer Maternal Aunt     Breast Cancer Paternal Aunt     Diabetes Other     Stroke Other     Other Sister         twin sister - osteopenia and low vitamin D levels     Social History:   She  reports that she has never smoked. She has never used smokeless tobacco. She is  and has two sons. She was a homemaker, but worked part-time in . She now helps care for her grandchildren.    Social History     Substance and Sexual Activity   Alcohol Use No     Immunization History:  Immunization History   Administered Date(s) Administered    Influenza Vaccine (Quad) PF 10/23/2015, 10/19/2016, 10/05/2017, 10/26/2018, 10/08/2019    Influenza Vaccine PF 12/12/2013, 10/03/2014    Influenza Vaccine Split 11/02/2011, 10/22/2012    Influenza Vaccine Whole 10/29/2010    PPD 01/03/2012    Pneumococcal Conjugate (PCV-13) 08/13/2020    Pneumococcal Polysaccharide (PPSV-23) 03/21/2017    TB Skin Test (PPD) Intradermal 02/12/2014    TDAP Vaccine 02/16/2012       Review of Systems:   As above included in HPI. Otherwise 11 point review of systems negative including constitutional, skin, HENT, eyes, respiratory, cardiovascular, gastrointestinal, genitourinary, musculoskeletal, endocrine, hematologic, allergy, and neurologic. Physical:   Vitals:   BP: 128/68  HR: 74  WT: (P) 182 lb (82.6 kg)  BMI:  33.29 kg/m2    Exam:   Patient appears in no apparent distress. Affect is appropriate. HEENT: PERRLA, anicteric, oropharynx clear, no JVD, adenopathy or thyromegaly. No carotid bruits or radiated murmur. Lungs: clear to auscultation, no wheezes, rhonchi, or rales. Heart: regular rate and rhythm. No murmur, rubs, gallops  Abdomen: soft, nontender, nondistended, normal bowel sounds, no hepatosplenomegaly or masses. Extremities: without edema.       Diabetic foot exam:     Left Foot:   Visual Exam: normal    Pulse DP: 2+ (normal)   Filament test: normal sensation    Vibratory sensation: normal      Right Foot:   Visual Exam: normal    Pulse DP: 2+ (normal)   Filament test: normal sensation    Vibratory sensation: normal        Review of Data:  Labs:  Hospital Outpatient Visit on 08/06/2020   Component Date Value Ref Range Status    Hemoglobin A1c 08/06/2020 6.4* 4.2 - 5.6 % Final    Est. average glucose 08/06/2020 137  mg/dL Final    LIPID PROFILE 08/06/2020        Final    Cholesterol, total 08/06/2020 168  <200 MG/DL Final    Triglyceride 08/06/2020 136  <150 MG/DL Final    HDL Cholesterol 08/06/2020 72* 40 - 60 MG/DL Final    LDL, calculated 08/06/2020 68.8  0 - 100 MG/DL Final    VLDL, calculated 08/06/2020 27.2  MG/DL Final    CHOL/HDL Ratio 08/06/2020 2.3  0 - 5.0   Final    Magnesium 08/06/2020 1.6  1.6 - 2.6 mg/dL Final    Sodium 08/06/2020 136  136 - 145 mmol/L Final    Potassium 08/06/2020 3.7  3.5 - 5.5 mmol/L Final    Chloride 08/06/2020 100  100 - 111 mmol/L Final    CO2 08/06/2020 30  21 - 32 mmol/L Final    Anion gap 08/06/2020 6  3.0 - 18 mmol/L Final    Glucose 08/06/2020 113* 74 - 99 mg/dL Final    BUN 08/06/2020 12  7.0 - 18 MG/DL Final    Creatinine 08/06/2020 0.78  0.6 - 1.3 MG/DL Final    BUN/Creatinine ratio 08/06/2020 15  12 - 20   Final    GFR est AA 08/06/2020 >60  >60 ml/min/1.73m2 Final    GFR est non-AA 08/06/2020 >60  >60 ml/min/1.73m2 Final    Calcium 08/06/2020 9.1  8.5 - 10.1 MG/DL Final    Bilirubin, total 08/06/2020 0.5  0.2 - 1.0 MG/DL Final    ALT (SGPT) 08/06/2020 51  13 - 56 U/L Final    AST (SGOT) 08/06/2020 33  10 - 38 U/L Final    Alk. phosphatase 08/06/2020 80  45 - 117 U/L Final    Protein, total 08/06/2020 7.4  6.4 - 8.2 g/dL Final    Albumin 08/06/2020 3.9  3.4 - 5.0 g/dL Final    Globulin 08/06/2020 3.5  2.0 - 4.0 g/dL Final    A-G Ratio 08/06/2020 1.1  0.8 - 1.7   Final    Microalbumin,urine random 08/06/2020 0.74  0 - 3.0 MG/DL Final    Creatinine, urine 08/06/2020 75.00  30 - 125 mg/dL Final    Microalbumin/Creat ratio (mg/g cre* 08/06/2020 10  0 - 30 mg/g Final     Health Maintenance:  Screening:    Mammogram: negative (11/2019). PAP smear: negative (3/2020) with negative HPV. Followed by Dr. Lisa Guerrero. Colorectal: colonoscopy (12/2016) serrated adenoma. Dr. Kentrell Marx. Due 12/2021.    Depression: none   DM (HbA1c/FPG): HbA1c 6.4 (8/2020)   Hepatitis C: negative (3/2016)   Falls: one   DEXA: within normal limits (11/2015)   Glaucoma: regular eye exams with Dr. Vika Veras (last 9/2019)   Smoking: none   Vitamin D: 32.2 (10/2019)    Medicare Wellness: today (Welcome to Medicare)      Impression:  Patient Active Problem List   Diagnosis Code    Benign hypertensive heart disease without congestive heart failure I11.9    Allergic rhinitis J30.9    Colon polyps K63.5    AVNRT (AV dharmesh re-entry tachycardia) ([facility]) I47.1    Hyperlipidemia E78.5    Essential hypertension I10    Asthma J45.909    GERD (gastroesophageal reflux disease) K21.9    Type 2 diabetes mellitus without complication, without long-term current use of insulin ([facility]) E11.9    Vitamin D insufficiency E55.9    Microscopic hematuria R31.29    Chronic pain of both knees M25.561, M25.562, G89.29    Obesity (BMI 30.0-34. 9) E66.9    Noncompliance Z91.19    Anxiety F41.9       Plan:  1. Diabetes mellitus. Diagnosed in 9/2016 when HbA1c was checked and found to be elevated at 6.6. Had been steadily increasing and reached 7.2 last visit with a fasting glucose of 135. Impaired fasting glucose had been present intermittently since 2012. Agreeable to starting treatment with metformin  mg at dinner in 3/2019 and repeat HbA1c today stable at 6.4. No evidence of microvascular complications. On statin. Started on losartan, and now taking consistently. Blood pressure controlled today. Stressed importance of compliance. Continue regular eye exams with Dr. Anderson Cowan. Foot exam normal today. Urine microalbumin/ creatinine ratio remains without evidence of microalbuminuria today. 2. Hypertension. Blood pressure with improved control today on losartan 25 mg daily in addition to her current regimen of metoprolol XL 50 mg daily and hydrochlorothiazide 12.5 mg daily. Renal function remains normal with creatinine 0.78/ eGFR >60. Continue to follow. 3. Hyperlipidemia. On moderate intensity dose simvastatin 20 mg daily with LDL 68 and HDL 72 today, indicative of good control. Continue to follow. 4. Elevated LFT's. Resolved. Unclear etiology, but doubt secondary to statin. Hepatitis panel and iron studies normal. RUQ ultrasound difficult secondary to body habitus, but abdominal CT scan showed normal liver parenchyma. Will continue to follow. 5. AV dharmesh reentrant tachycardia. No recent episodes. On metoprolol and no significant palpitations recently. Followed by Dr. Patti Rushing. 6. Asthma, mild persistent. Associated with allergies. Receiving monthly immunotherapy injections with Dr. Werner Alanis and states that generally stable. 7. GERD/ Laryngopharyngeal reflux. Evaluated by Dr. Mario Caban, and noted on laryngoscopic exam. Started on omeprazole daily but states that decided to discontinue as did not help with cough. Follow. 8. Microscopic hematuria. Patient refusing further evaluation with cystoscopy or CT urogram. Urine cytology negative. Did have abdominal CT scan in 5/2016 where kidneys appeared normal. Recommended that she complete evaluation with Dr. Chaparro Perez. Repeat urinalysis with evidence small blood and 3-5 RBCs in 10/2019.    9. Bilateral knee pain. Did not respond to cortisone injection. Had both right and left knee MRI showing variable degrees of menisci tears and osteoarthritis of knee joint. Now being followed by Dr. Laura David and reports relatively quiescent. 10. Shingles. Diagnosed in 4/2020 by Dr. Rajan Paredes. Treated with Valtrex and patient reports mild symptoms. Urged to proceed with Shingrix vaccine. 11. Left breast pain and nodules. Evaluated for new onset left breast pain and tenderness in 10/2019. Positive family history for breast cancer in a maternal aunt in her 46s and in a paternal aunt. Underwent bilateral mammogram and left breast ultrasound, and no abnormalities were found. Reports resolution of discomfort. Will continue with annual screening. 12. Obesity. Weight stable since last visit. Emphasized importance of lifestyle modifications, including diet, exercise, and weight loss. Weight decreased five pounds which patient attributes to acute illness. Will readdress next visit. 13. Health maintenance. Urged to obtain influenza vaccine this fall. Already received script for Shingrix vaccine and advised to proceed. Given Pneumovax in 3/2017 given asthma history. Will give Prevnar 13 today and will need repeat Pneumovax 23 in 3/2022. Colonoscopy due 2021. Mammogram as above. Continue regular eye exams with Dr. Kiara Lovelace. Vitamin D level normal. Continue maintenance dose supplement. Counseled patient regarding strict mitigation measures for COVID-19, including wearing a mask, social distancing and diligent hand washing, as recommended by the CDC. In addition, a Welcome to Medicare visit was done today. Total time: 40 minutes spent with the patient on counseling, answering questions and/or coordination of care. Complex medical review and management performed. Patient understands recommendations and agrees with plan.   Follow-up in 3 months for physical exam.

## 2020-08-18 ENCOUNTER — TELEPHONE (OUTPATIENT)
Dept: INTERNAL MEDICINE CLINIC | Age: 65
End: 2020-08-18

## 2020-08-18 DIAGNOSIS — F41.9 ANXIETY: ICD-10-CM

## 2020-08-18 RX ORDER — LORAZEPAM 1 MG/1
1 TABLET ORAL
Qty: 30 TAB | Refills: 0 | Status: SHIPPED | OUTPATIENT
Start: 2020-08-18 | End: 2021-06-23

## 2020-08-18 NOTE — TELEPHONE ENCOUNTER
[facility] date for Ativan as 3/19/19 for a 10 d/s. No UDS or CSA on file    Last Visit: 8/13/20 with MD Abhinav Connor  Next Appointment: 11/17/20 with MD Bal  Previous Refill Encounter(s): 3/19/19 #30    Requested Prescriptions     Pending Prescriptions Disp Refills    LORazepam (ATIVAN) 1 mg tablet 30 Tab 0     Sig: Take 1 Tab by mouth every eight (8) hours as needed for Anxiety. Max Daily Amount: 3 mg.

## 2020-08-19 NOTE — TELEPHONE ENCOUNTER
Patient reached and she states that she noticed this finding this week. Patient denies any difficulty with hand or finger movement. Patient advised to follow up with DR. Pramdo [place] [facility] office. Patient verbalizes understanding and had no further questions.
Please clarify time frame that this developed. Explain that this is not from one of her medications. Is she having any difficulty with hand or finger movement? She has seen Dr. Felton Bullard in the past. Would recommend that she follow-up with him if bothersome.
Pt states on right hand, from thumb to middle finger the tendon underneath is sticking out. She doesn't know if one of her medicines is causing this, said it is in palm of hand. Said it doesn't hurt. Please advise her at [phone].
Patient with hx of fall last week 2/2 to weakness  Will trend 2 more sets of cardiac enzymes(especially CK level)  Patient received 500cc bolus followed by maintenance at 50cc hr for 10 hours   Will hold off on home Pravastatin due to elevated CK  Fall precautions ordered   Vital signs q4hrs  PT consult ordered

## 2020-08-28 NOTE — MR AVS SNAPSHOT
Visit Information Date & Time Provider Department Dept. Phone Encounter #  
 11/21/2017  1:00 PM Melia Barrera [facility] [address] [id number] Follow-up Instructions Return in about 6 months (around 5/21/2018), or if symptoms worsen or fail to improve. Your Appointments 1/25/2018 12:30 PM  
Office Visit with Keila Kat MD  
[facility] [address] Appt Note: 3 month follow up  
 [address], [address] [place]  
  
   
 [address], [address]  
  
    
  
 1/5/2018  1:30 PM  
Any with Bharath Bray MD  
[facility] ([address]) [address] [place] [address]  
[address] [facility] [address] [facility] [address] [place] [address] Upcoming Health Maintenance Date Due ZOSTER VACCINE AGE 60> 12/29/2017* HEMOGLOBIN A1C Q6M 3/20/2018 FOOT EXAM Q1 3/21/2018 EYE EXAM RETINAL OR DILATED Q1 6/20/2018 MICROALBUMIN Q1 9/20/2018 LIPID PANEL Q1 9/20/2018 BREAST CANCER SCRN MAMMOGRAM 11/28/2018 PAP AKA CERVICAL CYTOLOGY 11/3/2021 COLONOSCOPY 12/6/2021 DTaP/Tdap/Td series (2 - Td) 2/16/2022 *Topic was postponed. The date shown is not the original due date. Allergies as of 11/21/2017  Review Complete On: 10/8/2017 By: Keila Kat MD  
  
 Severity Noted Reaction Type Reactions Altace [Ramipril]  03/17/2011    Cough Penicillins    Other (comments) Hands peel Sulfur    Itching Current Immunizations  Reviewed on 10/5/2017 Name Date Influenza Vaccine (Quad) PF 10/5/2017 12:43 PM, 10/19/2016, 10/23/2015 Influenza Vaccine PF 10/3/2014, 12/12/2013 Influenza Vaccine Split 10/22/2012, 11/2/2011 Influenza Vaccine Whole 10/29/2010 PPD 1/3/2012 Pneumococcal Polysaccharide (PPSV-23) 3/21/2017 TB Skin Test (PPD) Intradermal 2/12/2014 TDAP Vaccine 2/16/2012 Not reviewed this visit You Were Diagnosed With   
  
 Codes Comments Palpitations    -  Primary ICD-10-CM: R00.2 ICD-9-CM: 785.1 PSVT (paroxysmal supraventricular tachycardia) (HCC)     ICD-10-CM: I47.1 ICD-9-CM: 427.0 Essential hypertension     ICD-10-CM: I10 
ICD-9-CM: 401.9 Hyperlipidemia, unspecified hyperlipidemia type     ICD-10-CM: E78.5 ICD-9-CM: 272.4 Vitals BP Pulse Height(growth percentile) Weight(growth percentile) LMP SpO2  
 122/74 70 5' 2\" (1.575 m) 183 lb (83 kg) (LMP Unknown) 98% BMI OB Status Smoking Status 33.47 kg/m2 Postmenopausal Never Smoker Vitals History BMI and BSA Data Body Mass Index Body Surface Area  
 33.47 kg/m 2 1.91 m 2 Preferred Pharmacy Pharmacy Name Phone [address], [address] [place] [address] [phone] Your Updated Medication List  
  
   
This list is accurate as of: 11/21/17  1:54 PM.  Always use your most recent med list.  
  
  
  
  
 albuterol 90 mcg/actuation inhaler Commonly known as:  PROAIR HFA  
inhale 2 puffs by mouth every 4 hours if needed for wheezing  
  
 clotrimazole-betamethasone topical cream  
Commonly known as:  José Miguel Drones Apply  to both ear canals and affected part of outer ear twice a day with a finger. fluticasone 50 mcg/actuation nasal spray Commonly known as:  Lella Solum 2 Sprays by Both Nostrils route daily. hydroCHLOROthiazide 25 mg tablet Commonly known as:  HYDRODIURIL  
take 1/2 tablet by mouth once daily LORazepam 1 mg tablet Commonly known as:  ATIVAN  
TAKE 1 TABLET BY MOUTH EVERY 8 HOURS AS NEEDED FOR ANXIETY  
  
 metoprolol succinate 50 mg XL tablet Commonly known as:  TOPROL-XL  
take 1 tablet by mouth once daily MULTIVITAMIN PO Take 1 Tab by mouth every other day. omeprazole 20 mg capsule Commonly known as:  PriLOSEC Take 1 Cap by mouth daily. potassium chloride 20 mEq tablet Commonly known as:  K-DUR, KLOR-CON  
take 1 tablet by mouth once daily QVAR 40 mcg/actuation SpokenLayer Generic drug:  beclomethasone Take 1 Puff by inhalation two (2) times a day. REFRESH LIQUIGEL 1 % Dlgl Generic drug:  carboxymethylcellulose sodium Apply  to eye. We Performed the Following AMB POC EKG ROUTINE W/ 12 LEADS, INTER & REP [36783 CPT(R)] Follow-up Instructions Return in about 6 months (around 5/21/2018), or if symptoms worsen or fail to improve. To-Do List   
 11/29/2017 10:30 AM  
  Appointment with SONYA PHILLIPS at [address] [place] [address] ([phone]) PAYMENT  For Non-Medicare patients - $15.00 will be collected from you at the time of your exam.  You will be billed $35.00 from the reading [facility]. OUTSIDE FILMS  - Any outside films related to the study being scheduled should be brought with you on the day of the exam.  If this cannot be done there may be a delay in the reading of the study. MEDICATIONS  - Patient must bring a complete list of all medications currently taking to include prescriptions, over-the-counter meds, herbals, vitamins & any dietary supplements  GENERAL INSTRUCTIONS  - On the day of your exam do not use any bath powder, deodorant or lotions on the armpit area. -Tenderness of breasts may cause an increase of discomfort during procedure. If you are experiencing breast tenderness on the day of your appointment and would like to reschedule, please call [phone].   
  
 12/04/2017 9:00 AM  
  Appointment with Dennis Felix at [facility]! [facility] introduces hint patient portal. Now you can access parts of your medical record, email your doctor's office, and request medication refills online. 1. In your internet browser, go to [email or web address] 2. Click on the First Time User? Click Here link in the Sign In box. You will see the New Member Sign Up page. 3. Enter your Trend[employer] Access Code exactly as it appears below. You will not need to use this code after youve completed the sign-up process. If you do not sign up before the expiration date, you must request a new code. · Trend[employer]ytics Access Code: [email or web address] Expires: 12/31/2017  3:12 PM 
 
4. Enter the last four digits of your Social Security Number (xxxx) and Date of Birth (mm/dd/yyyy) as indicated and click Submit. You will be taken to the next sign-up page. 5. Create a Trendalytics ID. This will be your Trendalytics login ID and cannot be changed, so think of one that is secure and easy to remember. 6. Create a T[employer] password. You can change your password at any time. 7. Enter your Password Reset Question and Answer. This can be used at a later time if you forget your password. 8. Enter your e-mail address. You will receive e-mail notification when new information is available in [address] 9. Click Sign Up. You can now view and download portions of your medical record. 10. Click the Download Summary menu link to download a portable copy of your medical information. If you have questions, please visit the Frequently Asked Questions section of the T[employer] website. Remember, [employer] is NOT to be used for urgent needs. For medical emergencies, dial 911. Now available from your iPhone and Android! Please provide this summary of care documentation to your next provider. Your primary care clinician is listed as Bria Burkett. If you have any questions after today's visit, please call [phone]. yes

## 2020-09-09 ENCOUNTER — TELEPHONE (OUTPATIENT)
Dept: INTERNAL MEDICINE CLINIC | Age: 65
End: 2020-09-09

## 2020-09-09 NOTE — TELEPHONE ENCOUNTER
Called and informed patient when she is due for the next pneumonia shot. She verbalized understanding. No further questions or concerns at this time.

## 2020-09-19 RX ORDER — ALBUTEROL SULFATE 90 UG/1
AEROSOL, METERED RESPIRATORY (INHALATION)
Qty: 8.5 G | Refills: 5 | Status: SHIPPED | OUTPATIENT
Start: 2020-09-19

## 2020-09-23 ENCOUNTER — CLINICAL SUPPORT (OUTPATIENT)
Dept: INTERNAL MEDICINE CLINIC | Age: 65
End: 2020-09-23

## 2020-09-23 DIAGNOSIS — Z23 NEEDS FLU SHOT: Primary | ICD-10-CM

## 2020-09-29 ENCOUNTER — TELEPHONE (OUTPATIENT)
Dept: INTERNAL MEDICINE CLINIC | Age: 65
End: 2020-09-29

## 2020-09-29 NOTE — TELEPHONE ENCOUNTER
Pt called said she was taking metformin, she said she noticed  It gives her diarrhea ,she is stopped it since 09/21- to give her stomach a break she is asking if you want to give her something not so strong Family/Patient

## 2020-09-30 ENCOUNTER — TELEPHONE (OUTPATIENT)
Dept: INTERNAL MEDICINE CLINIC | Age: 65
End: 2020-09-30

## 2020-09-30 NOTE — TELEPHONE ENCOUNTER
Please see if she would be able to come in for an appointment tomorrow. Seems to be a few open slots.
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
## 2020-09-30 NOTE — TELEPHONE ENCOUNTER
Pt calling asking to speak to a nurse. Says she has a blister on her side. Wants to know if it could be a bug bite or what might cause it. Says she had her shingles shot about 3 weeks ago.

## 2020-10-01 ENCOUNTER — HOSPITAL ENCOUNTER (OUTPATIENT)
Dept: GENERAL RADIOLOGY | Age: 65
Discharge: HOME | End: 2020-10-01
Payer: MEDICARE

## 2020-10-01 ENCOUNTER — OFFICE VISIT (OUTPATIENT)
Dept: INTERNAL MEDICINE CLINIC | Age: 65
End: 2020-10-01
Payer: MEDICARE

## 2020-10-01 VITALS
BODY MASS INDEX: 33.13 KG/M2 | TEMPERATURE: 97.1 F | RESPIRATION RATE: 16 BRPM | OXYGEN SATURATION: 97 % | WEIGHT: 180 LBS | SYSTOLIC BLOOD PRESSURE: 134 MMHG | DIASTOLIC BLOOD PRESSURE: 70 MMHG | HEIGHT: 62 IN | HEART RATE: 70 BPM

## 2020-10-01 DIAGNOSIS — M43.16 SPONDYLOLISTHESIS, LUMBAR REGION: ICD-10-CM

## 2020-10-01 DIAGNOSIS — F41.9 ANXIETY: ICD-10-CM

## 2020-10-01 DIAGNOSIS — M54.41 ACUTE RIGHT-SIDED LOW BACK PAIN WITH RIGHT-SIDED SCIATICA: Primary | ICD-10-CM

## 2020-10-01 DIAGNOSIS — I47.1 AVNRT (AV NODAL RE-ENTRY TACHYCARDIA) (HCC): ICD-10-CM

## 2020-10-01 DIAGNOSIS — E11.9 TYPE 2 DIABETES MELLITUS WITHOUT COMPLICATION, WITHOUT LONG-TERM CURRENT USE OF INSULIN (HCC): ICD-10-CM

## 2020-10-01 DIAGNOSIS — M51.36 DDD (DEGENERATIVE DISC DISEASE), LUMBAR: ICD-10-CM

## 2020-10-01 DIAGNOSIS — M54.41 ACUTE RIGHT-SIDED LOW BACK PAIN WITH RIGHT-SIDED SCIATICA: ICD-10-CM

## 2020-10-01 DIAGNOSIS — I10 ESSENTIAL HYPERTENSION: ICD-10-CM

## 2020-10-01 DIAGNOSIS — J30.1 SEASONAL ALLERGIC RHINITIS DUE TO POLLEN: ICD-10-CM

## 2020-10-01 DIAGNOSIS — J45.30 MILD PERSISTENT ASTHMA WITHOUT COMPLICATION: ICD-10-CM

## 2020-10-01 DIAGNOSIS — E66.9 OBESITY (BMI 30.0-34.9): ICD-10-CM

## 2020-10-01 PROCEDURE — 3044F HG A1C LEVEL LT 7.0%: CPT | Performed by: INTERNAL MEDICINE

## 2020-10-01 PROCEDURE — G8399 PT W/DXA RESULTS DOCUMENT: HCPCS | Performed by: INTERNAL MEDICINE

## 2020-10-01 PROCEDURE — 72100 X-RAY EXAM L-S SPINE 2/3 VWS: CPT

## 2020-10-01 PROCEDURE — 1101F PT FALLS ASSESS-DOCD LE1/YR: CPT | Performed by: INTERNAL MEDICINE

## 2020-10-01 PROCEDURE — G9899 SCRN MAM PERF RSLTS DOC: HCPCS | Performed by: INTERNAL MEDICINE

## 2020-10-01 PROCEDURE — G8432 DEP SCR NOT DOC, RNG: HCPCS | Performed by: INTERNAL MEDICINE

## 2020-10-01 PROCEDURE — G8754 DIAS BP LESS 90: HCPCS | Performed by: INTERNAL MEDICINE

## 2020-10-01 PROCEDURE — 99214 OFFICE O/P EST MOD 30 MIN: CPT | Performed by: INTERNAL MEDICINE

## 2020-10-01 PROCEDURE — 2022F DILAT RTA XM EVC RTNOPTHY: CPT | Performed by: INTERNAL MEDICINE

## 2020-10-01 PROCEDURE — G8536 NO DOC ELDER MAL SCRN: HCPCS | Performed by: INTERNAL MEDICINE

## 2020-10-01 PROCEDURE — 3017F COLORECTAL CA SCREEN DOC REV: CPT | Performed by: INTERNAL MEDICINE

## 2020-10-01 PROCEDURE — G8417 CALC BMI ABV UP PARAM F/U: HCPCS | Performed by: INTERNAL MEDICINE

## 2020-10-01 PROCEDURE — G8427 DOCREV CUR MEDS BY ELIG CLIN: HCPCS | Performed by: INTERNAL MEDICINE

## 2020-10-01 PROCEDURE — G0463 HOSPITAL OUTPT CLINIC VISIT: HCPCS | Performed by: INTERNAL MEDICINE

## 2020-10-01 PROCEDURE — G8752 SYS BP LESS 140: HCPCS | Performed by: INTERNAL MEDICINE

## 2020-10-01 PROCEDURE — 1090F PRES/ABSN URINE INCON ASSESS: CPT | Performed by: INTERNAL MEDICINE

## 2020-10-01 RX ORDER — LORATADINE 10 MG/1
10 TABLET ORAL DAILY
Qty: 90 TAB | Refills: 2 | Status: SHIPPED | OUTPATIENT
Start: 2020-10-01 | End: 2021-06-10 | Stop reason: SDUPTHER

## 2020-10-01 RX ORDER — ETODOLAC 500 MG/1
500 TABLET, FILM COATED ORAL
Qty: 60 TAB | Refills: 0 | Status: SHIPPED | OUTPATIENT
Start: 2020-10-01 | End: 2020-11-17 | Stop reason: ALTCHOICE

## 2020-10-01 RX ORDER — METHOCARBAMOL 500 MG/1
500 TABLET, FILM COATED ORAL
Qty: 60 TAB | Refills: 0 | Status: SHIPPED | OUTPATIENT
Start: 2020-10-01 | End: 2020-11-17 | Stop reason: ALTCHOICE

## 2020-10-01 NOTE — PATIENT INSTRUCTIONS
Low Back Pain: Exercises  Introduction  Here are some examples of exercises for you to try. The exercises may be suggested for a condition or for rehabilitation. Start each exercise slowly. Ease off the exercises if you start to have pain. You will be told when to start these exercises and which ones will work best for you. How to do the exercises  Press-up   1. Lie on your stomach, supporting your body with your forearms. 2. Press your elbows down into the floor to raise your upper back. As you do this, relax your stomach muscles and allow your back to arch without using your back muscles. As your press up, do not let your hips or pelvis come off the floor. 3. Hold for 15 to 30 seconds, then relax. 4. Repeat 2 to 4 times. Alternate arm and leg (bird dog) exercise   Do this exercise slowly. Try to keep your body straight at all times, and do not let one hip drop lower than the other. 1. Start on the floor, on your hands and knees. 2. Tighten your belly muscles. 3. Raise one leg off the floor, and hold it straight out behind you. Be careful not to let your hip drop down, because that will twist your trunk. 4. Hold for about 6 seconds, then lower your leg and switch to the other leg. 5. Repeat 8 to 12 times on each leg. 6. Over time, work up to holding for 10 to 30 seconds each time. 7. If you feel stable and secure with your leg raised, try raising the opposite arm straight out in front of you at the same time. Knee-to-chest exercise   1. Lie on your back with your knees bent and your feet flat on the floor. 2. Bring one knee to your chest, keeping the other foot flat on the floor (or keeping the other leg straight, whichever feels better on your lower back). 3. Keep your lower back pressed to the floor. Hold for at least 15 to 30 seconds. 4. Relax, and lower the knee to the starting position. 5. Repeat with the other leg. Repeat 2 to 4 times with each leg.   6. To get more stretch, put your other leg flat on the floor while pulling your knee to your chest.    Curl-ups   1. Lie on the floor on your back with your knees bent at a 90-degree angle. Your feet should be flat on the floor, about 12 inches from your buttocks. 2. Cross your arms over your chest. If this bothers your neck, try putting your hands behind your neck (not your head), with your elbows spread apart. 3. Slowly tighten your belly muscles and raise your shoulder blades off the floor. 4. Keep your head in line with your body, and do not press your chin to your chest.  5. Hold this position for 1 or 2 seconds, then slowly lower yourself back down to the floor. 6. Repeat 8 to 12 times. Pelvic tilt exercise   1. Lie on your back with your knees bent. 2. \"Brace\" your stomach. This means to tighten your muscles by pulling in and imagining your belly button moving toward your spine. You should feel like your back is pressing to the floor and your hips and pelvis are rocking back. 3. Hold for about 6 seconds while you breathe smoothly. 4. Repeat 8 to 12 times. Heel dig bridging   1. Lie on your back with both knees bent and your ankles bent so that only your heels are digging into the floor. Your knees should be bent about 90 degrees. 2. Then push your heels into the floor, squeeze your buttocks, and lift your hips off the floor until your shoulders, hips, and knees are all in a straight line. 3. Hold for about 6 seconds as you continue to breathe normally, and then slowly lower your hips back down to the floor and rest for up to 10 seconds. 4. Do 8 to 12 repetitions. Hamstring stretch in doorway   1. Lie on your back in a doorway, with one leg through the open door. 2. Slide your leg up the wall to straighten your knee. You should feel a gentle stretch down the back of your leg. 3. Hold the stretch for at least 15 to 30 seconds. Do not arch your back, point your toes, or bend either knee.  Keep one heel touching the floor and the other heel touching the wall. 4. Repeat with your other leg. 5. Do 2 to 4 times for each leg. Hip flexor stretch   1. Kneel on the floor with one knee bent and one leg behind you. Place your forward knee over your foot. Keep your other knee touching the floor. 2. Slowly push your hips forward until you feel a stretch in the upper thigh of your rear leg. 3. Hold the stretch for at least 15 to 30 seconds. Repeat with your other leg. 4. Do 2 to 4 times on each side. Wall sit   1. Stand with your back 10 to 12 inches away from a wall. 2. Lean into the wall until your back is flat against it. 3. Slowly slide down until your knees are slightly bent, pressing your lower back into the wall. 4. Hold for about 6 seconds, then slide back up the wall. 5. Repeat 8 to 12 times. Follow-up care is a key part of your treatment and safety. Be sure to make and go to all appointments, and call your doctor if you are having problems. It's also a good idea to know your test results and keep a list of the medicines you take. Where can you learn more? Go to http://yoselynBlue Interactive Groupmerline.info/  Enter Q612 in the search box to learn more about \"Low Back Pain: Exercises. \"  Current as of: March 2, 2020               Content Version: 12.6  © 9126-6313 [employer]. Care instructions adapted under license by [employer] (which disclaims liability or warranty for this information). If you have questions about a medical condition or this instruction, always ask your healthcare professional. [place] [address] any warranty or liability for your use of this information. Low Back Arthritis: Exercises  Introduction  Here are some examples of typical rehabilitation exercises for your condition. Start each exercise slowly. Ease off the exercise if you start to have pain.   Your doctor or physical therapist will tell you when you can start these exercises and which ones will work best for you. When you are not being active, find a comfortable position for rest. Some people are comfortable on the floor or a medium-firm bed with a small pillow under their head and another under their knees. Some people prefer to lie on their side with a pillow between their knees. Don't stay in one position for too long. Take short walks (10 to 20 minutes) every 2 to 3 hours. Avoid slopes, hills, and stairs until you feel better. Walk only distances you can manage without pain, especially leg pain. How to do the exercises  Pelvic tilt   1. Lie on your back with your knees bent. 2. \"Brace\" your stomach--tighten your muscles by pulling in and imagining your belly button moving toward your spine. 3. Press your lower back into the floor. You should feel your hips and pelvis rock back. 4. Hold for 6 seconds while breathing smoothly. 5. Relax and allow your pelvis and hips to rock forward. 6. Repeat 8 to 12 times. Back stretches   1. Get down on your hands and knees on the floor. 2. Relax your head and allow it to droop. Round your back up toward the ceiling until you feel a nice stretch in your upper, middle, and lower back. Hold this stretch for as long as it feels comfortable, or about 15 to 30 seconds. 3. Return to the starting position with a flat back while you are on your hands and knees. 4. Let your back sway by pressing your stomach toward the floor. Lift your buttocks toward the ceiling. 5. Hold this position for 15 to 30 seconds. 6. Repeat 2 to 4 times. Follow-up care is a key part of your treatment and safety. Be sure to make and go to all appointments, and call your doctor if you are having problems. It's also a good idea to know your test results and keep a list of the medicines you take. Where can you learn more? Go to http://www.EngTechNow.com/  Enter T094 in the search box to learn more about \"Low Back Arthritis: Exercises. \"  Current as of: March 2, 2020               Content Version: 12.6  © 3018-8071 [employer] Incorporated. Care instructions adapted under license by [employer] (which disclaims liability or warranty for this information). If you have questions about a medical condition or this instruction, always ask your healthcare professional. [address] any warranty or liability for your use of this information. Sciatica: Exercises  Introduction  Here are some examples of typical rehabilitation exercises for your condition. Start each exercise slowly. Ease off the exercise if you start to have pain. Your doctor or physical therapist will tell you when you can start these exercises and which ones will work best for you. When you are not being active, find a comfortable position for rest. Some people are comfortable on the floor or a medium-firm bed with a small pillow under their head and another under their knees. Some people prefer to lie on their side with a pillow between their knees. Don't stay in one position for too long. Take short walks (10 to 20 minutes) every 2 to 3 hours. Avoid slopes, hills, and stairs until you feel better. Walk only distances you can manage without pain, especially leg pain. How to do the exercises  Back stretches   7. Get down on your hands and knees on the floor. 8. Relax your head and allow it to droop. Round your back up toward the ceiling until you feel a nice stretch in your upper, middle, and lower back. Hold this stretch for as long as it feels comfortable, or about 15 to 30 seconds. 9. Return to the starting position with a flat back while you are on your hands and knees. 10. Let your back sway by pressing your stomach toward the floor. Lift your buttocks toward the ceiling. 11. Hold this position for 15 to 30 seconds. 12. Repeat 2 to 4 times. Follow-up care is a key part of your treatment and safety.  Be sure to make and go to all appointments, and call your doctor if you are having problems. It's also a good idea to know your test results and keep a list of the medicines you take. Where can you learn more? Go to http://www.gray.com/  Enter W907 in the search box to learn more about \"Sciatica: Exercises. \"  Current as of: March 2, 2020               Content Version: 12.6  © 6751-4364 [employer]. Care instructions adapted under license by [employer] (which disclaims liability or warranty for this information). If you have questions about a medical condition or this instruction, always ask your healthcare professional. [place] [address] any warranty or liability for your use of this information.

## 2020-10-05 PROBLEM — M51.36 DDD (DEGENERATIVE DISC DISEASE), LUMBAR: Status: ACTIVE | Noted: 2020-10-05

## 2020-10-05 PROBLEM — M43.16 SPONDYLOLISTHESIS, LUMBAR REGION: Status: ACTIVE | Noted: 2020-10-05

## 2020-10-05 NOTE — PROGRESS NOTES
HPI:   Mario Watt is a 72y.o. year old female who presents today for an acute visit. She has a history of hypertension, hyperlipidemia, paroxysmal SVT, diabetes mellitus, GERD, asthma, elevated transaminases, and atypical mycobacterial pneumonia. She reports that she developed acute bilateral low back pain on 9/28/2020, and is unclear as to the inciting factor. She does report several activities over the weekend which could have triggered it including lifting draws while cleaning out a room, riding in a [employer] in [place] where the  drove over railroad tracks, and riding a bicycle outside on Monday. She describes pain into her right buttock and posterior thigh, and reports difficulty ambulating so that she has started using a cane. She denies any fevers, chills, weight change, saddle paresthesia, neurogenic bowel or bladder symptoms, or recent trauma. She reports that she developed similar symptoms several months ago and was evaluated at [facility]. She states she was prescribed etodolac and Robaxin and restarted taking them this week due to the pain. She also reports using a heating pad and states that she has now developed a small painless blister on her right buttock. .She also reports that her intermittent loose stools increased, and she stopped taking metformin over the last several week. She states that the diarrhea does improve with Imodium. She denies any abdominal pain, nausea, vomiting, melena, or hematochezia. She is otherwise without new complaints. She has a history of hypertension, treated with metoprolol and hydrochlorothiazide (+ potassium). She reports that she does not check her blood pressure at home. She does not exercise regularly, but denies any chest pain, shortness of breath at rest or with exertion, lightheadedness, or edema. 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  She has had approximately six severe episodes over the years, prompting presentation to the ED and treatment with IV Adenosine. She currently reports infrequent short episodes of palpitations and is being treated with metoprolol. She is followed by Dr. Norris Mcneil. She had an echocardiogram (10/2005) showing normal LV size and function (EF 60-65%), and no valvular pathology. In 11/2012, she underwent an exercise stress echocardiogram, which was normal at maximal exercise. She has a history of hyperlipidemia, treated with simvastatin from 10/2012 to 3/2015 at which time she stopped taking it due to myalgias. She restarted it on 8/2015 and continued to take it without difficulty until 3/2016, when it was noticed that she had transaminase elevation (AST 85/ ) and it was discontinued. Evaluation included hepatitis A, B, and C levels (negative), iron panel (normal), and RUQ ultrasound (3/23/2016) with limited sonographic window for the liver; only partially visualized but grossly unremarkable. Repeat hepatic panel (4/22/2016) showed AST 75 and ALT 98. Repeat lipid panel showed total chol 215/ / HDL 64/. In 5/2016, she had an abdominal CT scan showing the liver to be normal in size with normal parenchymal density; no discrete mass or ascites, and no intrahepatic biliary dilatation. She was subsequently instructed to restart simvastatin, but chose not to since she felt it was making her forgetful. She agreed to trial of rosuvastatin 10 mg and started it in 2/2018. However, after two weeks of therapy, she discontinued it since she thought it was causing her leg pain. She subsequently contacted Dr. Maria Teresa Kaba office and asked to begin simvastatin 20 mg daily in 4/2018. She has been taking it without difficulty since restarting. She has a history of diabetes mellitus, with impaired fasting glucose ranging from 103-111 since 2012, and HbA1c to 6.6-6.8 since 9/2016 (not checked previously).  She was prescribed metformin ER last visit for an increase in her HbA1c to 7.1, but she reports that she took it for only one week and discontinued for unclear reasons. She was not experiencing any side effects. She denies any polyuria, polydipsia, nocturia, or blurry vision, and has no history of retinopathy, neuropathy, or nephropathy. She has regular eye exams with Dr. Radha Delgadillo. She has a history of asthma and allergic rhinitis and is followed by Dr. Lynda Peterson. She is receiving immunotherapy once per month, and reports that she has not required any inhalers recently. She states that she does not use Qvar daily, but will take it occasionally. She does use Claritin every day. In 7/2019, she reported increasing difficulty with right ear fullness, post nasal drainage, hoarseness, and cough, and was referred to Dr. Damaso Hernandez. He performed a nasal laryngoscopy and found evidence of laryngopharyngeal reflux. She was started on daily omeprazole, and she states that she has noticed some improvement. In 10/2017, she fell down the steps of her porch and had difficulty with right knee pain and swelling. She was evaluated by Dr. Zane Wise in 12/2017, and right knee xray showed decreased medial joint space, and moderate degenerative changes. She received a cortisone injection, but did not notice any improvement. She underwent an MRI of the right knee (1/29/2018) which showed complete tear of posterior horn and root of the medial meniscus; tear of the body and posterior horn of the lateral meniscus; tricompartmental osteoarthritis most prominent in medial and patellofemoral compartments; moderate joint effusion; small Baker's cyst. It was recommended that she consider knee replacement and arthroscopy. However, she decided to seek a second opinion and was evaluated by Dr. Rosy Vargas. She also underwent an MRI of her left knee (3/23/2018) showing radial tear posterior horn medial meniscus with extrusion of the body.  Also a radial tear in the mid body; lateral meniscus intrasubstance degeneration and probable small undersurface tear of the posterior horn; medial and patellofemoral compartments moderate chondral loss; s. ubchondral bone marrow edema in the medial tibial plateau, likely reactive. She is completed physical therapy for her bilateral knees and feels that it may have helped. 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 She was treated with Avelox, and repeat chest x-ray in 1/2012 showed complete resolution of the pneumonia. She denies any cough or shortness of breath. She had a screening colonoscopy in 12/2006 by Dr. Kena Rivera showing a 5 mm sessile polyp in the rectum (pathology: hyperplastic). She had a repeat colonoscopy in 12/2016 which showed moderate sigmoid diverticulosis and a 6 mm sessile ascending colon polyp (pathology: serrated adenoma). Follow-up recommended for 5 years. She denies any abdominal pain, nausea, vomiting, melena, hematochezia, or change in bowel movements. She does take omeprazole occasionally for GERD symptoms. In 12/2017, she was referred by her gynecologist for evaluation by Dr. Jordyn Tom for microscopic hematuria, and urine cytology was negative. However, she states that she refused his recommendations to undergo a CT urogram or cystoscopy. She denies any dysuria, gross hematuria, or flank pain. Past Medical History:   Diagnosis Date    Allergic rhinitis     Asthma     Cardiac stress echo, normal 11/02/2012    Normal maximal stress echo study. EF 60%. Ex time 9 min 45 sec. 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     Hyperlipidemia     Hypertension     Menopause     Plantar fasciitis     left    PSVT (paroxysmal supraventricular tachycardia) (HCC)     A-V dharmesh reentrant tachycardia     Past Surgical History:   Procedure Laterality Date    ENDOSCOPY, COLON, DIAGNOSTIC      polyp    HX CERVICAL POLYPECTOMY      HX CYST INCISION AND DRAINAGE Right 10 or more years    HX DILATION AND CURETTAGE      HX GYN      polyp on cervix    HX POLYPECTOMY      from rectum     Current Outpatient Medications   Medication Sig    SITagliptin (JANUVIA) 100 mg tablet Take 1 Tab by mouth daily.  etodolac (LODINE) 500 mg tablet Take 1 Tab by mouth two (2) times daily as needed for Pain. Indications: low back pain with right sciatica    methocarbamoL (ROBAXIN) 500 mg tablet Take 1 Tab by mouth four (4) times daily as needed for Muscle Spasm(s).  loratadine (Claritin) 10 mg tablet Take 1 Tab by mouth daily.  albuterol (PROVENTIL HFA, VENTOLIN HFA, PROAIR HFA) 90 mcg/actuation inhaler inhale 2 puffs by mouth every 4 hours if needed for wheezing    LORazepam (ATIVAN) 1 mg tablet Take 1 Tab by mouth every eight (8) hours as needed for Anxiety. Max Daily Amount: 3 mg.  losartan (COZAAR) 25 mg tablet take 1 tablet by mouth once daily    potassium chloride (K-DUR, KLOR-CON) 20 mEq tablet take 1 tablet by mouth once daily    metoprolol succinate (TOPROL-XL) 50 mg XL tablet take 1 tablet by mouth once daily    fluticasone propionate (FLONASE) 50 mcg/actuation nasal spray instill 2 sprays into each nostril once daily    hydroCHLOROthiazide (HYDRODIURIL) 12.5 mg tablet take 1 tablet by mouth once daily    clotrimazole-betamethasone (LOTRISONE) topical cream Apply  to both ear canals and affected part of outer ear twice a day with a finger.  cholecalciferol (VITAMIN D3) 1,000 unit cap Take 1,000 Units by mouth daily.  simvastatin (ZOCOR) 20 mg tablet take 1 tablet by mouth at bedtime    carboxymethylcellulose sodium (REFRESH LIQUIGEL) 1 % dlgl ophthalmic solution Apply  to eye. No current facility-administered medications for this visit. Allergies and Intolerances: Allergies   Allergen Reactions    Altace [Ramipril] Cough    Penicillins Other (comments)     Hands peel    Sulfur Itching     Family History: She had two aunts who had breast cancer (her mother's sister and father's sister). She has no FH of colon cancer. Her mother passed away from uterine cancer. Her father  from metastatic prostate cancer. Family History   Problem Relation Age of Onset   Byron Rodgers Arthritis-osteo Mother     Hypertension Mother              Cancer Father         bone cancer    Hypertension Sister     Hypertension Sister     Hypertension Sister     Breast Cancer Maternal Aunt     Breast Cancer Paternal Aunt     Diabetes Other     Stroke Other     Other Sister         twin sister - osteopenia and low vitamin D levels     Social History:   She  reports that she has never smoked. She has never used smokeless tobacco. She is  and has two sons. She was a homemaker, but worked part-time in . She now helps care for her grandchildren. Social History     Substance and Sexual Activity   Alcohol Use No     Immunization History:  Immunization History   Administered Date(s) Administered    Influenza Vaccine [employer]) PF (>6 Mo Flulaval, Fluarix, and >3 Yrs Afluria, Fluzone 63069) 10/23/2015, 10/19/2016, 10/05/2017, 10/26/2018, 10/08/2019    Influenza Vaccine PF 2013, 10/03/2014    Influenza Vaccine Split 2011, 10/22/2012    Influenza Vaccine Whole 10/29/2010    PPD 2012    Pneumococcal Conjugate (PCV-13) 2020    Pneumococcal Polysaccharide (PPSV-23) 2017    TB Skin Test (PPD) Intradermal 2014    TDAP Vaccine 2012    Zoster Recombinant 2020       Review of Systems:   As above included in HPI.   Otherwise 11 point review of systems negative including constitutional, skin, HENT, eyes, respiratory, cardiovascular, gastrointestinal, genitourinary, musculoskeletal, endocrine, hematologic, allergy, and neurologic. Physical:   Vitals:   BP: 134/70  HR: 70  WT: 180 lb (81.6 kg)  BMI:  32.92 kg/m2    Exam:   Patient appears in no apparent distress. Affect is appropriate. HEENT: PERRLA, anicteric, oropharynx clear, no JVD, adenopathy or thyromegaly. No carotid bruits or radiated murmur. Lungs: clear to auscultation, no wheezes, rhonchi, or rales. Heart: regular rate and rhythm. No murmur, rubs, gallops  Abdomen: soft, nontender, nondistended, normal bowel sounds, no hepatosplenomegaly or masses. Extremities: without edema. Back: no spinal tenderness; tender to palpation over paraspinal muscles. Negative straight leg raises. Small circular 2 cm blister with erythema on right buttocks. Review of Data:  Labs:  No visits with results within 1 Month(s) from this visit.    Latest known visit with results is:   Hospital Outpatient Visit on 08/06/2020   Component Date Value Ref Range Status    Hemoglobin A1c 08/06/2020 6.4* 4.2 - 5.6 % Final    Est. average glucose 08/06/2020 137  mg/dL Final    LIPID PROFILE 08/06/2020        Final    Cholesterol, total 08/06/2020 168  <200 MG/DL Final    Triglyceride 08/06/2020 136  <150 MG/DL Final    HDL Cholesterol 08/06/2020 72* 40 - 60 MG/DL Final    LDL, calculated 08/06/2020 68.8  0 - 100 MG/DL Final    VLDL, calculated 08/06/2020 27.2  MG/DL Final    CHOL/HDL Ratio 08/06/2020 2.3  0 - 5.0   Final    Magnesium 08/06/2020 1.6  1.6 - 2.6 mg/dL Final    Sodium 08/06/2020 136  136 - 145 mmol/L Final    Potassium 08/06/2020 3.7  3.5 - 5.5 mmol/L Final    Chloride 08/06/2020 100  100 - 111 mmol/L Final    CO2 08/06/2020 30  21 - 32 mmol/L Final    Anion gap 08/06/2020 6  3.0 - 18 mmol/L Final    Glucose 08/06/2020 113* 74 - 99 mg/dL Final    BUN 08/06/2020 12  7.0 - 18 MG/DL Final    Creatinine 08/06/2020 0.78  0.6 - 1.3 MG/DL Final    BUN/Creatinine ratio 08/06/2020 15  12 - 20   Final    GFR est AA 08/06/2020 >60  >60 ml/min/1.73m2 Final    GFR est non-AA 08/06/2020 >60  >60 ml/min/1.73m2 Final    Calcium 08/06/2020 9.1  8.5 - 10.1 MG/DL Final    Bilirubin, total 08/06/2020 0.5  0.2 - 1.0 MG/DL Final    ALT (SGPT) 08/06/2020 51  13 - 56 U/L Final    AST (SGOT) 08/06/2020 33  10 - 38 U/L Final    Alk. phosphatase 08/06/2020 80  45 - 117 U/L Final    Protein, total 08/06/2020 7.4  6.4 - 8.2 g/dL Final    Albumin 08/06/2020 3.9  3.4 - 5.0 g/dL Final    Globulin 08/06/2020 3.5  2.0 - 4.0 g/dL Final    A-G Ratio 08/06/2020 1.1  0.8 - 1.7   Final    Microalbumin,urine random 08/06/2020 0.74  0 - 3.0 MG/DL Final    Creatinine, urine 08/06/2020 75.00  30 - 125 mg/dL Final    Microalbumin/Creat ratio (mg/g cre* 08/06/2020 10  0 - 30 mg/g Final     Health Maintenance:  Screening:    Mammogram: negative (11/2019). PAP smear: negative (3/2020) with negative HPV. Followed by Dr. Mariana Telles. Colorectal: colonoscopy (12/2016) serrated adenoma. Dr. Rohith Saba. Due 12/2021. Depression: none   DM (HbA1c/FPG): HbA1c 6.4 (8/2020)   Hepatitis C: negative (3/2016)   Falls: one   DEXA: within normal limits (11/2015)   Glaucoma: regular eye exams with Dr. Zulema Christiansen (last 9/2019)   Smoking: none   Vitamin D: 32.2 (10/2019)    Medicare Wellness: 8/13/2020  (Welcome)      Impression:  Patient Active Problem List   Diagnosis Code    Benign hypertensive heart disease without congestive heart failure I11.9    Allergic rhinitis J30.9    Colon polyps K63.5    AVNRT (AV dharmesh re-entry tachycardia) ([facility] [address] I47.1    Hyperlipidemia E78.5    Essential hypertension I10    Asthma J45.909    GERD (gastroesophageal reflux disease) K21.9    Type 2 diabetes mellitus without complication, without long-term current use of insulin (HCC) E11.9    Vitamin D insufficiency E55.9    Microscopic hematuria R31.29    Chronic pain of both knees M25.561, M25.562, G89.29    Obesity (BMI 30.0-34. 9) E66.9    Noncompliance Z91.19    Anxiety F41.9    Spondylolisthesis, lumbar region M43.16    DDD (degenerative disc disease), lumbar M51.36       Plan:  Right sciatica. Patient reports severe low back pain and right sciatica for several days. She is unclear as to inciting incident. Denies lower extremity weakness or paresthesias. Reports pain radiating to right posterior thigh. Began taking Etodolac and methocarbamol as prescribed by [facility] several months ago for similar symptoms, and reports helpful in controlling discomfort. Advised to continue and refills provided. Will obtain lumbar spine x-ray and refer to physical therapy. Sustained small burn on right buttocks related to heating pad and advised local wound care and using only with towel while awake. Advised to begin stretches while awaiting physical therapy. Other issues:  1. Diabetes mellitus. Diagnosed in 9/2016 when HbA1c was checked and found to be elevated at 6.6. Had been steadily increasing and reached 7.5 in 4/2020. Impaired fasting glucose had been present intermittently since 2012. Being treated with metformin  mg at dinner, but stopped taking it 3 weeks ago due to diarrhea. Will begin alternate treatment with sitagliptin 100 mg daily. No evidence of microvascular complications. On statin. Started on losartan, and now taking consistently. Blood pressure controlled today. Stressed importance of compliance. Continue regular eye exams with Dr. Poncho Arnold. Foot exam normal today. Urine microalbumin/ creatinine ratio remains without evidence of microalbuminuria. 2. Hypertension. Blood pressure with improved control today on losartan 25 mg daily in addition to her current regimen of metoprolol XL 50 mg daily and hydrochlorothiazide 12.5 mg daily. Renal function has been normal with creatinine 0.78/ eGFR >60 in 8/2020. Continue to follow. 3. Hyperlipidemia. On moderate intensity dose simvastatin 20 mg daily with LDL 68 and HDL 72 (8/2020), indicative of good control. Continue to follow. 4. Elevated LFT's. Resolved.  Unclear etiology, but doubt secondary to statin. Hepatitis panel and iron studies normal. RUQ ultrasound difficult secondary to body habitus, but abdominal CT scan showed normal liver parenchyma. Will continue to follow. 5. AV dharmesh reentrant tachycardia. No recent episodes. On metoprolol and no significant palpitations recently. Followed by Dr. Juan Reeder. 6. Asthma, mild persistent. Associated with allergies. Receiving monthly immunotherapy injections with Dr. Jerrell Lindsay and states that generally stable. Noting some increase in allergy symptoms and advised to begin Claritin. 7. GERD/ Laryngopharyngeal reflux. Evaluated by Dr. Lisa Gee, and noted on laryngoscopic exam. Started on omeprazole daily but states that decided to discontinue as did not help with cough. Follow. 8. Microscopic hematuria. Patient refusing further evaluation with cystoscopy or CT urogram. Urine cytology negative. Did have abdominal CT scan in 5/2016 where kidneys appeared normal. Recommended that she complete evaluation with Dr. Abiodun Rico. Repeat urinalysis with evidence small blood and 3-5 RBCs in 10/2019.    9. Bilateral knee pain. Did not respond to cortisone injection. Had both right and left knee MRI showing variable degrees of menisci tears and osteoarthritis of knee joint. Now being followed by Dr. Aline Mota and reports relatively quiescent. 10. Shingles. Diagnosed in 4/2020 by Dr. Kalyani Peterson. Treated with Valtrex and patient reports mild symptoms. Urged to proceed with Shingrix vaccine. 11. Left breast pain and nodules. Evaluated for new onset left breast pain and tenderness in 10/2019. Positive family history for breast cancer in a maternal aunt in her 46s and in a paternal aunt. Underwent bilateral mammogram and left breast ultrasound, and no abnormalities were found. Reports resolution of discomfort. Will continue with annual screening. 12. Obesity. Weight stable since last visit.  Emphasized importance of lifestyle modifications, including diet, exercise, and weight loss. Will readdress next visit. 13. Health maintenance. Wishing to wait and obtain influenza vaccine at next visit. Already received script for Shingrix vaccine and advised to proceed. Given Pneumovax in 3/2017 given asthma history. Received Prevnar 13 and will need repeat Pneumovax 23 in 3/2022. Colonoscopy due 2021. Mammogram as above. Continue regular eye exams with Dr. Mei Godoy. Vitamin D level normal. Continue maintenance dose supplement. Welcome to Medicare visit completed. Patient understands recommendations and agrees with plan. Follow-up as previously scheduled. Addendum    XR Results (most recent):  Results from Hospital Encounter encounter on 10/01/20   XR SPINE LUMB 2 OR 3 V    Narrative History: Pain. TECHNIQUE: 3 views lumbar spine. COMPARISON: CT May 13, 2016    FINDINGS:    Mild levorotoscoliosis and loss of lumbar lordosis. Findings unchanged. Multilevel spondylolisthesis grossly similar to the reference CT as well. There  is trace retrolisthesis of L2 on L3, L3 on L4 and L4 on L5. 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 Moderate/severe multilevel facet arthropathy again noted most pronounced within  the lower lumbar levels. Moderate to severe multilevel degenerative disc disease noted throughout. Degenerative findings are unchanged. Prominent sclerotic bone island again noted  along the superior posterior endplate of M07, unchanged. 4 mm well corticated ossific density just posterior and inferior to the L5  spinous process, likely remote injury or degenerative change. Trace retrolisthesis also noted coned down images of L5 on S1. Sacroiliac joints appear symmetric. Mild degenerative changes of the left hip joint noted.       Impression IMPRESSION:    Multilevel degenerative findings, trace levorotoscoliosis and multilevel  spondylolisthesis very similar to reference CT abdomen of May 13, 2016. Please see report for full details. MRI may be obtained for further evaluation. Discussed x-ray results with patient and discussed proceeding with lumbar MRI, but patient wishing to attend physical therapy to see if it will help with her discomfort. If no improvement, then will proceed with MRI. Referral placed for [facility]. Diana Collins

## 2020-10-14 ENCOUNTER — HOSPITAL ENCOUNTER (OUTPATIENT)
Dept: PHYSICAL THERAPY | Age: 65
Discharge: HOME | End: 2020-10-14
Payer: MEDICARE

## 2020-10-14 DIAGNOSIS — M54.41 ACUTE RIGHT-SIDED LOW BACK PAIN WITH RIGHT-SIDED SCIATICA: ICD-10-CM

## 2020-10-14 PROCEDURE — 97110 THERAPEUTIC EXERCISES: CPT

## 2020-10-14 PROCEDURE — 97161 PT EVAL LOW COMPLEX 20 MIN: CPT

## 2020-10-14 NOTE — PROGRESS NOTES
[facility] [employer]  [address] [place] [address]  [phone] fax    Plan of Care/ Statement of Necessity for Physical Therapy Services    Patient name: Radha Ordaz Start of Care: 10/14/2020   Referral source: Jean Paul Thomas MD : 1955    Medical Diagnosis: Acute right-sided low back pain with right-sided sciatica [M54.41]  Payor: [place] MEDICARE / Plan: VA MEDICARE PART A & B / Product Type: Medicare /  Onset Date:10/2020    Treatment Diagnosis: LBP with Sciatica   Prior Hospitalization: see medical history Provider#: [id number]   Medications: Verified on Patient summary List    Comorbidities: Arthritis, Asthma, HTN   Prior Level of Function: Likes to bike ride and watches her grand daughter during the week. The Plan of Care and following information is based on the information from the initial evaluation. Assessment/ key information: Pt is a 72 yr old female with CC of LBP that started 10/20 after she went bike riding around her neighborhood. She reports the sx are better now and pain levels at 2/10. She no longer requires her cane for ambulation. Pain radiates into her right buttock and up to her mid thigh. She has most difficulty standing up from sitting, prolonged standing and sitting. Pt demonstrates decreased LE flexibility and TTP to right HS as well as IT band bilaterally. Pt will benefit from skilled Therapy to improve LS function, decrease pain and improve QOL. Evaluation Complexity History MEDIUM  Complexity : 1-2 comorbidities / personal factors will impact the outcome/ POC ; Examination MEDIUM Complexity : 3 Standardized tests and measures addressing body structure, function, activity limitation and / or participation in recreation  ;Presentation MEDIUM Complexity : Evolving with changing characteristics  ; Clinical Decision Making MEDIUM Complexity : FOTO score of 26-74  Overall Complexity Rating: MEDIUM  Problem List: pain affecting function, decrease ROM, decrease strength, edema affecting function, impaired gait/ balance, decrease ADL/ functional abilitiies, decrease activity tolerance, decrease flexibility/ joint mobility and decrease transfer abilities   Treatment Plan may include any combination of the following: Therapeutic exercise, Therapeutic activities, Neuromuscular re-education, Physical agent/modality, Gait/balance training, Manual therapy, Patient education, Self Care training, Functional mobility training, Home safety training and Stair training  Patient / Family readiness to learn indicated by: asking questions, trying to perform skills and interest  Persons(s) to be included in education: patient (P)  Barriers to Learning/Limitations: None  Patient Goal (s): to get up and walk  Patient Self Reported Health Status: good  Rehabilitation Potential: good    Short Term Goals: To be accomplished in 1 weeks:   1. PT will be compliant with ball massage and stretches from Evaluation to ease with LS pain. Long Term Goals: To be accomplished in 4 weeks:   1. Pt will increase fOTO score by 7 pts to improve LS function. 2, Pt will report >70% improvement in sx to return to ADL's and hobbies. 3. Pt will report <3/10 pain at worst during light activities. Frequency / Duration: Patient to be seen 2 times per week for 4 weeks. Patient/ CarPatient/ Caregiver education and instruction: Diagnosis, prognosis, self care and exercises   [x]  Plan of care has been reviewed with ROSEMARY Pham, PT 10/14/2020 12:05 PM    ________________________________________________________________________    I certify that the above Therapy Services are being furnished while the patient is under my care. I agree with the treatment plan and certify that this therapy is necessary.     [de-identified] Signature:____________Date:_________TIME:________    [employer], Date and Time must be completed for valid certification **    Please sign and return to [facility] - [facility] [employer]  [address] [facility]  [phone] fax

## 2020-10-14 NOTE — PROGRESS NOTES
PT DAILY TREATMENT NOTE 10-18    Patient Name: Hari Cross  Date:10/14/2020  : 1955  [x]  Patient  Verified  Payor: VA MEDICARE / Plan: VA MEDICARE PART A & B / Product Type: Medicare /    In time:1125  Out time:1205  Total Treatment Time (min): 40  Visit #: 1 of     Medicare/BCBS Only   Total Timed Codes (min):  40 1:1 Treatment Time:  25       Treatment Area: Acute right-sided low back pain with right-sided sciatica [M54.41]    SUBJECTIVE  Pain Level (0-10 scale): 2/10  Any medication changes, allergies to medications, adverse drug reactions, diagnosis change, or new procedure performed?: [x] No    [] Yes (see summary sheet for update)  Subjective functional status/changes:   [] No changes reported  See eval    OBJECTIVE    Modality rationale:  to improve the patients ability to    Min Type Additional Details    [] Estim:  []Unatt       []IFC  []Premod                        []Other:  []w/ice   []w/heat  Position:  Location:    [] Estim: []Att    []TENS instruct  []NMES                    []Other:  []w/US   []w/ice   []w/heat  Position:  Location:    []  Traction: [] Cervical       []Lumbar                       [] Prone          []Supine                       []Intermittent   []Continuous Lbs:  [] before manual  [] after manual    []  Ultrasound: []Continuous   [] Pulsed                           []1MHz   []3MHz W/cm2:  Location:    []  Iontophoresis with dexamethasone         Location: [] Take home patch   [] In clinic    []  Ice     []  heat  []  Ice massage  []  Laser   []  Anodyne Position:  Location:    []  Laser with stim  []  Other:  Position:  Location:    []  Vasopneumatic Device Pressure:       [] lo [] med [] hi   Temperature: [] lo [] med [] hi   [] Skin assessment post-treatment:  []intact []redness- no adverse reaction    []redness - adverse reaction:     20 min []Eval                  []Re-Eval       25 min Therapeutic Exercise:  [] See flow sheet :   Rationale: increase ROM, increase strength and improve coordination to improve the patients ability to ease with ADL's     With   [] TE   [] TA   [] neuro   [] other: Patient Education: [x] Review HEP    [] Progressed/Changed HEP based on:   [] positioning   [] body mechanics   [] transfers   [] heat/ice application    [] other:      Other Objective/Functional Measures: see eval     Pain Level (0-10 scale) post treatment: 2/10    ASSESSMENT/Changes in Function: see eval    Patient will continue to benefit from skilled PT services to modify and progress therapeutic interventions, address functional mobility deficits, address ROM deficits, address strength deficits, analyze and address soft tissue restrictions, analyze and cue movement patterns, analyze and modify body mechanics/ergonomics, assess and modify postural abnormalities and address imbalance/dizziness to attain remaining goals.      [x]  See Plan of Care  []  See progress note/recertification  []  See Discharge Summary         Progress towards goals / Updated goals:  seepoc    PLAN  [x]  Upgrade activities as tolerated     [x]  Continue plan of care  []  Update interventions per flow sheet       []  Discharge due to:_  []  Other:_      Charlene Henriquez, PT 10/14/2020  10:16 AM    Future Appointments   Date Time Provider Ankit Feli   10/14/2020 11:15 AM Alyn Cheadle, PT [facility]   11/2/2020 10:20 AM Kenn Jacobs DO [facility] BS AMB   11/12/2020 11:05 AM [facility] NURSE VISIT [facility] BS AMB   11/17/2020 12:00 PM Lisa Lamar MD [facility] BS AMB

## 2020-10-15 RX ORDER — SIMVASTATIN 20 MG/1
TABLET, FILM COATED ORAL
Qty: 30 TAB | Refills: 6 | Status: SHIPPED | OUTPATIENT
Start: 2020-10-15 | End: 2021-07-20 | Stop reason: SDUPTHER

## 2020-10-16 ENCOUNTER — HOSPITAL ENCOUNTER (OUTPATIENT)
Dept: PHYSICAL THERAPY | Age: 65
Discharge: HOME | End: 2020-10-16
Payer: MEDICARE

## 2020-10-16 PROCEDURE — 97110 THERAPEUTIC EXERCISES: CPT

## 2020-10-16 PROCEDURE — 97112 NEUROMUSCULAR REEDUCATION: CPT

## 2020-10-16 NOTE — PROGRESS NOTES
PT DAILY TREATMENT NOTE 10-18    Patient Name: Juan Miguel Walters  Date:10/16/2020  : 1955  [x]  Patient  Verified  Payor: VA MEDICARE / Plan: VA MEDICARE PART A & B / Product Type: Medicare /    In time: 2:21  Out time:3:01  Total Treatment Time (min): 40  Visit #: 2 of     Medicare/BCBS Only   Total Timed Codes (min):  40 1:1 Treatment Time:  40       Treatment Area: Right-sided low back pain with bilateral sciatica [M54.41, M54.42]    SUBJECTIVE  Pain Level (0-10 scale): 4  Any medication changes, allergies to medications, adverse drug reactions, diagnosis change, or new procedure performed?: [x] No    [] Yes (see summary sheet for update)  Subjective functional status/changes:   [] No changes reported  Pt reports her pain is not too bad today. She states her pain is getting better slowly. OBJECTIVE    12 min Therapeutic Exercise:  [x] See flow sheet :   Rationale: increase ROM and increase strength to improve the patients ability to tolerate ADLs    28 min Neuromuscular Re-education:  X  See flow sheet : core stability and re-education exercises    Rationale: increase strength, improve coordination and increase proprioception  to improve the patients ability to tolerate functional tasks. With   [] TE   [] TA   [] neuro   [] other: Patient Education: [x] Review HEP    [] Progressed/Changed HEP based on:   [] positioning   [] body mechanics   [] transfers   [] heat/ice application    [] other:      Other Objective/Functional Measures: Initiated exercises/interventions in flow sheet. Pelvic alignment and leg length WNL today. Unable to perform SB#1 without holding her breath and accessory motion from the chest. Less accessory motion noted with TA contraction. Tends to lend backward with standing exercises. Pain Level (0-10 scale) post treatment: 6 stiffness    ASSESSMENT/Changes in Function: Reported increased stiffness post session today. Needs cues for proper form of exercises.  Pt demonstrates limitations with core strength/stability and poor postural awareness. Continue POC as tolerated. Patient will continue to benefit from skilled PT services to modify and progress therapeutic interventions, address functional mobility deficits, address ROM deficits, address strength deficits, analyze and address soft tissue restrictions, analyze and cue movement patterns, analyze and modify body mechanics/ergonomics, assess and modify postural abnormalities, address imbalance/dizziness and instruct in home and community integration to attain remaining goals. []  See Plan of Care  []  See progress note/recertification  []  See Discharge Summary         Progress towards goals / Updated goals:  Short Term Goals: To be accomplished in 1 weeks:               1. PT will be compliant with ball massage and stretches from Evaluation to ease with LS pain. Long Term Goals: To be accomplished in 4 weeks:               1. Pt will increase fOTO score by 7 pts to improve LS function. 2, Pt will report >70% improvement in sx to return to ADL's and hobbies. 3. Pt will report <3/10 pain at worst during light activities.     Current: reports improvements in overall pain 10/16/2020    PLAN  [x]  Upgrade activities as tolerated     [x]  Continue plan of care  [x]  Update interventions per flow sheet       []  Discharge due to:_  []  Other:_      Mirian Aguilar, PT 10/16/2020  2:25 PM    Future Appointments   Date Time Provider Ankit Edmond   10/22/2020  4:30 PM Katia Rendon, PT [facility]   10/26/2020  6:00 PM Katia Brine, PT [facility]   10/28/2020  4:30 PM Katia Rendon, PT [facility]   11/2/2020 10:20 AM DO GOYO Ba [facility] BS AMB   11/2/2020  5:15 PM Katia Rendon, PT [facility]   11/4/2020  5:15 PM Sanket Jeronimo, PT [facility]   11/12/2020 11:05 AM [facility] NURSE VISIT [facility] BS AMB   11/17/2020 12:00 PM Wilma Mcdowell MD [facility] BS AMB

## 2020-10-21 ENCOUNTER — TRANSCRIBE ORDER (OUTPATIENT)
Dept: SCHEDULING | Age: 65
End: 2020-10-21

## 2020-10-21 ENCOUNTER — TELEPHONE (OUTPATIENT)
Dept: INTERNAL MEDICINE CLINIC | Age: 65
End: 2020-10-21

## 2020-10-21 DIAGNOSIS — Z12.31 VISIT FOR SCREENING MAMMOGRAM: Primary | ICD-10-CM

## 2020-10-21 RX ORDER — GLIPIZIDE 5 MG/1
5 TABLET, FILM COATED, EXTENDED RELEASE ORAL DAILY
Qty: 90 TAB | Refills: 2 | Status: SHIPPED | OUTPATIENT
Start: 2020-10-21 | End: 2021-03-07 | Stop reason: SDDI

## 2020-10-21 NOTE — TELEPHONE ENCOUNTER
Patient stating she did not get the Januvia because it is too expensive. Is there a cheaper alternative?

## 2020-10-22 ENCOUNTER — HOSPITAL ENCOUNTER (OUTPATIENT)
Dept: PHYSICAL THERAPY | Age: 65
Discharge: HOME | End: 2020-10-22
Payer: MEDICARE

## 2020-10-22 PROCEDURE — 97140 MANUAL THERAPY 1/> REGIONS: CPT

## 2020-10-22 PROCEDURE — 97110 THERAPEUTIC EXERCISES: CPT

## 2020-10-22 PROCEDURE — 97112 NEUROMUSCULAR REEDUCATION: CPT

## 2020-10-22 NOTE — PROGRESS NOTES
PT DAILY TREATMENT NOTE 10-18    Patient Name: Arelis Howell  Date:10/22/2020  : 1955  [x]  Patient  Verified  Payor: VA MEDICARE / Plan: VA MEDICARE PART A & B / Product Type: Medicare /    In time: 4:33  Out time: 5:25  Total Treatment Time (min): 52  Visit #: 3 of -    Medicare/BCBS Only   Total Timed Codes (min): 42 1:1 Treatment Time: 38       Treatment Area: Right-sided low back pain with bilateral sciatica [M54.41, M54.42]    SUBJECTIVE  Pain Level (0-10 scale): 6  Any medication changes, allergies to medications, adverse drug reactions, diagnosis change, or new procedure performed?: [x] No    [] Yes (see summary sheet for update)  Subjective functional status/changes:   [] No changes reported  Pt reports having some right sided mid and low back today. She states she wonders if she needs a new mattress.      OBJECTIVE    Modality rationale: decrease pain and increase tissue extensibility to improve the patients ability to tolerate ADLs   Min Type Additional Details    [] Estim:  []Unatt       []IFC  []Premod                        []Other:  []w/ice   []w/heat  Position:  Location:    [] Estim: []Att    []TENS instruct  []NMES                    []Other:  []w/US   []w/ice   []w/heat  Position:  Location:    []  Traction: [] Cervical       []Lumbar                       [] Prone          []Supine                       []Intermittent   []Continuous Lbs:  [] before manual  [] after manual    []  Ultrasound: []Continuous   [] Pulsed                           []1MHz   []3MHz Location:  W/cm2:    []  Iontophoresis with dexamethasone         Location: [] Take home patch   [] In clinic   10 []  Ice     [x]  heat  []  Ice massage  []  Laser   []  Anodyne Position: reclined with LEs on wedge  Location: low back    []  Laser with stim  []  Other: Position:  Location:    []  Vasopneumatic Device Pressure:       [] lo [] med [] hi   Temperature: [] lo [] med [] hi   [] Skin assessment post-treatment: []intact []redness- no adverse reaction    []redness  adverse reaction:     22 min Therapeutic Exercise:  [x] See flow sheet :   Rationale: increase ROM and increase strength to improve the patients ability to tolerate ADLs    10 min Neuromuscular Re-education:  X  See flow sheet : core and glute stability and re-education exercises    Rationale: increase strength, improve coordination and increase proprioception  to improve the patients ability to tolerate functional tasks. 10 min Manual Therapy: in supine: Pelvic alignment and leg length assessment; in left s/l: DTM/TPR right l/s paraspinals and at superior right iliac crest, MFR to right l/s paraspinals   Rationale: decrease pain, increase ROM, increase tissue extensibility, decrease trigger points and increase postural awareness to improve activity tolerance. With   [x] TE   [] TA   [x] neuro   [] other: Patient Education: [x] Review HEP    [] Progressed/Changed HEP based on:   [] positioning   [] body mechanics   [] transfers   [] heat/ice application    [] other:      Other Objective/Functional Measures: Pelvic alignment and leg length WNL. Tenderness and tightness noted to the right l/s paraspinals and superior right iliac crest with manual interventions. Pain Level (0-10 scale) post treatment: 0    ASSESSMENT/Changes in Function: Reported improvement in pain post session today. Pt continued to report having tightness in the right mid and low back region upon sitting up from the bed after manual and plinth exercises. She demonstrates increased tightness to the right l/s musculature as noted with manual interventions. Improvement in TA contraction noted with PPT today with less cues given from therapist. Continue POC as tolerated to improve muscle tightness and glute/core strength.      Patient will continue to benefit from skilled PT services to modify and progress therapeutic interventions, address functional mobility deficits, address ROM deficits, address strength deficits, analyze and address soft tissue restrictions, analyze and cue movement patterns, analyze and modify body mechanics/ergonomics, assess and modify postural abnormalities, address imbalance/dizziness and instruct in home and community integration to attain remaining goals. []  See Plan of Care  []  See progress note/recertification  []  See Discharge Summary         Progress towards goals / Updated goals:  Short Term Goals: To be accomplished in 1 weeks:               1. PT will be compliant with ball massage and stretches from Evaluation to ease with LS pain. Current: MET, reports compliance 10/22/2020  Long Term Goals: To be accomplished in 4 weeks:               1. Pt will increase FOTO score by 7 pts to improve LS function. 2, Pt will report >70% improvement in sx to return to ADL's and hobbies. 3. Pt will report <3/10 pain at worst during light activities.     Current: reports improvements in overall pain 10/16/2020    PLAN  [x]  Upgrade activities as tolerated     [x]  Continue plan of care  [x]  Update interventions per flow sheet       []  Discharge due to:_  []  Other:_      Mayi Iraheta, PT 10/22/2020  4:36 PM    Future Appointments   Date Time Provider Ankit Edmond   10/26/2020  6:00 PM Delinda Christian [facility]   10/28/2020  4:30 PM Debora Mckinnon, PT [facility]   11/2/2020 10:20 AM Cedric Saab DO [facility] BS AMB   11/2/2020  5:15 PM Debora Mckinnon PT [facility]   11/4/2020  5:15 PM Yonatan Franco PT [facility]   11/12/2020 11:05 AM [facility] NURSE VISIT [facility] BS AMB   11/17/2020 12:00 PM Sheri Vasquez MD [facility] BS AMB   12/24/2020 10:45 AM [facility] RM 4 3D [facility]

## 2020-10-26 ENCOUNTER — APPOINTMENT (OUTPATIENT)
Dept: PHYSICAL THERAPY | Age: 65
End: 2020-10-26
Payer: MEDICARE

## 2020-10-28 ENCOUNTER — HOSPITAL ENCOUNTER (OUTPATIENT)
Dept: PHYSICAL THERAPY | Age: 65
Discharge: HOME | End: 2020-10-28
Payer: MEDICARE

## 2020-10-28 PROCEDURE — 97530 THERAPEUTIC ACTIVITIES: CPT

## 2020-10-28 PROCEDURE — 97112 NEUROMUSCULAR REEDUCATION: CPT

## 2020-10-28 PROCEDURE — 97140 MANUAL THERAPY 1/> REGIONS: CPT

## 2020-10-28 NOTE — PROGRESS NOTES
PT DAILY TREATMENT NOTE 10-18    Patient Name: Hugh Bravo  Date:10/28/2020  : 1955  [x]  Patient  Verified  Payor: VA MEDICARE / Plan: VA MEDICARE PART A & B / Product Type: Medicare /    In time: 4:30   Out time: 5:25  Total Treatment Time (min): 55  Visit #: 4 of -    Medicare/BCBS Only   Total Timed Codes (min): 45 1:1 Treatment Time: 40       Treatment Area: Right-sided low back pain with bilateral sciatica [M54.41, M54.42]    SUBJECTIVE  Pain Level (0-10 scale): 4  Any medication changes, allergies to medications, adverse drug reactions, diagnosis change, or new procedure performed?: [x] No    [] Yes (see summary sheet for update)  Subjective functional status/changes:   [] No changes reported  Pt reports having less pain today.      OBJECTIVE    Modality rationale: decrease pain and increase tissue extensibility to improve the patients ability to tolerate ADLs   Min Type Additional Details    [] Estim:  []Unatt       []IFC  []Premod                        []Other:  []w/ice   []w/heat  Position:  Location:    [] Estim: []Att    []TENS instruct  []NMES                    []Other:  []w/US   []w/ice   []w/heat  Position:  Location:    []  Traction: [] Cervical       []Lumbar                       [] Prone          []Supine                       []Intermittent   []Continuous Lbs:  [] before manual  [] after manual    []  Ultrasound: []Continuous   [] Pulsed                           []1MHz   []3MHz Location:  W/cm2:    []  Iontophoresis with dexamethasone         Location: [] Take home patch   [] In clinic   10 []  Ice     [x]  heat  []  Ice massage  []  Laser   []  Anodyne Position: reclined with LEs on wedge  Location: low back    []  Laser with stim  []  Other: Position:  Location:    []  Vasopneumatic Device Pressure:       [] lo [] med [] hi   Temperature: [] lo [] med [] hi   [] Skin assessment post-treatment:  []intact []redness- no adverse reaction    []redness  adverse reaction:     20 min Therapeutic Exercise:  [x] See flow sheet :   Rationale: increase ROM and increase strength to improve the patients ability to tolerate ADLs    12 min Therapeutic Activity:  []  See flow sheet : FOTO with pt, pt education on adding a pillow under her abdomin when sleeping and to avoid lifting heavy objects/grandchildren to avoid increased pain. Rationale: increase ROM and increase strength  to improve the patients ability to perform functional and household chores. 13 min Neuromuscular Re-education:  X  See flow sheet : core and glute stability and re-education exercises    Rationale: increase strength, improve coordination and increase proprioception  to improve the patients ability to tolerate functional tasks. With   [x] TE   [] TA   [x] neuro   [] other: Patient Education: [x] Review HEP    [] Progressed/Changed HEP based on:   [] positioning   [] body mechanics   [] transfers   [] heat/ice application    [] other:      Other Objective/Functional Measures: needs cues for proper form of lobo palloff press to avoid hyperextension of the l/s. Increased reps per flow sheet to improve core strength and flexibility. Held some exercises secondary to doing FOTO today. Pain Level (0-10 scale) post treatment: 0    ASSESSMENT/Changes in Function: Reported improvement in pain post session today, but stated she had some soreness in the low back. Reported pulling in the right low back with SB#1 exercise but pt was able to perform this exercise with improved TA contraction than in previous sessions. Educated pt to avoid lifting heavy objects/grandchiildren and to put a pillow under her abdomin when sleeping in prone to improve pain and tightness. Pt continues to have pain and tightness at times with static sitting but states her right LE pain is improving overall. Continue POC as tolerated.      Patient will continue to benefit from skilled PT services to modify and progress therapeutic interventions, address functional mobility deficits, address ROM deficits, address strength deficits, analyze and address soft tissue restrictions, analyze and cue movement patterns, analyze and modify body mechanics/ergonomics, assess and modify postural abnormalities, address imbalance/dizziness and instruct in home and community integration to attain remaining goals. []  See Plan of Care  []  See progress note/recertification  []  See Discharge Summary         Progress towards goals / Updated goals:  Short Term Goals: To be accomplished in 1 weeks:               1. PT will be compliant with ball massage and stretches from Evaluation to ease with LS pain. Current: MET, reports compliance 10/22/2020  Long Term Goals: To be accomplished in 4 weeks:               1. Pt will increase FOTO score by 7 pts to improve LS function. 2, Pt will report >70% improvement in sx to return to ADL's and hobbies. states her right LE pain is improving overall 10/28/2020               3. Pt will report <3/10 pain at worst during light activities.     Current: 4/10 pain pre session today 10/28/2020    PLAN  [x]  Upgrade activities as tolerated     [x]  Continue plan of care  [x]  Update interventions per flow sheet       []  Discharge due to:_  []  Other:_      Katia Cottrell, PT 10/28/2020  4:53 PM    Future Appointments   Date Time Provider Ankit Edmond   11/2/2020 10:20 AM Margarito Gaxiola DO [facility] BS AMB   11/2/2020  5:15 PM Scooby Alex, PT [facility]   11/4/2020  5:15 PM Jean Blair, PT [facility]   11/12/2020 11:05 AM [facility] NURSE VISIT [facility] BS AMB   11/17/2020 12:00 PM Selma Villalobos MD [facility] BS AMB   12/24/2020 10:45 AM [facility] RM 4 3D [facility]

## 2020-11-02 ENCOUNTER — HOSPITAL ENCOUNTER (OUTPATIENT)
Dept: PHYSICAL THERAPY | Age: 65
Discharge: HOME | End: 2020-11-02
Payer: MEDICARE

## 2020-11-02 ENCOUNTER — OFFICE VISIT (OUTPATIENT)
Dept: CARDIOLOGY CLINIC | Age: 65
End: 2020-11-02
Payer: MEDICARE

## 2020-11-02 VITALS
DIASTOLIC BLOOD PRESSURE: 88 MMHG | HEIGHT: 62 IN | SYSTOLIC BLOOD PRESSURE: 130 MMHG | WEIGHT: 183 LBS | OXYGEN SATURATION: 98 % | BODY MASS INDEX: 33.68 KG/M2 | HEART RATE: 69 BPM

## 2020-11-02 DIAGNOSIS — I10 ESSENTIAL HYPERTENSION: ICD-10-CM

## 2020-11-02 DIAGNOSIS — I47.1 AVNRT (AV NODAL RE-ENTRY TACHYCARDIA) (HCC): ICD-10-CM

## 2020-11-02 DIAGNOSIS — R00.2 PALPITATIONS: Primary | ICD-10-CM

## 2020-11-02 DIAGNOSIS — E78.5 HYPERLIPIDEMIA, UNSPECIFIED HYPERLIPIDEMIA TYPE: ICD-10-CM

## 2020-11-02 DIAGNOSIS — I47.1 PSVT (PAROXYSMAL SUPRAVENTRICULAR TACHYCARDIA) (HCC): ICD-10-CM

## 2020-11-02 PROCEDURE — 97112 NEUROMUSCULAR REEDUCATION: CPT

## 2020-11-02 PROCEDURE — G8417 CALC BMI ABV UP PARAM F/U: HCPCS | Performed by: INTERNAL MEDICINE

## 2020-11-02 PROCEDURE — G8510 SCR DEP NEG, NO PLAN REQD: HCPCS | Performed by: INTERNAL MEDICINE

## 2020-11-02 PROCEDURE — G9899 SCRN MAM PERF RSLTS DOC: HCPCS | Performed by: INTERNAL MEDICINE

## 2020-11-02 PROCEDURE — G8754 DIAS BP LESS 90: HCPCS | Performed by: INTERNAL MEDICINE

## 2020-11-02 PROCEDURE — G8752 SYS BP LESS 140: HCPCS | Performed by: INTERNAL MEDICINE

## 2020-11-02 PROCEDURE — G8399 PT W/DXA RESULTS DOCUMENT: HCPCS | Performed by: INTERNAL MEDICINE

## 2020-11-02 PROCEDURE — 99214 OFFICE O/P EST MOD 30 MIN: CPT | Performed by: INTERNAL MEDICINE

## 2020-11-02 PROCEDURE — G8536 NO DOC ELDER MAL SCRN: HCPCS | Performed by: INTERNAL MEDICINE

## 2020-11-02 PROCEDURE — 93000 ELECTROCARDIOGRAM COMPLETE: CPT | Performed by: INTERNAL MEDICINE

## 2020-11-02 PROCEDURE — 97110 THERAPEUTIC EXERCISES: CPT

## 2020-11-02 PROCEDURE — G8427 DOCREV CUR MEDS BY ELIG CLIN: HCPCS | Performed by: INTERNAL MEDICINE

## 2020-11-02 NOTE — PROGRESS NOTES
PT DAILY TREATMENT NOTE 10-18    Patient Name: Preeti Miramontes  Date:2020  : 1955  [x]  Patient  Verified  Payor: Rita Ferguson / Plan: [place] MEDICARE PART A & B / Product Type: Medicare /    In time: 5:15   Out time: 6:09  Total Treatment Time (min): 54  Visit #: 5 of -12    Medicare/BCBS Only   Total Timed Codes (min): 44 1:1 Treatment Time: 39       Treatment Area: Right-sided low back pain with bilateral sciatica [M54.41, M54.42]    SUBJECTIVE  Pain Level (0-10 scale): 6 \"stiff\"  Any medication changes, allergies to medications, adverse drug reactions, diagnosis change, or new procedure performed?: [x] No    [] Yes (see summary sheet for update)  Subjective functional status/changes:   [] No changes reported  Pt reports having stiffness today and contributes this to the weather. Pt reports she has less pain into her LEs and states she is doing better overall.      OBJECTIVE    Modality rationale: decrease pain and increase tissue extensibility to improve the patients ability to tolerate ADLs   Min Type Additional Details    [] Estim:  []Unatt       []IFC  []Premod                        []Other:  []w/ice   []w/heat  Position:  Location:    [] Estim: []Att    []TENS instruct  []NMES                    []Other:  []w/US   []w/ice   []w/heat  Position:  Location:    []  Traction: [] Cervical       []Lumbar                       [] Prone          []Supine                       []Intermittent   []Continuous Lbs:  [] before manual  [] after manual    []  Ultrasound: []Continuous   [] Pulsed                           []1MHz   []3MHz Location:  W/cm2:    []  Iontophoresis with dexamethasone         Location: [] Take home patch   [] In clinic   10 []  Ice     [x]  heat  []  Ice massage  []  Laser   []  Anodyne Position: reclined with LEs on wedge  Location: low back    []  Laser with stim  []  Other: Position:  Location:    []  Vasopneumatic Device Pressure:       [] lo [] med [] hi   Temperature: [] lo [] med [] hi   [] Skin assessment post-treatment:  []intact []redness- no adverse reaction    []redness  adverse reaction:     15 min Therapeutic Exercise:  [x] See flow sheet :   Rationale: increase ROM and increase strength to improve the patients ability to tolerate ADLs    29 min Neuromuscular Re-education:  X  See flow sheet : core stability and re-education exercises    Rationale: increase strength, improve coordination and increase proprioception  to improve the patients ability to tolerate functional tasks. With   [x] TE   [] TA   [x] neuro   [] other: Patient Education: [x] Review HEP    [] Progressed/Changed HEP based on:   [] positioning   [] body mechanics   [] transfers   [] heat/ice application    [] other:      Other Objective/Functional Measures: Added exercises per flow sheet to improve strength and core stability. Pelvic alignment WNL today. Pain Level (0-10 scale) post treatment: 0 \"sore\"    ASSESSMENT/Changes in Function: Reported no pain post session today, only soreness. Educated pt to not push through pain with exercises to avoid pain. Pt reports overall improvements in LBP and LE pain but continues to have pain with lifting and with bed transfers. Continued to educate pt on avoiding heavy lifting and positioning to avoid increased pain. Continue POC as tolerated. Patient will continue to benefit from skilled PT services to modify and progress therapeutic interventions, address functional mobility deficits, address ROM deficits, address strength deficits, analyze and address soft tissue restrictions, analyze and cue movement patterns, analyze and modify body mechanics/ergonomics, assess and modify postural abnormalities, address imbalance/dizziness and instruct in home and community integration to attain remaining goals. []  See Plan of Care  []  See progress note/recertification  []  See Discharge Summary         Progress towards goals / Updated goals:  Short Term Goals:  To be accomplished in 1 weeks:               1. PT will be compliant with ball massage and stretches from Evaluation to ease with LS pain. Current: MET, reports compliance 10/22/2020  Long Term Goals: To be accomplished in 4 weeks:               1. Pt will increase FOTO score by 7 pts to improve LS function. 2, Pt will report >70% improvement in sx to return to ADL's and hobbies. states her right LE pain is improving overall 10/28/2020               3. Pt will report <3/10 pain at worst during light activities.     Current: 4/10 pain pre session today 10/28/2020    PLAN  [x]  Upgrade activities as tolerated     [x]  Continue plan of care  [x]  Update interventions per flow sheet       []  Discharge due to:_  []  Other:_      Fayd Underwood, LESLIE 11/2/2020  5:32 PM    Future Appointments   Date Time Provider Ankit Edmond   11/4/2020  5:15 PM Colette Barger, LESLIE [facility]   11/12/2020 11:05 AM I[facility] NURSE VISIT IOC BS AMB   11/17/2020 12:00 PM Cleo Bautista MD [facility] BS AMB   12/24/2020 10:45 AM HBV VERONIKA RM 4 3D [facility] HBV   10/13/2021  1:00 PM Yaneli Vieyra MD [facility] BS AMB

## 2020-11-02 NOTE — PROGRESS NOTES
HPI:  I saw Yaya . Rios Cano in my office today in cardiovascular evaluation regarding her past problems with palpitations and hypercholesterolemia. Ms. 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 She has had about six separate episodes of supraventricular tachycardia in the past, for which she has had to go to the emergency room and get adenosine intravenously in order to break her rhythm to sinus, but generally speaking on just Toprol 50 mg daily she does quite well and has had only occasional palpitations. She comes in today relates that she is doing quite well. She is remaining quite active and is denying any cardiovascular symptomatology specifically has not been having any palpitations. Encounter Diagnoses   Name Primary?  Palpitations Yes    History of PSVT (paroxysmal supraventricular tachycardia) ([facility])     AVNRT (AV dharmesh re-entry tachycardia) ([address] resolved s/p ablation therapy     Hyperlipidemia, unspecified hyperlipidemia type     Essential hypertension        Discussion: This lady appears to be doing about as well as we could expect and I have no recommendations for change at this time. She is having very infrequent palpitations which are very short-lived on her current Toprol XL 50 mg daily and I would simply leave her on that medication long-term    Her latest lipid profile which was completed on August 6, 2020 was reasonably good with a total cholesterol of 168, triglycerides 136, HDL of 72, LDL of 68.8, and VLDL of 27.2 which is good control on only Zocor 20 mg daily. Her hemoglobin A1c at the same time was also well controlled at 6.4. Her blood pressure is adequately controlled today and her EKG appears to be stable so I am simply going to have her seen again in a year by my associate Dr. Ren Tsai since I will be retiring.     PCP: Toan Alvarez MD       Past Medical History:   Diagnosis Date    Allergic rhinitis     Asthma     Cardiac stress echo, normal 11/02/2012    Normal maximal stress echo study. EF 60%. Ex time 9 min 45 sec. 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  Hyperlipidemia     Hypertension     Menopause     Plantar fasciitis     left    PSVT ([facility]oxysmal supra[facility]ricular tachycardia) [facility]     A-V dharmesh reentrant tachycardia         Past Surgical History:   Procedure Laterality Date    ENDOSCOPY, COLON, DIAGNOSTIC      polyp    HX CERVICAL POLYPECTOMY      HX CYST INCISION AND DRAINAGE Right 10 or more years    HX DILATION AND CURETTAGE      HX GYN      polyp on cervix    HX POLYPECTOMY      from rectum     Current Outpatient Medications   Medication Sig    glipiZIDE SR (GLUCOTROL XL) 5 mg CR tablet Take 1 Tab by mouth daily.  simvastatin (ZOCOR) 20 mg tablet take 1 tablet by mouth at bedtime    SITagliptin (JANUVIA) 100 mg tablet Take 1 Tab by mouth daily.  etodolac (LODINE) 500 mg tablet Take 1 Tab by mouth two (2) times daily as needed for Pain. Indications: low back pain with right sciatica    methocarbamoL (ROBAXIN) 500 mg tablet Take 1 Tab by mouth four (4) times daily as needed for Muscle Spasm(s).  loratadine (Claritin) 10 mg tablet Take 1 Tab by mouth daily.  albuterol (PROVENTIL HFA, VENTOLIN HFA, PROAIR HFA) 90 mcg/actuation inhaler inhale 2 puffs by mouth every 4 hours if needed for wheezing    LORazepam (ATIVAN) 1 mg tablet Take 1 Tab by mouth every eight (8) hours as needed for Anxiety. Max Daily Amount: 3 mg.     losartan (COZAAR) 25 mg tablet take 1 tablet by mouth once daily    potassium chloride (K-DUR, KLOR-CON) 20 mEq tablet take 1 tablet by mouth once daily    metoprolol succinate (TOPROL-XL) 50 mg XL tablet take 1 tablet by mouth once daily    fluticasone propionate (FLONASE) 50 mcg/actuation nasal spray instill 2 sprays into each nostril once daily    hydroCHLOROthiazide (HYDRODIURIL) 12.5 mg tablet take 1 tablet by mouth once daily    clotrimazole-betamethasone (LOTRISONE) topical cream Apply  to both ear canals and affected part of outer ear twice a day with a finger.  cholecalciferol (VITAMIN D3) 1,000 unit cap Take 1,000 Units by mouth daily.  carboxymethylcellulose sodium (REFRESH LIQUIGEL) 1 % dlgl ophthalmic solution Apply  to eye. No current facility-administered medications for this visit. Allergies   Allergen Reactions    Altace [Ramipril] Cough    Penicillins Other (comments)     Hands peel    Sulfur Itching         Social History:   Social History     Tobacco Use    Smoking status: Never Smoker    Smokeless tobacco: Never Used   Substance Use Topics    Alcohol use: No           Family history: family history includes Arthritis-osteo in her mother; Breast Cancer in her maternal aunt and paternal aunt; Cancer in her father; Diabetes in an other family member; Hypertension in her mother, sister, sister, and sister; Other in her sister; Stroke in an other family member. Review of Systems: See Cardiovascular review above under HPI  General: No weight change or constitutional symptoms. HEENT: No history of glaucoma or thyroid condition,  Respiratory: Positive for dry cough. No sputum production, hemoptysis or wheezing. Gastrointestinal: No anorexia, dysphagia, nausea, vomiting, melana, or hematochezia. Genitourinary: No UTI symptoms, hematuria, or kidney stones. Neuro/Psychiatric: No TIA's, seizures, anxiety or memory loss. Hematologic: No bleeding or clotting problems and no anemia. Skin: No rashes. Musculoskeletal: Positive for muscle pain and joint pain. Negative for falls or dizziness.             Physical Exam:   The patient is an alert, oriented, well developed, well nourished 72 y.o.  female who was in no acute distress at the time of my examination. Visit Vitals  /88 (BP 1 Location: Left arm, BP Patient Position: Sitting)   Pulse 69   Ht 5' 2\" (1.575 m)   Wt 83 kg (183 lb)   SpO2 98%   BMI 33.47 kg/m²      BP Readings from Last 3 Encounters:   11/02/20 130/88   10/01/20 134/70   08/13/20 128/68        Wt Readings from Last 3 Encounters:   11/02/20 83 kg (183 lb)   10/01/20 81.6 kg (180 lb)   08/13/20 (P) 82.6 kg (182 lb)       HEENT: Conjuctiva white, mucosa moist, no pallor or cyanosis. Neck: Supple without masses, tenderness or thyromegaly. No jugular venous distention. Carotid upstrokes are full bilaterally, without bruits. Cardiovascular: Chest is symmetrical with good excursion. [place] is not displaced. No lifts, heaves or thrills. S1 and S2 are normal, without appreciable murmurs, rubs, clicks or gallops. Lungs: Clear to auscultation in all fields. Abdomen: Soft; no masses, tenderness or organomegaly. Extremities: No edema, with full peripheral pulses. Review of Data: See PMH and Cardiology and Imaging sections for cardiac testing  Lab Results   Component Value Date/Time    Cholesterol, total 168 08/06/2020 10:07 AM    HDL Cholesterol 72 (H) 08/06/2020 10:07 AM    LDL, calculated 68.8 08/06/2020 10:07 AM    Triglyceride 136 08/06/2020 10:07 AM    CHOL/HDL Ratio 2.3 08/06/2020 10:07 AM       Results for orders placed or performed in visit on 11/02/20   AMB POC EKG ROUTINE W/ 12 LEADS, INTER & REP     Status: None    Narrative    Normal sinus rhythm rate 69. Diffuse nonspecific ST-T flattening some mild T wave inversion anteriorly which is all nonspecific. Compared to the EKG of October 29, 2019 there was little interval change. Madeline Quintana D.O., F.A.C.C. [facility]  [facility] [place]  [facility].   [address] [facility] [address] [facility]    PLEASE NOTE:  This document has been produced using voice recognition software. Unrecognized errors in transcription may be present.

## 2020-11-02 NOTE — PROGRESS NOTES
Wendy Loaiza presents today for   Chief Complaint   Patient presents with    Follow-up     1 year follow up        Wendy Loaiza preferred language for health care discussion is english/other. Is someone accompanying this pt? no    Is the patient using any DME equipment during [address]? no    Depression Screening:  3 most recent PHQ Screens 11/2/2020   Little interest or pleasure in doing things Not at all   Feeling down, depressed, irritable, or hopeless Not at all   Total Score PHQ 2 0   Trouble falling or staying asleep, or sleeping too much -   Feeling tired or having little energy -   Poor appetite, weight loss, or overeating -   Feeling bad about yourself - or that you are a failure or have let yourself or your family down -   Trouble concentrating on things such as school, work, reading, or watching TV -   Moving or speaking so slowly that other people could have noticed; or the opposite being so fidgety that others notice -   Thoughts of being better off dead, or hurting yourself in some way -   PHQ 9 Score -   How difficult have these problems made it for you to do your work, take care of your home and get along with others -       Learning Assessment:  Learning Assessment 10/8/2019   PRIMARY LEARNER Patient   HIGHEST LEVEL OF EDUCATION - PRIMARY LEARNER  GRADUATED HIGH SCHOOL OR GED   BARRIERS PRIMARY LEARNER NONE   CO-LEARNER CAREGIVER No   PRIMARY LANGUAGE ENGLISH   LEARNER PREFERENCE PRIMARY DEMONSTRATION   ANSWERED BY patient   RELATIONSHIP SELF       Abuse Screening:  Abuse Screening Questionnaire 11/2/2020   Do you ever feel afraid of your partner? N   Are you in a relationship with someone who physically or mentally threatens you? N   Is it safe for you to go home? Y       Fall Risk  Fall Risk Assessment, last 12 mths 11/2/2020   Able to walk? Yes   Fall in past 12 months? No       Pt currently taking Anticoagulant therapy? no    Coordination of Care:  1.  Have you been to the ER, urgent care clinic since your last visit? Hospitalized since your last visit? no    2. Have you seen or consulted any other health care providers outside of the [address] [place] [address] since your last visit? Include any pap smears or colon screening.  no

## 2020-11-04 ENCOUNTER — HOSPITAL ENCOUNTER (OUTPATIENT)
Dept: PHYSICAL THERAPY | Age: 65
Discharge: HOME | End: 2020-11-04
Payer: MEDICARE

## 2020-11-04 PROCEDURE — 97110 THERAPEUTIC EXERCISES: CPT

## 2020-11-04 NOTE — PROGRESS NOTES
PT DAILY TREATMENT NOTE - [facility]     Patient Name: Juan Miguel Walters  Date:2020  : 1955  [x]  Patient  Verified  Payor: Navin Lightjayla / Plan: [place] MEDICARE PART A & B / Product Type: Medicare /    In time:530  Out time:603  Total Treatment Time (min): 33    Visit #: 6 of 8-10    Treatment Area: Right-sided low back pain with bilateral sciatica [M54.41, M54.42]    SUBJECTIVE  Pain Level (0-10 scale): 2  Any medication changes, allergies to medications, adverse drug reactions, diagnosis change, or new procedure performed?: [x] No    [] Yes (see summary sheet for update)  Subjective functional status/changes:   [] No changes reported  Reports she is doing much better and wants to be DC'd today.      OBJECTIVE    Modality rationale: decrease pain to improve the patients ability to ease with ADL's   Min Type Additional Details    [] Estim:  []Unatt       []IFC  []Premod                        []Other:  []w/ice   []w/heat  Position:  Location:    [] Estim: []Att    []TENS instruct  []NMES                    []Other:  []w/US   []w/ice   []w/heat  Position:  Location:    []  Traction: [] Cervical       []Lumbar                       [] Prone          []Supine                       []Intermittent   []Continuous Lbs:  [] before manual  [] after manual    []  Ultrasound: []Continuous   [] Pulsed                           []1MHz   []3MHz W/cm2:  Location:    []  Iontophoresis with dexamethasone         Location: [] Take home patch   [] In clinic   10 []  Ice     []  heat  []  Ice massage  []  Laser   []  Anodyne Position:supine  Location:LS    []  Laser with stim  []  Other:  Position:  Location:    []  Vasopneumatic Device Pressure:       [] lo [] med [] hi   Temperature: [] lo [] med [] hi   [] Skin assessment post-treatment:  []intact []redness- no adverse reaction    []redness  adverse reaction:       23 min Therapeutic Exercise:  [] See flow sheet :   Rationale: increase ROM and increase strength to improve the patients ability to ease wiith ADL's       With   [] TE   [] TA   [] neuro   [x] other: Patient Education: [x] Review HEP    [] Progressed/Changed HEP based on:   [] positioning   [] body mechanics   [] transfers   [] heat/ice application    [] other: taking warm baths and stretching     Other Objective/Functional Measures: No pain reported. Pt educated     Pain Level (0-10 scale) post treatment: 0/10    ASSESSMENT/Changes in Function: reports 95% improvement. She will continue with ex's at home. Patient will continue to benefit from skilled PT services to modify and progress therapeutic interventions, address functional mobility deficits, address ROM deficits, address strength deficits, analyze and address soft tissue restrictions, analyze and cue movement patterns, analyze and modify body mechanics/ergonomics, assess and modify postural abnormalities and address imbalance/dizziness to attain remaining goals. [x]  See Plan of Care  []  See progress note/recertification  []  See Discharge Summary         Progress towards goals / Updated goals:  Short Term Goals: To be accomplished in 1 weeks:               1. PT will be compliant with ball massage and stretches from Evaluation to ease with LS pain. Current: MET, reports compliance 10/22/2020  Long Term Goals: To be accomplished in 4 weeks:               1. Pt will increase FOTO score by 7 pts to improve LS function. MET. FOTO=75%               2, Pt will report >70% improvement in sx to return to ADL's and hobbies. states her right LE pain is improving overall 10/28/2020  Current: 95% improvement               3. Pt will report <3/10 pain at worst during light activities.                Current: 0/10 pain pre session today 11/4/20    PLAN  []  Upgrade activities as tolerated     []  Continue plan of care  []  Update interventions per flow sheet       [x]  Discharge due to:_  []  Other:_      Ming Nieves, PT 11/4/2020  4:34 PM    Future Appointments   Date Time Provider Ankit Tavarezi   11/4/2020  5:15 PM Linh Solano PT [facility]   11/12/2020 11:05 AM IOC NURSE VISIT IOC BS AMB   11/17/2020 12:00 PM MD ANTHONY CabreraC BS AMB   12/24/2020 10:45 AM HBV VERONIKA RM 4 3D [facility] HBV   10/13/2021  1:00 PM Augustine Vieyra MD [facility] BS AMB

## 2020-11-12 ENCOUNTER — APPOINTMENT (OUTPATIENT)
Dept: INTERNAL MEDICINE CLINIC | Age: 65
End: 2020-11-12

## 2020-11-12 ENCOUNTER — HOSPITAL ENCOUNTER (OUTPATIENT)
Dept: LAB | Age: 65
Discharge: HOME | End: 2020-11-12
Payer: MEDICARE

## 2020-11-12 DIAGNOSIS — E55.9 VITAMIN D DEFICIENCY, UNSPECIFIED: ICD-10-CM

## 2020-11-12 DIAGNOSIS — J30.1 SEASONAL ALLERGIC RHINITIS DUE TO POLLEN: ICD-10-CM

## 2020-11-12 DIAGNOSIS — M25.561 CHRONIC PAIN OF BOTH KNEES: ICD-10-CM

## 2020-11-12 DIAGNOSIS — E66.9 OBESITY (BMI 30.0-34.9): ICD-10-CM

## 2020-11-12 DIAGNOSIS — E11.9 TYPE 2 DIABETES MELLITUS WITHOUT COMPLICATION, WITHOUT LONG-TERM CURRENT USE OF INSULIN (HCC): ICD-10-CM

## 2020-11-12 DIAGNOSIS — I10 ESSENTIAL HYPERTENSION: ICD-10-CM

## 2020-11-12 DIAGNOSIS — G89.29 CHRONIC PAIN OF BOTH KNEES: ICD-10-CM

## 2020-11-12 DIAGNOSIS — K21.9 GASTROESOPHAGEAL REFLUX DISEASE WITHOUT ESOPHAGITIS: ICD-10-CM

## 2020-11-12 DIAGNOSIS — M25.562 CHRONIC PAIN OF BOTH KNEES: ICD-10-CM

## 2020-11-12 DIAGNOSIS — E78.5 HYPERLIPIDEMIA, UNSPECIFIED HYPERLIPIDEMIA TYPE: ICD-10-CM

## 2020-11-12 DIAGNOSIS — J45.30 MILD PERSISTENT ASTHMA WITHOUT COMPLICATION: ICD-10-CM

## 2020-11-12 DIAGNOSIS — Z91.199 NONCOMPLIANCE: ICD-10-CM

## 2020-11-12 DIAGNOSIS — I47.1 AVNRT (AV NODAL RE-ENTRY TACHYCARDIA) (HCC): ICD-10-CM

## 2020-11-12 LAB
25(OH)D3 SERPL-MCNC: 35.4 NG/ML (ref 30–100)
ALBUMIN SERPL-MCNC: 3.7 G/DL (ref 3.4–5)
ALBUMIN/GLOB SERPL: 1.1 {RATIO} (ref 0.8–1.7)
ALP SERPL-CCNC: 76 U/L (ref 45–117)
ALT SERPL-CCNC: 66 U/L (ref 13–56)
ANION GAP SERPL CALC-SCNC: 7 MMOL/L (ref 3–18)
APPEARANCE UR: CLEAR
AST SERPL-CCNC: 45 U/L (ref 10–38)
BACTERIA URNS QL MICRO: ABNORMAL /HPF
BASOPHILS # BLD: 0 K/UL (ref 0–0.1)
BASOPHILS NFR BLD: 0 % (ref 0–2)
BILIRUB SERPL-MCNC: 0.5 MG/DL (ref 0.2–1)
BILIRUB UR QL: NEGATIVE
BUN SERPL-MCNC: 15 MG/DL (ref 7–18)
BUN/CREAT SERPL: 20 (ref 12–20)
CALCIUM SERPL-MCNC: 8.9 MG/DL (ref 8.5–10.1)
CHLORIDE SERPL-SCNC: 102 MMOL/L (ref 100–111)
CHOLEST SERPL-MCNC: 160 MG/DL
CO2 SERPL-SCNC: 28 MMOL/L (ref 21–32)
COLOR UR: YELLOW
CREAT SERPL-MCNC: 0.75 MG/DL (ref 0.6–1.3)
CREAT UR-MCNC: 180 MG/DL (ref 30–125)
DIFFERENTIAL METHOD BLD: NORMAL
EOSINOPHIL # BLD: 0.3 K/UL (ref 0–0.4)
EOSINOPHIL NFR BLD: 3 % (ref 0–5)
EPITH CASTS URNS QL MICRO: ABNORMAL /LPF (ref 0–5)
ERYTHROCYTE [DISTWIDTH] IN BLOOD BY AUTOMATED COUNT: 12.1 % (ref 11.6–14.5)
EST. AVERAGE GLUCOSE BLD GHB EST-MCNC: 148 MG/DL
GLOBULIN SER CALC-MCNC: 3.5 G/DL (ref 2–4)
GLUCOSE SERPL-MCNC: 115 MG/DL (ref 74–99)
GLUCOSE UR STRIP.AUTO-MCNC: NEGATIVE MG/DL
HBA1C MFR BLD: 6.8 % (ref 4.2–5.6)
HCT VFR BLD AUTO: 39.3 % (ref 35–45)
HDLC SERPL-MCNC: 65 MG/DL (ref 40–60)
HDLC SERPL: 2.5 {RATIO} (ref 0–5)
HGB BLD-MCNC: 13.2 G/DL (ref 12–16)
HGB UR QL STRIP: ABNORMAL
KETONES UR QL STRIP.AUTO: NEGATIVE MG/DL
LDLC SERPL CALC-MCNC: 77 MG/DL (ref 0–100)
LEUKOCYTE ESTERASE UR QL STRIP.AUTO: NEGATIVE
LIPID PROFILE,FLP: ABNORMAL
LYMPHOCYTES # BLD: 2.9 K/UL (ref 0.9–3.6)
LYMPHOCYTES NFR BLD: 33 % (ref 21–52)
MAGNESIUM SERPL-MCNC: 1.7 MG/DL (ref 1.6–2.6)
MCH RBC QN AUTO: 30.2 PG (ref 24–34)
MCHC RBC AUTO-ENTMCNC: 33.6 G/DL (ref 31–37)
MCV RBC AUTO: 89.9 FL (ref 74–97)
MICROALBUMIN UR-MCNC: 1.02 MG/DL (ref 0–3)
MICROALBUMIN/CREAT UR-RTO: 6 MG/G (ref 0–30)
MONOCYTES # BLD: 0.6 K/UL (ref 0.05–1.2)
MONOCYTES NFR BLD: 7 % (ref 3–10)
MUCOUS THREADS URNS QL MICRO: ABNORMAL /LPF
NEUTS SEG # BLD: 5 K/UL (ref 1.8–8)
NEUTS SEG NFR BLD: 57 % (ref 40–73)
NITRITE UR QL STRIP.AUTO: NEGATIVE
PH UR STRIP: 5.5 [PH] (ref 5–8)
PLATELET # BLD AUTO: 260 K/UL (ref 135–420)
PMV BLD AUTO: 10.8 FL (ref 9.2–11.8)
POTASSIUM SERPL-SCNC: 3.9 MMOL/L (ref 3.5–5.5)
PROT SERPL-MCNC: 7.2 G/DL (ref 6.4–8.2)
PROT UR STRIP-MCNC: NEGATIVE MG/DL
RBC # BLD AUTO: 4.37 M/UL (ref 4.2–5.3)
RBC #/AREA URNS HPF: ABNORMAL /HPF (ref 0–5)
SODIUM SERPL-SCNC: 137 MMOL/L (ref 136–145)
SP GR UR REFRACTOMETRY: 1.02 (ref 1–1.03)
TRIGL SERPL-MCNC: 90 MG/DL (ref ?–150)
TSH SERPL DL<=0.05 MIU/L-ACNC: 1.81 UIU/ML (ref 0.36–3.74)
UROBILINOGEN UR QL STRIP.AUTO: 0.2 EU/DL (ref 0.2–1)
VLDLC SERPL CALC-MCNC: 18 MG/DL
WBC # BLD AUTO: 8.7 K/UL (ref 4.6–13.2)
WBC URNS QL MICRO: ABNORMAL /HPF (ref 0–4)

## 2020-11-12 PROCEDURE — 82306 VITAMIN D 25 HYDROXY: CPT

## 2020-11-12 PROCEDURE — 83735 ASSAY OF MAGNESIUM: CPT

## 2020-11-12 PROCEDURE — 80061 LIPID PANEL: CPT

## 2020-11-12 PROCEDURE — 84443 ASSAY THYROID STIM HORMONE: CPT

## 2020-11-12 PROCEDURE — 82043 UR ALBUMIN QUANTITATIVE: CPT

## 2020-11-12 PROCEDURE — 81001 URINALYSIS AUTO W/SCOPE: CPT

## 2020-11-12 PROCEDURE — 36415 COLL VENOUS BLD VENIPUNCTURE: CPT

## 2020-11-12 PROCEDURE — 85025 COMPLETE CBC W/AUTO DIFF WBC: CPT

## 2020-11-12 PROCEDURE — 80053 COMPREHEN METABOLIC PANEL: CPT

## 2020-11-12 PROCEDURE — 83036 HEMOGLOBIN GLYCOSYLATED A1C: CPT

## 2020-11-17 ENCOUNTER — OFFICE VISIT (OUTPATIENT)
Dept: INTERNAL MEDICINE CLINIC | Age: 65
End: 2020-11-17
Payer: MEDICARE

## 2020-11-17 VITALS
OXYGEN SATURATION: 95 % | HEIGHT: 62 IN | HEART RATE: 77 BPM | SYSTOLIC BLOOD PRESSURE: 136 MMHG | RESPIRATION RATE: 16 BRPM | BODY MASS INDEX: 33.68 KG/M2 | WEIGHT: 183 LBS | TEMPERATURE: 98.1 F | DIASTOLIC BLOOD PRESSURE: 74 MMHG

## 2020-11-17 DIAGNOSIS — E78.5 HYPERLIPIDEMIA, UNSPECIFIED HYPERLIPIDEMIA TYPE: ICD-10-CM

## 2020-11-17 DIAGNOSIS — J45.30 MILD PERSISTENT ASTHMA WITHOUT COMPLICATION: ICD-10-CM

## 2020-11-17 DIAGNOSIS — M25.562 CHRONIC PAIN OF BOTH KNEES: ICD-10-CM

## 2020-11-17 DIAGNOSIS — M25.561 CHRONIC PAIN OF BOTH KNEES: ICD-10-CM

## 2020-11-17 DIAGNOSIS — R19.7 DIARRHEA, UNSPECIFIED TYPE: ICD-10-CM

## 2020-11-17 DIAGNOSIS — M51.36 DDD (DEGENERATIVE DISC DISEASE), LUMBAR: ICD-10-CM

## 2020-11-17 DIAGNOSIS — R79.89 ELEVATED LFTS: ICD-10-CM

## 2020-11-17 DIAGNOSIS — E11.9 TYPE 2 DIABETES MELLITUS WITHOUT COMPLICATION, WITHOUT LONG-TERM CURRENT USE OF INSULIN (HCC): ICD-10-CM

## 2020-11-17 DIAGNOSIS — G89.29 CHRONIC PAIN OF BOTH KNEES: ICD-10-CM

## 2020-11-17 DIAGNOSIS — E66.9 OBESITY (BMI 30.0-34.9): ICD-10-CM

## 2020-11-17 DIAGNOSIS — I11.9 BENIGN HYPERTENSIVE HEART DISEASE WITHOUT CONGESTIVE HEART FAILURE: ICD-10-CM

## 2020-11-17 DIAGNOSIS — E55.9 VITAMIN D INSUFFICIENCY: ICD-10-CM

## 2020-11-17 DIAGNOSIS — J30.1 SEASONAL ALLERGIC RHINITIS DUE TO POLLEN: ICD-10-CM

## 2020-11-17 DIAGNOSIS — K21.9 GASTROESOPHAGEAL REFLUX DISEASE WITHOUT ESOPHAGITIS: ICD-10-CM

## 2020-11-17 DIAGNOSIS — I10 ESSENTIAL HYPERTENSION: Primary | ICD-10-CM

## 2020-11-17 DIAGNOSIS — I47.1 AVNRT (AV NODAL RE-ENTRY TACHYCARDIA) (HCC): ICD-10-CM

## 2020-11-17 DIAGNOSIS — M43.16 SPONDYLOLISTHESIS, LUMBAR REGION: ICD-10-CM

## 2020-11-17 PROCEDURE — 99214 OFFICE O/P EST MOD 30 MIN: CPT | Performed by: INTERNAL MEDICINE

## 2020-11-17 PROCEDURE — 1090F PRES/ABSN URINE INCON ASSESS: CPT | Performed by: INTERNAL MEDICINE

## 2020-11-17 PROCEDURE — G8754 DIAS BP LESS 90: HCPCS | Performed by: INTERNAL MEDICINE

## 2020-11-17 PROCEDURE — G0463 HOSPITAL OUTPT CLINIC VISIT: HCPCS | Performed by: INTERNAL MEDICINE

## 2020-11-17 PROCEDURE — G8427 DOCREV CUR MEDS BY ELIG CLIN: HCPCS | Performed by: INTERNAL MEDICINE

## 2020-11-17 PROCEDURE — G8399 PT W/DXA RESULTS DOCUMENT: HCPCS | Performed by: INTERNAL MEDICINE

## 2020-11-17 PROCEDURE — 3017F COLORECTAL CA SCREEN DOC REV: CPT | Performed by: INTERNAL MEDICINE

## 2020-11-17 PROCEDURE — G8536 NO DOC ELDER MAL SCRN: HCPCS | Performed by: INTERNAL MEDICINE

## 2020-11-17 PROCEDURE — 3044F HG A1C LEVEL LT 7.0%: CPT | Performed by: INTERNAL MEDICINE

## 2020-11-17 PROCEDURE — G9899 SCRN MAM PERF RSLTS DOC: HCPCS | Performed by: INTERNAL MEDICINE

## 2020-11-17 PROCEDURE — G8417 CALC BMI ABV UP PARAM F/U: HCPCS | Performed by: INTERNAL MEDICINE

## 2020-11-17 PROCEDURE — G8510 SCR DEP NEG, NO PLAN REQD: HCPCS | Performed by: INTERNAL MEDICINE

## 2020-11-17 PROCEDURE — G8752 SYS BP LESS 140: HCPCS | Performed by: INTERNAL MEDICINE

## 2020-11-17 PROCEDURE — 1101F PT FALLS ASSESS-DOCD LE1/YR: CPT | Performed by: INTERNAL MEDICINE

## 2020-11-17 PROCEDURE — 2022F DILAT RTA XM EVC RTNOPTHY: CPT | Performed by: INTERNAL MEDICINE

## 2020-11-17 NOTE — PATIENT INSTRUCTIONS
Learning About Meal Planning for Diabetes Why plan your meals? Meal planning can be a key part of managing diabetes. Planning meals and snacks with the right balance of carbohydrate, protein, and fat can help you keep your blood sugar at the target level you set with your doctor. You don't have to eat special foods. You can eat what your family eats, including sweets once in a while. But you do have to pay attention to how often you eat and how much you eat of certain foods. You may want to work with a dietitian or a certified diabetes educator. He or she can give you tips and meal ideas and can answer your questions about meal planning. This health professional can also help you reach a healthy weight if that is one of your goals. What plan is right for you? Your dietitian or diabetes educator may suggest that you start with the plate format or carbohydrate counting. The plate format The plate format is a simple way to help you manage how you eat. You plan meals by learning how much space each food should take on a plate. Using the plate format helps you spread carbohydrate throughout the day. It can make it easier to keep your blood sugar level within your target range. It also helps you see if you're eating healthy portion sizes. To use the plate format, you put non-starchy vegetables on half your plate. Add meat or meat substitutes on one-quarter of the plate. Put a grain or starchy vegetable (such as brown rice or a potato) on the final quarter of the plate. You can add a small piece of fruit and some low-fat or fat-free milk or yogurt, depending on your carbohydrate goal for each meal. 
Here are some tips for using the plate format: · Make sure that you are not using an oversized plate. A 9-inch plate is best. Many restaurants use larger plates. · Get used to using the plate format at home. Then you can use it when you eat out. · Write down your questions about using the plate format. Talk to your doctor, a dietitian, or a diabetes educator about your concerns. Carbohydrate counting With carbohydrate counting, you plan meals based on the amount of carbohydrate in each food. Carbohydrate raises blood sugar higher and more quickly than any other nutrient. It is found in desserts, breads and cereals, and fruit. It's also found in starchy vegetables such as potatoes and corn, grains such as rice and pasta, and milk and yogurt. Spreading carbohydrate throughout the day helps keep your blood sugar levels within your target range. Your daily amount depends on several things, including your weight, how active you are, which diabetes medicines you take, and what your goals are for your blood sugar levels. A registered dietitian or diabetes educator can help you plan how much carbohydrate to include in each meal and snack. A guideline for your daily amount of carbohydrate is: · 45 to 60 grams at each meal. That's about the same as 3 to 4 carbohydrate servings. · 15 to 20 grams at each snack. That's about the same as 1 carbohydrate serving. The Nutrition Facts label on packaged foods tells you how much carbohydrate is in a serving of the food. First, look at the serving size on the food label. Is that the amount you eat in a serving? All of the nutrition information on a food label is based on that serving size. So if you eat more or less than that, you'll need to adjust the other numbers. Total carbohydrate is the next thing you need to look for on the label. If you count carbohydrate servings, one serving of carbohydrate is 15 grams. For foods that don't come with labels, such as fresh fruits and vegetables, you'll need a guide that lists carbohydrate in these foods. Ask your doctor, dietitian, or diabetes educator about books or other nutrition guides you can use.  
If you take insulin, you need to know how many grams of carbohydrate are in a meal. This lets you know how much rapid-acting insulin to take before you eat. If you use an insulin pump, you get a constant rate of insulin during the day. So the pump must be programmed at meals to give you extra insulin to cover the rise in blood sugar after meals. When you know how much carbohydrate you will eat, you can take the right amount of insulin. Or, if you always use the same amount of insulin, you need to make sure that you eat the same amount of carbohydrate at meals. If you need more help to understand carbohydrate counting and food labels, ask your doctor, dietitian, or diabetes educator. How do you get started with meal planning? Here are some tips to get started: 
· Plan your meals a week at a time. Don't forget to include snacks too. · Use cookbooks or online recipes to plan several main meals. Plan some quick meals for busy nights. You also can double some recipes that freeze well. Then you can save half for other busy nights when you don't have time to cook. · Make sure you have the ingredients you need for your recipes. If you're running low on basic items, put these items on your shopping list too. · List foods that you use to make breakfasts, lunches, and snacks. List plenty of fruits and vegetables. · Post this list on the refrigerator. Add to it as you think of more things you need. · Take the list to the store to do your weekly shopping. Follow-up care is a key part of your treatment and safety. Be sure to make and go to all appointments, and call your doctor if you are having problems. It's also a good idea to know your test results and keep a list of the medicines you take. Where can you learn more? Go to http://www.[employer]/ [employer] in the search box to learn more about \"Learning About Meal Planning for Diabetes. \" Current as of: December 20, 2019               Content Version: 12.6 © 6375-8141 [employer]. Care instructions adapted under license by [employer] (which disclaims liability or warranty for this information). If you have questions about a medical condition or this instruction, always ask your healthcare professional. [address] any warranty or liability for your use of this information. Learning About Diabetes Food Guidelines Your Care Instructions Meal planning is important to manage diabetes. It helps keep your blood sugar at a target level (which you set with your doctor). You don't have to eat special foods. You can eat what your family eats, including sweets once in a while. But you do have to pay attention to how often you eat and how much you eat of certain foods. You may want to work with a dietitian or a certified diabetes educator (CDE) to help you plan meals and snacks. A dietitian or CDE can also help you lose weight if that is one of your goals. What should you know about eating carbs? Managing the amount of carbohydrate (carbs) you eat is an important part of healthy meals when you have diabetes. Carbohydrate is found in many foods. · Learn which foods have carbs. And learn the amounts of carbs in different foods. ? Bread, cereal, pasta, and rice have about 15 grams of carbs in a serving. A serving is 1 slice of bread (1 ounce), ½ cup of cooked cereal, or 1/3 cup of cooked pasta or rice. ? Fruits have 15 grams of carbs in a serving. A serving is 1 small fresh fruit, such as an apple or orange; ½ of a banana; ½ cup of cooked or canned fruit; ½ cup of fruit juice; 1 cup of melon or raspberries; or 2 tablespoons of dried fruit. ? Milk and no-sugar-added yogurt have 15 grams of carbs in a serving. A serving is 1 cup of milk or 2/3 cup of no-sugar-added yogurt. ? Starchy vegetables have 15 grams of carbs in a serving.  A serving is ½ cup of mashed potatoes or sweet potato; 1 cup winter squash; ½ of a small baked potato; ½ cup of cooked beans; or ½ cup cooked corn or green peas. · Learn how much carbs to eat each day and at each meal. A dietitian or CDE can teach you how to keep track of the amount of carbs you eat. This is called carbohydrate counting. · If you are not sure how to count carbohydrate grams, use the Plate Method to plan meals. It is a good, quick way to make sure that you have a balanced meal. It also helps you spread carbs throughout the day. ? Divide your plate by types of foods. Put non-starchy vegetables on half the plate, meat or other protein food on one-quarter of the plate, and a grain or starchy vegetable in the final quarter of the plate. To this you can add a small piece of fruit and 1 cup of milk or yogurt, depending on how many carbs you are supposed to eat at a meal. 
· Try to eat about the same amount of carbs at each meal. Do not \"save up\" your daily allowance of carbs to eat at one meal. 
· Proteins have very little or no carbs per serving. Examples of proteins are beef, chicken, turkey, fish, eggs, tofu, cheese, cottage cheese, and peanut butter. A serving size of meat is 3 ounces, which is about the size of a deck of cards. Examples of meat substitute serving sizes (equal to 1 ounce of meat) are 1/4 cup of cottage cheese, 1 egg, 1 tablespoon of peanut butter, and ½ cup of tofu. How can you eat out and still eat healthy? · Learn to estimate the serving sizes of foods that have carbohydrate. If you measure food at home, it will be easier to estimate the amount in a serving of restaurant food. · If the meal you order has too much carbohydrate (such as potatoes, corn, or baked beans), ask to have a low-carbohydrate food instead. Ask for a salad or green vegetables. · If you use insulin, check your blood sugar before and after eating out to help you plan how much to eat in the future.  
· If you eat more carbohydrate at a meal than you had planned, take a walk or do other exercise. This will help lower your blood sugar. What else should you know? · Limit saturated fat, such as the fat from meat and dairy products. This is a healthy choice because people who have diabetes are at higher risk of heart disease. So choose lean cuts of meat and nonfat or low-fat dairy products. Use olive or canola oil instead of butter or shortening when cooking. · Don't skip meals. Your blood sugar may drop too low if you skip meals and take insulin or certain medicines for diabetes. · Check with your doctor before you drink alcohol. Alcohol can cause your blood sugar to drop too low. Alcohol can also cause a bad reaction if you take certain diabetes medicines. Follow-up care is a key part of your treatment and safety. Be sure to make and go to all appointments, and call your doctor if you are having problems. It's also a good idea to know your test results and keep a list of the medicines you take. Where can you learn more? Go to http://yoselyn-merline.info/ Enter P764 in the search box to learn more about \"Learning About Diabetes Food Guidelines. \" Current as of: December 20, 2019               Content Version: 12.6 © 0389-3806 [employer]. Care instructions adapted under license by [employer] (which disclaims liability or warranty for this information). If you have questions about a medical condition or this instruction, always ask your healthcare professional. [address] any warranty or liability for your use of this information.

## 2020-11-21 NOTE — PROGRESS NOTES
HPI:   Wendy Loaiza is a 72y.o. year old female who presents today for a physical exam. She has a history of hypertension, hyperlipidemia, paroxysmal SVT, diabetes mellitus, GERD, asthma, elevated transaminases, and atypical mycobacterial pneumonia. She reports that she is doing reasonably well. She states that her right sciatica had improved with physical therapy, and she is following with Dr. Lesly García. She reports that her intermittent loose stools is improved, but persists, since stopping metformin. She states that the diarrhea does respond to Imodium. She denies any abdominal pain, nausea, vomiting, melena, or hematochezia. She is otherwise without new complaints. She has a history of hypertension, treated with metoprolol and hydrochlorothiazide (+ potassium). She reports that she does not check her blood pressure at home. She does not exercise regularly, but denies any chest pain, shortness of breath at rest or with exertion, lightheadedness, or edema. 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 She has had approximately six severe episodes over the years, prompting presentation to the ED and treatment with IV Adenosine. She currently reports infrequent short episodes of palpitations and is being treated with metoprolol. She is followed by Dr. Maribel Lynn. She had an echocardiogram (10/2005) showing normal LV size and function (EF 60-65%), and no valvular pathology. In 11/2012, she underwent an exercise stress echocardiogram, which was normal at maximal exercise. She has a history of hyperlipidemia, treated with simvastatin from 10/2012 to 3/2015 at which time she stopped taking it due to myalgias. She restarted it on 8/2015 and continued to take it without difficulty until 3/2016, when it was noticed that she had transaminase elevation (AST 85/ ) and it was discontinued.  Evaluation included hepatitis A, B, and C levels (negative), iron panel (normal), and RUQ ultrasound (3/23/2016) with limited sonographic window for the liver; only partially visualized but grossly unremarkable. Repeat hepatic panel (4/22/2016) showed AST 75 and ALT 98. Repeat lipid panel showed total chol 215/ / HDL 64/. In 5/2016, she had an abdominal CT scan showing the liver to be normal in size with normal parenchymal density; no discrete mass or ascites, and no intrahepatic biliary dilatation. She was subsequently instructed to restart simvastatin, but chose not to since she felt it was making her forgetful. She agreed to trial of rosuvastatin 10 mg and started it in 2/2018. However, after two weeks of therapy, she discontinued it since she thought it was causing her leg pain. She subsequently contacted Dr. Dee Dee Cohen office and asked to begin simvastatin 20 mg daily in 4/2018. She has been taking it without difficulty since restarting. She has a history of diabetes mellitus, with impaired fasting glucose ranging from 103-111 since 2012, and HbA1c to 6.6-6.8 since 9/2016 (not checked previously). She was prescribed metformin ER last visit for an increase in her HbA1c to 7.1, but she reports that she took it for only one week and discontinued for unclear reasons. She was not experiencing any side effects. She denies any polyuria, polydipsia, nocturia, or blurry vision, and has no history of retinopathy, neuropathy, or nephropathy. She has regular eye exams with Dr. Tanya Flynn. She has a history of asthma and allergic rhinitis and is followed by Dr. Sinai Moreno. She is receiving immunotherapy once per month, and reports that she has not required any inhalers recently. She states that she does not use Qvar daily, but will take it occasionally. She does use Claritin every day. In 7/2019, she reported increasing difficulty with right ear fullness, post nasal drainage, hoarseness, and cough, and was referred to Dr. Jeff Olmos.  He performed a nasal laryngoscopy and found evidence of laryngopharyngeal reflux. She was started on daily omeprazole, and she states that she has noticed some improvement. In 10/2017, she fell down the steps of her porch and had difficulty with right knee pain and swelling. She was evaluated by Dr. Cassy Muñoz in 12/2017, and right knee xray showed decreased medial joint space, and moderate degenerative changes. She received a cortisone injection, but did not notice any improvement. She underwent an MRI of the right knee (1/29/2018) which showed complete tear of posterior horn and root of the medial meniscus; tear of the body and posterior horn of the lateral meniscus; tricompartmental osteoarthritis most prominent in medial and patellofemoral compartments; moderate joint effusion; small Baker's cyst. It was recommended that she consider knee replacement and arthroscopy. However, she decided to seek a second opinion and was evaluated by Dr. Anyi Reeves. She also underwent an MRI of her left knee (3/23/2018) showing radial tear posterior horn medial meniscus with extrusion of the body. Also a radial tear in the mid body; lateral meniscus intrasubstance degeneration and probable small undersurface tear of the posterior horn; medial and patellofemoral compartments moderate chondral loss; s. ubchondral bone marrow edema in the medial tibial plateau, likely reactive. She is completed physical therapy for her bilateral knees and feels that it may have helped. 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 She was treated with Avelox, and repeat chest x-ray in 1/2012 showed complete resolution of the pneumonia. She denies any cough or shortness of breath. She had a screening colonoscopy in 12/2006 by Dr. Mary Ann Sexton showing a 5 mm sessile polyp in the rectum (pathology: hyperplastic).  She had a repeat colonoscopy in 12/2016 which showed moderate sigmoid diverticulosis and a 6 mm sessile ascending colon polyp (pathology: serrated adenoma). Follow-up recommended for 5 years. She denies any abdominal pain, nausea, vomiting, melena, hematochezia, or change in bowel movements. She does take omeprazole occasionally for GERD symptoms. In 12/2017, she was referred by her gynecologist for evaluation by Dr. Kj Lea for microscopic hematuria, and urine cytology was negative. However, she states that she refused his recommendations to undergo a CT urogram or cystoscopy. She denies any dysuria, gross hematuria, or flank pain. Past Medical History:   Diagnosis Date    Allergic rhinitis     Asthma     Cardiac stress echo, normal 11/02/2012    Normal maximal stress echo study. EF 60%. Ex time 9 min 45 sec. 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  Hyperlipidemia     Hypertension     Menopause     Plantar fasciitis     left    PSVT ([facility]oxysmal [facility]ricular tachycardia) [facility])     A-V dharmesh reentrant tachycardia     Past Surgical History:   Procedure Laterality Date    ENDOSCOPY, COLON, DIAGNOSTIC      polyp    HX CERVICAL POLYPECTOMY      HX CYST INCISION AND DRAINAGE Right 10 or more years    HX DILATION AND CURETTAGE      HX GYN      polyp on cervix    HX POLYPECTOMY      from rectum     Current Outpatient Medications   Medication Sig    glipiZIDE SR (GLUCOTROL XL) 5 mg CR tablet Take 1 Tab by mouth daily.  simvastatin (ZOCOR) 20 mg tablet take 1 tablet by mouth at bedtime    loratadine (Claritin) 10 mg tablet Take 1 Tab by mouth daily.     albuterol (PROVENTIL HFA, VENTOLIN HFA, PROAIR HFA) 90 mcg/actuation inhaler inhale 2 puffs by mouth every 4 hours if needed for wheezing    LORazepam (ATIVAN) 1 mg tablet Take 1 Tab by mouth every eight (8) hours as needed for Anxiety. Max Daily Amount: 3 mg.  losartan (COZAAR) 25 mg tablet take 1 tablet by mouth once daily    potassium chloride (K-DUR, KLOR-CON) 20 mEq tablet take 1 tablet by mouth once daily    metoprolol succinate (TOPROL-XL) 50 mg XL tablet take 1 tablet by mouth once daily    fluticasone propionate (FLONASE) 50 mcg/actuation nasal spray instill 2 sprays into each nostril once daily    hydroCHLOROthiazide (HYDRODIURIL) 12.5 mg tablet take 1 tablet by mouth once daily    clotrimazole-betamethasone (LOTRISONE) topical cream Apply  to both ear canals and affected part of outer ear twice a day with a finger.  cholecalciferol (VITAMIN D3) 1,000 unit cap Take 1,000 Units by mouth daily.  carboxymethylcellulose sodium (REFRESH LIQUIGEL) 1 % dlgl ophthalmic solution Apply  to eye. No current facility-administered medications for this visit. Allergies and Intolerances: Allergies   Allergen Reactions    Altace [Ramipril] Cough    Penicillins Other (comments)     Hands peel    Sulfur Itching     Family History: She had two aunts who had breast cancer (her mother's sister and father's sister). She has no FH of colon cancer. Her mother passed away from uterine cancer. Her father  from metastatic prostate cancer. Family History   Problem Relation Age of Onset   [address] [facility] Arthritis-osteo Mother     Hypertension Mother              Cancer Father         bone cancer    Hypertension Sister     Hypertension Sister     Hypertension Sister     Breast Cancer Maternal Aunt     Breast Cancer Paternal Aunt     Diabetes Other     Stroke Other     Other Sister         twin sister - osteopenia and low vitamin D levels     Social History:   She  reports that she has never smoked. She has never used smokeless tobacco. She is  and has two sons. She was a homemaker, but worked part-time in . She now helps care for her grandchildren.    Social History     Substance and Sexual Activity   Alcohol Use No Immunization History:  Immunization History   Administered Date(s) Administered    Influenza Vaccine [facility]) PF (>6 Mo Flulaval, Fluarix, and >3 Yrs Afluria, Fluzone 07774) 10/23/2015, 10/19/2016, 10/05/2017, 10/26/2018, 10/08/2019    Influenza Vaccine PF 12/12/2013, 10/03/2014    Influenza Vaccine Split 11/02/2011, 10/22/2012    Influenza Vaccine Whole 10/29/2010    PPD 01/03/2012    Pneumococcal Conjugate (PCV-13) 08/13/2020    Pneumococcal Polysaccharide (PPSV-23) 03/21/2017    TB Skin Test (PPD) Intradermal 02/12/2014    TDAP Vaccine 02/16/2012    Zoster Recombinant 09/16/2020       Review of Systems:   As above included in HPI. Otherwise 11 point review of systems negative including constitutional, skin, HENT, eyes, respiratory, cardiovascular, gastrointestinal, genitourinary, musculoskeletal, endocrine, hematologic, allergy, and neurologic. Physical:   Vitals:   BP: 136/74  HR: 77  WT: 183 lb (83 kg)  BMI:  32.92 kg/m2    Exam:   Patient appears in no apparent distress. Affect is appropriate. HEENT --Anicteric sclerae, tympanic membranes normal,  ear canals normal.  PERRL, EOMI, conjunctiva and lids normal. No cervical lymphadenopathy. No thyromegaly, JVD, or bruits. Carotid pulses 2+ with normal upstroke. Lungs --Clear to auscultation. No wheezing or rales. Heart --Regular rate and rhythm, no murmurs, rubs, gallops, or clicks. Abdomen -- Soft and nontender, no hepatosplenomegaly or masses. Extremities -- Without cyanosis, clubbing, edema.  Normal looking digits, ROM intact  Neuro -- CN 2-12 intact, strength 5/5 with intact soft touch in all extremities  Derm  no obvious abnormalities noted, no rash       Review of Data:  Labs:  Hospital Outpatient Visit on 11/12/2020   Component Date Value Ref Range Status    WBC 11/12/2020 8.7  4.6 - 13.2 K/uL Final    RBC 11/12/2020 4.37  4.20 - 5.30 M/uL Final    HGB 11/12/2020 13.2  12.0 - 16.0 g/dL Final    HCT 11/12/2020 39.3  35.0 - 45.0 % Final    MCV 11/12/2020 89.9  74.0 - 97.0 FL Final    MCH 11/12/2020 30.2  24.0 - 34.0 PG Final    MCHC 11/12/2020 33.6  31.0 - 37.0 g/dL Final    RDW 11/12/2020 12.1  11.6 - 14.5 % Final    PLATELET 68/78/5365 516  135 - 420 K/uL Final    MPV 11/12/2020 10.8  9.2 - 11.8 FL Final    NEUTROPHILS 11/12/2020 57  40 - 73 % Final    LYMPHOCYTES 11/12/2020 33  21 - 52 % Final    MONOCYTES 11/12/2020 7  3 - 10 % Final    EOSINOPHILS 11/12/2020 3  0 - 5 % Final    BASOPHILS 11/12/2020 0  0 - 2 % Final    ABS. NEUTROPHILS 11/12/2020 5.0  1.8 - 8.0 K/UL Final    ABS. LYMPHOCYTES 11/12/2020 2.9  0.9 - 3.6 K/UL Final    ABS. MONOCYTES 11/12/2020 0.6  0.05 - 1.2 K/UL Final    ABS. EOSINOPHILS 11/12/2020 0.3  0.0 - 0.4 K/UL Final    ABS.  BASOPHILS 11/12/2020 0.0  0.0 - 0.1 K/UL Final    DF 11/12/2020 AUTOMATED    Final    Hemoglobin A1c 11/12/2020 6.8* 4.2 - 5.6 % Final    Est. average glucose 11/12/2020 148  mg/dL Final    LIPID PROFILE 11/12/2020        Final    Cholesterol, total 11/12/2020 160  <200 MG/DL Final    Triglyceride 11/12/2020 90  <150 MG/DL Final    HDL Cholesterol 11/12/2020 65* 40 - 60 MG/DL Final    LDL, calculated 11/12/2020 77  0 - 100 MG/DL Final    VLDL, calculated 11/12/2020 18  MG/DL Final    CHOL/HDL Ratio 11/12/2020 2.5  0 - 5.0   Final    Magnesium 11/12/2020 1.7  1.6 - 2.6 mg/dL Final    Sodium 11/12/2020 137  136 - 145 mmol/L Final    Potassium 11/12/2020 3.9  3.5 - 5.5 mmol/L Final    Chloride 11/12/2020 102  100 - 111 mmol/L Final    CO2 11/12/2020 28  21 - 32 mmol/L Final    Anion gap 11/12/2020 7  3.0 - 18 mmol/L Final    Glucose 11/12/2020 115* 74 - 99 mg/dL Final    BUN 11/12/2020 15  7.0 - 18 MG/DL Final    Creatinine 11/12/2020 0.75  0.6 - 1.3 MG/DL Final    BUN/Creatinine ratio 11/12/2020 20  12 - 20   Final    GFR est AA 11/12/2020 >60  >60 ml/min/1.73m2 Final    GFR est non-AA 11/12/2020 >60  >60 ml/min/1.73m2 Final    Calcium 11/12/2020 8.9  8.5 - 10.1 MG/DL Final    Bilirubin, total 11/12/2020 0.5  0.2 - 1.0 MG/DL Final    ALT (SGPT) 11/12/2020 66* 13 - 56 U/L Final    AST (SGOT) 11/12/2020 45* 10 - 38 U/L Final    Alk. phosphatase 11/12/2020 76  45 - 117 U/L Final    Protein, total 11/12/2020 7.2  6.4 - 8.2 g/dL Final    Albumin 11/12/2020 3.7  3.4 - 5.0 g/dL Final    Globulin 11/12/2020 3.5  2.0 - 4.0 g/dL Final    A-G Ratio 11/12/2020 1.1  0.8 - 1.7   Final    Microalbumin,urine random 11/12/2020 1.02  0 - 3.0 MG/DL Final    Creatinine, urine 11/12/2020 180.00* 30 - 125 mg/dL Final    Microalbumin/Creat ratio (mg/g cre* 11/12/2020 6  0 - 30 mg/g Final    TSH 11/12/2020 1.81  0.36 - 3.74 uIU/mL Final    Vitamin D 25-Hydroxy 11/12/2020 35.4  30 - 100 ng/mL Final     Health Maintenance:  Screening:    Mammogram: negative (11/2020). PAP smear: negative (3/2020) with negative HPV. Followed by Dr. Silvana Batres. Colorectal: colonoscopy (12/2016) serrated adenoma. Dr. Francesco Alcaraz. Due 12/2021. Depression: none   DM (HbA1c/FPG): HbA1c 6.8 (11/2020)   Hepatitis C: negative (3/2016)   Falls: one   DEXA: within normal limits (11/2015)   Glaucoma: regular eye exams with Dr. Mayur Mccracken (last 10/2020)   Smoking: none   Vitamin D: 35.4 (11/2020)    Medicare Wellness: 8/13/2020  (Welcome)      Impression:  Patient Active Problem List   Diagnosis Code    Benign hypertensive heart disease without congestive heart failure I11.9    Allergic rhinitis J30.9    Colon polyps K63.5    AVNRT (AV dharmesh re-entry tachycardia) ([facility] [address] I47.1    Hyperlipidemia E78.5    Essential hypertension I10    Asthma J45.909    GERD (gastroesophageal reflux disease) K21.9    Type 2 diabetes mellitus without complication, without long-term current use of insulin (HCC) E11.9    Vitamin D insufficiency E55.9    Microscopic hematuria R31.29    Chronic pain of both knees M25.561, M25.562, G89.29    Obesity (BMI 30.0-34. 9) E66.9    Noncompliance Z91.19    Anxiety F41.9    Spondylolisthesis, lumbar region M43.16    DDD (degenerative disc disease), lumbar M51.36       Plan:  1. Diabetes mellitus. Diagnosed in 9/2016 when HbA1c was checked and found to be elevated at 6.6. Had been steadily increasing and reached 7.5 in 4/2020. Impaired fasting glucose had been present intermittently since 2012. Being treated with metformin  mg at dinner, but stopped taking it due to diarrhea. Changed to sitagliptin, but unable to fill due to cost. Started on glipizide SR 5 mg daily. Repeat HbA1c 6.8 today. No evidence of microvascular complications. On statin. On losartan and now taking consistently. Stressed importance of compliance. Continue regular eye exams with Dr. Mayur Mccracken. Foot exam normal (8/2020). Urine microalbumin/ creatinine ratio remains without evidence of microalbuminuria (6 mg/g). Emphasized importance of lifestyle modifications, including diet, exercise, and weight loss. 2. Hypertension. Blood pressure with improved control today on losartan 25 mg daily, metoprolol XL 50 mg daily, and hydrochlorothiazide 12.5 mg daily. Still mildly above goal, and will consider increasing dose of losartan to 50 mg daily at next visit if not improved. Renal function has been normal with creatinine 0.75/ eGFR >60 today. Continue to follow. 3. Hyperlipidemia. On moderate intensity dose simvastatin 20 mg daily with LDL 77 and HDL 65, indicative of good control. Continue to follow. 4. Elevated LFT's. Developed in 3/2016 with AST 85/ . Evaluation included hepatitis A, B, and C levels (negative), iron panel (normal), and RUQ ultrasound (3/23/2016) with limited sonographic window for the liver; only partially visualized but grossly unremarkable. In 5/2016, she had an abdominal CT scan showing the liver to be normal in size with normal parenchymal density; no discrete mass or ascites, and no intrahepatic biliary dilatation. Unclear etiology, but transaminases had normalized since that time. However, repeat elevation today with ALT 66 and AST 45. She denies alcohol use. Also reporting some difficulty with intermittent diarrhea, so need to consider possible celiac disease. Will refer to Dr. Tamiko Liang for further evaluation. 5. AV dharmesh reentrant tachycardia. No recent episodes. On metoprolol succinate 50 mg daily and no significant palpitations recently. Followed by Dr. Norris Mcneil and will be transitioning care to Dr. Myriam Amin. 6. Asthma, mild persistent. Associated with allergies. Receiving monthly immunotherapy injections with Dr. Yaniv Howard and states that generally stable. Started Claritin last visit for increased allergy symptoms with improvement. 7. GERD/ Laryngopharyngeal reflux. Evaluated by Dr. Edy Fontenot, and noted on laryngoscopic exam. Started on omeprazole daily but states that decided to discontinue as did not help with cough. Follow. 8. Microscopic hematuria. Patient refusing further evaluation with cystoscopy or CT urogram. Urine cytology negative. Did have abdominal CT scan in 5/2016 where kidneys appeared normal. Recommended that she complete evaluation with Dr. Ghazala Norton. Repeat urinalysis with evidence small blood and 3-5 RBCs in 10/2019. Negative for blood today. Will continue to follow. 9. Bilateral knee pain. Did not respond to cortisone injection. Had both right and left knee MRI showing variable degrees of menisci tears and osteoarthritis of knee joint. Now being followed by Dr. Zoie Gutierrez and reports relatively quiescent. 10. Right sciatica. Patient presented in 10/2020 with severe low back pain and right sciatica for several days. Unclear as to inciting incident. Denied lower extremity weakness or paresthesias. Began taking Etodolac and methocarbamol as prescribed by [facility] several months ago for similar symptoms, and reports helpful in controlling discomfort.  Lumbar spine x-ray showed multilevel degenerative findings, trace levorotoscoliosis and multilevel spondylolisthesis. Referred to physical therapy and reports very helpful. Following with Dr. Marleen Jacobo. 11. Left breast pain and nodules. Evaluated for new onset left breast pain and tenderness in 10/2019. Positive family history for breast cancer in a maternal aunt in her 46s and in a paternal aunt. Underwent bilateral mammogram and left breast ultrasound, and no abnormalities were found. Reports resolution of discomfort. Will continue with annual screening. 12. Shingles. Diagnosed in 4/2020 by Dr. Waldemar Handy. Treated with Valtrex and patient reports mild symptoms. Completed 1/2 doses of Shingrix vaccine. Reports scheduled for 12/2020 for second dose. 13. Obesity. Weight stable since last visit. Emphasized importance of lifestyle modifications, including diet, exercise, and weight loss. Will readdress next visit. 14. Health maintenance. Already received influenza vaccine. REceived 1/2 doses of Shingrix vaccine. Given Pneumovax in 3/2017 given asthma history. Received Prevnar 13 and will need repeat Pneumovax 23 in 3/2022. Colonoscopy due 12/2021. Mammogram up to date as discussed. Continue regular eye exams with Dr. Claudette Huger. Vitamin D level normal. Continue maintenance dose supplement. Welcome to Medicare visit completed. Patient understands recommendations and agrees with plan. Follow-up in 3 months.

## 2020-11-27 RX ORDER — HYDROCHLOROTHIAZIDE 12.5 MG/1
TABLET ORAL
Qty: 90 TAB | Refills: 3 | Status: SHIPPED | OUTPATIENT
Start: 2020-11-27 | End: 2021-10-13

## 2020-12-23 NOTE — PROGRESS NOTES
[facility]  [address] [facility]  [phone] fax    Physical Therapy Discharge Summary    Patient name: Donato Suarez Start of Care: 10/14/2020   Referral source: Lisa Lamar MD : [id number]                Medical Diagnosis: Acute right-sided low back pain with right-sided sciatica [M54.41]  Payor: [place] MEDICARE / Plan: [place] MEDICARE PART A & B / Product Type: Medicare /  Onset Date:10/2020                Treatment Diagnosis: LBP with Sciatica   Prior Hospitalization: see medical history Provider#: [id number]   Medications: Verified on Patient summary List    Comorbidities: Arthritis, Asthma, HTN   Prior Level of Function: Likes to bike ride and watches her grand daughter during the week. Visits from Start of Care: 6    Missed Visits: 1    Reporting Period : 10/14/20 to 20    Summary of Care:  Goal: Pt will increase fOTO score by 7 pts to improve LS function. Status at last note/certification:  50  Status at discharge: met    Goal:  Pt will report >70% improvement in sx to return to ADL's and hobbies. Status at last note/certification: n/a  Status at discharge: met    Goal: Pt will report <3/10 pain at worst during light activities. Status at last note/certification: increased pain  Status at discharge: met    Pt. Progressed well with physical therapy. FOTO score improved to 75 points indicating a significant improvement in function. She also reports a 95% improvement in symptoms since [facility]. She reports good pain control and demonstrates compliance with HEP. She was educated on continuing with HEP following D/C.      ASSESSMENT/RECOMMENDATIONS:  [x]Discontinue therapy: [x]Patient has reached or is progressing toward set goals      []Patient is non-compliant or has abdicated      []Due to lack of appreciable progress towards set goals    Lm Martell, PT 2020 2:51 PM

## 2021-01-19 RX ORDER — METOPROLOL SUCCINATE 50 MG/1
TABLET, EXTENDED RELEASE ORAL
Qty: 90 TAB | Refills: 3 | Status: SHIPPED | OUTPATIENT
Start: 2021-01-19 | End: 2022-01-12

## 2021-01-27 ENCOUNTER — TRANSCRIBE ORDER (OUTPATIENT)
Dept: SCHEDULING | Age: 66
End: 2021-01-27

## 2021-01-27 DIAGNOSIS — K21.9 ESOPHAGEAL REFLUX: ICD-10-CM

## 2021-01-27 DIAGNOSIS — R74.8 ACID PHOSPHATASE ELEVATED: ICD-10-CM

## 2021-01-27 DIAGNOSIS — K76.0 NON-ALCOHOLIC FATTY LIVER DISEASE: ICD-10-CM

## 2021-01-27 DIAGNOSIS — K76.0 FATTY LIVER: ICD-10-CM

## 2021-01-27 DIAGNOSIS — R19.4 CHANGE IN BOWEL HABITS: ICD-10-CM

## 2021-01-27 DIAGNOSIS — Z86.010 PERSONAL HISTORY OF COLONIC POLYPS: ICD-10-CM

## 2021-01-27 DIAGNOSIS — K57.30 DIVERTICULOSIS OF LARGE INTESTINE WITHOUT DIVERTICULITIS: Primary | ICD-10-CM

## 2021-02-10 ENCOUNTER — TELEPHONE (OUTPATIENT)
Dept: INTERNAL MEDICINE CLINIC | Age: 66
End: 2021-02-10

## 2021-02-16 ENCOUNTER — TELEPHONE (OUTPATIENT)
Dept: INTERNAL MEDICINE CLINIC | Age: 66
End: 2021-02-16

## 2021-02-16 ENCOUNTER — HOSPITAL ENCOUNTER (OUTPATIENT)
Dept: ULTRASOUND IMAGING | Age: 66
Discharge: HOME | End: 2021-02-16
Attending: INTERNAL MEDICINE
Payer: MEDICARE

## 2021-02-16 DIAGNOSIS — K21.9 ESOPHAGEAL REFLUX: ICD-10-CM

## 2021-02-16 DIAGNOSIS — R74.8 ACID PHOSPHATASE ELEVATED: ICD-10-CM

## 2021-02-16 DIAGNOSIS — K57.30 DIVERTICULOSIS OF LARGE INTESTINE WITHOUT DIVERTICULITIS: ICD-10-CM

## 2021-02-16 DIAGNOSIS — K76.0 FATTY LIVER: ICD-10-CM

## 2021-02-16 DIAGNOSIS — K76.0 NON-ALCOHOLIC FATTY LIVER DISEASE: ICD-10-CM

## 2021-02-16 DIAGNOSIS — R19.4 CHANGE IN BOWEL HABITS: ICD-10-CM

## 2021-02-16 DIAGNOSIS — Z86.010 PERSONAL HISTORY OF COLONIC POLYPS: ICD-10-CM

## 2021-02-16 PROCEDURE — 76705 ECHO EXAM OF ABDOMEN: CPT

## 2021-02-16 NOTE — TELEPHONE ENCOUNTER
Pt calling, says she is getting allergy shots. Wants to know if she is supposed to wait to get the covid shot after shots? She is on antibiotics for a uti. She is also having colonoscopy next month. Should she wait for the shot or try and get now.

## 2021-02-16 NOTE — TELEPHONE ENCOUNTER
Would recommend proceeding with the COVID vaccine now. May wish to wait until after completion of antibiotics. Please see if she would like to schedule in [facility] this week.

## 2021-02-17 NOTE — TELEPHONE ENCOUNTER
Pt aware of message below and verbalized understanding. She will call to speak with the front office about scheduling at [facility]. No further questions or concerns from pt at this time.

## 2021-02-23 ENCOUNTER — HOSPITAL ENCOUNTER (OUTPATIENT)
Dept: LAB | Age: 66
Discharge: HOME | End: 2021-02-23
Payer: MEDICARE

## 2021-02-23 ENCOUNTER — APPOINTMENT (OUTPATIENT)
Dept: INTERNAL MEDICINE CLINIC | Age: 66
End: 2021-02-23

## 2021-02-23 DIAGNOSIS — M43.16 SPONDYLOLISTHESIS, LUMBAR REGION: ICD-10-CM

## 2021-02-23 DIAGNOSIS — K21.9 GASTROESOPHAGEAL REFLUX DISEASE WITHOUT ESOPHAGITIS: ICD-10-CM

## 2021-02-23 DIAGNOSIS — E55.9 VITAMIN D INSUFFICIENCY: ICD-10-CM

## 2021-02-23 DIAGNOSIS — E11.9 TYPE 2 DIABETES MELLITUS WITHOUT COMPLICATION, WITHOUT LONG-TERM CURRENT USE OF INSULIN (HCC): ICD-10-CM

## 2021-02-23 DIAGNOSIS — R79.89 ELEVATED LFTS: ICD-10-CM

## 2021-02-23 DIAGNOSIS — R19.7 DIARRHEA, UNSPECIFIED TYPE: ICD-10-CM

## 2021-02-23 DIAGNOSIS — E78.5 HYPERLIPIDEMIA, UNSPECIFIED HYPERLIPIDEMIA TYPE: ICD-10-CM

## 2021-02-23 DIAGNOSIS — E66.9 OBESITY (BMI 30.0-34.9): ICD-10-CM

## 2021-02-23 DIAGNOSIS — G89.29 CHRONIC PAIN OF BOTH KNEES: ICD-10-CM

## 2021-02-23 DIAGNOSIS — J45.30 MILD PERSISTENT ASTHMA WITHOUT COMPLICATION: ICD-10-CM

## 2021-02-23 DIAGNOSIS — I11.9 BENIGN HYPERTENSIVE HEART DISEASE WITHOUT CONGESTIVE HEART FAILURE: ICD-10-CM

## 2021-02-23 DIAGNOSIS — J30.1 SEASONAL ALLERGIC RHINITIS DUE TO POLLEN: ICD-10-CM

## 2021-02-23 DIAGNOSIS — I47.1 AVNRT (AV NODAL RE-ENTRY TACHYCARDIA) (HCC): ICD-10-CM

## 2021-02-23 DIAGNOSIS — M51.36 DDD (DEGENERATIVE DISC DISEASE), LUMBAR: ICD-10-CM

## 2021-02-23 DIAGNOSIS — I10 ESSENTIAL HYPERTENSION: ICD-10-CM

## 2021-02-23 DIAGNOSIS — M25.561 CHRONIC PAIN OF BOTH KNEES: ICD-10-CM

## 2021-02-23 DIAGNOSIS — M25.562 CHRONIC PAIN OF BOTH KNEES: ICD-10-CM

## 2021-02-23 LAB
ALBUMIN SERPL-MCNC: 3.9 G/DL (ref 3.4–5)
ALBUMIN/GLOB SERPL: 1.1 {RATIO} (ref 0.8–1.7)
ALP SERPL-CCNC: 80 U/L (ref 45–117)
ALT SERPL-CCNC: 38 U/L (ref 13–56)
ANION GAP SERPL CALC-SCNC: 3 MMOL/L (ref 3–18)
AST SERPL-CCNC: 29 U/L (ref 10–38)
BILIRUB SERPL-MCNC: 0.5 MG/DL (ref 0.2–1)
BUN SERPL-MCNC: 14 MG/DL (ref 7–18)
BUN/CREAT SERPL: 22 (ref 12–20)
CALCIUM SERPL-MCNC: 9.1 MG/DL (ref 8.5–10.1)
CHLORIDE SERPL-SCNC: 107 MMOL/L (ref 100–111)
CHOLEST SERPL-MCNC: 161 MG/DL
CO2 SERPL-SCNC: 32 MMOL/L (ref 21–32)
CREAT SERPL-MCNC: 0.63 MG/DL (ref 0.6–1.3)
CREAT UR-MCNC: 167 MG/DL (ref 30–125)
EST. AVERAGE GLUCOSE BLD GHB EST-MCNC: 148 MG/DL
GLOBULIN SER CALC-MCNC: 3.4 G/DL (ref 2–4)
GLUCOSE SERPL-MCNC: 129 MG/DL (ref 74–99)
HBA1C MFR BLD: 6.8 % (ref 4.2–5.6)
HDLC SERPL-MCNC: 72 MG/DL (ref 40–60)
HDLC SERPL: 2.2 {RATIO} (ref 0–5)
LDLC SERPL CALC-MCNC: 66.6 MG/DL (ref 0–100)
LIPID PROFILE,FLP: ABNORMAL
MAGNESIUM SERPL-MCNC: 1.9 MG/DL (ref 1.6–2.6)
MICROALBUMIN UR-MCNC: 1.23 MG/DL (ref 0–3)
MICROALBUMIN/CREAT UR-RTO: 7 MG/G (ref 0–30)
POTASSIUM SERPL-SCNC: 4 MMOL/L (ref 3.5–5.5)
PROT SERPL-MCNC: 7.3 G/DL (ref 6.4–8.2)
SODIUM SERPL-SCNC: 142 MMOL/L (ref 136–145)
TRIGL SERPL-MCNC: 112 MG/DL (ref ?–150)
VLDLC SERPL CALC-MCNC: 22.4 MG/DL

## 2021-02-23 PROCEDURE — 80053 COMPREHEN METABOLIC PANEL: CPT

## 2021-02-23 PROCEDURE — 80061 LIPID PANEL: CPT

## 2021-02-23 PROCEDURE — 82043 UR ALBUMIN QUANTITATIVE: CPT

## 2021-02-23 PROCEDURE — 83735 ASSAY OF MAGNESIUM: CPT

## 2021-02-23 PROCEDURE — 83036 HEMOGLOBIN GLYCOSYLATED A1C: CPT

## 2021-02-23 PROCEDURE — 36415 COLL VENOUS BLD VENIPUNCTURE: CPT

## 2021-03-02 RX ORDER — FLUTICASONE PROPIONATE 50 MCG
SPRAY, SUSPENSION (ML) NASAL
Qty: 16 G | Refills: 5 | Status: SHIPPED | OUTPATIENT
Start: 2021-03-02 | End: 2022-04-05

## 2021-03-03 ENCOUNTER — OFFICE VISIT (OUTPATIENT)
Dept: INTERNAL MEDICINE CLINIC | Age: 66
End: 2021-03-03
Payer: MEDICARE

## 2021-03-03 VITALS
OXYGEN SATURATION: 98 % | HEART RATE: 74 BPM | HEIGHT: 62 IN | WEIGHT: 182.6 LBS | SYSTOLIC BLOOD PRESSURE: 122 MMHG | BODY MASS INDEX: 33.6 KG/M2 | TEMPERATURE: 97.7 F | RESPIRATION RATE: 16 BRPM | DIASTOLIC BLOOD PRESSURE: 60 MMHG

## 2021-03-03 DIAGNOSIS — K21.9 GASTROESOPHAGEAL REFLUX DISEASE WITHOUT ESOPHAGITIS: ICD-10-CM

## 2021-03-03 DIAGNOSIS — J30.1 SEASONAL ALLERGIC RHINITIS DUE TO POLLEN: ICD-10-CM

## 2021-03-03 DIAGNOSIS — E66.9 OBESITY (BMI 30.0-34.9): ICD-10-CM

## 2021-03-03 DIAGNOSIS — I47.1 AVNRT (AV NODAL RE-ENTRY TACHYCARDIA) (HCC): ICD-10-CM

## 2021-03-03 DIAGNOSIS — R79.89 ELEVATED LFTS: ICD-10-CM

## 2021-03-03 DIAGNOSIS — Z91.14 NONCOMPLIANCE WITH DIET AND MEDICATION REGIMEN: ICD-10-CM

## 2021-03-03 DIAGNOSIS — E78.5 HYPERLIPIDEMIA, UNSPECIFIED HYPERLIPIDEMIA TYPE: ICD-10-CM

## 2021-03-03 DIAGNOSIS — K75.81 NASH (NONALCOHOLIC STEATOHEPATITIS): ICD-10-CM

## 2021-03-03 DIAGNOSIS — I10 ESSENTIAL HYPERTENSION: Primary | ICD-10-CM

## 2021-03-03 DIAGNOSIS — K76.0 NAFL (NONALCOHOLIC FATTY LIVER): ICD-10-CM

## 2021-03-03 DIAGNOSIS — J45.30 MILD PERSISTENT ASTHMA WITHOUT COMPLICATION: ICD-10-CM

## 2021-03-03 DIAGNOSIS — Z91.119 NONCOMPLIANCE WITH DIET AND MEDICATION REGIMEN: ICD-10-CM

## 2021-03-03 DIAGNOSIS — E11.9 TYPE 2 DIABETES MELLITUS WITHOUT COMPLICATION, WITHOUT LONG-TERM CURRENT USE OF INSULIN (HCC): ICD-10-CM

## 2021-03-03 PROCEDURE — G8510 SCR DEP NEG, NO PLAN REQD: HCPCS | Performed by: INTERNAL MEDICINE

## 2021-03-03 PROCEDURE — 99214 OFFICE O/P EST MOD 30 MIN: CPT | Performed by: INTERNAL MEDICINE

## 2021-03-03 PROCEDURE — 3017F COLORECTAL CA SCREEN DOC REV: CPT | Performed by: INTERNAL MEDICINE

## 2021-03-03 PROCEDURE — G8536 NO DOC ELDER MAL SCRN: HCPCS | Performed by: INTERNAL MEDICINE

## 2021-03-03 PROCEDURE — G8399 PT W/DXA RESULTS DOCUMENT: HCPCS | Performed by: INTERNAL MEDICINE

## 2021-03-03 PROCEDURE — 2022F DILAT RTA XM EVC RTNOPTHY: CPT | Performed by: INTERNAL MEDICINE

## 2021-03-03 PROCEDURE — 1101F PT FALLS ASSESS-DOCD LE1/YR: CPT | Performed by: INTERNAL MEDICINE

## 2021-03-03 PROCEDURE — 1090F PRES/ABSN URINE INCON ASSESS: CPT | Performed by: INTERNAL MEDICINE

## 2021-03-03 PROCEDURE — G0463 HOSPITAL OUTPT CLINIC VISIT: HCPCS | Performed by: INTERNAL MEDICINE

## 2021-03-03 PROCEDURE — G8754 DIAS BP LESS 90: HCPCS | Performed by: INTERNAL MEDICINE

## 2021-03-03 PROCEDURE — G8417 CALC BMI ABV UP PARAM F/U: HCPCS | Performed by: INTERNAL MEDICINE

## 2021-03-03 PROCEDURE — G8752 SYS BP LESS 140: HCPCS | Performed by: INTERNAL MEDICINE

## 2021-03-03 PROCEDURE — 3044F HG A1C LEVEL LT 7.0%: CPT | Performed by: INTERNAL MEDICINE

## 2021-03-03 PROCEDURE — G8427 DOCREV CUR MEDS BY ELIG CLIN: HCPCS | Performed by: INTERNAL MEDICINE

## 2021-03-03 PROCEDURE — G9899 SCRN MAM PERF RSLTS DOC: HCPCS | Performed by: INTERNAL MEDICINE

## 2021-03-03 NOTE — PROGRESS NOTES
1. Have you been to the ER, urgent care clinic or hospitalized since your last visit? NO.     2. Have you seen or consulted any other health care providers outside of the [address] [place] [address] since your last visit (Include any pap smears or colon screening)?  NO

## 2021-03-03 NOTE — PATIENT INSTRUCTIONS
Learning About Meal Planning for Diabetes Why plan your meals? Meal planning can be a key part of managing diabetes. Planning meals and snacks with the right balance of carbohydrate, protein, and fat can help you keep your blood sugar at the target level you set with your doctor. You don't have to eat special foods. You can eat what your family eats, including sweets once in a while. But you do have to pay attention to how often you eat and how much you eat of certain foods. You may want to work with a dietitian or a certified diabetes educator. He or she can give you tips and meal ideas and can answer your questions about meal planning. This health professional can also help you reach a healthy weight if that is one of your goals. What plan is right for you? Your dietitian or diabetes educator may suggest that you start with the plate format or carbohydrate counting. The plate format The plate format is a simple way to help you manage how you eat. You plan meals by learning how much space each food should take on a plate. Using the plate format helps you spread carbohydrate throughout the day. It can make it easier to keep your blood sugar level within your target range. It also helps you see if you're eating healthy portion sizes. To use the plate format, you put non-starchy vegetables on half your plate. Add meat or meat substitutes on one-quarter of the plate. Put a grain or starchy vegetable (such as brown rice or a potato) on the final quarter of the plate. You can add a small piece of fruit and some low-fat or fat-free milk or yogurt, depending on your carbohydrate goal for each meal. 
Here are some tips for using the plate format: · Make sure that you are not using an oversized plate. A 9-inch plate is best. Many restaurants use larger plates. · Get used to using the plate format at home. Then you can use it when you eat out. · Write down your questions about using the plate format. Talk to your doctor, a dietitian, or a diabetes educator about your concerns. Carbohydrate counting With carbohydrate counting, you plan meals based on the amount of carbohydrate in each food. Carbohydrate raises blood sugar higher and more quickly than any other nutrient. It is found in desserts, breads and cereals, and fruit. It's also found in starchy vegetables such as potatoes and corn, grains such as rice and pasta, and milk and yogurt. Spreading carbohydrate throughout the day helps keep your blood sugar levels within your target range. Your daily amount depends on several things, including your weight, how active you are, which diabetes medicines you take, and what your goals are for your blood sugar levels. A registered dietitian or diabetes educator can help you plan how much carbohydrate to include in each meal and snack. A guideline for your daily amount of carbohydrate is: · 45 to 60 grams at each meal. That's about the same as 3 to 4 carbohydrate servings. · 15 to 20 grams at each snack. That's about the same as 1 carbohydrate serving. The Nutrition Facts label on packaged foods tells you how much carbohydrate is in a serving of the food. First, look at the serving size on the food label. Is that the amount you eat in a serving? All of the nutrition information on a food label is based on that serving size. So if you eat more or less than that, you'll need to adjust the other numbers. Total carbohydrate is the next thing you need to look for on the label. If you count carbohydrate servings, one serving of carbohydrate is 15 grams. For foods that don't come with labels, such as fresh fruits and vegetables, you'll need a guide that lists carbohydrate in these foods. Ask your doctor, dietitian, or diabetes educator about books or other nutrition guides you can use. If you take insulin, you need to know how many grams of carbohydrate are in a meal. This lets you know how much rapid-acting insulin to take before you eat. If you use an insulin pump, you get a constant rate of insulin during the day. So the pump must be programmed at meals to give you extra insulin to cover the rise in blood sugar after meals. When you know how much carbohydrate you will eat, you can take the right amount of insulin. Or, if you always use the same amount of insulin, you need to make sure that you eat the same amount of carbohydrate at meals. If you need more help to understand carbohydrate counting and food labels, ask your doctor, dietitian, or diabetes educator. How do you get started with meal planning? Here are some tips to get started: 
· Plan your meals a week at a time. Don't forget to include snacks too. · Use cookbooks or online recipes to plan several main meals. Plan some quick meals for busy nights. You also can double some recipes that freeze well. Then you can save half for other busy nights when you don't have time to cook. · Make sure you have the ingredients you need for your recipes. If you're running low on basic items, put these items on your shopping list too. · List foods that you use to make breakfasts, lunches, and snacks. List plenty of fruits and vegetables. · Post this list on the refrigerator. Add to it as you think of more things you need. · Take the list to the store to do your weekly shopping. Follow-up care is a key part of your treatment and safety. Be sure to make and go to all appointments, and call your doctor if you are having problems. It's also a good idea to know your test results and keep a list of the medicines you take. Where can you learn more? Go to http://www.tinyclues.com/ Riya Poor in the search box to learn more about \"Learning About Meal Planning for Diabetes. \" 
 Current as of: December 20, 2019               Content Version: 12.6 © 8288-2178 [employer]. Care instructions adapted under license by [id number] (which disclaims liability or warranty for this information). If you have questions about a medical condition or this instruction, always ask your healthcare professional. [address] any warranty or liability for your use of this information. Learning About Diabetes Food Guidelines Your Care Instructions Meal planning is important to manage diabetes. It helps keep your blood sugar at a target level (which you set with your doctor). You don't have to eat special foods. You can eat what your family eats, including sweets once in a while. But you do have to pay attention to how often you eat and how much you eat of certain foods. You may want to work with a dietitian or a certified diabetes educator (CDE) to help you plan meals and snacks. A dietitian or CDE can also help you lose weight if that is one of your goals. What should you know about eating carbs? Managing the amount of carbohydrate (carbs) you eat is an important part of healthy meals when you have diabetes. Carbohydrate is found in many foods. · Learn which foods have carbs. And learn the amounts of carbs in different foods. ? Bread, cereal, pasta, and rice have about 15 grams of carbs in a serving. A serving is 1 slice of bread (1 ounce), ½ cup of cooked cereal, or 1/3 cup of cooked pasta or rice. ? Fruits have 15 grams of carbs in a serving. A serving is 1 small fresh fruit, such as an apple or orange; ½ of a banana; ½ cup of cooked or canned fruit; ½ cup of fruit juice; 1 cup of melon or raspberries; or 2 tablespoons of dried fruit. ? Milk and no-sugar-added yogurt have 15 grams of carbs in a serving. A serving is 1 cup of milk or 2/3 cup of no-sugar-added yogurt. ? Starchy vegetables have 15 grams of carbs in a serving. A serving is ½ cup of mashed potatoes or sweet potato; 1 cup winter squash; ½ of a small baked potato; ½ cup of cooked beans; or ½ cup cooked corn or green peas. · Learn how much carbs to eat each day and at each meal. A dietitian or CDE can teach you how to keep track of the amount of carbs you eat. This is called carbohydrate counting. · If you are not sure how to count carbohydrate grams, use the Plate Method to plan meals. It is a good, quick way to make sure that you have a balanced meal. It also helps you spread carbs throughout the day. ? Divide your plate by types of foods. Put non-starchy vegetables on half the plate, meat or other protein food on one-quarter of the plate, and a grain or starchy vegetable in the final quarter of the plate. To this you can add a small piece of fruit and 1 cup of milk or yogurt, depending on how many carbs you are supposed to eat at a meal. 
· Try to eat about the same amount of carbs at each meal. Do not \"save up\" your daily allowance of carbs to eat at one meal. 
· Proteins have very little or no carbs per serving. Examples of proteins are beef, chicken, turkey, fish, eggs, tofu, cheese, cottage cheese, and peanut butter. A serving size of meat is 3 ounces, which is about the size of a deck of cards. Examples of meat substitute serving sizes (equal to 1 ounce of meat) are 1/4 cup of cottage cheese, 1 egg, 1 tablespoon of peanut butter, and ½ cup of tofu. How can you eat out and still eat healthy? · Learn to estimate the serving sizes of foods that have carbohydrate. If you measure food at home, it will be easier to estimate the amount in a serving of restaurant food. · If the meal you order has too much carbohydrate (such as potatoes, corn, or baked beans), ask to have a low-carbohydrate food instead. Ask for a salad or green vegetables. · If you use insulin, check your blood sugar before and after eating out to help you plan how much to eat in the future. · If you eat more carbohydrate at a meal than you had planned, take a walk or do other exercise. This will help lower your blood sugar. What else should you know? · Limit saturated fat, such as the fat from meat and dairy products. This is a healthy choice because people who have diabetes are at higher risk of heart disease. So choose lean cuts of meat and nonfat or low-fat dairy products. Use olive or canola oil instead of butter or shortening when cooking. · Don't skip meals. Your blood sugar may drop too low if you skip meals and take insulin or certain medicines for diabetes. · Check with your doctor before you drink alcohol. Alcohol can cause your blood sugar to drop too low. Alcohol can also cause a bad reaction if you take certain diabetes medicines. Follow-up care is a key part of your treatment and safety. Be sure to make and go to all appointments, and call your doctor if you are having problems. It's also a good idea to know your test results and keep a list of the medicines you take. Where can you learn more? Go to http://www.gray.com/ Enter I538 in the search box to learn more about \"Learning About Diabetes Food Guidelines. \" Current as of: December 20, 2019               Content Version: 12.6 © 4093-7348 [employer]. Care instructions adapted under license by [employer] (which disclaims liability or warranty for this information). If you have questions about a medical condition or this instruction, always ask your healthcare professional. [place] [address] any warranty or liability for your use of this information. Nutrition Tips for Diabetes: After Your Visit Your Care Instructions A healthy diet is important to manage diabetes. It helps you lose weight (if you need to) and keep it off. It gives you the nutrition and energy your body needs and helps prevent heart disease. But a diet for diabetes does not mean that you have to eat special foods. You can eat what your family eats, including occasional sweets and other favorites. But you do have to pay attention to how often you eat and how much you eat of certain foods. The right plan for you will give you meals that help you keep your blood sugar at healthy levels. Try to eat a variety of foods and to spread carbohydrate throughout the day. Carbohydrate raises blood sugar higher and more quickly than any other nutrient does. Carbohydrate is found in sugar, breads and cereals, fruit, starchy vegetables such as potatoes and corn, and milk and yogurt. You may want to work with a dietitian or diabetes educator to help you plan meals and snacks. A dietitian or diabetes educator also can help you lose weight if that is one of your goals. The following tips can help you enjoy your meals and stay healthy. Follow-up care is a key part of your treatment and safety. Be sure to make and go to all appointments, and call your doctor if you are having problems. Its also a good idea to know your test results and keep a list of the medicines you take. How can you care for yourself at home? · Learn which foods have carbohydrate and how much carbohydrate to eat. A dietitian or diabetes educator can help you learn to keep track of how much carbohydrate you eat. · Spread carbohydrate throughout the day. Eat some carbohydrate at all meals, but do not eat too much at any one time. · Plan meals to include food from all the food groups. These are the food groups and some example portion sizes: ¨ Grains: 1 slice of bread (1 ounce), ½ cup of cooked cereal, and 1/3 cup of cooked pasta or rice. These have about 15 grams of carbohydrate in a serving. Choose whole grains such as whole wheat bread or crackers, oatmeal, and brown rice more often than refined grains. ¨ Fruit: 1 small fresh fruit, such as an apple or orange; ½ of a banana; ½ cup of chopped, cooked, or canned fruit; ½ cup of fruit juice; 1 cup of melon or raspberries; and 2 tablespoons of dried fruit. These have about 15 grams of carbohydrate in a serving. ¨ Dairy: 1 cup of nonfat or low-fat milk and 2/3 cup of plain yogurt. These have about 15 grams of carbohydrate in a serving. ¨ Protein foods: Beef, chicken, turkey, fish, eggs, tofu, cheese, cottage cheese, and peanut butter. A serving size of meat is 3 ounces, which is about the size of a deck of cards. Examples of meat substitute serving sizes (equal to 1 ounce of meat) are 1/4 cup of cottage cheese, 1 egg, 1 tablespoon of peanut butter, and ½ cup of tofu. These have very little or no carbohydrate per serving. ¨ Vegetables: Starchy vegetables such as ½ cup of cooked dried beans, peas, potatoes, or corn have about 15 grams of carbohydrate. Nonstarchy vegetables have very little carbohydrate, such as 1 cup of raw leafy vegetables (such as spinach), ½ cup of other vegetables (cooked or chopped), and 3/4 cup of vegetable juice. · Use the plate format to plan meals. It is a good, quick way to make sure that you have a balanced meal. It also helps you spread carbohydrate throughout the day. You divide your plate by types of foods. Put vegetables on half the plate, meat or meat substitutes on one-quarter of the plate, and a grain or starchy vegetable (such as brown rice or a potato) in the final quarter of the plate.  To this you can add a small piece of fruit and 1 cup of milk or yogurt, depending on how much carbohydrate you are supposed to eat at a meal. 
 · Talk to your dietitian or diabetes educator about ways to add limited amounts of sweets into your meal plan. You can eat these foods now and then, as long as you include the amount of carbohydrate they have in your daily carbohydrate allowance. · If you drink alcohol, limit it to no more than 1 drink a day for women and 2 drinks a day for men. If you are pregnant, no amount of alcohol is known to be safe. · Protein, fat, and fiber do not raise blood sugar as much as carbohydrate does. If you eat a lot of these nutrients in a meal, your blood sugar will rise more slowly than it would otherwise. · Limit saturated fats, such as those from meat and dairy products. Try to replace it with monounsaturated fat, such as olive oil. This is a healthier choice because people who have diabetes are at higher-than-average risk of heart disease. But use a modest amount of olive oil. A tablespoon of olive oil has 14 grams of fat and 120 calories. · Exercise lowers blood sugar. If you take insulin by shots or pump, you can use less than you would if you were not exercising. Keep in mind that timing matters. If you exercise within 1 hour after a meal, your body may need less insulin for that meal than it would if you exercised 3 hours after the meal. Test your blood sugar to find out how exercise affects your need for insulin. · Exercise on most days of the week. Aim for at least 30 minutes. Exercise helps you stay at a healthy weight and helps your body use insulin. Walking is an easy way to get exercise. Gradually increase the amount you walk every day. You also may want to swim, bike, or do other activities. When you eat out · Learn to estimate the serving sizes of foods that have carbohydrate. If you measure food at home, it will be easier to estimate the amount in a serving of restaurant food. · If the meal you order has too much carbohydrate (such as potatoes, corn, or baked beans), ask to have a low-carbohydrate food instead. Ask for a salad or green vegetables. · If you use insulin, check your blood sugar before and after eating out to help you plan how much to eat in the future. · If you eat more carbohydrate at a meal than you had planned, take a walk or do other exercise. This will help lower your blood sugar. Where can you learn more? Go to [employer] Enter R427 in the search box to learn more about \"Nutrition Tips for Diabetes: After Your Visit. \"  
© 5763-7392 [employer]wise, Inc[employer]. Care instructions adapted under license by [employer] (which disclaims liability or warranty for this information). This care instruction is for use with your licensed healthcare professional. If you have questions about a medical condition or this instruction, always ask your healthcare professional. [place] [address] any warranty or liability for your use of this information. Content Version: 71.7.541691; Current as of: June 4, 2014 Neck: Exercises Introduction Here are some examples of exercises for you to try. The exercises may be suggested for a condition or for rehabilitation. Start each exercise slowly. Ease off the exercises if you start to have pain. You will be told when to start these exercises and which ones will work best for you. How to do the exercises Neck stretch 1. This stretch works best if you keep your shoulder down as you lean away from it. To help you remember to do this, start by relaxing your shoulders and lightly holding on to your thighs or your chair. 2. Tilt your head toward your shoulder and hold for 15 to 30 seconds. Let the weight of your head stretch your muscles. 3. If you would like a little added stretch, use your hand to gently and steadily pull your head toward your shoulder. For example, keeping your right shoulder down, lean your head to the left. 4. Repeat 2 to 4 times toward each shoulder. Diagonal neck stretch 1. Turn your head slightly toward the direction you will be stretching, and tilt your head diagonally toward your chest and hold for 15 to 30 seconds. 2. If you would like a little added stretch, use your hand to gently and steadily pull your head forward on the diagonal. 
3. Repeat 2 to 4 times toward each side. Dorsal glide stretch The dorsal glide stretches the back of the neck. If you feel pain, do not glide so far back. Some people find this exercise easier to do while lying on their backs with an ice pack on the neck. 1. Sit or stand tall and look straight ahead. 2. Slowly tuck your chin as you glide your head backward over your body 3. Hold for a count of 6, and then relax for up to 10 seconds. 4. Repeat 8 to 12 times. Chest and shoulder stretch 1. Sit or stand tall and glide your head backward as in the dorsal glide stretch. 2. Raise both arms so that your hands are next to your ears. 3. Take a deep breath, and as you breathe out, lower your elbows down and behind your back. You will feel your shoulder blades slide down and together, and at the same time you will feel a stretch across your chest and the front of your shoulders. 4. Hold for about 6 seconds, and then relax for up to 10 seconds. 5. Repeat 8 to 12 times. Strengthening: Hands on head 1. Move your head backward, forward, and side to side against gentle pressure from your hands, holding each position for about 6 seconds. 2. Repeat 8 to 12 times. Follow-up care is a key part of your treatment and safety. Be sure to make and go to all appointments, and call your doctor if you are having problems. It's also a good idea to know your test results and keep a list of the medicines you take. Where can you learn more? Go to http://www.pittman.com/ Enter P975 in the search box to learn more about \"Neck: Exercises. \" Current as of: March 2, 2020               Content Version: 12.6 © 2006-2020 [employer]. Care instructions adapted under license by [employer] (which disclaims liability or warranty for this information). If you have questions about a medical condition or this instruction, always ask your healthcare professional. [address] any warranty or liability for your use of this information.

## 2021-03-07 PROBLEM — Z91.119 NONCOMPLIANCE WITH DIET AND MEDICATION REGIMEN: Status: ACTIVE | Noted: 2018-12-02

## 2021-03-07 PROBLEM — K76.0 NAFL (NONALCOHOLIC FATTY LIVER): Status: ACTIVE | Noted: 2021-03-07

## 2021-03-07 PROBLEM — K75.81 NASH (NONALCOHOLIC STEATOHEPATITIS): Status: ACTIVE | Noted: 2021-03-07

## 2021-03-07 PROBLEM — Z91.14 NONCOMPLIANCE WITH DIET AND MEDICATION REGIMEN: Status: ACTIVE | Noted: 2018-12-02

## 2021-03-07 NOTE — PROGRESS NOTES
HPI:   Mehdi Campos is a 72y.o. year old female who presents today for a routine visit. She has a history of hypertension, hyperlipidemia, paroxysmal SVT, diabetes mellitus, GERD, asthma, elevated transaminases, and atypical mycobacterial pneumonia. She reports that she is doing reasonably well. She reports that she did undergo evaluation by Dr. Adrianne Whitehead for her elevated LFT's, and states that lab evaluation was negative. She reports that she did undergo an abdominal ultrasound (2/16/2021) which showed an echogenic lobulated contour liver suggestive of steatosis +/- cirrhosis; no focal lesions seen. She reports that she did obtain her first dose of Moderna COVID vaccine, and is attempting to schedule her second dose. She states that she is no longer taking glipizide since she wants to try to control her blood sugar with her diet. She is otherwise without new complaints. She has a history of hypertension, treated with metoprolol and hydrochlorothiazide (+ potassium). She reports that she does not check her blood pressure at home. She does not exercise regularly, but denies any chest pain, shortness of breath at rest or with exertion, lightheadedness, or edema. 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 She has had approximately six severe episodes over the years, prompting presentation to the ED and treatment with IV Adenosine. She currently reports infrequent short episodes of palpitations and is being treated with metoprolol. She is followed by Dr. María Santiago. She had an echocardiogram (10/2005) showing normal LV size and function (EF 60-65%), and no valvular pathology. In 11/2012, she underwent an exercise stress echocardiogram, which was normal at maximal exercise. She has a history of hyperlipidemia, treated with simvastatin from 10/2012 to 3/2015 at which time she stopped taking it due to myalgias.  She restarted it on 8/2015 and continued to take it without difficulty until 3/2016, when it was noticed that she had transaminase elevation (AST 85/ ) and it was discontinued. Evaluation included hepatitis A, B, and C levels (negative), iron panel (normal), and RUQ ultrasound (3/23/2016) with limited sonographic window for the liver; only partially visualized but grossly unremarkable. Repeat hepatic panel (4/22/2016) showed AST 75 and ALT 98. Repeat lipid panel showed total chol 215/ / HDL 64/. In 5/2016, she had an abdominal CT scan showing the liver to be normal in size with normal parenchymal density; no discrete mass or ascites, and no intrahepatic biliary dilatation. She was subsequently instructed to restart simvastatin, but chose not to since she felt it was making her forgetful. She agreed to trial of rosuvastatin 10 mg and started it in 2/2018. However, after two weeks of therapy, she discontinued it since she thought it was causing her leg pain. She subsequently contacted Dr. Jayme Baker office and asked to begin simvastatin 20 mg daily in 4/2018. She has been taking it without difficulty since restarting. She has a history of diabetes mellitus, with impaired fasting glucose ranging from 103-111 since 2012, and HbA1c to 6.6-6.8 since 9/2016 (not checked previously). She was prescribed metformin ER last visit for an increase in her HbA1c to 7.1, but she reports that she took it for only one week and discontinued for unclear reasons. She was not experiencing any side effects. She denies any polyuria, polydipsia, nocturia, or blurry vision, and has no history of retinopathy, neuropathy, or nephropathy. She has regular eye exams with Dr. Brenda Belcher. She has a history of asthma and allergic rhinitis and is followed by Dr. Chantelle Browning. She is receiving immunotherapy once per month, and reports that she has not required any inhalers recently. She states that she does not use Qvar daily, but will take it occasionally.  She does use Claritin every day. In 7/2019, she reported increasing difficulty with right ear fullness, post nasal drainage, hoarseness, and cough, and was referred to Dr. Ira Bailon. He performed a nasal laryngoscopy and found evidence of laryngopharyngeal reflux. She was started on daily omeprazole, and she states that she has noticed some improvement. In 10/2017, she fell down the steps of her porch and had difficulty with right knee pain and swelling. She was evaluated by Dr. Amalia Rosado in 12/2017, and right knee xray showed decreased medial joint space, and moderate degenerative changes. She received a cortisone injection, but did not notice any improvement. She underwent an MRI of the right knee (1/29/2018) which showed complete tear of posterior horn and root of the medial meniscus; tear of the body and posterior horn of the lateral meniscus; tricompartmental osteoarthritis most prominent in medial and patellofemoral compartments; moderate joint effusion; small Baker's cyst. It was recommended that she consider knee replacement and arthroscopy. However, she decided to seek a second opinion and was evaluated by Dr. Geetha Valdez. She also underwent an MRI of her left knee (3/23/2018) showing radial tear posterior horn medial meniscus with extrusion of the body. Also a radial tear in the mid body; lateral meniscus intrasubstance degeneration and probable small undersurface tear of the posterior horn; medial and patellofemoral compartments moderate chondral loss; s. ubchondral bone marrow edema in the medial tibial plateau, likely reactive. She is completed physical therapy for her bilateral knees and feels that it may have helped. 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 She was treated with Avelox, and repeat chest x-ray in 1/2012 showed complete resolution of the pneumonia. She denies any cough or shortness of breath.        She had a screening colonoscopy in 12/2006 by Dr. Cyndee Laughlin showing a 5 mm sessile polyp in the rectum (pathology: hyperplastic). She had a repeat colonoscopy in 12/2016 which showed moderate sigmoid diverticulosis and a 6 mm sessile ascending colon polyp (pathology: serrated adenoma). Follow-up recommended for 5 years. She denies any abdominal pain, nausea, vomiting, melena, hematochezia, or change in bowel movements. She does take omeprazole occasionally for GERD symptoms. In 12/2017, she was referred by her gynecologist for evaluation by Dr. Oumar Arredondo for microscopic hematuria, and urine cytology was negative. However, she states that she refused his recommendations to undergo a CT urogram or cystoscopy. She denies any dysuria, gross hematuria, or flank pain. Past Medical History:   Diagnosis Date    Allergic rhinitis     Asthma     Cardiac stress echo, normal 11/02/2012    Normal maximal stress echo study. EF 60%. Ex time 9 min 45 sec. 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     Hyperlipidemia     Hypertension     Menopause     Plantar fasciitis     left    PSVT (paroxysmal supra[facility]ricular tachycardia) ([facility])     A-V dharmesh reentrant tachycardia     Past Surgical History:   Procedure Laterality Date    ENDOSCOPY, COLON, DIAGNOSTIC      polyp    HX CERVICAL POLYPECTOMY      HX CYST INCISION AND DRAINAGE Right 10 or more years    HX DILATION AND CURETTAGE      HX GYN      polyp on cervix    HX POLYPECTOMY      from rectum     Current Outpatient Medications   Medication Sig    fluticasone propionate (FLONASE) 50 mcg/actuation nasal spray instill 2 sprays into each nostril once daily    metoprolol succinate (TOPROL-XL) 50 mg XL tablet take 1 tablet by mouth once daily    hydroCHLOROthiazide (HYDRODIURIL) 12.5 mg tablet take 1 tablet by mouth once daily    simvastatin (ZOCOR) 20 mg tablet take 1 tablet by mouth at bedtime    loratadine (Claritin) 10 mg tablet Take 1 Tab by mouth daily.  albuterol (PROVENTIL HFA, VENTOLIN HFA, PROAIR HFA) 90 mcg/actuation inhaler inhale 2 puffs by mouth every 4 hours if needed for wheezing    LORazepam (ATIVAN) 1 mg tablet Take 1 Tab by mouth every eight (8) hours as needed for Anxiety. Max Daily Amount: 3 mg.  losartan (COZAAR) 25 mg tablet take 1 tablet by mouth once daily    potassium chloride (K-DUR, KLOR-CON) 20 mEq tablet take 1 tablet by mouth once daily    clotrimazole-betamethasone (LOTRISONE) topical cream Apply  to both ear canals and affected part of outer ear twice a day with a finger.  cholecalciferol (VITAMIN D3) 1,000 unit cap Take 1,000 Units by mouth daily.  carboxymethylcellulose sodium (REFRESH LIQUIGEL) 1 % dlgl ophthalmic solution Apply  to eye. No current facility-administered medications for this visit. Allergies and Intolerances: Allergies   Allergen Reactions    Altace [Ramipril] Cough    Penicillins Other (comments)     Hands peel    Sulfur Itching     Family History: She had two aunts who had breast cancer (her mother's sister and father's sister). She has no FH of colon cancer. Her mother passed away from uterine cancer. Her father  from metastatic prostate cancer. Family History   Problem Relation Age of Onset   [facility] Arthritis-osteo Mother     Hypertension Mother              Cancer Father         bone cancer    Hypertension Sister     Hypertension Sister     Hypertension Sister     Breast Cancer Maternal Aunt     Breast Cancer Paternal Aunt     Diabetes Other     Stroke Other     Other Sister         twin sister - osteopenia and low vitamin D levels     Social History:   She  reports that she has never smoked. She has never used smokeless tobacco. She is  and has two sons.  She was a homemaker, but worked part-time in . She now helps care for her grandchildren. Social History     Substance and Sexual Activity   Alcohol Use No     Immunization History:  Immunization History   Administered Date(s) Administered    Covid-19, MODERNA, Mrna, Lnp-s, Pf, 100mcg/0.5mL 03/01/2021    Influenza Vaccine [employer]) PF (>6 Mo Flulaval, Fluarix, and >3 Yrs Afluria, Fluzone 33531) 10/23/2015, 10/19/2016, 10/05/2017, 10/26/2018, 10/08/2019    Influenza Vaccine PF 12/12/2013, 10/03/2014    Influenza Vaccine Split 11/02/2011, 10/22/2012    Influenza Vaccine Whole 10/29/2010    PPD 01/03/2012    Pneumococcal Conjugate (PCV-13) 08/13/2020    Pneumococcal Polysaccharide (PPSV-23) 03/21/2017    TB Skin Test (PPD) Intradermal 02/12/2014    TDAP Vaccine 02/16/2012    Zoster Recombinant 09/16/2020, 11/27/2020       Review of Systems:   As above included in HPI. Otherwise 11 point review of systems negative including constitutional, skin, HENT, eyes, respiratory, cardiovascular, gastrointestinal, genitourinary, musculoskeletal, endocrine, hematologic, allergy, and neurologic. Physical:   Vitals:   BP: 122/60  HR: 74  WT: 182 lb 9.6 oz (82.8 kg)  BMI:  33.40 kg/m2    Exam:   Patient appears in no apparent distress. Affect is appropriate. HEENT: PERRLA, anicteric, oropharynx clear, no JVD, adenopathy or thyromegaly. No carotid bruits or radiated murmur. Lungs: clear to auscultation, no wheezes, rhonchi, or rales. Heart: regular rate and rhythm. No murmur, rubs, gallops  Abdomen: soft, nontender, nondistended, normal bowel sounds, no hepatosplenomegaly or masses. Extremities: without edema.         Review of Data:  Labs:  Hospital Outpatient Visit on 02/23/2021   Component Date Value Ref Range Status    Hemoglobin A1c 02/23/2021 6.8* 4.2 - 5.6 % Final    Est. average glucose 02/23/2021 148  mg/dL Final    LIPID PROFILE 02/23/2021        Final    Cholesterol, total 02/23/2021 161  <200 MG/DL Final  Triglyceride 02/23/2021 112  <150 MG/DL Final    HDL Cholesterol 02/23/2021 72* 40 - 60 MG/DL Final    LDL, calculated 02/23/2021 66.6  0 - 100 MG/DL Final    VLDL, calculated 02/23/2021 22.4  MG/DL Final    CHOL/HDL Ratio 02/23/2021 2.2  0 - 5.0   Final    Magnesium 02/23/2021 1.9  1.6 - 2.6 mg/dL Final    Sodium 02/23/2021 142  136 - 145 mmol/L Final    Potassium 02/23/2021 4.0  3.5 - 5.5 mmol/L Final    Chloride 02/23/2021 107  100 - 111 mmol/L Final    CO2 02/23/2021 32  21 - 32 mmol/L Final    Anion gap 02/23/2021 3  3.0 - 18 mmol/L Final    Glucose 02/23/2021 129* 74 - 99 mg/dL Final    BUN 02/23/2021 14  7.0 - 18 MG/DL Final    Creatinine 02/23/2021 0.63  0.6 - 1.3 MG/DL Final    BUN/Creatinine ratio 02/23/2021 22* 12 - 20   Final    GFR est AA 02/23/2021 >60  >60 ml/min/1.73m2 Final    GFR est non-AA 02/23/2021 >60  >60 ml/min/1.73m2 Final    Calcium 02/23/2021 9.1  8.5 - 10.1 MG/DL Final    Bilirubin, total 02/23/2021 0.5  0.2 - 1.0 MG/DL Final    ALT (SGPT) 02/23/2021 38  13 - 56 U/L Final    AST (SGOT) 02/23/2021 29  10 - 38 U/L Final    Alk. phosphatase 02/23/2021 80  45 - 117 U/L Final    Protein, total 02/23/2021 7.3  6.4 - 8.2 g/dL Final    Albumin 02/23/2021 3.9  3.4 - 5.0 g/dL Final    Globulin 02/23/2021 3.4  2.0 - 4.0 g/dL Final    A-G Ratio 02/23/2021 1.1  0.8 - 1.7   Final    Microalbumin,urine random 02/23/2021 1.23  0 - 3.0 MG/DL Final    Creatinine, urine 02/23/2021 167.00* 30 - 125 mg/dL Final    Microalbumin/Creat ratio (mg/g cre* 02/23/2021 7  0 - 30 mg/g Final         Health Maintenance:  Screening:    Mammogram: negative (11/2019). Scheduled 3/22/2021. PAP smear: negative (3/2020) with negative HPV. Followed by Dr. David Bright. Colorectal: colonoscopy (12/2016) serrated adenoma. Dr. Justice Aden. Due 12/2021.    Depression: none   DM (HbA1c/FPG): HbA1c 6.8 (2/2021)   Hepatitis C: negative (3/2016)   Falls: one   DEXA: within normal limits (11/2015)   Glaucoma: regular eye exams with Dr. Martinez (last 10/2020)   Smoking: none   Vitamin D: 35.4 (11/2020)    Medicare Wellness: 8/13/2020  (Welcome)      Impression:  Patient Active Problem List   Diagnosis Code   • Benign hypertensive heart disease without congestive heart failure I11.9   • Allergic rhinitis J30.9   • Colon polyps K63.5   • AVNRT (AV dharmesh re-entry tachycardia) ([facility]) I47.1   • Hyperlipidemia E78.5   • Essential hypertension I10   • Asthma J45.909   • GERD (gastroesophageal reflux disease) K21.9   • Type 2 diabetes mellitus without complication, without long-term current use of insulin ([facility]) E11.9   • Vitamin D insufficiency E55.9   • Microscopic hematuria R31.29   • Chronic pain of both knees M25.561, M25.562, G89.29   • Obesity (BMI 30.0-34.9) E66.9   • Noncompliance Z91.19   • Anxiety F41.9   • Spondylolisthesis, lumbar region M43.16   • DDD (degenerative disc disease), lumbar M51.36   • NAFL (nonalcoholic fatty liver) K76.0   • HENRY (nonalcoholic steatohepatitis) K75.81       Plan:  1. Diabetes mellitus. Diagnosed in 9/2016 when HbA1c was checked and found to be elevated at 6.6. Had been steadily increasing and reached 7.5 in 4/2020. Impaired fasting glucose had been present intermittently since 2012. Being treated with metformin  mg at dinner, but stopped taking it due to diarrhea. Changed to sitagliptin, but unable to fill due to cost. Started on glipizide SR 5 mg daily, but reports no longer taking today. Repeat HbA1c 6.8 today. No evidence of microvascular complications. On statin. On losartan and now taking consistently. Stressed importance of compliance. Continue regular eye exams with Dr. Martinez. Foot exam normal (8/2020). Urine microalbumin/ creatinine ratio remains without evidence of microalbuminuria (7 mg/g). Emphasized importance of lifestyle modifications, including diet, exercise, and weight loss.  2. Hypertension. Blood pressure with improved control today on losartan 25 mg daily,  metoprolol XL 50 mg daily, and hydrochlorothiazide 12.5 mg daily. Renal function has been normal with creatinine 0.63/ eGFR >60 today. Continue to follow. 3. Hyperlipidemia. On moderate intensity dose simvastatin 20 mg daily with LDL 66 and HDL 72, indicative of good control. Continue to follow. 4. Elevated LFT's. Developed in 3/2016 with AST 85/ . Evaluation included hepatitis A, B, and C levels (negative), iron panel (normal), and RUQ ultrasound (3/23/2016) with limited sonographic window for the liver; only partially visualized but grossly unremarkable. In 5/2016, she had an abdominal CT scan showing the liver to be normal in size with normal parenchymal density; no discrete mass or ascites, and no intrahepatic biliary dilatation. Unclear etiology, but transaminases had normalized since that time. However, repeat elevation in 11/2020 with ALT 66 and AST 45. Referred to Dr. Meaghan Rodriguez and liver ultrasound (2/2021) showed lobulated contour liver consistent with steatosis +/- possible cirrhosis. She stated that weight loss recommended. Will continue to monitor. 5. AV dharmesh reentrant tachycardia. No recent episodes. On metoprolol succinate 50 mg daily and no significant palpitations recently. Followed by Dr. Kristine Todd and will be transitioning care to Dr. Lucrecia Toth in 10/2021.     6. Asthma, mild persistent. Associated with allergies. Receiving monthly immunotherapy injections with Dr. Aleisha Lopez and states that generally stable. Started Claritin last visit for increased allergy symptoms with improvement. 7. GERD/ Laryngopharyngeal reflux. Evaluated by Dr. Marlo Martinez, and noted on laryngoscopic exam. Started on omeprazole daily but states that decided to discontinue as did not help with cough. Follow. 8. Microscopic hematuria. Patient refusing further evaluation with cystoscopy or CT urogram. Urine cytology negative.  Did have abdominal CT scan in 5/2016 where kidneys appeared normal. Recommended that she complete evaluation with Dr. Daniel Kamara. Repeat urinalysis with evidence small blood and 3-5 RBCs in 10/2019. Trace blood with 0-3 RBC in 11/2020. Will continue to follow. 9. Bilateral knee pain. Did not respond to cortisone injection. Had both right and left knee MRI showing variable degrees of menisci tears and osteoarthritis of knee joint. Now being followed by Dr. Wilma Najera and reports relatively quiescent. 10. Right sciatica. Patient presented in 10/2020 with severe low back pain and right sciatica for several days. Unclear as to inciting incident. Denied lower extremity weakness or paresthesias. Began taking Etodolac and methocarbamol as prescribed by [facility] several months ago for similar symptoms, and reports helpful in controlling discomfort. Lumbar spine x-ray showed multilevel degenerative findings, trace levorotoscoliosis and multilevel spondylolisthesis. Completed physical therapy with improvement. Following with Dr. Wilma Najera. 11. Left breast pain and nodules. Evaluated for new onset left breast pain and tenderness in 10/2019. Positive family history for breast cancer in a maternal aunt in her 46s and in a paternal aunt. Underwent bilateral mammogram and left breast ultrasound, and no abnormalities were found. Reports resolution of discomfort. Will continue with annual screening, and scheduled for later this month. 12. Shingles. Diagnosed in 4/2020 by Dr. Shannon Sood. Treated with Valtrex and patient reports mild symptoms. Completed Shingrix vaccine series. 13. Obesity. Weight stable since last visit. Emphasized importance of lifestyle modifications, including diet, exercise, and weight loss. Will readdress next visit. 14. Health maintenance. Already received influenza vaccine. Received 2/2 doses of Shingrix vaccine. Given Pneumovax in 3/2017 given asthma history. Received Prevnar 13 and will need repeat Pneumovax 23 in 3/2022.  Completed first dose of Moderna COVID 19 vaccine and is attempting to schedule her second dose. Other immunizations up to date. Colonoscopy due 12/2021. Mammogram up to date as discussed. Continue regular eye exams with Dr. Simran Mcgee. Vitamin D level normal. Continue maintenance dose supplement. Welcome to Medicare visit completed. Patient understands recommendations and agrees with plan. Follow-up in 3 months.

## 2021-03-20 RX ORDER — LOSARTAN POTASSIUM 25 MG/1
TABLET ORAL
Qty: 90 TAB | Refills: 2 | Status: SHIPPED | OUTPATIENT
Start: 2021-03-20 | End: 2021-12-15

## 2021-03-22 ENCOUNTER — HOSPITAL ENCOUNTER (OUTPATIENT)
Dept: MAMMOGRAPHY | Age: 66
Discharge: HOME | End: 2021-03-22
Attending: INTERNAL MEDICINE
Payer: MEDICARE

## 2021-03-22 DIAGNOSIS — Z12.31 VISIT FOR SCREENING MAMMOGRAM: ICD-10-CM

## 2021-03-22 PROCEDURE — 77063 BREAST TOMOSYNTHESIS BI: CPT

## 2021-04-27 RX ORDER — POTASSIUM CHLORIDE 20 MEQ/1
TABLET, EXTENDED RELEASE ORAL
Qty: 90 TAB | Refills: 3 | Status: SHIPPED | OUTPATIENT
Start: 2021-04-27 | End: 2022-04-05

## 2021-06-01 ENCOUNTER — HOSPITAL ENCOUNTER (OUTPATIENT)
Dept: LAB | Age: 66
Discharge: HOME | End: 2021-06-01
Payer: MEDICARE

## 2021-06-01 ENCOUNTER — APPOINTMENT (OUTPATIENT)
Dept: INTERNAL MEDICINE CLINIC | Age: 66
End: 2021-06-01

## 2021-06-01 DIAGNOSIS — R79.89 ELEVATED LFTS: ICD-10-CM

## 2021-06-01 DIAGNOSIS — I10 ESSENTIAL HYPERTENSION: ICD-10-CM

## 2021-06-01 DIAGNOSIS — K21.9 GASTROESOPHAGEAL REFLUX DISEASE WITHOUT ESOPHAGITIS: ICD-10-CM

## 2021-06-01 DIAGNOSIS — Z91.119 NONCOMPLIANCE WITH DIET AND MEDICATION REGIMEN: ICD-10-CM

## 2021-06-01 DIAGNOSIS — E66.9 OBESITY (BMI 30.0-34.9): ICD-10-CM

## 2021-06-01 DIAGNOSIS — K75.81 NASH (NONALCOHOLIC STEATOHEPATITIS): ICD-10-CM

## 2021-06-01 DIAGNOSIS — I47.1 AVNRT (AV NODAL RE-ENTRY TACHYCARDIA) (HCC): ICD-10-CM

## 2021-06-01 DIAGNOSIS — J30.1 SEASONAL ALLERGIC RHINITIS DUE TO POLLEN: ICD-10-CM

## 2021-06-01 DIAGNOSIS — E78.5 HYPERLIPIDEMIA, UNSPECIFIED HYPERLIPIDEMIA TYPE: ICD-10-CM

## 2021-06-01 DIAGNOSIS — J45.30 MILD PERSISTENT ASTHMA WITHOUT COMPLICATION: ICD-10-CM

## 2021-06-01 DIAGNOSIS — K76.0 NAFL (NONALCOHOLIC FATTY LIVER): ICD-10-CM

## 2021-06-01 DIAGNOSIS — E11.9 TYPE 2 DIABETES MELLITUS WITHOUT COMPLICATION, WITHOUT LONG-TERM CURRENT USE OF INSULIN (HCC): ICD-10-CM

## 2021-06-01 DIAGNOSIS — Z91.14 NONCOMPLIANCE WITH DIET AND MEDICATION REGIMEN: ICD-10-CM

## 2021-06-01 LAB
ALBUMIN SERPL-MCNC: 3.6 G/DL (ref 3.4–5)
ALBUMIN/GLOB SERPL: 1.1 {RATIO} (ref 0.8–1.7)
ALP SERPL-CCNC: 78 U/L (ref 45–117)
ALT SERPL-CCNC: 36 U/L (ref 13–56)
ANION GAP SERPL CALC-SCNC: 5 MMOL/L (ref 3–18)
APPEARANCE UR: CLEAR
AST SERPL-CCNC: 28 U/L (ref 10–38)
BACTERIA URNS QL MICRO: ABNORMAL /HPF
BILIRUB SERPL-MCNC: 0.4 MG/DL (ref 0.2–1)
BILIRUB UR QL: NEGATIVE
BUN SERPL-MCNC: 13 MG/DL (ref 7–18)
BUN/CREAT SERPL: 21 (ref 12–20)
CALCIUM SERPL-MCNC: 8.7 MG/DL (ref 8.5–10.1)
CHLORIDE SERPL-SCNC: 104 MMOL/L (ref 100–111)
CHOLEST SERPL-MCNC: 163 MG/DL
CO2 SERPL-SCNC: 30 MMOL/L (ref 21–32)
COLOR UR: YELLOW
CREAT SERPL-MCNC: 0.62 MG/DL (ref 0.6–1.3)
CREAT UR-MCNC: 167 MG/DL (ref 30–125)
EPITH CASTS URNS QL MICRO: ABNORMAL /LPF (ref 0–5)
EST. AVERAGE GLUCOSE BLD GHB EST-MCNC: 157 MG/DL
GLOBULIN SER CALC-MCNC: 3.3 G/DL (ref 2–4)
GLUCOSE SERPL-MCNC: 137 MG/DL (ref 74–99)
GLUCOSE UR STRIP.AUTO-MCNC: NEGATIVE MG/DL
HBA1C MFR BLD: 7.1 % (ref 4.2–5.6)
HDLC SERPL-MCNC: 64 MG/DL (ref 40–60)
HDLC SERPL: 2.5 {RATIO} (ref 0–5)
HGB UR QL STRIP: NEGATIVE
KETONES UR QL STRIP.AUTO: NEGATIVE MG/DL
LDLC SERPL CALC-MCNC: 75.2 MG/DL (ref 0–100)
LEUKOCYTE ESTERASE UR QL STRIP.AUTO: ABNORMAL
LIPID PROFILE,FLP: ABNORMAL
MAGNESIUM SERPL-MCNC: 1.7 MG/DL (ref 1.6–2.6)
MICROALBUMIN UR-MCNC: 1.21 MG/DL (ref 0–3)
MICROALBUMIN/CREAT UR-RTO: 7 MG/G (ref 0–30)
MUCOUS THREADS URNS QL MICRO: ABNORMAL /LPF
NITRITE UR QL STRIP.AUTO: NEGATIVE
PH UR STRIP: 5.5 [PH] (ref 5–8)
POTASSIUM SERPL-SCNC: 3.8 MMOL/L (ref 3.5–5.5)
PROT SERPL-MCNC: 6.9 G/DL (ref 6.4–8.2)
PROT UR STRIP-MCNC: NEGATIVE MG/DL
SODIUM SERPL-SCNC: 139 MMOL/L (ref 136–145)
SP GR UR REFRACTOMETRY: 1.02 (ref 1–1.03)
TRIGL SERPL-MCNC: 119 MG/DL (ref ?–150)
UROBILINOGEN UR QL STRIP.AUTO: 0.2 EU/DL (ref 0.2–1)
VLDLC SERPL CALC-MCNC: 23.8 MG/DL
WBC URNS QL MICRO: ABNORMAL /HPF (ref 0–5)

## 2021-06-01 PROCEDURE — 81001 URINALYSIS AUTO W/SCOPE: CPT

## 2021-06-01 PROCEDURE — 80053 COMPREHEN METABOLIC PANEL: CPT

## 2021-06-01 PROCEDURE — 82043 UR ALBUMIN QUANTITATIVE: CPT

## 2021-06-01 PROCEDURE — 80061 LIPID PANEL: CPT

## 2021-06-01 PROCEDURE — 83735 ASSAY OF MAGNESIUM: CPT

## 2021-06-01 PROCEDURE — 83036 HEMOGLOBIN GLYCOSYLATED A1C: CPT

## 2021-06-10 ENCOUNTER — OFFICE VISIT (OUTPATIENT)
Dept: INTERNAL MEDICINE CLINIC | Age: 66
End: 2021-06-10
Payer: MEDICARE

## 2021-06-10 VITALS
SYSTOLIC BLOOD PRESSURE: 128 MMHG | OXYGEN SATURATION: 97 % | RESPIRATION RATE: 16 BRPM | BODY MASS INDEX: 33.97 KG/M2 | TEMPERATURE: 97.7 F | DIASTOLIC BLOOD PRESSURE: 78 MMHG | HEART RATE: 77 BPM | HEIGHT: 62 IN | WEIGHT: 184.6 LBS

## 2021-06-10 DIAGNOSIS — J30.1 SEASONAL ALLERGIC RHINITIS DUE TO POLLEN: ICD-10-CM

## 2021-06-10 DIAGNOSIS — Z91.14 NONCOMPLIANCE WITH DIET AND MEDICATION REGIMEN: ICD-10-CM

## 2021-06-10 DIAGNOSIS — I47.1 AVNRT (AV NODAL RE-ENTRY TACHYCARDIA) (HCC): ICD-10-CM

## 2021-06-10 DIAGNOSIS — E78.5 HYPERLIPIDEMIA, UNSPECIFIED HYPERLIPIDEMIA TYPE: ICD-10-CM

## 2021-06-10 DIAGNOSIS — K76.0 NAFL (NONALCOHOLIC FATTY LIVER): ICD-10-CM

## 2021-06-10 DIAGNOSIS — E66.9 OBESITY (BMI 30.0-34.9): ICD-10-CM

## 2021-06-10 DIAGNOSIS — I10 ESSENTIAL HYPERTENSION: ICD-10-CM

## 2021-06-10 DIAGNOSIS — Z91.119 NONCOMPLIANCE WITH DIET AND MEDICATION REGIMEN: ICD-10-CM

## 2021-06-10 DIAGNOSIS — J45.30 MILD PERSISTENT ASTHMA WITHOUT COMPLICATION: ICD-10-CM

## 2021-06-10 DIAGNOSIS — E11.9 TYPE 2 DIABETES MELLITUS WITHOUT COMPLICATION, WITHOUT LONG-TERM CURRENT USE OF INSULIN (HCC): Primary | ICD-10-CM

## 2021-06-10 PROCEDURE — G8417 CALC BMI ABV UP PARAM F/U: HCPCS | Performed by: INTERNAL MEDICINE

## 2021-06-10 PROCEDURE — 3017F COLORECTAL CA SCREEN DOC REV: CPT | Performed by: INTERNAL MEDICINE

## 2021-06-10 PROCEDURE — G8754 DIAS BP LESS 90: HCPCS | Performed by: INTERNAL MEDICINE

## 2021-06-10 PROCEDURE — G8752 SYS BP LESS 140: HCPCS | Performed by: INTERNAL MEDICINE

## 2021-06-10 PROCEDURE — 1101F PT FALLS ASSESS-DOCD LE1/YR: CPT | Performed by: INTERNAL MEDICINE

## 2021-06-10 PROCEDURE — 2022F DILAT RTA XM EVC RTNOPTHY: CPT | Performed by: INTERNAL MEDICINE

## 2021-06-10 PROCEDURE — G8399 PT W/DXA RESULTS DOCUMENT: HCPCS | Performed by: INTERNAL MEDICINE

## 2021-06-10 PROCEDURE — G8427 DOCREV CUR MEDS BY ELIG CLIN: HCPCS | Performed by: INTERNAL MEDICINE

## 2021-06-10 PROCEDURE — G8510 SCR DEP NEG, NO PLAN REQD: HCPCS | Performed by: INTERNAL MEDICINE

## 2021-06-10 PROCEDURE — 99214 OFFICE O/P EST MOD 30 MIN: CPT | Performed by: INTERNAL MEDICINE

## 2021-06-10 PROCEDURE — 1090F PRES/ABSN URINE INCON ASSESS: CPT | Performed by: INTERNAL MEDICINE

## 2021-06-10 PROCEDURE — G9899 SCRN MAM PERF RSLTS DOC: HCPCS | Performed by: INTERNAL MEDICINE

## 2021-06-10 PROCEDURE — G0463 HOSPITAL OUTPT CLINIC VISIT: HCPCS | Performed by: INTERNAL MEDICINE

## 2021-06-10 PROCEDURE — 3051F HG A1C>EQUAL 7.0%<8.0%: CPT | Performed by: INTERNAL MEDICINE

## 2021-06-10 PROCEDURE — G8536 NO DOC ELDER MAL SCRN: HCPCS | Performed by: INTERNAL MEDICINE

## 2021-06-10 RX ORDER — INSULIN PUMP SYRINGE, 3 ML
EACH MISCELLANEOUS
Qty: 1 KIT | Refills: 0 | Status: SHIPPED | OUTPATIENT
Start: 2021-06-10

## 2021-06-10 RX ORDER — LANCETS
EACH MISCELLANEOUS
Qty: 100 EACH | Refills: 5 | Status: SHIPPED | OUTPATIENT
Start: 2021-06-10 | End: 2022-11-04

## 2021-06-10 RX ORDER — LORATADINE 10 MG/1
10 TABLET ORAL DAILY
Qty: 90 TABLET | Refills: 2 | Status: SHIPPED | OUTPATIENT
Start: 2021-06-10

## 2021-06-10 NOTE — PATIENT INSTRUCTIONS
Heart-Healthy Diet: Care Instructions  Your Care Instructions     A heart-healthy diet has lots of vegetables, fruits, nuts, beans, and whole grains, and is low in salt. It limits foods that are high in saturated fat, such as meats, cheeses, and fried foods. It may be hard to change your diet, but even small changes can lower your risk of heart attack and heart disease. Follow-up care is a key part of your treatment and safety. Be sure to make and go to all appointments, and call your doctor if you are having problems. It's also a good idea to know your test results and keep a list of the medicines you take. How can you care for yourself at home? Watch your portions  · Learn what a serving is. A \"serving\" and a \"portion\" are not always the same thing. Make sure that you are not eating larger portions than are recommended. For example, a serving of pasta is ½ cup. A serving size of meat is 2 to 3 ounces. A 3-ounce serving is about the size of a deck of cards. Measure serving sizes until you are good at [place]" them. Keep in mind that restaurants often serve portions that are 2 or 3 times the size of one serving. · To keep your energy level up and keep you from feeling hungry, eat often but in smaller portions. · Eat only the number of calories you need to stay at a healthy weight. If you need to lose weight, eat fewer calories than your body burns (through exercise and other physical activity). Eat more fruits and vegetables  · Eat a variety of fruit and vegetables every day. Dark green, deep orange, red, or yellow fruits and vegetables are especially good for you. Examples include spinach, carrots, peaches, and berries. · Keep carrots, celery, and other veggies handy for snacks. Buy fruit that is in season and store it where you can see it so that you will be tempted to eat it. · Cook dishes that have a lot of veggies in them, such as stir-fries and soups.   Limit saturated and trans fat  · Read food labels, and try to avoid saturated and trans fats. They increase your risk of heart disease. · Use olive or canola oil when you cook. · Bake, broil, grill, or steam foods instead of frying them. · Choose lean meats instead of high-fat meats such as hot dogs and sausages. Cut off all visible fat when you prepare meat. · Eat fish, skinless poultry, and meat alternatives such as soy products instead of high-fat meats. Soy products, such as tofu, may be especially good for your heart. · Choose low-fat or fat-free milk and dairy products. Eat foods high in fiber  · Eat a variety of grain products every day. Include whole-grain foods that have lots of fiber and nutrients. Examples of whole-grain foods include oats, whole wheat bread, and brown rice. · Buy whole-grain breads and cereals, instead of white bread or pastries. Limit salt and sodium  · Limit how much salt and sodium you eat to help lower your blood pressure. · Taste food before you salt it. Add only a little salt when you think you need it. With time, your taste buds will adjust to less salt. · Eat fewer snack items, fast foods, and other high-salt, processed foods. Check food labels for the amount of sodium in packaged foods. · Choose low-sodium versions of canned goods (such as soups, vegetables, and beans). Limit sugar  · Limit drinks and foods with added sugar. These include candy, desserts, and soda pop. Limit alcohol  · Limit alcohol to no more than 2 drinks a day for men and 1 drink a day for women. Too much alcohol can cause health problems. When should you call for help? Watch closely for changes in your health, and be sure to contact your doctor if:    · You would like help planning heart-healthy meals. Where can you learn more? Go to http://www.[employer].com/  Enter V137 in the search box to learn more about \"Heart-Healthy Diet: Care Instructions. \"  Current as of: August 22, 2019               Content Version: 12.6  © 5161-6283 [employer]. Care instructions adapted under license by [employer] (which disclaims liability or warranty for this information). If you have questions about a medical condition or this instruction, always ask your healthcare professional. [address] any warranty or liability for your use of this information. Learning About Diabetes Food Guidelines  Your Care Instructions     Meal planning is important to manage diabetes. It helps keep your blood sugar at a target level (which you set with your doctor). You don't have to eat special foods. You can eat what your family eats, including sweets once in a while. But you do have to pay attention to how often you eat and how much you eat of certain foods. You may want to work with a dietitian or a certified diabetes educator (CDE) to help you plan meals and snacks. A dietitian or CDE can also help you lose weight if that is one of your goals. What should you know about eating carbs? Managing the amount of carbohydrate (carbs) you eat is an important part of healthy meals when you have diabetes. Carbohydrate is found in many foods. · Learn which foods have carbs. And learn the amounts of carbs in different foods. ? Bread, cereal, pasta, and rice have about 15 grams of carbs in a serving. A serving is 1 slice of bread (1 ounce), ½ cup of cooked cereal, or 1/3 cup of cooked pasta or rice. ? Fruits have 15 grams of carbs in a serving. A serving is 1 small fresh fruit, such as an apple or orange; ½ of a banana; ½ cup of cooked or canned fruit; ½ cup of fruit juice; 1 cup of melon or raspberries; or 2 tablespoons of dried fruit. ? Milk and no-sugar-added yogurt have 15 grams of carbs in a serving. A serving is 1 cup of milk or 2/3 cup of no-sugar-added yogurt. ? Starchy vegetables have 15 grams of carbs in a serving.  A serving is ½ cup of mashed potatoes or sweet potato; 1 cup winter squash; ½ of a small baked potato; ½ cup of cooked beans; or ½ cup cooked corn or green peas. · Learn how much carbs to eat each day and at each meal. A dietitian or CDE can teach you how to keep track of the amount of carbs you eat. This is called carbohydrate counting. · If you are not sure how to count carbohydrate grams, use the Plate Method to plan meals. It is a good, quick way to make sure that you have a balanced meal. It also helps you spread carbs throughout the day. ? Divide your plate by types of foods. Put non-starchy vegetables on half the plate, meat or other protein food on one-quarter of the plate, and a grain or starchy vegetable in the final quarter of the plate. To this you can add a small piece of fruit and 1 cup of milk or yogurt, depending on how many carbs you are supposed to eat at a meal.  · Try to eat about the same amount of carbs at each meal. Do not \"save up\" your daily allowance of carbs to eat at one meal.  · Proteins have very little or no carbs per serving. Examples of proteins are beef, chicken, turkey, fish, eggs, tofu, cheese, cottage cheese, and peanut butter. A serving size of meat is 3 ounces, which is about the size of a deck of cards. Examples of meat substitute serving sizes (equal to 1 ounce of meat) are 1/4 cup of cottage cheese, 1 egg, 1 tablespoon of peanut butter, and ½ cup of tofu. How can you eat out and still eat healthy? · Learn to estimate the serving sizes of foods that have carbohydrate. If you measure food at home, it will be easier to estimate the amount in a serving of restaurant food. · If the meal you order has too much carbohydrate (such as potatoes, corn, or baked beans), ask to have a low-carbohydrate food instead. Ask for a salad or green vegetables. · If you use insulin, check your blood sugar before and after eating out to help you plan how much to eat in the future.   · If you eat more carbohydrate at a meal than you had planned, take a walk or do other exercise. This will help lower your blood sugar. What else should you know? · Limit saturated fat, such as the fat from meat and dairy products. This is a healthy choice because people who have diabetes are at higher risk of heart disease. So choose lean cuts of meat and nonfat or low-fat dairy products. Use olive or canola oil instead of butter or shortening when cooking. · Don't skip meals. Your blood sugar may drop too low if you skip meals and take insulin or certain medicines for diabetes. · Check with your doctor before you drink alcohol. Alcohol can cause your blood sugar to drop too low. Alcohol can also cause a bad reaction if you take certain diabetes medicines. Follow-up care is a key part of your treatment and safety. Be sure to make and go to all appointments, and call your doctor if you are having problems. It's also a good idea to know your test results and keep a list of the medicines you take. Where can you learn more? Go to http://www.pittman.com/  Enter I147 in the search box to learn more about \"Learning About Diabetes Food Guidelines. \"  Current as of: December 20, 2019               Content Version: 12.6  © 5634-3999 [employer]. Care instructions adapted under license by [employer] (which disclaims liability or warranty for this information). If you have questions about a medical condition or this instruction, always ask your healthcare professional. [address] any warranty or liability for your use of this information. Learning About Meal Planning for Diabetes  Why plan your meals? Meal planning can be a key part of managing diabetes. Planning meals and snacks with the right balance of carbohydrate, protein, and fat can help you keep your blood sugar at the target level you set with your doctor. You don't have to eat special foods.  You can eat what your family eats, including sweets once in a while. But you do have to pay attention to how often you eat and how much you eat of certain foods. You may want to work with a dietitian or a certified diabetes educator. He or she can give you tips and meal ideas and can answer your questions about meal planning. This health professional can also help you reach a healthy weight if that is one of your goals. What plan is right for you? Your dietitian or diabetes educator may suggest that you start with the plate format or carbohydrate counting. The plate format  The plate format is a simple way to help you manage how you eat. You plan meals by learning how much space each food should take on a plate. Using the plate format helps you spread carbohydrate throughout the day. It can make it easier to keep your blood sugar level within your target range. It also helps you see if you're eating healthy portion sizes. To use the plate format, you put non-starchy vegetables on half your plate. Add meat or meat substitutes on one-quarter of the plate. Put a grain or starchy vegetable (such as brown rice or a potato) on the final quarter of the plate. You can add a small piece of fruit and some low-fat or fat-free milk or yogurt, depending on your carbohydrate goal for each meal.  Here are some tips for using the plate format:  · Make sure that you are not using an oversized plate. A 9-inch plate is best. Many restaurants use larger plates. · Get used to using the plate format at home. Then you can use it when you eat out. · Write down your questions about using the plate format. Talk to your doctor, a dietitian, or a diabetes educator about your concerns. Carbohydrate counting  With carbohydrate counting, you plan meals based on the amount of carbohydrate in each food. Carbohydrate raises blood sugar higher and more quickly than any other nutrient. It is found in desserts, breads and cereals, and fruit.  It's also found in starchy vegetables such as potatoes and corn, grains such as rice and pasta, and milk and yogurt. Spreading carbohydrate throughout the day helps keep your blood sugar levels within your target range. Your daily amount depends on several things, including your weight, how active you are, which diabetes medicines you take, and what your goals are for your blood sugar levels. A registered dietitian or diabetes educator can help you plan how much carbohydrate to include in each meal and snack. A guideline for your daily amount of carbohydrate is:  · 45 to 60 grams at each meal. That's about the same as 3 to 4 carbohydrate servings. · 15 to 20 grams at each snack. That's about the same as 1 carbohydrate serving. The Nutrition Facts label on packaged foods tells you how much carbohydrate is in a serving of the food. First, look at the serving size on the food label. Is that the amount you eat in a serving? All of the nutrition information on a food label is based on that serving size. So if you eat more or less than that, you'll need to adjust the other numbers. Total carbohydrate is the next thing you need to look for on the label. If you count carbohydrate servings, one serving of carbohydrate is 15 grams. For foods that don't come with labels, such as fresh fruits and vegetables, you'll need a guide that lists carbohydrate in these foods. Ask your doctor, dietitian, or diabetes educator about books or other nutrition guides you can use. If you take insulin, you need to know how many grams of carbohydrate are in a meal. This lets you know how much rapid-acting insulin to take before you eat. If you use an insulin pump, you get a constant rate of insulin during the day. So the pump must be programmed at meals to give you extra insulin to cover the rise in blood sugar after meals. When you know how much carbohydrate you will eat, you can take the right amount of insulin.  Or, if you always use the same amount of insulin, you need to make sure that you eat the same amount of carbohydrate at meals. If you need more help to understand carbohydrate counting and food labels, ask your doctor, dietitian, or diabetes educator. How do you get started with meal planning? Here are some tips to get started:  · Plan your meals a week at a time. Don't forget to include snacks too. · Use cookbooks or online recipes to plan several main meals. Plan some quick meals for busy nights. You also can double some recipes that freeze well. Then you can save half for other busy nights when you don't have time to cook. · Make sure you have the ingredients you need for your recipes. If you're running low on basic items, put these items on your shopping list too. · List foods that you use to make breakfasts, lunches, and snacks. List plenty of fruits and vegetables. · Post this list on the refrigerator. Add to it as you think of more things you need. · Take the list to the store to do your weekly shopping. Follow-up care is a key part of your treatment and safety. Be sure to make and go to all appointments, and call your doctor if you are having problems. It's also a good idea to know your test results and keep a list of the medicines you take. Where can you learn more? Go to http://www.gray.com/  Enter K497 in the search box to learn more about \"Learning About Meal Planning for Diabetes. \"  Current as of: December 20, 2019               Content Version: 12.6  © 8366-9655 [employer]. Care instructions adapted under license by [employer] (which disclaims liability or warranty for this information). If you have questions about a medical condition or this instruction, always ask your healthcare professional. [address] any warranty or liability for your use of this information.

## 2021-06-10 NOTE — PROGRESS NOTES
1. Have you been to the ER, urgent care clinic or hospitalized since your last visit? NO.     2. Have you seen or consulted any other health care providers outside of the [address] [place] [address] since your last visit (Include any pap smears or colon screening)?  NO

## 2021-06-14 NOTE — PROGRESS NOTES
HPI:   Chase Pedraza is a 77y.o. year old female who presents today for a routine visit. She has a history of hypertension, hyperlipidemia, paroxysmal SVT, diabetes mellitus, GERD, asthma, elevated transaminases, and atypical mycobacterial pneumonia. She reports that she is doing reasonably well. She has completed the Moderna COVID-19 vaccine series. She states that she is not exercising regularly, and has not been following a diabetic diet since she remains unsure about what she should eat. She is otherwise without new complaints and feeling generally well. Summary of prior  medical history:  She has a history of hypertension, treated with metoprolol and hydrochlorothiazide (+ potassium). She reports that she does not check her blood pressure at home. She does not exercise regularly, but denies any chest pain, shortness of breath at rest or with exertion, lightheadedness, or edema. 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 She has had approximately six severe episodes over the years, prompting presentation to the ED and treatment with IV Adenosine. She currently reports infrequent short episodes of palpitations and is being treated with metoprolol. She is followed by Dr. Felisha Adan. She had an echocardiogram (10/2005) showing normal LV size and function (EF 60-65%), and no valvular pathology. In 11/2012, she underwent an exercise stress echocardiogram, which was normal at maximal exercise. She has a history of hyperlipidemia, treated with simvastatin from 10/2012 to 3/2015 at which time she stopped taking it due to myalgias. She restarted it on 8/2015 and continued to take it without difficulty until 3/2016, when it was noticed that she had transaminase elevation (AST 85/ ) and it was discontinued.  Evaluation included hepatitis A, B, and C levels (negative), iron panel (normal), and RUQ ultrasound (3/23/2016) with limited sonographic window for the liver; only partially visualized but grossly unremarkable. Repeat hepatic panel (4/22/2016) showed AST 75 and ALT 98. Repeat lipid panel showed total chol 215/ / HDL 64/. In 5/2016, she had an abdominal CT scan showing the liver to be normal in size with normal parenchymal density; no discrete mass or ascites, and no intrahepatic biliary dilatation. She restarted simvastatin 20 mg daily in 4/2018. She has been taking it without difficulty since restarting. She underwent repeat evaluation by Dr. Lorenza Padron for her elevated LFT's, and reports that lab evaluation was negative. She underwent an abdominal ultrasound (2/16/2021) which showed an echogenic lobulated contour liver suggestive of steatosis +/- cirrhosis; no focal lesions seen. Recommendation was for her to lose weight. She has a history of diabetes mellitus, with impaired fasting glucose ranging from 103-111 since 2012, and HbA1c to 6.6-6.8 since 9/2016 (not checked previously). She was prescribed metformin ER last visit for an increase in her HbA1c to 7.1, but she reports that she took it for only one week and discontinued for unclear reasons. She was not experiencing any side effects. She denies any polyuria, polydipsia, nocturia, or blurry vision, and has no history of retinopathy, neuropathy, or nephropathy. She has regular eye exams with Dr. Anderson Cowan. She has a history of asthma and allergic rhinitis and is followed by Dr. Lorie Hilliard. She is receiving immunotherapy once per month, and reports that she has not required any inhalers recently. She states that she does not use Qvar daily, but will take it occasionally. She does use Claritin every day. In 7/2019, she reported increasing difficulty with right ear fullness, post nasal drainage, hoarseness, and cough, and was referred to Dr. Kel Kimbrough. He performed a nasal laryngoscopy and found evidence of laryngopharyngeal reflux.  She was started on daily omeprazole, and she states that she has noticed some improvement. In 10/2017, she fell down the steps of her porch and had difficulty with right knee pain and swelling. She was evaluated by Dr. Lilian Lovett in 12/2017, and right knee xray showed decreased medial joint space, and moderate degenerative changes. She received a cortisone injection, but did not notice any improvement. She underwent an MRI of the right knee (1/29/2018) which showed complete tear of posterior horn and root of the medial meniscus; tear of the body and posterior horn of the lateral meniscus; tricompartmental osteoarthritis most prominent in medial and patellofemoral compartments; moderate joint effusion; small Baker's cyst. It was recommended that she consider knee replacement and arthroscopy. However, she decided to seek a second opinion and was evaluated by Dr. Mahsa Vincent. She also underwent an MRI of her left knee (3/23/2018) showing radial tear posterior horn medial meniscus with extrusion of the body. Also a radial tear in the mid body; lateral meniscus intrasubstance degeneration and probable small undersurface tear of the posterior horn; medial and patellofemoral compartments moderate chondral loss; s. ubchondral bone marrow edema in the medial tibial plateau, likely reactive. She is completed physical therapy for her bilateral knees and feels that it may have helped. 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 She was treated with Avelox, and repeat chest x-ray in 1/2012 showed complete resolution of the pneumonia. She denies any cough or shortness of breath. She had a screening colonoscopy in 12/2006 by Dr. Se Snyder showing a 5 mm sessile polyp in the rectum (pathology: hyperplastic). She had a repeat colonoscopy in 12/2016 which showed moderate sigmoid diverticulosis and a 6 mm sessile ascending colon polyp (pathology: serrated adenoma). Follow-up recommended for 5 years. She denies any abdominal pain, nausea, vomiting, melena, hematochezia, or change in bowel movements. She does take omeprazole occasionally for GERD symptoms. In 12/2017, she was referred by her gynecologist for evaluation by Dr. Jeff Mirza for microscopic hematuria, and urine cytology was negative. However, she states that she refused his recommendations to undergo a CT urogram or cystoscopy. She denies any dysuria, gross hematuria, or flank pain. Past Medical History:   Diagnosis Date    Allergic rhinitis     Asthma     Cardiac stress echo, normal 11/02/2012    Normal maximal stress echo study. EF 60%. Ex time 9 min 45 sec. 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  Hyperlipidemia     Hypertension     Menopause     Plantar fasciitis     left    PSVT (paroxysmal supraventricular tachycardia) (HCC)     A-V dharmesh reentrant tachycardia     Past Surgical History:   Procedure Laterality Date    ENDOSCOPY, COLON, DIAGNOSTIC      polyp    HX CERVICAL POLYPECTOMY      HX CYST INCISION AND DRAINAGE Right 10 or more years    HX DILATION AND CURETTAGE      HX GYN      polyp on cervix    HX POLYPECTOMY      from rectum     Current Outpatient Medications   Medication Sig    loratadine (Claritin) 10 mg tablet Take 1 Tablet by mouth daily.  empagliflozin (JARDIANCE) 10 mg tablet Take 1 Tablet by mouth daily.     Blood-Glucose Meter monitoring kit Monitor fasting blood glucose once daily    glucose blood VI test strips (ASCENSIA AUTODISC VI, ONE TOUCH ULTRA TEST VI) strip Monitor fasting blood glucose once daily    lancets misc Monitor fasting blood glucose once daily    potassium chloride (K-DUR, KLOR-CON) 20 mEq tablet take 1 tablet by mouth once daily    losartan (COZAAR) 25 mg tablet take 1 tablet by mouth once daily    fluticasone propionate (FLONASE) 50 mcg/actuation nasal spray instill 2 sprays into each nostril once daily    metoprolol succinate (TOPROL-XL) 50 mg XL tablet take 1 tablet by mouth once daily    hydroCHLOROthiazide (HYDRODIURIL) 12.5 mg tablet take 1 tablet by mouth once daily    simvastatin (ZOCOR) 20 mg tablet take 1 tablet by mouth at bedtime    albuterol (PROVENTIL HFA, VENTOLIN HFA, PROAIR HFA) 90 mcg/actuation inhaler inhale 2 puffs by mouth every 4 hours if needed for wheezing    LORazepam (ATIVAN) 1 mg tablet Take 1 Tab by mouth every eight (8) hours as needed for Anxiety. Max Daily Amount: 3 mg.  clotrimazole-betamethasone (LOTRISONE) topical cream Apply  to both ear canals and affected part of outer ear twice a day with a finger.  cholecalciferol (VITAMIN D3) 1,000 unit cap Take 1,000 Units by mouth daily.  carboxymethylcellulose sodium (REFRESH LIQUIGEL) 1 % dlgl ophthalmic solution Apply  to eye. No current facility-administered medications for this visit. Allergies and Intolerances: Allergies   Allergen Reactions    Altace [Ramipril] Cough    Penicillins Other (comments)     Hands peel    Sulfur Itching     Family History: She had two aunts who had breast cancer (her mother's sister and father's sister). She has no FH of colon cancer. Her mother passed away from uterine cancer. Her father  from metastatic prostate cancer. Family History   Problem Relation Age of Onset   Collette Satchel Arthritis-osteo Mother     Hypertension Mother              Cancer Father         bone cancer    Hypertension Sister     Hypertension Sister     Hypertension Sister     Breast Cancer Maternal Aunt     Breast Cancer Paternal Aunt     Diabetes Other     Stroke Other     Other Sister         twin sister - osteopenia and low vitamin D levels     Social History:   She  reports that she has never smoked.  She has never used smokeless tobacco. She is  and has two sons. She was a homemaker, but worked part-time in . She now helps care for her grandchildren. Social History     Substance and Sexual Activity   Alcohol Use No     Immunization History:  Immunization History   Administered Date(s) Administered    Covid-19, MODERNA, Mrna, Lnp-s, Pf, 100mcg/0.5mL 03/01/2021, 04/02/2021    Influenza Vaccine (Quad) PF (>6 Mo Flulaval, Fluarix, and >3 Yrs Afluria, Fluzone [id number]) 10/23/2015, 10/19/2016, 10/05/2017, 10/26/2018, 10/08/2019    Influenza Vaccine PF 12/12/2013, 10/03/2014    Influenza Vaccine Split 11/02/2011, 10/22/2012    Influenza Vaccine Whole 10/29/2010    PPD 01/03/2012    Pneumococcal Conjugate (PCV-13) 08/13/2020    Pneumococcal Polysaccharide (PPSV-23) 03/21/2017    TB Skin Test (PPD) Intradermal 02/12/2014    TDAP Vaccine 02/16/2012    Zoster Recombinant 09/16/2020, 11/27/2020       Review of Systems:   As above included in HPI. Otherwise 11 point review of systems negative including constitutional, skin, HENT, eyes, respiratory, cardiovascular, gastrointestinal, genitourinary, musculoskeletal, endocrine, hematologic, allergy, and neurologic. Physical:   Visit Vitals  /78 (BP 1 Location: Left arm, BP Patient Position: Sitting)   Pulse 77   Temp 97.7 °F (36.5 °C) (Temporal)   Resp 16   Ht 5' 2\" (1.575 m)   Wt 184 lb 9.6 oz (83.7 kg)   SpO2 97%   BMI 33.76 kg/m²       Exam:   Patient appears in no apparent distress. Affect is appropriate. HEENT: PERRLA, anicteric, oropharynx clear, no JVD, adenopathy or thyromegaly. No carotid bruits or radiated murmur. Lungs: clear to auscultation, no wheezes, rhonchi, or rales. Heart: regular rate and rhythm. No murmur, rubs, gallops  Abdomen: soft, nontender, nondistended, normal bowel sounds, no hepatosplenomegaly or masses. Extremities: without edema.         Review of Data:  Labs:  Hospital Outpatient Visit on 06/01/2021   Component Date Value Ref Range Status    Hemoglobin A1c 06/01/2021 7.1* 4.2 - 5.6 % Final    Est. average glucose 06/01/2021 157  mg/dL Final    LIPID PROFILE 06/01/2021        Final    Cholesterol, total 06/01/2021 163  <200 MG/DL Final    Triglyceride 06/01/2021 119  <150 MG/DL Final    HDL Cholesterol 06/01/2021 64* 40 - 60 MG/DL Final    LDL, calculated 06/01/2021 75.2  0 - 100 MG/DL Final    VLDL, calculated 06/01/2021 23.8  MG/DL Final    CHOL/HDL Ratio 06/01/2021 2.5  0 - 5.0   Final    Magnesium 06/01/2021 1.7  1.6 - 2.6 mg/dL Final    Sodium 06/01/2021 139  136 - 145 mmol/L Final    Potassium 06/01/2021 3.8  3.5 - 5.5 mmol/L Final    Chloride 06/01/2021 104  100 - 111 mmol/L Final    CO2 06/01/2021 30  21 - 32 mmol/L Final    Anion gap 06/01/2021 5  3.0 - 18 mmol/L Final    Glucose 06/01/2021 137* 74 - 99 mg/dL Final    BUN 06/01/2021 13  7.0 - 18 MG/DL Final    Creatinine 06/01/2021 0.62  0.6 - 1.3 MG/DL Final    BUN/Creatinine ratio 06/01/2021 21* 12 - 20   Final    GFR est AA 06/01/2021 >60  >60 ml/min/1.73m2 Final    GFR est non-AA 06/01/2021 >60  >60 ml/min/1.73m2 Final    Calcium 06/01/2021 8.7  8.5 - 10.1 MG/DL Final    Bilirubin, total 06/01/2021 0.4  0.2 - 1.0 MG/DL Final    ALT (SGPT) 06/01/2021 36  13 - 56 U/L Final    AST (SGOT) 06/01/2021 28  10 - 38 U/L Final    Alk.  phosphatase 06/01/2021 78  45 - 117 U/L Final    Protein, total 06/01/2021 6.9  6.4 - 8.2 g/dL Final    Albumin 06/01/2021 3.6  3.4 - 5.0 g/dL Final    Globulin 06/01/2021 3.3  2.0 - 4.0 g/dL Final    A-G Ratio 06/01/2021 1.1  0.8 - 1.7   Final    Microalbumin,urine random 06/01/2021 1.21  0 - 3.0 MG/DL Final    Creatinine, urine 06/01/2021 167.00* 30 - 125 mg/dL Final    Microalbumin/Creat ratio (mg/g cre* 06/01/2021 7  0 - 30 mg/g Final    Color 06/01/2021 YELLOW    Final    Appearance 06/01/2021 CLEAR    Final    Specific gravity 06/01/2021 1.019  1.005 - 1.030   Final    pH (UA) 06/01/2021 5.5  5.0 - 8.0   Final    Protein 06/01/2021 Negative NEG mg/dL Final    Glucose 06/01/2021 Negative  NEG mg/dL Final    Ketone 06/01/2021 Negative  NEG mg/dL Final    Bilirubin 06/01/2021 Negative  NEG   Final    Blood 06/01/2021 Negative  NEG   Final    Urobilinogen 06/01/2021 0.2  0.2 - 1.0 EU/dL Final    Nitrites 06/01/2021 Negative  NEG   Final    Leukocyte Esterase 06/01/2021 TRACE* NEG   Final    WBC 06/01/2021 0 to 3  0 - 5 /hpf Final    Epithelial cells 06/01/2021 1+  0 - 5 /lpf Final    Bacteria 06/01/2021 FEW* NEG /hpf Final    Mucus 06/01/2021 FEW* NEG /lpf Final         Health Maintenance:  Screening:    Mammogram: negative (3/2021)    PAP smear: negative (3/2020) with negative HPV. Followed by Dr. Joaquim Bejarano. Colorectal: colonoscopy (12/2016) serrated adenoma. Dr. Pio Cedillo. Due 12/2021. Depression: none   DM (HbA1c/FPG): HbA1c 7.1 (6/2021)   Hepatitis C: negative (3/2016)   Falls: one   DEXA: within normal limits (11/2015)   Glaucoma: regular eye exams with Dr. Elizabeth Solomon (last 10/2020)   Smoking: none   Vitamin D: 35.4 (11/2020)    Medicare Wellness: 8/13/2020  ([place])      Impression:  Patient Active Problem List   Diagnosis Code    Benign hypertensive heart disease without congestive heart failure I11.9    Allergic rhinitis J30.9    Colon polyps K63.5    AVNRT (AV dharmesh re-entry tachycardia) ([facility] [address] I47.1    Hyperlipidemia E78.5    Essential hypertension I10    Asthma J45.909    GERD (gastroesophageal reflux disease) K21.9    Type 2 diabetes mellitus without complication, without long-term current use of insulin (HCC) E11.9    Vitamin D insufficiency E55.9    Microscopic hematuria R31.29    Chronic pain of both knees M25.561, M25.562, G89.29    Obesity (BMI 30.0-34. 9) E66.9    Noncompliance with diet and medication regimen Z91.11, Z91.14    Anxiety F41.9    Spondylolisthesis, lumbar region M43.16    DDD (degenerative disc disease), lumbar M51.36    NAFL (nonalcoholic fatty liver) E46.1    HENRY (nonalcoholic steatohepatitis) K75.81 Plan:  1. Diabetes mellitus. Diagnosed in 9/2016 when HbA1c was checked and found to be elevated at 6.6. Had been steadily increasing and reached 7.5 in 4/2020. Impaired fasting glucose had been present intermittently since 2012. Being treated with metformin  mg at dinner, but stopped taking it due to diarrhea. Changed to sitagliptin, but unable to fill due to cost. Started on glipizide SR 5 mg daily, but reports no longer taking today. Repeat HbA1c 7.1 today. Will begin Jardiance 25 mg daily. Will also refer to nutrition counseling to help with dietary control and promote weight loss. Urged compliance with new medication. Will also prescribe a blood glucose meter and supplies and asked that patient monitor her blood sugar daily. No evidence of microvascular complications. On statin. On losartan and now taking consistently. Continue regular eye exams with Dr. Juana Tucker. Foot exam normal (8/2020). Urine microalbumin/ creatinine ratio remains without evidence of microalbuminuria (7 mg/g). Emphasized importance of lifestyle modifications, including diet, exercise, and weight loss. 2. Hypertension. Blood pressure well controlledtoday on losartan 25 mg daily, metoprolol XL 50 mg daily, and hydrochlorothiazide 12.5 mg daily. Renal function has been normal with creatinine 0.62/ eGFR >60 today. Continue to follow. 3. Hyperlipidemia. On moderate intensity dose simvastatin 20 mg daily with LDL 75 and HDL 64, indicative of good control. Continue to follow. 4. Hepatic steatosis. Developed in 3/2016 with AST 85/ . Evaluation included hepatitis A, B, and C levels (negative), iron panel (normal), and RUQ ultrasound (3/23/2016) with limited sonographic window for the liver; only partially visualized but grossly unremarkable. In 5/2016, she had an abdominal CT scan showing the liver to be normal in size with normal parenchymal density; no discrete mass or ascites, and no intrahepatic biliary dilatation.  Unclear etiology, but transaminases had normalized since that time. However, repeat elevation in 11/2020 with ALT 66 and AST 45. Referred to Dr. Lorenza Padron and liver ultrasound (2/2021) showed lobulated contour liver consistent with steatosis +/- possible cirrhosis. She stated that weight loss recommended. Normalization of LFTs today. However unable to lose weight. Discussed importance given hepatic steatosis and concern for progression to fibrosis. Will continue to monitor. 5. AV dharmesh reentrant tachycardia. No recent episodes. On metoprolol succinate 50 mg daily and no significant palpitations recently. Followed by Dr. Robinson Moralez and will be transitioning care to Dr. Peyton Olson in 10/2021.     6. Asthma, mild persistent. Associated with allergies. Receiving monthly immunotherapy injections with Dr. Lorie Hilliard and states that generally stable. No longer taking Claritin and reporting increasing allergy symptoms. Will refill today. .   7. GERD/ Laryngopharyngeal reflux. Evaluated by Dr. Kel Kimbrough, and noted on laryngoscopic exam. Started on omeprazole daily but states that decided to discontinue as did not help with cough. Follow. 8. Microscopic hematuria. Patient refusing further evaluation with cystoscopy or CT urogram. Urine cytology negative. Did have abdominal CT scan in 5/2016 where kidneys appeared normal. Unwilling to complete evaluation with Dr. Miki Barger. Repeat urinalysis with evidence small blood and 3-5 RBCs in 10/2019. Trace blood with 0-3 RBC in 11/2020 and negative for microscopic hematuria today. Will continue to monitor. 9. Bilateral knee pain. Did not respond to cortisone injection. Had both right and left knee MRI showing variable degrees of menisci tears and osteoarthritis of knee joint. Now being followed by Dr. Chris Espitia and reports relatively quiescent. 10. Right sciatica. Patient presented in 10/2020 with severe low back pain and right sciatica for several days. Unclear as to inciting incident.  Denied lower extremity weakness or paresthesias. Began taking Etodolac and methocarbamol as prescribed by [facility] several months ago for similar symptoms, and reports helpful in controlling discomfort. Lumbar spine x-ray showed multilevel degenerative findings, trace levorotoscoliosis and multilevel spondylolisthesis. Completed physical therapy with improvement. Following with Dr. Barbra Nugent. 11. Family history of breast cancer. Positive family history for breast cancer in a maternal aunt in her 46s and in a paternal aunt. Wishing to continue only with screening mammograms annually and not assess lifetime risk. Last mammogram 3/2021. 12. Obesity. Weight increased 2 pounds since last visit. Emphasized importance of lifestyle modifications, including diet, exercise, and weight loss. Patient not exercising and seems unclear as to appropriate diabetic diet or dietary changes needed to lose weight. Will place referral to nutrition counseling. Will readdress at next visit  13. Health maintenance. Completed Moderna COVID-19 vaccine series. Received 2/2 doses of Shingrix vaccine. Received Prevnar 13 and will need repeat Pneumovax 23 in 3/2022. Other immunizations up to date. Colonoscopy due 12/2021. Mammogram up to date as discussed. Continue regular eye exams with Dr. Winter Rehman. Vitamin D level has been normal. Continue maintenance dose supplement. Welcome to Medicare visit completed. Patient understands recommendations and agrees with plan. Follow-up in 3 months.

## 2021-06-17 ENCOUNTER — TELEPHONE (OUTPATIENT)
Dept: INTERNAL MEDICINE CLINIC | Age: 66
End: 2021-06-17

## 2021-06-17 NOTE — TELEPHONE ENCOUNTER
Pt calling, says the new medication you want her to take for her sugar is too expensive. $300.00. She says she is not going to pay that and really doesn't want to take any pills. She is going to go on a diet and see if that helps.

## 2021-06-22 DIAGNOSIS — F41.9 ANXIETY: ICD-10-CM

## 2021-06-23 RX ORDER — LORAZEPAM 1 MG/1
TABLET ORAL
Qty: 30 TABLET | Refills: 0 | Status: SHIPPED | OUTPATIENT
Start: 2021-06-23 | End: 2021-11-03

## 2021-06-23 NOTE — TELEPHONE ENCOUNTER
[facility]  reports [facility] date for Ativan as 8/18/20 for a 10 d/s.      Last Visit: 6/10/21 with MD Jaqui Sebastian  Next Appointment: 9/14/21 with MD huynh  Previous Refill Encounter(s): 8/18/20 #30    Requested Prescriptions     Pending Prescriptions Disp Refills    LORazepam (ATIVAN) 1 mg tablet [Pharmacy Med Name: LORAZEPAM 1 MG TABLET] 30 Tablet 0     Sig: take 1 tablet by mouth every 8 hours if needed for anxiety

## 2021-07-19 ENCOUNTER — OFFICE VISIT (OUTPATIENT)
Dept: INTERNAL MEDICINE CLINIC | Age: 66
End: 2021-07-19

## 2021-07-19 DIAGNOSIS — E11.9 TYPE 2 DIABETES MELLITUS WITHOUT COMPLICATION, WITHOUT LONG-TERM CURRENT USE OF INSULIN (HCC): Primary | ICD-10-CM

## 2021-07-19 NOTE — PROGRESS NOTES
Pharmacy Progress Note - Diabetes Management    S/O: Ms. Yony Briones is a 77 y.o. female, referred by Dr. Julio Beach MD, was seen today for diabetes management visit. Patient's last A1c was 7.1% in June 2021. This is a(n) increase from 6.8% in February 2021. Brief History: Patient states that she has known of her diabetes/borderline blood sugars for many years. She does endorse a family history of diabetes on her mom's side, so she is familiar with the disease state and its potential complications. She states that she has been prescribed a few different medications in the past, but has not taken them due to side effects or personal preference. She was most recently prescribed Jardiance, but medication cost about $300 which she states was too expensive for her. She admits that she can do a better job with diet and exercise, and would prefer to stay off of medications and work on this if possible. Current anti-hyperglycemic regimen include(s):    - Jardiance 10 mg once daily - not taking    Patient denies adherence with her medications. She is opting not to take any medications at this time and wants to work on lifestyle modifications. - Has tried metformin and glipizide in the past. Metformin was stopped due to GI upset and she has not tried the glipizide due to weight gain potential     ROS:  Today, Pt endorses:  - Symptoms of Hyperglycemia: none  - Symptoms of Hypoglycemia: none    Self Monitoring Blood Glucose (SMBG) or CGM:  - Brought in home glucometer/blood glucose log/CGM reader today:  no  - Patient does have supplies at home, but is not checking every day  - She has checked blood sugars twice since last PCP visit, and her fasting reading was 159 and 170    Nutrition/Lifestyle Modifications:  - Eats 3 meals/day ;  - Breakfast: Yesterday had [employer] larios, egg and cheese biscuit because she was taking granddaughter to school.  Usually, she will have Eggo waffles (2) and/or scrambled eggs with larios or sausage. She states that she was drinking OJ with  Breakfast, but has started to cut this back. May occasionally have a glass of milk with quick chocolate in it   - Lunch: Typically a sandwich (corn beef/ham) +/- chips. May also have a frozen meal like a frozen pizza. - Dinner: Chicken, grilled hamburger/hotdogs, homemade salads. Not a whole lot of rice/potatoes, but does like a baked potato occasionally. Likes Outback steaks if they eat out.   - Snack(s): Loves bread. Acknowledges that she eats too many sweets (daily occurrence), likes fruit   - Beverage(s): Some juice, occasional sweet tea, diet soda and water     Physical Activity:   - No formal exercise due to knee pain; used to ride a bike but unable to do that now   - Tries to get out and walk and play with her dog  - Granddaughter keeps her active  - Active throughout her day when she runs errands/goes shopping  - Has treadmill in her garage, but hasn't used it a lot       Vitals: Wt Readings from Last 3 Encounters:   06/10/21 184 lb 9.6 oz (83.7 kg)   03/03/21 182 lb 9.6 oz (82.8 kg)   11/17/20 183 lb (83 kg)     BP Readings from Last 3 Encounters:   06/10/21 128/78   03/03/21 122/60   11/17/20 136/74     Pulse Readings from Last 3 Encounters:   06/10/21 77   03/03/21 74   11/17/20 77       Past Medical History:   Diagnosis Date    Allergic rhinitis     Asthma     Cardiac stress echo, normal 11/02/2012    Normal maximal stress echo study. EF 60%. Ex time 9 min 45 sec. 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     Hyperlipidemia     Hypertension     Menopause     Plantar fasciitis     left    PSVT [facility]oxysmal [facility]ricular tachycardia) [facility])     A-V dharmesh reentrant tachycardia     Allergies   Allergen Reactions    Altace [Ramipril] Cough    Penicillins Other (comments)     Hands peel    Sulfur Itching       Current Outpatient Medications   Medication Sig    LORazepam (ATIVAN) 1 mg tablet take 1 tablet by mouth every 8 hours if needed for anxiety    loratadine (Claritin) 10 mg tablet Take 1 Tablet by mouth daily.  empagliflozin (JARDIANCE) 10 mg tablet Take 1 Tablet by mouth daily.  Blood-Glucose Meter monitoring kit Monitor fasting blood glucose once daily    glucose blood VI test strips (ASCENSIA AUTODISC VI, ONE TOUCH ULTRA TEST VI) strip Monitor fasting blood glucose once daily    lancets misc Monitor fasting blood glucose once daily    potassium chloride (K-DUR, KLOR-CON) 20 mEq tablet take 1 tablet by mouth once daily    losartan (COZAAR) 25 mg tablet take 1 tablet by mouth once daily    fluticasone propionate (FLONASE) 50 mcg/actuation nasal spray instill 2 sprays into each nostril once daily    metoprolol succinate (TOPROL-XL) 50 mg XL tablet take 1 tablet by mouth once daily    hydroCHLOROthiazide (HYDRODIURIL) 12.5 mg tablet take 1 tablet by mouth once daily    simvastatin (ZOCOR) 20 mg tablet take 1 tablet by mouth at bedtime    albuterol (PROVENTIL HFA, VENTOLIN HFA, PROAIR HFA) 90 mcg/actuation inhaler inhale 2 puffs by mouth every 4 hours if needed for wheezing    clotrimazole-betamethasone (LOTRISONE) topical cream Apply  to both ear canals and affected part of outer ear twice a day with a finger.  cholecalciferol (VITAMIN D3) 1,000 unit cap Take 1,000 Units by mouth daily.  carboxymethylcellulose sodium (REFRESH LIQUIGEL) 1 % dlgl ophthalmic solution Apply  to eye. No current facility-administered medications for this visit.        Lab Results   Component Value Date/Time    Sodium 139 06/01/2021 10:35 AM    Potassium 3.8 06/01/2021 10:35 AM    Chloride 104 06/01/2021 10:35 AM    CO2 30 06/01/2021 10:35 AM    Anion gap 5 06/01/2021 10:35 AM    Glucose 137 (H) 06/01/2021 10:35 AM BUN 13 06/01/2021 10:35 AM    Creatinine 0.62 06/01/2021 10:35 AM    BUN/Creatinine ratio 21 (H) 06/01/2021 10:35 AM    GFR est AA >60 06/01/2021 10:35 AM    GFR est non-AA >60 06/01/2021 10:35 AM    Calcium 8.7 06/01/2021 10:35 AM    Bilirubin, total 0.4 06/01/2021 10:35 AM    Alk. phosphatase 78 06/01/2021 10:35 AM    Protein, total 6.9 06/01/2021 10:35 AM    Albumin 3.6 06/01/2021 10:35 AM    Globulin 3.3 06/01/2021 10:35 AM    A-G Ratio 1.1 06/01/2021 10:35 AM    ALT (SGPT) 36 06/01/2021 10:35 AM       Lab Results   Component Value Date/Time    Cholesterol, total 163 06/01/2021 10:35 AM    HDL Cholesterol 64 (H) 06/01/2021 10:35 AM    LDL, calculated 75.2 06/01/2021 10:35 AM    VLDL, calculated 23.8 06/01/2021 10:35 AM    Triglyceride 119 06/01/2021 10:35 AM    CHOL/HDL Ratio 2.5 06/01/2021 10:35 AM       Lab Results   Component Value Date/Time    WBC 8.7 11/12/2020 11:16 AM    HGB 13.2 11/12/2020 11:16 AM    HCT 39.3 11/12/2020 11:16 AM    PLATELET 430 00/92/9169 11:16 AM    MCV 89.9 11/12/2020 11:16 AM       Lab Results   Component Value Date/Time    Microalbumin/Creat ratio (mg/g creat) 7 06/01/2021 10:35 AM    Microalbumin,urine random 1.21 06/01/2021 10:35 AM       HbA1c:  Lab Results   Component Value Date/Time    Hemoglobin A1c 7.1 (H) 06/01/2021 10:35 AM     No components found for: 2     Last Point of Care HGB A1C  No results found for: [id number]     CrCl cannot be calculated (Unknown ideal weight. ). A/P:    Diabetes Management:  - Per ADA guidelines, Pt's A1c is not at goal of < 7%. - Current SMBG(s)/CGM trend appears to be above fasting blood sugar goals when patient does check at home   - Patient would like to remain off of medications for now and work on diet/exercise. Will assist patient with this, but notified her that we will likely have to start medications if A1c continues to increase at next PCP visit.  She expressed understanding.   - Focused on diet/exercise education today as detailed below  - Encouraged patient to check fasting blood sugars more consistently so that we have a better idea if she is improving or not  - Follow up in 4 weeks, instructed her to bring blood sugar log with her   - Will continue to hold Jardiance for now per patient preference     Nutrition/Lifestyle Modifications:  - Reviewed with patient utilization of the plate method as a way to encourage healthy eating. Reviewed carbohydrate and non-carbohydrate rich foods, carbohydrate content of various foods, appropriate serving sizes for carbohydrates (15 grams = 1 carb serving), reading the nutrition label focusing on total carbohydrates while being mindful of serving size, recommended serving sizes for carbohydrates for meals and snacks (45-60g with meals and less than or equal to 15g for snacks ), and discussion regarding fruits as a carbohydrate. Patient education materials were provided and reviewed with the patient for their future reference and use. - Encouraged patient to focus on big picture items for now - decrease sugary beverages, sweets and bread/pasta in diet   - Instructed patient to start increasing exercise into her routine. Will work our way up to 30 minutes of moderate intensity exercise 5 days/week    Patient verbalized understanding of the information presented and all of the patients questions were answered. AVS was handed to the patient. Patient advised to call the office with any additional questions or concerns. Notifications of recommendations will be sent to Dr. Clau Tony MD for review. Patient will return to clinic in 4 week(s) for follow up. Thank you,  Antony Viveros. CHAVA Leonard      For Pharmacy Admin Tracking Only     CPA in place:  Yes   Recommendation Provided To: Patient/Caregiver: 1 via In person   Time Spent (min): 60

## 2021-07-19 NOTE — Clinical Note
Leonides,  I met with Mrs. Mirna Lopez. She is opting to hold off on Jardiance and wants to work on diet/exercise. We focused mostly on nutrition education today to help her with this, but I told her if A1c does not improve by next visit in September, she will likely need to start therapy. She is in agreement.     Thanks,  Niranjan Krause

## 2021-07-19 NOTE — PATIENT INSTRUCTIONS
Your Visit Summary:     Check and document your blood sugar first thing in the morning (fasting)  Bring your meter/log to all future visits. Your blood sugar goals:  - Fasting (first thing in the morning)  blood sugar: 80 - 130     When you experience symptoms of low blood sugar (example: less than 70):  - Confirm low reading by checking your blood sugar.   - Then treat with 15 grams of carbohydrates (one-half cup of juice or regular soda, or 4-5 glucose tablets). - Wait 15 minutes to recheck blood sugar.   - Then eat a protein containing meal/snack to prevent another low blood sugar episode. (example: peanut butter + crackers)    Other recommendations:  - Schedule an annual eye exam.  - Check your feet daily for any signs of open wounds, cuts, or sores. - Given your risk factors, the following vaccines are recommended: annual flu shot, age-based pneumococcal vaccines (Pneumovax, Prevnar 13). In addition to taking your medications as directed, improving your blood sugar involves modifying your nutrition and maximizing the amount of physical activity. Nutrition:  - When reviewing a nutrition label, focus on the serving size, total calories, fat (and type of fats), total carbohydrates, sugar (and amount of added sugar), amount of fiber (good for your digestive), and amount of protein. Refer to your nutrition label guide for more information.  - For a meal : max 45 - 60 grams of carbohydrates  - For a snack: max 15 grams of carbohydrates  - Reduce amount of saturated and trans fat. Consider more unsaturated fat options as they are better for your heart health.    - Have at least 1 serving of lean fat protein with each meal.    - Increase fiber intake slowly to prevent constipation.   - Substitute fruit juices for the whole fruit    Low carb snack ideas (15 grams total carb or less):     String cheese or babybel with 6 crackers   4 peanut butter crackers   3 cups of popcorn   1 cup raw vegetables with hummus or ranch dip (just need to watch how much dip you use)   Nuts   2 rice cakes   Celery with peanut butter or cream cheese   String cheese with 1 serving of fruit   Greek yogurt (look at label to make sure < 15 gram carb)   Plain greek yogurt with fresh berries added   Nature valley protein bar   Whisps parmesan cheese crisps   Hard boiled egg   Cottage cheese   Tuna salad lettuce roll-ups   Deli meat roll-ups with slice of cheese   Sugar Free Jello   Glucerna shake (16 grams)    Glucerna hunger smart shake (16 grams)   Ensure protein max shake   Fruit (1 serving/15 grams)   1/2 banana, brittany, or grapefruit    1/3 melon (small cantaloupe)   1 slice or 1 cup of honeydew melon   1 slice or 1 and 1/4 cups of watermelon    1 small apple, peach, orange or pear   2 small tangerines   1 cup of raspberries   3/4 cup of blackberries, blueberries or pineapples   1/2 cup of fruit juice, pears, applesauce, or mangos   17 small grapes   12 cherries    Be careful with the glucerna products as they differ in the total carbs depending on the product (some are intended as meal replacements not snacks). Make sure you look at the total carbs on the label as products can differ. Physical Activity:  - Aim for 30 minutes of consistent, moderately intensive, physical activity a day for 5 days or an average of 150 minutes per week. - Start slow, increase as tolerated. For example: Walk every day, working up to 30 minutes of brisk walking, 5 days a weekor split the 30 minutes into two 15-minute or three 10-minute walks. - If you sit for a long time, get up and move/stretch every 90 minutes.

## 2021-07-20 RX ORDER — SIMVASTATIN 20 MG/1
TABLET, FILM COATED ORAL
Qty: 90 TABLET | Refills: 3 | Status: SHIPPED | OUTPATIENT
Start: 2021-07-20 | End: 2022-10-06

## 2021-08-23 ENCOUNTER — OFFICE VISIT (OUTPATIENT)
Dept: INTERNAL MEDICINE CLINIC | Age: 66
End: 2021-08-23

## 2021-08-23 DIAGNOSIS — E11.9 TYPE 2 DIABETES MELLITUS WITHOUT COMPLICATION, WITHOUT LONG-TERM CURRENT USE OF INSULIN (HCC): Primary | ICD-10-CM

## 2021-08-23 NOTE — PROGRESS NOTES
Pharmacy Progress Note - Diabetes Management    Assessment / Plan:   Diabetes Management:  - Per ADA guidelines, Pt's A1c is not at goal of < 7%. - Current SMBG(s)/CGM trend is unable to be assessed today; patient has been unable to use her glucometer successfully. She states that it was working fine in the past, but recently she keeps getting an error message.    - She states that she is taking her meter back to [address] [place] [address] for assistance. Instructed patient to let PharmD know if she still has issues after doing this; happy to schedule another visit for glucometer education. She expressed understanding.   - Patient admits that diet/exercise is still a work in progress  - Encouraged her to seriously consider starting medication therapy if A1c is increased again at follow up with PCP, especially if diet/exercise is going to take a little bit longer to improve   - Discussed medication options, and she is still very hesitant to try any injections. Would recommend SGLT2i or GLP-1 agonist (Rybelsus) as cost allows. It looks like she has commercial insurance for her prescriptions, so we may be able to utilize co-pay cards. - Patient to discuss options with PCP at visit in a few weeks. Will follow up afterwards as needed. S/O: Ms. Jami Vega is a 77 y.o. female, referred by Dr. Isaiah Cui MD was seen today for diabetes management follow up. Patient's last A1c was 7.1% in June 2021. Interim update: Patient states that she has been doing well since our last visit. She was unable to check her blood sugars because her meter stopped working properly, so she is in the process of taking the meter back to the pharmacy for assistance. She did not bring her meter with her to clinic today. Additionally, she states that diet is still a work in progress and she has not been exercising as she should.      Current anti-hyperglycemic regimen include(s):    - Not currently on any medications for diabetes   *Did not tolerate metformin in the past and has not started Jardiance due to personal preference and cost (Jardiance is $325/90 days and $111/30 days per pharmacy)    ROS:  Today, Pt endorses:  - Symptoms of Hyperglycemia: none  - Symptoms of Hypoglycemia: none    Self Monitoring Blood Glucose (SMBG) or CGM:  - Brought in home glucometer/blood glucose log/CGM reader today:  no  - Has not been checking blood sugars  - States she keeps getting an error message when she tries     Nutrition/Lifestyle Modifications:  - Work in progress  - Took a trip to [place] and she had a milkshake yesterday   - States she hasn't been monitoring her diet very much   - Not doing much exercise due to previous heel spur and knee pain    Vitals: Wt Readings from Last 3 Encounters:   06/10/21 184 lb 9.6 oz (83.7 kg)   03/03/21 182 lb 9.6 oz (82.8 kg)   11/17/20 183 lb (83 kg)     BP Readings from Last 3 Encounters:   06/10/21 128/78   03/03/21 122/60   11/17/20 136/74     Pulse Readings from Last 3 Encounters:   06/10/21 77   03/03/21 74   11/17/20 77       Past Medical History:   Diagnosis Date    Allergic rhinitis     Asthma     Cardiac stress echo, normal 11/02/2012    Normal maximal stress echo study. EF 60%. Ex time 9 min 45 sec. 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     Hyperlipidemia     Hypertension     Menopause     Plantar fasciitis     left    PSVT [facility]ox[facility]cardia[facility] ([facility])     A-V dharmesh reentrant tachycardia     Allergies   Allergen Reactions    Altace [Ramipril] Cough    Penicillins Other (comments)     Hands peel    Sulfur Itching       Current Outpatient Medications   Medication Sig    simvastatin (ZOCOR) 20 mg tablet take 1 tablet by mouth at bedtime  Indications: excessive fat in the blood    LORazepam (ATIVAN) 1 mg tablet take 1 tablet by mouth every 8 hours if needed for anxiety    loratadine (Claritin) 10 mg tablet Take 1 Tablet by mouth daily.  empagliflozin (JARDIANCE) 10 mg tablet Take 1 Tablet by mouth daily. (Patient not taking: Reported on 7/20/2021)    Blood-Glucose Meter monitoring kit Monitor fasting blood glucose once daily    glucose blood VI test strips (ASCENSIA AUTODISC VI, ONE TOUCH ULTRA TEST VI) strip Monitor fasting blood glucose once daily    lancets misc Monitor fasting blood glucose once daily    potassium chloride (K-DUR, KLOR-CON) 20 mEq tablet take 1 tablet by mouth once daily    losartan (COZAAR) 25 mg tablet take 1 tablet by mouth once daily    fluticasone propionate (FLONASE) 50 mcg/actuation nasal spray instill 2 sprays into each nostril once daily    metoprolol succinate (TOPROL-XL) 50 mg XL tablet take 1 tablet by mouth once daily    hydroCHLOROthiazide (HYDRODIURIL) 12.5 mg tablet take 1 tablet by mouth once daily    albuterol (PROVENTIL HFA, VENTOLIN HFA, PROAIR HFA) 90 mcg/actuation inhaler inhale 2 puffs by mouth every 4 hours if needed for wheezing    clotrimazole-betamethasone (LOTRISONE) topical cream Apply  to both ear canals and affected part of outer ear twice a day with a finger.  cholecalciferol (VITAMIN D3) 1,000 unit cap Take 1,000 Units by mouth daily.  carboxymethylcellulose sodium (REFRESH LIQUIGEL) 1 % dlgl ophthalmic solution Apply  to eye. No current facility-administered medications for this visit.        Lab Results   Component Value Date/Time    Sodium 139 06/01/2021 10:35 AM    Potassium 3.8 06/01/2021 10:35 AM    Chloride 104 06/01/2021 10:35 AM    CO2 30 06/01/2021 10:35 AM    Anion gap 5 06/01/2021 10:35 AM    Glucose 137 (H) 06/01/2021 10:35 AM    BUN 13 06/01/2021 10:35 AM    Creatinine 0.62 06/01/2021 10:35 AM    BUN/Creatinine ratio 21 (H) 06/01/2021 10:35 AM    GFR est AA >60 06/01/2021 10:35 AM GFR est non-AA >60 06/01/2021 10:35 AM    Calcium 8.7 06/01/2021 10:35 AM    Bilirubin, total 0.4 06/01/2021 10:35 AM    Alk. phosphatase 78 06/01/2021 10:35 AM    Protein, total 6.9 06/01/2021 10:35 AM    Albumin 3.6 06/01/2021 10:35 AM    Globulin 3.3 06/01/2021 10:35 AM    A-G Ratio 1.1 06/01/2021 10:35 AM    ALT (SGPT) 36 06/01/2021 10:35 AM       Lab Results   Component Value Date/Time    Cholesterol, total 163 06/01/2021 10:35 AM    HDL Cholesterol 64 (H) 06/01/2021 10:35 AM    LDL, calculated 75.2 06/01/2021 10:35 AM    VLDL, calculated 23.8 06/01/2021 10:35 AM    Triglyceride 119 06/01/2021 10:35 AM    CHOL/HDL Ratio 2.5 06/01/2021 10:35 AM       Lab Results   Component Value Date/Time    WBC 8.7 11/12/2020 11:16 AM    HGB 13.2 11/12/2020 11:16 AM    HCT 39.3 11/12/2020 11:16 AM    PLATELET 085 88/74/0503 11:16 AM    MCV 89.9 11/12/2020 11:16 AM       Lab Results   Component Value Date/Time    Microalbumin/Creat ratio (mg/g creat) 7 06/01/2021 10:35 AM    Microalbumin,urine random 1.21 06/01/2021 10:35 AM       HbA1c:  Lab Results   Component Value Date/Time    Hemoglobin A1c 7.1 (H) 06/01/2021 10:35 AM     No components found for: 2     Last Point of Care HGB A1C  No results found for: [id number]     CrCl cannot be calculated (Unknown ideal weight. ). Medication reconciliation was completed during the visit. There are no discontinued medications. Patient verbalized understanding of the information presented and all of the patients questions were answered. AVS was handed to the patient. Patient advised to call the office with any additional questions or concerns. Notifications of recommendations will be sent to Dr. India Nino MD for review. Thank you,  CHAVA Lopez              For Pharmacy Admin Tracking Only     CPA in place:  Yes   Recommendation Provided To: Patient/Caregiver: 1 via In person   Time Spent (min): 30

## 2021-08-23 NOTE — PATIENT INSTRUCTIONS
Your Visit Summary:     Plan:  - Consider medication options to add prior to next visit with your doctor   -           Call me with any questions or concerns  Jessica Vidal [phone]    Check and document your blood sugar first thing in the morning (fasting), 1-2 hours after a meal, and/or before bedtime. Bring your meter/log to all future visits. Your blood sugar goals:  - Fasting (first thing in the morning)  blood sugar: 80 - 130   - 1 to 2 hours after a meal: less than 180     When you experience symptoms of low blood sugar (example: less than 70):  - Confirm low reading by checking your blood sugar.   - Then treat with 15 grams of carbohydrates (one-half cup of juice or regular soda, or 4-5 glucose tablets). - Wait 15 minutes to recheck blood sugar.   - Then eat a protein containing meal/snack to prevent another low blood sugar episode. (example: peanut butter + crackers)    Nutrition:  - When reviewing a nutrition label, focus on the serving size, total calories, fat (and type of fats), total carbohydrates, sugar (and amount of added sugar), amount of fiber (good for your digestive), and amount of protein. Refer to your nutrition label guide for more information.  - For a meal : max 45 - 60 grams of carbohydrates  - For a snack: max 15 grams of carbohydrates  - Reduce amount of saturated and trans fat. Consider more unsaturated fat options as they are better for your heart health.    - Have at least 1 serving of lean fat protein with each meal.    - Increase fiber intake slowly to prevent constipation.   - Substitute fruit juices for the whole fruit    Low carb snack ideas (15 grams total carb or less):     String cheese or babybel with 6 crackers   4 peanut butter crackers   3 cups of popcorn   1 cup raw vegetables with hummus or ranch dip (just need to watch how much dip you use)   Nuts   2 rice cakes   Celery with peanut butter or cream cheese   String cheese with 1 serving of fruit   Greek yogurt (look at label to make sure < 15 gram carb)   Plain greek yogurt with fresh berries added   Nature valley protein bar   Whisps parmesan cheese crisps   Hard boiled egg   Cottage cheese   Tuna salad lettuce roll-ups   Deli meat roll-ups with slice of cheese   Sugar Free Jello   Glucerna shake (16 grams)    Glucerna hunger smart shake (16 grams)   Ensure protein max shake   Fruit (1 serving/15 grams)   1/2 banana, brittany, or grapefruit    1/3 melon (small cantaloupe)   1 slice or 1 cup of honeydew melon   1 slice or 1 and 1/4 cups of watermelon    1 small apple, peach, orange or pear   2 small tangerines   1 cup of raspberries   3/4 cup of blackberries, blueberries or pineapples   1/2 cup of fruit juice, pears, applesauce, or mangos   17 small grapes   12 cherries    Be careful with the glucerna products as they differ in the total carbs depending on the product (some are intended as meal replacements not snacks). Make sure you look at the total carbs on the label as products can differ. Physical Activity:  - Aim for 30 minutes of consistent, moderately intensive, physical activity a day for 5 days or an average of 150 minutes per week. - Start slow, increase as tolerated. For example: Walk every day, working up to 30 minutes of brisk walking, 5 days a weekor split the 30 minutes into two 15-minute or three 10-minute walks. - If you sit for a long time, get up and move/stretch every 90 minutes. Other recommendations:  - Schedule an annual eye exam.  - Check your feet daily for any signs of open wounds, cuts, or sores. - Given your risk factors, the following vaccines are recommended: annual flu shot, age-based pneumococcal vaccines (Pneumovax, Prevnar 13). In addition to taking your medications as directed, improving your blood sugar involves modifying your nutrition and maximizing the amount of physical activity.

## 2021-09-07 ENCOUNTER — HOSPITAL ENCOUNTER (OUTPATIENT)
Dept: LAB | Age: 66
Discharge: HOME | End: 2021-09-07
Payer: MEDICARE

## 2021-09-07 ENCOUNTER — APPOINTMENT (OUTPATIENT)
Dept: INTERNAL MEDICINE CLINIC | Age: 66
End: 2021-09-07

## 2021-09-07 DIAGNOSIS — I47.1 AVNRT (AV NODAL RE-ENTRY TACHYCARDIA) (HCC): ICD-10-CM

## 2021-09-07 DIAGNOSIS — E66.9 OBESITY (BMI 30.0-34.9): ICD-10-CM

## 2021-09-07 DIAGNOSIS — J45.30 MILD PERSISTENT ASTHMA WITHOUT COMPLICATION: ICD-10-CM

## 2021-09-07 DIAGNOSIS — K76.0 NAFL (NONALCOHOLIC FATTY LIVER): ICD-10-CM

## 2021-09-07 DIAGNOSIS — I10 ESSENTIAL HYPERTENSION: ICD-10-CM

## 2021-09-07 DIAGNOSIS — Z91.119 NONCOMPLIANCE WITH DIET AND MEDICATION REGIMEN: ICD-10-CM

## 2021-09-07 DIAGNOSIS — Z91.14 NONCOMPLIANCE WITH DIET AND MEDICATION REGIMEN: ICD-10-CM

## 2021-09-07 DIAGNOSIS — E11.9 TYPE 2 DIABETES MELLITUS WITHOUT COMPLICATION, WITHOUT LONG-TERM CURRENT USE OF INSULIN (HCC): ICD-10-CM

## 2021-09-07 DIAGNOSIS — E78.5 HYPERLIPIDEMIA, UNSPECIFIED HYPERLIPIDEMIA TYPE: ICD-10-CM

## 2021-09-07 DIAGNOSIS — J30.1 SEASONAL ALLERGIC RHINITIS DUE TO POLLEN: ICD-10-CM

## 2021-09-07 LAB
ALBUMIN SERPL-MCNC: 3.6 G/DL (ref 3.4–5)
ALBUMIN/GLOB SERPL: 1 {RATIO} (ref 0.8–1.7)
ALP SERPL-CCNC: 76 U/L (ref 45–117)
ALT SERPL-CCNC: 33 U/L (ref 13–56)
ANION GAP SERPL CALC-SCNC: 6 MMOL/L (ref 3–18)
AST SERPL-CCNC: 25 U/L (ref 10–38)
BILIRUB SERPL-MCNC: 0.5 MG/DL (ref 0.2–1)
BUN SERPL-MCNC: 12 MG/DL (ref 7–18)
BUN/CREAT SERPL: 17 (ref 12–20)
CALCIUM SERPL-MCNC: 8.5 MG/DL (ref 8.5–10.1)
CHLORIDE SERPL-SCNC: 102 MMOL/L (ref 100–111)
CHOLEST SERPL-MCNC: 162 MG/DL
CO2 SERPL-SCNC: 30 MMOL/L (ref 21–32)
CREAT SERPL-MCNC: 0.71 MG/DL (ref 0.6–1.3)
CREAT UR-MCNC: 138 MG/DL (ref 30–125)
EST. AVERAGE GLUCOSE BLD GHB EST-MCNC: 166 MG/DL
GLOBULIN SER CALC-MCNC: 3.5 G/DL (ref 2–4)
GLUCOSE SERPL-MCNC: 157 MG/DL (ref 74–99)
HBA1C MFR BLD: 7.4 % (ref 4.2–5.6)
HDLC SERPL-MCNC: 62 MG/DL (ref 40–60)
HDLC SERPL: 2.6 {RATIO} (ref 0–5)
LDLC SERPL CALC-MCNC: 76.6 MG/DL (ref 0–100)
LIPID PROFILE,FLP: ABNORMAL
MAGNESIUM SERPL-MCNC: 1.7 MG/DL (ref 1.6–2.6)
MICROALBUMIN UR-MCNC: 0.79 MG/DL (ref 0–3)
MICROALBUMIN/CREAT UR-RTO: 6 MG/G (ref 0–30)
POTASSIUM SERPL-SCNC: 3.7 MMOL/L (ref 3.5–5.5)
PROT SERPL-MCNC: 7.1 G/DL (ref 6.4–8.2)
SODIUM SERPL-SCNC: 138 MMOL/L (ref 136–145)
TRIGL SERPL-MCNC: 117 MG/DL (ref ?–150)
VLDLC SERPL CALC-MCNC: 23.4 MG/DL

## 2021-09-07 PROCEDURE — 80061 LIPID PANEL: CPT

## 2021-09-07 PROCEDURE — 80053 COMPREHEN METABOLIC PANEL: CPT

## 2021-09-07 PROCEDURE — 82043 UR ALBUMIN QUANTITATIVE: CPT

## 2021-09-07 PROCEDURE — 83735 ASSAY OF MAGNESIUM: CPT

## 2021-09-07 PROCEDURE — 36415 COLL VENOUS BLD VENIPUNCTURE: CPT

## 2021-09-07 PROCEDURE — 83036 HEMOGLOBIN GLYCOSYLATED A1C: CPT

## 2021-09-14 ENCOUNTER — OFFICE VISIT (OUTPATIENT)
Dept: INTERNAL MEDICINE CLINIC | Age: 66
End: 2021-09-14
Payer: MEDICARE

## 2021-09-14 VITALS
RESPIRATION RATE: 16 BRPM | SYSTOLIC BLOOD PRESSURE: 126 MMHG | DIASTOLIC BLOOD PRESSURE: 74 MMHG | WEIGHT: 186.4 LBS | BODY MASS INDEX: 34.3 KG/M2 | HEIGHT: 62 IN | OXYGEN SATURATION: 97 % | TEMPERATURE: 97.7 F | HEART RATE: 78 BPM

## 2021-09-14 DIAGNOSIS — M54.50 CHRONIC BILATERAL LOW BACK PAIN WITHOUT SCIATICA: ICD-10-CM

## 2021-09-14 DIAGNOSIS — K75.81 NASH (NONALCOHOLIC STEATOHEPATITIS): ICD-10-CM

## 2021-09-14 DIAGNOSIS — E66.9 OBESITY (BMI 30.0-34.9): ICD-10-CM

## 2021-09-14 DIAGNOSIS — G89.29 CHRONIC BILATERAL LOW BACK PAIN WITHOUT SCIATICA: ICD-10-CM

## 2021-09-14 DIAGNOSIS — K76.0 NAFL (NONALCOHOLIC FATTY LIVER): ICD-10-CM

## 2021-09-14 DIAGNOSIS — E78.5 HYPERLIPIDEMIA, UNSPECIFIED HYPERLIPIDEMIA TYPE: ICD-10-CM

## 2021-09-14 DIAGNOSIS — J45.30 MILD PERSISTENT ASTHMA WITHOUT COMPLICATION: ICD-10-CM

## 2021-09-14 DIAGNOSIS — I10 ESSENTIAL HYPERTENSION: ICD-10-CM

## 2021-09-14 DIAGNOSIS — E55.9 VITAMIN D INSUFFICIENCY: ICD-10-CM

## 2021-09-14 DIAGNOSIS — Z13.31 SCREENING FOR DEPRESSION: ICD-10-CM

## 2021-09-14 DIAGNOSIS — I47.1 AVNRT (AV NODAL RE-ENTRY TACHYCARDIA) (HCC): ICD-10-CM

## 2021-09-14 DIAGNOSIS — E11.9 TYPE 2 DIABETES MELLITUS WITHOUT COMPLICATION, WITHOUT LONG-TERM CURRENT USE OF INSULIN (HCC): Primary | ICD-10-CM

## 2021-09-14 DIAGNOSIS — Z00.00 INITIAL MEDICARE ANNUAL WELLNESS VISIT: ICD-10-CM

## 2021-09-14 DIAGNOSIS — Z71.89 ADVANCED DIRECTIVES, COUNSELING/DISCUSSION: ICD-10-CM

## 2021-09-14 PROCEDURE — G8417 CALC BMI ABV UP PARAM F/U: HCPCS | Performed by: INTERNAL MEDICINE

## 2021-09-14 PROCEDURE — 99214 OFFICE O/P EST MOD 30 MIN: CPT | Performed by: INTERNAL MEDICINE

## 2021-09-14 PROCEDURE — 3051F HG A1C>EQUAL 7.0%<8.0%: CPT | Performed by: INTERNAL MEDICINE

## 2021-09-14 PROCEDURE — 1090F PRES/ABSN URINE INCON ASSESS: CPT | Performed by: INTERNAL MEDICINE

## 2021-09-14 PROCEDURE — 99497 ADVNCD CARE PLAN 30 MIN: CPT | Performed by: INTERNAL MEDICINE

## 2021-09-14 PROCEDURE — G8510 SCR DEP NEG, NO PLAN REQD: HCPCS | Performed by: INTERNAL MEDICINE

## 2021-09-14 PROCEDURE — G8752 SYS BP LESS 140: HCPCS | Performed by: INTERNAL MEDICINE

## 2021-09-14 PROCEDURE — 3017F COLORECTAL CA SCREEN DOC REV: CPT | Performed by: INTERNAL MEDICINE

## 2021-09-14 PROCEDURE — G8536 NO DOC ELDER MAL SCRN: HCPCS | Performed by: INTERNAL MEDICINE

## 2021-09-14 PROCEDURE — G9899 SCRN MAM PERF RSLTS DOC: HCPCS | Performed by: INTERNAL MEDICINE

## 2021-09-14 PROCEDURE — 2022F DILAT RTA XM EVC RTNOPTHY: CPT | Performed by: INTERNAL MEDICINE

## 2021-09-14 PROCEDURE — G8399 PT W/DXA RESULTS DOCUMENT: HCPCS | Performed by: INTERNAL MEDICINE

## 2021-09-14 PROCEDURE — G8427 DOCREV CUR MEDS BY ELIG CLIN: HCPCS | Performed by: INTERNAL MEDICINE

## 2021-09-14 PROCEDURE — G0439 PPPS, SUBSEQ VISIT: HCPCS | Performed by: INTERNAL MEDICINE

## 2021-09-14 PROCEDURE — G0463 HOSPITAL OUTPT CLINIC VISIT: HCPCS | Performed by: INTERNAL MEDICINE

## 2021-09-14 PROCEDURE — 1101F PT FALLS ASSESS-DOCD LE1/YR: CPT | Performed by: INTERNAL MEDICINE

## 2021-09-14 PROCEDURE — G8754 DIAS BP LESS 90: HCPCS | Performed by: INTERNAL MEDICINE

## 2021-09-14 RX ORDER — GLIPIZIDE 5 MG/1
5 TABLET, FILM COATED, EXTENDED RELEASE ORAL DAILY
Qty: 90 TABLET | Refills: 2 | Status: SHIPPED | OUTPATIENT
Start: 2021-09-14 | End: 2022-01-13 | Stop reason: SDUPTHER

## 2021-09-14 NOTE — ACP (ADVANCE CARE PLANNING)
Advance Care Planning     Advance Care Planning (ACP) Physician/NP/PA Conversation      Date of Conversation: 9/14/2021  Conducted with: Patient with Decision Making Capacity    Healthcare Decision Maker:     Primary Decision Maker: Avelino - Spouse - [phone]    Secondary Decision Maker: Rody Zurita - Son - [phone]    Secondary Decision Maker: Nae uGtierrez - [phone]  Click here to complete [address] including selection of the Healthcare Decision Maker Relationship (ie \"Primary\")        Today we documented Decision Maker(s) consistent with Legal Next of Kin hierarchy. Care Preferences:    Hospitalization: \"If your health worsens and it becomes clear that your chance of recovery is unlikely, what would be your preference regarding hospitalization? \"  The patient would prefer hospitalization. Ventilation: \"If you were unable to breathe on your own and your chance of recovery was unlikely, what would be your preference about the use of a ventilator (breathing machine) if it was available to you? \"   The patient would desire the use of a ventilator. Resuscitation: \"In the event your heart stopped as a result of an underlying serious health condition, would you want attempts to be made to restart your heart, or would you prefer a natural death? \"   Yes, attempt to resuscitate.     Additional topics discussed: treatment goals, benefit/burden of treatment options, ventilation preferences, hospitalization preferences, resuscitation preferences and end of life care preferences (vegetative state/imminent death)    Conversation Outcomes / Follow-Up Plan:   ACP complete - no further action today  Reviewed DNR/DNI and patient elects Full Code (Attempt Resuscitation)     Length of Voluntary ACP Conversation in minutes:  16 minutes    Chapito Blunt MD

## 2021-09-14 NOTE — PROGRESS NOTES
This is an Initial Medicare Annual Wellness Exam (AWV) (Performed 12 months after IPPE or effective date of Medicare Part B enrollment, Once in a lifetime)    I have reviewed the patient's medical history in detail and updated the computerized patient record. Assessment/Plan   Education and counseling provided:  Are appropriate based on today's review and evaluation  End-of-Life planning (with patient's consent)  Influenza Vaccine  Screening Mammography  Colorectal cancer screening tests  Cardiovascular screening blood test  Bone mass measurement (DEXA)  Screening for glaucoma  Medical nutrition therapy for individuals with diabetes or renal disease    1. Type 2 diabetes mellitus without complication, without long-term current use of insulin (HCC)  -      DIABETES FOOT EXAM  -     CBC WITH AUTOMATED DIFF; Future  -     HEMOGLOBIN A1C WITH EAG; Future  -     LIPID PANEL; Future  -     METABOLIC PANEL, COMPREHENSIVE; Future  -     MICROALBUMIN, UR, RAND W/ MICROALB/CREAT RATIO; Future  -     TSH 3RD GENERATION; Future  -     URINALYSIS W/MICROSCOPIC; Future  2. Chronic bilateral low back pain without sciatica  -     REFERRAL TO PHYSICAL THERAPY  3. Essential hypertension  -     CBC WITH AUTOMATED DIFF; Future  -     MAGNESIUM; Future  -     METABOLIC PANEL, COMPREHENSIVE; Future  -     MICROALBUMIN, UR, RAND W/ MICROALB/CREAT RATIO; Future  -     TSH 3RD GENERATION; Future  -     URINALYSIS W/MICROSCOPIC; Future  4. AVNRT (AV dharmesh re-entry tachycardia) ([facility] [address]  5. NAFL (nonalcoholic fatty liver)  -     LIPID PANEL; Future  6. HENRY (nonalcoholic steatohepatitis)  -     LIPID PANEL; Future  7. Hyperlipidemia, unspecified hyperlipidemia type  -     LIPID PANEL; Future  8. Obesity (BMI 30.0-34.9)  9. Mild persistent asthma without complication  10. Initial Medicare annual wellness visit  -     ADVANCE CARE PLANNING FIRST 30 MINS  11.  Advanced directives, counseling/discussion  -     ADVANCE CARE PLANNING FIRST 30 MINS  12. Screening for depression  13. Vitamin D insufficiency  -     VITAMIN D, 25 HYDROXY; Future       Depression Risk Factor Screening     3 most recent PHQ Screens 9/14/2021   Little interest or pleasure in doing things Not at all   Feeling down, depressed, irritable, or hopeless Not at all   Total Score PHQ 2 0   Trouble falling or staying asleep, or sleeping too much -   Feeling tired or having little energy -   Poor appetite, weight loss, or overeating -   Feeling bad about yourself - or that you are a failure or have let yourself or your family down -   Trouble concentrating on things such as school, work, reading, or watching TV -   Moving or speaking so slowly that other people could have noticed; or the opposite being so fidgety that others notice -   Thoughts of being better off dead, or hurting yourself in some way -   PHQ 9 Score -   How difficult have these problems made it for you to do your work, take care of your home and get along with others -       Alcohol Risk Screen    Do you average more than 1 drink per night or more than 7 drinks a week:  No    On any one occasion in the past three months have you have had more than 3 drinks containing alcohol:  No         Functional Ability and Level of Safety    Hearing: Hearing is good. Activities of Daily Living: The home contains: no safety equipment. Patient does total self care     Ambulation: with no difficulty      Fall Risk:  Fall Risk Assessment, last 12 mths 9/14/2021   Able to walk? Yes   Fall in past 12 months? 1   Do you feel unsteady? 0   Are you worried about falling 0   Number of falls in past 12 months 1   Fall with injury?  0      Abuse Screen:  Patient is not abused       Cognitive Screening    Has your family/caregiver stated any concerns about your memory: no     Cognitive Screening: none performed today    Health Maintenance Due     Health Maintenance Due   Topic Date Due    Flu Vaccine (1) 09/01/2021       Patient Care Team Patient Care Team:  Blaze Campa MD as PCP - General (Internal Medicine)  Blaze Campa MD as PCP - [facility]  Zo Pack MD (Gastroenterology)  Arden Hartman DO (Cardiology)  Vianney Cruz MD as Surgeon (General Surgery)  Cornelius Chen MD (Podiatry)  Emperatriz Avila MD (Otolaryngology)  Oli Recinos MD (Allergy)  Allison Camejo MD (Obstetrics & Gynecology)  Diomedes Montes MD (Ophthalmology)    History     Patient Active Problem List   Diagnosis Code    Benign hypertensive heart disease without congestive heart failure I11.9    Allergic rhinitis J30.9    Colon polyps K63.5    AVNRT (AV dharmesh re-entry tachycardia) ([facility] [address] I47.1    Hyperlipidemia E78.5    Essential hypertension I10    Asthma J45.909    GERD (gastroesophageal reflux disease) K21.9    Type 2 diabetes mellitus without complication, without long-term current use of insulin ([facility]) E11.9    Vitamin D insufficiency E55.9    Microscopic hematuria R31.29    Chronic pain of both knees M25.561, M25.562, G89.29    Obesity (BMI 30.0-34. 9) E66.9    Noncompliance with diet and medication regimen Z91.11, Z91.14    Anxiety F41.9    Spondylolisthesis, lumbar region M43.16    DDD (degenerative disc disease), lumbar M51.36    NAFL (nonalcoholic fatty liver) I14.5    HENRY (nonalcoholic steatohepatitis) K75.81     Past Medical History:   Diagnosis Date    Allergic rhinitis     Asthma     Cardiac stress echo, normal 11/02/2012    Normal maximal stress echo study. EF 60%. Ex time 9 min 45 sec. 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     Hyperlipidemia     Hypertension     Menopause     Plantar fasciitis     left    PSVT (paroxysmal supraventricular tachycardia) ([facility])     A-V dharmesh reentrant tachycardia      Past Surgical History:   Procedure Laterality Date    ENDOSCOPY, COLON, DIAGNOSTIC      polyp    HX CERVICAL POLYPECTOMY      HX CYST INCISION AND DRAINAGE Right 10 or more years    HX DILATION AND CURETTAGE      HX GYN      polyp on cervix    HX POLYPECTOMY      from rectum     Current Outpatient Medications   Medication Sig Dispense Refill    glipiZIDE SR (GLUCOTROL XL) 5 mg CR tablet Take 1 Tablet by mouth daily. 90 Tablet 2    simvastatin (ZOCOR) 20 mg tablet take 1 tablet by mouth at bedtime  Indications: excessive fat in the blood 90 Tablet 3    LORazepam (ATIVAN) 1 mg tablet take 1 tablet by mouth every 8 hours if needed for anxiety 30 Tablet 0    loratadine (Claritin) 10 mg tablet Take 1 Tablet by mouth daily. 90 Tablet 2    Blood-Glucose Meter monitoring kit Monitor fasting blood glucose once daily 1 Kit 0    glucose blood VI test strips (ASCENSIA AUTODISC VI, ONE TOUCH ULTRA TEST VI) strip Monitor fasting blood glucose once daily 100 Strip 5    lancets misc Monitor fasting blood glucose once daily 100 Each 5    potassium chloride (K-DUR, KLOR-CON) 20 mEq tablet take 1 tablet by mouth once daily 90 Tab 3    losartan (COZAAR) 25 mg tablet take 1 tablet by mouth once daily 90 Tab 2    fluticasone propionate (FLONASE) 50 mcg/actuation nasal spray instill 2 sprays into each nostril once daily 16 g 5    metoprolol succinate (TOPROL-XL) 50 mg XL tablet take 1 tablet by mouth once daily 90 Tab 3    hydroCHLOROthiazide (HYDRODIURIL) 12.5 mg tablet take 1 tablet by mouth once daily 90 Tab 3    albuterol (PROVENTIL HFA, VENTOLIN HFA, PROAIR HFA) 90 mcg/actuation inhaler inhale 2 puffs by mouth every 4 hours if needed for wheezing 8.5 g 5    clotrimazole-betamethasone (LOTRISONE) topical cream Apply  to both ear canals and affected part of outer ear twice a day with a finger. 45 g 3    cholecalciferol (VITAMIN D3) 1,000 unit cap Take 1,000 Units by mouth daily.       carboxymethylcellulose sodium (REFRESH LIQUIGEL) 1 % dlgl ophthalmic solution Apply  to eye.        Allergies   Allergen Reactions    Altace [Ramipril] Cough    Penicillins Other (comments)     Hands peel    Sulfur Itching       Family History   Problem Relation Age of Onset   [facility] Arthritis-osteo Mother     Hypertension Mother              Cancer Father         bone cancer    Hypertension Sister     Hypertension Sister     Hypertension Sister     Breast Cancer Maternal Aunt     Breast Cancer Paternal Aunt     Diabetes Other     Stroke Other     Other Sister         twin sister - osteopenia and low vitamin D levels     Social History     Tobacco Use    Smoking status: Never Smoker    Smokeless tobacco: Never Used   Substance Use Topics    Alcohol use: No       Glory Jacobs MD

## 2021-09-14 NOTE — PROGRESS NOTES
1. Have you been to the ER, urgent care clinic or hospitalized since your last visit? NO.     2. Have you seen or consulted any other health care providers outside of the [address] [place] [address] since your last visit (Include any pap smears or colon screening)?  NO

## 2021-09-19 NOTE — PROGRESS NOTES
HPI:   Humberto Moya is a 77y.o. year old female who presents today for a routine visit. She has a history of hypertension, hyperlipidemia, paroxysmal SVT, diabetes mellitus, GERD, asthma, elevated transaminases, and atypical mycobacterial pneumonia. She has completed the Moderna COVID-19 vaccine series. She reports that she is doing reasonably well. She was last seen on 6/10/2021 it was noted that her HbA1c had increased to 7.1. She had discontinued glipizide CR and decision made to proceed with Jardiance. However, patient did not fill due to cost.  She did meet with Pharm. DRylee Hale for nutrition counseling although unclear if it had been beneficial as weight did not decrease. She states that she is not exercising regularly. She does complain of some increasing low back pain without sciatica over the last several weeks. denies any fevers, chills, weight change, saddle paresthesia, neurogenic bowel or bladder symptoms, or recent trauma. She is otherwise without new complaints and feeling generally well. Summary of prior  medical history:  She has a history of hypertension, treated with metoprolol and hydrochlorothiazide (+ potassium). She reports that she does not check her blood pressure at home. She does not exercise regularly, but denies any chest pain, shortness of breath at rest or with exertion, lightheadedness, or edema. 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 She has had approximately six severe episodes over the years, prompting presentation to the ED and treatment with IV Adenosine. She currently reports infrequent short episodes of palpitations and is being treated with metoprolol. She is followed by Dr. Concepcion Meigs. She had an echocardiogram (10/2005) showing normal LV size and function (EF 60-65%), and no valvular pathology. In 11/2012, she underwent an exercise stress echocardiogram, which was normal at maximal exercise. She has a history of hyperlipidemia, treated with simvastatin from 10/2012 to 3/2015 at which time she stopped taking it due to myalgias. She restarted it on 8/2015 and continued to take it without difficulty until 3/2016, when it was noticed that she had transaminase elevation (AST 85/ ) and it was discontinued. Evaluation included hepatitis A, B, and C levels (negative), iron panel (normal), and RUQ ultrasound (3/23/2016) with limited sonographic window for the liver; only partially visualized but grossly unremarkable. Repeat hepatic panel (4/22/2016) showed AST 75 and ALT 98. Repeat lipid panel showed total chol 215/ / HDL 64/. In 5/2016, she had an abdominal CT scan showing the liver to be normal in size with normal parenchymal density; no discrete mass or ascites, and no intrahepatic biliary dilatation. She restarted simvastatin 20 mg daily in 4/2018. She has been taking it without difficulty since restarting. She underwent repeat evaluation by Dr. Ana Maria Herr for her elevated LFT's, and reports that lab evaluation was negative. She underwent an abdominal ultrasound (2/16/2021) which showed an echogenic lobulated contour liver suggestive of steatosis +/- cirrhosis; no focal lesions seen. Recommendation was for her to lose weight. She has a history of diabetes mellitus, with impaired fasting glucose ranging from 103-111 since 2012, and HbA1c to 6.6-6.8 since 9/2016 (not checked previously). She was prescribed metformin ER last visit for an increase in her HbA1c to 7.1, but she reports that she took it for only one week and discontinued for unclear reasons. She was not experiencing any side effects. She denies any polyuria, polydipsia, nocturia, or blurry vision, and has no history of retinopathy, neuropathy, or nephropathy. She has regular eye exams with Dr. Corina Damon. She has a history of asthma and allergic rhinitis and is followed by Dr. Alexandrea Bullock.  She is receiving immunotherapy once per month, and reports that she has not required any inhalers recently. She states that she does not use Qvar daily, but will take it occasionally. She does use Claritin every day. In 7/2019, she reported increasing difficulty with right ear fullness, post nasal drainage, hoarseness, and cough, and was referred to Dr. Hunter Dickey. He performed a nasal laryngoscopy and found evidence of laryngopharyngeal reflux. She was started on daily omeprazole, and she states that she has noticed some improvement. In 10/2017, she fell down the steps of her porch and had difficulty with right knee pain and swelling. She was evaluated by Dr. Katie Boykin in 12/2017, and right knee xray showed decreased medial joint space, and moderate degenerative changes. She received a cortisone injection, but did not notice any improvement. She underwent an MRI of the right knee (1/29/2018) which showed complete tear of posterior horn and root of the medial meniscus; tear of the body and posterior horn of the lateral meniscus; tricompartmental osteoarthritis most prominent in medial and patellofemoral compartments; moderate joint effusion; small Baker's cyst. It was recommended that she consider knee replacement and arthroscopy. However, she decided to seek a second opinion and was evaluated by Dr. Js French. She also underwent an MRI of her left knee (3/23/2018) showing radial tear posterior horn medial meniscus with extrusion of the body. Also a radial tear in the mid body; lateral meniscus intrasubstance degeneration and probable small undersurface tear of the posterior horn; medial and patellofemoral compartments moderate chondral loss; s. ubchondral bone marrow edema in the medial tibial plateau, likely reactive. She is completed physical therapy for her bilateral knees and feels that it may have helped. 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  She was treated with Avelox, and repeat chest x-ray in 1/2012 showed complete resolution of the pneumonia. She denies any cough or shortness of breath. She had a screening colonoscopy in 12/2006 by Dr. Donaldo Santo showing a 5 mm sessile polyp in the rectum (pathology: hyperplastic). She had a repeat colonoscopy in 12/2016 which showed moderate sigmoid diverticulosis and a 6 mm sessile ascending colon polyp (pathology: serrated adenoma). She had a repeat screening colonoscopy in 4/2021 by Dr. Donaldo Santo showing a single six 6 mm polyp in the distal sigmoid colon (pathology: Hyperplastic polyp). Follow-up recommended for 5 years. She denies any abdominal pain, nausea, vomiting, melena, hematochezia, or change in bowel movements. She does take omeprazole occasionally for GERD symptoms. In 12/2017, she was referred by her gynecologist for evaluation by Dr. Bao Flores for microscopic hematuria, and urine cytology was negative. However, she states that she refused his recommendations to undergo a CT urogram or cystoscopy. She denies any dysuria, gross hematuria, or flank pain. Past Medical History:   Diagnosis Date    Allergic rhinitis     Asthma     Cardiac stress echo, normal 11/02/2012    Normal maximal stress echo study. EF 60%. Ex time 9 min 45 sec. 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     Hyperlipidemia     Hypertension     Menopause     Plantar fasciitis     left    PSVT (paroxysmal supraventricular tachycardia) ([facility])     A-V dharmesh reentrant tachycardia     Past Surgical History:   Procedure Laterality Date    ENDOSCOPY, COLON, DIAGNOSTIC      polyp    HX CERVICAL POLYPECTOMY      HX CYST INCISION AND DRAINAGE Right 10 or more years    HX DILATION AND CURETTAGE      HX GYN      polyp on cervix    HX POLYPECTOMY      from rectum     Current Outpatient Medications   Medication Sig    glipiZIDE SR (GLUCOTROL XL) 5 mg CR tablet Take 1 Tablet by mouth daily.  simvastatin (ZOCOR) 20 mg tablet take 1 tablet by mouth at bedtime  Indications: excessive fat in the blood    LORazepam (ATIVAN) 1 mg tablet take 1 tablet by mouth every 8 hours if needed for anxiety    loratadine (Claritin) 10 mg tablet Take 1 Tablet by mouth daily.  Blood-Glucose Meter monitoring kit Monitor fasting blood glucose once daily    glucose blood VI test strips (ASCENSIA AUTODISC VI, ONE TOUCH ULTRA TEST VI) strip Monitor fasting blood glucose once daily    lancets misc Monitor fasting blood glucose once daily    potassium chloride (K-DUR, KLOR-CON) 20 mEq tablet take 1 tablet by mouth once daily    losartan (COZAAR) 25 mg tablet take 1 tablet by mouth once daily    fluticasone propionate (FLONASE) 50 mcg/actuation nasal spray instill 2 sprays into each nostril once daily    metoprolol succinate (TOPROL-XL) 50 mg XL tablet take 1 tablet by mouth once daily    hydroCHLOROthiazide (HYDRODIURIL) 12.5 mg tablet take 1 tablet by mouth once daily    albuterol (PROVENTIL HFA, VENTOLIN HFA, PROAIR HFA) 90 mcg/actuation inhaler inhale 2 puffs by mouth every 4 hours if needed for wheezing    clotrimazole-betamethasone (LOTRISONE) topical cream Apply  to both ear canals and affected part of outer ear twice a day with a finger.  cholecalciferol (VITAMIN D3) 1,000 unit cap Take 1,000 Units by mouth daily.  carboxymethylcellulose sodium (REFRESH LIQUIGEL) 1 % dlgl ophthalmic solution Apply  to eye. No current facility-administered medications for this visit. Allergies and Intolerances: Allergies   Allergen Reactions    Altace [Ramipril] Cough    Penicillins Other (comments)     Hands peel    Sulfur Itching     Family History: She had two aunts who had breast cancer (her mother's sister and father's sister).  She has no FH of colon cancer. Her mother passed away from uterine cancer. Her father  from metastatic prostate cancer. Family History   Problem Relation Age of Onset   Jozef Pardo Arthritis-osteo Mother     Hypertension Mother              Cancer Father         bone cancer    Hypertension Sister     Hypertension Sister     Hypertension Sister     Breast Cancer Maternal Aunt     Breast Cancer Paternal Aunt     Diabetes Other     Stroke Other     Other Sister         twin sister - osteopenia and low vitamin D levels     Social History:   She  reports that she has never smoked. She has never used smokeless tobacco. She is  and has two sons. She was a homemaker, but worked part-time in . She now helps care for her grandchildren. Social History     Substance and Sexual Activity   Alcohol Use No     Immunization History:  Immunization History   Administered Date(s) Administered    Covid-19, MODERNA, Mrna, Lnp-s, Pf, 100mcg/0.5mL 2021, 2021    Influenza Vaccine (Quad) PF (>6 Mo Flulaval, Fluarix, and >3 Yrs Afluria, Fluzone 01832) 10/23/2015, 10/19/2016, 10/05/2017, 10/26/2018, 10/08/2019    Influenza Vaccine PF 2013, 10/03/2014    Influenza Vaccine Split 2011, 10/22/2012    Influenza Vaccine Whole 10/29/2010    PPD 2012    Pneumococcal Conjugate (PCV-13) 2020    Pneumococcal Polysaccharide (PPSV-23) 2017    TB Skin Test (PPD) Intradermal 2014    TDAP Vaccine 2012    Zoster Recombinant 2020, 2020       Review of Systems:   As above included in HPI. Otherwise 11 point review of systems negative including constitutional, skin, HENT, eyes, respiratory, cardiovascular, gastrointestinal, genitourinary, musculoskeletal, endocrine, hematologic, allergy, and neurologic.     Physical:   Visit Vitals  /74   Pulse 78   Temp 97.7 °F (36.5 °C) (Temporal)   Resp 16   Ht 5' 2\" (1.575 m)   Wt 186 lb 6.4 oz (84.6 kg)   SpO2 97%   BMI 34.09 kg/m² Exam:   Patient appears in no apparent distress. Affect is appropriate. HEENT: PERRLA, anicteric, oropharynx clear, no JVD, adenopathy or thyromegaly. No carotid bruits or radiated murmur. Lungs: clear to auscultation, no wheezes, rhonchi, or rales. Heart: regular rate and rhythm. No murmur, rubs, gallops  Abdomen: soft, nontender, nondistended, normal bowel sounds, no hepatosplenomegaly or masses. Extremities: without edema. Musculoskeletal: No tenderness palpable along spine or paraspinal muscles. Negative straight leg raises bilaterally.     Diabetic foot exam:     Left Foot:   Visual Exam: normal    Pulse DP: 2+ (normal)   Filament test: normal sensation    Vibratory sensation: normal      Right Foot:   Visual Exam: normal    Pulse DP: 2+ (normal)   Filament test: normal sensation    Vibratory sensation: normal             Review of Data:  Labs:  Hospital Outpatient Visit on 09/07/2021   Component Date Value Ref Range Status    Hemoglobin A1c 09/07/2021 7.4* 4.2 - 5.6 % Final    Est. average glucose 09/07/2021 166  mg/dL Final    LIPID PROFILE 09/07/2021        Final    Cholesterol, total 09/07/2021 162  <200 MG/DL Final    Triglyceride 09/07/2021 117  <150 MG/DL Final    HDL Cholesterol 09/07/2021 62* 40 - 60 MG/DL Final    LDL, calculated 09/07/2021 76.6  0 - 100 MG/DL Final    VLDL, calculated 09/07/2021 23.4  MG/DL Final    CHOL/HDL Ratio 09/07/2021 2.6  0 - 5.0   Final    Magnesium 09/07/2021 1.7  1.6 - 2.6 mg/dL Final    Sodium 09/07/2021 138  136 - 145 mmol/L Final    Potassium 09/07/2021 3.7  3.5 - 5.5 mmol/L Final    Chloride 09/07/2021 102  100 - 111 mmol/L Final    CO2 09/07/2021 30  21 - 32 mmol/L Final    Anion gap 09/07/2021 6  3.0 - 18 mmol/L Final    Glucose 09/07/2021 157* 74 - 99 mg/dL Final    BUN 09/07/2021 12  7.0 - 18 MG/DL Final    Creatinine 09/07/2021 0.71  0.6 - 1.3 MG/DL Final    BUN/Creatinine ratio 09/07/2021 17  12 - 20   Final    GFR est AA 09/07/2021 >60  >60 ml/min/1.73m2 Final    GFR est non-AA 09/07/2021 >60  >60 ml/min/1.73m2 Final    Calcium 09/07/2021 8.5  8.5 - 10.1 MG/DL Final    Bilirubin, total 09/07/2021 0.5  0.2 - 1.0 MG/DL Final    ALT (SGPT) 09/07/2021 33  13 - 56 U/L Final    AST (SGOT) 09/07/2021 25  10 - 38 U/L Final    Alk. phosphatase 09/07/2021 76  45 - 117 U/L Final    Protein, total 09/07/2021 7.1  6.4 - 8.2 g/dL Final    Albumin 09/07/2021 3.6  3.4 - 5.0 g/dL Final    Globulin 09/07/2021 3.5  2.0 - 4.0 g/dL Final    A-G Ratio 09/07/2021 1.0  0.8 - 1.7   Final    Microalbumin,urine random 09/07/2021 0.79  0 - 3.0 MG/DL Final    Creatinine, urine 09/07/2021 138.00* 30 - 125 mg/dL Final    Microalbumin/Creat ratio (mg/g cre* 09/07/2021 6  0 - 30 mg/g Final         Health Maintenance:  Screening:    Mammogram: negative (3/2021)    PAP smear: negative (3/2020) with negative HPV. Followed by Dr. Jomar Wagner. Colorectal: colonoscopy (4/2021) serrated adenoma. Dr. Evy Stovall. Due 4/2026. Depression: none   DM (HbA1c/FPG): HbA1c 7.4 (8/2021)   Hepatitis C: negative (3/2016)   Falls: one   DEXA: within normal limits (11/2015)   Glaucoma: regular eye exams with Dr. Cee Singh (last 10/2020)   Smoking: none   Vitamin D: 35.4 (11/2020)    Medicare Wellness: today  (Initial)      Impression:  Patient Active Problem List   Diagnosis Code    Benign hypertensive heart disease without congestive heart failure I11.9    Allergic rhinitis J30.9    Colon polyps K63.5    AVNRT (AV dharmesh re-entry tachycardia) ([facility] [address] I47.1    Hyperlipidemia E78.5    Essential hypertension I10    Asthma J45.909    GERD (gastroesophageal reflux disease) K21.9    Type 2 diabetes mellitus without complication, without long-term current use of insulin (HCC) E11.9    Vitamin D insufficiency E55.9    Microscopic hematuria R31.29    Chronic pain of both knees M25.561, M25.562, G89.29    Obesity (BMI 30.0-34. 9) E66.9    Noncompliance with diet and medication regimen Z91.11, Z91.14    Anxiety F41.9    Spondylolisthesis, lumbar region M43.16    DDD (degenerative disc disease), lumbar M51.36    NAFL (nonalcoholic fatty liver) T26.0    HENRY (nonalcoholic steatohepatitis) K75.81       Plan:  1. Diabetes mellitus. Impaired fasting glucose had been present intermittently since 2012. Diagnosed in 9/2016 when HbA1c was checked and found to be elevated at 6.6. Had been steadily increasing and reached 7.5 in 4/2020. Was being treated with metformin  mg at dinner, but stopped taking it due to diarrhea. Changed to sitagliptin, but unable to fill due to cost. Started on glipizide SR 5 mg daily, but stopped taking it for unclear reasons. .  Prescribed Jardiance 25 mg daily at last visit but again too costly so did not fill. HbA1c is worsened today to 7.4 and stressed importance of need for treatment. Given cost limitations in selecting medication, will restart glipizide CR 5 mg daily. No evidence of microvascular complications. On statin. On losartan and now taking consistently. Continue regular eye exams with Dr. Nel Voss. Foot exam normal today. Urine microalbumin/ creatinine ratio remains without evidence of microalbuminuria (6 mg/g). Emphasized importance of lifestyle modifications, including diet, exercise, and weight loss. Will reassess HbA1c in 3 months. 2. Hypertension. Blood pressure remains well controlled today on losartan 25 mg daily, metoprolol XL 50 mg daily, and hydrochlorothiazide 12.5 mg daily. Renal function has been normal with creatinine 0.71/ eGFR >60 today. Continue to follow. 3. Hyperlipidemia. On moderate intensity dose simvastatin 20 mg daily with LDL 76 and HDL 62, indicative of good control. Continue to follow. 4. Hepatic steatosis. Developed in 3/2016 with AST 85/ .  Evaluation included hepatitis A, B, and C levels (negative), iron panel (normal), and RUQ ultrasound (3/23/2016) with limited sonographic window for the liver; only partially visualized but grossly unremarkable. In 5/2016, she had an abdominal CT scan showing the liver to be normal in size with normal parenchymal density; no discrete mass or ascites, and no intrahepatic biliary dilatation. Transaminases had normalized since that time. However, repeat elevation in 11/2020 with ALT 66 and AST 45. Referred to Dr. J Luis Herman and liver ultrasound (2/2021) showed lobulated contour liver consistent with steatosis +/- possible cirrhosis. She stated that weight loss recommended. Normalization of LFTs today, but has been unable to lose weight. Discussed importance given hepatic steatosis and concern for progression to fibrosis. Will continue to monitor. 5. AV dharmesh reentrant tachycardia. No recent episodes. On metoprolol succinate 50 mg daily and no significant palpitations recently. Will be establishing care with Dr. Phylicia Hutchinson in 10/2021.     6. Asthma, mild persistent. Associated with allergies. Receiving monthly immunotherapy injections with Dr. Piyush Wakefield and states that generally stable. No longer taking Claritin, but using Flonase regularly. 7. GERD/ Laryngopharyngeal reflux. Evaluated by Dr. Flower Gutierres, and noted on laryngoscopic exam. Started on omeprazole daily but states that decided to discontinue as did not help with cough. Currently receiving immunotherapy as discussed, and feels that this has been most helpful. 8. Microscopic hematuria. Patient refusing further evaluation with cystoscopy or CT urogram. Urine cytology negative. Did have abdominal CT scan in 5/2016 where kidneys appeared normal. Unwilling to complete evaluation with Dr. Won Shen. Repeat urinalysis with evidence small blood and 3-5 RBCs in 10/2019. Trace blood with 0-3 RBC in 11/2020 and negative for microscopic hematuria in 6/2021. Will continue to monitor. 9. Bilateral knee pain. Did not respond to cortisone injection.  Had both right and left knee MRI showing variable degrees of menisci tears and osteoarthritis of knee joint. Now being followed by Dr. Suzanne Sanders and reports relatively quiescent. 10. Right sciatica. Patient presented in 10/2020 with severe low back pain and right sciatica for several days. Unclear as to inciting incident. Denied lower extremity weakness or paresthesias. Began taking Etodolac and methocarbamol as prescribed by [facility] several months prior for similar symptoms. Lumbar spine x-ray (10/2020) showed multilevel degenerative findings, trace levorotoscoliosis and multilevel spondylolisthesis. Following with Dr. Suzanne Sanders. Reports increasing low back pain without sciatica today, and agreeable to referral to physical therapy as found to be beneficial in the past.  Referral placed. 11. Family history of breast cancer. Positive family history for breast cancer in a maternal aunt in her 46s and in a paternal aunt. Wishing to continue only with screening mammograms annually and not assess lifetime risk. Last mammogram 3/2021. 12. Obesity. Weight increased another 2 pounds since last visit. Emphasized importance of lifestyle modifications, including diet, exercise, and weight loss. Patient not exercising and seems unclear as to appropriate diabetic diet or dietary changes needed to lose weight. Referred to Pharm. DRylee James for nutrition counseling and attended two sessions. Will continue to address. 13. Health maintenance. Completed Moderna COVID-19 vaccine series. Received 2/2 doses of Shingrix vaccine. Received Prevnar 13 and will need repeat Pneumovax 23 in 3/2022. Other immunizations up to date. Colonoscopy completed. Mammogram up to date as discussed. Continue regular eye exams with Dr. Flex Patel. Vitamin D level has been normal on maintenance dose supplement. In addition, an initial Medicare wellness visit was done today. Patient understands recommendations and agrees with plan. Follow-up in 3 months.

## 2021-09-21 ENCOUNTER — TELEPHONE (OUTPATIENT)
Dept: INTERNAL MEDICINE CLINIC | Age: 66
End: 2021-09-21

## 2021-09-21 DIAGNOSIS — E11.9 TYPE 2 DIABETES MELLITUS WITHOUT COMPLICATION, WITHOUT LONG-TERM CURRENT USE OF INSULIN (HCC): Primary | ICD-10-CM

## 2021-09-21 NOTE — TELEPHONE ENCOUNTER
Pharmacy Progress Note - Telephone Encounter    S/O: Ms. William Anchors 77 y.o. female, referred by Dr. Jennifer Gaona MD, was contacted via an outbound telephone call to discuss diabetes management today. Verified patients identifiers (name & ) per HIPAA policy. - Called patient to see how visit went with PCP last week in regards to her diabetes management   - She states that her A1c increased to 7.4%, and she was started on glipizide   - However, she has since stopped this medication after taking it for 3 days and feeling dizzy/nauseous   - She states that she rather work on diet and exercise and not take any medications, though she acknowledges that this is a work in progress (still eating a lot of sweets)  - She did state that she has checked her blood sugars at home and her fasting readings have been in the 130s-140s    A/P:  - Educated patient on importance of taking glipizide with a meal to reduce symptoms of hypoglycemia. She expressed understanding, but still does not want to take any medications at this time as she feels she is taking too many medications already   - Informed patient that we will really need to focus on diet and exercise to improve control if she does not want to take anything. She is willing to work on this in addition to monitoring her blood sugar.   - Reinforced fasting blood sugar goal of 80 - 130  - Scheduled patient for office visit in 1 month to check on progress  - Patient endorses understanding to the provided information. All questions answered at this time. Thank you,  CHAVA Pierce        For Pharmacy Admin Tracking Only     CPA in place:  Yes   Recommendation Provided To: Patient/Caregiver: 1 via Telephone   Intervention Detail: Scheduled Appointment   Gap Closed?: No   Intervention Accepted By: Patient/Caregiver: 1   Time Spent (min): 20

## 2021-09-22 ENCOUNTER — HOSPITAL ENCOUNTER (OUTPATIENT)
Dept: PHYSICAL THERAPY | Age: 66
Discharge: HOME | End: 2021-09-22
Attending: INTERNAL MEDICINE
Payer: MEDICARE

## 2021-09-22 PROCEDURE — 97530 THERAPEUTIC ACTIVITIES: CPT

## 2021-09-22 PROCEDURE — 97162 PT EVAL MOD COMPLEX 30 MIN: CPT

## 2021-09-22 PROCEDURE — 97161 PT EVAL LOW COMPLEX 20 MIN: CPT

## 2021-09-22 NOTE — PROGRESS NOTES
[facility]  [place] [employer] [address]  [phone] fax    Plan of Care/ Statement of Necessity for Physical Therapy Services    Patient name: Cee Moreno Start of Care: 2021   Referral source: Ravin Hickey MD : 1955    Medical Diagnosis: Low back pain [M54.5]  Payor: Dannie Briseno / Plan: [place] MEDICARE PART A & B / Product Type: Medicare /  Onset Date:chronic; aggravated ~ 3 weeks ago (from start of care date)    Treatment Diagnosis: Low back pain   Prior Hospitalization: see medical history Provider#: [id number]   Medications: Verified on Patient summary List    Comorbidities: arthritis, HTN, asthma   Prior Level of Function: patient enjoys shopping and riding bike; lives in 1 [place] home with 3 steps to enter      The Plan of Care and following information is based on the information from the initial evaluation. Assessment/ key information: Patient is a 69-year-old female who presents to therapy with chief compliant of low back pain chronic in nature exacerbated around 3 months ago. Patient reports she fell tripping over stones in her walkway; she denies injury from fall and reports she did not have more pain after fall. Patient reports pain is a \"soreness/ache\" and is worse on her right side. Symptoms aggravated with AM, navigating stairs, vacuuming, lying supine, and bending; symptoms reduced with movement, walking, and lying prone. Patient presents with protracted shoulders posture. Lumbar AROM diminished with flex, ext, and left sidebend with min pain with active movements. LE strength mildly limited with left side weaker. Sacral gapping and thigh thrusts tests negative; pelvic alignment WNL at this time. Patient with TTP to B lumbar paraspinals and right QL.  Patient would benefit from skilled outpatient PT to address above mentioned deficits to return to prior level of function, increase independence with ADLs, and improve overall quality of life. Evaluation Complexity History HIGH Complexity :3+ comorbidities / personal factors will impact the outcome/ POC ; Examination MEDIUM Complexity : 3 Standardized tests and measures addressing body structure, function, activity limitation and / or participation in recreation  ;Presentation LOW Complexity : Stable, uncomplicated  ;Clinical Decision Making MEDIUM Complexity : FOTO score of 26-74  Overall Complexity Rating: LOW   Problem List: pain affecting function, decrease ROM, decrease strength, impaired gait/ balance, decrease ADL/ functional abilitiies, decrease activity tolerance, decrease flexibility/ joint mobility and decrease transfer abilities   Treatment Plan may include any combination of the following: Therapeutic exercise, Therapeutic activities, Neuromuscular re-education, Physical agent/modality, Gait/balance training, Manual therapy, Patient education, Self Care training, Functional mobility training, Home safety training and Stair training  Patient / Family readiness to learn indicated by: asking questions, trying to perform skills and interest  Persons(s) to be included in education: patient (P)  Barriers to Learning/Limitations: None  Patient Goal (s): *to be able to bend and ride my bike  Patient Self Reported Health Status: fair  Rehabilitation Potential: good    Short Term Goals: To be accomplished in 1 weeks:   1. Patient will be compliant with initial HEP to optimize therapy outcomes. Long Term Goals: To be accomplished in 4 weeks:   1. Patient will improve FOTO score by at least 5 points in order to demonstrate functional improvement. 2. Patient will report no more than \"moderate difficulty\" with \"bending or stooping\" with FOTO in order to demonstrate improved tolerance to lifting. 3. Patient will report no greater than 4/10 pain in low back in order to perform ADLs with increased ease.     4. Patient will report at least a 50% increase in overall improvement since start of care in order to improve quality of life. Frequency / Duration: Patient to be seen 2-3 times per week for 4 weeks. Patient/ Caregiver education and instruction: Diagnosis, prognosis, self care, activity modification and exercises   [x]  Plan of care has been reviewed with PTA    Certification Period: 9/22/21-10/21/21  Radha Mcqueen, PT 9/22/2021 10:23 AM    ________________________________________________________________________    I certify that the above Therapy Services are being furnished while the patient is under my care. I agree with the treatment plan and certify that this therapy is necessary.     [de-identified] Signature:____________Date:_________TIME:________     Monica Mccracken MD  ** Signature, Date and Time must be completed for valid certification **    Please sign and return to [facility] [address] [facility] [place]  [phone] fax

## 2021-09-24 ENCOUNTER — HOSPITAL ENCOUNTER (OUTPATIENT)
Dept: PHYSICAL THERAPY | Age: 66
Discharge: HOME | End: 2021-09-24
Attending: INTERNAL MEDICINE
Payer: MEDICARE

## 2021-09-24 ENCOUNTER — TELEPHONE (OUTPATIENT)
Dept: PHYSICAL THERAPY | Age: 66
End: 2021-09-24

## 2021-09-24 PROCEDURE — 97110 THERAPEUTIC EXERCISES: CPT

## 2021-09-24 NOTE — PROGRESS NOTES
PT DAILY TREATMENT NOTE     Patient Name: Radha Oliveira  Date:2021  : 1955  [x]  Patient  Verified  Payor: Adalberto Can / Plan: [place] MEDICARE PART A & B / Product Type: Medicare /    In time:2:45P  Out time:3:28P  Total Treatment Time (min): 43  Visit #: 2 of 12    Medicare/BCBS Only   Total Timed Codes (min):  33 1:1 Treatment Time:  25       Treatment Area: Low back pain [M54.5]    SUBJECTIVE  Pain Level (0-10 scale): 6/10  Any medication changes, allergies to medications, adverse drug reactions, diagnosis change, or new procedure performed?: [x] No    [] Yes (see summary sheet for update)  Subjective functional status/changes:   [] No changes reported    Patient reports no issues with the exercises at home. She has been using heat which has helped. She feels her legs are weak. OBJECTIVE    Modality rationale: decrease pain and increase tissue extensibility to improve the patients ability to perform functional tasks with increased ease.    Min Type Additional Details    [] Estim:  []Unatt       []IFC  []Premod                        []Other:  []w/ice   []w/heat  Position:  Location:    [] Estim: []Att    []TENS instruct  []NMES                    []Other:  []w/US   []w/ice   []w/heat  Position:  Location:    []  Traction: [] Cervical       []Lumbar                       [] Prone          []Supine                       []Intermittent   []Continuous Lbs:  [] before manual  [] after manual    []  Ultrasound: []Continuous   [] Pulsed                           []1MHz   []3MHz W/cm2:  Location:    []  Iontophoresis with dexamethasone         Location: [] Take home patch   [] In clinic   10 []  Ice     [x]  heat  []  Ice massage  []  Laser   []  Anodyne Position: seated  Location: back     []  Laser with stim  []  Other:  Position:  Location:    []  Vasopneumatic Device    []  Right     []  Left  Pre-treatment girth:  Post-treatment girth:  Measured at (location):  Pressure:       [] lo [] med [] hi   Temperature: [] lo [] med [] hi   [x] Skin assessment post-treatment:  [x]intact []redness- no adverse reaction    []redness  adverse reaction:     33 min Therapeutic Exercise:  [x] See flow sheet :   Rationale: increase ROM and increase strength to improve the patients ability to perform functional tasks with increased ease            With   [x] TE   [] TA   [] neuro   [] other: Patient Education: [x] Review HEP    [] Progressed/Changed HEP based on:   [] positioning   [] body mechanics   [] transfers   [] heat/ice application    [] other: log roll technique with supine<>sit transfers      Other Objective/Functional Measures:     Session abbreviated due to patient arriviing late to appt  Quad lag with SLR B reduced with cues to limit range  Pain reported with initial rep of bridges   Positive subjective response to heat at end of session    Pain Level (0-10 scale) post treatment: 4/10    ASSESSMENT/Changes in Function: Initiated activities per POC/flow sheet. Patient with good overall tolerance to session. Educated patient use of log roll technique for supine<>sit transfers; patient reported decreased symptoms of low back. She reports compliance with HEP at this time. Patient reporting decreased pain at end of session. Patient will continue to benefit from skilled PT services to modify and progress therapeutic interventions, address functional mobility deficits, address ROM deficits, address strength deficits, analyze and address soft tissue restrictions, analyze and cue movement patterns, analyze and modify body mechanics/ergonomics, assess and modify postural abnormalities, address imbalance/dizziness and instruct in home and community integration to attain remaining goals. Progress towards goals / Updated goals:  Short Term Goals: To be accomplished in 1 weeks:              1. Patient will be compliant with initial HEP to optimize therapy outcomes. Long Term Goals:  To be accomplished in 4 weeks: 1. Patient will improve FOTO score by at least 5 points in order to demonstrate functional improvement. 2. Patient will report no more than \"moderate difficulty\" with \"bending or stooping\" with FOTO in order to demonstrate improved tolerance to lifting. 3. Patient will report no greater than 4/10 pain in low back in order to perform ADLs with increased ease. 4. Patient will report at least a 50% increase in overall improvement since start of care in order to improve quality of life.     PLAN  [x]  Upgrade activities as tolerated     [x]  Continue plan of care  []  Update interventions per flow sheet       []  Discharge due to:_  []  Other:_      Blanca Harris, PT 9/24/2021  2:38 PM    Future Appointments   Date Time Provider Ankit Edmond   9/28/2021  3:00 PM Jorene Angles, PTA [facility]   10/1/2021 12:45 PM Jorene Angles, PTA [facility]   10/5/2021  1:30 PM Jorene Angles, PTA [facility]   10/7/2021 12:45 PM Vita Carito Henry, PT [facility]   10/12/2021 12:45 PM Vita Carito Henry, PT [facility]   10/13/2021  1:00 PM Papito Lancaster MD [facility] BS AMB   10/14/2021 12:45 PM Vita Carito Henry, PT [facility]   10/19/2021 12:45 PM Vita Carito Henry, PT [facility]   10/21/2021 12:45 PM Vita Carito Henry, PT [facility]   10/26/2021 12:45 PM Jorene Angles, PTA [facility]   10/27/2021 10:30 AM Brittany Vega PHARMD [facility] BS AMB   10/28/2021 12:45 PM Vita Carito Henry, PT [facility]   11/2/2021 12:45 PM Jorene Angles, PTA [facility]   11/4/2021 12:45 PM Vita Carito Henry, PT [facility]   11/9/2021 12:45 PM Jorene Angles, PTA [facility]   11/11/2021 12:45 PM Vita Carito Henry, PT [facility]   11/16/2021 12:45 PM Jorene Angles, PTA [facility]   11/18/2021 12:45 PM Jorene Angles, PTA [facility]   11/23/2021 12:45 PM Vita Carito Henry, PT [facility]   11/26/2021 12:45 PM Fuad Henley [facility]   1/6/2022 10:25 AM [facility] LAB VISIT [facility] BS AMB   1/13/2022 11:30 AM José Antonio ROSS MD [facility] AMB

## 2021-09-28 ENCOUNTER — HOSPITAL ENCOUNTER (OUTPATIENT)
Dept: PHYSICAL THERAPY | Age: 66
Discharge: HOME | End: 2021-09-28
Attending: INTERNAL MEDICINE
Payer: MEDICARE

## 2021-09-28 PROCEDURE — 97110 THERAPEUTIC EXERCISES: CPT

## 2021-09-28 NOTE — PROGRESS NOTES
PT DAILY TREATMENT NOTE     Patient Name: Lizbeth May  Date:2021  : 1955  [x]  Patient  Verified  Payor: VA MEDICARE / Plan: VA MEDICARE PART A & B / Product Type: Medicare /    In time:300  Out time:348  Total Treatment Time (min): 48  Visit #: 3 of 12    Medicare/BCBS Only   Total Timed Codes (min):  38 1:1 Treatment Time:  38       Treatment Area: Low back pain [M54.5]    SUBJECTIVE  Pain Level (0-10 scale): 2/10  Any medication changes, allergies to medications, adverse drug reactions, diagnosis change, or new procedure performed?: [x] No    [] Yes (see summary sheet for update)  Subjective functional status/changes:   [] No changes reported  Pt stated that her pain is much worse first thing in the morning    OBJECTIVE    Modality rationale: decrease pain and increase tissue extensibility to improve the patients ability to increase ease with ADLs   Min Type Additional Details    [] Estim:  []Unatt       []IFC  []Premod                        []Other:  []w/ice   []w/heat  Position:  Location:    [] Estim: []Att    []TENS instruct  []NMES                    []Other:  []w/US   []w/ice   []w/heat  Position:  Location:    []  Traction: [] Cervical       []Lumbar                       [] Prone          []Supine                       []Intermittent   []Continuous Lbs:  [] before manual  [] after manual    []  Ultrasound: []Continuous   [] Pulsed                           []1MHz   []3MHz W/cm2:  Location:    []  Iontophoresis with dexamethasone         Location: [] Take home patch   [] In clinic   10 []  Ice     [x]  heat  []  Ice massage  []  Laser   []  Anodyne Position:seated  Location:low back    []  Laser with stim  []  Other:  Position:  Location:    []  Vasopneumatic Device    []  Right     []  Left  Pre-treatment girth:  Post-treatment girth:  Measured at (location):  Pressure:       [] lo [] med [] hi   Temperature: [] lo [] med [] hi   [x] Skin assessment post-treatment:  [x]intact []redness- no adverse reaction    []redness  adverse reaction:     38 min Therapeutic Exercise:  [x] See flow sheet :   Rationale: increase ROM and increase strength to improve the patients ability to increase ease with ADLs    With   [x] TE   [] TA   [] neuro   [] other: Patient Education: [x] Review HEP    [] Progressed/Changed HEP based on:   [] positioning   [] body mechanics   [] transfers   [] heat/ice application    [] other:      Other Objective/Functional Measures:   Had slight difficulty with standing hip ext  Needed cueing to slow down with exercises  Had difficulty with QL stretches bilaterally     Pain Level (0-10 scale) post treatment: 1/10    ASSESSMENT/Changes in Function:   Pt is making slow progress toward goals. Pt cont with pain first thing in the mornings. Pt reports that the pain decreases as she moves and performs ADLs. Patient will continue to benefit from skilled PT services to modify and progress therapeutic interventions, address functional mobility deficits, address ROM deficits, address strength deficits, analyze and cue movement patterns, analyze and modify body mechanics/ergonomics, assess and modify postural abnormalities, address imbalance/dizziness and instruct in home and community integration to attain remaining goals. [x]  See Plan of Care  []  See progress note/recertification  []  See Discharge Summary         Progress towards goals / Updated goals:  Short Term Goals: To be accomplished in 1 weeks:              1. Patient will be compliant with initial HEP to optimize therapy outcomes. Goal met. 9/28/21    Long Term Goals: To be accomplished in 4 weeks:              1.  Patient will improve FOTO score by at least 5 points in order to demonstrate functional improvement.              2. Patient will report no more than \"moderate difficulty\" with \"bending or stooping\" with FOTO in order to demonstrate improved tolerance to lifting.              3. Patient will report no greater than 4/10 pain in low back in order to perform ADLs with increased ease.               4. Patient will report at least a 50% increase in overall improvement since start of care in order to improve quality of life.     PLAN  []  Upgrade activities as tolerated     [x]  Continue plan of care  []  Update interventions per flow sheet       []  Discharge due to:_  []  Other:_      Robyn Benavidez, PTA 9/28/2021  6:59 AM    Future Appointments   Date Time Provider Ankit Edmond   9/28/2021  3:00 PM Shannon Douse, PTA [facility]   10/1/2021 12:45 PM Shannon Douse, PTA [facility]   10/5/2021  1:30 PM Shannon Douse, PTA [facility]   10/7/2021 12:45 PM Vita Justice Has, PT [facility]   10/12/2021 12:45 PM Vita Justice Has, PT [facility]   10/13/2021  1:00 PM Joleen Lopez MD [facility] BS AMB   10/14/2021 12:45 PM Vita Justice Has, PT [facility]   10/19/2021 12:45 PM Vita Justice Has, PT [facility]   10/21/2021 12:45 PM Vita Justice Has, PT [facility]   10/26/2021 12:45 PM Shannon Douse, PTA [facility]   10/27/2021 10:30 AM Pierre Opitz, PHARMD [facility] BS AMB   10/28/2021 12:45 PM Vita Justice Has, PT [facility]   11/2/2021 12:45 PM Shannon Douse, PTA [facility]   11/4/2021 12:45 PM Vita Justice Has, PT [facility]   11/9/2021 12:45 PM Shannon Douse, PTA [facility]   11/11/2021 12:45 PM Vitamcihelle Almodovar Has, PT [facility]   11/16/2021 12:45 PM Shannon Douse, PTA [facility]   11/18/2021 12:45 PM Shannon Douse, PTA [facility]   11/23/2021 12:45 PM Vitakaycee Pinedo, PT [facility]   11/26/2021 12:45 PM Shannon Douse, PTA [facility]   1/6/2022 10:25 AM [facility] LAB VISIT [facility] BS AMB   1/13/2022 11:30 AM Maisha Joseph MD [facility] BS AMB

## 2021-10-01 ENCOUNTER — HOSPITAL ENCOUNTER (OUTPATIENT)
Dept: PHYSICAL THERAPY | Age: 66
Discharge: HOME | End: 2021-10-01
Attending: INTERNAL MEDICINE
Payer: MEDICARE

## 2021-10-01 PROCEDURE — 97110 THERAPEUTIC EXERCISES: CPT

## 2021-10-01 NOTE — PROGRESS NOTES
PT DAILY TREATMENT NOTE     Patient Name: Stas Huynh  Date:10/1/2021  : 1955  [x]  Patient  Verified  Payor: [place] MED[place]ARE / Plan: [place] MEDICARE PART A & B / Product Type: Medicare /    In time:1245  Out time:134  Total Treatment Time (min): 48  Visit #: 4 of 12    Medicare/BCBS Only   Total Timed Codes (min):  38 1:1 Treatment Time:  38       Treatment Area: Low back pain [M54.5]    SUBJECTIVE  Pain Level (0-10 scale): 3/10  Any medication changes, allergies to medications, adverse drug reactions, diagnosis change, or new procedure performed?: [x] No    [] Yes (see summary sheet for update)  Subjective functional status/changes:   [] No changes reported  Pt stated that she has a little more pain today    OBJECTIVE    Modality rationale: decrease pain and increase tissue extensibility to improve the patients ability to increase ease with ADLs   Min Type Additional Details    [] Estim:  []Unatt       []IFC  []Premod                        []Other:  []w/ice   []w/heat  Position:  Location:    [] Estim: []Att    []TENS instruct  []NMES                    []Other:  []w/US   []w/ice   []w/heat  Position:  Location:    []  Traction: [] Cervical       []Lumbar                       [] Prone          []Supine                       []Intermittent   []Continuous Lbs:  [] before manual  [] after manual    []  Ultrasound: []Continuous   [] Pulsed                           []1MHz   []3MHz W/cm2:  Location:    []  Iontophoresis with dexamethasone         Location: [] Take home patch   [] In clinic   10 []  Ice     [x]  heat  []  Ice massage  []  Laser   []  Anodyne Position:seated  Location:low back    []  Laser with stim  []  Other:  Position:  Location:    []  Vasopneumatic Device    []  Right     []  Left  Pre-treatment girth:  Post-treatment girth:  Measured at (location):  Pressure:       [] lo [] med [] hi   Temperature: [] lo [] med [] hi   [x] Skin assessment post-treatment:  [x]intact []redness- no adverse reaction    []redness  adverse 38reaction:     38 min Therapeutic Exercise:  [x] See flow sheet :   Rationale: increase ROM and increase strength to improve the patients ability to increase ease with ADLs    With   [x] TE   [] TA   [] neuro   [] other: Patient Education: [x] Review HEP    [] Progressed/Changed HEP based on:   [] positioning   [] body mechanics   [] transfers   [] heat/ice application    [] other:      Other Objective/Functional Measures:   Had no difficulty with exercises  Very talkative and hard to keep on tasks  No complaint of increased pain during session     Pain Level (0-10 scale) post treatment: 2/10    ASSESSMENT/Changes in Function:   Pt is making slow progress toward goals. Pt cont with mild pain in the low back. Cont to have difficulty with bending and stooping. Cont to report difficulty with performing some ADLs and household chores. Patient will continue to benefit from skilled PT services to modify and progress therapeutic interventions, address functional mobility deficits, address ROM deficits, address strength deficits, analyze and cue movement patterns, analyze and modify body mechanics/ergonomics, assess and modify postural abnormalities, address imbalance/dizziness and instruct in home and community integration to attain remaining goals. [x]  See Plan of Care  []  See progress note/recertification  []  See Discharge Summary         Progress towards goals / Updated goals:  Short Term Goals: To be accomplished in 1 weeks:              1. Patient will be compliant with initial HEP to optimize therapy outcomes. Goal met. 9/28/21     Long Term Goals: To be accomplished in 4 weeks:              1.  Patient will improve FOTO score by at least 5 points in order to demonstrate functional improvement.              2. Patient will report no more than \"moderate difficulty\" with \"bending or stooping\" with FOTO in order to demonstrate improved tolerance to lifting.              3. Patient will report no greater than 4/10 pain in low back in order to perform ADLs with increased ease.               4. Patient will report at least a 50% increase in overall improvement since start of care in order to improve quality of life.     PLAN  []  Upgrade activities as tolerated     [x]  Continue plan of care  []  Update interventions per flow sheet       []  Discharge due to:_  []  Other:_      Kate Mckinnon, PTA 10/1/2021  6:50 AM    Future Appointments   Date Time Provider Ankit Edmond   10/1/2021 12:45 PM Sherlene Tonica, PTA [facility] [address]   10/5/2021  1:30 PM Sherlene Tonica, PTA [facility] [address]   10/7/2021 12:45 PM Vita Floria Profit, PT [facility] [address]   10/12/2021 12:45 PM Vita Floria Profit, PT [facility] [address]   10/13/2021  1:00 PM Jasbir Mccormack MD [facility] BS AMB   10/14/2021 12:45 PM Vita Floria Profit, PT [facility] [address]   10/19/2021 12:45 PM Vita Floria Profit, PT [facility] [address]   10/21/2021 12:45 PM Vita Floria Profit, PT [facility] [address]   10/26/2021 12:45 PM Sherlene Tonica, PTA [facility] [address]   10/27/2021 10:30 AM Zulma Shah PHARMD [facility] BS AMB   10/28/2021 12:45 PM Vita Floria Profit, PT [facility] [address]   11/2/2021 12:45 PM Sherlene Tonica, PTA [facility] [address]   11/4/2021 12:45 PM Vita Floria Profit, PT [facility] [address]   11/9/2021 12:45 PM Sherlene Tonica, PTA [facility] [address]   11/11/2021 12:45 PM Vita Floria Profit, PT [facility] [address]   11/16/2021 12:45 PM Yared Caruso, PTA [facility] [address]   11/18/2021 12:45 PM Yared Caruso, PTA [facility] [address]   11/23/2021 12:45 PM Vita Wallace, PT [facility] [address]   11/26/2021 12:45 PM Yared Caruso, PTA [facility] [address]   1/6/2022 10:25 AM [facility] LAB VISIT [facility] BS AMB   1/13/2022 11:30 AM Chela Morales MD [facility] BS AMB

## 2021-10-05 ENCOUNTER — HOSPITAL ENCOUNTER (OUTPATIENT)
Dept: PHYSICAL THERAPY | Age: 66
Discharge: HOME | End: 2021-10-05
Attending: INTERNAL MEDICINE
Payer: MEDICARE

## 2021-10-05 PROCEDURE — 97110 THERAPEUTIC EXERCISES: CPT

## 2021-10-05 NOTE — PROGRESS NOTES
PT DAILY TREATMENT NOTE     Patient Name: Mehdi Campos  Date:10/5/2021  : 1955  [x]  Patient  Verified  Payor: [place] MEDICARE / Plan: [place] MEDICARE PART A & B / Product Type: Medicare /    In time:130  Out time:220  Total Treatment Time (min): 50  Visit #: 5 of 12    Medicare/BCBS Only   Total Timed Codes (min):  40 1:1 Treatment Time:  40       Treatment Area: Low back pain [M54.5]    SUBJECTIVE  Pain Level (0-10 scale): 4/10  Any medication changes, allergies to medications, adverse drug reactions, diagnosis change, or new procedure performed?: [x] No    [] Yes (see summary sheet for update)  Subjective functional status/changes:   [] No changes reported  Pt stated that she has increased low back pain today, she feels that it may be her new mattress as it is hard    OBJECTIVE    Modality rationale: decrease pain and increase tissue extensibility to improve the patients ability to increase ease with ADLs   Min Type Additional Details    [] Estim:  []Unatt       []IFC  []Premod                        []Other:  []w/ice   []w/heat  Position:  Location:    [] Estim: []Att    []TENS instruct  []NMES                    []Other:  []w/US   []w/ice   []w/heat  Position:  Location:    []  Traction: [] Cervical       []Lumbar                       [] Prone          []Supine                       []Intermittent   []Continuous Lbs:  [] before manual  [] after manual    []  Ultrasound: []Continuous   [] Pulsed                           []1MHz   []3MHz W/cm2:  Location:    []  Iontophoresis with dexamethasone         Location: [] Take home patch   [] In clinic   10 []  Ice     [x]  heat  []  Ice massage  []  Laser   []  Anodyne Position:seataed  Location:low back    []  Laser with stim  []  Other:  Position:  Location:    []  Vasopneumatic Device    []  Right     []  Left  Pre-treatment girth:  Post-treatment girth:  Measured at (location):  Pressure:       [] lo [] med [] hi   Temperature: [] lo [] med [] hi   [x] Skin assessment post-treatment:  [x]intact []redness- no adverse reaction    []redness  adverse reaction:     40 min Therapeutic Exercise:  [x] See flow sheet :   Rationale: increase ROM and increase strength to improve the patients ability to increase ease with ADLs    With   [x] TE   [] TA   [] neuro   [] other: Patient Education: [x] Review HEP    [] Progressed/Changed HEP based on:   [] positioning   [] body mechanics   [] transfers   [] heat/ice application    [] other:      Other Objective/Functional Measures:   No difficulty with exercises  No complaint of increased pain during session   Pt rode bike instead of TM due to improper foot wear    Pain Level (0-10 scale) post treatment: 0/10    ASSESSMENT/Changes in Function:   Pt is making slow progress toward goals. Pt cont to report moderate pain in her low back. Cont to report having quite a bit of difficulty with performing ADLs and household chores. Pt cont to complain of left knee pain and is to call MD for appt. Patient will continue to benefit from skilled PT services to modify and progress therapeutic interventions, address functional mobility deficits, address ROM deficits, address strength deficits, analyze and address soft tissue restrictions, analyze and cue movement patterns, analyze and modify body mechanics/ergonomics, assess and modify postural abnormalities, address imbalance/dizziness and instruct in home and community integration to attain remaining goals. [x]  See Plan of Care  []  See progress note/recertification  []  See Discharge Summary         Progress towards goals / Updated goals:  Short Term Goals: To be accomplished in 1 weeks:              1. Patient will be compliant with initial HEP to optimize therapy outcomes.   Goal met. 9/28/21     Long Term Goals: To be accomplished in 4 weeks:              1. Patient will improve FOTO score by at least 5 points in order to demonstrate functional improvement.              2. Patient will report no more than \"moderate difficulty\" with \"bending or stooping\" with FOTO in order to demonstrate improved tolerance to lifting.              3. Patient will report no greater than 4/10 pain in low back in order to perform ADLs with increased ease.               4. Patient will report at least a 50% increase in overall improvement since start of care in order to improve quality of life. Progressing. Pt reports improvement in overall sx's.  10/5/21    PLAN  []  Upgrade activities as tolerated     [x]  Continue plan of care  []  Update interventions per flow sheet       []  Discharge due to:_  []  Other:_      Ele Watson, ROSEMARY 10/5/2021  7:02 AM    Future Appointments   Date Time Provider Ankit Edmond   10/5/2021  1:30 PM Linda Townsend, PTA [facility]   10/7/2021 12:45 PM Vita Almeta [place], PT [facility]   10/12/2021 12:45 PM Vita Almeta [place], PT [facility]   10/13/2021  1:00 PM Ernesto Vieyra MD [facility] BS AMB   10/14/2021 12:45 PM Vita Almeta [place], PT [facility]   10/19/2021 12:45 PM Vita Almeta [place], PT [facility]   10/21/2021 12:45 PM Vita Almeta [place], PT [facility]   10/26/2021 12:45 PM Linda Townsend, PTA [facility]   10/27/2021 10:30 AM Lv Valera PHARMD [facility] BS AMB   10/28/2021 12:45 PM Vita Almeta [place], PT [facility]   11/2/2021 12:45 PM Linda Townsend, PTA [facility]   11/4/2021 12:45 PM Vita Edwards, PT [facility]   11/9/2021 12:45 PM Linda Infantes, PTA [facility]   11/11/2021 12:45 PM Vita Edwards, PT [facility]   11/16/2021 12:45 PM Linda Townsend, PTA [facility]   11/18/2021 12:45 PM Linda Townsend, PTA [facility]   11/23/2021 12:45 PM Vita Edwards, PT [facility]   11/26/2021 12:45 PM Linda Infantes, PTA [facility]   1/6/2022 10:25 AM [facility] LAB VISIT [facility] BS AMB   1/13/2022 11:30 AM Brittany Whitaker MD [facility] BS AMB

## 2021-10-07 ENCOUNTER — HOSPITAL ENCOUNTER (OUTPATIENT)
Dept: PHYSICAL THERAPY | Age: 66
Discharge: HOME | End: 2021-10-07
Attending: INTERNAL MEDICINE
Payer: MEDICARE

## 2021-10-07 PROCEDURE — 97110 THERAPEUTIC EXERCISES: CPT

## 2021-10-07 NOTE — PROGRESS NOTES
PT DAILY TREATMENT NOTE     Patient Name: Hamilton Gutierrez  Date:10/7/2021  : 1955  [x]  Patient  Verified  Payor: [place] MEDICARE / Plan: [place] MEDICARE PART A & B / Product Type: Medicare /    In time:12:46P Out time:1:40P  Total Treatment Time (min): 54  Visit #: 6 of 12    Medicare/BCBS Only   Total Timed Codes (min): 44 1:1 Treatment Time: 41       Treatment Area: Low back pain [M54.5]    SUBJECTIVE   Pain Level (0-10 scale): 2/10  Any medication changes, allergies to medications, adverse drug reactions, diagnosis change, or new procedure performed?: [x] No    [] Yes (see summary sheet for update)  Subjective functional status/changes:   [] No changes reported      Patient reports she has some pain in the right side of her low back. She reports she has more energy when she exercises. Her knee is not bothering her as much today. She feels her knees \"click and pop\" but it is not painful; she attributes this to arthritis. Her asthma is aggravating her now. Her pain has been better in the morning. Stairs are \"ok\"; she feels her legs are weak with going upstairs. She was prescribed medication to monitor her blood sugar but she does not take it; she is going to talk to the pharmacist about alternatives but she wants to try to lose weight and change her diet first. She has talked to her doctor about this.  She went for a walk last night and felt she was walking more slowly than normal.     Next MD visit: 2021     OBJECTIVE    Modality rationale: decrease pain and increase tissue extensibility to improve the patients ability to perform daily tasks with increased ease   Min Type Additional Details    [] Estim:  []Unatt       []IFC  []Premod                        []Other:  []w/ice   []w/heat  Position:  Location:    [] Estim: []Att    []TENS instruct  []NMES                    []Other:  []w/US   []w/ice   []w/heat  Position:  Location:    []  Traction: [] Cervical       []Lumbar                       [] Prone          []Supine                       []Intermittent   []Continuous Lbs:  [] before manual  [] after manual    []  Ultrasound: []Continuous   [] Pulsed                           []1MHz   []3MHz W/cm2:  Location:    []  Iontophoresis with dexamethasone         Location: [] Take home patch   [] In clinic   10 []  Ice     [x]  heat  []  Ice massage  []  Laser   []  Anodyne Position:seated  Location: back    []  Laser with stim  []  Other:  Position:  Location:    []  Vasopneumatic Device    []  Right     []  Left  Pre-treatment girth:  Post-treatment girth:  Measured at (location):  Pressure:       [] lo [] med [] hi   Temperature: [] lo [] med [] hi   [x] Skin assessment post-treatment:  [x]intact []redness- no adverse reaction    []redness  adverse reaction:     44 min Therapeutic Exercise:  [x] See flow sheet :   Rationale: increase ROM, increase strength and increase proprioception to improve the patients ability to perform daily tasks with increased ease        With   [] TE   [] TA   [] neuro   [] other: Patient Education: [x] Review HEP    [] Progressed/Changed HEP based on:   [] positioning   [] body mechanics   [] transfers   [] heat/ice application    [] other: importance of managing blood sugar, role of exercise/diet in lowering blood sugar; performing activities at home to tolerance and to not \"overdo\" it; leading with stronger/less painful UE when ascending stairs and descending leading with weaker/more painful LE; use of jar opener to assist with gripping to open jars/containers     Other Objective/Functional Measures:     Verbal and visual cues for form with mini squats for posterior weight shift   Patient has difficulty remaining on track with counting reps with activities  Mild quad lag with SLR more on left    Pain Level (0-10 scale) post treatment: 1/10    ASSESSMENT/Changes in Function: Patient continues to report min pain levels in low back at time of therapy session.  She reports subjective improvement in overall endurance but continued difficulty with ambulating longer distances and navigating stairs. She continues to require verbal cues to maintain count with reps as well as for form with activities. She remains challenged with activities in therapy. Patient will continue to benefit from skilled PT services to modify and progress therapeutic interventions, address functional mobility deficits, address ROM deficits, address strength deficits, analyze and address soft tissue restrictions, analyze and cue movement patterns, analyze and modify body mechanics/ergonomics, assess and modify postural abnormalities, address imbalance/dizziness and instruct in home and community integration to attain remaining goals. Progress towards goals / Updated goals:  Short Term Goals: To be accomplished in 1 weeks:              1. Patient will be compliant with initial HEP to optimize therapy outcomes.   Goal met. 9/28/21     Long Term Goals: To be accomplished in 4 weeks:              1. Patient will improve FOTO score by at least 5 points in order to demonstrate functional improvement.              2. Patient will report no more than \"moderate difficulty\" with \"bending or stooping\" with FOTO in order to demonstrate improved tolerance to lifting.              3. Patient will report no greater than 4/10 pain in low back in order to perform ADLs with increased ease.               4. Patient will report at least a 50% increase in overall improvement since start of care in order to improve quality of life. Progressing. Pt reports improvement in overall sx's.  10/5/21    PLAN  [x]  Upgrade activities as tolerated     [x]  Continue plan of care  []  Update interventions per flow sheet       []  Discharge due to:_  []  Other:_      Disha Hoffman PT 10/7/2021  10:20 AM    Future Appointments   Date Time Provider Ankit Edmond   10/7/2021 12:45 PM Vita Edwards, PT [facility] 10/12/2021 12:45 PM Vita Sinda Mauricio, PT [facility]   10/13/2021  1:00 PM Star Bryant MD [facility] BS AMB   10/14/2021 12:45 PM Vita Sinda Mauricio, PT [facility]   10/19/2021 12:45 PM Vita Sinda Mauricio, PT [facility]   10/21/2021 12:45 PM Vita Sinda Mauricio, PT [facility]   10/26/2021 12:45 PM Iris Hews, PTA [facility]   10/27/2021 10:30 AM Catalino Her PHARMD [facility] BS AMB   10/28/2021 12:45 PM Vita Sinda Mauricio, PT [facility]   11/2/2021 12:45 PM Iris Hews, PTA [facility]   11/4/2021 12:45 PM Vita Sinda Mauricio, PT [facility]   11/9/2021 12:45 PM Iris Hews, PTA [facility]   11/11/2021 12:45 PM Vita Sinda Mauricio, PT [facility]   11/16/2021 12:45 PM Iris Hews, PTA [facility]   11/18/2021 12:45 PM Iris Hews, PTA [facility]   11/23/2021 12:45 PM Vita Sinda Mauricio, PT [facility]   11/26/2021 12:45 PM Iris Hews, PTA [facility]   1/6/2022 10:25 AM [facility] LAB VISIT [facility] AMB   1/13/2022 11:30 AM Patrick Dean MD [facility] AMB

## 2021-10-11 ENCOUNTER — TELEPHONE (OUTPATIENT)
Dept: INTERNAL MEDICINE CLINIC | Age: 66
End: 2021-10-11

## 2021-10-11 NOTE — TELEPHONE ENCOUNTER
Patient called and said that she is currently having to get allergy shots once a week for a while, before she will be able to do them once a month. She is asking if it is okay to get the flu shot at the same time as the allergy shots or does she need to wait? Please advise, thank you!

## 2021-10-11 NOTE — TELEPHONE ENCOUNTER
Would be fine for her to proceed with the influenza vaccine while receiving immunotherapy. Recommendation by the CDC is to obtain before the end of October this year.

## 2021-10-12 ENCOUNTER — HOSPITAL ENCOUNTER (OUTPATIENT)
Dept: PHYSICAL THERAPY | Age: 66
Discharge: HOME | End: 2021-10-12
Attending: INTERNAL MEDICINE
Payer: MEDICARE

## 2021-10-12 PROCEDURE — 97110 THERAPEUTIC EXERCISES: CPT

## 2021-10-12 NOTE — PROGRESS NOTES
PT DAILY TREATMENT NOTE     Patient Name: Radha Covarrubias  Date:10/12/2021  : 1955  [x]  Patient  Verified  Payor: [facility] MED[place]ARE / Plan: [place] MEDICARE PART A & B / Product Type: Medicare /    In time:1245  Out time:123  Total Treatment Time (min): 38  Visit #: 7 of 12    Medicare/BCBS Only   Total Timed Codes (min):  38 1:1 Treatment Time:  38       Treatment Area: Low back pain [M54.5]    SUBJECTIVE  Pain Level (0-10 scale): 0/10  Any medication changes, allergies to medications, adverse drug reactions, diagnosis change, or new procedure performed?: [x] No    [] Yes (see summary sheet for update)  Subjective functional status/changes:   [] No changes reported  Pt stated that she is doing great and wants to be done today    OBJECTIVE    38 min Therapeutic Exercise:  [x] See flow sheet :   Rationale: increase ROM and increase strength to improve the patients ability to increase ease with ADLs    With   [x] TE   [] TA   [] neuro   [] other: Patient Education: [x] Review HEP    [] Progressed/Changed HEP based on:   [] positioning   [] body mechanics   [] transfers   [] heat/ice application    [] other:      Other Objective/Functional Measures:   FOTO 94     Pain Level (0-10 scale) post treatment: 0/10    ASSESSMENT/Changes in Function:   []  See Plan of Care  []  See progress note/recertification  [x]  See Discharge Summary         Progress towards goals / Updated goals:  Short Term Goals: To be accomplished in 1 weeks:              1. Patient will be compliant with initial HEP to optimize therapy outcomes.   Goal met. 21     Long Term Goals: To be accomplished in 4 weeks:              1. Patient will improve FOTO score by at least 5 points in order to demonstrate functional improvement. Goal met. Increased from 48 to 80              2. Patient will report no more than \"moderate difficulty\" with \"bending or stooping\" with FOTO in order to demonstrate improved tolerance to lifting. Goal met.  No difficulty with bending and stooping.               3. Patient will report no greater than 4/10 pain in low back in order to perform ADLs with increased ease. Goal met. Pt reported no pain today              4. Patient will report at least a 50% increase in overall improvement since start of care in order to improve quality of life. Goal met.  100% improvement in overall sx's    PLAN  []  Upgrade activities as tolerated     []  Continue plan of care  []  Update interventions per flow sheet       [x]  Discharge due to:all goals met  []  Other:_      Isabel Menjivar PTA 10/12/2021  8:50 AM    Future Appointments   Date Time Provider Ankit Caballeroisti   10/12/2021 12:45 PM Karole Kwabena, PTA [facility]   10/13/2021  1:00 PM Salvatore Arizmendi MD [facility] BS AMB   10/14/2021 12:45 PM Vita Wava Martinez, PT [facility]   10/19/2021 12:45 PM Vita Wava Martinez, PT [facility]   10/21/2021 12:45 PM Vita Wava Martinez, PT [facility]   10/26/2021 12:45 PM Karole Kwabena, PTA [facility]   10/27/2021 10:30 AM Glenn Rodrigues PHARMD [facility] BS AMB   10/28/2021 12:45 PM Vita Wava Martinez, PT [facility]   11/2/2021 12:45 PM Karole Kwabena, PTA [facility]   11/4/2021 12:45 PM Vita Wava Martinez, PT [facility]   11/9/2021 12:45 PM Karole Kwabena, PTA [facility]   11/11/2021 12:45 PM Vita Wava Martinez, PT [facility]   11/16/2021 12:45 PM Faisal Yuan, PTA [facility]   11/18/2021 12:45 PM Faisal Yuan, PTA [facility]   11/23/2021 12:45 PM Vita Martinez, PT [facility]   11/26/2021 12:45 PM Faisal Yuan, PTA [facility]   1/6/2022 10:25 AM [facility] LAB VISIT [facility] AMB   1/13/2022 11:30 AM Mamie Isaac MD [facility] BS AMB

## 2021-10-13 ENCOUNTER — OFFICE VISIT (OUTPATIENT)
Dept: CARDIOLOGY CLINIC | Age: 66
End: 2021-10-13
Payer: MEDICARE

## 2021-10-13 VITALS
BODY MASS INDEX: 34.04 KG/M2 | HEART RATE: 77 BPM | OXYGEN SATURATION: 99 % | WEIGHT: 185 LBS | DIASTOLIC BLOOD PRESSURE: 74 MMHG | SYSTOLIC BLOOD PRESSURE: 136 MMHG | HEIGHT: 62 IN

## 2021-10-13 DIAGNOSIS — E11.9 CONTROLLED TYPE 2 DIABETES MELLITUS WITHOUT COMPLICATION, WITHOUT LONG-TERM CURRENT USE OF INSULIN (HCC): ICD-10-CM

## 2021-10-13 DIAGNOSIS — E78.5 HYPERLIPIDEMIA, UNSPECIFIED HYPERLIPIDEMIA TYPE: ICD-10-CM

## 2021-10-13 DIAGNOSIS — I10 ESSENTIAL HYPERTENSION: ICD-10-CM

## 2021-10-13 DIAGNOSIS — I47.1 AVNRT (AV NODAL RE-ENTRY TACHYCARDIA) (HCC): ICD-10-CM

## 2021-10-13 DIAGNOSIS — R00.2 PALPITATIONS: ICD-10-CM

## 2021-10-13 DIAGNOSIS — I47.1 PSVT (PAROXYSMAL SUPRAVENTRICULAR TACHYCARDIA) (HCC): Primary | ICD-10-CM

## 2021-10-13 PROCEDURE — 1101F PT FALLS ASSESS-DOCD LE1/YR: CPT | Performed by: INTERNAL MEDICINE

## 2021-10-13 PROCEDURE — 3017F COLORECTAL CA SCREEN DOC REV: CPT | Performed by: INTERNAL MEDICINE

## 2021-10-13 PROCEDURE — G8754 DIAS BP LESS 90: HCPCS | Performed by: INTERNAL MEDICINE

## 2021-10-13 PROCEDURE — 2022F DILAT RTA XM EVC RTNOPTHY: CPT | Performed by: INTERNAL MEDICINE

## 2021-10-13 PROCEDURE — 93000 ELECTROCARDIOGRAM COMPLETE: CPT | Performed by: INTERNAL MEDICINE

## 2021-10-13 PROCEDURE — 99215 OFFICE O/P EST HI 40 MIN: CPT | Performed by: INTERNAL MEDICINE

## 2021-10-13 PROCEDURE — 1090F PRES/ABSN URINE INCON ASSESS: CPT | Performed by: INTERNAL MEDICINE

## 2021-10-13 PROCEDURE — G8752 SYS BP LESS 140: HCPCS | Performed by: INTERNAL MEDICINE

## 2021-10-13 PROCEDURE — G8417 CALC BMI ABV UP PARAM F/U: HCPCS | Performed by: INTERNAL MEDICINE

## 2021-10-13 PROCEDURE — G9899 SCRN MAM PERF RSLTS DOC: HCPCS | Performed by: INTERNAL MEDICINE

## 2021-10-13 PROCEDURE — G8427 DOCREV CUR MEDS BY ELIG CLIN: HCPCS | Performed by: INTERNAL MEDICINE

## 2021-10-13 PROCEDURE — G8536 NO DOC ELDER MAL SCRN: HCPCS | Performed by: INTERNAL MEDICINE

## 2021-10-13 PROCEDURE — G8399 PT W/DXA RESULTS DOCUMENT: HCPCS | Performed by: INTERNAL MEDICINE

## 2021-10-13 PROCEDURE — G8510 SCR DEP NEG, NO PLAN REQD: HCPCS | Performed by: INTERNAL MEDICINE

## 2021-10-13 PROCEDURE — 3051F HG A1C>EQUAL 7.0%<8.0%: CPT | Performed by: INTERNAL MEDICINE

## 2021-10-13 RX ORDER — HYDROCHLOROTHIAZIDE 12.5 MG/1
TABLET ORAL
Qty: 90 TABLET | Refills: 3 | Status: SHIPPED | OUTPATIENT
Start: 2021-10-13 | End: 2022-11-04

## 2021-10-13 NOTE — PROGRESS NOTES
History of Present Illness:  77year old female here for establishing care. She has followed with Dr. Shannon Prior for many years,on Toprol for history of recurrent SVT. She has not been in the hospital anytime recently, but has required adenosine in the past.  She has done quite well on Toprol. She does note she has gained a little bit of weight, not quite as active as she wants to be, and her hemoglobin A1c is slightly increased and there was discussion of starting antiglycemic medication. No new chest pain, dyspnea, presyncope, syncope, PND, orthopnea, edema, or palpitations. Impression:  1. History of SVT, conservatively treated with Toprol for many years, previous admissions with adenosine, but very stable. No ablation has been performed. 2. History of dyslipidemia. 3. History of HTN. 4. History of prediabetes. 5. Allergies with shots and history of mild asthma. Plan:  We discussed her SVT, current therapy. Her symptoms are very well controlled and we will continue to monitor. Obviously if they become more difficult to treat or she has refractory episodes or becomes intolerant to Toprol, would consider ablation. I will see back in one year. Past Medical History:   Diagnosis Date    Allergic rhinitis     Asthma     Cardiac stress echo, normal 11/02/2012    Normal maximal stress echo study. EF 60%. Ex time 9 min 45 sec. 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     Hyperlipidemia     Hypertension     Menopause     Plantar fasciitis     left    PSVT ([facility]oxysmal [facility]ricular tachycardia) ([facility])     A-V dharmesh reentrant tachycardia       Current Outpatient Medications   Medication Sig Dispense Refill    hydroCHLOROthiazide (HYDRODIURIL) 12.5 mg tablet take 1 tablet by mouth once daily 90 Tablet 3    glipiZIDE SR (GLUCOTROL XL) 5 mg CR tablet Take 1 Tablet by mouth daily. 90 Tablet 2    simvastatin (ZOCOR) 20 mg tablet take 1 tablet by mouth at bedtime  Indications: excessive fat in the blood 90 Tablet 3    LORazepam (ATIVAN) 1 mg tablet take 1 tablet by mouth every 8 hours if needed for anxiety 30 Tablet 0    loratadine (Claritin) 10 mg tablet Take 1 Tablet by mouth daily. 90 Tablet 2    Blood-Glucose Meter monitoring kit Monitor fasting blood glucose once daily 1 Kit 0    glucose blood VI test strips (ASCENSIA AUTODISC VI, ONE TOUCH ULTRA TEST VI) strip Monitor fasting blood glucose once daily 100 Strip 5    lancets misc Monitor fasting blood glucose once daily 100 Each 5    potassium chloride (K-DUR, KLOR-CON) 20 mEq tablet take 1 tablet by mouth once daily 90 Tab 3    losartan (COZAAR) 25 mg tablet take 1 tablet by mouth once daily 90 Tab 2    fluticasone propionate (FLONASE) 50 mcg/actuation nasal spray instill 2 sprays into each nostril once daily 16 g 5    metoprolol succinate (TOPROL-XL) 50 mg XL tablet take 1 tablet by mouth once daily 90 Tab 3    albuterol (PROVENTIL HFA, VENTOLIN HFA, PROAIR HFA) 90 mcg/actuation inhaler inhale 2 puffs by mouth every 4 hours if needed for wheezing 8.5 g 5    clotrimazole-betamethasone (LOTRISONE) topical cream Apply  to both ear canals and affected part of outer ear twice a day with a finger. 45 g 3    cholecalciferol (VITAMIN D3) 1,000 unit cap Take 1,000 Units by mouth daily.  carboxymethylcellulose sodium (REFRESH LIQUIGEL) 1 % dlgl ophthalmic solution Apply  to eye. Social History   reports that she has never smoked. She has never used smokeless tobacco.   reports no history of alcohol use. Family History  family history includes Arthritis-osteo in her mother; Breast Cancer in her maternal aunt and paternal aunt;  Cancer in her father; Diabetes in an other family member; Hypertension in her mother, sister, sister, and sister; Other in her sister; Stroke in an other family member. Review of Systems  Except as stated above include:  Constitutional: Negative for fever, chills and malaise/fatigue. HEENT: No congestion or recent URI. Gastrointestinal: No nausea, vomiting, abdominal pain, bloody stools. Pulmonary:  Negative except as stated above. Cardiac:  Negative except as stated above. Musculoskeletal: Negative except as stated above. Neurological:  No localized symptoms. Skin:  Negative except as stated above. Psych:  Negative except as stated above. Endocrine:  Negative except as stated above. PHYSICAL EXAM  BP Readings from Last 3 Encounters:   10/13/21 136/74   09/14/21 126/74   06/10/21 128/78     Pulse Readings from Last 3 Encounters:   10/13/21 77   09/14/21 78   06/10/21 77     Wt Readings from Last 3 Encounters:   10/13/21 83.9 kg (185 lb)   09/14/21 84.6 kg (186 lb 6.4 oz)   06/10/21 83.7 kg (184 lb 9.6 oz)     General:   Well developed, well groomed. Head/Neck:   No obvious jugular venous distention     No obvious carotid pulsations. No evidence of xanthelasma. Lungs:   No respiratory distress. Clear bilaterally. Heart:  Regular rate and rhythm. Normal S1/S2. Palpation grossly normal.    No significant murmurs, rubs or gallops. Abdomen:   Non-acute abdomen. No obvious pulsations. Extremities:   Intact peripheral pulses. No significant edema. Neurological:   Alert and oriented to person, place, time. No focal neurological deficit visually. Skin:   No obvious rash    Blood Pressure Metric:  Monitor recommended and adjustments stated if needed.

## 2021-10-14 ENCOUNTER — APPOINTMENT (OUTPATIENT)
Dept: PHYSICAL THERAPY | Age: 66
End: 2021-10-14
Attending: INTERNAL MEDICINE
Payer: MEDICARE

## 2021-10-14 NOTE — PROGRESS NOTES
[facility] [address]  [address]  [phone] fax    Physical Therapy Discharge Summary    Patient name: Radha Covarrubias Start of Care:2021   Referral source: Paty Griffin MD : 1955   Medical/Treatment Diagnosis: Low back pain [M54.5]  Payor: [place] MEDICARE / Plan: [place] MEDICARE PART A & B / Product Type: Medicare /  Onset Date:chronic; aggravated ~ 3 weeks ago (from start of care date)     Prior Hospitalization: see medical history Provider#: [id number]   Medications: Verified on Patient Summary List    Comorbidities: arthritis, HTN, asthma  Prior Level of Function:patient enjoys shopping and riding bike; lives in 1 story home with 3 steps to enter    Visits from Start of Care: 7    Missed Visits: 0    Reporting Period : 21 to 10/12/21    Summary of Care:  Goal: Patient will be compliant with initial HEP to optimize therapy outcomes. Status at last note/certification: New goal-established at evaluation  Status at discharge: MET    Goal: Patient will improve FOTO score by at least 5 points in order to demonstrate functional improvement. .  Status at last note/certification: New MFIY-JYIH=88/754  Status at discharge: MET-FOTO=94/100    Goal: Patient will report no more than \"moderate difficulty\" with \"bending or stooping\" with FOTO in order to demonstrate improved tolerance to lifting. Status at last note/certification: New goal-\"extreme difficulty\"  Status at discharge: MET-not asked with FOTO, patient reports \"no difficulty\"    Goal: Patient will report no greater than 4/10 pain in low back in order to perform ADLs with increased ease. Status at last note/certification: New goal-max 6/10  Status at discharge: MET-patient denied pain at date of last therapy visit    Goal: Patient will report at least a 50% increase in overall improvement since start of care in order to improve quality of life.   Status at last note/certification: New goal  Status at discharge: MET-100%    ASSESSMENT/RECOMMENDATIONS: Patient has progressed well with skilled outpatient PT meeting all initially projected goals. Patient reports 100% subjective improvement since start of care. FOTO score has increased to 94/100 points (+46) indicating functional improvement. She reports improved tolerance to ADLs and functional tasks. Patient requesting to discharge from skilled outpatient PT.  At this time, patient is appropriate for discharge from skilled outpatient PT.     [x]Discontinue therapy: [x]Patient has reached or is progressing toward set goals      []Patient is non-compliant or has abdicated      []Due to lack of appreciable progress towards set goals    Amanda Lutz, PT 10/14/2021 11:49 AM

## 2021-10-19 ENCOUNTER — APPOINTMENT (OUTPATIENT)
Dept: PHYSICAL THERAPY | Age: 66
End: 2021-10-19
Attending: INTERNAL MEDICINE
Payer: MEDICARE

## 2021-10-21 ENCOUNTER — APPOINTMENT (OUTPATIENT)
Dept: PHYSICAL THERAPY | Age: 66
End: 2021-10-21
Attending: INTERNAL MEDICINE
Payer: MEDICARE

## 2021-10-26 ENCOUNTER — APPOINTMENT (OUTPATIENT)
Dept: PHYSICAL THERAPY | Age: 66
End: 2021-10-26
Attending: INTERNAL MEDICINE
Payer: MEDICARE

## 2021-10-27 ENCOUNTER — OFFICE VISIT (OUTPATIENT)
Dept: INTERNAL MEDICINE CLINIC | Age: 66
End: 2021-10-27

## 2021-10-27 DIAGNOSIS — E11.9 TYPE 2 DIABETES MELLITUS WITHOUT COMPLICATION, WITHOUT LONG-TERM CURRENT USE OF INSULIN (HCC): Primary | ICD-10-CM

## 2021-10-27 NOTE — PATIENT INSTRUCTIONS
Your Visit Summary:     Plan:  - Restart with glipizide XL 5 mg once daily   - Will touch base after next visit with Dr. Jesse Ocampo   - Call me if blood sugars drops below 80         Call me with any questions or concerns  Terri Marte [phone]    Check and document your blood sugar first thing in the morning (fasting), 1-2 hours after a meal, and/or before bedtime. Bring your meter/log to all future visits. Your blood sugar goals:  - Fasting (first thing in the morning)  blood sugar: 80 - 130   - 1 to 2 hours after a meal: less than 180     When you experience symptoms of low blood sugar (example: less than 70):  - Confirm low reading by checking your blood sugar.   - Then treat with 15 grams of carbohydrates (one-half cup of juice or regular soda, or 4-5 glucose tablets). - Wait 15 minutes to recheck blood sugar.   - Then eat a protein containing meal/snack to prevent another low blood sugar episode. (example: peanut butter + crackers)    Nutrition:  - When reviewing a nutrition label, focus on the serving size, total calories, fat (and type of fats), total carbohydrates, sugar (and amount of added sugar), amount of fiber (good for your digestive), and amount of protein. Refer to your nutrition label guide for more information.  - For a meal : max 45 - 60 grams of carbohydrates  - For a snack: max 15 grams of carbohydrates  - Reduce amount of saturated and trans fat. Consider more unsaturated fat options as they are better for your heart health.    - Have at least 1 serving of lean fat protein with each meal.    - Increase fiber intake slowly to prevent constipation.   - Substitute fruit juices for the whole fruit    Low carb snack ideas (15 grams total carb or less):     String cheese or babybel with 6 crackers   4 peanut butter crackers   3 cups of popcorn   1 cup raw vegetables with hummus or ranch dip (just need to watch how much dip you use)   Nuts   2 rice cakes   Celery with peanut butter or cream cheese   String cheese with 1 serving of fruit   Greek yogurt (look at label to make sure < 15 gram carb)   Plain greek yogurt with fresh berries added   Nature valley protein bar   Whisps parmesan cheese crisps   Hard boiled egg   Cottage cheese   Tuna salad lettuce roll-ups   Deli meat roll-ups with slice of cheese   Sugar Free Jello   Glucerna shake (16 grams)    Glucerna hunger smart shake (16 grams)   Ensure protein max shake   Fruit (1 serving/15 grams)   1/2 banana, brittany, or grapefruit    1/3 melon (small cantaloupe)   1 slice or 1 cup of honeydew melon   1 slice or 1 and 1/4 cups of watermelon    1 small apple, peach, orange or pear   2 small tangerines   1 cup of raspberries   3/4 cup of blackberries, blueberries or pineapples   1/2 cup of fruit juice, pears, applesauce, or mangos   17 small grapes   12 cherries    Be careful with the glucerna products as they differ in the total carbs depending on the product (some are intended as meal replacements not snacks). Make sure you look at the total carbs on the label as products can differ. Physical Activity:  - Aim for 30 minutes of consistent, moderately intensive, physical activity a day for 5 days or an average of 150 minutes per week. - Start slow, increase as tolerated. For example: Walk every day, working up to 30 minutes of brisk walking, 5 days a weekor split the 30 minutes into two 15-minute or three 10-minute walks. - If you sit for a long time, get up and move/stretch every 90 minutes. Other recommendations:  - Schedule an annual eye exam.  - Check your feet daily for any signs of open wounds, cuts, or sores. - Given your risk factors, the following vaccines are recommended: annual flu shot, age-based pneumococcal vaccines (Pneumovax, Prevnar 13).     In addition to taking your medications as directed, improving your blood sugar involves modifying your nutrition and maximizing the amount of physical activity.

## 2021-10-28 ENCOUNTER — APPOINTMENT (OUTPATIENT)
Dept: PHYSICAL THERAPY | Age: 66
End: 2021-10-28
Attending: INTERNAL MEDICINE
Payer: MEDICARE

## 2021-10-28 NOTE — PROGRESS NOTES
Pharmacy Progress Note - Diabetes Management    Assessment / Plan:   Diabetes Management:  - Per ADA guidelines, Pt's A1c is not at goal of < 7%. - Current SMBG(s)/CGM trend is above fasting goals majority of the time per review of blood sugars today   - Encouraged patient to reconsider starting a medication to assist with blood sugar control, especially since she is still trying to work on improving diet habits and increasing exercise into her routine   - She is in agreement to retry glipizide. Will start again at glipizide XL 5 mg once daily   - Reinforced fasting and post-prandial blood sugars goals. Instructed patient to notify PharmD if she notices any readings below 70 or if blood sugars are spiking high above goals consistently   - Otherwise, patient to continue with glipizide and work on lifestyle modifications until next visit with PCP   - Will reassess control after next A1c check and touch base with patient as needed          S/O: Ms. Chastity Nichols is a 77 y.o. female, referred by Dr. Mamie Isaac MD was seen today for diabetes management follow up. Patient's last A1c was 7.4% in December 2021. Current anti-hyperglycemic regimen include(s):    - Patient decided to hold off on taking glipizide, so she is not currently taking any medications for diabetes     ROS:  Today, Pt endorses:  - Symptoms of Hyperglycemia: none  - Symptoms of Hypoglycemia: none    Self Monitoring Blood Glucose (SMBG) or CGM:  - Brought in home glucometer/blood glucose log/CGM reader today:  yes  - Conducts/scans:  Once daily fasting    - Fasting blood sugars have ranged in in the mid-upper 100 range  - Lowest blood sugar has been in the 130s    Nutrition/Lifestyle Modifications:  - Patient states that this is still a work in progress and not much has changed since the last visit       Past Medical History:   Diagnosis Date    Allergic rhinitis     Asthma     Cardiac stress echo, normal 11/02/2012    Normal maximal stress echo study. EF 60%. Ex time 9 min 45 sec. 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  Hyperlipidemia     Hypertension     Menopause     Plantar fasciitis     left    PSVT [facility]oxysmal supra[facility]ricular tachycardia) ([facility])     A-V dharmesh reentrant tachycardia     Allergies   Allergen Reactions    Altace [Ramipril] Cough    Penicillins Other (comments)     Hands peel    Sulfur Itching       Current Outpatient Medications   Medication Sig    hydroCHLOROthiazide (HYDRODIURIL) 12.5 mg tablet take 1 tablet by mouth once daily    glipiZIDE SR (GLUCOTROL XL) 5 mg CR tablet Take 1 Tablet by mouth daily.  simvastatin (ZOCOR) 20 mg tablet take 1 tablet by mouth at bedtime  Indications: excessive fat in the blood    LORazepam (ATIVAN) 1 mg tablet take 1 tablet by mouth every 8 hours if needed for anxiety    loratadine (Claritin) 10 mg tablet Take 1 Tablet by mouth daily.     Blood-Glucose Meter monitoring kit Monitor fasting blood glucose once daily    glucose blood VI test strips (ASCENSIA AUTODISC VI, ONE TOUCH ULTRA TEST VI) strip Monitor fasting blood glucose once daily    lancets misc Monitor fasting blood glucose once daily    potassium chloride (K-DUR, KLOR-CON) 20 mEq tablet take 1 tablet by mouth once daily    losartan (COZAAR) 25 mg tablet take 1 tablet by mouth once daily    fluticasone propionate (FLONASE) 50 mcg/actuation nasal spray instill 2 sprays into each nostril once daily    metoprolol succinate (TOPROL-XL) 50 mg XL tablet take 1 tablet by mouth once daily    albuterol (PROVENTIL HFA, VENTOLIN HFA, PROAIR HFA) 90 mcg/actuation inhaler inhale 2 puffs by mouth every 4 hours if needed for wheezing    clotrimazole-betamethasone (LOTRISONE) topical cream Apply  to both ear canals and affected part of outer ear twice a day with a finger.  cholecalciferol (VITAMIN D3) 1,000 unit cap Take 1,000 Units by mouth daily.  carboxymethylcellulose sodium (REFRESH LIQUIGEL) 1 % dlgl ophthalmic solution Apply  to eye. No current facility-administered medications for this visit. Lab Results   Component Value Date/Time    Sodium 138 09/07/2021 09:48 AM    Potassium 3.7 09/07/2021 09:48 AM    Chloride 102 09/07/2021 09:48 AM    CO2 30 09/07/2021 09:48 AM    Anion gap 6 09/07/2021 09:48 AM    Glucose 157 (H) 09/07/2021 09:48 AM    BUN 12 09/07/2021 09:48 AM    Creatinine 0.71 09/07/2021 09:48 AM    BUN/Creatinine ratio 17 09/07/2021 09:48 AM    GFR est AA >60 09/07/2021 09:48 AM    GFR est non-AA >60 09/07/2021 09:48 AM    Calcium 8.5 09/07/2021 09:48 AM    Bilirubin, total 0.5 09/07/2021 09:48 AM    Alk.  phosphatase 76 09/07/2021 09:48 AM    Protein, total 7.1 09/07/2021 09:48 AM    Albumin 3.6 09/07/2021 09:48 AM    Globulin 3.5 09/07/2021 09:48 AM    A-G Ratio 1.0 09/07/2021 09:48 AM    ALT (SGPT) 33 09/07/2021 09:48 AM       Lab Results   Component Value Date/Time    Cholesterol, total 162 09/07/2021 09:48 AM    HDL Cholesterol 62 (H) 09/07/2021 09:48 AM    LDL, calculated 76.6 09/07/2021 09:48 AM    VLDL, calculated 23.4 09/07/2021 09:48 AM    Triglyceride 117 09/07/2021 09:48 AM    CHOL/HDL Ratio 2.6 09/07/2021 09:48 AM       Lab Results   Component Value Date/Time    WBC 8.7 11/12/2020 11:16 AM    HGB 13.2 11/12/2020 11:16 AM    HCT 39.3 11/12/2020 11:16 AM    PLATELET 225 63/37/0704 11:16 AM    MCV 89.9 11/12/2020 11:16 AM       Lab Results   Component Value Date/Time    Microalbumin/Creat ratio (mg/g creat) 6 09/07/2021 09:48 AM    Microalbumin,urine random 0.79 09/07/2021 09:48 AM       HbA1c:  Lab Results   Component Value Date/Time    Hemoglobin A1c 7.4 (H) 09/07/2021 09:48 AM     No components found for: 2     Last Point of Care HGB A1C  No results found for: Mica Olvera CrCl cannot be calculated (Unknown ideal weight.). Patient verbalized understanding of the information presented and all of the patients questions were answered. AVS was handed to the patient. Patient advised to call the office with any additional questions or concerns. Thank you,  Martha Ahmadi. Tato Duffy [facility]          For Pharmacy Admin Tracking Only     CPA in place:  Yes   Recommendation Provided To: Patient/Caregiver: 0 via In person   Time Spent (min): 45

## 2021-11-02 ENCOUNTER — APPOINTMENT (OUTPATIENT)
Dept: PHYSICAL THERAPY | Age: 66
End: 2021-11-02
Attending: INTERNAL MEDICINE

## 2021-11-03 DIAGNOSIS — F41.9 ANXIETY: ICD-10-CM

## 2021-11-03 RX ORDER — LORAZEPAM 1 MG/1
TABLET ORAL
Qty: 30 TABLET | Refills: 0 | Status: SHIPPED | OUTPATIENT
Start: 2021-11-03 | End: 2022-06-30

## 2021-11-03 NOTE — TELEPHONE ENCOUNTER
[facility] for Ativan [facility] 6/23/21 for a 10 d/s.      Last Visit: 9/14/21 with MD Nico Murphy  Next Appointment: 1/13/22 with MD Nico Murphy  Previous Refill Encounter(s): 6/23/21 #30    Requested Prescriptions     Pending Prescriptions Disp Refills    LORazepam (ATIVAN) 1 mg tablet [Pharmacy Med Name: LORAZEPAM 1 MG TABLET] 30 Tablet 0     Sig: take 1 tablet by mouth every 8 hours if needed for anxiety

## 2021-11-04 ENCOUNTER — APPOINTMENT (OUTPATIENT)
Dept: PHYSICAL THERAPY | Age: 66
End: 2021-11-04
Attending: INTERNAL MEDICINE

## 2021-11-09 ENCOUNTER — APPOINTMENT (OUTPATIENT)
Dept: PHYSICAL THERAPY | Age: 66
End: 2021-11-09
Attending: INTERNAL MEDICINE

## 2021-11-11 ENCOUNTER — APPOINTMENT (OUTPATIENT)
Dept: PHYSICAL THERAPY | Age: 66
End: 2021-11-11
Attending: INTERNAL MEDICINE

## 2021-11-16 ENCOUNTER — APPOINTMENT (OUTPATIENT)
Dept: PHYSICAL THERAPY | Age: 66
End: 2021-11-16
Attending: INTERNAL MEDICINE

## 2021-11-18 ENCOUNTER — APPOINTMENT (OUTPATIENT)
Dept: PHYSICAL THERAPY | Age: 66
End: 2021-11-18
Attending: INTERNAL MEDICINE

## 2021-11-23 ENCOUNTER — APPOINTMENT (OUTPATIENT)
Dept: PHYSICAL THERAPY | Age: 66
End: 2021-11-23
Attending: INTERNAL MEDICINE

## 2021-11-26 ENCOUNTER — APPOINTMENT (OUTPATIENT)
Dept: PHYSICAL THERAPY | Age: 66
End: 2021-11-26
Attending: INTERNAL MEDICINE

## 2021-12-01 ENCOUNTER — TELEPHONE (OUTPATIENT)
Dept: INTERNAL MEDICINE CLINIC | Age: 66
End: 2021-12-01

## 2021-12-01 NOTE — TELEPHONE ENCOUNTER
Called and spoke with patient. Reports that over the last 3 days she has noticed sinus congestion, intermittent facial pain, itchy eyes, and slight blood when blowing her nose each morning. Denies any fevers or chills. Reports that she was previously receiving immunotherapy with Dr. Romulo Fairbanks but discontinued 1 month ago. Currently taking Flonase each morning but no other allergy medication. Advised to change Flonase to nightly and restart Claritin. Also instructed to begin nasal saline each morning. Patient states that she was prescribed Singulair by Dr. Romulo Fairbanks but has not been taking it, and advised to restart. Discussed that she should call back if her symptoms do not improve or worsen. Answered all questions. Patient also reports that she received the influenza vaccine on 10/25/2021 and the Moderna booster dose yesterday (11/30/2021) at [facility]. Chart updated.

## 2021-12-01 NOTE — TELEPHONE ENCOUNTER
Pt called stating for the past 3 days , having sinus congestion , w/face pain tinge of blood when she blows her nose she thinks its a sinus infection and is asking if  something could be called into her pharmacy

## 2021-12-02 ENCOUNTER — TRANSCRIBE ORDER (OUTPATIENT)
Dept: SCHEDULING | Age: 66
End: 2021-12-02

## 2021-12-02 DIAGNOSIS — Z78.0 MENOPAUSE: Primary | ICD-10-CM

## 2021-12-15 RX ORDER — LOSARTAN POTASSIUM 25 MG/1
TABLET ORAL
Qty: 90 TABLET | Refills: 2 | Status: SHIPPED | OUTPATIENT
Start: 2021-12-15 | End: 2022-09-18

## 2022-01-06 ENCOUNTER — HOSPITAL ENCOUNTER (OUTPATIENT)
Dept: LAB | Age: 67
Discharge: HOME | End: 2022-01-06
Payer: MEDICARE

## 2022-01-06 ENCOUNTER — APPOINTMENT (OUTPATIENT)
Dept: INTERNAL MEDICINE CLINIC | Age: 67
End: 2022-01-06

## 2022-01-06 DIAGNOSIS — E11.9 TYPE 2 DIABETES MELLITUS WITHOUT COMPLICATION, WITHOUT LONG-TERM CURRENT USE OF INSULIN (HCC): ICD-10-CM

## 2022-01-06 DIAGNOSIS — K75.81 NASH (NONALCOHOLIC STEATOHEPATITIS): ICD-10-CM

## 2022-01-06 DIAGNOSIS — E55.9 VITAMIN D INSUFFICIENCY: ICD-10-CM

## 2022-01-06 DIAGNOSIS — K76.0 NAFL (NONALCOHOLIC FATTY LIVER): ICD-10-CM

## 2022-01-06 DIAGNOSIS — E78.5 HYPERLIPIDEMIA, UNSPECIFIED HYPERLIPIDEMIA TYPE: ICD-10-CM

## 2022-01-06 DIAGNOSIS — I10 ESSENTIAL HYPERTENSION: ICD-10-CM

## 2022-01-06 LAB
25(OH)D3 SERPL-MCNC: 29.3 NG/ML (ref 30–100)
ALBUMIN SERPL-MCNC: 4 G/DL (ref 3.4–5)
ALBUMIN/GLOB SERPL: 1.2 {RATIO} (ref 0.8–1.7)
ALP SERPL-CCNC: 87 U/L (ref 45–117)
ALT SERPL-CCNC: 32 U/L (ref 13–56)
ANION GAP SERPL CALC-SCNC: 6 MMOL/L (ref 3–18)
APPEARANCE UR: CLEAR
AST SERPL-CCNC: 25 U/L (ref 10–38)
BACTERIA URNS QL MICRO: ABNORMAL /HPF
BASOPHILS # BLD: 0.1 K/UL (ref 0–0.1)
BASOPHILS NFR BLD: 1 % (ref 0–2)
BILIRUB SERPL-MCNC: 0.4 MG/DL (ref 0.2–1)
BILIRUB UR QL: NEGATIVE
BUN SERPL-MCNC: 13 MG/DL (ref 7–18)
BUN/CREAT SERPL: 20 (ref 12–20)
CALCIUM SERPL-MCNC: 8.9 MG/DL (ref 8.5–10.1)
CHLORIDE SERPL-SCNC: 101 MMOL/L (ref 100–111)
CHOLEST SERPL-MCNC: 190 MG/DL
CO2 SERPL-SCNC: 30 MMOL/L (ref 21–32)
COLOR UR: YELLOW
CREAT SERPL-MCNC: 0.65 MG/DL (ref 0.6–1.3)
CREAT UR-MCNC: 157 MG/DL (ref 30–125)
DIFFERENTIAL METHOD BLD: NORMAL
EOSINOPHIL # BLD: 0.3 K/UL (ref 0–0.4)
EOSINOPHIL NFR BLD: 4 % (ref 0–5)
EPITH CASTS URNS QL MICRO: ABNORMAL /LPF (ref 0–5)
ERYTHROCYTE [DISTWIDTH] IN BLOOD BY AUTOMATED COUNT: 11.8 % (ref 11.6–14.5)
EST. AVERAGE GLUCOSE BLD GHB EST-MCNC: 166 MG/DL
GLOBULIN SER CALC-MCNC: 3.4 G/DL (ref 2–4)
GLUCOSE SERPL-MCNC: 116 MG/DL (ref 74–99)
GLUCOSE UR STRIP.AUTO-MCNC: NEGATIVE MG/DL
HBA1C MFR BLD: 7.4 % (ref 4.2–5.6)
HCT VFR BLD AUTO: 42.5 % (ref 35–45)
HDLC SERPL-MCNC: 65 MG/DL (ref 40–60)
HDLC SERPL: 2.9 {RATIO} (ref 0–5)
HGB BLD-MCNC: 13.4 G/DL (ref 12–16)
HGB UR QL STRIP: NEGATIVE
IMM GRANULOCYTES # BLD AUTO: 0 K/UL (ref 0–0.04)
IMM GRANULOCYTES NFR BLD AUTO: 0 % (ref 0–0.5)
KETONES UR QL STRIP.AUTO: NEGATIVE MG/DL
LDLC SERPL CALC-MCNC: 92.8 MG/DL (ref 0–100)
LEUKOCYTE ESTERASE UR QL STRIP.AUTO: ABNORMAL
LIPID PROFILE,FLP: ABNORMAL
LYMPHOCYTES # BLD: 2.6 K/UL (ref 0.9–3.6)
LYMPHOCYTES NFR BLD: 27 % (ref 21–52)
MAGNESIUM SERPL-MCNC: 2 MG/DL (ref 1.6–2.6)
MCH RBC QN AUTO: 29.8 PG (ref 24–34)
MCHC RBC AUTO-ENTMCNC: 31.5 G/DL (ref 31–37)
MCV RBC AUTO: 94.4 FL (ref 78–100)
MICROALBUMIN UR-MCNC: 0.91 MG/DL (ref 0–3)
MICROALBUMIN/CREAT UR-RTO: 6 MG/G (ref 0–30)
MONOCYTES # BLD: 0.5 K/UL (ref 0.05–1.2)
MONOCYTES NFR BLD: 5 % (ref 3–10)
NEUTS SEG # BLD: 6.1 K/UL (ref 1.8–8)
NEUTS SEG NFR BLD: 64 % (ref 40–73)
NITRITE UR QL STRIP.AUTO: NEGATIVE
NRBC # BLD: 0 K/UL (ref 0–0.01)
NRBC BLD-RTO: 0 PER 100 WBC
PH UR STRIP: 5.5 [PH] (ref 5–8)
PLATELET # BLD AUTO: 283 K/UL (ref 135–420)
PMV BLD AUTO: 10.6 FL (ref 9.2–11.8)
POTASSIUM SERPL-SCNC: 3.9 MMOL/L (ref 3.5–5.5)
PROT SERPL-MCNC: 7.4 G/DL (ref 6.4–8.2)
PROT UR STRIP-MCNC: NEGATIVE MG/DL
RBC # BLD AUTO: 4.5 M/UL (ref 4.2–5.3)
RBC #/AREA URNS HPF: NEGATIVE /HPF (ref 0–5)
SODIUM SERPL-SCNC: 137 MMOL/L (ref 136–145)
SP GR UR REFRACTOMETRY: 1.02 (ref 1–1.03)
TRIGL SERPL-MCNC: 161 MG/DL (ref ?–150)
TSH SERPL DL<=0.05 MIU/L-ACNC: 2.31 UIU/ML (ref 0.36–3.74)
UROBILINOGEN UR QL STRIP.AUTO: 0.2 EU/DL (ref 0.2–1)
VLDLC SERPL CALC-MCNC: 32.2 MG/DL
WBC # BLD AUTO: 9.5 K/UL (ref 4.6–13.2)
WBC URNS QL MICRO: ABNORMAL /HPF (ref 0–4)

## 2022-01-06 PROCEDURE — 83735 ASSAY OF MAGNESIUM: CPT

## 2022-01-06 PROCEDURE — 80053 COMPREHEN METABOLIC PANEL: CPT

## 2022-01-06 PROCEDURE — 80061 LIPID PANEL: CPT

## 2022-01-06 PROCEDURE — 84443 ASSAY THYROID STIM HORMONE: CPT

## 2022-01-06 PROCEDURE — 85025 COMPLETE CBC W/AUTO DIFF WBC: CPT

## 2022-01-06 PROCEDURE — 82043 UR ALBUMIN QUANTITATIVE: CPT

## 2022-01-06 PROCEDURE — 81001 URINALYSIS AUTO W/SCOPE: CPT

## 2022-01-06 PROCEDURE — 83036 HEMOGLOBIN GLYCOSYLATED A1C: CPT

## 2022-01-06 PROCEDURE — 36415 COLL VENOUS BLD VENIPUNCTURE: CPT

## 2022-01-06 PROCEDURE — 82306 VITAMIN D 25 HYDROXY: CPT

## 2022-01-12 RX ORDER — METOPROLOL SUCCINATE 50 MG/1
TABLET, EXTENDED RELEASE ORAL
Qty: 90 TABLET | Refills: 3 | Status: SHIPPED | OUTPATIENT
Start: 2022-01-12

## 2022-01-12 NOTE — PROGRESS NOTES
1. \"Have you been to the ER, urgent care clinic since your last visit? Hospitalized since your last visit? \" No    2. \"Have you seen or consulted any other health care providers outside of the [address] [place] [address] since your last visit? \" No     3. For patients aged 39-70: Has the patient had a colonoscopy / FIT/ Cologuard? Yes, HM satisfied with blue hyperlink     If the patient is female:    4. For patients aged 41-77: Has the patient had a mammogram within the past 2 years? Yes, HM satisfied with blue hyperlink    5. For patients aged 21-65: Has the patient had a pap smear?  NA based on age or sex

## 2022-01-13 ENCOUNTER — OFFICE VISIT (OUTPATIENT)
Dept: INTERNAL MEDICINE CLINIC | Age: 67
End: 2022-01-13
Payer: MEDICARE

## 2022-01-13 VITALS
RESPIRATION RATE: 16 BRPM | HEIGHT: 62 IN | TEMPERATURE: 97.3 F | HEART RATE: 78 BPM | BODY MASS INDEX: 34.23 KG/M2 | OXYGEN SATURATION: 97 % | DIASTOLIC BLOOD PRESSURE: 77 MMHG | SYSTOLIC BLOOD PRESSURE: 114 MMHG | WEIGHT: 186 LBS

## 2022-01-13 DIAGNOSIS — I10 ESSENTIAL HYPERTENSION: ICD-10-CM

## 2022-01-13 DIAGNOSIS — E11.9 TYPE 2 DIABETES MELLITUS WITHOUT COMPLICATION, WITHOUT LONG-TERM CURRENT USE OF INSULIN (HCC): Primary | ICD-10-CM

## 2022-01-13 DIAGNOSIS — K76.0 NAFL (NONALCOHOLIC FATTY LIVER): ICD-10-CM

## 2022-01-13 DIAGNOSIS — Z78.0 POSTMENOPAUSAL: ICD-10-CM

## 2022-01-13 DIAGNOSIS — E55.9 VITAMIN D DEFICIENCY: ICD-10-CM

## 2022-01-13 DIAGNOSIS — I47.1 AVNRT (AV NODAL RE-ENTRY TACHYCARDIA) (HCC): ICD-10-CM

## 2022-01-13 DIAGNOSIS — Z12.31 BREAST CANCER SCREENING BY MAMMOGRAM: ICD-10-CM

## 2022-01-13 DIAGNOSIS — E66.9 OBESITY (BMI 30.0-34.9): ICD-10-CM

## 2022-01-13 DIAGNOSIS — E78.5 HYPERLIPIDEMIA, UNSPECIFIED HYPERLIPIDEMIA TYPE: ICD-10-CM

## 2022-01-13 DIAGNOSIS — J45.30 MILD PERSISTENT ASTHMA WITHOUT COMPLICATION: ICD-10-CM

## 2022-01-13 DIAGNOSIS — Z91.14 NONCOMPLIANCE WITH DIET AND MEDICATION REGIMEN: ICD-10-CM

## 2022-01-13 DIAGNOSIS — Z91.119 NONCOMPLIANCE WITH DIET AND MEDICATION REGIMEN: ICD-10-CM

## 2022-01-13 PROCEDURE — 2022F DILAT RTA XM EVC RTNOPTHY: CPT | Performed by: INTERNAL MEDICINE

## 2022-01-13 PROCEDURE — 3051F HG A1C>EQUAL 7.0%<8.0%: CPT | Performed by: INTERNAL MEDICINE

## 2022-01-13 PROCEDURE — 1090F PRES/ABSN URINE INCON ASSESS: CPT | Performed by: INTERNAL MEDICINE

## 2022-01-13 PROCEDURE — G8510 SCR DEP NEG, NO PLAN REQD: HCPCS | Performed by: INTERNAL MEDICINE

## 2022-01-13 PROCEDURE — G0463 HOSPITAL OUTPT CLINIC VISIT: HCPCS | Performed by: INTERNAL MEDICINE

## 2022-01-13 PROCEDURE — G8399 PT W/DXA RESULTS DOCUMENT: HCPCS | Performed by: INTERNAL MEDICINE

## 2022-01-13 PROCEDURE — G8427 DOCREV CUR MEDS BY ELIG CLIN: HCPCS | Performed by: INTERNAL MEDICINE

## 2022-01-13 PROCEDURE — 1101F PT FALLS ASSESS-DOCD LE1/YR: CPT | Performed by: INTERNAL MEDICINE

## 2022-01-13 PROCEDURE — G8417 CALC BMI ABV UP PARAM F/U: HCPCS | Performed by: INTERNAL MEDICINE

## 2022-01-13 PROCEDURE — G9899 SCRN MAM PERF RSLTS DOC: HCPCS | Performed by: INTERNAL MEDICINE

## 2022-01-13 PROCEDURE — G8536 NO DOC ELDER MAL SCRN: HCPCS | Performed by: INTERNAL MEDICINE

## 2022-01-13 PROCEDURE — G8754 DIAS BP LESS 90: HCPCS | Performed by: INTERNAL MEDICINE

## 2022-01-13 PROCEDURE — G8752 SYS BP LESS 140: HCPCS | Performed by: INTERNAL MEDICINE

## 2022-01-13 PROCEDURE — 99214 OFFICE O/P EST MOD 30 MIN: CPT | Performed by: INTERNAL MEDICINE

## 2022-01-13 PROCEDURE — 3017F COLORECTAL CA SCREEN DOC REV: CPT | Performed by: INTERNAL MEDICINE

## 2022-01-13 RX ORDER — GLIPIZIDE 5 MG/1
5 TABLET, FILM COATED, EXTENDED RELEASE ORAL DAILY
Qty: 90 TABLET | Refills: 2 | Status: SHIPPED | OUTPATIENT
Start: 2022-01-13 | End: 2022-04-19

## 2022-01-13 NOTE — PATIENT INSTRUCTIONS
Heart-Healthy Diet: Care Instructions  Your Care Instructions     A heart-healthy diet has lots of vegetables, fruits, nuts, beans, and whole grains, and is low in salt. It limits foods that are high in saturated fat, such as meats, cheeses, and fried foods. It may be hard to change your diet, but even small changes can lower your risk of heart attack and heart disease. Follow-up care is a key part of your treatment and safety. Be sure to make and go to all appointments, and call your doctor if you are having problems. It's also a good idea to know your test results and keep a list of the medicines you take. How can you care for yourself at home? Watch your portions  · Learn what a serving is. A \"serving\" and a \"portion\" are not always the same thing. Make sure that you are not eating larger portions than are recommended. For example, a serving of pasta is ½ cup. A serving size of meat is 2 to 3 ounces. A 3-ounce serving is about the size of a deck of cards. Measure serving sizes until you are good at [place]" them. Keep in mind that restaurants often serve portions that are 2 or 3 times the size of one serving. · To keep your energy level up and keep you from feeling hungry, eat often but in smaller portions. · Eat only the number of calories you need to stay at a healthy weight. If you need to lose weight, eat fewer calories than your body burns (through exercise and other physical activity). Eat more fruits and vegetables  · Eat a variety of fruit and vegetables every day. Dark green, deep orange, red, or yellow fruits and vegetables are especially good for you. Examples include spinach, carrots, peaches, and berries. · Keep carrots, celery, and other veggies handy for snacks. Buy fruit that is in season and store it where you can see it so that you will be tempted to eat it. · Cook dishes that have a lot of veggies in them, such as stir-fries and soups.   Limit saturated and trans fat  · Read food labels, and try to avoid saturated and trans fats. They increase your risk of heart disease. · Use olive or canola oil when you cook. · Bake, broil, grill, or steam foods instead of frying them. · Choose lean meats instead of high-fat meats such as hot dogs and sausages. Cut off all visible fat when you prepare meat. · Eat fish, skinless poultry, and meat alternatives such as soy products instead of high-fat meats. Soy products, such as tofu, may be especially good for your heart. · Choose low-fat or fat-free milk and dairy products. Eat foods high in fiber  · Eat a variety of grain products every day. Include whole-grain foods that have lots of fiber and nutrients. Examples of whole-grain foods include oats, whole wheat bread, and brown rice. · Buy whole-grain breads and cereals, instead of white bread or pastries. Limit salt and sodium  · Limit how much salt and sodium you eat to help lower your blood pressure. · Taste food before you salt it. Add only a little salt when you think you need it. With time, your taste buds will adjust to less salt. · Eat fewer snack items, fast foods, and other high-salt, processed foods. Check food labels for the amount of sodium in packaged foods. · Choose low-sodium versions of canned goods (such as soups, vegetables, and beans). Limit sugar  · Limit drinks and foods with added sugar. These include candy, desserts, and soda pop. Limit alcohol  · Limit alcohol to no more than 2 drinks a day for men and 1 drink a day for women. Too much alcohol can cause health problems. When should you call for help? Watch closely for changes in your health, and be sure to contact your doctor if:    · You would like help planning heart-healthy meals. Where can you learn more? Go to http://www.[employer].com/  Enter V137 in the search box to learn more about \"Heart-Healthy Diet: Care Instructions. \"  Current as of: August 22, 2019               Content Version: 12.6  © 1892-9949 [employer]. Care instructions adapted under license by [employer] (which disclaims liability or warranty for this information). If you have questions about a medical condition or this instruction, always ask your healthcare professional. [address] any warranty or liability for your use of this information. Learning About Diabetes Food Guidelines  Your Care Instructions     Meal planning is important to manage diabetes. It helps keep your blood sugar at a target level (which you set with your doctor). You don't have to eat special foods. You can eat what your family eats, including sweets once in a while. But you do have to pay attention to how often you eat and how much you eat of certain foods. You may want to work with a dietitian or a certified diabetes educator (CDE) to help you plan meals and snacks. A dietitian or CDE can also help you lose weight if that is one of your goals. What should you know about eating carbs? Managing the amount of carbohydrate (carbs) you eat is an important part of healthy meals when you have diabetes. Carbohydrate is found in many foods. · Learn which foods have carbs. And learn the amounts of carbs in different foods. ? Bread, cereal, pasta, and rice have about 15 grams of carbs in a serving. A serving is 1 slice of bread (1 ounce), ½ cup of cooked cereal, or 1/3 cup of cooked pasta or rice. ? Fruits have 15 grams of carbs in a serving. A serving is 1 small fresh fruit, such as an apple or orange; ½ of a banana; ½ cup of cooked or canned fruit; ½ cup of fruit juice; 1 cup of melon or raspberries; or 2 tablespoons of dried fruit. ? Milk and no-sugar-added yogurt have 15 grams of carbs in a serving. A serving is 1 cup of milk or 2/3 cup of no-sugar-added yogurt. ? Starchy vegetables have 15 grams of carbs in a serving.  A serving is ½ cup of mashed potatoes or sweet potato; 1 cup winter squash; ½ of a small baked potato; ½ cup of cooked beans; or ½ cup cooked corn or green peas. · Learn how much carbs to eat each day and at each meal. A dietitian or CDE can teach you how to keep track of the amount of carbs you eat. This is called carbohydrate counting. · If you are not sure how to count carbohydrate grams, use the Plate Method to plan meals. It is a good, quick way to make sure that you have a balanced meal. It also helps you spread carbs throughout the day. ? Divide your plate by types of foods. Put non-starchy vegetables on half the plate, meat or other protein food on one-quarter of the plate, and a grain or starchy vegetable in the final quarter of the plate. To this you can add a small piece of fruit and 1 cup of milk or yogurt, depending on how many carbs you are supposed to eat at a meal.  · Try to eat about the same amount of carbs at each meal. Do not \"save up\" your daily allowance of carbs to eat at one meal.  · Proteins have very little or no carbs per serving. Examples of proteins are beef, chicken, turkey, fish, eggs, tofu, cheese, cottage cheese, and peanut butter. A serving size of meat is 3 ounces, which is about the size of a deck of cards. Examples of meat substitute serving sizes (equal to 1 ounce of meat) are 1/4 cup of cottage cheese, 1 egg, 1 tablespoon of peanut butter, and ½ cup of tofu. How can you eat out and still eat healthy? · Learn to estimate the serving sizes of foods that have carbohydrate. If you measure food at home, it will be easier to estimate the amount in a serving of restaurant food. · If the meal you order has too much carbohydrate (such as potatoes, corn, or baked beans), ask to have a low-carbohydrate food instead. Ask for a salad or green vegetables. · If you use insulin, check your blood sugar before and after eating out to help you plan how much to eat in the future.   · If you eat more carbohydrate at a meal than you had planned, take a walk or do other exercise. This will help lower your blood sugar. What else should you know? · Limit saturated fat, such as the fat from meat and dairy products. This is a healthy choice because people who have diabetes are at higher risk of heart disease. So choose lean cuts of meat and nonfat or low-fat dairy products. Use olive or canola oil instead of butter or shortening when cooking. · Don't skip meals. Your blood sugar may drop too low if you skip meals and take insulin or certain medicines for diabetes. · Check with your doctor before you drink alcohol. Alcohol can cause your blood sugar to drop too low. Alcohol can also cause a bad reaction if you take certain diabetes medicines. Follow-up care is a key part of your treatment and safety. Be sure to make and go to all appointments, and call your doctor if you are having problems. It's also a good idea to know your test results and keep a list of the medicines you take. Where can you learn more? Go to http://www.[employer].com/  Enter I147 in the search box to learn more about \"Learning About Diabetes Food Guidelines. \"  Current as of: December 20, 2019               Content Version: 12.6  © 6469-6040 [employer]. Care instructions adapted under license by [employer] (which disclaims liability or warranty for this information). If you have questions about a medical condition or this instruction, always ask your healthcare professional. [address] any warranty or liability for your use of this information.

## 2022-01-17 NOTE — PROGRESS NOTES
HPI:   Kevin Rose is a 77y.o. year old female who presents today for a physical exam. She has a history of hypertension, hyperlipidemia, paroxysmal SVT, diabetes mellitus, GERD, asthma, elevated transaminases, atypical mycobacterial pneumonia, and noncompliance. She has completed the Moderna COVID-19 vaccine series and received the Moderna booster dose. She reports that she is doing reasonably well. She was last seen on 9/14/2021 it was noted that her HbA1c had increased further to 7.4. She had not restarted glipizide CR as instructed and other medications including Januvia and Jennet Begin felt to be too expensive. She did meet with Pharm. NAMRATA Santiago for nutrition counseling but has not instituted any changes in diet and states that she is not exercising regularly. She did complete physical therapy for her low back pain and found it to be beneficial.  She reports that she is no longer receiving immunotherapy from Dr. Pattie Cramer and has not noted any worsening of her asthma or allergy symptoms. She is otherwise without new complaints and feeling generally well. Summary of prior  medical history:  She has a history of hypertension, treated with metoprolol and hydrochlorothiazide (+ potassium). She reports that she does not check her blood pressure at home. She does not exercise regularly, but denies any chest pain, shortness of breath at rest or with exertion, lightheadedness, or edema. 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 She has had approximately six severe episodes over the years, prompting presentation to the ED and treatment with IV Adenosine. She currently reports infrequent short episodes of palpitations and is being treated with metoprolol. She is followed by Dr. Christie Hancock. She had an echocardiogram (10/2005) showing normal LV size and function (EF 60-65%), and no valvular pathology.  In 11/2012, she underwent an exercise stress echocardiogram, which was normal at maximal exercise. She has a history of hyperlipidemia, treated with simvastatin from 10/2012 to 3/2015 at which time she stopped taking it due to myalgias. She restarted it on 8/2015 and continued to take it without difficulty until 3/2016, when it was noticed that she had transaminase elevation (AST 85/ ) and it was discontinued. Evaluation included hepatitis A, B, and C levels (negative), iron panel (normal), and RUQ ultrasound (3/23/2016) with limited sonographic window for the liver; only partially visualized but grossly unremarkable. Repeat hepatic panel (4/22/2016) showed AST 75 and ALT 98. Repeat lipid panel showed total chol 215/ / HDL 64/. In 5/2016, she had an abdominal CT scan showing the liver to be normal in size with normal parenchymal density; no discrete mass or ascites, and no intrahepatic biliary dilatation. She restarted simvastatin 20 mg daily in 4/2018. She has been taking it without difficulty since restarting. She underwent repeat evaluation by Dr. Segun Swain for her elevated LFT's, and reports that lab evaluation was negative. She underwent an abdominal ultrasound (2/16/2021) which showed an echogenic lobulated contour liver suggestive of steatosis +/- cirrhosis; no focal lesions seen. Recommendation was for her to lose weight. She has a history of diabetes mellitus, with impaired fasting glucose ranging from 103-111 since 2012, and HbA1c to 6.6-6.8 since 9/2016 (not checked previously). She was prescribed metformin ER last visit for an increase in her HbA1c to 7.1, but she reports that she took it for only one week and discontinued for unclear reasons. She was not experiencing any side effects. She denies any polyuria, polydipsia, nocturia, or blurry vision, and has no history of retinopathy, neuropathy, or nephropathy. She has regular eye exams with Dr. Renate Hood.      She has a history of asthma and allergic rhinitis and is followed by Dr. Priyanka Krause. She is receiving immunotherapy once per month, and reports that she has not required any inhalers recently. She states that she does not use Qvar daily, but will take it occasionally. She does use Claritin every day. In 7/2019, she reported increasing difficulty with right ear fullness, post nasal drainage, hoarseness, and cough, and was referred to Dr. Jessica Dumont. He performed a nasal laryngoscopy and found evidence of laryngopharyngeal reflux. She was started on daily omeprazole, and she states that she has noticed some improvement. In 10/2017, she fell down the steps of her porch and had difficulty with right knee pain and swelling. She was evaluated by Dr. Earnest Guzman in 12/2017, and right knee xray showed decreased medial joint space, and moderate degenerative changes. She received a cortisone injection, but did not notice any improvement. She underwent an MRI of the right knee (1/29/2018) which showed complete tear of posterior horn and root of the medial meniscus; tear of the body and posterior horn of the lateral meniscus; tricompartmental osteoarthritis most prominent in medial and patellofemoral compartments; moderate joint effusion; small Baker's cyst. It was recommended that she consider knee replacement and arthroscopy. However, she decided to seek a second opinion and was evaluated by Dr. Holly Georges. She also underwent an MRI of her left knee (3/23/2018) showing radial tear posterior horn medial meniscus with extrusion of the body. Also a radial tear in the mid body; lateral meniscus intrasubstance degeneration and probable small undersurface tear of the posterior horn; medial and patellofemoral compartments moderate chondral loss; s. ubchondral bone marrow edema in the medial tibial plateau, likely reactive. She is completed physical therapy for her bilateral knees and feels that it may have helped.      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 She was treated with Avelox, and repeat chest x-ray in 1/2012 showed complete resolution of the pneumonia. She denies any cough or shortness of breath. She had a screening colonoscopy in 12/2006 by Dr. Rosy Nielson showing a 5 mm sessile polyp in the rectum (pathology: hyperplastic). She had a repeat colonoscopy in 12/2016 which showed moderate sigmoid diverticulosis and a 6 mm sessile ascending colon polyp (pathology: serrated adenoma). She had a repeat screening colonoscopy in 4/2021 by Dr. Rosy Nielson showing a single six 6 mm polyp in the distal sigmoid colon (pathology: Hyperplastic polyp). Follow-up recommended for 5 years. She denies any abdominal pain, nausea, vomiting, melena, hematochezia, or change in bowel movements. She does take omeprazole occasionally for GERD symptoms. In 12/2017, she was referred by her gynecologist for evaluation by Dr. Gee Murphy for microscopic hematuria, and urine cytology was negative. However, she states that she refused his recommendations to undergo a CT urogram or cystoscopy. She denies any dysuria, gross hematuria, or flank pain. Past Medical History:   Diagnosis Date    Allergic rhinitis     Asthma     Cardiac stress echo, normal 11/02/2012    Normal maximal stress echo study. EF 60%. Ex time 9 min 45 sec. 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     Hyperlipidemia     Hypertension     Menopause     Plantar fasciitis     left    PSVT (paroxysmal supraventricular tachycardia) ([facility])     A-V dharmesh reentrant tachycardia     Past Surgical History:   Procedure Laterality Date    ENDOSCOPY, COLON, DIAGNOSTIC      polyp    HX CERVICAL POLYPECTOMY      HX CYST INCISION AND DRAINAGE Right 10 or more years    HX DILATION AND CURETTAGE      HX GYN      polyp on cervix    HX POLYPECTOMY      from rectum     Current Outpatient Medications   Medication Sig    glipiZIDE SR (GLUCOTROL XL) 5 mg CR tablet Take 1 Tablet by mouth daily.  metoprolol succinate (TOPROL-XL) 50 mg XL tablet take 1 tablet by mouth once daily    losartan (COZAAR) 25 mg tablet take 1 tablet by mouth once daily    LORazepam (ATIVAN) 1 mg tablet take 1 tablet by mouth every 8 hours if needed for anxiety    hydroCHLOROthiazide (HYDRODIURIL) 12.5 mg tablet take 1 tablet by mouth once daily    simvastatin (ZOCOR) 20 mg tablet take 1 tablet by mouth at bedtime  Indications: excessive fat in the blood    loratadine (Claritin) 10 mg tablet Take 1 Tablet by mouth daily.  Blood-Glucose Meter monitoring kit Monitor fasting blood glucose once daily    glucose blood VI test strips (ASCENSIA AUTODISC VI, ONE TOUCH ULTRA TEST VI) strip Monitor fasting blood glucose once daily    lancets misc Monitor fasting blood glucose once daily    potassium chloride (K-DUR, KLOR-CON) 20 mEq tablet take 1 tablet by mouth once daily    fluticasone propionate (FLONASE) 50 mcg/actuation nasal spray instill 2 sprays into each nostril once daily    albuterol (PROVENTIL HFA, VENTOLIN HFA, PROAIR HFA) 90 mcg/actuation inhaler inhale 2 puffs by mouth every 4 hours if needed for wheezing    clotrimazole-betamethasone (LOTRISONE) topical cream Apply  to both ear canals and affected part of outer ear twice a day with a finger.  cholecalciferol (VITAMIN D3) 1,000 unit cap Take 1,000 Units by mouth daily.  carboxymethylcellulose sodium (REFRESH LIQUIGEL) 1 % dlgl ophthalmic solution Apply  to eye. No current facility-administered medications for this visit. Allergies and Intolerances:    Allergies   Allergen Reactions    Altace [Ramipril] Cough    Penicillins Other (comments)     Hands peel    Sulfur Itching     Family History: She had two aunts who had breast cancer (her mother's sister and father's sister). She has no FH of colon cancer. Her mother passed away from uterine cancer. Her father  from metastatic prostate cancer. Family History   Problem Relation Age of Onset    OSTEOARTHRITIS Mother     Hypertension Mother              Cancer Father         bone cancer    Hypertension Sister     Hypertension Sister     Hypertension Sister     Breast Cancer Maternal Aunt     Breast Cancer Paternal Aunt     Diabetes Other     Stroke Other     Other Sister         twin sister - osteopenia and low vitamin D levels     Social History:   She  reports that she has never smoked. She has never used smokeless tobacco. She is  and has two sons. She was a homemaker, but worked part-time in . She now helps care for her grandchildren. Social History     Substance and Sexual Activity   Alcohol Use No     Immunization History:  Immunization History   Administered Date(s) Administered    COVID-19, Moderna Booster, PF, 0.25mL Dose 2021    COVID-19, Katnhi Holml, Primary or Immunocompromised Series, MRNA, PF, 100mcg/0.5mL 2021, 2021    Influenza Vaccine (Quad) PF (>6 Mo Flulaval, Fluarix, and >3 Yrs Afluria, Fluzone 92671) 10/23/2015, 10/19/2016, 10/05/2017, 10/26/2018, 10/08/2019    Influenza Vaccine PF 2013, 10/03/2014    Influenza Vaccine Split 2011, 10/22/2012    Influenza Vaccine Whole 10/29/2010    Influenza, Quadrivalent, Adjuvanted (>65 Yrs FLUAD QUAD 63063) 10/25/2021    PPD 2012    Pneumococcal Conjugate (PCV-13) 2020    Pneumococcal Polysaccharide (PPSV-23) 2017    TB Skin Test (PPD) Intradermal 2014    TDAP Vaccine 2012    Zoster Recombinant 2020, 2020       Review of Systems:   As above included in HPI.   Otherwise 11 point review of systems negative including constitutional, skin, HENT, eyes, respiratory, cardiovascular, gastrointestinal, genitourinary, musculoskeletal, endocrine, hematologic, allergy, and neurologic. Physical:   Visit Vitals  /77 (BP 1 Location: Left arm, BP Patient Position: Sitting)   Pulse 78   Temp 97.3 °F (36.3 °C) (Temporal)   Resp 16   Ht 5' 2\" (1.575 m)   Wt 186 lb (84.4 kg)   SpO2 97%   BMI 34.02 kg/m²       Exam:   Patient appears in no apparent distress. Affect is appropriate. HEENT --Anicteric sclerae, tympanic membranes normal,  ear canals normal.  PERRL, EOMI, conjunctiva and lids normal.  No cervical lymphadenopathy. No thyromegaly, JVD, or bruits. Carotid pulses 2+ with normal upstroke. Lungs --Clear to auscultation. No wheezing or rales. Heart --Regular rate and rhythm, no murmurs, rubs, gallops, or clicks. Abdomen -- Soft and nontender, no hepatosplenomegaly or masses. Extremities -- Without cyanosis, clubbing, edema. Normal looking digits, ROM intact  Neuro -- CN 2-12 intact, strength 5/5 with intact soft touch in all extremities  Derm - no obvious abnormalities noted, no rash          Review of Data:  Labs:  Hospital Outpatient Visit on 01/06/2022   Component Date Value Ref Range Status    WBC 01/06/2022 9.5  4.6 - 13.2 K/uL Final    RBC 01/06/2022 4.50  4. 20 - 5.30 M/uL Final    HGB 01/06/2022 13.4  12.0 - 16.0 g/dL Final    HCT 01/06/2022 42.5  35.0 - 45.0 % Final    MCV 01/06/2022 94.4  78.0 - 100.0 FL Final    MCH 01/06/2022 29.8  24.0 - 34.0 PG Final    MCHC 01/06/2022 31.5  31.0 - 37.0 g/dL Final    RDW 01/06/2022 11.8  11.6 - 14.5 % Final    PLATELET 66/91/8249 999  135 - 420 K/uL Final    MPV 01/06/2022 10.6  9.2 - 11.8 FL Final    NRBC 01/06/2022 0.0  0  WBC Final    ABSOLUTE NRBC 01/06/2022 0.00  0.00 - 0.01 K/uL Final    NEUTROPHILS 01/06/2022 64  40 - 73 % Final    LYMPHOCYTES 01/06/2022 27  21 - 52 % Final    MONOCYTES 01/06/2022 5  3 - 10 % Final    EOSINOPHILS 01/06/2022 4  0 - 5 % Final    BASOPHILS 01/06/2022 1  0 - 2 % Final    IMMATURE GRANULOCYTES 01/06/2022 0  0.0 - 0.5 % Final    ABS. NEUTROPHILS 01/06/2022 6.1  1.8 - 8.0 K/UL Final    ABS. LYMPHOCYTES 01/06/2022 2.6  0.9 - 3.6 K/UL Final    ABS. MONOCYTES 01/06/2022 0.5  0.05 - 1.2 K/UL Final    ABS. EOSINOPHILS 01/06/2022 0.3  0.0 - 0.4 K/UL Final    ABS. BASOPHILS 01/06/2022 0.1  0.0 - 0.1 K/UL Final    ABS. IMM. GRANS. 01/06/2022 0.0  0.00 - 0.04 K/UL Final    DF 01/06/2022 AUTOMATED    Final    Hemoglobin A1c 01/06/2022 7.4* 4.2 - 5.6 % Final    Est. average glucose 01/06/2022 166  mg/dL Final    LIPID PROFILE 01/06/2022        Final    Cholesterol, total 01/06/2022 190  <200 MG/DL Final    Triglyceride 01/06/2022 161* <150 MG/DL Final    HDL Cholesterol 01/06/2022 65* 40 - 60 MG/DL Final    LDL, calculated 01/06/2022 92.8  0 - 100 MG/DL Final    VLDL, calculated 01/06/2022 32.2  MG/DL Final    CHOL/HDL Ratio 01/06/2022 2.9  0 - 5.0   Final    Magnesium 01/06/2022 2.0  1.6 - 2.6 mg/dL Final    Sodium 01/06/2022 137  136 - 145 mmol/L Final    Potassium 01/06/2022 3.9  3.5 - 5.5 mmol/L Final    Chloride 01/06/2022 101  100 - 111 mmol/L Final    CO2 01/06/2022 30  21 - 32 mmol/L Final    Anion gap 01/06/2022 6  3.0 - 18 mmol/L Final    Glucose 01/06/2022 116* 74 - 99 mg/dL Final    BUN 01/06/2022 13  7.0 - 18 MG/DL Final    Creatinine 01/06/2022 0.65  0.6 - 1.3 MG/DL Final    BUN/Creatinine ratio 01/06/2022 20  12 - 20   Final    GFR est AA 01/06/2022 >60  >60 ml/min/1.73m2 Final    GFR est non-AA 01/06/2022 >60  >60 ml/min/1.73m2 Final    Calcium 01/06/2022 8.9  8.5 - 10.1 MG/DL Final    Bilirubin, total 01/06/2022 0.4  0.2 - 1.0 MG/DL Final    ALT (SGPT) 01/06/2022 32  13 - 56 U/L Final    AST (SGOT) 01/06/2022 25  10 - 38 U/L Final    Alk.  phosphatase 01/06/2022 87  45 - 117 U/L Final    Protein, total 01/06/2022 7.4  6.4 - 8.2 g/dL Final    Albumin 01/06/2022 4.0  3.4 - 5.0 g/dL Final    Globulin 01/06/2022 3.4  2.0 - 4.0 g/dL Final    A-G Ratio 01/06/2022 1.2  0.8 - 1.7   Final    Microalbumin,urine random 01/06/2022 0.91  0 - 3.0 MG/DL Final    Creatinine, urine 01/06/2022 157.00* 30 - 125 mg/dL Final    Microalbumin/Creat ratio (mg/g cre* 01/06/2022 6  0 - 30 mg/g Final    TSH 01/06/2022 2.31  0.36 - 3.74 uIU/mL Final    Vitamin D 25-Hydroxy 01/06/2022 29.3* 30 - 100 ng/mL Final    Color 01/06/2022 YELLOW    Final    Appearance 01/06/2022 CLEAR    Final    Specific gravity 01/06/2022 1.020  1.005 - 1.030   Final    pH (UA) 01/06/2022 5.5  5.0 - 8.0   Final    Protein 01/06/2022 Negative  NEG mg/dL Final    Glucose 01/06/2022 Negative  NEG mg/dL Final    Ketone 01/06/2022 Negative  NEG mg/dL Final    Bilirubin 01/06/2022 Negative  NEG   Final    Blood 01/06/2022 Negative  NEG   Final    Urobilinogen 01/06/2022 0.2  0.2 - 1.0 EU/dL Final    Nitrites 01/06/2022 Negative  NEG   Final    Leukocyte Esterase 01/06/2022 TRACE* NEG   Final    WBC 01/06/2022 0 to 3  0 - 4 /hpf Final    RBC 01/06/2022 Negative  0 - 5 /hpf Final    Epithelial cells 01/06/2022 FEW  0 - 5 /lpf Final    Bacteria 01/06/2022 FEW* NEG /hpf Final         Health Maintenance:  Screening:    Mammogram: negative (3/2021)    PAP smear: negative (3/2020) with negative HPV. Followed by Dr. Martínez Fajardo. Colorectal: colonoscopy (4/2021) serrated adenoma. Dr. Segun Swain. Due 4/2026.    Depression: none   DM (HbA1c/FPG): HbA1c 7.4 (1/2022)   Hepatitis C: negative (3/2016)   Falls: one   DEXA: within normal limits (11/2015)   Glaucoma: regular eye exams with Dr. Renate Hood (last 10/2020)   Smoking: none   Vitamin D: 29.3 (1/2022)    Medicare Wellness: 9/14/2021 (Initial)      Impression:  Patient Active Problem List   Diagnosis Code    Benign hypertensive heart disease without congestive heart failure I11.9    Allergic rhinitis J30.9    Colon polyps K63.5    AVNRT (AV dharmesh re-entry tachycardia) ([facility] [address] I47.1    Hyperlipidemia E78.5    Essential hypertension I10    Asthma J45.909    GERD (gastroesophageal reflux disease) K21.9    Type 2 diabetes mellitus without complication, without long-term current use of insulin (HCC) E11.9    Vitamin D insufficiency E55.9    Microscopic hematuria R31.29    Chronic pain of both knees M25.561, M25.562, G89.29    Obesity (BMI 30.0-34. 9) E66.9    Noncompliance with diet and medication regimen Z91.11, Z91.14    Anxiety F41.9    Spondylolisthesis, lumbar region M43.16    DDD (degenerative disc disease), lumbar M51.36    NAFL (nonalcoholic fatty liver) H49.9    HENRY (nonalcoholic steatohepatitis) K75.81       Plan:  1. Diabetes mellitus. Impaired fasting glucose had been present intermittently since 2012. Diagnosed in 9/2016 when HbA1c was checked and found to be elevated at 6.6. Had been steadily increasing and reached 7.5 in 4/2020. Was being treated with metformin  mg at dinner, but stopped taking it due to diarrhea. Changed to sitagliptin, but unable to fill due to cost. Started on glipizide SR 5 mg daily, but stopped taking it for unclear reasons. .  Prescribed Jardiance 25 mg daily at last visit but again too costly so did not fill. HbA1c had worsened in 9/2021 to 7.4 and stressed importance of need for treatment. Prescribed glipizide CR 5 mg daily, but again reports noncompliance with therapy. Did meet with Pharm. D. Janey Mcnamara for diabetes education, but does not appear to have instituted any changes. Repeat HbA1c at 7.4 today. Again stressed importance for treatment and agreeable to restart glipizide. No evidence of microvascular complications. On statin and ARB. Continue regular eye exams with Dr. Luke Currie. Foot exam normal (9/2021). Urine microalbumin/ creatinine ratio remains without evidence of microalbuminuria (6 mg/g). Emphasized importance of lifestyle modifications, including diet, exercise, and weight loss. Will reassess HbA1c in 3 months. 2. Hypertension.  Blood pressure remains well controlled today on losartan 25 mg daily, metoprolol XL 50 mg daily, and hydrochlorothiazide 12.5 mg daily. Renal function has been normal with creatinine 0.65/ eGFR >60 today. Continue to follow. 3. Hyperlipidemia. On moderate intensity dose simvastatin 20 mg daily with LDL 92 and HDL 65, indicative of good control. Continue to follow. 4. Hepatic steatosis. Developed in 3/2016 with AST 85/ . Evaluation included hepatitis A, B, and C levels (negative), iron panel (normal), and RUQ ultrasound (3/23/2016) with limited sonographic window for the liver; only partially visualized but grossly unremarkable. In 5/2016, she had an abdominal CT scan showing the liver to be normal in size with normal parenchymal density; no discrete mass or ascites, and no intrahepatic biliary dilatation. Transaminases had normalized since that time. However, repeat elevation in 11/2020 with ALT 66 and AST 45. Referred to Dr. Alyssa Ramos and liver ultrasound (2/2021) showed lobulated contour liver consistent with steatosis +/- possible cirrhosis. She stated that weight loss recommended. Normalization of LFTs today, but has been unable to lose weight. Discussed importance given hepatic steatosis and concern for progression to fibrosis. Will continue to monitor. 5. AV dharmesh reentrant tachycardia. No recent episodes. On metoprolol succinate 50 mg daily and no significant palpitations recently. Established care with Dr. Fadi Hyman in 10/2021 and will be following annually. 6. Asthma, mild persistent. Associated with allergies. Had been receiving monthly immunotherapy injections with Dr. Blanca Friend, but reports discontinued as of unclear benefit. No longer taking Claritin, but using Flonase regularly. Denies any increase in symptoms. 7. GERD/ Laryngopharyngeal reflux. Evaluated by Dr. Yani Euceda, and noted on laryngoscopic exam. Started on omeprazole daily but states that decided to discontinue as did not help with cough. Was receiving immunotherapy as discussed but states now has discontinued.   No increasing symptoms today. 8. Microscopic hematuria. Patient refusing further evaluation with cystoscopy or CT urogram. Urine cytology negative. Did have abdominal CT scan in 5/2016 where kidneys appeared normal. Unwilling to complete evaluation with Dr. Edwina Molina. Repeat urinalysis with evidence small blood and 3-5 RBCs in 10/2019. Trace blood with 0-3 RBC in 11/2020 and negative for microscopic hematuria in 6/2021 and today. Will continue to monitor. 9. Bilateral knee pain. Did not respond to cortisone injection. Had both right and left knee MRI showing variable degrees of menisci tears and osteoarthritis of knee joint. Now being followed by Dr. Holly Georges and reports relatively quiescent. 10.  Low back pain with right sciatica. Patient presented in 10/2020 with severe low back pain and right sciatica for several days. Unclear as to inciting incident. Denied lower extremity weakness or paresthesias. Began taking Etodolac and methocarbamol as prescribed by [facility] several months prior for similar symptoms. Lumbar spine x-ray (10/2020) showed multilevel degenerative findings, trace levorotoscoliosis and multilevel spondylolisthesis. Following with Dr. Holly Georges. Reported increasing low back pain without sciatica at last visit in 10/2021 and referred to physical therapy with improvement. 11. Family history of breast cancer. Positive family history for breast cancer in a maternal aunt in her 46s and in a paternal aunt. Wishing to continue only with screening mammograms annually and not assess lifetime risk. Last mammogram 3/2021 and will place order. 12. Obesity. Weight essentially stable over the last year. Emphasized importance of lifestyle modifications, including diet, exercise, and weight loss. Patient not exercising and unclear as to appropriate diabetic diet or dietary changes needed to lose weight. Referred to Pharm. NAMRATA Lees for nutrition counseling and attended two sessions.   However, does not appear to have made changes in lifestyle or diet. Will continue to address  13. Health maintenance. Completed Moderna COVID-19 vaccine series and received the Moderna booster dose. Already received the influenza vaccine. Received 2/2 doses of Shingrix vaccine. Received Prevnar 13 and will need repeat Pneumovax 23 in 3/2022. Will give at next visit. Other immunizations up to date. Colonoscopy completed. Mammogram up to date as discussed. Continue regular eye exams with Dr. Evelia Lares. Vitamin D level mildly low and advised to increase maintenance dose supplement to 2000 units daily. Will order repeat bone density study with next mammogram.  Medicare wellness visit up-to-date. Patient understands recommendations and agrees with plan. Follow-up in 3 months.

## 2022-01-20 ENCOUNTER — TELEPHONE (OUTPATIENT)
Dept: INTERNAL MEDICINE CLINIC | Age: 67
End: 2022-01-20

## 2022-01-20 NOTE — TELEPHONE ENCOUNTER
Pt called stating her left arm , near the elbow inner part closest to body, is tender to the touch and feels like she has a swollen gland , she rohit she has not injured it and is asking if this is something she needs to see the Dr for and if so when can she be seen .  She asked if the nurse could  Call her back

## 2022-01-20 NOTE — TELEPHONE ENCOUNTER
Called and spoke with patient. Reports that she noticed some minimal swelling and tenderness of the left cubital fossa. Denies any arm or hand swelling or difficulty with movement. Reports that she did experience some upper arm pain a few days ago after lifting her vacuum, but the upper arm pain has resolved and now she is experiencing some minimal tenderness of the fossa. Discussed that the biceps tendon inserts in this area and is likely mildly inflamed related to the lifting of the vacuum . Advised application of ice and to monitor. Advised to call back if any worsening or present to urgent care. Answered all questions.

## 2022-01-27 ENCOUNTER — TELEPHONE (OUTPATIENT)
Dept: INTERNAL MEDICINE CLINIC | Age: 67
End: 2022-01-27

## 2022-01-27 NOTE — TELEPHONE ENCOUNTER
----- Message from Alexandr Jauregui sent at 1/27/2022  4:16 PM EST -----  Subject: Message to Provider    QUESTIONS  Information for Provider? Pt would like to get pneumonia shot & when does   she need to get one?   ---------------------------------------------------------------------------  --------------  CALL BACK INFO  What is the best way for the office to contact you? OK to leave message on   voicemail  Preferred Call Back Phone Number? [phone]  ---------------------------------------------------------------------------  --------------  SCRIPT ANSWERS  Relationship to Patient?  Self

## 2022-01-28 NOTE — TELEPHONE ENCOUNTER
She has received the pneumovax 23 in 3/2017 and Prevnar 13 in 8/2020. She is due for a repeat Pneumovax 23 in 3/2022. Was planning to give at her next visit in 4/2022.

## 2022-02-21 NOTE — TELEPHONE ENCOUNTER
Reviewed urinalysis and culture results from 5/25/2018. Urinalysis with too numerous to count WBC, trace blood with 4-10 RBC. Urine culture showed <10,000 colonies of Group B strept. Given symptoms and abnormal urinalysis, will opt to treat. Given penicillin allergy, will treat with Levaquin 500 mg daily for 3 days. Please let the patient know that her urine culture did show an infection. Script for Levaquin 500 mg bid for 3 days was sent to [facility]. Please ask her to . S: Patient feels ok overall. Has had some food aversions.   Patient has had nausea and has had vomiting x1. Nausea has improved a little.     Has been taking zyrtec for fluid in ears/congestion- hasn't really noticed a large change but maybe slight improvement.     O: /66   Wt 75.7 kg (166 lb 14.4 oz)   LMP 12/10/2021    Exam:  Constitutional: healthy, alert and no distress  Respiratory: Respirations even and unlabored  Gastrointestinal: Abdomen soft, non-tender. Fundus measures appropriately for gestational age. Fetal heart tones heard easily. Visualized on bedside US as well.   Psychiatric: mentation appears normal and affect normal/bright  A:     ICD-10-CM    1. Encounter for supervision of normal first pregnancy in first trimester  Z34.01 [facility] Non-Invasive Prenatal Screening     Process and hold DNA     [facility] Non-Invasive Prenatal Screening     Process and hold DNA       P:  Educated about normal weight gain  Normal to feel movement between 18-22 weeks  Reviewed physiology of pregnancy and vasodilation and increased fluid increasing congestion in pregnancy   Reviewed labs from 1st OB and dating US- WNL. Discussed genetic screening; patient would like to do NIPS today.   Warning signs discussed    Return to clinic 4 weeks    Encouraged patient to call with any questions or concerns.      BEN Valdes CNM

## 2022-03-07 ENCOUNTER — TELEPHONE (OUTPATIENT)
Dept: INTERNAL MEDICINE CLINIC | Age: 67
End: 2022-03-07

## 2022-03-08 NOTE — TELEPHONE ENCOUNTER
Pharmacy Progress Note - Telephone Call    Ms. Michael Mcconnell 77 y.o. was contacted via an outbound telephone call regarding diabetes management today. A voicemail was left for patient to return my call. Thank you,  Mario Todd. CHAVA Danielson        For Pharmacy Admin Tracking Only     CPA in place:  Yes   Time Spent (min): 5

## 2022-03-18 PROBLEM — M51.369 DDD (DEGENERATIVE DISC DISEASE), LUMBAR: Status: ACTIVE | Noted: 2020-10-05

## 2022-03-18 PROBLEM — M51.36 DDD (DEGENERATIVE DISC DISEASE), LUMBAR: Status: ACTIVE | Noted: 2020-10-05

## 2022-03-18 PROBLEM — M25.562 CHRONIC PAIN OF BOTH KNEES: Status: ACTIVE | Noted: 2018-01-28

## 2022-03-18 PROBLEM — M25.561 CHRONIC PAIN OF BOTH KNEES: Status: ACTIVE | Noted: 2018-01-28

## 2022-03-18 PROBLEM — G89.29 CHRONIC PAIN OF BOTH KNEES: Status: ACTIVE | Noted: 2018-01-28

## 2022-03-19 PROBLEM — Z91.14 NONCOMPLIANCE WITH DIET AND MEDICATION REGIMEN: Status: ACTIVE | Noted: 2018-12-02

## 2022-03-19 PROBLEM — Z91.199 NONCOMPLIANCE WITH DIET AND MEDICATION REGIMEN: Status: ACTIVE | Noted: 2018-12-02

## 2022-03-19 PROBLEM — Z91.119 NONCOMPLIANCE WITH DIET AND MEDICATION REGIMEN: Status: ACTIVE | Noted: 2018-12-02

## 2022-03-19 PROBLEM — M43.16 SPONDYLOLISTHESIS, LUMBAR REGION: Status: ACTIVE | Noted: 2020-10-05

## 2022-03-19 PROBLEM — E55.9 VITAMIN D DEFICIENCY: Status: ACTIVE | Noted: 2017-03-24

## 2022-03-19 PROBLEM — F41.9 ANXIETY: Status: ACTIVE | Noted: 2019-03-23

## 2022-03-19 PROBLEM — Z91.148 NONCOMPLIANCE WITH DIET AND MEDICATION REGIMEN: Status: ACTIVE | Noted: 2018-12-02

## 2022-03-19 PROBLEM — E66.9 OBESITY (BMI 30.0-34.9): Status: ACTIVE | Noted: 2018-08-18

## 2022-03-19 PROBLEM — E66.811 OBESITY (BMI 30.0-34.9): Status: ACTIVE | Noted: 2018-08-18

## 2022-03-20 PROBLEM — K76.0 NAFL (NONALCOHOLIC FATTY LIVER): Status: ACTIVE | Noted: 2021-03-07

## 2022-03-20 PROBLEM — K75.81 NASH (NONALCOHOLIC STEATOHEPATITIS): Status: ACTIVE | Noted: 2021-03-07

## 2022-03-20 PROBLEM — R31.29 MICROSCOPIC HEMATURIA: Status: ACTIVE | Noted: 2018-01-28

## 2022-03-22 DIAGNOSIS — Z78.0 POSTMENOPAUSAL: ICD-10-CM

## 2022-03-22 DIAGNOSIS — Z12.31 BREAST CANCER SCREENING BY MAMMOGRAM: ICD-10-CM

## 2022-03-24 ENCOUNTER — TELEPHONE (OUTPATIENT)
Dept: INTERNAL MEDICINE CLINIC | Age: 67
End: 2022-03-24

## 2022-03-24 ENCOUNTER — HOSPITAL ENCOUNTER (OUTPATIENT)
Dept: BONE DENSITY | Age: 67
Discharge: HOME | End: 2022-03-24
Attending: INTERNAL MEDICINE
Payer: MEDICARE

## 2022-03-24 ENCOUNTER — HOSPITAL ENCOUNTER (OUTPATIENT)
Dept: MAMMOGRAPHY | Age: 67
Discharge: HOME | End: 2022-03-24
Attending: INTERNAL MEDICINE
Payer: MEDICARE

## 2022-03-24 DIAGNOSIS — Z12.31 BREAST CANCER SCREENING BY MAMMOGRAM: ICD-10-CM

## 2022-03-24 DIAGNOSIS — R92.8 ABNORMAL MAMMOGRAM OF LEFT BREAST: Primary | ICD-10-CM

## 2022-03-24 DIAGNOSIS — R92.8 ABNORMAL MAMMOGRAM OF LEFT BREAST: ICD-10-CM

## 2022-03-24 PROCEDURE — 77080 DXA BONE DENSITY AXIAL: CPT

## 2022-03-24 PROCEDURE — 77067 SCR MAMMO BI INCL CAD: CPT

## 2022-03-24 PROCEDURE — 77063 BREAST TOMOSYNTHESIS BI: CPT

## 2022-03-24 NOTE — TELEPHONE ENCOUNTER
[facility] Results (most recent):  Results from Orders Only encounter on 03/22/22    [facility] MAMMO BI SCREENING INCL CAD    Narrative  BILATERAL SCREENING DIGITAL MAMMOGRAM WITH CAD WITH DIGITAL BREAST TOMOSYNTHESIS  IMAGING/COMBINATION 2-D 3-D EXAM    CLINICAL HISTORY/INDICATION:  asymptomatic    COMPARISON: mammogram 21 - 17    TECHNIQUE: 2-D and tomosynthesis 3-D digital mammograms were performed with CC  and MLO views obtained of both breasts. CAD analysis was performed with the  computer-aided detection system. Any areas marked correlated with the mammogram.    Breast composition B: There are scattered areas of fibroglandular density. FINDINGS:    Left breast junction of the middle to the posterior third 9 cm from the nipple  at the 12:00 position 5 mm mass. There are no suspicious masses, regions of distortion, nor suspicious  microcalcifications on the right. Impression  5 mm mass deep left breast 12:00 position. BIRADS CATEGORY: BIRADS 0:  Incomplete: Need additional imaging evaluation. Management Recommendation: Left breast ultrasound      Called and spoke with patient. Informed abnormal finding of 5 mm mass noted deep in the left breast on mammogram today. Advised of need for additional imaging and orders placed. Provided with number to schedule. Answered all questions. Also informed of normal bone density study.

## 2022-03-25 ENCOUNTER — TRANSCRIBE ORDER (OUTPATIENT)
Dept: SCHEDULING | Age: 67
End: 2022-03-25

## 2022-04-05 RX ORDER — POTASSIUM CHLORIDE 20 MEQ/1
TABLET, EXTENDED RELEASE ORAL
Qty: 90 TABLET | Refills: 3 | Status: SHIPPED | OUTPATIENT
Start: 2022-04-05

## 2022-04-05 RX ORDER — FLUTICASONE PROPIONATE 50 MCG
SPRAY, SUSPENSION (ML) NASAL
Qty: 16 G | Refills: 5 | Status: SHIPPED | OUTPATIENT
Start: 2022-04-05

## 2022-04-07 ENCOUNTER — HOSPITAL ENCOUNTER (OUTPATIENT)
Dept: MAMMOGRAPHY | Age: 67
Discharge: HOME | End: 2022-04-07
Attending: INTERNAL MEDICINE
Payer: MEDICARE

## 2022-04-07 ENCOUNTER — HOSPITAL ENCOUNTER (OUTPATIENT)
Dept: ULTRASOUND IMAGING | Age: 67
Discharge: HOME | End: 2022-04-07
Attending: INTERNAL MEDICINE
Payer: MEDICARE

## 2022-04-07 DIAGNOSIS — R92.8 ABNORMAL MAMMOGRAM OF LEFT BREAST: ICD-10-CM

## 2022-04-07 PROCEDURE — 76642 ULTRASOUND BREAST LIMITED: CPT

## 2022-04-07 PROCEDURE — 77061 BREAST TOMOSYNTHESIS UNI: CPT

## 2022-04-08 ENCOUNTER — TELEPHONE (OUTPATIENT)
Dept: INTERNAL MEDICINE CLINIC | Age: 67
End: 2022-04-08

## 2022-04-08 NOTE — TELEPHONE ENCOUNTER
US Results (most recent):  Results from Orders Only encounter on 03/24/22    US BREAST LT LIMITED=<3 QUAD    Narrative  EXAM: Diagnostic Mammogram of the Left Breast and Focused Ultrasound of the Left  Breast    INDICATION:  Left breast mass    TECHNIQUE: Diagnostic digital mammogram of the left breast. The following views  obtained:  spot compression CC and MLO with full field ML view. CAD was  utilized. 3D tomosynthesis was utilized. Focused ultrasound of the the left breast performed. COMPARISON: 3/24/2022, 3/22/2021, 11/13/2019, 12/1/2018    PARENCHYMAL RADIODENSITY: There are scattered areas of fibroglandular density. FINDINGS:  Mammogram: In the nearly 12:00 position 8 to 10 cm posterior to the nipple,  there is an 7 x 6 mm ovoid nodule. No architectural distortion appreciated. Left Breast Focused Ultrasound: Scanning was performed by the radiologist and  sonographer. At the 12:00 position 8 cm posterior to the nipple, there is a  complicated cystic structure measuring 0.7 x 0.5 x 0.5 cm. Discussed with the  patient, the patient would prefer cyst aspiration with possible biopsy. Impression  1. Complicated cyst. Further evaluation with left breast ultrasound guided cyst  aspiration and possible biopsy. BI-RADS Category 4 - Suspicion for Malignancy    These findings were discussed with the patient in person. Options and  alternatives were discussed. Patient demonstrated understanding. The lack of mammographic evidence of malignancy should not deter further work-up  of a clinically significant palpable finding. Radiodense breast tissue may  obscure an early malignancy or a palpable finding. 10-15% of breast cancers are  not detected by mammography. Called and discussed findings of left breast mammogram and ultrasound. Discussed that found complicated cyst and further evaluation with ultrasound-guided aspiration and possible biopsy recommended.   BI-RADS Category 4-suspicious for malignancy. Patient discussed that she was provided with a contact for the nurse navigator to call if she would like to proceed with aspiration and biopsy. Advised that would recommend either contacting the nurse navigator to schedule cyst aspiration OR referral to a breast surgeon for evaluation and recommendations. Patient states that she will decide after speaking with her family. Patient has upcoming appointment in 2 weeks and will address at that time if not scheduled before.

## 2022-04-12 ENCOUNTER — HOSPITAL ENCOUNTER (OUTPATIENT)
Dept: LAB | Age: 67
Discharge: HOME | End: 2022-04-12
Payer: MEDICARE

## 2022-04-12 ENCOUNTER — APPOINTMENT (OUTPATIENT)
Dept: INTERNAL MEDICINE CLINIC | Age: 67
End: 2022-04-12

## 2022-04-12 DIAGNOSIS — K76.0 NAFL (NONALCOHOLIC FATTY LIVER): ICD-10-CM

## 2022-04-12 DIAGNOSIS — E55.9 VITAMIN D DEFICIENCY: ICD-10-CM

## 2022-04-12 DIAGNOSIS — I10 ESSENTIAL HYPERTENSION: ICD-10-CM

## 2022-04-12 DIAGNOSIS — E78.5 HYPERLIPIDEMIA, UNSPECIFIED HYPERLIPIDEMIA TYPE: ICD-10-CM

## 2022-04-12 DIAGNOSIS — E11.9 TYPE 2 DIABETES MELLITUS WITHOUT COMPLICATION, WITHOUT LONG-TERM CURRENT USE OF INSULIN (HCC): ICD-10-CM

## 2022-04-12 LAB
ALBUMIN SERPL-MCNC: 3.8 G/DL (ref 3.4–5)
ALBUMIN/GLOB SERPL: 1.1 {RATIO} (ref 0.8–1.7)
ALP SERPL-CCNC: 80 U/L (ref 45–117)
ALT SERPL-CCNC: 31 U/L (ref 13–56)
ANION GAP SERPL CALC-SCNC: 6 MMOL/L (ref 3–18)
AST SERPL-CCNC: 23 U/L (ref 10–38)
BILIRUB SERPL-MCNC: 0.4 MG/DL (ref 0.2–1)
BUN SERPL-MCNC: 11 MG/DL (ref 7–18)
BUN/CREAT SERPL: 16 (ref 12–20)
CALCIUM SERPL-MCNC: 8.9 MG/DL (ref 8.5–10.1)
CHLORIDE SERPL-SCNC: 103 MMOL/L (ref 100–111)
CHOLEST SERPL-MCNC: 168 MG/DL
CO2 SERPL-SCNC: 29 MMOL/L (ref 21–32)
CREAT SERPL-MCNC: 0.69 MG/DL (ref 0.6–1.3)
CREAT UR-MCNC: 135 MG/DL (ref 30–125)
EST. AVERAGE GLUCOSE BLD GHB EST-MCNC: 194 MG/DL
GLOBULIN SER CALC-MCNC: 3.4 G/DL (ref 2–4)
GLUCOSE SERPL-MCNC: 131 MG/DL (ref 74–99)
HBA1C MFR BLD: 8.4 % (ref 4.2–5.6)
HDLC SERPL-MCNC: 66 MG/DL (ref 40–60)
HDLC SERPL: 2.5 {RATIO} (ref 0–5)
LDLC SERPL CALC-MCNC: 74.4 MG/DL (ref 0–100)
LIPID PROFILE,FLP: ABNORMAL
MAGNESIUM SERPL-MCNC: 1.8 MG/DL (ref 1.6–2.6)
MICROALBUMIN UR-MCNC: 0.94 MG/DL (ref 0–3)
MICROALBUMIN/CREAT UR-RTO: 7 MG/G (ref 0–30)
POTASSIUM SERPL-SCNC: 4.1 MMOL/L (ref 3.5–5.5)
PROT SERPL-MCNC: 7.2 G/DL (ref 6.4–8.2)
SODIUM SERPL-SCNC: 138 MMOL/L (ref 136–145)
TRIGL SERPL-MCNC: 138 MG/DL (ref ?–150)
VLDLC SERPL CALC-MCNC: 27.6 MG/DL

## 2022-04-12 PROCEDURE — 36415 COLL VENOUS BLD VENIPUNCTURE: CPT

## 2022-04-12 PROCEDURE — 82306 VITAMIN D 25 HYDROXY: CPT

## 2022-04-12 PROCEDURE — 83735 ASSAY OF MAGNESIUM: CPT

## 2022-04-12 PROCEDURE — 80053 COMPREHEN METABOLIC PANEL: CPT

## 2022-04-12 PROCEDURE — 83036 HEMOGLOBIN GLYCOSYLATED A1C: CPT

## 2022-04-12 PROCEDURE — 82043 UR ALBUMIN QUANTITATIVE: CPT

## 2022-04-12 PROCEDURE — 80061 LIPID PANEL: CPT

## 2022-04-13 ENCOUNTER — HOSPITAL ENCOUNTER (OUTPATIENT)
Dept: ULTRASOUND IMAGING | Age: 67
Discharge: HOME | End: 2022-04-13
Attending: INTERNAL MEDICINE
Payer: MEDICARE

## 2022-04-13 DIAGNOSIS — N63.0 LUMP OR MASS IN BREAST: ICD-10-CM

## 2022-04-13 DIAGNOSIS — R92.8 ABNORMAL MAMMOGRAM: ICD-10-CM

## 2022-04-13 LAB — 25(OH)D3 SERPL-MCNC: 30.2 NG/ML (ref 30–100)

## 2022-04-13 PROCEDURE — 76642 ULTRASOUND BREAST LIMITED: CPT

## 2022-04-19 ENCOUNTER — OFFICE VISIT (OUTPATIENT)
Dept: INTERNAL MEDICINE CLINIC | Age: 67
End: 2022-04-19
Payer: MEDICARE

## 2022-04-19 VITALS
SYSTOLIC BLOOD PRESSURE: 132 MMHG | WEIGHT: 189 LBS | HEIGHT: 62 IN | TEMPERATURE: 97.5 F | HEART RATE: 74 BPM | BODY MASS INDEX: 34.78 KG/M2 | RESPIRATION RATE: 16 BRPM | DIASTOLIC BLOOD PRESSURE: 78 MMHG | OXYGEN SATURATION: 96 %

## 2022-04-19 DIAGNOSIS — E78.5 HYPERLIPIDEMIA, UNSPECIFIED HYPERLIPIDEMIA TYPE: ICD-10-CM

## 2022-04-19 DIAGNOSIS — K76.0 NAFL (NONALCOHOLIC FATTY LIVER): ICD-10-CM

## 2022-04-19 DIAGNOSIS — Z23 ENCOUNTER FOR IMMUNIZATION: ICD-10-CM

## 2022-04-19 DIAGNOSIS — Z91.119 NONCOMPLIANCE WITH DIET AND MEDICATION REGIMEN: ICD-10-CM

## 2022-04-19 DIAGNOSIS — K75.81 NASH (NONALCOHOLIC STEATOHEPATITIS): ICD-10-CM

## 2022-04-19 DIAGNOSIS — E66.9 OBESITY (BMI 30.0-34.9): ICD-10-CM

## 2022-04-19 DIAGNOSIS — E11.65 TYPE 2 DIABETES MELLITUS WITH HYPERGLYCEMIA, WITHOUT LONG-TERM CURRENT USE OF INSULIN (HCC): Primary | ICD-10-CM

## 2022-04-19 DIAGNOSIS — R92.8 ABNORMAL MAMMOGRAM OF LEFT BREAST: ICD-10-CM

## 2022-04-19 DIAGNOSIS — I10 ESSENTIAL HYPERTENSION: ICD-10-CM

## 2022-04-19 DIAGNOSIS — Z91.14 NONCOMPLIANCE WITH DIET AND MEDICATION REGIMEN: ICD-10-CM

## 2022-04-19 DIAGNOSIS — I47.1 AVNRT (AV NODAL RE-ENTRY TACHYCARDIA) (HCC): ICD-10-CM

## 2022-04-19 DIAGNOSIS — E55.9 VITAMIN D DEFICIENCY: ICD-10-CM

## 2022-04-19 PROCEDURE — 1101F PT FALLS ASSESS-DOCD LE1/YR: CPT | Performed by: INTERNAL MEDICINE

## 2022-04-19 PROCEDURE — 3052F HG A1C>EQUAL 8.0%<EQUAL 9.0%: CPT | Performed by: INTERNAL MEDICINE

## 2022-04-19 PROCEDURE — 2022F DILAT RTA XM EVC RTNOPTHY: CPT | Performed by: INTERNAL MEDICINE

## 2022-04-19 PROCEDURE — G8427 DOCREV CUR MEDS BY ELIG CLIN: HCPCS | Performed by: INTERNAL MEDICINE

## 2022-04-19 PROCEDURE — 99214 OFFICE O/P EST MOD 30 MIN: CPT | Performed by: INTERNAL MEDICINE

## 2022-04-19 PROCEDURE — G8754 DIAS BP LESS 90: HCPCS | Performed by: INTERNAL MEDICINE

## 2022-04-19 PROCEDURE — G9899 SCRN MAM PERF RSLTS DOC: HCPCS | Performed by: INTERNAL MEDICINE

## 2022-04-19 PROCEDURE — 90732 PPSV23 VACC 2 YRS+ SUBQ/IM: CPT | Performed by: INTERNAL MEDICINE

## 2022-04-19 PROCEDURE — G0463 HOSPITAL OUTPT CLINIC VISIT: HCPCS | Performed by: INTERNAL MEDICINE

## 2022-04-19 PROCEDURE — 1090F PRES/ABSN URINE INCON ASSESS: CPT | Performed by: INTERNAL MEDICINE

## 2022-04-19 PROCEDURE — G8399 PT W/DXA RESULTS DOCUMENT: HCPCS | Performed by: INTERNAL MEDICINE

## 2022-04-19 PROCEDURE — G8417 CALC BMI ABV UP PARAM F/U: HCPCS | Performed by: INTERNAL MEDICINE

## 2022-04-19 PROCEDURE — G8752 SYS BP LESS 140: HCPCS | Performed by: INTERNAL MEDICINE

## 2022-04-19 PROCEDURE — G8510 SCR DEP NEG, NO PLAN REQD: HCPCS | Performed by: INTERNAL MEDICINE

## 2022-04-19 PROCEDURE — G8536 NO DOC ELDER MAL SCRN: HCPCS | Performed by: INTERNAL MEDICINE

## 2022-04-19 PROCEDURE — 3017F COLORECTAL CA SCREEN DOC REV: CPT | Performed by: INTERNAL MEDICINE

## 2022-04-19 RX ORDER — GLIPIZIDE 10 MG/1
10 TABLET, FILM COATED, EXTENDED RELEASE ORAL DAILY
Qty: 90 TABLET | Refills: 3 | Status: SHIPPED | OUTPATIENT
Start: 2022-04-19

## 2022-04-19 NOTE — PROGRESS NOTES
1. \"Have you been to the ER, urgent care clinic since your last visit? Hospitalized since your last visit? \" No    2. \"Have you seen or consulted any other health care providers outside of the [address] [place] [address] since your last visit? \" No     3. For patients aged 39-70: Has the patient had a colonoscopy / FIT/ Cologuard? Yes - no Care Gap present      If the patient is female:    4. For patients aged 41-77: Has the patient had a mammogram within the past 2 years? Yes - no Care Gap present      5. For patients aged 21-65: Has the patient had a pap smear?  NA - based on age or sex

## 2022-04-19 NOTE — PATIENT INSTRUCTIONS
Heart-Healthy Diet: Care Instructions  Your Care Instructions     A heart-healthy diet has lots of vegetables, fruits, nuts, beans, and whole grains, and is low in salt. It limits foods that are high in saturated fat, such as meats, cheeses, and fried foods. It may be hard to change your diet, but even small changes can lower your risk of heart attack and heart disease. Follow-up care is a key part of your treatment and safety. Be sure to make and go to all appointments, and call your doctor if you are having problems. It's also a good idea to know your test results and keep a list of the medicines you take. How can you care for yourself at home? Watch your portions  · Learn what a serving is. A \"serving\" and a \"portion\" are not always the same thing. Make sure that you are not eating larger portions than are recommended. For example, a serving of pasta is ½ cup. A serving size of meat is 2 to 3 ounces. A 3-ounce serving is about the size of a deck of cards. Measure serving sizes until you are good at [place]" them. Keep in mind that restaurants often serve portions that are 2 or 3 times the size of one serving. · To keep your energy level up and keep you from feeling hungry, eat often but in smaller portions. · Eat only the number of calories you need to stay at a healthy weight. If you need to lose weight, eat fewer calories than your body burns (through exercise and other physical activity). Eat more fruits and vegetables  · Eat a variety of fruit and vegetables every day. Dark green, deep orange, red, or yellow fruits and vegetables are especially good for you. Examples include spinach, carrots, peaches, and berries. · Keep carrots, celery, and other veggies handy for snacks. Buy fruit that is in season and store it where you can see it so that you will be tempted to eat it. · Cook dishes that have a lot of veggies in them, such as stir-fries and soups.   Limit saturated and trans fat  · Read food labels, and try to avoid saturated and trans fats. They increase your risk of heart disease. · Use olive or canola oil when you cook. · Bake, broil, grill, or steam foods instead of frying them. · Choose lean meats instead of high-fat meats such as hot dogs and sausages. Cut off all visible fat when you prepare meat. · Eat fish, skinless poultry, and meat alternatives such as soy products instead of high-fat meats. Soy products, such as tofu, may be especially good for your heart. · Choose low-fat or fat-free milk and dairy products. Eat foods high in fiber  · Eat a variety of grain products every day. Include whole-grain foods that have lots of fiber and nutrients. Examples of whole-grain foods include oats, whole wheat bread, and brown rice. · Buy whole-grain breads and cereals, instead of white bread or pastries. Limit salt and sodium  · Limit how much salt and sodium you eat to help lower your blood pressure. · Taste food before you salt it. Add only a little salt when you think you need it. With time, your taste buds will adjust to less salt. · Eat fewer snack items, fast foods, and other high-salt, processed foods. Check food labels for the amount of sodium in packaged foods. · Choose low-sodium versions of canned goods (such as soups, vegetables, and beans). Limit sugar  · Limit drinks and foods with added sugar. These include candy, desserts, and soda pop. Limit alcohol  · Limit alcohol to no more than 2 drinks a day for men and 1 drink a day for women. Too much alcohol can cause health problems. When should you call for help? Watch closely for changes in your health, and be sure to contact your doctor if:    · You would like help planning heart-healthy meals. Where can you learn more? Go to http://www.Genius.com/  Enter V137 in the search box to learn more about \"Heart-Healthy Diet: Care Instructions. \"  Current as of: August 22, 2019               Content Version: 12.6  © 5606-8899 [employer]. Care instructions adapted under license by [employer] (which disclaims liability or warranty for this information). If you have questions about a medical condition or this instruction, always ask your healthcare professional. [address] any warranty or liability for your use of this information. Learning About Diabetes Food Guidelines  Your Care Instructions     Meal planning is important to manage diabetes. It helps keep your blood sugar at a target level (which you set with your doctor). You don't have to eat special foods. You can eat what your family eats, including sweets once in a while. But you do have to pay attention to how often you eat and how much you eat of certain foods. You may want to work with a dietitian or a certified diabetes educator (CDE) to help you plan meals and snacks. A dietitian or CDE can also help you lose weight if that is one of your goals. What should you know about eating carbs? Managing the amount of carbohydrate (carbs) you eat is an important part of healthy meals when you have diabetes. Carbohydrate is found in many foods. · Learn which foods have carbs. And learn the amounts of carbs in different foods. ? Bread, cereal, pasta, and rice have about 15 grams of carbs in a serving. A serving is 1 slice of bread (1 ounce), ½ cup of cooked cereal, or 1/3 cup of cooked pasta or rice. ? Fruits have 15 grams of carbs in a serving. A serving is 1 small fresh fruit, such as an apple or orange; ½ of a banana; ½ cup of cooked or canned fruit; ½ cup of fruit juice; 1 cup of melon or raspberries; or 2 tablespoons of dried fruit. ? Milk and no-sugar-added yogurt have 15 grams of carbs in a serving. A serving is 1 cup of milk or 2/3 cup of no-sugar-added yogurt. ? Starchy vegetables have 15 grams of carbs in a serving.  A serving is ½ cup of mashed potatoes or sweet potato; 1 cup winter squash; ½ of a small baked potato; ½ cup of cooked beans; or ½ cup cooked corn or green peas. · Learn how much carbs to eat each day and at each meal. A dietitian or CDE can teach you how to keep track of the amount of carbs you eat. This is called carbohydrate counting. · If you are not sure how to count carbohydrate grams, use the Plate Method to plan meals. It is a good, quick way to make sure that you have a balanced meal. It also helps you spread carbs throughout the day. ? Divide your plate by types of foods. Put non-starchy vegetables on half the plate, meat or other protein food on one-quarter of the plate, and a grain or starchy vegetable in the final quarter of the plate. To this you can add a small piece of fruit and 1 cup of milk or yogurt, depending on how many carbs you are supposed to eat at a meal.  · Try to eat about the same amount of carbs at each meal. Do not \"save up\" your daily allowance of carbs to eat at one meal.  · Proteins have very little or no carbs per serving. Examples of proteins are beef, chicken, turkey, fish, eggs, tofu, cheese, cottage cheese, and peanut butter. A serving size of meat is 3 ounces, which is about the size of a deck of cards. Examples of meat substitute serving sizes (equal to 1 ounce of meat) are 1/4 cup of cottage cheese, 1 egg, 1 tablespoon of peanut butter, and ½ cup of tofu. How can you eat out and still eat healthy? · Learn to estimate the serving sizes of foods that have carbohydrate. If you measure food at home, it will be easier to estimate the amount in a serving of restaurant food. · If the meal you order has too much carbohydrate (such as potatoes, corn, or baked beans), ask to have a low-carbohydrate food instead. Ask for a salad or green vegetables. · If you use insulin, check your blood sugar before and after eating out to help you plan how much to eat in the future.   · If you eat more carbohydrate at a meal than you had planned, take a walk or do other exercise. This will help lower your blood sugar. What else should you know? · Limit saturated fat, such as the fat from meat and dairy products. This is a healthy choice because people who have diabetes are at higher risk of heart disease. So choose lean cuts of meat and nonfat or low-fat dairy products. Use olive or canola oil instead of butter or shortening when cooking. · Don't skip meals. Your blood sugar may drop too low if you skip meals and take insulin or certain medicines for diabetes. · Check with your doctor before you drink alcohol. Alcohol can cause your blood sugar to drop too low. Alcohol can also cause a bad reaction if you take certain diabetes medicines. Follow-up care is a key part of your treatment and safety. Be sure to make and go to all appointments, and call your doctor if you are having problems. It's also a good idea to know your test results and keep a list of the medicines you take. Where can you learn more? Go to http://www.pittman.com/  Enter I147 in the search box to learn more about \"Learning About Diabetes Food Guidelines. \"  Current as of: December 20, 2019               Content Version: 12.6  © 8539-1477 [employer]. Care instructions adapted under license by [employer] (which disclaims liability or warranty for this information). If you have questions about a medical condition or this instruction, always ask your healthcare professional. [address] any warranty or liability for your use of this information. Learning About Meal Planning for Diabetes  Why plan your meals? Meal planning can be a key part of managing diabetes. Planning meals and snacks with the right balance of carbohydrate, protein, and fat can help you keep your blood sugar at the target level you set with your doctor. You don't have to eat special foods.  You can eat what your family eats, including sweets once in a while. But you do have to pay attention to how often you eat and how much you eat of certain foods. You may want to work with a dietitian or a certified diabetes educator. He or she can give you tips and meal ideas and can answer your questions about meal planning. This health professional can also help you reach a healthy weight if that is one of your goals. What plan is right for you? Your dietitian or diabetes educator may suggest that you start with the plate format or carbohydrate counting. The plate format  The plate format is a simple way to help you manage how you eat. You plan meals by learning how much space each food should take on a plate. Using the plate format helps you spread carbohydrate throughout the day. It can make it easier to keep your blood sugar level within your target range. It also helps you see if you're eating healthy portion sizes. To use the plate format, you put non-starchy vegetables on half your plate. Add meat or meat substitutes on one-quarter of the plate. Put a grain or starchy vegetable (such as brown rice or a potato) on the final quarter of the plate. You can add a small piece of fruit and some low-fat or fat-free milk or yogurt, depending on your carbohydrate goal for each meal.  Here are some tips for using the plate format:  · Make sure that you are not using an oversized plate. A 9-inch plate is best. Many restaurants use larger plates. · Get used to using the plate format at home. Then you can use it when you eat out. · Write down your questions about using the plate format. Talk to your doctor, a dietitian, or a diabetes educator about your concerns. Carbohydrate counting  With carbohydrate counting, you plan meals based on the amount of carbohydrate in each food. Carbohydrate raises blood sugar higher and more quickly than any other nutrient. It is found in desserts, breads and cereals, and fruit.  It's also found in starchy vegetables such as potatoes and corn, grains such as rice and pasta, and milk and yogurt. Spreading carbohydrate throughout the day helps keep your blood sugar levels within your target range. Your daily amount depends on several things, including your weight, how active you are, which diabetes medicines you take, and what your goals are for your blood sugar levels. A registered dietitian or diabetes educator can help you plan how much carbohydrate to include in each meal and snack. A guideline for your daily amount of carbohydrate is:  · 45 to 60 grams at each meal. That's about the same as 3 to 4 carbohydrate servings. · 15 to 20 grams at each snack. That's about the same as 1 carbohydrate serving. The Nutrition Facts label on packaged foods tells you how much carbohydrate is in a serving of the food. First, look at the serving size on the food label. Is that the amount you eat in a serving? All of the nutrition information on a food label is based on that serving size. So if you eat more or less than that, you'll need to adjust the other numbers. Total carbohydrate is the next thing you need to look for on the label. If you count carbohydrate servings, one serving of carbohydrate is 15 grams. For foods that don't come with labels, such as fresh fruits and vegetables, you'll need a guide that lists carbohydrate in these foods. Ask your doctor, dietitian, or diabetes educator about books or other nutrition guides you can use. If you take insulin, you need to know how many grams of carbohydrate are in a meal. This lets you know how much rapid-acting insulin to take before you eat. If you use an insulin pump, you get a constant rate of insulin during the day. So the pump must be programmed at meals to give you extra insulin to cover the rise in blood sugar after meals. When you know how much carbohydrate you will eat, you can take the right amount of insulin.  Or, if you always use the same amount of insulin, you need to make sure that you eat the same amount of carbohydrate at meals. If you need more help to understand carbohydrate counting and food labels, ask your doctor, dietitian, or diabetes educator. How do you get started with meal planning? Here are some tips to get started:  · Plan your meals a week at a time. Don't forget to include snacks too. · Use cookbooks or online recipes to plan several main meals. Plan some quick meals for busy nights. You also can double some recipes that freeze well. Then you can save half for other busy nights when you don't have time to cook. · Make sure you have the ingredients you need for your recipes. If you're running low on basic items, put these items on your shopping list too. · List foods that you use to make breakfasts, lunches, and snacks. List plenty of fruits and vegetables. · Post this list on the refrigerator. Add to it as you think of more things you need. · Take the list to the store to do your weekly shopping. Follow-up care is a key part of your treatment and safety. Be sure to make and go to all appointments, and call your doctor if you are having problems. It's also a good idea to know your test results and keep a list of the medicines you take. Where can you learn more? Go to http://www.gray.com/  Enter H112 in the search box to learn more about \"Learning About Meal Planning for Diabetes. \"  Current as of: December 20, 2019               Content Version: 12.6  © 2275-9210 [employer]. Care instructions adapted under license by [employer] (which disclaims liability or warranty for this information). If you have questions about a medical condition or this instruction, always ask your healthcare professional. [address] any warranty or liability for your use of this information.

## 2022-04-21 ENCOUNTER — TELEPHONE (OUTPATIENT)
Dept: INTERNAL MEDICINE CLINIC | Age: 67
End: 2022-04-21

## 2022-04-21 NOTE — TELEPHONE ENCOUNTER
Pt states she had a pneumonia shot in her right arm on 04/19- she said her muscle is very sore and she is asking if that is normal she is concerned of a blood clot .  Please advise

## 2022-04-21 NOTE — TELEPHONE ENCOUNTER
Patient is aware her arm will be sore after an injection. Patient was advised to watch injection site over the next couple of days and if it joyner snot get any better call the office for an appointment.

## 2022-04-24 NOTE — PROGRESS NOTES
HPI:   Martha De Leon is a 79y.o. year old female who presents today for a routine visit. She has a history of hypertension, hyperlipidemia, paroxysmal SVT, diabetes mellitus, GERD, asthma, elevated transaminases, atypical mycobacterial pneumonia, and noncompliance. She has completed the Moderna COVID-19 vaccine series and received the Moderna booster dose. She reports that she is doing reasonably well. She admits that she has not started treatment with glipizide CR for her diabetes mellitus and has not been compliant with her diet or weight loss. She does report some increase in allergy symptoms particularly with regard to eye irritation and redness. She is using Systane drops and Flonase. She is otherwise without new complaints and feeling generally well. Summary of prior  medical history:  She has a history of hypertension, treated with metoprolol and hydrochlorothiazide (+ potassium). She reports that she does not check her blood pressure at home. She does not exercise regularly, but denies any chest pain, shortness of breath at rest or with exertion, lightheadedness, or edema. 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 She has had approximately six severe episodes over the years, prompting presentation to the ED and treatment with IV Adenosine. She currently reports infrequent short episodes of palpitations and is being treated with metoprolol. She is followed by Dr. Angus Marie. She had an echocardiogram (10/2005) showing normal LV size and function (EF 60-65%), and no valvular pathology. In 11/2012, she underwent an exercise stress echocardiogram, which was normal at maximal exercise. She has a history of hyperlipidemia, treated with simvastatin from 10/2012 to 3/2015 at which time she stopped taking it due to myalgias.  She restarted it on 8/2015 and continued to take it without difficulty until 3/2016, when it was noticed that she had transaminase elevation (AST 85/ ) and it was discontinued. Evaluation included hepatitis A, B, and C levels (negative), iron panel (normal), and RUQ ultrasound (3/23/2016) with limited sonographic window for the liver; only partially visualized but grossly unremarkable. Repeat hepatic panel (4/22/2016) showed AST 75 and ALT 98. Repeat lipid panel showed total chol 215/ / HDL 64/. In 5/2016, she had an abdominal CT scan showing the liver to be normal in size with normal parenchymal density; no discrete mass or ascites, and no intrahepatic biliary dilatation. She restarted simvastatin 20 mg daily in 4/2018. She has been taking it without difficulty since restarting. She underwent repeat evaluation by Dr. Kaia Garcia for her elevated LFT's, and reports that lab evaluation was negative. She underwent an abdominal ultrasound (2/16/2021) which showed an echogenic lobulated contour liver suggestive of steatosis +/- cirrhosis; no focal lesions seen. Recommendation was for her to lose weight. She has a history of diabetes mellitus, with impaired fasting glucose ranging from 103-111 since 2012, and HbA1c to 6.6-6.8 since 9/2016 (not checked previously). She was prescribed metformin ER last visit for an increase in her HbA1c to 7.1, but she reports that she took it for only one week and discontinued for unclear reasons. She was not experiencing any side effects. She denies any polyuria, polydipsia, nocturia, or blurry vision, and has no history of retinopathy, neuropathy, or nephropathy. She has regular eye exams with Dr. Heather Amezquita. She has a history of asthma and allergic rhinitis and is followed by Dr. Shila Sue. She is receiving immunotherapy once per month, and reports that she has not required any inhalers recently. She states that she does not use Qvar daily, but will take it occasionally. She does use Claritin every day.   In 7/2019, she reported increasing difficulty with right ear fullness, post nasal drainage, hoarseness, and cough, and was referred to Dr. Shin Cox. He performed a nasal laryngoscopy and found evidence of laryngopharyngeal reflux. She was started on daily omeprazole, and she states that she has noticed some improvement. In 10/2017, she fell down the steps of her porch and had difficulty with right knee pain and swelling. She was evaluated by Dr. Misael Montiel in 12/2017, and right knee xray showed decreased medial joint space, and moderate degenerative changes. She received a cortisone injection, but did not notice any improvement. She underwent an MRI of the right knee (1/29/2018) which showed complete tear of posterior horn and root of the medial meniscus; tear of the body and posterior horn of the lateral meniscus; tricompartmental osteoarthritis most prominent in medial and patellofemoral compartments; moderate joint effusion; small Baker's cyst. It was recommended that she consider knee replacement and arthroscopy. However, she decided to seek a second opinion and was evaluated by Dr. Deep Rose. She also underwent an MRI of her left knee (3/23/2018) showing radial tear posterior horn medial meniscus with extrusion of the body. Also a radial tear in the mid body; lateral meniscus intrasubstance degeneration and probable small undersurface tear of the posterior horn; medial and patellofemoral compartments moderate chondral loss; s. ubchondral bone marrow edema in the medial tibial plateau, likely reactive. She is completed physical therapy for her bilateral knees and feels that it may have helped. 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 She was treated with Avelox, and repeat chest x-ray in 1/2012 showed complete resolution of the pneumonia. She denies any cough or shortness of breath.        She had a screening colonoscopy in 12/2006 by Dr. Kate Green showing a 5 mm sessile polyp in the rectum (pathology: hyperplastic). She had a repeat colonoscopy in 12/2016 which showed moderate sigmoid diverticulosis and a 6 mm sessile ascending colon polyp (pathology: serrated adenoma). She had a repeat screening colonoscopy in 4/2021 by  [facility] showing a single six 6 mm polyp in the distal sigmoid colon (pathology: Hyperplastic polyp). Follow-up recommended for 5 years. She denies any abdominal pain, nausea, vomiting, melena, hematochezia, or change in bowel movements. She does take omeprazole occasionally for GERD symptoms. In 12/2017, she was referred by her gynecologist for evaluation by Dr. Tory Chavez for microscopic hematuria, and urine cytology was negative. However, she states that she refused his recommendations to undergo a CT urogram or cystoscopy. She denies any dysuria, gross hematuria, or flank pain. Past Medical History:   Diagnosis Date    Allergic rhinitis     Asthma     Cardiac stress echo, normal 11/02/2012    Normal maximal stress echo study. EF 60%. Ex time 9 min 45 sec. 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  Hyperlipidemia     Hypertension     Menopause     Plantar fasciitis     left    PSVT (paroxysmal supraventricular tachycardia) (HCC)     A-V dharmesh reentrant tachycardia     Past Surgical History:   Procedure Laterality Date    ENDOSCOPY, COLON, DIAGNOSTIC      polyp    HX CERVICAL POLYPECTOMY      HX CYST INCISION AND DRAINAGE Right 10 or more years    HX DILATION AND CURETTAGE      HX GYN      polyp on cervix    HX POLYPECTOMY      from rectum     Current Outpatient Medications   Medication Sig    glipiZIDE SR (GLUCOTROL XL) 10 mg CR tablet Take 1 Tablet by mouth daily.  Indications: type 2 diabetes mellitus    fluticasone propionate (FLONASE) 50 mcg/actuation nasal spray instill 2 sprays into each nostril once daily    potassium chloride (K-DUR, KLOR-CON M20) 20 mEq tablet take 1 tablet by mouth once daily    metoprolol succinate (TOPROL-XL) 50 mg XL tablet take 1 tablet by mouth once daily    losartan (COZAAR) 25 mg tablet take 1 tablet by mouth once daily    LORazepam (ATIVAN) 1 mg tablet take 1 tablet by mouth every 8 hours if needed for anxiety    hydroCHLOROthiazide (HYDRODIURIL) 12.5 mg tablet take 1 tablet by mouth once daily    simvastatin (ZOCOR) 20 mg tablet take 1 tablet by mouth at bedtime  Indications: excessive fat in the blood    loratadine (Claritin) 10 mg tablet Take 1 Tablet by mouth daily.  Blood-Glucose Meter monitoring kit Monitor fasting blood glucose once daily    glucose blood VI test strips (ASCENSIA AUTODISC VI, ONE TOUCH ULTRA TEST VI) strip Monitor fasting blood glucose once daily    lancets misc Monitor fasting blood glucose once daily    albuterol (PROVENTIL HFA, VENTOLIN HFA, PROAIR HFA) 90 mcg/actuation inhaler inhale 2 puffs by mouth every 4 hours if needed for wheezing    clotrimazole-betamethasone (LOTRISONE) topical cream Apply  to both ear canals and affected part of outer ear twice a day with a finger.  cholecalciferol (VITAMIN D3) 1,000 unit cap Take 2,000 Units by mouth daily.  carboxymethylcellulose sodium (REFRESH LIQUIGEL) 1 % dlgl ophthalmic solution Apply  to eye. No current facility-administered medications for this visit. Allergies and Intolerances: Allergies   Allergen Reactions    Altace [Ramipril] Cough    Penicillins Other (comments)     Hands peel    Sulfur Itching     Family History: She had two aunts who had breast cancer (her mother's sister and father's sister). She has no FH of colon cancer. Her mother passed away from uterine cancer. Her father  from metastatic prostate cancer.      Family History   Problem Relation Age of Onset    OSTEOARTHRITIS Mother     Hypertension Mother  Cancer Father         bone cancer    Hypertension Sister     Hypertension Sister     Hypertension Sister     Breast Cancer Maternal Aunt     Breast Cancer Paternal Aunt     Diabetes Other     Stroke Other     Other Sister         twin sister - osteopenia and low vitamin D levels     Social History:   She  reports that she has never smoked. She has never used smokeless tobacco. She is  and has two sons. She was a homemaker, but worked part-time in . She now helps care for her grandchildren. Social History     Substance and Sexual Activity   Alcohol Use No     Immunization History:  Immunization History   Administered Date(s) Administered    COVID-19, Moderna Booster, PF, 0.25mL Dose 11/30/2021    COVID-19, Garry Gallus, Primary or Immunocompromised Series, MRNA, PF, 100mcg/0.5mL 03/01/2021, 04/02/2021    Influenza Vaccine (Quad) PF (>6 Mo Flulaval, Fluarix, and >3 Yrs Afluria, Fluzone 01198) 10/23/2015, 10/19/2016, 10/05/2017, 10/26/2018, 10/08/2019    Influenza Vaccine PF 12/12/2013, 10/03/2014    Influenza Vaccine Split 11/02/2011, 10/22/2012    Influenza Vaccine Whole 10/29/2010    Influenza, Quadrivalent, Adjuvanted (>65 Yrs FLUAD QUAD 16203) 10/25/2021    PPD 01/03/2012    Pneumococcal Conjugate (PCV-13) 08/13/2020    Pneumococcal Polysaccharide (PPSV-23) 03/21/2017, 04/19/2022    TB Skin Test (PPD) Intradermal 02/12/2014    TDAP Vaccine 02/16/2012    Zoster Recombinant 09/16/2020, 11/27/2020       Review of Systems:   As above included in HPI. Otherwise 11 point review of systems negative including constitutional, skin, HENT, eyes, respiratory, cardiovascular, gastrointestinal, genitourinary, musculoskeletal, endocrine, hematologic, allergy, and neurologic.     Physical:   Visit Vitals  /78   Pulse 74   Temp 97.5 °F (36.4 °C) (Temporal)   Resp 16   Ht 5' 2\" (1.575 m)   Wt 189 lb (85.7 kg)   SpO2 96%   BMI 34.57 kg/m²       Exam:   Patient appears in no apparent distress. Affect is appropriate. HEENT: PERRLA, anicteric,  no JVD, adenopathy or thyromegaly. No carotid bruits or radiated murmur. Lungs: clear to auscultation, no wheezes, rhonchi, or rales. Heart: regular rate and rhythm. No murmur, rubs, gallops  Abdomen: soft, nontender, nondistended, normal bowel sounds, no hepatosplenomegaly or masses. Extremities: without edema. Review of Data:  Labs:  Hospital Outpatient Visit on 04/12/2022   Component Date Value Ref Range Status    Hemoglobin A1c 04/12/2022 8.4* 4.2 - 5.6 % Final    Est. average glucose 04/12/2022 194  mg/dL Final    LIPID PROFILE 04/12/2022        Final    Cholesterol, total 04/12/2022 168  <200 MG/DL Final    Triglyceride 04/12/2022 138  <150 MG/DL Final    HDL Cholesterol 04/12/2022 66* 40 - 60 MG/DL Final    LDL, calculated 04/12/2022 74.4  0 - 100 MG/DL Final    VLDL, calculated 04/12/2022 27.6  MG/DL Final    CHOL/HDL Ratio 04/12/2022 2.5  0 - 5.0   Final    Magnesium 04/12/2022 1.8  1.6 - 2.6 mg/dL Final    Sodium 04/12/2022 138  136 - 145 mmol/L Final    Potassium 04/12/2022 4.1  3.5 - 5.5 mmol/L Final    Chloride 04/12/2022 103  100 - 111 mmol/L Final    CO2 04/12/2022 29  21 - 32 mmol/L Final    Anion gap 04/12/2022 6  3.0 - 18 mmol/L Final    Glucose 04/12/2022 131* 74 - 99 mg/dL Final    BUN 04/12/2022 11  7.0 - 18 MG/DL Final    Creatinine 04/12/2022 0.69  0.6 - 1.3 MG/DL Final    BUN/Creatinine ratio 04/12/2022 16  12 - 20   Final    GFR est AA 04/12/2022 >60  >60 ml/min/1.73m2 Final    GFR est non-AA 04/12/2022 >60  >60 ml/min/1.73m2 Final    Calcium 04/12/2022 8.9  8.5 - 10.1 MG/DL Final    Bilirubin, total 04/12/2022 0.4  0.2 - 1.0 MG/DL Final    ALT (SGPT) 04/12/2022 31  13 - 56 U/L Final    AST (SGOT) 04/12/2022 23  10 - 38 U/L Final    Alk.  phosphatase 04/12/2022 80  45 - 117 U/L Final    Protein, total 04/12/2022 7.2  6.4 - 8.2 g/dL Final    Albumin 04/12/2022 3.8  3.4 - 5.0 g/dL Final    Globulin 04/12/2022 3.4  2.0 - 4.0 g/dL Final    A-G Ratio 04/12/2022 1.1  0.8 - 1.7   Final    Microalbumin,urine random 04/12/2022 0.94  0 - 3.0 MG/DL Final    Creatinine, urine 04/12/2022 135.00* 30 - 125 mg/dL Final    Microalbumin/Creat ratio (mg/g cre* 04/12/2022 7  0 - 30 mg/g Final    Vitamin D 25-Hydroxy 04/12/2022 30.2  30 - 100 ng/mL Final         Health Maintenance:  Screening:    Mammogram: abnormal (3/2022) left breast mass. Dr. Francesco Woods on 5/6/2022    PAP smear: negative (3/2020) with negative HPV. Followed by Dr. Lesly Herring. Colorectal: colonoscopy (4/2021) serrated adenoma. Dr. Alysia Tierney. Due 4/2026. Depression: none   DM (HbA1c/FPG): HbA1c 8.4 (4/2022)   Hepatitis C: negative (3/2016)   Falls: one   DEXA: within normal limits (11/2015)   Glaucoma: regular eye exams with Dr. Estefany Nicole (last 10/2021)   Smoking: none   Vitamin D: 30.2 (4/2022)    Medicare Wellness: 9/14/2021 (Initial)      Impression:  Patient Active Problem List   Diagnosis Code    Benign hypertensive heart disease without congestive heart failure I11.9    Allergic rhinitis J30.9    Colon polyps K63.5    AVNRT (AV dharmesh re-entry tachycardia) ([facility] [address] I47.1    Hyperlipidemia E78.5    Essential hypertension I10    Asthma J45.909    GERD (gastroesophageal reflux disease) K21.9    Type 2 diabetes mellitus without complication, without long-term current use of insulin (HCC) E11.9    Vitamin D deficiency E55.9    Microscopic hematuria R31.29    Chronic pain of both knees M25.561, M25.562, G89.29    Obesity (BMI 30.0-34. 9) E66.9    Noncompliance with diet and medication regimen Z91.11, Z91.14    Anxiety F41.9    Spondylolisthesis, lumbar region M43.16    DDD (degenerative disc disease), lumbar M51.36    NAFL (nonalcoholic fatty liver) U26.5    HENRY (nonalcoholic steatohepatitis) K75.81       Plan:  1. Diabetes mellitus. Impaired fasting glucose had been present since 2012.  Diagnosed in 9/2016 when HbA1c was checked and found to be elevated at 6.6. Had been steadily increasing and reached 7.5 in 4/2020. Was being treated with metformin  mg at dinner but stopped taking it due to diarrhea. Has been prescribed sitagliptin and Jardiance, but states too costly. Started on glipizide SR 5 mg daily but has not been compliant with dose. HbA1c had worsened in 9/2021 and 1/2022 to 7.4 and stressed importance of need for treatment but remains noncompliant. Did meet with Pharm. D. Melita Dubon for diabetes education but does not appear to have instituted any changes. Repeat HbA1c worsened today to 8.4 and again stressed need for treatment. Unwilling to proceed with GLP-1 agonist which would be beneficial for weight loss, and states that she will begin glipizide. Dose increased to glipizide SR 10 mg daily. No evidence of microvascular complications. On statin and ARB. Continue regular eye exams with Dr. Jose D Butler. Foot exam normal (9/2021). Urine microalbumin/ creatinine ratio remains without evidence of microalbuminuria (7 mg/g). Emphasized importance of lifestyle modifications, including heart healthy diabetic diet, regular exercise, and weight loss. Will reassess HbA1c in 3 months. 2. Hypertension. Blood pressure remains well controlled today on losartan 25 mg daily, metoprolol XL 50 mg daily, and hydrochlorothiazide 12.5 mg daily. Renal function has been normal with creatinine 0.69/ eGFR >60 today. Continue to follow. 3. Hyperlipidemia. On moderate intensity dose simvastatin 20 mg daily with LDL 74 and HDL 66, indicative of good control. Continue to follow. 4. Hepatic steatosis. Developed in 3/2016 with AST 85/ . Evaluation included hepatitis A, B, and C levels (negative), iron panel (normal), and RUQ ultrasound (3/23/2016) with limited sonographic window for the liver; only partially visualized but grossly unremarkable.  In 5/2016, she had an abdominal CT scan showing the liver to be normal in size with normal parenchymal density; no discrete mass or ascites, and no intrahepatic biliary dilatation. Transaminases had normalized since that time. However, repeat elevation in 11/2020 with ALT 66 and AST 45. Referred to Dr. Ana Maria Herr and liver ultrasound (2/2021) showed lobulated contour liver consistent with steatosis +/- possible cirrhosis. She stated that weight loss recommended. Normalization of LFTs today, but has been unable to lose weight. Discussed importance given hepatic steatosis and concern for progression to fibrosis. Will continue to monitor. 5. AV dharmesh reentrant tachycardia. No recent episodes. On metoprolol succinate 50 mg daily and no significant palpitations recently. Established care with Dr. Mendy Melendez in 10/2021 and will be following annually. 6. Asthma, mild persistent. Associated with allergies. Had been receiving monthly immunotherapy injections with Dr. Alexandrea Bullock, but reports discontinued as of unclear benefit. No longer taking Claritin but using Flonase regularly. Noting some increase in allergy symptoms with eye irritation and recommended to restart Claritin. 7. GERD/ laryngopharyngeal reflux. Evaluated by Dr. Severa Chock, and noted on laryngoscopic exam. Started on omeprazole daily but states that decided to discontinue as did not help with cough. Was receiving immunotherapy as discussed but states now has discontinued. No increasing symptoms today. 8. Microscopic hematuria. Patient refusing further evaluation with cystoscopy or CT urogram. Urine cytology negative. Did have abdominal CT scan in 5/2016 where kidneys appeared normal. Unwilling to complete evaluation with Dr. April Thakkar. Repeat urinalysis with evidence small blood and 3-5 RBCs in 10/2019. Trace blood with 0-3 RBC in 11/2020 and negative for microscopic hematuria in 6/2021 and 10/2021. Will continue to monitor. 9. Bilateral knee pain. Did not respond to cortisone injection.  Had both right and left knee MRI showing variable degrees of menisci tears and osteoarthritis of knee joint. Now being followed by Dr. Ally Sanchez and reports relatively quiescent. 10.  Low back pain with right sciatica. Patient presented in 10/2020 with severe low back pain and right sciatica for several days. Unclear as to inciting incident. Denied lower extremity weakness or paresthesias. Began taking Etodolac and methocarbamol as prescribed by [facility] several months prior for similar symptoms. Lumbar spine x-ray (10/2020) showed multilevel degenerative findings, trace levorotoscoliosis and multilevel spondylolisthesis. Following with Dr. Ally Sanchez. Reported increasing low back pain without sciatica in 10/2021 and referred to physical therapy with improvement. 11. Abnormal mammogram/ family history of breast cancer. Positive family history for breast cancer in a maternal aunt in her 46s and in a paternal aunt. Wishing to continue only with screening mammograms annually and not assess lifetime risk. Underwent screening mammogram on 3/24/2022 which showed a 5 mm mass deep in the left breast at the 12 o'clock position. Diagnostic mammogram and ultrasound on 7/5/3744 showed a complicated cystic structure and recommended ultrasound-guided cyst aspiration and biopsy. Now scheduled with Dr. Nicolasa Mccray on 5/6/2022 for evaluation and biopsy. 12. Obesity. Weight increased 3 pounds since last visit. Patient not exercising and unclear as to appropriate diabetic diet or dietary changes needed to lose weight. Referred to Pharm. DRylee Lopez for nutrition counseling and attended two sessions. However, does not appear to have made changes in lifestyle or diet. Emphasized importance of lifestyle modifications, including heart healthy diabetic diet, regular exercise, and weight loss. Will continue to address. 13. Health maintenance. Completed Moderna COVID-19 vaccine series and received the Moderna booster dose. Advised to proceed with second booster dose given new CDC recommendations.   Received 2/2 doses of Shingrix vaccine. Will give prescription for Tdap today. Received Prevnar 13 and will give Pneumovax 23 today. Other immunizations up to date. Colonoscopy completed. Mammogram as discussed. Continue regular eye exams with Dr. Martinez Dalton. Vitamin D level remains low normal on maintenance dose supplement to 2000 units daily. Repeat bone density study (3/24/2022) within normal limits. Medicare wellness visit up-to-date. Patient understands recommendations and agrees with plan. Follow-up in 3 months.

## 2022-05-05 ENCOUNTER — TELEPHONE (OUTPATIENT)
Dept: INTERNAL MEDICINE CLINIC | Age: 67
End: 2022-05-05

## 2022-05-05 NOTE — TELEPHONE ENCOUNTER
Pt had a tick on back of her head at base near her neck,  pulled it out, and now has a knot, feels like a marble she said and gland at back of neck is swollen. Said it hurts to touch knot and it itches some. She is currently on doxycycline 50 mg for an eye infection she said. She doesn't know if she needs to have the knot checked out or does she need to be on a stronger antibiotic?     Please advise her at [phone]

## 2022-05-06 NOTE — TELEPHONE ENCOUNTER
Called and spoke with patient. Reports that he acquired a tick while visiting a relative at [address] home and removed within 24 hours. Reports that tick was not engorged. Discusses that has some mild redness and swelling in the area of tick attachment and noting an enlarged lymph node just below the area, but no extension beyond the area of the bite. Reports area is pruritic and has been applying alcohol. On doxycycline 50 mg daily for 30 days for a eye infection as prescribed by her ophthalmologist.  Denies any fever, chills, or systemic complaints. Advised that area of swelling in the area of tick bite is likely a local reaction to retained mouthparts as is the enlarged lymph node. Advised that may apply hydrocortisone for pruritus. Advised to monitor closely and if does not improve over the weekend, instructed to call next week and will schedule a visit. Discussed that no need to increase dose of doxycycline as antibiotics would not be indicated for tick bite given short attachment period. Advised also to monitor for development of erythema migrans within 30 days. Also reports that had visit today with Dr. Junior Rodriguez for her abnormal mammogram and she recommended returning to the breast center for ultrasound-guided biopsy. Advised to contact nurse navigator William Jose to schedule.

## 2022-05-06 NOTE — TELEPHONE ENCOUNTER
Alyssa Blanco [facility]  Subject: Message to Provider     QUESTIONS   Information for Provider? patient returning call about tick bite   ---------------------------------------------------------------------------   --------------   CALL BACK INFO   What is the best way for the office to contact you? OK to leave message on   voicemail   Preferred Call Back Phone Number? [phone]   ---------------------------------------------------------------------------   --------------   SCRIPT ANSWERS   Relationship to Patient?  Self

## 2022-05-06 NOTE — TELEPHONE ENCOUNTER
[phone]    Pt returning call from yesterday. Says the knot feels like it needs to be drained. Does she need more antibiotics?

## 2022-05-18 ENCOUNTER — HOSPITAL ENCOUNTER (OUTPATIENT)
Dept: ULTRASOUND IMAGING | Age: 67
Discharge: HOME | End: 2022-05-18
Attending: SURGERY
Payer: MEDICARE

## 2022-05-18 DIAGNOSIS — R92.8 ABNORMAL MAMMOGRAM: ICD-10-CM

## 2022-05-18 DIAGNOSIS — N63.0 LUMP OR MASS IN BREAST: ICD-10-CM

## 2022-05-18 PROCEDURE — 19000 PUNCTURE ASPIR CYST BREAST: CPT

## 2022-05-18 PROCEDURE — 74011000250 HC RX REV CODE- 250: Performed by: SURGERY

## 2022-05-18 RX ORDER — LIDOCAINE HYDROCHLORIDE 10 MG/ML
5 INJECTION, SOLUTION EPIDURAL; INFILTRATION; INTRACAUDAL; PERINEURAL
Status: COMPLETED | OUTPATIENT
Start: 2022-05-18 | End: 2022-05-18

## 2022-05-18 RX ADMIN — LIDOCAINE HYDROCHLORIDE 10 ML: 10 INJECTION, SOLUTION EPIDURAL; INFILTRATION; INTRACAUDAL; PERINEURAL at 14:17

## 2022-06-29 DIAGNOSIS — F41.9 ANXIETY: ICD-10-CM

## 2022-06-30 RX ORDER — LORAZEPAM 1 MG/1
TABLET ORAL
Qty: 30 TABLET | Refills: 0 | Status: SHIPPED | OUTPATIENT
Start: 2022-06-30

## 2022-06-30 NOTE — TELEPHONE ENCOUNTER
[facility] for At[facility] as 11/3/21 for a 10 d/s.      Last Visit: 4/19/22 with MD Mario Garcia  Next Appointment: 8/2/22 with MD Mario Garcia  Previous Refill Encounter(s): 11/3/21 #30    Requested Prescriptions     Pending Prescriptions Disp Refills    LORazepam (ATIVAN) 1 mg tablet [Pharmacy Med Name: LORAZEPAM 1 MG TABLET] 30 Tablet 0     Sig: take 1 tablet by mouth every 8 hours if needed for anxiety         For Pharmacy [id number] in place:    Recommendation Provided To:    Intervention Detail: New Rx: 1, reason: Patient Preference   Gap Closed?:    Intervention Accepted By:   Ebenezer Aponte Time Spent (min): 5

## 2022-08-02 ENCOUNTER — HOSPITAL ENCOUNTER (OUTPATIENT)
Dept: LAB | Age: 67
Discharge: HOME | End: 2022-08-02
Payer: MEDICARE

## 2022-08-02 DIAGNOSIS — K75.81 NASH (NONALCOHOLIC STEATOHEPATITIS): ICD-10-CM

## 2022-08-02 DIAGNOSIS — I47.1 AVNRT (AV NODAL RE-ENTRY TACHYCARDIA) (HCC): ICD-10-CM

## 2022-08-02 DIAGNOSIS — E11.65 TYPE 2 DIABETES MELLITUS WITH HYPERGLYCEMIA, WITHOUT LONG-TERM CURRENT USE OF INSULIN (HCC): ICD-10-CM

## 2022-08-02 DIAGNOSIS — E55.9 VITAMIN D DEFICIENCY: ICD-10-CM

## 2022-08-02 DIAGNOSIS — K76.0 NAFL (NONALCOHOLIC FATTY LIVER): ICD-10-CM

## 2022-08-02 DIAGNOSIS — I10 ESSENTIAL HYPERTENSION: ICD-10-CM

## 2022-08-02 DIAGNOSIS — E78.5 HYPERLIPIDEMIA, UNSPECIFIED HYPERLIPIDEMIA TYPE: ICD-10-CM

## 2022-08-02 LAB
25(OH)D3 SERPL-MCNC: 44.4 NG/ML (ref 30–100)
ALBUMIN SERPL-MCNC: 3.7 G/DL (ref 3.4–5)
ALBUMIN/GLOB SERPL: 1 {RATIO} (ref 0.8–1.7)
ALP SERPL-CCNC: 80 U/L (ref 45–117)
ALT SERPL-CCNC: 31 U/L (ref 13–56)
ANION GAP SERPL CALC-SCNC: 6 MMOL/L (ref 3–18)
AST SERPL-CCNC: 26 U/L (ref 10–38)
BASOPHILS # BLD: 0.1 K/UL (ref 0–0.1)
BASOPHILS NFR BLD: 1 % (ref 0–2)
BILIRUB SERPL-MCNC: 0.4 MG/DL (ref 0.2–1)
BUN SERPL-MCNC: 11 MG/DL (ref 7–18)
BUN/CREAT SERPL: 16 (ref 12–20)
CALCIUM SERPL-MCNC: 9.2 MG/DL (ref 8.5–10.1)
CHLORIDE SERPL-SCNC: 103 MMOL/L (ref 100–111)
CHOLEST SERPL-MCNC: 164 MG/DL
CO2 SERPL-SCNC: 31 MMOL/L (ref 21–32)
CREAT SERPL-MCNC: 0.67 MG/DL (ref 0.6–1.3)
CREAT UR-MCNC: 148 MG/DL (ref 30–125)
DIFFERENTIAL METHOD BLD: NORMAL
EOSINOPHIL # BLD: 0.4 K/UL (ref 0–0.4)
EOSINOPHIL NFR BLD: 5 % (ref 0–5)
ERYTHROCYTE [DISTWIDTH] IN BLOOD BY AUTOMATED COUNT: 11.9 % (ref 11.6–14.5)
EST. AVERAGE GLUCOSE BLD GHB EST-MCNC: 174 MG/DL
GLOBULIN SER CALC-MCNC: 3.7 G/DL (ref 2–4)
GLUCOSE SERPL-MCNC: 116 MG/DL (ref 74–99)
HBA1C MFR BLD: 7.7 % (ref 4.2–5.6)
HCT VFR BLD AUTO: 41 % (ref 35–45)
HDLC SERPL-MCNC: 66 MG/DL (ref 40–60)
HDLC SERPL: 2.5 {RATIO} (ref 0–5)
HGB BLD-MCNC: 13.5 G/DL (ref 12–16)
IMM GRANULOCYTES # BLD AUTO: 0 K/UL (ref 0–0.04)
IMM GRANULOCYTES NFR BLD AUTO: 0 % (ref 0–0.5)
LDLC SERPL CALC-MCNC: 69.6 MG/DL (ref 0–100)
LIPID PROFILE,FLP: ABNORMAL
LYMPHOCYTES # BLD: 2.2 K/UL (ref 0.9–3.6)
LYMPHOCYTES NFR BLD: 28 % (ref 21–52)
MAGNESIUM SERPL-MCNC: 1.7 MG/DL (ref 1.6–2.6)
MCH RBC QN AUTO: 29.4 PG (ref 24–34)
MCHC RBC AUTO-ENTMCNC: 32.9 G/DL (ref 31–37)
MCV RBC AUTO: 89.3 FL (ref 78–100)
MICROALBUMIN UR-MCNC: 1.55 MG/DL (ref 0–3)
MICROALBUMIN/CREAT UR-RTO: 10 MG/G (ref 0–30)
MONOCYTES # BLD: 0.5 K/UL (ref 0.05–1.2)
MONOCYTES NFR BLD: 6 % (ref 3–10)
NEUTS SEG # BLD: 4.9 K/UL (ref 1.8–8)
NEUTS SEG NFR BLD: 61 % (ref 40–73)
NRBC # BLD: 0 K/UL (ref 0–0.01)
NRBC BLD-RTO: 0 PER 100 WBC
PLATELET # BLD AUTO: 249 K/UL (ref 135–420)
PMV BLD AUTO: 11 FL (ref 9.2–11.8)
POTASSIUM SERPL-SCNC: 4 MMOL/L (ref 3.5–5.5)
PROT SERPL-MCNC: 7.4 G/DL (ref 6.4–8.2)
RBC # BLD AUTO: 4.59 M/UL (ref 4.2–5.3)
SODIUM SERPL-SCNC: 140 MMOL/L (ref 136–145)
TRIGL SERPL-MCNC: 142 MG/DL (ref ?–150)
VLDLC SERPL CALC-MCNC: 28.4 MG/DL
WBC # BLD AUTO: 8.1 K/UL (ref 4.6–13.2)

## 2022-08-02 PROCEDURE — 80053 COMPREHEN METABOLIC PANEL: CPT

## 2022-08-02 PROCEDURE — 80061 LIPID PANEL: CPT

## 2022-08-02 PROCEDURE — 82043 UR ALBUMIN QUANTITATIVE: CPT

## 2022-08-02 PROCEDURE — 83036 HEMOGLOBIN GLYCOSYLATED A1C: CPT

## 2022-08-02 PROCEDURE — 82306 VITAMIN D 25 HYDROXY: CPT

## 2022-08-02 PROCEDURE — 85025 COMPLETE CBC W/AUTO DIFF WBC: CPT

## 2022-08-02 PROCEDURE — 83735 ASSAY OF MAGNESIUM: CPT

## 2022-08-02 PROCEDURE — 36415 COLL VENOUS BLD VENIPUNCTURE: CPT

## 2022-08-09 ENCOUNTER — OFFICE VISIT (OUTPATIENT)
Dept: INTERNAL MEDICINE CLINIC | Age: 67
End: 2022-08-09
Payer: MEDICARE

## 2022-08-09 VITALS
SYSTOLIC BLOOD PRESSURE: 118 MMHG | OXYGEN SATURATION: 97 % | DIASTOLIC BLOOD PRESSURE: 76 MMHG | BODY MASS INDEX: 33.86 KG/M2 | WEIGHT: 184 LBS | RESPIRATION RATE: 16 BRPM | HEIGHT: 62 IN | TEMPERATURE: 97 F | HEART RATE: 76 BPM

## 2022-08-09 DIAGNOSIS — K76.0 NAFL (NONALCOHOLIC FATTY LIVER): ICD-10-CM

## 2022-08-09 DIAGNOSIS — E55.9 VITAMIN D DEFICIENCY: ICD-10-CM

## 2022-08-09 DIAGNOSIS — R31.29 MICROSCOPIC HEMATURIA: ICD-10-CM

## 2022-08-09 DIAGNOSIS — E11.65 TYPE 2 DIABETES MELLITUS WITH HYPERGLYCEMIA, WITHOUT LONG-TERM CURRENT USE OF INSULIN (HCC): Primary | ICD-10-CM

## 2022-08-09 DIAGNOSIS — I10 ESSENTIAL HYPERTENSION: ICD-10-CM

## 2022-08-09 DIAGNOSIS — E78.5 HYPERLIPIDEMIA, UNSPECIFIED HYPERLIPIDEMIA TYPE: ICD-10-CM

## 2022-08-09 DIAGNOSIS — Z91.119 NONCOMPLIANCE WITH DIET AND MEDICATION REGIMEN: ICD-10-CM

## 2022-08-09 DIAGNOSIS — E66.9 OBESITY (BMI 30.0-34.9): ICD-10-CM

## 2022-08-09 DIAGNOSIS — J45.20 MILD INTERMITTENT ASTHMA WITHOUT COMPLICATION: ICD-10-CM

## 2022-08-09 DIAGNOSIS — Z91.14 NONCOMPLIANCE WITH DIET AND MEDICATION REGIMEN: ICD-10-CM

## 2022-08-09 DIAGNOSIS — I47.1 AVNRT (AV NODAL RE-ENTRY TACHYCARDIA) (HCC): ICD-10-CM

## 2022-08-09 PROCEDURE — 99214 OFFICE O/P EST MOD 30 MIN: CPT | Performed by: INTERNAL MEDICINE

## 2022-08-09 PROCEDURE — G8399 PT W/DXA RESULTS DOCUMENT: HCPCS | Performed by: INTERNAL MEDICINE

## 2022-08-09 PROCEDURE — G8417 CALC BMI ABV UP PARAM F/U: HCPCS | Performed by: INTERNAL MEDICINE

## 2022-08-09 PROCEDURE — 2022F DILAT RTA XM EVC RTNOPTHY: CPT | Performed by: INTERNAL MEDICINE

## 2022-08-09 PROCEDURE — G8752 SYS BP LESS 140: HCPCS | Performed by: INTERNAL MEDICINE

## 2022-08-09 PROCEDURE — 1101F PT FALLS ASSESS-DOCD LE1/YR: CPT | Performed by: INTERNAL MEDICINE

## 2022-08-09 PROCEDURE — 3051F HG A1C>EQUAL 7.0%<8.0%: CPT | Performed by: INTERNAL MEDICINE

## 2022-08-09 PROCEDURE — G8536 NO DOC ELDER MAL SCRN: HCPCS | Performed by: INTERNAL MEDICINE

## 2022-08-09 PROCEDURE — 3017F COLORECTAL CA SCREEN DOC REV: CPT | Performed by: INTERNAL MEDICINE

## 2022-08-09 PROCEDURE — 1090F PRES/ABSN URINE INCON ASSESS: CPT | Performed by: INTERNAL MEDICINE

## 2022-08-09 PROCEDURE — G8754 DIAS BP LESS 90: HCPCS | Performed by: INTERNAL MEDICINE

## 2022-08-09 PROCEDURE — G9899 SCRN MAM PERF RSLTS DOC: HCPCS | Performed by: INTERNAL MEDICINE

## 2022-08-09 PROCEDURE — G8427 DOCREV CUR MEDS BY ELIG CLIN: HCPCS | Performed by: INTERNAL MEDICINE

## 2022-08-09 PROCEDURE — G0463 HOSPITAL OUTPT CLINIC VISIT: HCPCS | Performed by: INTERNAL MEDICINE

## 2022-08-09 PROCEDURE — G8510 SCR DEP NEG, NO PLAN REQD: HCPCS | Performed by: INTERNAL MEDICINE

## 2022-08-09 PROCEDURE — 1123F ACP DISCUSS/DSCN MKR DOCD: CPT | Performed by: INTERNAL MEDICINE

## 2022-08-09 NOTE — PATIENT INSTRUCTIONS
Increase glipizide dose to 10 mg daily. Please obtain the Tdap date from [facility]. Heart-Healthy Diet: Care Instructions  Your Care Instructions     A heart-healthy diet has lots of vegetables, fruits, nuts, beans, and whole grains, and is low in salt. It limits foods that are high in saturated fat, such as meats, cheeses, and fried foods. It may be hard to change your diet, but even small changes can lower your risk of heart attack and heart disease. Follow-up care is a key part of your treatment and safety. Be sure to make and go to all appointments, and call your doctor if you are having problems. It's also a good idea to know your test results and keep a list of the medicines you take. How can you care for yourself at home? Watch your portions  Learn what a serving is. A \"serving\" and a \"portion\" are not always the same thing. Make sure that you are not eating larger portions than are recommended. For example, a serving of pasta is ½ cup. A serving size of meat is 2 to 3 ounces. A 3-ounce serving is about the size of a deck of cards. Measure serving sizes until you are good at [employer]" them. Keep in mind that restaurants often serve portions that are 2 or 3 times the size of one serving. To keep your energy level up and keep you from feeling hungry, eat often but in smaller portions. Eat only the number of calories you need to stay at a healthy weight. If you need to lose weight, eat fewer calories than your body burns (through exercise and other physical activity). Eat more fruits and vegetables  Eat a variety of fruit and vegetables every day. Dark green, deep orange, red, or yellow fruits and vegetables are especially good for you. Examples include spinach, carrots, peaches, and berries. Keep carrots, celery, and other veggies handy for snacks. Buy fruit that is in season and store it where you can see it so that you will be tempted to eat it.   Cook dishes that have a lot of veggies in them, such as stir-fries and soups. Limit saturated and trans fat  Read food labels, and try to avoid saturated and trans fats. They increase your risk of heart disease. Use olive or canola oil when you cook. Bake, broil, grill, or steam foods instead of frying them. Choose lean meats instead of high-fat meats such as hot dogs and sausages. Cut off all visible fat when you prepare meat. Eat fish, skinless poultry, and meat alternatives such as soy products instead of high-fat meats. Soy products, such as tofu, may be especially good for your heart. Choose low-fat or fat-free milk and dairy products. Eat foods high in fiber  Eat a variety of grain products every day. Include whole-grain foods that have lots of fiber and nutrients. Examples of whole-grain foods include oats, whole wheat bread, and brown rice. Buy whole-grain breads and cereals, instead of white bread or pastries. Limit salt and sodium  Limit how much salt and sodium you eat to help lower your blood pressure. Taste food before you salt it. Add only a little salt when you think you need it. With time, your taste buds will adjust to less salt. Eat fewer snack items, fast foods, and other high-salt, processed foods. Check food labels for the amount of sodium in packaged foods. Choose low-sodium versions of canned goods (such as soups, vegetables, and beans). Limit sugar  Limit drinks and foods with added sugar. These include candy, desserts, and soda pop. Limit alcohol  Limit alcohol to no more than 2 drinks a day for men and 1 drink a day for women. Too much alcohol can cause health problems. When should you call for help? Watch closely for changes in your health, and be sure to contact your doctor if:    You would like help planning heart-healthy meals. Where can you learn more? Go to http://www.[employer].com/  Enter V137 in the search box to learn more about \"Heart-Healthy Diet: Care Instructions. \"  Current as of: August 22, 2019               Content Version: 12.6  © 6414-9162 [employer]. Care instructions adapted under license by [employer] (which disclaims liability or warranty for this information). If you have questions about a medical condition or this instruction, always ask your healthcare professional. [address] any warranty or liability for your use of this information. Learning About Diabetes Food Guidelines  Your Care Instructions     Meal planning is important to manage diabetes. It helps keep your blood sugar at a target level (which you set with your doctor). You don't have to eat special foods. You can eat what your family eats, including sweets once in a while. But you do have to pay attention to how often you eat and how much you eat of certain foods. You may want to work with a dietitian or a certified diabetes educator (CDE) to help you plan meals and snacks. A dietitian or CDE can also help you lose weight if that is one of your goals. What should you know about eating carbs? Managing the amount of carbohydrate (carbs) you eat is an important part of healthy meals when you have diabetes. Carbohydrate is found in many foods. Learn which foods have carbs. And learn the amounts of carbs in different foods. Bread, cereal, pasta, and rice have about 15 grams of carbs in a serving. A serving is 1 slice of bread (1 ounce), ½ cup of cooked cereal, or 1/3 cup of cooked pasta or rice. Fruits have 15 grams of carbs in a serving. A serving is 1 small fresh fruit, such as an apple or orange; ½ of a banana; ½ cup of cooked or canned fruit; ½ cup of fruit juice; 1 cup of melon or raspberries; or 2 tablespoons of dried fruit. Milk and no-sugar-added yogurt have 15 grams of carbs in a serving. A serving is 1 cup of milk or 2/3 cup of no-sugar-added yogurt. Starchy vegetables have 15 grams of carbs in a serving.  A serving is ½ cup of mashed potatoes or sweet potato; 1 cup winter squash; ½ of a small baked potato; ½ cup of cooked beans; or ½ cup cooked corn or green peas. Learn how much carbs to eat each day and at each meal. A dietitian or CDE can teach you how to keep track of the amount of carbs you eat. This is called carbohydrate counting. If you are not sure how to count carbohydrate grams, use the Plate Method to plan meals. It is a good, quick way to make sure that you have a balanced meal. It also helps you spread carbs throughout the day. Divide your plate by types of foods. Put non-starchy vegetables on half the plate, meat or other protein food on one-quarter of the plate, and a grain or starchy vegetable in the final quarter of the plate. To this you can add a small piece of fruit and 1 cup of milk or yogurt, depending on how many carbs you are supposed to eat at a meal.  Try to eat about the same amount of carbs at each meal. Do not \"save up\" your daily allowance of carbs to eat at one meal.  Proteins have very little or no carbs per serving. Examples of proteins are beef, chicken, turkey, fish, eggs, tofu, cheese, cottage cheese, and peanut butter. A serving size of meat is 3 ounces, which is about the size of a deck of cards. Examples of meat substitute serving sizes (equal to 1 ounce of meat) are 1/4 cup of cottage cheese, 1 egg, 1 tablespoon of peanut butter, and ½ cup of tofu. How can you eat out and still eat healthy? Learn to estimate the serving sizes of foods that have carbohydrate. If you measure food at home, it will be easier to estimate the amount in a serving of restaurant food. If the meal you order has too much carbohydrate (such as potatoes, corn, or baked beans), ask to have a low-carbohydrate food instead. Ask for a salad or green vegetables. If you use insulin, check your blood sugar before and after eating out to help you plan how much to eat in the future.   If you eat more carbohydrate at a meal than you had planned, take a walk or do other exercise. This will help lower your blood sugar. What else should you know? Limit saturated fat, such as the fat from meat and dairy products. This is a healthy choice because people who have diabetes are at higher risk of heart disease. So choose lean cuts of meat and nonfat or low-fat dairy products. Use olive or canola oil instead of butter or shortening when cooking. Don't skip meals. Your blood sugar may drop too low if you skip meals and take insulin or certain medicines for diabetes. Check with your doctor before you drink alcohol. Alcohol can cause your blood sugar to drop too low. Alcohol can also cause a bad reaction if you take certain diabetes medicines. Follow-up care is a key part of your treatment and safety. Be sure to make and go to all appointments, and call your doctor if you are having problems. It's also a good idea to know your test results and keep a list of the medicines you take. Where can you learn more? Go to http://www.pittman.com/  Enter I147 in the search box to learn more about \"Learning About Diabetes Food Guidelines. \"  Current as of: December 20, 2019               Content Version: 12.6  © 2804-5755 [employer]. Care instructions adapted under license by [employer] (which disclaims liability or warranty for this information). If you have questions about a medical condition or this instruction, always ask your healthcare professional. [address] any warranty or liability for your use of this information. Learning About Meal Planning for Diabetes  Why plan your meals? Meal planning can be a key part of managing diabetes. Planning meals and snacks with the right balance of carbohydrate, protein, and fat can help you keep your blood sugar at the target level you set with your doctor. You don't have to eat special foods.  You can eat what your family eats, including sweets once in a while. But you do have to pay attention to how often you eat and how much you eat of certain foods. You may want to work with a dietitian or a certified diabetes educator. He or she can give you tips and meal ideas and can answer your questions about meal planning. This health professional can also help you reach a healthy weight if that is one of your goals. What plan is right for you? Your dietitian or diabetes educator may suggest that you start with the plate format or carbohydrate counting. The plate format  The plate format is a simple way to help you manage how you eat. You plan meals by learning how much space each food should take on a plate. Using the plate format helps you spread carbohydrate throughout the day. It can make it easier to keep your blood sugar level within your target range. It also helps you see if you're eating healthy portion sizes. To use the plate format, you put non-starchy vegetables on half your plate. Add meat or meat substitutes on one-quarter of the plate. Put a grain or starchy vegetable (such as brown rice or a potato) on the final quarter of the plate. You can add a small piece of fruit and some low-fat or fat-free milk or yogurt, depending on your carbohydrate goal for each meal.  Here are some tips for using the plate format:  Make sure that you are not using an oversized plate. A 9-inch plate is best. Many restaurants use larger plates. Get used to using the plate format at home. Then you can use it when you eat out. Write down your questions about using the plate format. Talk to your doctor, a dietitian, or a diabetes educator about your concerns. Carbohydrate counting  With carbohydrate counting, you plan meals based on the amount of carbohydrate in each food. Carbohydrate raises blood sugar higher and more quickly than any other nutrient. It is found in desserts, breads and cereals, and fruit.  It's also found in starchy vegetables such as potatoes and corn, grains such as rice and pasta, and milk and yogurt. Spreading carbohydrate throughout the day helps keep your blood sugar levels within your target range. Your daily amount depends on several things, including your weight, how active you are, which diabetes medicines you take, and what your goals are for your blood sugar levels. A registered dietitian or diabetes educator can help you plan how much carbohydrate to include in each meal and snack. A guideline for your daily amount of carbohydrate is:  45 to 60 grams at each meal. That's about the same as 3 to 4 carbohydrate servings. 15 to 20 grams at each snack. That's about the same as 1 carbohydrate serving. The Nutrition Facts label on packaged foods tells you how much carbohydrate is in a serving of the food. First, look at the serving size on the food label. Is that the amount you eat in a serving? All of the nutrition information on a food label is based on that serving size. So if you eat more or less than that, you'll need to adjust the other numbers. Total carbohydrate is the next thing you need to look for on the label. If you count carbohydrate servings, one serving of carbohydrate is 15 grams. For foods that don't come with labels, such as fresh fruits and vegetables, you'll need a guide that lists carbohydrate in these foods. Ask your doctor, dietitian, or diabetes educator about books or other nutrition guides you can use. If you take insulin, you need to know how many grams of carbohydrate are in a meal. This lets you know how much rapid-acting insulin to take before you eat. If you use an insulin pump, you get a constant rate of insulin during the day. So the pump must be programmed at meals to give you extra insulin to cover the rise in blood sugar after meals. When you know how much carbohydrate you will eat, you can take the right amount of insulin.  Or, if you always use the same amount of insulin, you need to make sure that you eat the same amount of carbohydrate at meals. If you need more help to understand carbohydrate counting and food labels, ask your doctor, dietitian, or diabetes educator. How do you get started with meal planning? Here are some tips to get started:  Plan your meals a week at a time. Don't forget to include snacks too. Use cookbooks or online recipes to plan several main meals. Plan some quick meals for busy nights. You also can double some recipes that freeze well. Then you can save half for other busy nights when you don't have time to cook. Make sure you have the ingredients you need for your recipes. If you're running low on basic items, put these items on your shopping list too. List foods that you use to make breakfasts, lunches, and snacks. List plenty of fruits and vegetables. Post this list on the refrigerator. Add to it as you think of more things you need. Take the list to the store to do your weekly shopping. Follow-up care is a key part of your treatment and safety. Be sure to make and go to all appointments, and call your doctor if you are having problems. It's also a good idea to know your test results and keep a list of the medicines you take. Where can you learn more? Go to http://www.gray.com/  Enter O569 in the search box to learn more about \"Learning About Meal Planning for Diabetes. \"  Current as of: December 20, 2019               Content Version: 12.6  © 2134-5154 [employer]. Care instructions adapted under license by [employer] (which disclaims liability or warranty for this information). If you have questions about a medical condition or this instruction, always ask your healthcare professional. [address] any warranty or liability for your use of this information.

## 2022-08-09 NOTE — PROGRESS NOTES
1. \"Have you been to the ER, urgent care clinic since your last visit? Hospitalized since your last visit? \" No    2. \"Have you seen or consulted any other health care providers outside of the [address] [place] [address] since your last visit? \" No     3. For patients aged 39-70: Has the patient had a colonoscopy / FIT/ Cologuard? Yes - no Care Gap present      If the patient is female:    4. For patients aged 41-77: Has the patient had a mammogram within the past 2 years? Yes - no Care Gap present      5. For patients aged 21-65: Has the patient had a pap smear?  NA - based on age or sex

## 2022-08-14 NOTE — PROGRESS NOTES
HPI:   Naty Frederick is a 79y.o. year old female who presents today for a routine visit. She has a history of hypertension, hyperlipidemia, paroxysmal SVT, diabetes mellitus, GERD, asthma, elevated transaminases, atypical mycobacterial pneumonia, and noncompliance. She has completed the Moderna COVID-19 vaccine series and received one Moderna booster dose. She reports that she is doing reasonably well. She states that she has started treatment with glipizide SR but has only been taking a half a tablet (5 mg) instead of increasing to 10 mg as instructed at her last visit. She states that she has not been compliant with her diet or weight loss. She is otherwise without new complaints and feeling generally well. Summary of prior  medical history:  She has a history of hypertension, treated with metoprolol and hydrochlorothiazide (+ potassium). She reports that she does not check her blood pressure at home. She does not exercise regularly, but denies any chest pain, shortness of breath at rest or with exertion, lightheadedness, or edema. 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 She has had approximately six severe episodes over the years, prompting presentation to the ED and treatment with IV Adenosine. She currently reports infrequent short episodes of palpitations and is being treated with metoprolol. She is followed by Dr. Fausto Smith. She had an echocardiogram (10/2005) showing normal LV size and function (EF 60-65%), and no valvular pathology. In 11/2012, she underwent an exercise stress echocardiogram, which was normal at maximal exercise. She has a history of hyperlipidemia, treated with simvastatin from 10/2012 to 3/2015 at which time she stopped taking it due to myalgias.  She restarted it on 8/2015 and continued to take it without difficulty until 3/2016, when it was noticed that she had transaminase elevation (AST 85/ ) and it was discontinued. Evaluation included hepatitis A, B, and C levels (negative), iron panel (normal), and RUQ ultrasound (3/23/2016) with limited sonographic window for the liver; only partially visualized but grossly unremarkable. Repeat hepatic panel (4/22/2016) showed AST 75 and ALT 98. Repeat lipid panel showed total chol 215/ / HDL 64/. In 5/2016, she had an abdominal CT scan showing the liver to be normal in size with normal parenchymal density; no discrete mass or ascites, and no intrahepatic biliary dilatation. She restarted simvastatin 20 mg daily in 4/2018. She has been taking it without difficulty since restarting. She underwent repeat evaluation by Dr. Victorino Corona for her elevated LFT's, and reports that lab evaluation was negative. She underwent an abdominal ultrasound (2/16/2021) which showed an echogenic lobulated contour liver suggestive of steatosis +/- cirrhosis; no focal lesions seen. Recommendation was for her to lose weight. She has a history of diabetes mellitus, with impaired fasting glucose ranging from 103-111 since 2012, and HbA1c to 6.6-6.8 since 9/2016 (not checked previously). She was prescribed metformin ER last visit for an increase in her HbA1c to 7.1, but she reports that she took it for only one week and discontinued for unclear reasons. She was not experiencing any side effects. She denies any polyuria, polydipsia, nocturia, or blurry vision, and has no history of retinopathy, neuropathy, or nephropathy. She has regular eye exams with Dr. Sophia Farmer. She has a history of asthma and allergic rhinitis and is followed by Dr. Lauren Neil. She is receiving immunotherapy once per month, and reports that she has not required any inhalers recently. She states that she does not use Qvar daily, but will take it occasionally. She does use Claritin every day.   In 7/2019, she reported increasing difficulty with right ear fullness, post nasal drainage, hoarseness, and cough, and was referred to Dr. Malcolm Santoyo. He performed a nasal laryngoscopy and found evidence of laryngopharyngeal reflux. She was started on daily omeprazole, and she states that she has noticed some improvement. In 10/2017, she fell down the steps of her porch and had difficulty with right knee pain and swelling. She was evaluated by Dr. Ricky Devries in 12/2017, and right knee xray showed decreased medial joint space, and moderate degenerative changes. She received a cortisone injection, but did not notice any improvement. She underwent an MRI of the right knee (1/29/2018) which showed complete tear of posterior horn and root of the medial meniscus; tear of the body and posterior horn of the lateral meniscus; tricompartmental osteoarthritis most prominent in medial and patellofemoral compartments; moderate joint effusion; small Baker's cyst. It was recommended that she consider knee replacement and arthroscopy. However, she decided to seek a second opinion and was evaluated by Dr. Gee Garcia. She also underwent an MRI of her left knee (3/23/2018) showing radial tear posterior horn medial meniscus with extrusion of the body. Also a radial tear in the mid body; lateral meniscus intrasubstance degeneration and probable small undersurface tear of the posterior horn; medial and patellofemoral compartments moderate chondral loss; s. ubchondral bone marrow edema in the medial tibial plateau, likely reactive. She is completed physical therapy for her bilateral knees and feels that it may have helped. 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 She was treated with Avelox, and repeat chest x-ray in 1/2012 showed complete resolution of the pneumonia. She denies any cough or shortness of breath. She had a screening colonoscopy in 12/2006 by Dr. Demetrice Ansari showing a 5 mm sessile polyp in the rectum (pathology: hyperplastic).  She had a repeat colonoscopy in 12/2016 which showed moderate sigmoid diverticulosis and a 6 mm sessile ascending colon polyp (pathology: serrated adenoma). She had a repeat screening colonoscopy in 4/2021 by Dr. Adan Olivia showing a single six 6 mm polyp in the distal sigmoid colon (pathology: Hyperplastic polyp). Follow-up recommended for 5 years. She denies any abdominal pain, nausea, vomiting, melena, hematochezia, or change in bowel movements. She does take omeprazole occasionally for GERD symptoms. In 12/2017, she was referred by her gynecologist for evaluation by Dr. Gracia Worrell for microscopic hematuria, and urine cytology was negative. However, she states that she refused his recommendations to undergo a CT urogram or cystoscopy. She denies any dysuria, gross hematuria, or flank pain. Past Medical History:   Diagnosis Date    Allergic rhinitis     Asthma     Cardiac stress echo, normal 11/02/2012    Normal maximal stress echo study. EF 60%. Ex time 9 min 45 sec. Chondromalacia patella     Colon polyps     Diabetes mellitus (HCC)     GERD (gastroesophageal reflux disease)     History of pneumonia 01/2012    AFB smear positive. 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 Hyperlipidemia     Hypertension     Menopause     Plantar fasciitis     left    PSVT (paroxysmal supraventricular tachycardia) ([facility])     A-V dharmesh reentrant tachycardia     Past Surgical History:   Procedure Laterality Date    ENDOSCOPY, COLON, DIAGNOSTIC      polyp    HX CERVICAL POLYPECTOMY      HX CYST INCISION AND DRAINAGE Right 10 or more years    HX DILATION AND CURETTAGE      HX GYN      polyp on cervix    HX POLYPECTOMY      from rectum     Current Outpatient Medications   Medication Sig    LORazepam (ATIVAN) 1 mg tablet take 1 tablet by mouth every 8 hours if needed for anxiety    glipiZIDE SR (GLUCOTROL XL) 10 mg CR tablet Take 1 Tablet by mouth daily.  Indications: type 2 diabetes mellitus    fluticasone propionate (FLONASE) 50 mcg/actuation nasal spray instill 2 sprays into each nostril once daily    potassium chloride (K-DUR, KLOR-CON M20) 20 mEq tablet take 1 tablet by mouth once daily    metoprolol succinate (TOPROL-XL) 50 mg XL tablet take 1 tablet by mouth once daily    losartan (COZAAR) 25 mg tablet take 1 tablet by mouth once daily    hydroCHLOROthiazide (HYDRODIURIL) 12.5 mg tablet take 1 tablet by mouth once daily    simvastatin (ZOCOR) 20 mg tablet take 1 tablet by mouth at bedtime  Indications: excessive fat in the blood    loratadine (Claritin) 10 mg tablet Take 1 Tablet by mouth daily. Blood-Glucose Meter monitoring kit Monitor fasting blood glucose once daily    glucose blood VI test strips (ASCENSIA AUTODISC VI, ONE TOUCH ULTRA TEST VI) strip Monitor fasting blood glucose once daily    lancets misc Monitor fasting blood glucose once daily    albuterol (PROVENTIL HFA, VENTOLIN HFA, PROAIR HFA) 90 mcg/actuation inhaler inhale 2 puffs by mouth every 4 hours if needed for wheezing    clotrimazole-betamethasone (LOTRISONE) topical cream Apply  to both ear canals and affected part of outer ear twice a day with a finger. cholecalciferol (VITAMIN D3) 1,000 unit cap Take 2,000 Units by mouth daily. carboxymethylcellulose sodium (REFRESH LIQUIGEL) 1 % dlgl ophthalmic solution Apply  to eye. No current facility-administered medications for this visit. Allergies and Intolerances: Allergies   Allergen Reactions    Altace [Ramipril] Cough    Penicillins Other (comments)     Hands peel    Sulfur Itching     Family History: She had two aunts who had breast cancer (her mother's sister and father's sister). She has no FH of colon cancer. Her mother passed away from uterine cancer. Her father  from metastatic prostate cancer.      Family History   Problem Relation Age of Onset    OSTEOARTHRITIS Mother     Hypertension Mother              Cancer Father         bone cancer    Hypertension Sister Hypertension Sister     Hypertension Sister     Breast Cancer Maternal Aunt     Breast Cancer Paternal Aunt     Diabetes Other     Stroke Other     Other Sister         twin sister - osteopenia and low vitamin D levels     Social History:   She  reports that she has never smoked. She has never used smokeless tobacco. She is  and has two sons. She was a homemaker, but worked part-time in . She now helps care for her grandchildren. Social History     Substance and Sexual Activity   Alcohol Use No     Immunization History:  Immunization History   Administered Date(s) Administered    COVID-19, MODERNA BLUE border, Primary or Immunocompromised, (age 18y+), IM, 100 mcg/0.5mL 03/01/2021, 04/02/2021    COVID-19, MODERNA Booster BLUE border, (age 18y+), IM, 50mcg/0.25mL 11/30/2021    Influenza Vaccine (Quad) PF (>6 Mo Flulaval, Fluarix, and >3 Yrs Afluria, Fluzone [id number]) 10/23/2015, 10/19/2016, 10/05/2017, 10/26/2018, 10/08/2019    Influenza Vaccine PF 12/12/2013, 10/03/2014    Influenza Vaccine Split 11/02/2011, 10/22/2012    Influenza Vaccine Whole 10/29/2010    Influenza, Quadrivalent, Adjuvanted (>65 Yrs FLUAD QUAD [id number]) 10/25/2021    PPD 01/03/2012    Pneumococcal Conjugate (PCV-13) 08/13/2020    Pneumococcal Polysaccharide (PPSV-23) 03/21/2017, 04/19/2022    TB Skin Test (PPD) Intradermal 02/12/2014    TDAP Vaccine 02/16/2012    Tdap 04/01/2020    Zoster Recombinant 09/16/2020, 11/27/2020       Review of Systems:   As above included in HPI. Otherwise 11 point review of systems negative including constitutional, skin, HENT, eyes, respiratory, cardiovascular, gastrointestinal, genitourinary, musculoskeletal, endocrine, hematologic, allergy, and neurologic.     Physical:   Visit Vitals  /76 (BP 1 Location: Left upper arm, BP Patient Position: Sitting)   Pulse 76   Temp 97 °F (36.1 °C) (Temporal)   Resp 16   Ht 5' 2\" (1.575 m)   Wt 184 lb (83.5 kg)   SpO2 97%   BMI 33.65 kg/m²       Exam:   Patient appears in no apparent distress. Affect is appropriate. HEENT: PERRLA, anicteric,  no JVD, adenopathy or thyromegaly. No carotid bruits or radiated murmur. Lungs: clear to auscultation, no wheezes, rhonchi, or rales. Heart: regular rate and rhythm. No murmur, rubs, gallops  Abdomen: soft, nontender, nondistended, normal bowel sounds, no hepatosplenomegaly or masses. Extremities: without edema. Review of Data:  Labs:  Hospital Outpatient Visit on 08/02/2022   Component Date Value Ref Range Status    WBC 08/02/2022 8.1  4.6 - 13.2 K/uL Final    RBC 08/02/2022 4.59  4.20 - 5.30 M/uL Final    HGB 08/02/2022 13.5  12.0 - 16.0 g/dL Final    HCT 08/02/2022 41.0  35.0 - 45.0 % Final    MCV 08/02/2022 89.3  78.0 - 100.0 FL Final    MCH 08/02/2022 29.4  24.0 - 34.0 PG Final    MCHC 08/02/2022 32.9  31.0 - 37.0 g/dL Final    RDW 08/02/2022 11.9  11.6 - 14.5 % Final    PLATELET 89/15/2881 823  135 - 420 K/uL Final    MPV 08/02/2022 11.0  9.2 - 11.8 FL Final    NRBC 08/02/2022 0.0  0  WBC Final    ABSOLUTE NRBC 08/02/2022 0.00  0.00 - 0.01 K/uL Final    NEUTROPHILS 08/02/2022 61  40 - 73 % Final    LYMPHOCYTES 08/02/2022 28  21 - 52 % Final    MONOCYTES 08/02/2022 6  3 - 10 % Final    EOSINOPHILS 08/02/2022 5  0 - 5 % Final    BASOPHILS 08/02/2022 1  0 - 2 % Final    IMMATURE GRANULOCYTES 08/02/2022 0  0.0 - 0.5 % Final    ABS. NEUTROPHILS 08/02/2022 4.9  1.8 - 8.0 K/UL Final    ABS. LYMPHOCYTES 08/02/2022 2.2  0.9 - 3.6 K/UL Final    ABS. MONOCYTES 08/02/2022 0.5  0.05 - 1.2 K/UL Final    ABS. EOSINOPHILS 08/02/2022 0.4  0.0 - 0.4 K/UL Final    ABS. BASOPHILS 08/02/2022 0.1  0.0 - 0.1 K/UL Final    ABS. IMM.  GRANS. 08/02/2022 0.0  0.00 - 0.04 K/UL Final    DF 08/02/2022 AUTOMATED    Final    Hemoglobin A1c 08/02/2022 7.7 (A) 4.2 - 5.6 % Final    Est. average glucose 08/02/2022 174  mg/dL Final    LIPID PROFILE 08/02/2022        Final    Cholesterol, total 08/02/2022 164  <200 MG/DL Final    Triglyceride 08/02/2022 142  <150 MG/DL Final    HDL Cholesterol 08/02/2022 66 (A) 40 - 60 MG/DL Final    LDL, calculated 08/02/2022 69.6  0 - 100 MG/DL Final    VLDL, calculated 08/02/2022 28.4  MG/DL Final    CHOL/HDL Ratio 08/02/2022 2.5  0 - 5.0   Final    Magnesium 08/02/2022 1.7  1.6 - 2.6 mg/dL Final    Sodium 08/02/2022 140  136 - 145 mmol/L Final    Potassium 08/02/2022 4.0  3.5 - 5.5 mmol/L Final    Chloride 08/02/2022 103  100 - 111 mmol/L Final    CO2 08/02/2022 31  21 - 32 mmol/L Final    Anion gap 08/02/2022 6  3.0 - 18 mmol/L Final    Glucose 08/02/2022 116 (A) 74 - 99 mg/dL Final    BUN 08/02/2022 11  7.0 - 18 MG/DL Final    Creatinine 08/02/2022 0.67  0.6 - 1.3 MG/DL Final    BUN/Creatinine ratio 08/02/2022 16  12 - 20   Final    GFR est AA 08/02/2022 >60  >60 ml/min/1.73m2 Final    GFR est non-AA 08/02/2022 >60  >60 ml/min/1.73m2 Final    Calcium 08/02/2022 9.2  8.5 - 10.1 MG/DL Final    Bilirubin, total 08/02/2022 0.4  0.2 - 1.0 MG/DL Final    ALT (SGPT) 08/02/2022 31  13 - 56 U/L Final    AST (SGOT) 08/02/2022 26  10 - 38 U/L Final    Alk. phosphatase 08/02/2022 80  45 - 117 U/L Final    Protein, total 08/02/2022 7.4  6.4 - 8.2 g/dL Final    Albumin 08/02/2022 3.7  3.4 - 5.0 g/dL Final    Globulin 08/02/2022 3.7  2.0 - 4.0 g/dL Final    A-G Ratio 08/02/2022 1.0  0.8 - 1.7   Final    Microalbumin,urine random 08/02/2022 1.55  0 - 3.0 MG/DL Final    Creatinine, urine 08/02/2022 148.00 (A) 30 - 125 mg/dL Final    Microalbumin/Creat ratio (mg/g cre* 08/02/2022 10  0 - 30 mg/g Final    Vitamin D 25-Hydroxy 08/02/2022 44.4  30 - 100 ng/mL Final         Health Maintenance:  Screening:    Mammogram: abnormal (3/2022) left breast mass aspiration showed benign cyst.    PAP smear: negative (3/2020) with negative HPV. Followed by Dr. Angelo Mohan. Colorectal: colonoscopy (4/2021) serrated adenoma. Dr. Kylie Thomas. Due 4/2026.    Depression: none   DM (HbA1c/FPG): HbA1c 7.7 (8/2022)   Hepatitis C: negative (3/2016)   Falls: one   DEXA: within normal limits (3/2022)   Glaucoma: regular eye exams with Dr. Mandie Arana (last 10/2021)   Smoking: none   Vitamin D: 44.4 (8/2022)    Medicare Wellness: 9/14/2021 (Initial)      Impression:  Patient Active Problem List   Diagnosis Code    Benign hypertensive heart disease without congestive heart failure I11.9    Allergic rhinitis J30.9    Colon polyps K63.5    AVNRT (AV dharmesh re-entry tachycardia) ([facility]) I47.1    Hyperlipidemia E78.5    Essential hypertension I10    Asthma J45.909    GERD (gastroesophageal reflux disease) K21.9    Type 2 diabetes mellitus without complication, without long-term current use of insulin ([facility]) E11.9    Vitamin D deficiency E55.9    Microscopic hematuria R31.29    Chronic pain of both knees M25.561, M25.562, G89.29    Obesity (BMI 30.0-34. 9) E66.9    Noncompliance with diet and medication regimen Z91.11, Z91.14    Anxiety F41.9    Spondylolisthesis, lumbar region M43.16    DDD (degenerative disc disease), lumbar M51.36    NAFL (nonalcoholic fatty liver) X17.8    HENRY (nonalcoholic steatohepatitis) K75.81       Plan:  1. Diabetes mellitus. Impaired fasting glucose had been present since 2012. Diagnosed in 9/2016 when HbA1c was assessed and found to be elevated at 6.6. Had been steadily increasing and reached 7.5 in 4/2020. Was being treated with metformin  mg at dinner but stopped taking it due to diarrhea. Prescribed sitagliptin and Jardiance, but too costly. Started on glipizide SR 5 mg daily but has not been compliant with dose. HbA1c had worsened in 9/2021 and 1/2022 to 7.4 and repeat HbA1c worsened in 4/2022 to 8.4 and again stressed need for treatment. Unwilling to proceed with GLP-1 agonist which would be beneficial for weight loss. Currently taking glipizide SR 5 mg daily and repeat HbA1c 7.7 today. Advised to increase dose of glipizide SR to 10 mg daily. No evidence of microvascular complications. On statin and ARB.  Continue regular eye exams with  Juan. Foot exam normal (9/2021). Urine microalbumin/ creatinine ratio remains without evidence of microalbuminuria (10 mg/g). Emphasized importance of lifestyle modifications, including heart healthy diabetic diet, regular exercise, and weight loss. Will reassess HbA1c in 3 months. 2. Hypertension. Blood pressure remains well controlled today on losartan 25 mg daily, metoprolol XL 50 mg daily, and hydrochlorothiazide 12.5 mg daily. Renal function has been normal with creatinine 0.67/ eGFR >60 today. Continue to follow. 3. Hyperlipidemia. On moderate intensity dose simvastatin 20 mg daily with LDL 69 and HDL 66, indicative of good control. Continue to follow. 4. Hepatic steatosis. Developed in 3/2016 with AST 85/ . Evaluation included hepatitis A, B, and C levels (negative), iron panel (normal), and RUQ ultrasound (3/23/2016) with limited sonographic window for the liver; only partially visualized but grossly unremarkable. In 5/2016, she had an abdominal CT scan showing the liver to be normal in size with normal parenchymal density; no discrete mass or ascites, and no intrahepatic biliary dilatation. Transaminases had normalized since that time. However, repeat elevation in 11/2020 with ALT 66 and AST 45. Referred to Dr. Jani West and liver ultrasound (2/2021) showed lobulated contour liver consistent with steatosis +/- possible cirrhosis. She stated that weight loss recommended. Normalization of LFTs today but weight essentially unchanged. Discussed importance given hepatic steatosis and concern for progression to fibrosis. Will continue to monitor. 5. AV dharmesh reentrant tachycardia. No recent episodes. On metoprolol succinate 50 mg daily and no significant palpitations recently. Established care with Dr. Ricky Denise in 10/2021 and will be following annually. 6. Asthma, mild persistent. Associated with allergies.   Had been receiving monthly immunotherapy injections with Dr. Sha Amaya, but reports discontinued in 11/2021 as of unclear benefit. Using Claritin and Flonase regularly with good control of allergy symptoms. 7. GERD/ laryngopharyngeal reflux. Evaluated by Dr. Jhoana Sena, and noted on laryngoscopic exam. Started on omeprazole daily but states that decided to discontinue as did not help with cough. Was receiving immunotherapy as discussed but states now has discontinued. No increasing symptoms today. 8. Microscopic hematuria. Patient refusing further evaluation with cystoscopy or CT urogram. Urine cytology negative. Did have abdominal CT scan in 5/2016 where kidneys appeared normal. Unwilling to complete evaluation with Dr. Piter Childs. Repeat urinalysis with evidence small blood and 3-5 RBCs in 10/2019. Trace blood with 0-3 RBC in 11/2020 and negative for microscopic hematuria since that time, last 1/2022. Will continue to monitor. 9. Bilateral knee pain. Did not respond to cortisone injection. Had both right and left knee MRI showing variable degrees of menisci tears and osteoarthritis of knee joint. Now being followed by Dr. Jerry Sandoval and reports relatively quiescent. 10.  Low back pain with right sciatica. Patient presented in 10/2020 with severe low back pain and right sciatica for several days. Unclear as to inciting incident. Denied lower extremity weakness or paresthesias. Began taking Etodolac and methocarbamol as prescribed by [facility] several months prior for similar symptoms. Lumbar spine x-ray (10/2020) showed multilevel degenerative findings, trace levorotoscoliosis and multilevel spondylolisthesis. Following with Dr. Jerry Sandoval. Reported increasing low back pain without sciatica in 10/2021 and referred to physical therapy with improvement. Reports no worsening symptoms today. 11. Abnormal mammogram/ family history of breast cancer. Positive family history for breast cancer in a maternal aunt in her 46s and in a paternal aunt.  Wishing to continue only with screening mammograms annually and not assess lifetime risk. Underwent screening mammogram on 3/24/2022 which showed a 5 mm mass deep in the left breast at the 12 o'clock position. Diagnostic mammogram and ultrasound on 7/9/3185 showed a complicated cystic structure and recommended ultrasound-guided cyst aspiration and biopsy. Evaluated by Dr. Amy Correa who recommended proceeding with biopsy by diagnostic radiology and patient underwent ultrasound-guided aspiration on 5/18/2022 by Dr. Jaqui Mohan and clear yellow fluid was aspirated with disappearance of the mass and diagnosis of benign cyst made. Recommended to continue with routine mammogram in 3/2023. 12. Obesity. Weight decreased 5 pounds since last visit. Patient not exercising and unclear as to appropriate diabetic diet or dietary changes needed to lose weight. Referred to Scout. NAMRATA Clark for nutrition counseling and attended two sessions. Emphasized importance of continued attempts at lifestyle modifications, including heart healthy diabetic diet, regular exercise, and weight loss. Will continue to address. 13. Health maintenance. Completed Moderna COVID-19 vaccine series and received one Moderna booster dose. Advised to obtain new booster dose this fall when available. Received 2/2 doses of Shingrix vaccine and Tdap vaccine. Other immunizations up to date. Colonoscopy completed. Mammogram as discussed. Continue regular eye exams with Dr. Quang Christy. Vitamin D level normalized today on maintenance dose supplement 2000 units daily. Repeat bone density study (3/24/2022) within normal limits. Medicare wellness visit up-to-date. Patient understands recommendations and agrees with plan. Follow-up in 3 months.

## 2022-09-18 RX ORDER — LOSARTAN POTASSIUM 25 MG/1
TABLET ORAL
Qty: 90 TABLET | Refills: 2 | Status: SHIPPED | OUTPATIENT
Start: 2022-09-18

## 2022-10-06 RX ORDER — SIMVASTATIN 20 MG/1
TABLET, FILM COATED ORAL
Qty: 90 TABLET | Refills: 3 | Status: SHIPPED | OUTPATIENT
Start: 2022-10-06

## 2022-10-12 ENCOUNTER — OFFICE VISIT (OUTPATIENT)
Dept: CARDIOLOGY CLINIC | Age: 67
End: 2022-10-12
Payer: MEDICARE

## 2022-10-12 VITALS
OXYGEN SATURATION: 98 % | HEART RATE: 74 BPM | DIASTOLIC BLOOD PRESSURE: 80 MMHG | WEIGHT: 186 LBS | HEIGHT: 62 IN | SYSTOLIC BLOOD PRESSURE: 130 MMHG | BODY MASS INDEX: 34.23 KG/M2

## 2022-10-12 DIAGNOSIS — E78.5 HYPERLIPIDEMIA, UNSPECIFIED HYPERLIPIDEMIA TYPE: ICD-10-CM

## 2022-10-12 DIAGNOSIS — R00.2 PALPITATIONS: ICD-10-CM

## 2022-10-12 DIAGNOSIS — I47.1 AVNRT (AV NODAL RE-ENTRY TACHYCARDIA) (HCC): Primary | ICD-10-CM

## 2022-10-12 PROCEDURE — 1123F ACP DISCUSS/DSCN MKR DOCD: CPT | Performed by: INTERNAL MEDICINE

## 2022-10-12 PROCEDURE — G8536 NO DOC ELDER MAL SCRN: HCPCS | Performed by: INTERNAL MEDICINE

## 2022-10-12 PROCEDURE — G8417 CALC BMI ABV UP PARAM F/U: HCPCS | Performed by: INTERNAL MEDICINE

## 2022-10-12 PROCEDURE — G8754 DIAS BP LESS 90: HCPCS | Performed by: INTERNAL MEDICINE

## 2022-10-12 PROCEDURE — 1101F PT FALLS ASSESS-DOCD LE1/YR: CPT | Performed by: INTERNAL MEDICINE

## 2022-10-12 PROCEDURE — G8427 DOCREV CUR MEDS BY ELIG CLIN: HCPCS | Performed by: INTERNAL MEDICINE

## 2022-10-12 PROCEDURE — 99214 OFFICE O/P EST MOD 30 MIN: CPT | Performed by: INTERNAL MEDICINE

## 2022-10-12 PROCEDURE — 3017F COLORECTAL CA SCREEN DOC REV: CPT | Performed by: INTERNAL MEDICINE

## 2022-10-12 PROCEDURE — 93000 ELECTROCARDIOGRAM COMPLETE: CPT | Performed by: INTERNAL MEDICINE

## 2022-10-12 PROCEDURE — G9899 SCRN MAM PERF RSLTS DOC: HCPCS | Performed by: INTERNAL MEDICINE

## 2022-10-12 PROCEDURE — 1090F PRES/ABSN URINE INCON ASSESS: CPT | Performed by: INTERNAL MEDICINE

## 2022-10-12 PROCEDURE — G8399 PT W/DXA RESULTS DOCUMENT: HCPCS | Performed by: INTERNAL MEDICINE

## 2022-10-12 PROCEDURE — G8752 SYS BP LESS 140: HCPCS | Performed by: INTERNAL MEDICINE

## 2022-10-12 PROCEDURE — G8432 DEP SCR NOT DOC, RNG: HCPCS | Performed by: INTERNAL MEDICINE

## 2022-10-12 NOTE — PROGRESS NOTES
History of Present Illness:  79 YOF here for follow up. She established care with me from Dr. Azalia Lee after seeing for 25 years for history of recurrent SVT, treated with Toprol. She has done well over the past year. No new chest pain, dyspnea, PND, orthopnea or edema. She has mild asthma, which is a concern today, and she also has left knee pain and planning to see orthopedic surgery. Impression:  Hx of SVT, conservatively treated for 20+ years with Toprol. She has had previous admission with Adenosine and decided to be conservative and no ablation has been performed. Stable today. Dyslipidemia. HTN. DJD with left knee pain. Hx of mild asthma. Plan:  She is doing well from a cardiology standpoint. She has done very well with beta blocker therapy for SVT, has not required any recent admissions to hospital.  BP is stable, weight is stable. All questions answered and I will see back annually. Past Medical History:   Diagnosis Date    Allergic rhinitis     Asthma     Cardiac stress echo, normal 11/02/2012    Normal maximal stress echo study. EF 60%. Ex time 9 min 45 sec. Chondromalacia patella     Colon polyps     Diabetes mellitus (HCC)     GERD (gastroesophageal reflux disease)     History of pneumonia 01/2012    AFB smear positive. 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 Hyperlipidemia     Hypertension     Menopause     Plantar fasciitis     left    PSVT (paroxysmal supraventricular tachycardia) ([facility])     A-V dharmesh reentrant tachycardia       Current Outpatient Medications   Medication Sig Dispense Refill    simvastatin (ZOCOR) 20 mg tablet take 1 tablet by mouth at bedtime 90 Tablet 3    losartan (COZAAR) 25 mg tablet take 1 tablet by mouth once daily 90 Tablet 2    LORazepam (ATIVAN) 1 mg tablet take 1 tablet by mouth every 8 hours if needed for anxiety 30 Tablet 0    glipiZIDE SR (GLUCOTROL XL) 10 mg CR tablet Take 1 Tablet by mouth daily. Indications: type 2 diabetes mellitus 90 Tablet 3    fluticasone propionate (FLONASE) 50 mcg/actuation nasal spray instill 2 sprays into each nostril once daily 16 g 5    potassium chloride (K-DUR, KLOR-CON M20) 20 mEq tablet take 1 tablet by mouth once daily 90 Tablet 3    metoprolol succinate (TOPROL-XL) 50 mg XL tablet take 1 tablet by mouth once daily 90 Tablet 3    hydroCHLOROthiazide (HYDRODIURIL) 12.5 mg tablet take 1 tablet by mouth once daily 90 Tablet 3    loratadine (Claritin) 10 mg tablet Take 1 Tablet by mouth daily. 90 Tablet 2    Blood-Glucose Meter monitoring kit Monitor fasting blood glucose once daily 1 Kit 0    glucose blood VI test strips (ASCENSIA AUTODISC VI, ONE TOUCH ULTRA TEST VI) strip Monitor fasting blood glucose once daily 100 Strip 5    lancets misc Monitor fasting blood glucose once daily 100 Each 5    albuterol (PROVENTIL HFA, VENTOLIN HFA, PROAIR HFA) 90 mcg/actuation inhaler inhale 2 puffs by mouth every 4 hours if needed for wheezing 8.5 g 5    clotrimazole-betamethasone (LOTRISONE) topical cream Apply  to both ear canals and affected part of outer ear twice a day with a finger. 45 g 3    cholecalciferol (VITAMIN D3) 1,000 unit cap Take 2,000 Units by mouth daily. carboxymethylcellulose sodium (REFRESH LIQUIGEL) 1 % dlgl ophthalmic solution Apply  to eye. Social History   reports that she has never smoked. She has never used smokeless tobacco.   reports no history of alcohol use. Family History  family history includes Breast Cancer in her maternal aunt and paternal aunt; Cancer in her father; Diabetes in an other family member; Hypertension in her mother, sister, sister, and sister; OSTEOARTHRITIS in her mother; Other in her sister; Stroke in an other family member. Review of Systems  Except as stated above include:  Constitutional: Negative for fever, chills and malaise/fatigue. HEENT: No congestion or recent URI.   Gastrointestinal: No nausea, vomiting, abdominal pain, bloody stools. Pulmonary:  Negative except as stated above. Cardiac:  Negative except as stated above. Musculoskeletal: Negative except as stated above. Neurological:  No localized symptoms. Skin:  Negative except as stated above. Psych:  Negative except as stated above. Endocrine:  Negative except as stated above. PHYSICAL EXAM  BP Readings from Last 3 Encounters:   10/12/22 130/80   08/09/22 118/76   04/19/22 132/78     Pulse Readings from Last 3 Encounters:   08/09/22 76   04/19/22 74   01/13/22 78     Wt Readings from Last 3 Encounters:   10/12/22 84.4 kg (186 lb)   08/09/22 83.5 kg (184 lb)   04/19/22 85.7 kg (189 lb)     General:   Well developed, well groomed. Head/Neck:   No obvious jugular venous distention     No obvious carotid pulsations. No evidence of xanthelasma. Lungs:   No respiratory distress. Clear bilaterally. Heart:  Regular rate and rhythm. Normal S1/S2. Palpation grossly normal.    No significant murmurs, rubs or gallops. Abdomen:   Non-acute abdomen. No obvious pulsations. Extremities:   Intact peripheral pulses. No significant edema. Neurological:   Alert and oriented to person, place, time. No focal neurological deficit visually. Skin:   No obvious rash    Blood Pressure Metric:  Monitor recommended and adjustments stated if needed.

## 2022-11-04 ENCOUNTER — HOSPITAL ENCOUNTER (OUTPATIENT)
Dept: LAB | Age: 67
Discharge: HOME | End: 2022-11-04
Payer: MEDICARE

## 2022-11-04 DIAGNOSIS — E11.65 TYPE 2 DIABETES MELLITUS WITH HYPERGLYCEMIA, WITHOUT LONG-TERM CURRENT USE OF INSULIN (HCC): ICD-10-CM

## 2022-11-04 DIAGNOSIS — I47.1 AVNRT (AV NODAL RE-ENTRY TACHYCARDIA) (HCC): ICD-10-CM

## 2022-11-04 DIAGNOSIS — I10 ESSENTIAL HYPERTENSION: ICD-10-CM

## 2022-11-04 DIAGNOSIS — R31.29 MICROSCOPIC HEMATURIA: ICD-10-CM

## 2022-11-04 DIAGNOSIS — E55.9 VITAMIN D DEFICIENCY: ICD-10-CM

## 2022-11-04 DIAGNOSIS — E78.5 HYPERLIPIDEMIA, UNSPECIFIED HYPERLIPIDEMIA TYPE: ICD-10-CM

## 2022-11-04 DIAGNOSIS — K76.0 NAFL (NONALCOHOLIC FATTY LIVER): ICD-10-CM

## 2022-11-04 DIAGNOSIS — E11.9 TYPE 2 DIABETES MELLITUS WITHOUT COMPLICATION, WITHOUT LONG-TERM CURRENT USE OF INSULIN (HCC): ICD-10-CM

## 2022-11-04 LAB
ALBUMIN SERPL-MCNC: 3.8 G/DL (ref 3.4–5)
ALBUMIN/GLOB SERPL: 1 {RATIO} (ref 0.8–1.7)
ALP SERPL-CCNC: 81 U/L (ref 45–117)
ALT SERPL-CCNC: 31 U/L (ref 13–56)
ANION GAP SERPL CALC-SCNC: 6 MMOL/L (ref 3–18)
APPEARANCE UR: CLEAR
AST SERPL-CCNC: 21 U/L (ref 10–38)
BACTERIA URNS QL MICRO: ABNORMAL /HPF
BILIRUB SERPL-MCNC: 0.5 MG/DL (ref 0.2–1)
BILIRUB UR QL: NEGATIVE
BUN SERPL-MCNC: 13 MG/DL (ref 7–18)
BUN/CREAT SERPL: 18 (ref 12–20)
CALCIUM SERPL-MCNC: 9.2 MG/DL (ref 8.5–10.1)
CHLORIDE SERPL-SCNC: 102 MMOL/L (ref 100–111)
CHOLEST SERPL-MCNC: 176 MG/DL
CO2 SERPL-SCNC: 31 MMOL/L (ref 21–32)
COLOR UR: YELLOW
CREAT SERPL-MCNC: 0.71 MG/DL (ref 0.6–1.3)
CREAT UR-MCNC: 229 MG/DL (ref 30–125)
EPITH CASTS URNS QL MICRO: ABNORMAL /LPF (ref 0–5)
EST. AVERAGE GLUCOSE BLD GHB EST-MCNC: 154 MG/DL
GLOBULIN SER CALC-MCNC: 3.8 G/DL (ref 2–4)
GLUCOSE SERPL-MCNC: 129 MG/DL (ref 74–99)
GLUCOSE UR STRIP.AUTO-MCNC: NEGATIVE MG/DL
HBA1C MFR BLD: 7 % (ref 4.2–5.6)
HDLC SERPL-MCNC: 64 MG/DL (ref 40–60)
HDLC SERPL: 2.8 {RATIO} (ref 0–5)
HGB UR QL STRIP: ABNORMAL
KETONES UR QL STRIP.AUTO: NEGATIVE MG/DL
LDLC SERPL CALC-MCNC: 82.4 MG/DL (ref 0–100)
LEUKOCYTE ESTERASE UR QL STRIP.AUTO: ABNORMAL
LIPID PROFILE,FLP: ABNORMAL
MAGNESIUM SERPL-MCNC: 1.6 MG/DL (ref 1.6–2.6)
MICROALBUMIN UR-MCNC: 2.21 MG/DL (ref 0–3)
MICROALBUMIN/CREAT UR-RTO: 10 MG/G (ref 0–30)
NITRITE UR QL STRIP.AUTO: NEGATIVE
PH UR STRIP: 5.5 [PH] (ref 5–8)
POTASSIUM SERPL-SCNC: 3.9 MMOL/L (ref 3.5–5.5)
PROT SERPL-MCNC: 7.6 G/DL (ref 6.4–8.2)
PROT UR STRIP-MCNC: NEGATIVE MG/DL
RBC #/AREA URNS HPF: NEGATIVE /HPF (ref 0–5)
SODIUM SERPL-SCNC: 139 MMOL/L (ref 136–145)
SP GR UR REFRACTOMETRY: 1.02 (ref 1–1.03)
TRIGL SERPL-MCNC: 148 MG/DL (ref ?–150)
UROBILINOGEN UR QL STRIP.AUTO: 0.2 EU/DL (ref 0.2–1)
VLDLC SERPL CALC-MCNC: 29.6 MG/DL
WBC URNS QL MICRO: ABNORMAL /HPF (ref 0–4)

## 2022-11-04 PROCEDURE — 80053 COMPREHEN METABOLIC PANEL: CPT

## 2022-11-04 PROCEDURE — 82043 UR ALBUMIN QUANTITATIVE: CPT

## 2022-11-04 PROCEDURE — 83735 ASSAY OF MAGNESIUM: CPT

## 2022-11-04 PROCEDURE — 81001 URINALYSIS AUTO W/SCOPE: CPT

## 2022-11-04 PROCEDURE — 80061 LIPID PANEL: CPT

## 2022-11-04 PROCEDURE — 83036 HEMOGLOBIN GLYCOSYLATED A1C: CPT

## 2022-11-04 PROCEDURE — 82306 VITAMIN D 25 HYDROXY: CPT

## 2022-11-04 PROCEDURE — 36415 COLL VENOUS BLD VENIPUNCTURE: CPT

## 2022-11-04 RX ORDER — BLOOD SUGAR DIAGNOSTIC
STRIP MISCELLANEOUS
Qty: 100 STRIP | Refills: 5 | Status: SHIPPED | OUTPATIENT
Start: 2022-11-04

## 2022-11-04 RX ORDER — HYDROCHLOROTHIAZIDE 12.5 MG/1
TABLET ORAL
Qty: 90 TABLET | Refills: 3 | Status: SHIPPED | OUTPATIENT
Start: 2022-11-04 | End: 2022-11-14

## 2022-11-04 RX ORDER — LANCETS 30 GAUGE
EACH MISCELLANEOUS
Qty: 100 LANCET | Refills: 5 | Status: SHIPPED | OUTPATIENT
Start: 2022-11-04

## 2022-11-07 LAB — 25(OH)D3 SERPL-MCNC: 34.4 NG/ML (ref 30–100)

## 2022-11-10 ENCOUNTER — OFFICE VISIT (OUTPATIENT)
Dept: INTERNAL MEDICINE CLINIC | Age: 67
End: 2022-11-10
Payer: MEDICARE

## 2022-11-10 ENCOUNTER — TELEPHONE (OUTPATIENT)
Dept: INTERNAL MEDICINE CLINIC | Age: 67
End: 2022-11-10

## 2022-11-10 VITALS
WEIGHT: 187.2 LBS | BODY MASS INDEX: 34.45 KG/M2 | SYSTOLIC BLOOD PRESSURE: 124 MMHG | DIASTOLIC BLOOD PRESSURE: 78 MMHG | HEIGHT: 62 IN | TEMPERATURE: 97.7 F | RESPIRATION RATE: 16 BRPM | OXYGEN SATURATION: 99 % | HEART RATE: 58 BPM

## 2022-11-10 DIAGNOSIS — M25.561 CHRONIC PAIN OF BOTH KNEES: ICD-10-CM

## 2022-11-10 DIAGNOSIS — G89.29 CHRONIC PAIN OF BOTH KNEES: ICD-10-CM

## 2022-11-10 DIAGNOSIS — E66.9 OBESITY (BMI 30.0-34.9): ICD-10-CM

## 2022-11-10 DIAGNOSIS — E78.5 HYPERLIPIDEMIA, UNSPECIFIED HYPERLIPIDEMIA TYPE: ICD-10-CM

## 2022-11-10 DIAGNOSIS — E11.9 TYPE 2 DIABETES MELLITUS WITHOUT COMPLICATION, WITHOUT LONG-TERM CURRENT USE OF INSULIN (HCC): Primary | ICD-10-CM

## 2022-11-10 DIAGNOSIS — Z13.31 SCREENING FOR DEPRESSION: ICD-10-CM

## 2022-11-10 DIAGNOSIS — K76.0 NAFL (NONALCOHOLIC FATTY LIVER): ICD-10-CM

## 2022-11-10 DIAGNOSIS — E55.9 VITAMIN D DEFICIENCY: ICD-10-CM

## 2022-11-10 DIAGNOSIS — Z71.89 ADVANCED DIRECTIVES, COUNSELING/DISCUSSION: ICD-10-CM

## 2022-11-10 DIAGNOSIS — I47.1 AVNRT (AV NODAL RE-ENTRY TACHYCARDIA) (HCC): ICD-10-CM

## 2022-11-10 DIAGNOSIS — Z12.31 BREAST CANCER SCREENING BY MAMMOGRAM: ICD-10-CM

## 2022-11-10 DIAGNOSIS — I10 ESSENTIAL HYPERTENSION: ICD-10-CM

## 2022-11-10 DIAGNOSIS — M25.562 CHRONIC PAIN OF BOTH KNEES: ICD-10-CM

## 2022-11-10 DIAGNOSIS — Z00.00 MEDICARE ANNUAL WELLNESS VISIT, SUBSEQUENT: ICD-10-CM

## 2022-11-10 PROCEDURE — 99214 OFFICE O/P EST MOD 30 MIN: CPT | Performed by: INTERNAL MEDICINE

## 2022-11-10 PROCEDURE — G0439 PPPS, SUBSEQ VISIT: HCPCS | Performed by: INTERNAL MEDICINE

## 2022-11-10 PROCEDURE — 2022F DILAT RTA XM EVC RTNOPTHY: CPT | Performed by: INTERNAL MEDICINE

## 2022-11-10 PROCEDURE — 3051F HG A1C>EQUAL 7.0%<8.0%: CPT | Performed by: INTERNAL MEDICINE

## 2022-11-10 PROCEDURE — 3074F SYST BP LT 130 MM HG: CPT | Performed by: INTERNAL MEDICINE

## 2022-11-10 PROCEDURE — G8399 PT W/DXA RESULTS DOCUMENT: HCPCS | Performed by: INTERNAL MEDICINE

## 2022-11-10 PROCEDURE — G8510 SCR DEP NEG, NO PLAN REQD: HCPCS | Performed by: INTERNAL MEDICINE

## 2022-11-10 PROCEDURE — G8417 CALC BMI ABV UP PARAM F/U: HCPCS | Performed by: INTERNAL MEDICINE

## 2022-11-10 PROCEDURE — 3078F DIAST BP <80 MM HG: CPT | Performed by: INTERNAL MEDICINE

## 2022-11-10 PROCEDURE — G8754 DIAS BP LESS 90: HCPCS | Performed by: INTERNAL MEDICINE

## 2022-11-10 PROCEDURE — 1123F ACP DISCUSS/DSCN MKR DOCD: CPT | Performed by: INTERNAL MEDICINE

## 2022-11-10 PROCEDURE — 99497 ADVNCD CARE PLAN 30 MIN: CPT | Performed by: INTERNAL MEDICINE

## 2022-11-10 PROCEDURE — G8536 NO DOC ELDER MAL SCRN: HCPCS | Performed by: INTERNAL MEDICINE

## 2022-11-10 PROCEDURE — 3017F COLORECTAL CA SCREEN DOC REV: CPT | Performed by: INTERNAL MEDICINE

## 2022-11-10 PROCEDURE — 1101F PT FALLS ASSESS-DOCD LE1/YR: CPT | Performed by: INTERNAL MEDICINE

## 2022-11-10 PROCEDURE — G8752 SYS BP LESS 140: HCPCS | Performed by: INTERNAL MEDICINE

## 2022-11-10 PROCEDURE — G8427 DOCREV CUR MEDS BY ELIG CLIN: HCPCS | Performed by: INTERNAL MEDICINE

## 2022-11-10 PROCEDURE — 1090F PRES/ABSN URINE INCON ASSESS: CPT | Performed by: INTERNAL MEDICINE

## 2022-11-10 PROCEDURE — G9899 SCRN MAM PERF RSLTS DOC: HCPCS | Performed by: INTERNAL MEDICINE

## 2022-11-10 PROCEDURE — G0463 HOSPITAL OUTPT CLINIC VISIT: HCPCS | Performed by: INTERNAL MEDICINE

## 2022-11-10 RX ORDER — GLIPIZIDE 10 MG/1
10 TABLET, FILM COATED, EXTENDED RELEASE ORAL 2 TIMES DAILY WITH MEALS
Qty: 180 TABLET | Refills: 3 | Status: SHIPPED | OUTPATIENT
Start: 2022-11-10

## 2022-11-10 RX ORDER — MELOXICAM 7.5 MG/1
TABLET ORAL
COMMUNITY
Start: 2022-11-07

## 2022-11-10 NOTE — PROGRESS NOTES
This is the Subsequent Medicare Annual Wellness Exam, performed 12 months or more after the Initial AWV or the last Subsequent AWV    I have reviewed the patient's medical history in detail and updated the computerized patient record. Assessment/Plan   Education and counseling provided:  Are appropriate based on today's review and evaluation  End-of-Life planning (with patient's consent)  Influenza Vaccine  Screening Mammography  Colorectal cancer screening tests  Cardiovascular screening blood test  Bone mass measurement (DEXA)  Screening for glaucoma  Medical nutrition therapy for individuals with diabetes or renal disease  Updated bivalent COVID-19 booster dose    1. Type 2 diabetes mellitus without complication, without long-term current use of insulin (HCC)  -      DIABETES FOOT EXAM  -     HEMOGLOBIN A1C WITH EAG; Future  -     LIPID PANEL; Future  -     METABOLIC PANEL, COMPREHENSIVE; Future  -     MICROALBUMIN, UR, RAND W/ MICROALB/CREAT RATIO; Future  2. Essential hypertension  -     MAGNESIUM; Future  -     METABOLIC PANEL, COMPREHENSIVE; Future  3. AVNRT (AV dharmesh re-entry tachycardia) ([facility] [address]  -     MAGNESIUM; Future  -     METABOLIC PANEL, COMPREHENSIVE; Future  4. Hyperlipidemia, unspecified hyperlipidemia type  -     LIPID PANEL; Future  5. NAFL (nonalcoholic fatty liver)  -     METABOLIC PANEL, COMPREHENSIVE; Future  6. Obesity (BMI 30.0-34.9)  7. Chronic pain of both knees  8. Vitamin D deficiency  -     VITAMIN D, 25 HYDROXY; Future  9. Medicare annual wellness visit, subsequent  -     ADVANCE CARE PLANNING FIRST 30 MINS  10. Advanced directives, counseling/discussion  -     ADVANCE CARE PLANNING FIRST 27 MINS  11. Screening for depression  12. Breast cancer screening by mammogram  -     [facility] 3D CATIE W MAMMO BI SCREENING INCL CAD;  Future       Depression Risk Factor Screening     3 most recent PHQ Screens 11/10/2022   Little interest or pleasure in doing things Not at all   Feeling down, depressed, irritable, or hopeless Not at all   Total Score PHQ 2 0   Trouble falling or staying asleep, or sleeping too much -   Feeling tired or having little energy -   Poor appetite, weight loss, or overeating -   Feeling bad about yourself - or that you are a failure or have let yourself or your family down -   Trouble concentrating on things such as school, work, reading, or watching TV -   Moving or speaking so slowly that other people could have noticed; or the opposite being so fidgety that others notice -   Thoughts of being better off dead, or hurting yourself in some way -   PHQ 9 Score -   How difficult have these problems made it for you to do your work, take care of your home and get along with others -       Alcohol & Drug Abuse Risk Screen    Do you average more than 1 drink per night or more than 7 drinks a week:  No    On any one occasion in the past three months have you have had more than 3 drinks containing alcohol:  No          Functional Ability and Level of Safety    Hearing: Hearing is good. Activities of Daily Living: The home contains: no safety equipment. Patient does total self care      Ambulation: with mild difficulty     Fall Risk:  Fall Risk Assessment, last 12 mths 11/10/2022   Able to walk? Yes   Fall in past 12 months? 0   Do you feel unsteady? 0   Are you worried about falling 0   Number of falls in past 12 months -   Fall with injury?  -      Abuse Screen:  Patient is not abused       Cognitive Screening    Has your family/caregiver stated any concerns about your memory: no     Cognitive Screening: None performed today    Health Maintenance Due     Health Maintenance Due   Topic Date Due    COVID-19 Vaccine (4 - Booster for Travis Beverly series) 01/25/2022    Eye Exam Retinal or Dilated  10/07/2022       Patient Care Team   Patient Care Team:  Osbaldo Rose MD as PCP - General (Internal Medicine Physician)  Osbaldo Rose MD as PCP - [facility]  Heidy Jones Sabra Seay MD (Gastroenterology)  Ulisses Soriano MD as Surgeon (General Surgery)  Vee Guerrero MD (Podiatry)  Bri García MD (Otolaryngology)  Regina Altamirano MD (Allergy)  Rodger Bell MD (Obstetrics & Gynecology)  Brenda De Souza MD (Ophthalmology)  Toyin Rebollar MD (Cardiovascular Disease Physician)  Xu Steward MD (Orthopedic Surgery)    History     Patient Active Problem List   Diagnosis Code    Benign hypertensive heart disease without congestive heart failure I11.9    Allergic rhinitis J30.9    Colon polyps K63.5    AVNRT (AV dharmesh re-entry tachycardia) ([address] I47.1    Hyperlipidemia E78.5    Essential hypertension I10    Asthma J45.909    GERD (gastroesophageal reflux disease) K21.9    Type 2 diabetes mellitus without complication, without long-term current use of insulin (HCC) E11.9    Vitamin D deficiency E55.9    Microscopic hematuria R31.29    Chronic pain of both knees M25.561, M25.562, G89.29    Obesity (BMI 30.0-34. 9) E66.9    Noncompliance with diet and medication regimen Z91.119, Z91.14    Anxiety F41.9    Spondylolisthesis, lumbar region M43.16    DDD (degenerative disc disease), lumbar M51.36    NAFL (nonalcoholic fatty liver) X70.1    HENRY (nonalcoholic steatohepatitis) K75.81     Past Medical History:   Diagnosis Date    Allergic rhinitis     Asthma     Cardiac stress echo, normal 11/02/2012    Normal maximal stress echo study. EF 60%. Ex time 9 min 45 sec. Chondromalacia patella     Colon polyps     Diabetes mellitus (HCC)     GERD (gastroesophageal reflux disease)     History of pneumonia 01/2012    AFB smear positive. 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     Hyperlipidemia     Hypertension     Menopause     Plantar fasciitis     left    PSVT (paroxysmal supraventricular tachycardia) ([facility])     A-V dharmesh reentrant tachycardia      Past Surgical History:   Procedure Laterality Date    ENDOSCOPY, COLON, DIAGNOSTIC      polyp HX CERVICAL POLYPECTOMY      HX CYST INCISION AND DRAINAGE Right 10 or more years    HX DILATION AND CURETTAGE      HX GYN      polyp on cervix    HX POLYPECTOMY      from rectum     Current Outpatient Medications   Medication Sig Dispense Refill    meloxicam (MOBIC) 7.5 mg tablet       glipiZIDE SR (GLUCOTROL XL) 10 mg CR tablet Take 1 Tablet by mouth two (2) times daily (with meals). Indications: type 2 diabetes mellitus 180 Tablet 3    OneTouch Delica Plus Lancet 30 gauge misc use 1 LANCET to TEST BLOOD SUGAR once daily 100 Lancet 5    glucose blood VI test strips (OneTouch Ultra Test) strip use 1 TEST STRIP to TEST BLOOD SUGAR once daily 100 Strip 5    hydroCHLOROthiazide (HYDRODIURIL) 12.5 mg tablet take 1 tablet by mouth once daily 90 Tablet 3    simvastatin (ZOCOR) 20 mg tablet take 1 tablet by mouth at bedtime 90 Tablet 3    losartan (COZAAR) 25 mg tablet take 1 tablet by mouth once daily 90 Tablet 2    LORazepam (ATIVAN) 1 mg tablet take 1 tablet by mouth every 8 hours if needed for anxiety 30 Tablet 0    fluticasone propionate (FLONASE) 50 mcg/actuation nasal spray instill 2 sprays into each nostril once daily 16 g 5    potassium chloride (K-DUR, KLOR-CON M20) 20 mEq tablet take 1 tablet by mouth once daily 90 Tablet 3    metoprolol succinate (TOPROL-XL) 50 mg XL tablet take 1 tablet by mouth once daily 90 Tablet 3    loratadine (Claritin) 10 mg tablet Take 1 Tablet by mouth daily. 90 Tablet 2    Blood-Glucose Meter monitoring kit Monitor fasting blood glucose once daily 1 Kit 0    albuterol (PROVENTIL HFA, VENTOLIN HFA, PROAIR HFA) 90 mcg/actuation inhaler inhale 2 puffs by mouth every 4 hours if needed for wheezing 8.5 g 5    clotrimazole-betamethasone (LOTRISONE) topical cream Apply  to both ear canals and affected part of outer ear twice a day with a finger. 45 g 3    cholecalciferol (VITAMIN D3) 1,000 unit cap Take 2,000 Units by mouth daily.       carboxymethylcellulose sodium (REFRESH LIQUIGEL) 1 % dlgl ophthalmic solution Apply  to eye.        Allergies   Allergen Reactions    Altace [Ramipril] Cough    Penicillins Other (comments)     Hands peel    Sulfur Itching       Family History   Problem Relation Age of Onset    OSTEOARTHRITIS Mother     Hypertension Mother              Cancer Father         bone cancer    Hypertension Sister     Hypertension Sister     Hypertension Sister     Breast Cancer Maternal Aunt     Breast Cancer Paternal Aunt     Diabetes Other     Stroke Other     Other Sister         twin sister - osteopenia and low vitamin D levels     Social History     Tobacco Use    Smoking status: Never    Smokeless tobacco: Never   Substance Use Topics    Alcohol use: No         Carito Correa MD

## 2022-11-10 NOTE — PROGRESS NOTES
1. \"Have you been to the ER, urgent care clinic since your last visit? Hospitalized since your last visit? \" No    2. \"Have you seen or consulted any other health care providers outside of the [address] [place] [address] since your last visit? \" No     3. For patients aged 39-70: Has the patient had a colonoscopy / FIT/ Cologuard? Yes - no Care Gap present      If the patient is female:    4. For patients aged 41-77: Has the patient had a mammogram within the past 2 years? Yes - no Care Gap present      5. For patients aged 21-65: Has the patient had a pap smear?  NA - based on age or sex

## 2022-11-10 NOTE — ACP (ADVANCE CARE PLANNING)
Advance Care Planning     Advance Care Planning (ACP) Physician/NP/PA Conversation      Date of Conversation: 11/10/2022  Conducted with: Patient with Decision Making Capacity    Healthcare Decision Maker:     Primary Decision Maker: Gui Balderrama - Spouse - [phone]    Secondary Decision Maker: Kendal Wang - [phone]    Secondary Decision Maker: Aleah Bray - [phone]  Click here to complete [place] including selection of the Healthcare Decision Maker Relationship (ie \"Primary\")      Today we documented Decision Maker(s) consistent with ACP documents on file. Care Preferences:    Hospitalization: \"If your health worsens and it becomes clear that your chance of recovery is unlikely, what would be your preference regarding hospitalization? \"  The patient would prefer hospitalization. Ventilation: \"If you were unable to breathe on your own and your chance of recovery was unlikely, what would be your preference about the use of a ventilator (breathing machine) if it was available to you? \"   The patient would desire the use of a ventilator. Resuscitation: \"In the event your heart stopped as a result of an underlying serious health condition, would you want attempts to be made to restart your heart, or would you prefer a natural death? \"   Yes, attempt to resuscitate.     Additional topics discussed: treatment goals, benefit/burden of treatment options, ventilation preferences, hospitalization preferences, resuscitation preferences, and end of life care preferences (vegetative state/imminent death)    Conversation Outcomes / Follow-Up Plan:   ACP complete - no further action today  Reviewed DNR/DNI and patient elects Full Code (Attempt Resuscitation)     Length of Voluntary ACP Conversation in minutes:  16 minutes    Kenji Ochoa MD

## 2022-11-10 NOTE — TELEPHONE ENCOUNTER
Please request recent eye exam from Dr. Yoselin Ross. Patient reports being seen in 10/2022. Thank you.

## 2022-11-10 NOTE — PATIENT INSTRUCTIONS
Heart-Healthy Diet: Care Instructions  Your Care Instructions     A heart-healthy diet has lots of vegetables, fruits, nuts, beans, and whole grains, and is low in salt. It limits foods that are high in saturated fat, such as meats, cheeses, and fried foods. It may be hard to change your diet, but even small changes can lower your risk of heart attack and heart disease. Follow-up care is a key part of your treatment and safety. Be sure to make and go to all appointments, and call your doctor if you are having problems. It's also a good idea to know your test results and keep a list of the medicines you take. How can you care for yourself at home? Watch your portions  Learn what a serving is. A \"serving\" and a \"portion\" are not always the same thing. Make sure that you are not eating larger portions than are recommended. For example, a serving of pasta is ½ cup. A serving size of meat is 2 to 3 ounces. A 3-ounce serving is about the size of a deck of cards. Measure serving sizes until you are good at [place]" them. Keep in mind that restaurants often serve portions that are 2 or 3 times the size of one serving. To keep your energy level up and keep you from feeling hungry, eat often but in smaller portions. Eat only the number of calories you need to stay at a healthy weight. If you need to lose weight, eat fewer calories than your body burns (through exercise and other physical activity). Eat more fruits and vegetables  Eat a variety of fruit and vegetables every day. Dark green, deep orange, red, or yellow fruits and vegetables are especially good for you. Examples include spinach, carrots, peaches, and berries. Keep carrots, celery, and other veggies handy for snacks. Buy fruit that is in season and store it where you can see it so that you will be tempted to eat it. Cook dishes that have a lot of veggies in them, such as stir-fries and soups.   Limit saturated and trans fat  Read food labels, and try to avoid saturated and trans fats. They increase your risk of heart disease. Use olive or canola oil when you cook. Bake, broil, grill, or steam foods instead of frying them. Choose lean meats instead of high-fat meats such as hot dogs and sausages. Cut off all visible fat when you prepare meat. Eat fish, skinless poultry, and meat alternatives such as soy products instead of high-fat meats. Soy products, such as tofu, may be especially good for your heart. Choose low-fat or fat-free milk and dairy products. Eat foods high in fiber  Eat a variety of grain products every day. Include whole-grain foods that have lots of fiber and nutrients. Examples of whole-grain foods include oats, whole wheat bread, and brown rice. Buy whole-grain breads and cereals, instead of white bread or pastries. Limit salt and sodium  Limit how much salt and sodium you eat to help lower your blood pressure. Taste food before you salt it. Add only a little salt when you think you need it. With time, your taste buds will adjust to less salt. Eat fewer snack items, fast foods, and other high-salt, processed foods. Check food labels for the amount of sodium in packaged foods. Choose low-sodium versions of canned goods (such as soups, vegetables, and beans). Limit sugar  Limit drinks and foods with added sugar. These include candy, desserts, and soda pop. Limit alcohol  Limit alcohol to no more than 2 drinks a day for men and 1 drink a day for women. Too much alcohol can cause health problems. When should you call for help? Watch closely for changes in your health, and be sure to contact your doctor if:    You would like help planning heart-healthy meals. Where can you learn more? Go to http://www.pittman.com/  Enter V137 in the search box to learn more about \"Heart-Healthy Diet: Care Instructions. \"  Current as of: August 22, 2019               Content Version: 12.6  © 8530-0399 [employer]. Care instructions adapted under license by [employer] (which disclaims liability or warranty for this information). If you have questions about a medical condition or this instruction, always ask your healthcare professional. [address] any warranty or liability for your use of this information. Learning About Diabetes Food Guidelines  Your Care Instructions     Meal planning is important to manage diabetes. It helps keep your blood sugar at a target level (which you set with your doctor). You don't have to eat special foods. You can eat what your family eats, including sweets once in a while. But you do have to pay attention to how often you eat and how much you eat of certain foods. You may want to work with a dietitian or a certified diabetes educator (CDE) to help you plan meals and snacks. A dietitian or CDE can also help you lose weight if that is one of your goals. What should you know about eating carbs? Managing the amount of carbohydrate (carbs) you eat is an important part of healthy meals when you have diabetes. Carbohydrate is found in many foods. Learn which foods have carbs. And learn the amounts of carbs in different foods. Bread, cereal, pasta, and rice have about 15 grams of carbs in a serving. A serving is 1 slice of bread (1 ounce), ½ cup of cooked cereal, or 1/3 cup of cooked pasta or rice. Fruits have 15 grams of carbs in a serving. A serving is 1 small fresh fruit, such as an apple or orange; ½ of a banana; ½ cup of cooked or canned fruit; ½ cup of fruit juice; 1 cup of melon or raspberries; or 2 tablespoons of dried fruit. Milk and no-sugar-added yogurt have 15 grams of carbs in a serving. A serving is 1 cup of milk or 2/3 cup of no-sugar-added yogurt. Starchy vegetables have 15 grams of carbs in a serving.  A serving is ½ cup of mashed potatoes or sweet potato; 1 cup winter squash; ½ of a small baked potato; ½ cup of cooked beans; or ½ cup cooked corn or green peas. Learn how much carbs to eat each day and at each meal. A dietitian or CDE can teach you how to keep track of the amount of carbs you eat. This is called carbohydrate counting. If you are not sure how to count carbohydrate grams, use the Plate Method to plan meals. It is a good, quick way to make sure that you have a balanced meal. It also helps you spread carbs throughout the day. Divide your plate by types of foods. Put non-starchy vegetables on half the plate, meat or other protein food on one-quarter of the plate, and a grain or starchy vegetable in the final quarter of the plate. To this you can add a small piece of fruit and 1 cup of milk or yogurt, depending on how many carbs you are supposed to eat at a meal.  Try to eat about the same amount of carbs at each meal. Do not \"save up\" your daily allowance of carbs to eat at one meal.  Proteins have very little or no carbs per serving. Examples of proteins are beef, chicken, turkey, fish, eggs, tofu, cheese, cottage cheese, and peanut butter. A serving size of meat is 3 ounces, which is about the size of a deck of cards. Examples of meat substitute serving sizes (equal to 1 ounce of meat) are 1/4 cup of cottage cheese, 1 egg, 1 tablespoon of peanut butter, and ½ cup of tofu. How can you eat out and still eat healthy? Learn to estimate the serving sizes of foods that have carbohydrate. If you measure food at home, it will be easier to estimate the amount in a serving of restaurant food. If the meal you order has too much carbohydrate (such as potatoes, corn, or baked beans), ask to have a low-carbohydrate food instead. Ask for a salad or green vegetables. If you use insulin, check your blood sugar before and after eating out to help you plan how much to eat in the future. If you eat more carbohydrate at a meal than you had planned, take a walk or do other exercise. This will help lower your blood sugar.   What else should you know? Limit saturated fat, such as the fat from meat and dairy products. This is a healthy choice because people who have diabetes are at higher risk of heart disease. So choose lean cuts of meat and nonfat or low-fat dairy products. Use olive or canola oil instead of butter or shortening when cooking. Don't skip meals. Your blood sugar may drop too low if you skip meals and take insulin or certain medicines for diabetes. Check with your doctor before you drink alcohol. Alcohol can cause your blood sugar to drop too low. Alcohol can also cause a bad reaction if you take certain diabetes medicines. Follow-up care is a key part of your treatment and safety. Be sure to make and go to all appointments, and call your doctor if you are having problems. It's also a good idea to know your test results and keep a list of the medicines you take. Where can you learn more? Go to http://www.pittman.com/  Enter I147 in the search box to learn more about \"Learning About Diabetes Food Guidelines. \"  Current as of: December 20, 2019               Content Version: 12.6  © 7217-6125 [employer]. Care instructions adapted under license by [employer] (which disclaims liability or warranty for this information). If you have questions about a medical condition or this instruction, always ask your healthcare professional. [address] any warranty or liability for your use of this information. Medicare Wellness Visit, Female    The best way to improve and maintain good health is to have a healthy lifestyle by eating a well-balanced diet, exercising regularly, limiting alcohol and stopping smoking. Regular visits with your physician or non-physician health care provider also support your good health. Preventive screening tests can find health problems before they become diseases or illnesses.      Preventive services such as immunizations prevent serious infections. All people over age 72 should have a Pneumovax and a Prevnar-13 shot to prevent potentially life threatening infections with the pneumococcus bacteria, a common cause of pneumonia. These are once in a lifetime unless you and your provider decide differently. All people over 65 should have a yearly influenza vaccine or \"flu\" shot. This does not prevent infection with cold viruses but has been proven to prevent hospitalization and death from influenza. Although Medicare part B \"regular Medicare\" currently only covers tetanus vaccination in the context of an injury, a tetanus vaccine (Tdap or Td) is recommended every 10 years. A shingles vaccine is recommended once in a lifetime after age 61. The Shingles vaccine is also not covered by Medicare part B. Note, however, that both the Shingles vaccine and Tdap/Td are generally covered by secondary carriers. Please check your coverage and out of pocket expenses. Consider contacting your local health department because it may stock these vaccines for a reasonable charge.     We currently have documentation of the following immunization history for you:  Immunization History   Administered Date(s) Administered     Influenza, FLUZONE (age 72 y+), High Dose 10/03/2022    COVID-19, MODERNA BLUE border, Primary or Immunocompromised, (age 18y+), IM, 100 mcg/0.5mL 03/01/2021, 04/02/2021    COVID-19, MODERNA Booster BLUE border, (age 18y+), IM, 50mcg/0.25mL 11/30/2021    Influenza Vaccine PF 12/12/2013, 10/03/2014    Influenza Vaccine Split 11/02/2011, 10/22/2012    Influenza Vaccine Whole 10/29/2010    Influenza, FLUAD, (age 72 y+), Adjuvanted 10/25/2021    Influenza, FLUARIX, FLULAVAL, FLUZONE (age 10 mo+) AND AFLURIA, (age 1 y+), PF, 0.5mL 10/23/2015, 10/19/2016, 10/05/2017, 10/26/2018, 10/08/2019    PPD 01/03/2012    Pneumococcal Conjugate (PCV-13) 08/13/2020    Pneumococcal Polysaccharide (PPSV-23) 03/21/2017, 04/19/2022    TB Skin Test (PPD) Intradermal 02/12/2014    TDAP Vaccine 02/16/2012    Tdap 04/01/2020    Zoster Recombinant 09/16/2020, 11/27/2020       Screening for infection with Hepatitis C is recommended for anyone born between [birth year] through 1965. The table at the bottom of this document indicates the status of this and other preventive services. A bone mass density test (DEXA) to screen for osteoporosis or thinning of the bones should be done at least once after age 72 and may be done up to every 2 years as determined by you and your health care provider. The most recent DEXA we have on file for you is:  DEXA Results (most recent):  Results from [facility] encounter on 03/22/22    DEXA BONE DENSITY STUDY AXIAL    Narrative  DEXA BONE DENSITOMETRY, CENTRAL    INDICATION: Postmenopausal. Hypertension. Diabetes. Asthma. Hyperlipidemia. Vitamin D deficiency. TECHNIQUE: Using GE LUNAR Prodigy densitometer, bone density measurement was  performed in the lumbar spine the proximal left and right femora and the left  forearm. T Score refers to standard deviations above or below average compared  to a young adult of the same sex. Z Score refers to standard deviations above or  below average compared to a patient of the same sex, age, race and weight. COMPARISON: October 15, 2009. November 18, 2015. FINDINGS:    Lumbar Spine Levels: L1-L3  Mean Bone Mineral Density (BMD):  1.418 g/cm2  T Score: 1.9  Z Score: 3.5  BMD decreased 1.5%, which is not statistically significant within a 95 percent  confidence interval compared to preceding study. BMD decreased 3.5%, which is statistically significant compared to baseline  study. On PA image of the lumbar spine obtained for localization of vertebral levels  (not a diagnostic quality radiograph), there is apparent increased density at  L4. The density is not well characterized on the basis of this image, but  likely degenerative.   Since this density spuriously increases measured bone  mineral density, this has been excluded. Left Distal1/3 Radius BMD:  0.934 g/cm2  T Score: 0.7  Z Score: 2.2  Baseline study at the forearm. Left Total Proximal Femur BMD: 1.040 g/cm2  T Score:  0.3  Z Score:  1.6  BMD decreased 4.5%, which is statistically significant within a 95 percent  confidence interval compared to preceding study. BMD decreased 7.9%, which is statistically significant compared to baseline  study. Right Total Proximal Femur BMD: 1.120 g/cm2  T Score:  0.9  Z Score:  2.2  BMD decreased 3.7%, which is statistically significant within a 95 percent  confidence interval compared to preceding study. BMD increased 1.7%, which is not statistically significant compared to baseline  study. Left Femoral Neck BMD:  0.970 g/cm2  T Score:  -0.5  Z Score:  1.1    Right Femoral Neck BMD:  1.016 g/cm2  T Score:  -0.2  Z Score:  1.4    Impression  1. BMD measures within normal limits. 2.  Compared to the preceding study, BMD has decreased. 3.  Compared to the baseline study, BMD  has decreased. Based upon current ISCD guidelines, the patient's overall diagnostic category,  selected using WHO criteria in postmenopausal women and males aged 48 and above,  is selected based upon the lowest T Score from among the lumbar spine, total  femur, femoral neck, (or distal third radius if measured). WHO Definition of Osteoporosis and Osteopenia on DXA (specified for  post-menopausal  females):    Normal:                     T Score at or above -1 SD  Osteopenia:              T Score between -1 and -2.5 SD  Osteoporosis:           T Score at or below -2.5 SD    The risk of fracture approximately doubles for each 1 SD decrease in T Score. It is important to consider other factors in assessing a patient's risk of  fracture, including age, risk of falling/injury, history of fragility fracture,  family history of osteoporosis, smoking, low weight.     Various fracture risk tools have been developed for adult patients and are  available online. For example, the FRAX tool developed by [facility] is widely used. Reference www.iscd.org. It is also important to note that DXA measures bone density but does not  distinguish among causes of decreased bone density, which include primary versus  secondary osteoporosis (such as metabolic bone disorders or possible effects of  medications) and also other conditions (such as osteomalacia). Clinical  considerations should determine what additional evaluation may be warranted to  exclude secondary conditions in a patient with low bone density. Please note that reliable, valid comparisons can not be made between studies  which have been performed on different densitometers. If clinically warranted,  follow up study performed at this site would best permit assessment of trend for  possible change in bone mineral density over time in comparison to this study. Thank you for this referral.      Screening for diabetes mellitus with a blood sugar test (glucose) should be done at least every 3 years until age 79. You and your health care provider may decide whether to continue screening after age 79. The most recent blood glucose we have on file for you is:   Lab Results   Component Value Date/Time    Glucose 129 (H) 11/04/2022 11:19 AM         Glaucoma is a disease of the eye due to increased ocular pressure that can lead to blindness. People with risk factors for glaucoma ( race, diabetes, family history) should be screened at least every 2 years by an eye professional.     Cardiovascular screening tests that check for elevated lipids or cholesterol (fatty part of blood) which can lead to heart disease and strokes should be done every 4-6 years through age 79. You and your health care provider may decide whether to continue screening after age 79.  The most recent lipid panel we have on file for you is:   Lab Results   Component Value Date/Time    Cholesterol, total 176 11/04/2022 11:19 AM    HDL Cholesterol 64 (H) 11/04/2022 11:19 AM    LDL, calculated 82.4 11/04/2022 11:19 AM    VLDL, calculated 29.6 11/04/2022 11:19 AM    Triglyceride 148 11/04/2022 11:19 AM    CHOL/HDL Ratio 2.8 11/04/2022 11:19 AM       Colorectal cancer screening that evaluates for blood or polyps in your colon for people with average risk should be done yearly as a stool test, every five years as a flexible sigmoidoscope or every 10 years as a colonoscopy up to age 76. You and your health care provider may decide whether to continue screening after age 76. Breast cancer screening with a mammogram is recommended at least once every 2 years  for women age 54-69. You and your health care provider may decide whether to continue screening after age 76. The most recent mammogram we have on file for you is:   [facility] Results (most recent):  Results from [place] encounter on 04/07/22    VERONIKA 3D CATIE W MAMMO LT DX INCL CAD    Narrative  EXAM: Diagnostic Mammogram of the Left Breast and Focused Ultrasound of the Left  Breast    INDICATION:  Left breast mass    TECHNIQUE: Diagnostic digital mammogram of the left breast. The following views  obtained:  spot compression CC and MLO with full field ML view. CAD was  utilized. 3D tomosynthesis was utilized. Focused ultrasound of the the left breast performed. COMPARISON: 3/24/2022, 3/22/2021, 11/13/2019, 12/1/2018    PARENCHYMAL RADIODENSITY: There are scattered areas of fibroglandular density. FINDINGS:  Mammogram: In the nearly 12:00 position 8 to 10 cm posterior to the nipple,  there is an 7 x 6 mm ovoid nodule. No architectural distortion appreciated. Left Breast Focused Ultrasound: Scanning was performed by the radiologist and  sonographer. At the 12:00 position 8 cm posterior to the nipple, there is a  complicated cystic structure measuring 0.7 x 0.5 x 0.5 cm.  Discussed with the  patient, the patient would prefer cyst aspiration with possible biopsy. Impression  1. Complicated cyst. Further evaluation with left breast ultrasound guided cyst  aspiration and possible biopsy. BI-RADS Category 4 - Suspicion for Malignancy    These findings were discussed with the patient in person. Options and  alternatives were discussed. Patient demonstrated understanding. The lack of mammographic evidence of malignancy should not deter further work-up  of a clinically significant palpable finding. Radiodense breast tissue may  obscure an early malignancy or a palpable finding. 10-15% of breast cancers are  not detected by mammography. Screening for cervical cancer with a pap smear is recommended for all women with a cervix until age 72. The frequency of this test is based on the details of her prior pap smear testing. You and your health care provider may decide whether to continue screening after age 72. People who have smoked the equivalent of 1 pack per day for 30 years or more may benefit from screening for lung cancer with a yearly low dose CT scan until they have been non smokers for 15 years or competing health conditions render this unlikely to be beneficial. Our records show:n/a    Your Medicare Wellness Exam is recommended annually.     Here is a list of your current Health Maintenance items with a due date:  Health Maintenance   Topic Date Due    COVID-19 Vaccine (4 - Booster for Moderna series) 01/25/2022    Eye Exam Retinal or Dilated  10/07/2022    Breast Cancer Screen Mammogram  04/07/2023    A1C test (Diabetic or Prediabetic)  11/04/2023    MICROALBUMIN Q1  11/04/2023    Lipid Screen  11/04/2023    Depression Screen  11/10/2023    Foot Exam Q1  11/10/2023    Medicare Yearly Exam  11/11/2023    Colorectal Cancer Screening Combo  04/15/2026    DTaP/Tdap/Td series (3 - Td or Tdap) 04/01/2030    Hepatitis C Screening  Completed    Bone Densitometry (Dexa) Screening  Completed    Shingrix Vaccine Age 50>  Completed    Flu Vaccine Completed    Pneumococcal 65+ years  Completed

## 2022-11-11 NOTE — PROGRESS NOTES
HPI:   Zane Yoder is a 79y.o. year old female who presents today for a routine follow-up visit. She has a history of hypertension, hyperlipidemia, paroxysmal SVT, diabetes mellitus, GERD, asthma, elevated transaminases, atypical mycobacterial pneumonia, and noncompliance. She has completed the Moderna COVID-19 vaccine series and received one Moderna booster dose. She reports that she is doing reasonably well. She reports that she has been having increasing difficulty with left knee pain and underwent a cortisone injection by Dr. Shaina Cordova 3 weeks ago. She states that a subsequent with visit she was started on meloxicam although has not begun taking it. She reports that she has been compliant with her glipizide dose of 10 mg daily. She is otherwise without new complaints and feeling generally well. Summary of prior  medical history:  She has a history of hypertension, treated with metoprolol and hydrochlorothiazide (+ potassium). She reports that she does not check her blood pressure at home. She does not exercise regularly, but denies any chest pain, shortness of breath at rest or with exertion, lightheadedness, or edema. 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 She has had approximately six severe episodes over the years, prompting presentation to the ED and treatment with IV Adenosine. She currently reports infrequent short episodes of palpitations and is being treated with metoprolol. She is followed by Dr. Hanna Alford. She had an echocardiogram (10/2005) showing normal LV size and function (EF 60-65%), and no valvular pathology. In 11/2012, she underwent an exercise stress echocardiogram, which was normal at maximal exercise. She has a history of hyperlipidemia, treated with simvastatin from 10/2012 to 3/2015 at which time she stopped taking it due to myalgias.  She restarted it on 8/2015 and continued to take it without difficulty until 3/2016, when it was noticed that she had transaminase elevation (AST 85/ ) and it was discontinued. Evaluation included hepatitis A, B, and C levels (negative), iron panel (normal), and RUQ ultrasound (3/23/2016) with limited sonographic window for the liver; only partially visualized but grossly unremarkable. Repeat hepatic panel (4/22/2016) showed AST 75 and ALT 98. Repeat lipid panel showed total chol 215/ / HDL 64/. In 5/2016, she had an abdominal CT scan showing the liver to be normal in size with normal parenchymal density; no discrete mass or ascites, and no intrahepatic biliary dilatation. She restarted simvastatin 20 mg daily in 4/2018. She has been taking it without difficulty since restarting. She underwent repeat evaluation by Dr. Gaston Schwartz for her elevated LFT's, and reports that lab evaluation was negative. She underwent an abdominal ultrasound (2/16/2021) which showed an echogenic lobulated contour liver suggestive of steatosis +/- cirrhosis; no focal lesions seen. Recommendation was for her to lose weight. She has a history of diabetes mellitus, with impaired fasting glucose ranging from 103-111 since 2012, and HbA1c to 6.6-6.8 since 9/2016 (not checked previously). She was prescribed metformin ER last visit for an increase in her HbA1c to 7.1, but she reports that she took it for only one week and discontinued for unclear reasons. She was not experiencing any side effects. She denies any polyuria, polydipsia, nocturia, or blurry vision, and has no history of retinopathy, neuropathy, or nephropathy. She has regular eye exams with Dr. Ricki Han. She has a history of asthma and allergic rhinitis and is followed by Dr. Thao Arevalo. She is receiving immunotherapy once per month, and reports that she has not required any inhalers recently. She states that she does not use Qvar daily, but will take it occasionally. She does use Claritin every day.   In 7/2019, she reported increasing difficulty with right ear fullness, post nasal drainage, hoarseness, and cough, and was referred to Dr. Brody Danielson. He performed a nasal laryngoscopy and found evidence of laryngopharyngeal reflux. She was started on daily omeprazole, and she states that she has noticed some improvement. In 10/2017, she fell down the steps of her porch and had difficulty with right knee pain and swelling. She was evaluated by Dr. Nanci Cowden in 12/2017, and right knee xray showed decreased medial joint space, and moderate degenerative changes. She received a cortisone injection, but did not notice any improvement. She underwent an MRI of the right knee (1/29/2018) which showed complete tear of posterior horn and root of the medial meniscus; tear of the body and posterior horn of the lateral meniscus; tricompartmental osteoarthritis most prominent in medial and patellofemoral compartments; moderate joint effusion; small Baker's cyst. It was recommended that she consider knee replacement and arthroscopy. However, she decided to seek a second opinion and was evaluated by Dr. Lupe Pires. She also underwent an MRI of her left knee (3/23/2018) showing radial tear posterior horn medial meniscus with extrusion of the body. Also a radial tear in the mid body; lateral meniscus intrasubstance degeneration and probable small undersurface tear of the posterior horn; medial and patellofemoral compartments moderate chondral loss; s. ubchondral bone marrow edema in the medial tibial plateau, likely reactive. She is completed physical therapy for her bilateral knees and feels that it may have helped. 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 She was treated with Avelox, and repeat chest x-ray in 1/2012 showed complete resolution of the pneumonia. She denies any cough or shortness of breath.        She had a screening colonoscopy in 12/2006 by Dr. Harris Rojas showing a 5 mm sessile polyp in the rectum (pathology: hyperplastic). She had a repeat colonoscopy in 12/2016 which showed moderate sigmoid diverticulosis and a 6 mm sessile ascending colon polyp (pathology: serrated adenoma). She had a repeat screening colonoscopy in 4/2021 by Dr. Placido Lopez showing a single six 6 mm polyp in the distal sigmoid colon (pathology: Hyperplastic polyp). Follow-up recommended for 5 years. She denies any abdominal pain, nausea, vomiting, melena, hematochezia, or change in bowel movements. She does take omeprazole occasionally for GERD symptoms. In 12/2017, she was referred by her gynecologist for evaluation by Dr. Abimael Bradley for microscopic hematuria, and urine cytology was negative. However, she states that she refused his recommendations to undergo a CT urogram or cystoscopy. She denies any dysuria, gross hematuria, or flank pain. Past Medical History:   Diagnosis Date    Allergic rhinitis     Asthma     Cardiac stress echo, normal 11/02/2012    Normal maximal stress echo study. EF 60%. Ex time 9 min 45 sec. Chondromalacia patella     Colon polyps     Diabetes mellitus (HCC)     GERD (gastroesophageal reflux disease)     History of pneumonia 01/2012    AFB smear positive. 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     Hyperlipidemia     Hypertension     Menopause     Plantar fasciitis     left    PSVT (paroxysmal supraventricular tachycardia) ([facility])     A-V dharmesh reentrant tachycardia     Past Surgical History:   Procedure Laterality Date    ENDOSCOPY, COLON, DIAGNOSTIC      polyp    HX CERVICAL POLYPECTOMY      HX CYST INCISION AND DRAINAGE Right 10 or more years    HX DILATION AND CURETTAGE      HX GYN      polyp on cervix    HX POLYPECTOMY      from rectum     Current Outpatient Medications   Medication Sig    meloxicam (MOBIC) 7.5 mg tablet     glipiZIDE SR (GLUCOTROL XL) 10 mg CR tablet Take 1 Tablet by mouth two (2) times daily (with meals). Indications: type 2 diabetes mellitus    OneTouch Delica Plus Lancet 30 gauge misc use 1 LANCET to TEST BLOOD SUGAR once daily    glucose blood VI test strips (OneTouch Ultra Test) strip use 1 TEST STRIP to TEST BLOOD SUGAR once daily    hydroCHLOROthiazide (HYDRODIURIL) 12.5 mg tablet take 1 tablet by mouth once daily    simvastatin (ZOCOR) 20 mg tablet take 1 tablet by mouth at bedtime    losartan (COZAAR) 25 mg tablet take 1 tablet by mouth once daily    LORazepam (ATIVAN) 1 mg tablet take 1 tablet by mouth every 8 hours if needed for anxiety    fluticasone propionate (FLONASE) 50 mcg/actuation nasal spray instill 2 sprays into each nostril once daily    potassium chloride (K-DUR, KLOR-CON M20) 20 mEq tablet take 1 tablet by mouth once daily    metoprolol succinate (TOPROL-XL) 50 mg XL tablet take 1 tablet by mouth once daily    loratadine (Claritin) 10 mg tablet Take 1 Tablet by mouth daily. Blood-Glucose Meter monitoring kit Monitor fasting blood glucose once daily    albuterol (PROVENTIL HFA, VENTOLIN HFA, PROAIR HFA) 90 mcg/actuation inhaler inhale 2 puffs by mouth every 4 hours if needed for wheezing    clotrimazole-betamethasone (LOTRISONE) topical cream Apply  to both ear canals and affected part of outer ear twice a day with a finger. cholecalciferol (VITAMIN D3) 1,000 unit cap Take 2,000 Units by mouth daily. carboxymethylcellulose sodium (REFRESH LIQUIGEL) 1 % dlgl ophthalmic solution Apply  to eye. No current facility-administered medications for this visit. Allergies and Intolerances: Allergies   Allergen Reactions    Altace [Ramipril] Cough    Penicillins Other (comments)     Hands peel    Sulfur Itching     Family History: She had two aunts who had breast cancer (her mother's sister and father's sister). She has no FH of colon cancer. Her mother passed away from uterine cancer. Her father  from metastatic prostate cancer.      Family History   Problem Relation Age of Onset    OSTEOARTHRITIS Mother     Hypertension Mother              Cancer Father         bone cancer    Hypertension Sister     Hypertension Sister     Hypertension Sister     Breast Cancer Maternal Aunt     Breast Cancer Paternal Aunt     Diabetes Other     Stroke Other     Other Sister         twin sister - osteopenia and low vitamin D levels     Social History:   She  reports that she has never smoked. She has never used smokeless tobacco. She is  and has two sons. She was a homemaker, but worked part-time in . She now helps care for her grandchildren. Social History     Substance and Sexual Activity   Alcohol Use No     Immunization History:  Immunization History   Administered Date(s) Administered     Influenza, FLUZONE (age 72 y+), High Dose 10/03/2022    COVID-19, MODERNA BLUE border, Primary or Immunocompromised, (age 18y+), IM, 100 mcg/0.5mL 03/01/2021, 04/02/2021    COVID-19, MODERNA Booster BLUE border, (age 18y+), IM, 50mcg/0.25mL 11/30/2021    Influenza Vaccine PF 12/12/2013, 10/03/2014    Influenza Vaccine Split 11/02/2011, 10/22/2012    Influenza Vaccine Whole 10/29/2010    Influenza, FLUAD, (age 72 y+), Adjuvanted 10/25/2021    Influenza, FLUARIX, FLULAVAL, FLUZONE (age 10 mo+) AND AFLURIA, (age 1 y+), PF, 0.5mL 10/23/2015, 10/19/2016, 10/05/2017, 10/26/2018, 10/08/2019    PPD 01/03/2012    Pneumococcal Conjugate (PCV-13) 08/13/2020    Pneumococcal Polysaccharide (PPSV-23) 03/21/2017, 04/19/2022    TB Skin Test (PPD) Intradermal 02/12/2014    TDAP Vaccine 02/16/2012    Tdap 04/01/2020    Zoster Recombinant 09/16/2020, 11/27/2020       Review of Systems:   As above included in HPI. Otherwise 11 point review of systems negative including constitutional, skin, HENT, eyes, respiratory, cardiovascular, gastrointestinal, genitourinary, musculoskeletal, endocrine, hematologic, allergy, and neurologic.     Physical:   Visit Vitals  /78 (BP 1 Location: Left upper arm, BP Patient Position: Sitting)   Pulse (!) 58   Temp 97.7 °F (36.5 °C) (Temporal)   Resp 16   Ht 5' 2\" (1.575 m)   Wt 187 lb 3.2 oz (84.9 kg)   SpO2 99%   BMI 34.24 kg/m²       Exam:   Patient appears in no apparent distress. Affect is appropriate. HEENT: PERRLA, anicteric,  no JVD, adenopathy or thyromegaly. No carotid bruits or radiated murmur. Lungs: clear to auscultation, no wheezes, rhonchi, or rales. Heart: regular rate and rhythm. No murmur, rubs, gallops  Abdomen: soft, nontender, nondistended, normal bowel sounds, no hepatosplenomegaly or masses. Extremities: without edema.       .Diabetic foot exam:     Left Foot:   Visual Exam: normal    Pulse DP: 2+ (normal)   Filament test: normal sensation    Vibratory sensation: normal      Right Foot:   Visual Exam: normal    Pulse DP: 2+ (normal)   Filament test: normal sensation    Vibratory sensation: normal        Review of Data:  Labs:  Hospital Outpatient Visit on 11/04/2022   Component Date Value Ref Range Status    Hemoglobin A1c 11/04/2022 7.0 (A)  4.2 - 5.6 % Final    Est. average glucose 11/04/2022 154  mg/dL Final    LIPID PROFILE 11/04/2022        Final    Cholesterol, total 11/04/2022 176  <200 MG/DL Final    Triglyceride 11/04/2022 148  <150 MG/DL Final    HDL Cholesterol 11/04/2022 64 (A)  40 - 60 MG/DL Final    LDL, calculated 11/04/2022 82.4  0 - 100 MG/DL Final    VLDL, calculated 11/04/2022 29.6  MG/DL Final    CHOL/HDL Ratio 11/04/2022 2.8  0 - 5.0   Final    Magnesium 11/04/2022 1.6  1.6 - 2.6 mg/dL Final    Sodium 11/04/2022 139  136 - 145 mmol/L Final    Potassium 11/04/2022 3.9  3.5 - 5.5 mmol/L Final    Chloride 11/04/2022 102  100 - 111 mmol/L Final    CO2 11/04/2022 31  21 - 32 mmol/L Final    Anion gap 11/04/2022 6  3.0 - 18 mmol/L Final    Glucose 11/04/2022 129 (A)  74 - 99 mg/dL Final    BUN 11/04/2022 13  7.0 - 18 MG/DL Final    Creatinine 11/04/2022 0.71  0.6 - 1.3 MG/DL Final    BUN/Creatinine ratio 11/04/2022 18  12 - 20   Final    eGFR 11/04/2022 >60  >60 ml/min/1.73m2 Final    Calcium 11/04/2022 9.2  8.5 - 10.1 MG/DL Final    Bilirubin, total 11/04/2022 0.5  0.2 - 1.0 MG/DL Final    ALT (SGPT) 11/04/2022 31  13 - 56 U/L Final    AST (SGOT) 11/04/2022 21  10 - 38 U/L Final    Alk. phosphatase 11/04/2022 81  45 - 117 U/L Final    Protein, total 11/04/2022 7.6  6.4 - 8.2 g/dL Final    Albumin 11/04/2022 3.8  3.4 - 5.0 g/dL Final    Globulin 11/04/2022 3.8  2.0 - 4.0 g/dL Final    A-G Ratio 11/04/2022 1.0  0.8 - 1.7   Final    Microalbumin,urine random 11/04/2022 2.21  0 - 3.0 MG/DL Final    Creatinine, urine random 11/04/2022 229.00 (A)  30 - 125 mg/dL Final    Microalbumin/Creat ratio (mg/g cre* 11/04/2022 10  0 - 30 mg/g Final    Vitamin D 25-Hydroxy 11/04/2022 34.4  30 - 100 ng/mL Final    Color 11/04/2022 YELLOW    Final    Appearance 11/04/2022 CLEAR    Final    Specific gravity 11/04/2022 1.019  1.005 - 1.030   Final    pH (UA) 11/04/2022 5.5  5.0 - 8.0   Final    Protein 11/04/2022 Negative  NEG mg/dL Final    Glucose 11/04/2022 Negative  NEG mg/dL Final    Ketone 11/04/2022 Negative  NEG mg/dL Final    Bilirubin 11/04/2022 Negative  NEG   Final    Blood 11/04/2022 TRACE (A)  NEG   Final    Urobilinogen 11/04/2022 0.2  0.2 - 1.0 EU/dL Final    Nitrites 11/04/2022 Negative  NEG   Final    Leukocyte Esterase 11/04/2022 MODERATE (A)  NEG   Final    WBC 11/04/2022 1 to 3  0 - 4 /hpf Final    RBC 11/04/2022 Negative  0 - 5 /hpf Final    Epithelial cells 11/04/2022 2+  0 - 5 /lpf Final    Bacteria 11/04/2022 2+ (A)  NEG /hpf Final         Health Maintenance:  Screening:    Mammogram: abnormal (3/2022) left breast mass aspiration showed benign cyst.    PAP smear: negative (3/2020) with negative HPV. Followed by Dr. Corey Escobar. Colorectal: colonoscopy (4/2021) serrated adenoma. Dr. Umang Nash. Due 4/2026.    Depression: none   DM (HbA1c/FPG): HbA1c 7.0 (11/2022)   Hepatitis C: negative (3/2016)   Falls: one   DEXA: within normal limits (3/2022)   Glaucoma: regular eye exams with Dr. Souleymane Perez (last 10/2022)   Smoking: none   Vitamin D: 34.4 (11/2022)    Medicare Wellness: today      Impression:  Patient Active Problem List   Diagnosis Code    Benign hypertensive heart disease without congestive heart failure I11.9    Allergic rhinitis J30.9    Colon polyps K63.5    AVNRT (AV dharmesh re-entry tachycardia) ([facility]) I47.1    Hyperlipidemia E78.5    Essential hypertension I10    Asthma J45.909    GERD (gastroesophageal reflux disease) K21.9    Type 2 diabetes mellitus without complication, without long-term current use of insulin ([facility]) E11.9    Vitamin D deficiency E55.9    Microscopic hematuria R31.29    Chronic pain of both knees M25.561, M25.562, G89.29    Obesity (BMI 30.0-34. 9) E66.9    Noncompliance with diet and medication regimen Z91.119, Z91.14    Anxiety F41.9    Spondylolisthesis, lumbar region M43.16    DDD (degenerative disc disease), lumbar M51.36    NAFL (nonalcoholic fatty liver) Z36.9    HENRY (nonalcoholic steatohepatitis) K75.81       Plan:  1. Diabetes mellitus. Impaired fasting glucose had been present since 2012. Diagnosed in 9/2016 when HbA1c was assessed and found to be elevated at 6.6. Had been steadily increasing and reached 7.5 in 4/2020. Was being treated with metformin  mg at dinner but stopped taking it due to diarrhea. Prescribed sitagliptin and Jardiance, but too costly. Started on glipizide SR 5 mg daily but was not initially compliant with dose. HbA1c had worsened in 9/2021 and 1/2022 to 7.4 and repeat HbA1c worsened in 4/2022 to 8.4 and again stressed need for treatment. Unwilling to proceed with GLP-1 agonist which would be beneficial for weight loss. Started compliance with glipizide SR 5 mg daily and repeat HbA1c 7.7 in 8/2022. Dose of glipizide SR increased to 10 mg daily with repeat HbA1c improved to 7.0 today. Advised to increase dose of glipizide SR to 10 mg twice daily and will reassess at next visit.   No evidence of microvascular complications. On statin and ARB. Continue regular eye exams with Dr. Michael Batista. Foot exam normal (11/2022). Urine microalbumin/ creatinine ratio remains without evidence of microalbuminuria (10 mg/g). Emphasized importance of lifestyle modifications, including heart healthy diabetic diet, regular exercise, and weight loss. Will reassess HbA1c in 3 months. 2. Hypertension. Blood pressure remains well controlled today on losartan 25 mg daily, metoprolol XL 50 mg daily, and hydrochlorothiazide 12.5 mg daily. Renal function has been normal with creatinine 0.71/ eGFR >60 today. Continue to follow. 3. Hyperlipidemia. On moderate intensity dose simvastatin 20 mg daily with LDL 82 and HDL 64, indicative of reasonable control although slightly worsened since last visit. Continue to follow. 4. Hepatic steatosis. Developed in 3/2016 with AST 85/ . Evaluation included hepatitis A, B, and C levels (negative), iron panel (normal), and RUQ ultrasound (3/23/2016) with limited sonographic window for the liver; only partially visualized but grossly unremarkable. In 5/2016, she had an abdominal CT scan showing the liver to be normal in size with normal parenchymal density; no discrete mass or ascites, and no intrahepatic biliary dilatation. Transaminases had normalized since that time. However, repeat elevation in 11/2020 with ALT 66 and AST 45. Referred to Dr. Nci Mccracken and liver ultrasound (2/2021) showed lobulated contour liver consistent with steatosis +/- possible cirrhosis. She stated that weight loss recommended. LFTs remain normal today but weight essentially unchanged. Discussed importance given hepatic steatosis and concern for progression to fibrosis. Will continue to monitor. 5. AV dharmesh reentrant tachycardia. No recent episodes. On metoprolol succinate 50 mg daily and no significant palpitations recently.   Established care with Dr. Eber Hays in 10/2021 and being followed annually, last visit in 10/2022.     6. Asthma, mild persistent. Associated with allergies. Had been receiving monthly immunotherapy injections with Dr. Sanket Hdz, but reports discontinued in 11/2021 as of unclear benefit. Using Claritin and Flonase regularly with good control of allergy symptoms. 7. GERD/ laryngopharyngeal reflux. Evaluated by Dr. Dominique Lua, and noted on laryngoscopic exam. Started on omeprazole daily but states that decided to discontinue as did not help with cough. Was receiving immunotherapy as discussed but states now has discontinued. No increasing symptoms today. 8. Microscopic hematuria. Patient refusing further evaluation with cystoscopy or CT urogram. Urine cytology negative. Did have abdominal CT scan in 5/2016 where kidneys appeared normal. Unwilling to complete evaluation with Dr. Elisabeth So. Repeat urinalysis with evidence small blood and 3-5 RBCs in 10/2019. Trace blood with 0-3 RBC in 11/2020 and negative for microscopic hematuria since that time, and remains negative today. Will continue to monitor. 9. Bilateral knee pain. Did not respond to cortisone injection. Had both right and left knee MRI showing variable degrees of menisci tears and osteoarthritis of knee joint. Now being followed by Dr. Cintia Barrios. Reports worsening symptoms 3 weeks ago and received a left cortisone injection with some improvement. Had follow-up with Dr. Cintia Barrios and started on meloxicam 7.5 mg daily. Patient reports taking as needed with some improvement. 10.  Low back pain with right sciatica. Patient presented in 10/2020 with severe low back pain and right sciatica for several days. Unclear as to inciting incident. Denied lower extremity weakness or paresthesias. Began taking Etodolac and methocarbamol as prescribed by [facility] several months prior for similar symptoms. Lumbar spine x-ray (10/2020) showed multilevel degenerative findings, trace levorotoscoliosis and multilevel spondylolisthesis.  Following with  Sandor Ordaz. Reported increasing low back pain without sciatica in 10/2021 and referred to physical therapy with improvement. Reports no worsening symptoms today. 11. Abnormal mammogram/ family history of breast cancer. Positive family history for breast cancer in a maternal aunt in her 46s and in a paternal aunt. Wishing to continue only with screening mammograms annually and not assess lifetime risk. Underwent screening mammogram on 3/24/2022 which showed a 5 mm mass deep in the left breast at the 12 o'clock position. Diagnostic mammogram and ultrasound on 2/7/3467 showed a complicated cystic structure and recommended ultrasound-guided cyst aspiration and biopsy. Evaluated by Dr. Radha Hartman who recommended proceeding with biopsy by diagnostic radiology and patient underwent ultrasound-guided aspiration on 5/18/2022 by Dr. Fredrick Pastor and clear yellow fluid was aspirated with disappearance of the mass and diagnosis of benign cyst made. Recommended to continue with routine mammogram in 3/2023. Will place order today so that patient may schedule. 12. Obesity. Weight essentially unchanged over the last 6 months. Patient admits to not exercising and not following a diabetic diet or dietary changes needed to lose weight. Already seen by Pharm. NAMRATA Stein for nutrition counseling and attended two sessions. Emphasized importance of continued attempts at lifestyle modifications, including heart healthy diabetic diet, regular exercise, and weight loss. Will continue to address. 13. Health maintenance. Completed Moderna COVID-19 vaccine series and received one Moderna booster dose. Advised to proceed with the updated bivalent booster dose. Already received the influenza vaccine. Received 2/2 doses of Shingrix vaccine and Tdap vaccine. Other immunizations up to date. Colonoscopy up-to-date. Mammogram as discussed. Continue regular eye exams with Dr. Luci Malone.  Vitamin D level remains normal today on maintenance dose supplement 2000 units daily. Repeat bone density study (3/24/2022) within normal limits. In addition, an annual Medicare wellness visit was done today. Patient understands recommendations and agrees with plan. Follow-up in 3 months. Future orders:    ICD-10-CM ICD-9-CM    1. Type 2 diabetes mellitus without complication, without long-term current use of insulin (HCC)  E11.9 250.00  DIABETES FOOT EXAM      HEMOGLOBIN A1C WITH EAG      LIPID PANEL      METABOLIC PANEL, COMPREHENSIVE      MICROALBUMIN, UR, RAND W/ MICROALB/CREAT RATIO      2. Essential hypertension  I10 401.9 MAGNESIUM      METABOLIC PANEL, COMPREHENSIVE      3. AVNRT (AV dharmesh re-entry tachycardia) ([facility])  I47.1 427.89 MAGNESIUM      METABOLIC PANEL, COMPREHENSIVE      4. Hyperlipidemia, unspecified hyperlipidemia type  E78.5 272.4 LIPID PANEL      5. NAFL (nonalcoholic fatty liver)  D42.3 851.8 METABOLIC PANEL, COMPREHENSIVE      6. Obesity (BMI 30.0-34. 9)  E66.9 278.00       7. Chronic pain of both knees  M25.561 719.46     M25.562 338.29     G89.29        8. Vitamin D deficiency  E55.9 268.9 VITAMIN D, 25 HYDROXY      9. Medicare annual wellness visit, subsequent  Z00.00 V70.0 ADVANCE CARE PLANNING FIRST 30 MINS      10. Advanced directives, counseling/discussion  Z71.89 V65.49 ADVANCE CARE PLANNING FIRST 30 MINS      11. Screening for depression  Z13.31 V79.0       12.  Breast cancer screening by mammogram  Z12.31 V76.12 VERONIKA 3D CATIE W MAMMO BI SCREENING INCL CAD

## 2022-11-14 RX ORDER — HYDROCHLOROTHIAZIDE 12.5 MG/1
TABLET ORAL
Qty: 90 TABLET | Refills: 3 | Status: SHIPPED | OUTPATIENT
Start: 2022-11-14

## 2022-12-05 ENCOUNTER — TELEPHONE (OUTPATIENT)
Dept: INTERNAL MEDICINE CLINIC | Age: 67
End: 2022-12-05

## 2022-12-05 NOTE — TELEPHONE ENCOUNTER
Called and spoke with patient. Reports noting some pink discharge in her undergarments over the last 3-4 days. States intermittent and only a small amount. Reports no discoloration of urine, and noticing pink color when wiping after urination. No melena or hematochezia. Reports has previously scheduled appointment tomorrow with her gynecologist, Dr. Joseph Larson, and advised to discuss with her so further evaluation can be performed. Answered all questions.

## 2022-12-05 NOTE — TELEPHONE ENCOUNTER
Patient called and said she has seen spotting and is concerned and has questions she thinks it maybe the medicine she is taking. Please advise.

## 2022-12-09 NOTE — TELEPHONE ENCOUNTER
Pt calling to let Dr Dotty Olguin know she saw her GYN Dr Nancy Perdue. She prescribed \"she thinks it's called Premarin cream\" to insert into her vagina.   She believes she is just very dry  She is also referring her to urology and ordering an [address]

## 2022-12-19 RX ORDER — METOPROLOL SUCCINATE 50 MG/1
TABLET, EXTENDED RELEASE ORAL
Qty: 90 TABLET | Refills: 3 | Status: SHIPPED | OUTPATIENT
Start: 2022-12-19

## 2022-12-21 ENCOUNTER — TELEPHONE (OUTPATIENT)
Dept: INTERNAL MEDICINE CLINIC | Age: 67
End: 2022-12-21

## 2022-12-21 NOTE — TELEPHONE ENCOUNTER
Called and spoke with patient to see if she is ok with being referred to [facility]. Patient states her gyn is sending her for an ultrasound in January and then she is to follow up with the gyn after that. She wants to wait until that follow up to decide who she wants to see. Patient states her gyn also mentioned the name of a female urologist who works out of [facility] and patient states she thinks she wants to see that provider. Patient is unsure of that providers name. Patient will follow up with gyn and then let us know if she needs anything.

## 2022-12-21 NOTE — TELEPHONE ENCOUNTER
Patient called and states that she recently saw her gynecologist for her regular check up, but started spotting. Her gynecologist prescribed her an estrogen cream to use twice a week, and put in a referral to a urologist, and this was back on 12/6/22. She states she never heard from anyone, she reached back out her to gynecologist and was given the information for the urologist she had been referred to, Dr Harris Noble at urology [facility], and she tried to reach out to them but never got an answer. She states that she does not want to see him, she would like to see who Dr. Aaron Griffin would recommend as a urologist and go there. She is hopeful that Dr. Aaron Griffin would be able to recommend someone? She can be reached at [phone]. States that she is not in a rush, she doesn't want anything set up until after the holidays.     Please advise ,thank you

## 2022-12-27 RX ORDER — METOPROLOL SUCCINATE 50 MG/1
TABLET, EXTENDED RELEASE ORAL
Qty: 90 TABLET | Refills: 3 | Status: SHIPPED | OUTPATIENT
Start: 2022-12-27

## 2022-12-28 ENCOUNTER — TELEPHONE (OUTPATIENT)
Dept: INTERNAL MEDICINE CLINIC | Age: 67
End: 2022-12-28

## 2022-12-28 NOTE — TELEPHONE ENCOUNTER
Patient is calling to let Dr. Kenan Castro know that Dr. Daniel Pina can't see her until March. Her GYN Dr. Danella Duverney is faxing a referral to Dr. Cherelle Hansen to see if she can see her sooner. She has an ultrasound with Dr Danella Duverney scheduled for Jan 16th. Also stating Dr. Kenan Castro put her on low dose sugar pills. This morning her BS was 101.

## 2022-12-28 NOTE — TELEPHONE ENCOUNTER
Please advise her that her blood sugar level of 101 is appropriate control and goal for fasting morning blood sugars is . She should continue glipizide as prescribed. Thanks.

## 2022-12-29 NOTE — TELEPHONE ENCOUNTER
Patient called back about the referral. Irineo Gupta she has been calling Dr. Jeremy Cox office and can't get through. Stating she is old and should not be bleeding. Stating she is not going to wait a month to see a doctor. She wants to see someone now.

## 2022-12-29 NOTE — TELEPHONE ENCOUNTER
Advised patient of the message below about her blood sugar. Patient also states she saw José Nugent did not do a pap smear.  told that they do not do pap smears on patients that are her age. She was referred to [facility] at [facility]. She reports not being able to reach any one on the phone to schedule an appointment. Patient reports she is still noticing intermittent episodes of blood on the tissue when she wipes. She is also having some low back pain. Patient will continue to try to reach [facility] office. Also advised patient to follow up with  to see what other recommendations they have.

## 2023-01-03 NOTE — TELEPHONE ENCOUNTER
Patient called and states that she is still having spotting of blood, its been going on for about a month. She states that she did see Dr. Robin Galeana and she is waiting to get an utlrasound done. She states that they did check her urine and did see blood in it, but they didn't tell her anything else. She is calling to see Dr. Mirlande Montero know and to see if her urine can be tested, states that she doesn't want to wait for the utlrasound and wants to see Dr. Mirlande Montero. States she does have pain in her right side sometimes. She also has some questions about her lab results from 11/4/22 that she would like to discuss with a nurse. She can be reached at [phone].   Please advise, thank you

## 2023-01-04 ENCOUNTER — HOSPITAL ENCOUNTER (OUTPATIENT)
Dept: LAB | Age: 68
Discharge: HOME | End: 2023-01-04
Payer: MEDICARE

## 2023-01-04 ENCOUNTER — OFFICE VISIT (OUTPATIENT)
Dept: INTERNAL MEDICINE CLINIC | Age: 68
End: 2023-01-04
Payer: MEDICARE

## 2023-01-04 VITALS
DIASTOLIC BLOOD PRESSURE: 70 MMHG | SYSTOLIC BLOOD PRESSURE: 138 MMHG | OXYGEN SATURATION: 95 % | TEMPERATURE: 97 F | BODY MASS INDEX: 34.04 KG/M2 | WEIGHT: 185 LBS | RESPIRATION RATE: 16 BRPM | HEIGHT: 62 IN | HEART RATE: 71 BPM

## 2023-01-04 DIAGNOSIS — I47.1 AVNRT (AV NODAL RE-ENTRY TACHYCARDIA) (HCC): ICD-10-CM

## 2023-01-04 DIAGNOSIS — B37.2 CANDIDAL DERMATITIS: ICD-10-CM

## 2023-01-04 DIAGNOSIS — E11.65 TYPE 2 DIABETES MELLITUS WITH HYPERGLYCEMIA, WITHOUT LONG-TERM CURRENT USE OF INSULIN (HCC): ICD-10-CM

## 2023-01-04 DIAGNOSIS — R31.29 MICROSCOPIC HEMATURIA: Primary | ICD-10-CM

## 2023-01-04 DIAGNOSIS — R31.29 MICROSCOPIC HEMATURIA: ICD-10-CM

## 2023-01-04 DIAGNOSIS — E66.09 CLASS 1 OBESITY DUE TO EXCESS CALORIES WITH SERIOUS COMORBIDITY AND BODY MASS INDEX (BMI) OF 33.0 TO 33.9 IN ADULT: ICD-10-CM

## 2023-01-04 DIAGNOSIS — E11.9 TYPE 2 DIABETES MELLITUS WITHOUT COMPLICATION, WITHOUT LONG-TERM CURRENT USE OF INSULIN (HCC): ICD-10-CM

## 2023-01-04 DIAGNOSIS — N95.0 POSTMENOPAUSAL BLEEDING: ICD-10-CM

## 2023-01-04 DIAGNOSIS — I10 ESSENTIAL HYPERTENSION: ICD-10-CM

## 2023-01-04 PROCEDURE — 88112 CYTOPATH CELL ENHANCE TECH: CPT

## 2023-01-04 PROCEDURE — G0463 HOSPITAL OUTPT CLINIC VISIT: HCPCS | Performed by: INTERNAL MEDICINE

## 2023-01-04 RX ORDER — NYSTATIN 100000 [USP'U]/G
POWDER TOPICAL 4 TIMES DAILY
Qty: 60 G | Refills: 2 | Status: SHIPPED | OUTPATIENT
Start: 2023-01-04

## 2023-01-04 RX ORDER — CONJUGATED ESTROGENS 0.62 MG/G
CREAM VAGINAL
COMMUNITY
Start: 2022-12-06

## 2023-01-04 RX ORDER — FLUCONAZOLE 150 MG/1
150 TABLET ORAL ONCE
Qty: 1 TABLET | Refills: 0 | Status: SHIPPED | OUTPATIENT
Start: 2023-01-04 | End: 2023-01-04

## 2023-01-04 NOTE — PATIENT INSTRUCTIONS
Learning About Diabetes Food Guidelines  Your Care Instructions     Meal planning is important to manage diabetes. It helps keep your blood sugar at a target level (which you set with your doctor). You don't have to eat special foods. You can eat what your family eats, including sweets once in a while. But you do have to pay attention to how often you eat and how much you eat of certain foods. You may want to work with a dietitian or a certified diabetes educator (CDE) to help you plan meals and snacks. A dietitian or CDE can also help you lose weight if that is one of your goals. What should you know about eating carbs? Managing the amount of carbohydrate (carbs) you eat is an important part of healthy meals when you have diabetes. Carbohydrate is found in many foods. Learn which foods have carbs. And learn the amounts of carbs in different foods. Bread, cereal, pasta, and rice have about 15 grams of carbs in a serving. A serving is 1 slice of bread (1 ounce), ½ cup of cooked cereal, or 1/3 cup of cooked pasta or rice. Fruits have 15 grams of carbs in a serving. A serving is 1 small fresh fruit, such as an apple or orange; ½ of a banana; ½ cup of cooked or canned fruit; ½ cup of fruit juice; 1 cup of melon or raspberries; or 2 tablespoons of dried fruit. Milk and no-sugar-added yogurt have 15 grams of carbs in a serving. A serving is 1 cup of milk or 2/3 cup of no-sugar-added yogurt. Starchy vegetables have 15 grams of carbs in a serving. A serving is ½ cup of mashed potatoes or sweet potato; 1 cup winter squash; ½ of a small baked potato; ½ cup of cooked beans; or ½ cup cooked corn or green peas. Learn how much carbs to eat each day and at each meal. A dietitian or CDE can teach you how to keep track of the amount of carbs you eat. This is called carbohydrate counting. If you are not sure how to count carbohydrate grams, use the Plate Method to plan meals.  It is a good, quick way to make sure that you have a balanced meal. It also helps you spread carbs throughout the day. Divide your plate by types of foods. Put non-starchy vegetables on half the plate, meat or other protein food on one-quarter of the plate, and a grain or starchy vegetable in the final quarter of the plate. To this you can add a small piece of fruit and 1 cup of milk or yogurt, depending on how many carbs you are supposed to eat at a meal.  Try to eat about the same amount of carbs at each meal. Do not \"save up\" your daily allowance of carbs to eat at one meal.  Proteins have very little or no carbs per serving. Examples of proteins are beef, chicken, turkey, fish, eggs, tofu, cheese, cottage cheese, and peanut butter. A serving size of meat is 3 ounces, which is about the size of a deck of cards. Examples of meat substitute serving sizes (equal to 1 ounce of meat) are 1/4 cup of cottage cheese, 1 egg, 1 tablespoon of peanut butter, and ½ cup of tofu. How can you eat out and still eat healthy? Learn to estimate the serving sizes of foods that have carbohydrate. If you measure food at home, it will be easier to estimate the amount in a serving of restaurant food. If the meal you order has too much carbohydrate (such as potatoes, corn, or baked beans), ask to have a low-carbohydrate food instead. Ask for a salad or green vegetables. If you use insulin, check your blood sugar before and after eating out to help you plan how much to eat in the future. If you eat more carbohydrate at a meal than you had planned, take a walk or do other exercise. This will help lower your blood sugar. What else should you know? Limit saturated fat, such as the fat from meat and dairy products. This is a healthy choice because people who have diabetes are at higher risk of heart disease. So choose lean cuts of meat and nonfat or low-fat dairy products. Use olive or canola oil instead of butter or shortening when cooking. Don't skip meals.  Your blood sugar may drop too low if you skip meals and take insulin or certain medicines for diabetes. Check with your doctor before you drink alcohol. Alcohol can cause your blood sugar to drop too low. Alcohol can also cause a bad reaction if you take certain diabetes medicines. Follow-up care is a key part of your treatment and safety. Be sure to make and go to all appointments, and call your doctor if you are having problems. It's also a good idea to know your test results and keep a list of the medicines you take. Where can you learn more? Go to http://www.pittman.com/  Enter I147 in the search box to learn more about \"Learning About Diabetes Food Guidelines. \"  Current as of: December 20, 2019               Content Version: 12.6  © 7244-4492 [employer]. Care instructions adapted under license by [employer] (which disclaims liability or warranty for this information). If you have questions about a medical condition or this instruction, always ask your healthcare professional. [address] any warranty or liability for your use of this information.

## 2023-01-04 NOTE — PROGRESS NOTES
1. \"Have you been to the ER, urgent care clinic since your last visit? Hospitalized since your last visit? \" No    2. \"Have you seen or consulted any other health care providers outside of the [address] [place] [address] since your last visit? \"        3. For patients aged 39-70: Has the patient had a colonoscopy / FIT/ Cologuard? Yes - no Care Gap present      If the patient is female:    4. For patients aged 41-77: Has the patient had a mammogram within the past 2 years? Yes - no Care Gap present      5. For patients aged 21-65: Has the patient had a pap smear?  NA - based on age or sex

## 2023-01-06 ENCOUNTER — TELEPHONE (OUTPATIENT)
Dept: INTERNAL MEDICINE CLINIC | Age: 68
End: 2023-01-06

## 2023-01-06 NOTE — TELEPHONE ENCOUNTER
* * CYTOPATHOLOGY* * *   =========================================================================   NON-GYNECOLOGICAL         Patient:  Cynthia Membreno Specimen #:     Age:  1955 (Age: 79)                                 Date of   Procedure: 1/4/2023   Sex:  F                                    Date of Receipt:  1/5/2023   Hospital#:  [id number]                       Date of Report:   1/6/2023   Med. Record #:  [id number]   [facility]        Physician(s):  Taylor Miramontes MD ([id number])            * * * CLINICAL HISTORY* * *       Other microscopic hematuria ICD10 Code: R31.29         SOURCE:   A: Urine       ============================================================================                                   * * * FINAL INTERPRETATION* * *       Urine   Satisfactory for evaluation. NO MALIGNANT CELLS FOUND. Light microscopic examination of ThinPrep slide performed by Sharlene Lacey. Iram Bourne MD PhD.       Buck Urszulavanesa:   Performed thin prep filter x1   Received 90cc pale yellow fluid                MOUNIKA Cline (CT)     Sharlene Lacey. Iram Bourne M.D.       * * *Electronically Signed* * *   1/6/2023           Specimen Collected: 01/04/23 10:20 Last Resulted: 01/06/23 09:50          Please let the patient know that her urine cytology did NOT show any evidence of malignant cells which is reassuring. Chase Lockhart

## 2023-01-06 NOTE — TELEPHONE ENCOUNTER
Please advise that no reason to repeat sample. The urine was examined under a microscope looking for abnormal cells, and those that were observed appeared normal.  That would not be affected by the wipe touching the sample.

## 2023-01-06 NOTE — TELEPHONE ENCOUNTER
Pt aware of message below and verbalized understanding. Patient states the day she completed the sample the wipe to clean prior to the sample ended up in the sample somehow. She wants to be sure that this did not affect her results. Per patient please advise if she needs to repeat the sample.

## 2023-01-08 ENCOUNTER — HOSPITAL ENCOUNTER (OUTPATIENT)
Dept: CT IMAGING | Age: 68
Discharge: HOME | End: 2023-01-08
Attending: INTERNAL MEDICINE
Payer: MEDICARE

## 2023-01-08 LAB — CREAT UR-MCNC: 0.7 MG/DL (ref 0.6–1.3)

## 2023-01-08 PROCEDURE — 82565 ASSAY OF CREATININE: CPT

## 2023-01-08 PROCEDURE — 74011000636 HC RX REV CODE- 636: Performed by: INTERNAL MEDICINE

## 2023-01-08 PROCEDURE — 74178 CT ABD&PLV WO CNTR FLWD CNTR: CPT

## 2023-01-08 RX ADMIN — IOPAMIDOL 100 ML: 755 INJECTION, SOLUTION INTRAVENOUS at 11:15

## 2023-01-08 NOTE — PROGRESS NOTES
HPI:   Jovan Gillespie is a 79y.o. year old female who presents today for an acute visit she has a history of hypertension, hyperlipidemia, paroxysmal SVT, diabetes mellitus, GERD, asthma, elevated transaminases, atypical mycobacterial pneumonia, and noncompliance. She has completed the Moderna COVID-19 vaccine series and received one Moderna booster dose and the updated bivalent booster dose. On 12/5/2022, she contacted the office and reported noticing a red discharge in her undergarments for 3-4 days. She reported no hematuria, but did describe pinkish-red coloring when wiping after urination. She denied any melena or hematochezia. She underwent evaluation by Dr. Michelle Rodrigues on 12/6/2022 and reports that she was found to have blood in her urine. She states that she was scheduled for a transvaginal ultrasound and referred to urology. However, she states that she wanted a female provider, so she scheduled an appointment with Dr. Yoly Ferrara in 4/2023. She also reports that she was prescribed estrogen cream to use twice weekly and clotrimazole/betamethasone to apply for \"vaginal dryness\". She reports that she continues to have ongoing bleeding and is concerned that her transvaginal ultrasound is not scheduled until 1/16/2023. She describes significant pelvic discomfort when urinating and is concerned since her mother passed away from uterine cancer. She is otherwise without new complaints. Summary of prior  medical history:  She has a history of hypertension, treated with metoprolol and hydrochlorothiazide (+ potassium). She reports that she does not check her blood pressure at home. She does not exercise regularly, but denies any chest pain, shortness of breath at rest or with exertion, lightheadedness, or edema. 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  She has had approximately six severe episodes over the years, prompting presentation to the ED and treatment with IV Adenosine. She currently reports infrequent short episodes of palpitations and is being treated with metoprolol. She is followed by Dr. Dev Hernandez. She had an echocardiogram (10/2005) showing normal LV size and function (EF 60-65%), and no valvular pathology. In 11/2012, she underwent an exercise stress echocardiogram, which was normal at maximal exercise. She has a history of hyperlipidemia, treated with simvastatin from 10/2012 to 3/2015 at which time she stopped taking it due to myalgias. She restarted it on 8/2015 and continued to take it without difficulty until 3/2016, when it was noticed that she had transaminase elevation (AST 85/ ) and it was discontinued. Evaluation included hepatitis A, B, and C levels (negative), iron panel (normal), and RUQ ultrasound (3/23/2016) with limited sonographic window for the liver; only partially visualized but grossly unremarkable. Repeat hepatic panel (4/22/2016) showed AST 75 and ALT 98. Repeat lipid panel showed total chol 215/ / HDL 64/. In 5/2016, she had an abdominal CT scan showing the liver to be normal in size with normal parenchymal density; no discrete mass or ascites, and no intrahepatic biliary dilatation. She restarted simvastatin 20 mg daily in 4/2018. She has been taking it without difficulty since restarting. She underwent repeat evaluation by Dr. Kathryn Gonzalez for her elevated LFT's, and reports that lab evaluation was negative. She underwent an abdominal ultrasound (2/16/2021) which showed an echogenic lobulated contour liver suggestive of steatosis +/- cirrhosis; no focal lesions seen. Recommendation was for her to lose weight. She has a history of diabetes mellitus, with impaired fasting glucose ranging from 103-111 since 2012, and HbA1c to 6.6-6.8 since 9/2016 (not checked previously).  She was prescribed metformin ER last visit for an increase in her HbA1c to 7.1, but she reports that she took it for only one week and discontinued for unclear reasons. She was not experiencing any side effects. She denies any polyuria, polydipsia, nocturia, or blurry vision, and has no history of retinopathy, neuropathy, or nephropathy. She has regular eye exams with Dr. Brooke Guy. She has a history of asthma and allergic rhinitis and is followed by Dr. Mariangel Guzman. She is receiving immunotherapy once per month, and reports that she has not required any inhalers recently. She states that she does not use Qvar daily, but will take it occasionally. She does use Claritin every day. In 7/2019, she reported increasing difficulty with right ear fullness, post nasal drainage, hoarseness, and cough, and was referred to Dr. Mason Garrett. He performed a nasal laryngoscopy and found evidence of laryngopharyngeal reflux. She was started on daily omeprazole, and she states that she has noticed some improvement. In 10/2017, she fell down the steps of her porch and had difficulty with right knee pain and swelling. She was evaluated by Dr. Kate Ortiz in 12/2017, and right knee xray showed decreased medial joint space, and moderate degenerative changes. She received a cortisone injection, but did not notice any improvement. She underwent an MRI of the right knee (1/29/2018) which showed complete tear of posterior horn and root of the medial meniscus; tear of the body and posterior horn of the lateral meniscus; tricompartmental osteoarthritis most prominent in medial and patellofemoral compartments; moderate joint effusion; small Baker's cyst. It was recommended that she consider knee replacement and arthroscopy. However, she decided to seek a second opinion and was evaluated by Dr. Perera. She also underwent an MRI of her left knee (3/23/2018) showing radial tear posterior horn medial meniscus with extrusion of the body.  Also a radial tear in the mid body; lateral meniscus intrasubstance degeneration and probable small undersurface tear of the posterior horn; medial and patellofemoral compartments moderate chondral loss; s. ubchondral bone marrow edema in the medial tibial plateau, likely reactive. She is completed physical therapy for her bilateral knees and feels that it may have helped. 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 She was treated with Avelox, and repeat chest x-ray in 1/2012 showed complete resolution of the pneumonia. She denies any cough or shortness of breath. She had a screening colonoscopy in 12/2006 by Dr. Amber Rangel showing a 5 mm sessile polyp in the rectum (pathology: hyperplastic). She had a repeat colonoscopy in 12/2016 which showed moderate sigmoid diverticulosis and a 6 mm sessile ascending colon polyp (pathology: serrated adenoma). She had a repeat screening colonoscopy in 4/2021 by Dr. Amber Rangel showing a single six 6 mm polyp in the distal sigmoid colon (pathology: Hyperplastic polyp). Follow-up recommended for 5 years. She denies any abdominal pain, nausea, vomiting, melena, hematochezia, or change in bowel movements. She does take omeprazole occasionally for GERD symptoms. In 12/2017, she was referred by her gynecologist for evaluation by Dr. Lala Lara for microscopic hematuria, and urine cytology was negative. However, she states that she refused his recommendations to undergo a CT urogram or cystoscopy. She denies any dysuria, gross hematuria, or flank pain. Past Medical History:   Diagnosis Date    Allergic rhinitis     Asthma     Cardiac stress echo, normal 11/02/2012    Normal maximal stress echo study. EF 60%. Ex time 9 min 45 sec. Chondromalacia patella     Colon polyps     Diabetes mellitus (HCC)     GERD (gastroesophageal reflux disease)     History of pneumonia 01/2012    AFB smear positive. 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     Hyperlipidemia     Hypertension Menopause     Plantar fasciitis     left    PSVT (paroxysmal supraventricular tachycardia) ([facility])     A-V dharmesh reentrant tachycardia     Past Surgical History:   Procedure Laterality Date    ENDOSCOPY, COLON, DIAGNOSTIC      polyp    HX CERVICAL POLYPECTOMY      HX CYST INCISION AND DRAINAGE Right 10 or more years    HX DILATION AND CURETTAGE      HX GYN      polyp on cervix    HX POLYPECTOMY      from rectum     Current Outpatient Medications   Medication Sig    nystatin (MYCOSTATIN) powder Apply  to affected area four (4) times daily. metoprolol succinate (TOPROL-XL) 50 mg XL tablet take 1 tablet by mouth once daily    hydroCHLOROthiazide (HYDRODIURIL) 12.5 mg tablet take 1 tablet by mouth once daily    meloxicam (MOBIC) 7.5 mg tablet     glipiZIDE SR (GLUCOTROL XL) 10 mg CR tablet Take 1 Tablet by mouth two (2) times daily (with meals). Indications: type 2 diabetes mellitus    OneTouch Delica Plus Lancet 30 gauge misc use 1 LANCET to TEST BLOOD SUGAR once daily    glucose blood VI test strips (OneTouch Ultra Test) strip use 1 TEST STRIP to TEST BLOOD SUGAR once daily    simvastatin (ZOCOR) 20 mg tablet take 1 tablet by mouth at bedtime    losartan (COZAAR) 25 mg tablet take 1 tablet by mouth once daily    LORazepam (ATIVAN) 1 mg tablet take 1 tablet by mouth every 8 hours if needed for anxiety    fluticasone propionate (FLONASE) 50 mcg/actuation nasal spray instill 2 sprays into each nostril once daily    potassium chloride (K-DUR, KLOR-CON M20) 20 mEq tablet take 1 tablet by mouth once daily    loratadine (Claritin) 10 mg tablet Take 1 Tablet by mouth daily.     Blood-Glucose Meter monitoring kit Monitor fasting blood glucose once daily    albuterol (PROVENTIL HFA, VENTOLIN HFA, PROAIR HFA) 90 mcg/actuation inhaler inhale 2 puffs by mouth every 4 hours if needed for wheezing    clotrimazole-betamethasone (LOTRISONE) topical cream Apply  to both ear canals and affected part of outer ear twice a day with a finger. cholecalciferol (VITAMIN D3) 1,000 unit cap Take 2,000 Units by mouth daily. carboxymethylcellulose sodium (REFRESH LIQUIGEL) 1 % dlgl ophthalmic solution Apply  to eye. Premarin 0.625 mg/gram vaginal cream insert 0.5 gram AT NIGHT with APPLICATOR vaginally two times a week     No current facility-administered medications for this visit. Allergies and Intolerances: Allergies   Allergen Reactions    Altace [Ramipril] Cough    Penicillins Other (comments)     Hands peel    Sulfur Itching     Family History: She had two aunts who had breast cancer (her mother's sister and father's sister). She has no FH of colon cancer. Her mother passed away from uterine cancer. Her father  from metastatic prostate cancer. Family History   Problem Relation Age of Onset    OSTEOARTHRITIS Mother     Hypertension Mother              Cancer Father         bone cancer    Hypertension Sister     Hypertension Sister     Hypertension Sister     Breast Cancer Maternal Aunt     Breast Cancer Paternal Aunt     Diabetes Other     Stroke Other     Other Sister         twin sister - osteopenia and low vitamin D levels     Social History:   She  reports that she has never smoked. She has never used smokeless tobacco. She is  and has two sons. She was a homemaker, but worked part-time in . She now helps care for her grandchildren.    Social History     Substance and Sexual Activity   Alcohol Use No     Immunization History:  Immunization History   Administered Date(s) Administered     Influenza, FLUZONE (age 72 y+), High Dose 10/03/2022    COVID-19, MODERNA BLUE border, Primary or Immunocompromised, (age 18y+), IM, 100 mcg/0.5mL 2021, 2021    COVID-19, MODERNA Bivalent BOOSTER, (age 18y+), IM, 50 mcg/0.5 mL 2022    COVID-19, MODERNA Booster BLUE border, (age 18y+), IM, 50mcg/0.25mL 2021    Influenza Vaccine PF 2013, 10/03/2014    Influenza Vaccine Split 2011, 10/22/2012 Influenza Vaccine Whole 10/29/2010    Influenza, FLUAD, (age 72 y+), Adjuvanted 10/25/2021    Influenza, FLUARIX, FLULAVAL, FLUZONE (age 10 mo+) AND AFLURIA, (age 1 y+), PF, 0.5mL 10/23/2015, 10/19/2016, 10/05/2017, 10/26/2018, 10/08/2019    PPD 01/03/2012    Pneumococcal Conjugate (PCV-13) 08/13/2020    Pneumococcal Polysaccharide (PPSV-23) 03/21/2017, 04/19/2022    TB Skin Test (PPD) Intradermal 02/12/2014    TDAP Vaccine 02/16/2012    Tdap 04/01/2020    Zoster Recombinant 09/16/2020, 11/27/2020       Review of Systems:   As above included in HPI. Otherwise 11 point review of systems negative including constitutional, skin, HENT, eyes, respiratory, cardiovascular, gastrointestinal, genitourinary, musculoskeletal, endocrine, hematologic, allergy, and neurologic. Physical:   Visit Vitals  /70 (BP 1 Location: Left upper arm, BP Patient Position: Sitting)   Pulse 71   Temp 97 °F (36.1 °C) (Temporal)   Resp 16   Ht 5' 2\" (1.575 m)   Wt 185 lb (83.9 kg)   SpO2 95%   BMI 33.84 kg/m²       Exam:   Patient appears in no apparent distress. Affect is appropriate. HEENT: PERRLA, anicteric,  no JVD, adenopathy or thyromegaly. No carotid bruits or radiated murmur. Lungs: clear to auscultation, no wheezes, rhonchi, or rales. Heart: regular rate and rhythm. No murmur, rubs, gallops  Abdomen: soft, nontender, nondistended, normal bowel sounds, no hepatosplenomegaly or masses. Extremities: without edema.  exam: Extensive erythema involving bilateral labia and extending to gluteal folds; several scattered areas of skin breakdown; no vaginal discharge noted        Review of Data:  Labs:  No visits with results within 1 Month(s) from this visit.    Latest known visit with results is:   Hospital Outpatient Visit on 11/04/2022   Component Date Value Ref Range Status    Hemoglobin A1c 11/04/2022 7.0 (A)  4.2 - 5.6 % Final    Est. average glucose 11/04/2022 154  mg/dL Final    LIPID PROFILE 11/04/2022        Final Cholesterol, total 11/04/2022 176  <200 MG/DL Final    Triglyceride 11/04/2022 148  <150 MG/DL Final    HDL Cholesterol 11/04/2022 64 (A)  40 - 60 MG/DL Final    LDL, calculated 11/04/2022 82.4  0 - 100 MG/DL Final    VLDL, calculated 11/04/2022 29.6  MG/DL Final    CHOL/HDL Ratio 11/04/2022 2.8  0 - 5.0   Final    Magnesium 11/04/2022 1.6  1.6 - 2.6 mg/dL Final    Sodium 11/04/2022 139  136 - 145 mmol/L Final    Potassium 11/04/2022 3.9  3.5 - 5.5 mmol/L Final    Chloride 11/04/2022 102  100 - 111 mmol/L Final    CO2 11/04/2022 31  21 - 32 mmol/L Final    Anion gap 11/04/2022 6  3.0 - 18 mmol/L Final    Glucose 11/04/2022 129 (A)  74 - 99 mg/dL Final    BUN 11/04/2022 13  7.0 - 18 MG/DL Final    Creatinine 11/04/2022 0.71  0.6 - 1.3 MG/DL Final    BUN/Creatinine ratio 11/04/2022 18  12 - 20   Final    eGFR 11/04/2022 >60  >60 ml/min/1.73m2 Final    Calcium 11/04/2022 9.2  8.5 - 10.1 MG/DL Final    Bilirubin, total 11/04/2022 0.5  0.2 - 1.0 MG/DL Final    ALT (SGPT) 11/04/2022 31  13 - 56 U/L Final    AST (SGOT) 11/04/2022 21  10 - 38 U/L Final    Alk.  phosphatase 11/04/2022 81  45 - 117 U/L Final    Protein, total 11/04/2022 7.6  6.4 - 8.2 g/dL Final    Albumin 11/04/2022 3.8  3.4 - 5.0 g/dL Final    Globulin 11/04/2022 3.8  2.0 - 4.0 g/dL Final    A-G Ratio 11/04/2022 1.0  0.8 - 1.7   Final    Microalbumin,urine random 11/04/2022 2.21  0 - 3.0 MG/DL Final    Creatinine, urine random 11/04/2022 229.00 (A)  30 - 125 mg/dL Final    Microalbumin/Creat ratio (mg/g cre* 11/04/2022 10  0 - 30 mg/g Final    Vitamin D 25-Hydroxy 11/04/2022 34.4  30 - 100 ng/mL Final    Color 11/04/2022 YELLOW    Final    Appearance 11/04/2022 CLEAR    Final    Specific gravity 11/04/2022 1.019  1.005 - 1.030   Final    pH (UA) 11/04/2022 5.5  5.0 - 8.0   Final    Protein 11/04/2022 Negative  NEG mg/dL Final    Glucose 11/04/2022 Negative  NEG mg/dL Final    Ketone 11/04/2022 Negative  NEG mg/dL Final    Bilirubin 11/04/2022 Negative NEG   Final    Blood 11/04/2022 TRACE (A)  NEG   Final    Urobilinogen 11/04/2022 0.2  0.2 - 1.0 EU/dL Final    Nitrites 11/04/2022 Negative  NEG   Final    Leukocyte Esterase 11/04/2022 MODERATE (A)  NEG   Final    WBC 11/04/2022 1 to 3  0 - 4 /hpf Final    RBC 11/04/2022 Negative  0 - 5 /hpf Final    Epithelial cells 11/04/2022 2+  0 - 5 /lpf Final    Bacteria 11/04/2022 2+ (A)  NEG /hpf Final         Health Maintenance:  Screening:    Mammogram: abnormal (3/2022) left breast mass aspiration showed benign cyst.    PAP smear: negative (3/2020) with negative HPV. Followed by Dr. Olive Jackson. Colorectal: colonoscopy (4/2021) serrated adenoma. Dr. Timur Hassan. Due 4/2026. Depression: none   DM (HbA1c/FPG): HbA1c 7.0 (11/2022)   Hepatitis C: negative (3/2016)   Falls: one   DEXA: within normal limits (3/2022)   Glaucoma: regular eye exams with Dr. Amanda Pike (last 10/2022)   Smoking: none   Vitamin D: 34.4 (11/2022)    Medicare Wellness: 11/10/2022      Impression:  Patient Active Problem List   Diagnosis Code    Benign hypertensive heart disease without congestive heart failure I11.9    Allergic rhinitis J30.9    Colon polyps K63.5    AVNRT (AV dharmesh re-entry tachycardia) ([facility]) I47.1    Hyperlipidemia E78.5    Essential hypertension I10    Asthma J45.909    GERD (gastroesophageal reflux disease) K21.9    Type 2 diabetes mellitus without complication, without long-term current use of insulin ([facility]) E11.9    Vitamin D deficiency E55.9    Microscopic hematuria R31.29    Chronic pain of both knees M25.561, M25.562, G89.29    Obesity (BMI 30.0-34. 9) E66.9    Noncompliance with diet and medication regimen Z91.119, Z91.14    Anxiety F41.9    Spondylolisthesis, lumbar region M43.16    DDD (degenerative disc disease), lumbar M51.36    NAFL (nonalcoholic fatty liver) I90.3    HENRY (nonalcoholic steatohepatitis) K75.81       Plan:  Vaginal bleeding.   Began noting vaginal bleeding in early 12/2022 and underwent evaluation by Dr. Jhony Alcantara on 12/6/2022. Noted to have urinalysis positive for blood at that time as well. Scheduled for a transvaginal ultrasound on 1/16/2023, but patient requested visit today due to concern regarding ongoing bleeding. Exam today noted severe candidal dermatitis throughout groin extending from labia to gluteal folds. May be contributing to bleeding as excoriations noted and significant discomfort when urinating due to burning pain. Was prescribed Lotrisone by Dr. Barbra Hernandez for \"vaginal dryness, but has not been using. Also prescribed estrogen cream and using twice weekly. Advised to begin use of Lotrisone twice daily as well as nystatin powder 4 times daily until improved. Given severity, will also prescribe fluconazole 150 mg x 1 dose. Advised regarding conservative measures for control of candidal dermatitis. Reassurance provided that transvaginal ultrasound is appropriate next step for evaluation. Answered all questions. Microscopic hematuria. History of abnormal urinalysis with microscopic hematuria and underwent evaluation by Dr. Issac Sam on 12/21/2017. Recommendation at that time was to proceed with cystoscopy and CT urogram.  Urine cytology was negative. However, was unwilling to complete evaluation at that time. Repeat urinalysis has intermittently showed RBCs and repeat urinalysis performed by Dr. Barbra Hernandez in 12/2022 also positive for blood. Referred to Dr. Daly Cisneros but wishing a female provider and now scheduled for evaluation by Dr. Sonja Rees in 4/2023. Discussed proceeding with evaluation and will obtain repeat urine cytology today. Also now agreeable to proceed with CT urogram, and order placed. Chronic issues:  1. Diabetes mellitus. Impaired fasting glucose had been present since 2012. Diagnosed in 9/2016 when HbA1c was assessed and found to be elevated at 6.6. Had been steadily increasing and reached 7.5 in 4/2020.  Was being treated with metformin  mg at dinner but stopped taking it due to diarrhea. Prescribed sitagliptin and Jardiance, but too costly. Started on glipizide SR 5 mg daily but was not initially compliant with dose. HbA1c had worsened in 9/2021 and 1/2022 to 7.4 and repeat HbA1c worsened in 4/2022 to 8.4 and again stressed need for treatment. Unwilling to proceed with GLP-1 agonist which would be beneficial for weight loss. Started compliance with glipizide SR 5 mg daily and repeat HbA1c 7.7 in 8/2022. Dose of glipizide SR increased to 10 mg daily with repeat HbA1c improved to 7.0 (11/2022). Advised to increase dose of glipizide SR to 10 mg twice daily and will reassess at next visit. No evidence of microvascular complications. On statin and ARB. Continue regular eye exams with Dr. Thania Interiano. Foot exam normal (11/2022). Urine microalbumin/ creatinine ratio remains without evidence of microalbuminuria (10 mg/g). Emphasized importance of lifestyle modifications, including heart healthy diabetic diet, regular exercise, and weight loss. Will reassess HbA1c in 3 months. 2. Hypertension. Blood pressure remains well controlled today on losartan 25 mg daily, metoprolol XL 50 mg daily, and hydrochlorothiazide 12.5 mg daily. Renal function has been normal with creatinine 0.71/ eGFR >60. Continue to follow. 3. Hyperlipidemia. On moderate intensity dose simvastatin 20 mg daily with LDL 82 and HDL 64 (11/2022), indicative of reasonable control although slightly worsened since last visit. Continue to follow. 4. Hepatic steatosis. Developed in 3/2016 with AST 85/ . Evaluation included hepatitis A, B, and C levels (negative), iron panel (normal), and RUQ ultrasound (3/23/2016) with limited sonographic window for the liver; only partially visualized but grossly unremarkable. In 5/2016, she had an abdominal CT scan showing the liver to be normal in size with normal parenchymal density; no discrete mass or ascites, and no intrahepatic biliary dilatation.  Transaminases had normalized since that time. However, repeat elevation in 11/2020 with ALT 66 and AST 45. Referred to Dr. Adan Olivia and liver ultrasound (2/2021) showed lobulated contour liver consistent with steatosis +/- possible cirrhosis. She stated that weight loss recommended. LFTs normal in 11/2022 but weight essentially unchanged. Discussed importance given hepatic steatosis and concern for progression to fibrosis. Will continue to monitor. 5. AV dharmesh reentrant tachycardia. No recent episodes. On metoprolol succinate 50 mg daily and no significant palpitations recently. Established care with Dr. Hua Coe in 10/2021 and being followed annually, last visit in 10/2022.     6. Asthma, mild persistent. Associated with allergies. Had been receiving monthly immunotherapy injections with Dr. Willa Howard, but reports discontinued in 11/2021 as of unclear benefit. Using Claritin and Flonase regularly with good control of allergy symptoms. 7. GERD/ laryngopharyngeal reflux. Evaluated by Dr. Eulalia Solis, and noted on laryngoscopic exam. Started on omeprazole daily but states that decided to discontinue as did not help with cough. Was receiving immunotherapy as discussed but states now has discontinued. No increasing symptoms today. 8. Bilateral knee pain. Did not respond to cortisone injection. Had both right and left knee MRI showing variable degrees of menisci tears and osteoarthritis of knee joint. Now being followed by Dr. Ani Bhagat. Reports worsening symptoms 3 weeks ago and received a left cortisone injection with some improvement. Had follow-up with Dr. Ani Bhagat and started on meloxicam 7.5 mg daily. Patient reports taking as needed with some improvement. 9.  Low back pain with right sciatica. Patient presented in 10/2020 with severe low back pain and right sciatica for several days. Unclear as to inciting incident. Denied lower extremity weakness or paresthesias.  Began taking Etodolac and methocarbamol as prescribed by [facility] several months prior for similar symptoms. Lumbar spine x-ray (10/2020) showed multilevel degenerative findings, trace levorotoscoliosis and multilevel spondylolisthesis. Following with Dr. Jenny Neil. Reported increasing low back pain without sciatica in 10/2021 and referred to physical therapy with improvement. Reports no worsening symptoms today. 10. Abnormal mammogram/ family history of breast cancer. Positive family history for breast cancer in a maternal aunt in her 46s and in a paternal aunt. Wishing to continue only with screening mammograms annually and not assess lifetime risk. Underwent screening mammogram on 3/24/2022 which showed a 5 mm mass deep in the left breast at the 12 o'clock position. Diagnostic mammogram and ultrasound on 4/3/6434 showed a complicated cystic structure and recommended ultrasound-guided cyst aspiration and biopsy. Evaluated by Dr. Dee Dee Feliciano who recommended proceeding with biopsy by diagnostic radiology and patient underwent ultrasound-guided aspiration on 5/18/2022 by Dr. Pau Gonzales and clear yellow fluid was aspirated with disappearance of the mass and diagnosis of benign cyst made. Recommended to continue with routine mammogram in 3/2023. Already scheduled. 11. Obesity. Weight essentially unchanged over the last 6 months. Patient admits to not exercising and not following a diabetic diet or dietary changes needed to lose weight. Already seen by Pharm. D. Arthur Prader for nutrition counseling and attended two sessions. Emphasized importance of continued attempts at lifestyle modifications, including heart healthy diabetic diet, regular exercise, and weight loss. Will continue to address. 12. Health maintenance. Completed Moderna COVID-19 vaccine series and received one Moderna booster dose and the updated bivalent booster dose. Already received the influenza vaccine. Received 2/2 doses of Shingrix vaccine and Tdap vaccine. Other immunizations up to date. Colonoscopy up-to-date. Mammogram as discussed. Continue regular eye exams with Dr. Simran Mcgee. Vitamin D level has been normal on maintenance dose supplement 2000 units daily. Repeat bone density study (3/24/2022) within normal limits. Medicare wellness visit up-to-date. Patient understands recommendations and agrees with plan. Follow-up as previously scheduled. This visit required high complexity medically necessary decision making and management plans. Time spent in preparing for the visit, including review of history, tests done prior to arrival, additional time reviewing clinical data, imaging, outside records and test results:  5  minutes. Time spent in counseling with patient and/or family members regarding care plan: 40 minutes. Time spent in ordering tests, treatments, and referring patient for further care: 5 minutes. Time spent on visit does not include time for documentation. Future orders:    ICD-10-CM ICD-9-CM    1. Microscopic hematuria  R31.29 599.72 CYTOLOGY NON-GYN      CT UROGRAM W WO CONT      2. Candidal dermatitis  B37.2 112.3 fluconazole (DIFLUCAN) 150 mg tablet      nystatin (MYCOSTATIN) powder      3. Postmenopausal bleeding  N95.0 627.1       4. Type 2 diabetes mellitus with hyperglycemia, without long-term current use of insulin (HCC)  E11.65 250.00      790.29       5. Type 2 diabetes mellitus without complication, without long-term current use of insulin (HCC)  E11.9 250.00       6. AVNRT (AV dharmesh re-entry tachycardia) (RUSTca 75.)  I47.1 427.89       7. Essential hypertension  I10 401.9       8.  Class 1 obesity due to excess calories with serious comorbidity and body mass index (BMI) of 33.0 to 33.9 in adult  E66.09 278.00     Z68.33 V85.33

## 2023-01-28 ENCOUNTER — TRANSCRIBE ORDERS (OUTPATIENT)
Facility: HOSPITAL | Age: 68
End: 2023-01-28

## 2023-01-28 DIAGNOSIS — Z12.31 VISIT FOR SCREENING MAMMOGRAM: Primary | ICD-10-CM

## 2023-01-31 DIAGNOSIS — Z12.31 BREAST CANCER SCREENING BY MAMMOGRAM: Primary | ICD-10-CM

## 2023-02-04 DIAGNOSIS — Z12.31 BREAST CANCER SCREENING BY MAMMOGRAM: Primary | ICD-10-CM

## 2023-02-04 DIAGNOSIS — E11.9 TYPE 2 DIABETES MELLITUS WITHOUT COMPLICATION, WITHOUT LONG-TERM CURRENT USE OF INSULIN (HCC): Primary | ICD-10-CM

## 2023-02-04 DIAGNOSIS — I10 ESSENTIAL HYPERTENSION: Primary | ICD-10-CM

## 2023-02-05 DIAGNOSIS — I10 ESSENTIAL HYPERTENSION: ICD-10-CM

## 2023-02-05 DIAGNOSIS — E11.9 TYPE 2 DIABETES MELLITUS WITHOUT COMPLICATION, WITHOUT LONG-TERM CURRENT USE OF INSULIN (HCC): Primary | ICD-10-CM

## 2023-02-05 DIAGNOSIS — E78.5 HYPERLIPIDEMIA, UNSPECIFIED HYPERLIPIDEMIA TYPE: ICD-10-CM

## 2023-02-06 DIAGNOSIS — E11.9 TYPE 2 DIABETES MELLITUS WITHOUT COMPLICATION, WITHOUT LONG-TERM CURRENT USE OF INSULIN (HCC): Primary | ICD-10-CM

## 2023-02-07 DIAGNOSIS — E55.9 VITAMIN D DEFICIENCY: Primary | ICD-10-CM

## 2023-02-27 ENCOUNTER — HOSPITAL ENCOUNTER (OUTPATIENT)
Facility: HOSPITAL | Age: 68
Discharge: HOME | End: 2023-03-02
Payer: MEDICARE

## 2023-02-27 DIAGNOSIS — E11.9 TYPE 2 DIABETES MELLITUS WITHOUT COMPLICATION, WITHOUT LONG-TERM CURRENT USE OF INSULIN (HCC): ICD-10-CM

## 2023-02-27 DIAGNOSIS — E78.5 HYPERLIPIDEMIA, UNSPECIFIED HYPERLIPIDEMIA TYPE: ICD-10-CM

## 2023-02-27 DIAGNOSIS — E55.9 VITAMIN D DEFICIENCY: ICD-10-CM

## 2023-02-27 DIAGNOSIS — I10 ESSENTIAL HYPERTENSION: ICD-10-CM

## 2023-02-27 LAB
25(OH)D3 SERPL-MCNC: 32.2 NG/ML (ref 30–100)
ALBUMIN SERPL-MCNC: 3.5 G/DL (ref 3.4–5)
ALBUMIN/GLOB SERPL: 1 (ref 0.8–1.7)
ALP SERPL-CCNC: 73 U/L (ref 45–117)
ALT SERPL-CCNC: 30 U/L (ref 13–56)
ANION GAP SERPL CALC-SCNC: 4 MMOL/L (ref 3–18)
AST SERPL-CCNC: 28 U/L (ref 10–38)
BILIRUB SERPL-MCNC: 0.4 MG/DL (ref 0.2–1)
BUN SERPL-MCNC: 12 MG/DL (ref 7–18)
BUN/CREAT SERPL: 19 (ref 12–20)
CALCIUM SERPL-MCNC: 8.8 MG/DL (ref 8.5–10.1)
CHLORIDE SERPL-SCNC: 101 MMOL/L (ref 100–111)
CHOLEST SERPL-MCNC: 165 MG/DL
CO2 SERPL-SCNC: 30 MMOL/L (ref 21–32)
CREAT SERPL-MCNC: 0.63 MG/DL (ref 0.6–1.3)
CREAT UR-MCNC: 43 MG/DL (ref 30–125)
EST. AVERAGE GLUCOSE BLD GHB EST-MCNC: 143 MG/DL
GLOBULIN SER CALC-MCNC: 3.6 G/DL (ref 2–4)
GLUCOSE SERPL-MCNC: 104 MG/DL (ref 74–99)
HBA1C MFR BLD: 6.6 % (ref 4.2–5.6)
HDLC SERPL-MCNC: 67 MG/DL (ref 40–60)
HDLC SERPL: 2.5 (ref 0–5)
LDLC SERPL CALC-MCNC: 73.8 MG/DL (ref 0–100)
LIPID PANEL: ABNORMAL
MAGNESIUM SERPL-MCNC: 1.6 MG/DL (ref 1.6–2.6)
MICROALBUMIN UR-MCNC: 0.61 MG/DL (ref 0–3)
MICROALBUMIN/CREAT UR-RTO: 14 MG/G (ref 0–30)
POTASSIUM SERPL-SCNC: 3.7 MMOL/L (ref 3.5–5.5)
PROT SERPL-MCNC: 7.1 G/DL (ref 6.4–8.2)
SODIUM SERPL-SCNC: 135 MMOL/L (ref 136–145)
TRIGL SERPL-MCNC: 121 MG/DL
VLDLC SERPL CALC-MCNC: 24.2 MG/DL

## 2023-02-27 PROCEDURE — 36415 COLL VENOUS BLD VENIPUNCTURE: CPT

## 2023-02-27 PROCEDURE — 83735 ASSAY OF MAGNESIUM: CPT

## 2023-02-27 PROCEDURE — 82306 VITAMIN D 25 HYDROXY: CPT

## 2023-02-27 PROCEDURE — 80061 LIPID PANEL: CPT

## 2023-02-27 PROCEDURE — 80053 COMPREHEN METABOLIC PANEL: CPT

## 2023-02-27 PROCEDURE — 82570 ASSAY OF URINE CREATININE: CPT

## 2023-02-27 PROCEDURE — 83036 HEMOGLOBIN GLYCOSYLATED A1C: CPT

## 2023-02-27 RX ORDER — POTASSIUM CHLORIDE 20 MEQ/1
TABLET, EXTENDED RELEASE ORAL
Qty: 90 TABLET | Refills: 5 | Status: SHIPPED | OUTPATIENT
Start: 2023-02-27

## 2023-03-01 ENCOUNTER — TELEPHONE (OUTPATIENT)
Age: 68
End: 2023-03-01

## 2023-03-01 NOTE — TELEPHONE ENCOUNTER
Received call from [facility] at 10:15am that patient had missed her 9 am appointment and wanted to be seen ASAP.  I spoke to patient she is doing fine so rescheduled to 3/29/2023.  She asked that she be called about her lab results if there was anything remarkable about them.

## 2023-03-06 RX ORDER — POTASSIUM CHLORIDE 20 MEQ/1
TABLET, EXTENDED RELEASE ORAL
Qty: 90 TABLET | Refills: 5 | Status: SHIPPED | OUTPATIENT
Start: 2023-03-06

## 2023-03-22 ENCOUNTER — OFFICE VISIT (OUTPATIENT)
Age: 68
End: 2023-03-22
Payer: MEDICARE

## 2023-03-22 VITALS
RESPIRATION RATE: 16 BRPM | SYSTOLIC BLOOD PRESSURE: 132 MMHG | WEIGHT: 185 LBS | HEIGHT: 62 IN | TEMPERATURE: 97.8 F | DIASTOLIC BLOOD PRESSURE: 70 MMHG | BODY MASS INDEX: 34.04 KG/M2 | HEART RATE: 74 BPM | OXYGEN SATURATION: 97 %

## 2023-03-22 DIAGNOSIS — I10 ESSENTIAL HYPERTENSION: ICD-10-CM

## 2023-03-22 DIAGNOSIS — E78.00 PURE HYPERCHOLESTEROLEMIA: ICD-10-CM

## 2023-03-22 DIAGNOSIS — R31.29 MICROSCOPIC HEMATURIA: ICD-10-CM

## 2023-03-22 DIAGNOSIS — E11.9 TYPE 2 DIABETES MELLITUS WITHOUT COMPLICATION, WITHOUT LONG-TERM CURRENT USE OF INSULIN (HCC): ICD-10-CM

## 2023-03-22 DIAGNOSIS — I47.1 AVNRT (AV NODAL RE-ENTRY TACHYCARDIA) (HCC): ICD-10-CM

## 2023-03-22 DIAGNOSIS — E55.9 VITAMIN D DEFICIENCY: ICD-10-CM

## 2023-03-22 DIAGNOSIS — E66.9 OBESITY (BMI 30.0-34.9): ICD-10-CM

## 2023-03-22 DIAGNOSIS — B37.2 CANDIDAL DERMATITIS: ICD-10-CM

## 2023-03-22 DIAGNOSIS — E11.65 TYPE 2 DIABETES MELLITUS WITH HYPERGLYCEMIA, WITHOUT LONG-TERM CURRENT USE OF INSULIN (HCC): Primary | ICD-10-CM

## 2023-03-22 DIAGNOSIS — J45.20 MILD INTERMITTENT ASTHMA WITHOUT COMPLICATION: ICD-10-CM

## 2023-03-22 DIAGNOSIS — K76.0 NAFL (NONALCOHOLIC FATTY LIVER): ICD-10-CM

## 2023-03-22 DIAGNOSIS — K75.81 NASH (NONALCOHOLIC STEATOHEPATITIS): ICD-10-CM

## 2023-03-22 PROCEDURE — 99211 OFF/OP EST MAY X REQ PHY/QHP: CPT

## 2023-03-22 RX ORDER — MONTELUKAST SODIUM 10 MG/1
10 TABLET ORAL NIGHTLY
Qty: 90 TABLET | Refills: 3 | Status: SHIPPED | OUTPATIENT
Start: 2023-03-22

## 2023-03-22 RX ORDER — AZITHROMYCIN 250 MG/1
TABLET, FILM COATED ORAL
COMMUNITY
Start: 2023-03-21

## 2023-03-22 RX ORDER — METHYLPREDNISOLONE 4 MG/1
TABLET ORAL
COMMUNITY
Start: 2023-03-20

## 2023-03-22 ASSESSMENT — PATIENT HEALTH QUESTIONNAIRE - PHQ9
2. FEELING DOWN, DEPRESSED OR HOPELESS: 0
SUM OF ALL RESPONSES TO PHQ QUESTIONS 1-9: 0
SUM OF ALL RESPONSES TO PHQ9 QUESTIONS 1 & 2: 0
SUM OF ALL RESPONSES TO PHQ QUESTIONS 1-9: 0
1. LITTLE INTEREST OR PLEASURE IN DOING THINGS: 0
SUM OF ALL RESPONSES TO PHQ QUESTIONS 1-9: 0
SUM OF ALL RESPONSES TO PHQ QUESTIONS 1-9: 0

## 2023-03-22 NOTE — PATIENT INSTRUCTIONS
Learning About Diabetes Food Guidelines  Your Care Instructions     Meal planning is important to manage diabetes. It helps keep your blood sugar at a target level (which you set with your doctor). You don't have to eat special foods. You can eat what your family eats, including sweets once in a while. But you do have to pay attention to how often you eat and how much you eat of certain foods. You may want to work with a dietitian or a certified diabetes educator (CDE) to help you plan meals and snacks. A dietitian or CDE can also help you lose weight if that is one of your goals. What should you know about eating carbs? Managing the amount of carbohydrate (carbs) you eat is an important part of healthy meals when you have diabetes. Carbohydrate is found in many foods. Learn which foods have carbs. And learn the amounts of carbs in different foods. Bread, cereal, pasta, and rice have about 15 grams of carbs in a serving. A serving is 1 slice of bread (1 ounce), ½ cup of cooked cereal, or 1/3 cup of cooked pasta or rice. Fruits have 15 grams of carbs in a serving. A serving is 1 small fresh fruit, such as an apple or orange; ½ of a banana; ½ cup of cooked or canned fruit; ½ cup of fruit juice; 1 cup of melon or raspberries; or 2 tablespoons of dried fruit. Milk and no-sugar-added yogurt have 15 grams of carbs in a serving. A serving is 1 cup of milk or 2/3 cup of no-sugar-added yogurt. Starchy vegetables have 15 grams of carbs in a serving. A serving is ½ cup of mashed potatoes or sweet potato; 1 cup winter squash; ½ of a small baked potato; ½ cup of cooked beans; or ½ cup cooked corn or green peas. Learn how much carbs to eat each day and at each meal. A dietitian or CDE can teach you how to keep track of the amount of carbs you eat. This is called carbohydrate counting. If you are not sure how to count carbohydrate grams, use the Plate Method to plan meals.  It is a good, quick way to make sure that

## 2023-03-22 NOTE — PROGRESS NOTES
Jayla Chand presents today for   Chief Complaint   Patient presents with    Diabetes     3 month follow up          1. \"Have you been to the ER, urgent care clinic since your last visit? Hospitalized since your last visit? \" no    2. \"Have you seen or consulted any other health care providers outside of the [address] [place] [address] since your last visit? \" no     3. For patients aged 39-70: Has the patient had a colonoscopy / FIT/ Cologuard? Yes - no Care Gap present      If the patient is female:    4. For patients aged 41-77: Has the patient had a mammogram within the past 2 years? Yes - no Care Gap present      5. For patients aged 21-65: Has the patient had a pap smear?  NA - based on age or sex
Left 5/18/2022    US ASP BREAST CYST LEFT 5/18/2022 HBV RAD US     Current Outpatient Medications   Medication Sig    methylPREDNISolone (MEDROL DOSEPACK) 4 MG tablet     azithromycin (ZITHROMAX) 250 MG tablet     montelukast (SINGULAIR) 10 MG tablet Take 1 tablet by mouth nightly    potassium chloride (KLOR-CON M) 20 MEQ extended release tablet take 1 tablet by mouth once daily    albuterol sulfate HFA (PROVENTIL;VENTOLIN;PROAIR) 108 (90 Base) MCG/ACT inhaler inhale 2 puffs by mouth every 4 hours if needed for wheezing    carboxymethylcellulose (THERATEARS) 1 % ophthalmic gel Apply to eye    vitamin D 25 MCG (1000 UT) CAPS Take 2,000 Units by mouth daily    clotrimazole-betamethasone (LOTRISONE) 1-0.05 % cream Apply  to both ear canals and affected part of outer ear twice a day with a finger. fluticasone (FLONASE) 50 MCG/ACT nasal spray instill 2 sprays into each nostril once daily    glipiZIDE (GLUCOTROL XL) 10 MG extended release tablet Take 10 mg by mouth 2 times daily (with meals)    hydroCHLOROthiazide (HYDRODIURIL) 12.5 MG tablet take 1 tablet by mouth once daily    loratadine (CLARITIN) 10 MG tablet Take 10 mg by mouth daily    LORazepam (ATIVAN) 1 MG tablet take 1 tablet by mouth every 8 hours if needed for anxiety    losartan (COZAAR) 25 MG tablet take 1 tablet by mouth once daily    meloxicam (MOBIC) 7.5 MG tablet Take 7.5 mg by mouth daily as needed ceived the following from [facility]: Outside name: meloxicam (MOBIC) 7.5 mg tablet    metoprolol succinate (TOPROL XL) 50 MG extended release tablet take 1 tablet by mouth once daily    simvastatin (ZOCOR) 20 MG tablet take 1 tablet by mouth at bedtime     No current facility-administered medications for this visit. Allergies and Intolerances:    Allergies   Allergen Reactions    Penicillins Other (See Comments)     Hands peel    Ramipril Cough    Sulfur Itching    Doxycycline Rash     Reports taking 1 pill and broke out in a itchy

## 2023-03-25 PROBLEM — B37.2 CANDIDAL DERMATITIS: Status: ACTIVE | Noted: 2023-03-25

## 2023-03-27 ENCOUNTER — HOSPITAL ENCOUNTER (OUTPATIENT)
Facility: HOSPITAL | Age: 68
Discharge: HOME | End: 2023-03-30
Payer: MEDICARE

## 2023-03-27 DIAGNOSIS — Z87.898 HISTORY OF ABNORMAL MAMMOGRAM: ICD-10-CM

## 2023-03-27 DIAGNOSIS — Z98.890 HX OF BREAST BIOPSY: ICD-10-CM

## 2023-03-27 DIAGNOSIS — Z12.31 BREAST CANCER SCREENING BY MAMMOGRAM: ICD-10-CM

## 2023-03-27 DIAGNOSIS — Z12.31 ENCOUNTER FOR SCREENING MAMMOGRAM FOR BREAST CANCER: ICD-10-CM

## 2023-03-27 PROCEDURE — G0279 TOMOSYNTHESIS, MAMMO: HCPCS

## 2023-04-03 NOTE — TELEPHONE ENCOUNTER
PCP: Mateus Barnett MD    Last appt: [unfilled]  Future Appointments   Date Time Provider Sean Romano   6/20/2023 10:00 AM Mateus Barnett MD [facility] BS AMB   10/18/2023 12:40 PM Odessa Bray MD [facility] BS AMB       Requested Prescriptions     Pending Prescriptions Disp Refills    glipiZIDE (GLUCOTROL XL) 5 MG extended release tablet [Pharmacy Med Name: GLIPIZIDE ER 5 MG TABLET] 90 tablet      Sig: take 1 tablet by mouth once daily

## 2023-04-03 NOTE — TELEPHONE ENCOUNTER
Patient should be taking glipizide 10 mg twice daily. Please confirm that she is taking the correct dose.

## 2023-04-04 RX ORDER — GLIPIZIDE 5 MG/1
TABLET, FILM COATED, EXTENDED RELEASE ORAL
Qty: 90 TABLET | OUTPATIENT
Start: 2023-04-04

## 2023-04-04 NOTE — TELEPHONE ENCOUNTER
Called and spoke with patient. She reports that she has been checking her blood sugar and then deciding on whether to take a 5 mg or 10 mg dose of the glipizide. Advised that she should be monitoring her fasting blood sugar each morning only and not using it to guide her dose of glipizide. Her last HbA1c on 10 mg twice daily was 6.6 and advised that this is at goal.  She reports that her lowest blood sugar readings have been around 100 and advised that this would be at goal.  She denies any readings below 100. Advised that would recommend continuing on glipizide 10 mg twice daily and patient agreeable.

## 2023-04-04 NOTE — TELEPHONE ENCOUNTER
Patient reports she took herself off the 10mg because her \"fasting blood sugar was low at like 100 a few times\". She has decided to take the 5mg tabs just once a day and would like a refill sent to her pharmacy.

## 2023-04-18 RX ORDER — ALBUTEROL SULFATE 90 UG/1
AEROSOL, METERED RESPIRATORY (INHALATION)
Qty: 8.5 G | Refills: 3 | Status: SHIPPED | OUTPATIENT
Start: 2023-04-18

## 2023-05-22 ENCOUNTER — TELEPHONE (OUTPATIENT)
Age: 68
End: 2023-05-22

## 2023-05-22 ENCOUNTER — HOSPITAL ENCOUNTER (OUTPATIENT)
Facility: HOSPITAL | Age: 68
Setting detail: RECURRING SERIES
Discharge: HOME | End: 2023-05-25
Payer: MEDICARE

## 2023-05-22 PROCEDURE — 97530 THERAPEUTIC ACTIVITIES: CPT

## 2023-05-22 PROCEDURE — 97161 PT EVAL LOW COMPLEX 20 MIN: CPT

## 2023-05-22 NOTE — TELEPHONE ENCOUNTER
Called and spoke with patient. Reports fasting blood sugars ranging  since decreasing glipizide to 10 mg daily approximately 1 month ago. Advised to continue at this dose and will reassess at upcoming visit. Reports some weakness in the evening and advised to check her blood sugar when feeling weak to determine if that could be the cause. Describing difficulty with dizziness when lying down in bed at night and when changing positions. Noting worsening allergies with postnasal drainage and ear fullness. Advised that dizziness secondary to vertigo and related to worsening allergies. Patient not taking Claritin or Singulair as prescribed and advised to restart. Patient has discontinued immunotherapy and may need to restart if does not improve.

## 2023-05-22 NOTE — TELEPHONE ENCOUNTER
Pt calling to make Dr Ophelia Arita aware re: glipizide 10 mg extended release tablet. She really wants to go back to 5 mg. Stated she does not feel well on the 10 mg, makes her head dizzy and weak feeling. Bart Moreno it has been going on for awhile. She took herself off taking the 10 mg 2 times per day and has only been taking it once daily, but still has these symptoms.

## 2023-05-22 NOTE — PROGRESS NOTES
PF Daily Treatment Note     Patient Name: Nic Fernandez    Date: 2023    : 1955  Insurance: Payor: MEDICARE / Plan: MEDICARE PART A AND B / Product Type: *No Product type* /      Patient  verified yes     Visit #   Current / Total 1 8-18   Time   In / Out 11:20 12:03   Pain   In / Out 5/10 lower back /10   Subjective Functional Status/Changes: See below   Changes to:  Meds, Allergies, Med Hx, Sx Hx? If yes, update Summary List no         Treatment Area: Vaginal prolapse [N81.10]    SUBJECTIVE  Pain Level (0-10 scale): 0-5/10  []constant [x]intermittent []improving []worsening []no change since onset    Any medication changes, allergies to medications, adverse drug reactions, diagnosis change, or new procedure performed?: [x] No    [] Yes (see summary sheet for update)  Subjective functional status/changes:     PLOF: mod idn with ADLS. Limitations to PLOF:   Mechanism of Injury:   Current symptoms/Complaints: Ms. Nic Fernandez is a 77 y/o, F who present with c/o vaginal prolapse, PFD/pain. She noted pressure/soreness & some infection symptoms (redness, bleeding/spotting) in 2023. Negative with US/imaging. MD gave her some cream which has been very helpful with infection symptoms. The first MD also gave her pessary but it didn't fit well. The second MD discuss bladder splint if no significant improvement with conservative treatment. She had natural childbirths with big babies (7 lbs & 9 lbs); she had episiotomy with the 1st one. Pt reports difficulty with delaying urinary urgency, requiring double voiding, experiencing incomplete voiding most of the time. She demonstrates poor fluid intake (<20 oz, mostly diet coke). Pt reports fluctuating symptoms with bowel, she has constipation sometimes but denies significant straining.      Previous Treatment/Compliance:   PMHx/Surgical Hx:   Work Hx:   Living Situation:   Pt Goals: improve prolapse  Barriers: []pain []financial []time []transportation

## 2023-05-22 NOTE — THERAPY EVALUATION
[facility] [employer]  [place]  [address] [place] [address]  [phone] fax    Plan of Care/ Statement of Necessity for Physical Therapy Services    Patient Name: Abhilash Kuhn : 1955   Medical   Diagnosis: Vaginal prolapse [N81.10] Treatment Diagnosis: R39.89  Other signs and symptoms of genitourinary system and M62.838  OTHER MUSCLE WEAKNESS      Onset Date: 2023 Payor :  Payor: Sarah Hernandez / Plan: MEDICARE PART A AND B / Product Type: *No Product type* /    Referral Source: Nayely Meadows MD Start of Care [facility] [place]): 2023   Prior Hospitalization: See medical history Provider #: [id number]   Prior Level of Function: mod idn with ADLS. Comorbidities: Pre-diabetes, arthritis, HTN, visual impaired, asthma, stiches with childbirths   Medications: Verified on Patient Summary List              The Plan of Care and following information is based on the information from the initial evaluation. Assessment/ sousa information: Ms. Abhilash Kuhn is a 77 y/o, F who present with c/o vaginal prolapse, PFD/pain. She noted pressure/soreness & some infection symptoms (redness, bleeding/spotting) in 2023. Negative with US/imaging. MD gave her some cream which has been very helpful with infection symptoms. The first MD also gave her pessary but it didn't fit well. The second MD discuss bladder splint if no significant improvement with conservative treatment. She had natural childbirths with big babies (7 lbs & 9 lbs); she had episiotomy with the 1st one. Pt reports difficulty with delaying urinary urgency, requiring double voiding, experiencing incomplete voiding most of the time. She demonstrates poor fluid intake (<20 oz, mostly diet coke). Pt reports fluctuating symptoms with bowel, she has constipation sometimes but denies significant straining.   Internal assessment held until next visit per patient request. Patient may benefit from physical therapy to address these 05-Jun-2017

## 2023-05-26 RX ORDER — LOSARTAN POTASSIUM 25 MG/1
TABLET ORAL
Qty: 90 TABLET | Refills: 3 | Status: SHIPPED | OUTPATIENT
Start: 2023-05-26

## 2023-06-06 ENCOUNTER — HOSPITAL ENCOUNTER (OUTPATIENT)
Facility: HOSPITAL | Age: 68
Setting detail: RECURRING SERIES
Discharge: HOME | End: 2023-06-09
Payer: MEDICARE

## 2023-06-06 PROCEDURE — 97530 THERAPEUTIC ACTIVITIES: CPT

## 2023-06-06 PROCEDURE — 97112 NEUROMUSCULAR REEDUCATION: CPT

## 2023-06-06 NOTE — PROGRESS NOTES
proprioception in order to improve patient's ability to progress to PLOF and address remaining functional goals. (see flow sheet as applicable)     Details if applicable:  healthy bowel & bladder function, prolapse prevention, introduced Biofeedback    Rationale: Increase pelvic floor muscle strength, Improve quality of pelvic floor contractions, Decrease resting tone of the pelvic floor, Increase tissue extensibility of the pelvic floor muscles, Increase core strength, Inhibit abnormal muscle activity, and Improve lumbosacral and coccygeal mobility in order to Improve frequency and ease of bowel movements and Improve ability to perform ADLs. Total  48 Total  48 Reminder: MC/BC bill using total billable min of TIMED therapeutic procedures (example: do not include dry needle or estim unattended, both untimed codes, in totals to left)     [x]  Patient Education billed concurrently with other procedures     Other Objective/Functional Measures:   []baseline resting tone:   []slow twitch mms   []fast twitch mms    Pain Level (0-10 scale) post treatment: 0/10    ASSESSMENT/Changes in Function: pt reports WNL with bowel & bladder. Her back hurts occasionally, potentially due to arthritis; recommended pt to discuss with MD. Pt reports good understanding with all education. Noted Valsava Maneuver with PFM contraction; no overflow/compensation of core or glute. Pt deferred internal assessment today but agreed to do it next visit. Will progress as tolerated.      []  Decrease # of leaks   [] No change []  Improving [] Resolved     []  Decrease hypertonus [] No change []  Improving [] Resolved     []  Increase void interval [] No change []  Improving [] Resolved     []  Increase PF strength [] No change []  Improving [] Resolved     []  Increase PF endurance [] No change []  Improving [] Resolved     []  Increase endurance [] No change []  Improving [] Resolved     []  Decrease # of pads [] No change []  Improving []

## 2023-06-19 ENCOUNTER — HOSPITAL ENCOUNTER (OUTPATIENT)
Facility: HOSPITAL | Age: 68
Setting detail: RECURRING SERIES
Discharge: HOME | End: 2023-06-22
Payer: MEDICARE

## 2023-06-19 PROCEDURE — 97112 NEUROMUSCULAR REEDUCATION: CPT

## 2023-06-19 PROCEDURE — 97530 THERAPEUTIC ACTIVITIES: CPT

## 2023-06-19 NOTE — PROGRESS NOTES
HPI:   Micheal Davalos is a 79y.o. year old female who presents today for a routine visit. She has a history of hypertension, hyperlipidemia, paroxysmal SVT, diabetes mellitus, GERD, asthma, elevated transaminases, atypical mycobacterial pneumonia, and noncompliance. She has completed the Moderna COVID-19 vaccine series and received one Moderna booster dose and the updated bivalent booster dose. She reports that she is doing reasonably well. She states that her blood sugar control has improved since decreasing her dose of glipizide to 10 mg daily in 5/2023. She admits that she is not very active due to bilateral knee and low back pain. On 12/5/2022, she contacted the office and reported noticing a red discharge in her undergarments for 3-4 days. She reported no hematuria, but did describe pinkish-red coloring when wiping after urination. She denied any melena or hematochezia. She underwent evaluation by Dr. Stephani Barraza on 12/6/2022 and reported that she was found to have blood in her urine. She was prescribed estrogen cream to use twice weekly and clotrimazole/ betamethasone to apply for \"vaginal dryness\". She was scheduled for a transvaginal ultrasound and referred to urology, and subsequently scheduled an appointment with Dr. Aakash Pisano in 4/2023. She presented for evaluation on 1/4/2023 and reported ongoing bleeding with pelvic discomfort when urinating. Physical exam showed severe candidal dermatitis throughout her groin extending from her labia to the gluteal folds. She was instructed to use Lotrisone cream twice daily and prescribed nystatin powder 4 times daily until improved. Also given a single dose of fluconazole 150 mg given severity. She also completed urine cytology (1/4/2023) which was negative for malignant cells; CT abdomen/pelvis (1/8/2023) showed no urolithiasis or obstructive uropathy, no suspicious lesions, and colonic diverticulosis.   Her groin candidal dermatitis rash

## 2023-06-19 NOTE — PROGRESS NOTES
PF DAILY TREATMENT NOTE (2023)      Patient Name: Monika Moeller    Date: 2023    : 1955  Insurance: Payor: MEDICARE / Plan: MEDICARE PART A AND B / Product Type: *No Product type* /      Patient  verified yes     Visit #   Current / Total 4 8-18   Time   In / Out 10:05 10:47   Pain   In / Out 7/10 7/10   Subjective Functional Status/Changes: Pt reports putting pressure on her back makes it feel better. She feels really sick because of this pain. She will see another MD tomorrow. TREATMENT AREA =  Vaginal prolapse [N81.10]      OBJECTIVE    Therapeutic Procedures: Tx Min Billable or 1:1 Min (if diff from Tx Min) Procedure, Rationale, Specifics       Patient Education:  [] positioning   [] body mechanics   [] transfers   [] heat/ice application      15   26595 Neuromuscular Re-Education (timed):  improve balance, coordination, kinesthetic sense, posture, core stability and proprioception to improve patient's ability to develop conscious control of individual muscles and awareness of position of extremities in order to progress to PLOF and address remaining functional goals. (see flow sheet as applicable)      Details if applicable:  PFM coordination, diaphragmatic breathing, lifting mechanics     Rationale: Increase pelvic floor muscle strength, Improve quality of pelvic floor contractions, Decrease resting tone of the pelvic floor, Increase tissue extensibility of the pelvic floor muscles, Increase core strength, Inhibit abnormal muscle activity, and Improve lumbosacral and coccygeal mobility in order to Improve frequency and ease of bowel movements and Improve ability to perform ADLs. 27   58401 Therapeutic Activity (timed):  use of dynamic activities replicating functional movements to increase ROM, strength, coordination, balance, and proprioception in order to improve patient's ability to progress to PLOF and address remaining functional goals.   (see flow sheet as applicable)
goals, cannot adjust frequency/duration, no signature required   Reporting Period: (date from last Prog Note/Eval to current Prog Note/Recert)  0/75/5824 - 6/57/2192    RECOMMENDATIONS  Continue therapy per initial Plan of Care or most recent Medicare Recert. If you have any questions/comments please contact us directly. Thank you for allowing us to assist in the care of your patient.     Justina Steele, PT       6/19/2023       11:01 AM

## 2023-06-20 ENCOUNTER — OFFICE VISIT (OUTPATIENT)
Age: 68
End: 2023-06-20
Payer: MEDICARE

## 2023-06-20 VITALS
RESPIRATION RATE: 16 BRPM | HEIGHT: 62 IN | BODY MASS INDEX: 34.85 KG/M2 | SYSTOLIC BLOOD PRESSURE: 138 MMHG | OXYGEN SATURATION: 96 % | TEMPERATURE: 97 F | HEART RATE: 71 BPM | WEIGHT: 189.4 LBS | DIASTOLIC BLOOD PRESSURE: 72 MMHG

## 2023-06-20 DIAGNOSIS — I47.1 AVNRT (AV NODAL RE-ENTRY TACHYCARDIA) (HCC): ICD-10-CM

## 2023-06-20 DIAGNOSIS — I10 ESSENTIAL HYPERTENSION: ICD-10-CM

## 2023-06-20 DIAGNOSIS — K75.81 NASH (NONALCOHOLIC STEATOHEPATITIS): ICD-10-CM

## 2023-06-20 DIAGNOSIS — E78.00 PURE HYPERCHOLESTEROLEMIA: ICD-10-CM

## 2023-06-20 DIAGNOSIS — E66.9 OBESITY (BMI 30.0-34.9): ICD-10-CM

## 2023-06-20 DIAGNOSIS — J45.20 MILD INTERMITTENT ASTHMA WITHOUT COMPLICATION: ICD-10-CM

## 2023-06-20 DIAGNOSIS — B37.2 CANDIDAL DERMATITIS: ICD-10-CM

## 2023-06-20 DIAGNOSIS — E11.9 TYPE 2 DIABETES MELLITUS WITHOUT COMPLICATION, WITHOUT LONG-TERM CURRENT USE OF INSULIN (HCC): Primary | ICD-10-CM

## 2023-06-20 DIAGNOSIS — R31.29 MICROSCOPIC HEMATURIA: ICD-10-CM

## 2023-06-20 DIAGNOSIS — E55.9 VITAMIN D DEFICIENCY: ICD-10-CM

## 2023-06-20 PROCEDURE — 3078F DIAST BP <80 MM HG: CPT | Performed by: INTERNAL MEDICINE

## 2023-06-20 PROCEDURE — G8399 PT W/DXA RESULTS DOCUMENT: HCPCS | Performed by: INTERNAL MEDICINE

## 2023-06-20 PROCEDURE — 1123F ACP DISCUSS/DSCN MKR DOCD: CPT | Performed by: INTERNAL MEDICINE

## 2023-06-20 PROCEDURE — 99214 OFFICE O/P EST MOD 30 MIN: CPT | Performed by: INTERNAL MEDICINE

## 2023-06-20 PROCEDURE — G8417 CALC BMI ABV UP PARAM F/U: HCPCS | Performed by: INTERNAL MEDICINE

## 2023-06-20 PROCEDURE — 3074F SYST BP LT 130 MM HG: CPT | Performed by: INTERNAL MEDICINE

## 2023-06-20 PROCEDURE — 1090F PRES/ABSN URINE INCON ASSESS: CPT | Performed by: INTERNAL MEDICINE

## 2023-06-20 PROCEDURE — 99211 OFF/OP EST MAY X REQ PHY/QHP: CPT

## 2023-06-20 PROCEDURE — 1036F TOBACCO NON-USER: CPT | Performed by: INTERNAL MEDICINE

## 2023-06-20 PROCEDURE — 2022F DILAT RTA XM EVC RTNOPTHY: CPT | Performed by: INTERNAL MEDICINE

## 2023-06-20 PROCEDURE — 3017F COLORECTAL CA SCREEN DOC REV: CPT | Performed by: INTERNAL MEDICINE

## 2023-06-20 PROCEDURE — G8427 DOCREV CUR MEDS BY ELIG CLIN: HCPCS | Performed by: INTERNAL MEDICINE

## 2023-06-20 PROCEDURE — 3044F HG A1C LEVEL LT 7.0%: CPT | Performed by: INTERNAL MEDICINE

## 2023-06-20 SDOH — ECONOMIC STABILITY: INCOME INSECURITY: HOW HARD IS IT FOR YOU TO PAY FOR THE VERY BASICS LIKE FOOD, HOUSING, MEDICAL CARE, AND HEATING?: NOT HARD AT ALL

## 2023-06-20 SDOH — ECONOMIC STABILITY: HOUSING INSECURITY
IN THE LAST 12 MONTHS, WAS THERE A TIME WHEN YOU DID NOT HAVE A STEADY PLACE TO SLEEP OR SLEPT IN A SHELTER (INCLUDING NOW)?: NO

## 2023-06-20 SDOH — ECONOMIC STABILITY: FOOD INSECURITY: WITHIN THE PAST 12 MONTHS, YOU WORRIED THAT YOUR FOOD WOULD RUN OUT BEFORE YOU GOT MONEY TO BUY MORE.: NEVER TRUE

## 2023-06-20 SDOH — ECONOMIC STABILITY: FOOD INSECURITY: WITHIN THE PAST 12 MONTHS, THE FOOD YOU BOUGHT JUST DIDN'T LAST AND YOU DIDN'T HAVE MONEY TO GET MORE.: NEVER TRUE

## 2023-06-20 ASSESSMENT — PATIENT HEALTH QUESTIONNAIRE - PHQ9
SUM OF ALL RESPONSES TO PHQ9 QUESTIONS 1 & 2: 0
SUM OF ALL RESPONSES TO PHQ QUESTIONS 1-9: 0
2. FEELING DOWN, DEPRESSED OR HOPELESS: 0
1. LITTLE INTEREST OR PLEASURE IN DOING THINGS: 0
SUM OF ALL RESPONSES TO PHQ QUESTIONS 1-9: 0

## 2023-06-20 NOTE — PATIENT INSTRUCTIONS
If you have questions about a medical condition or this instruction, always ask your healthcare professional. [place] [address] any warranty or liability for your use of this information.

## 2023-06-20 NOTE — PROGRESS NOTES
Scarlett Maciel presents today for   Chief Complaint   Patient presents with    3 Month Follow-Up                 1. \"Have you been to the ER, urgent care clinic since your last visit? Hospitalized since your last visit? \" no    2. \"Have you seen or consulted any other health care providers outside of the [address] [place] [address] since your last visit? \" GYN, Pelvic Therapy    3. For patients aged 39-70: Has the patient had a colonoscopy / FIT/ Cologuard? Yes - no Care Gap present      If the patient is female:    4. For patients aged 41-77: Has the patient had a mammogram within the past 2 years? Yes - no Care Gap present      5. For patients aged 21-65: Has the patient had a pap smear?  NA - based on age or sex

## 2023-07-03 ENCOUNTER — HOSPITAL ENCOUNTER (OUTPATIENT)
Facility: HOSPITAL | Age: 68
Setting detail: RECURRING SERIES
End: 2023-07-03
Payer: MEDICARE

## 2023-07-10 ENCOUNTER — APPOINTMENT (OUTPATIENT)
Facility: HOSPITAL | Age: 68
End: 2023-07-10
Payer: MEDICARE

## 2023-07-17 ENCOUNTER — HOSPITAL ENCOUNTER (OUTPATIENT)
Facility: HOSPITAL | Age: 68
Setting detail: RECURRING SERIES
End: 2023-07-17
Payer: MEDICARE

## 2023-07-26 ENCOUNTER — HOSPITAL ENCOUNTER (OUTPATIENT)
Facility: HOSPITAL | Age: 68
Setting detail: RECURRING SERIES
Discharge: HOME | End: 2023-07-29
Payer: MEDICARE

## 2023-07-26 PROCEDURE — 97530 THERAPEUTIC ACTIVITIES: CPT

## 2023-07-26 NOTE — PROGRESS NOTES
Physical Therapy Discharge Instructions      [facility] [place] [employer]  [address] [facility] [place]  [phone] fax    Patient: Rohit Bolaños  : 1955      Continue Home Exercise Program as instructed      Continue with    [x] Ice  as needed   [x] Heat           Follow up with MD:     [x] Upon completion of therapy     [] As needed      Recommendations:     [x]   Return to activity with home program    []   Return to activity with the following modifications:       []Post Rehab Program    []Join Independent aquatic program     []Return to/join local gym        Additional Comments:

## 2023-07-26 NOTE — PROGRESS NOTES
PF DAILY TREATMENT NOTE (2023)      Patient Name: Maria R Ochoa    Date: 2023    : 1955  Insurance: Payor: MEDICARE / Plan: MEDICARE PART A AND B / Product Type: *No Product type* /      Patient  verified yes     Visit #   Current / Total 1 4-8   Time   In / Out 3:26 3:56   Pain   In / Out 0/10 0/10   Subjective Functional Status/Changes: Pt reports she will follow up with another urologist/surgeon in Sep.      TREATMENT AREA =  Vaginal prolapse [N81.10]      OBJECTIVE    Therapeutic Procedures: Tx Min Billable or 1:1 Min (if diff from Tx Min) Procedure, Rationale, Specifics       Patient Education:  [] positioning   [] body mechanics   [] transfers   [] heat/ice application      30   41748 Therapeutic Activity (timed):  use of dynamic activities replicating functional movements to increase ROM, strength, coordination, balance, and proprioception in order to improve patient's ability to progress to PLOF and address remaining functional goals. (see flow sheet as applicable)      Details if applicable:  pain management, prolapse management, HEP review, re-assess goals/FOTO     Rationale: Increase pelvic floor muscle strength, Improve quality of pelvic floor contractions, Decrease resting tone of the pelvic floor, Increase tissue extensibility of the pelvic floor muscles, Increase core strength, Inhibit abnormal muscle activity, and Improve lumbosacral and coccygeal mobility in order to Improve frequency and ease of bowel movements and Improve ability to perform ADLs.    Total  30 Total  30 Reminder: MC/BC bill using total billable min of TIMED therapeutic procedures (example: do not include dry needle or estim unattended, both untimed codes, in totals to left)            [x]  Patient Education billed concurrently with other procedures     Other Objective/Functional Measures:   []baseline resting tone:   []slow twitch mms   []fast twitch mms    Pain Level (0-10 scale) post treatment:

## 2023-07-26 NOTE — PROGRESS NOTES
[facility] - Zarina Sher  [address] [facility] [place]  [phone] fax    Physical Therapy Discharge Summary    Patient name: Blair De Leon Start of Care: ***   Referral source: Mamie Laguna MD : 1955   Medical/Treatment Diagnosis: Vaginal prolapse [N81.10]  Payor: MEDICARE / Plan: MEDICARE PART A AND B / Product Type: *No Product type* /  Onset Date:***     Prior Hospitalization: see medical history Provider#: [id number]   Comorbidities: ***  Prior Level of Function:***    Visits from Start of Care: ***    Missed Visits: ***    Reporting Period : *** to ***    Summary of Care:  Goal:  Status at last note/certification:  Status at discharge: {BSHSI IP OUTPT CURRENT STATUS-Ex:01126}    Goal:  Status at last note/certification:  Status at discharge: {BSHSI IP OUTPT CURRENT STATUS-Ex:68277}    Goal:  Status at last note/certification:  Status at discharge: {BSHSI IP OUTPT CURRENT STATUS-Ex:79155}    Goal:  Status at last note/certification:  Status at discharge: {BSHSI IP OUTPT CURRENT STATUS-Ex:05965}    Goal:  Status at last note/certification:  Status at discharge: {BSHSI IP OUTPT CURRENT STATUS-Ex:62433}      ASSESSMENT/RECOMMENDATIONS:  []Discontinue therapy: []Patient has reached or is progressing toward set goals      []Patient is non-compliant or has abdicated      []Due to lack of appreciable progress towards set goals    Andrés Khan, LUCIA 2023 6:04 PM

## 2023-07-26 NOTE — THERAPY RECERTIFICATION
7/26/2023 9:20 AM    ________________________________________________________________________  I certify that the above Therapy Services are being furnished while the patient is under my care. I agree with the treatment plan and certify that this therapy is necessary. [] I have read the above and request that my patient continue as recommended.   [] I have read the above report and request that my patient continue therapy with the following changes/special instructions: _____________________________________________  [] I have read the above report and request that my patient be discharged from therapy      Physician's Signature:_________________ Date:___________Time:__________                                      Storm MD Renard      Please sign and return to [facility] at [address] [place]  Ph [phone]  Fx [phone]

## 2023-07-27 ENCOUNTER — TELEPHONE (OUTPATIENT)
Age: 68
End: 2023-07-27

## 2023-07-27 RX ORDER — METHYLPREDNISOLONE 4 MG/1
4 TABLET ORAL SEE ADMIN INSTRUCTIONS
Qty: 1 KIT | Refills: 0 | Status: SHIPPED | OUTPATIENT
Start: 2023-07-27

## 2023-07-27 NOTE — TELEPHONE ENCOUNTER
Called and spoke with patient. Reports acute onset of left sciatica pain over the last few days. Applying heat without relief. Seeing Dr. Lucretia Mcdonald for her left knee and received injections. Scheduled for physical therapy. Discussed stretches and will prescribe Medrol Dosepak. Advised that she needs to monitor blood sugar closely while on steroid pack and avoid NSAIDs. Discussed using Tylenol ES if pain medication needed. Answered all questions.

## 2023-07-27 NOTE — TELEPHONE ENCOUNTER
Patient called and states she has pain from her buttocks down her left leg, states she is using a heating pad and is sitting in a recliner but the pain won't go away. She was calling to request an appt, no appts available at this time. Patient can be reached at [phone]. Please advise, thank you.

## 2023-08-04 ENCOUNTER — APPOINTMENT (OUTPATIENT)
Facility: HOSPITAL | Age: 68
End: 2023-08-04
Payer: MEDICARE

## 2023-08-08 ENCOUNTER — HOSPITAL ENCOUNTER (OUTPATIENT)
Facility: HOSPITAL | Age: 68
Setting detail: RECURRING SERIES
Discharge: HOME | End: 2023-08-11
Payer: MEDICARE

## 2023-08-08 PROCEDURE — 97110 THERAPEUTIC EXERCISES: CPT

## 2023-08-08 PROCEDURE — 97162 PT EVAL MOD COMPLEX 30 MIN: CPT

## 2023-08-08 PROCEDURE — 97535 SELF CARE MNGMENT TRAINING: CPT

## 2023-08-08 PROCEDURE — 97161 PT EVAL LOW COMPLEX 20 MIN: CPT

## 2023-08-08 NOTE — PROGRESS NOTES
Max Park is a 86 y.o. male.     History of Present Illness     Still burning with urination and blood in urine.  Still on doxycycline.   inr to be checked soon another provider.    Review of Systems   Constitutional: Negative for chills, fatigue and fever.   HENT: Negative for congestion, ear discharge, ear pain, facial swelling, hearing loss, postnasal drip, rhinorrhea, sinus pressure, sore throat, trouble swallowing and voice change.    Eyes: Negative for discharge, redness and visual disturbance.   Respiratory: Negative for cough, chest tightness, shortness of breath and wheezing.    Cardiovascular: Negative for chest pain and palpitations.   Gastrointestinal: Negative for abdominal pain, blood in stool, constipation, diarrhea, nausea and vomiting.   Endocrine: Negative for polydipsia and polyuria.   Genitourinary: Positive for dysuria and hematuria. Negative for flank pain and urgency.   Musculoskeletal: Negative for arthralgias, back pain, joint swelling and myalgias.   Skin: Negative for rash.   Neurological: Negative for dizziness, weakness, numbness and headaches.   Hematological: Negative for adenopathy.   Psychiatric/Behavioral: Negative for confusion and sleep disturbance. The patient is not nervous/anxious.        Objective   Physical Exam   Constitutional: He is oriented to person, place, and time. He appears well-developed and well-nourished.   HENT:   Head: Normocephalic and atraumatic.   Right Ear: External ear normal.   Left Ear: External ear normal.   Nose: Nose normal.   Eyes: Conjunctivae and EOM are normal. Pupils are equal, round, and reactive to light.   Neck: Normal range of motion.   Pulmonary/Chest: Effort normal.   Musculoskeletal: Normal range of motion.   Neurological: He is alert and oriented to person, place, and time.   Psychiatric: He has a normal mood and affect. His behavior is normal. Judgment and thought content normal.   Nursing note and vitals 
[facility]  [address] [facility], [address] [place] [address]  TR:[phone] SQ:[phone]  Plan of Care / Statement of Necessity for Physical Therapy Services     Patient Name: Matthew Davidson : 1955   Medical   Diagnosis: Pain in left knee [M25.562] Treatment Diagnosis: M25.562  LEFT KNEE PAIN     Onset Date: 23     Referral Source: Thelma Cho [facility]): 2023   Prior Hospitalization: See medical history Provider #: [id number]   Prior Level of Function:  history of B knee pain with ability to jesica ADLS and activities, no AD use, tolerated household and community activities, retired   Comorbidities:  OA left knee, had previous injections in B knees, asthma, back pain, HTN, arthritis, pre diabetic     Assessment / key information:   75 YO female diagnosed as above and with S/S consistent with above diagnosis presents to skilled PT [facility] left knee pain and pain down the left leg from left buttock down to the left foot. Some reports of LBP across lower back. Pain today 7/10 knee > leg today. She notes onset after descending stairs from train and injuring the left knee, now with compensation noting LBP and Left glut/LE pain. She does have STC TTP left glut with + left piriformis tension test. She has decrease ease sit to stands, TTP left knee, crepitus, LOM and decrease strength. Patient demonstrates the potential to make gains with improved ROM, strength, endurance/activity tolerance, functional FOTO survey score   and all within a reasonable time frame so as to increase their functional independence with ADLs and activities for carryover to improved quality of life, tolerance to household and community activities. Patient requires skilled Physical Therapy so as to monitor their response to and modify their treatment plan accordingly. Patient appears to be an appropriate candidate for skilled outpatient Physical Therapy.        Evaluation
reviewed.      Assessment/Plan   Ravi was seen today for follow-up and back pain.    Diagnoses and all orders for this visit:    Dysuria  -     POC Urinalysis Dipstick    Other orders  -     tamsulosin (FLOMAX) 0.4 MG capsule 24 hr capsule; Take 2 capsules by mouth Every Night.  -     cefuroxime (CEFTIN) 500 MG tablet; Take 1 tablet by mouth 2 (Two) Times a Day.      Increased flomax to 2 hs. Warned of falling and dizziness/drowsiness.    Stop doxycycline and start ceftin.  Call coumadin place tomorrow.           
Physical Therapy. Patient will continue to benefit from skilled PT services to modify and progress therapeutic interventions, analyze and address ROM deficits, analyze and address strength deficits, analyze and address soft tissue restrictions, analyze and cue for proper movement patterns, analyze and modify for postural abnormalities, and instruct in home and community integration to address functional deficits and attain remaining goals.        [x]  See Plan of Care for goals and reassessment       PLAN  [x]  Upgrade activities as tolerated     [x]  Continue plan of care  []  Update interventions per flow sheet       []  Other:_      Michelle Griffiths, PT 8/8/2023  11:04 AM

## 2023-08-11 ENCOUNTER — APPOINTMENT (OUTPATIENT)
Facility: HOSPITAL | Age: 68
End: 2023-08-11
Payer: MEDICARE

## 2023-08-11 ENCOUNTER — TELEPHONE (OUTPATIENT)
Age: 68
End: 2023-08-11

## 2023-08-11 NOTE — TELEPHONE ENCOUNTER
----- Message from Dandre Muñoz sent at 8/11/2023 10:05 AM EDT -----  Subject: Message to Provider    QUESTIONS  Information for Provider? pt is calling and would like a call from Dr. Bob Cristobal to call pt. pt is having left leg pain and also has elevated sugar   level of 180 fasting today. pt states her leg is hurting bad. please have   Dr. Bob Cristobal to call pt.  ---------------------------------------------------------------------------  --------------  Anel Perez [facility]  [phone]; OK to leave message on voicemail  ---------------------------------------------------------------------------  --------------  SCRIPT ANSWERS  Relationship to Patient?  Self

## 2023-08-11 NOTE — TELEPHONE ENCOUNTER
Called and spoke with patient. Discussed that elevated blood sugars likely related to treatment with Medrol for her left sciatica. Advised to continue monitoring blood sugars and minimizing carbohydrates in diet to ensure improvement. If not, advised to call the office. She also reports that she underwent PT evaluation and is planning to begin therapy on 8/22/2023. She has an appointment with Dr. Deepthi Verduzco next week on 8/15/2023. Advised that would recommend taking Tylenol for discomfort and continuing with the stretches as recommended by physical therapy.

## 2023-08-16 ENCOUNTER — HOSPITAL ENCOUNTER (OUTPATIENT)
Facility: HOSPITAL | Age: 68
Setting detail: RECURRING SERIES
Discharge: HOME | End: 2023-08-19
Payer: MEDICARE

## 2023-08-16 PROCEDURE — 97110 THERAPEUTIC EXERCISES: CPT

## 2023-08-16 PROCEDURE — 97530 THERAPEUTIC ACTIVITIES: CPT

## 2023-08-16 PROCEDURE — 97035 APP MDLTY 1+ULTRASOUND EA 15: CPT

## 2023-08-16 NOTE — PROGRESS NOTES
PHYSICAL / OCCUPATIONAL THERAPY - DAILY TREATMENT NOTE (updated )    Patient Name: Jaime Chamberlain    Date: 2023    : 1955  Insurance: Payor: MEDICARE / Plan: MEDICARE PART A AND B / Product Type: *No Product type* /      Patient  verified Yes     Visit #   Current / Total 2 12    Time   In / Out 850 940   Pain   In / Out 4 4   Subjective Functional Status/Changes: The pain is still there, when I get up in the morning it isn't quite as bad . Sometimes when I sit in the chair I feel it. I like the recliner because I can put my feet up. Reports she saw Dr. Delmy Donis yesterday and I go back 23. He said I had some fluid and he gave me some medication for the inflammation. TREATMENT AREA =  Pain in left knee [M25.562]    OBJECTIVE    Modalities Rationale:     decrease inflammation, decrease pain, and increase tissue extensibility to improve patient's ability to progress to PLOF and address remaining functional goals. min [] Estim Unattended, type/location:                                      []  w/ice    []  w/heat    min [] Estim Attended, type/location:                                     []  w/US     []  w/ice    []  w/heat    []  TENS insruct      min []  Mechanical Traction: type/lbs                   []  pro   []  sup   []  int   []  cont    []  before manual    []  after manual   8 min [x]  Ultrasound, settings/location:  Left knee, reclined with LE over SAQ roll, 1.3 w/cm2, left medial and lateral joint lines, distal ITBAND    min []  Iontophoresis w/ dexamethasone, location:                                               []  take home patch       []  in clinic    min  unbill []  Ice     []  Heat    location/position:     min []  Paraffin,  details:     min []  Vasopneumatic Device, press/temp:     min []  Graciela Moffettt / Calli Smith:     If using vaso (only need to measure limb vaso being performed on)      pre-treatment girth :       post-treatment girth :       measured at (landmark location)

## 2023-08-22 ENCOUNTER — HOSPITAL ENCOUNTER (OUTPATIENT)
Facility: HOSPITAL | Age: 68
Setting detail: RECURRING SERIES
Discharge: HOME | End: 2023-08-25
Payer: MEDICARE

## 2023-08-22 PROCEDURE — 97110 THERAPEUTIC EXERCISES: CPT

## 2023-08-22 PROCEDURE — 97530 THERAPEUTIC ACTIVITIES: CPT

## 2023-08-22 NOTE — PROGRESS NOTES
progress therapeutic interventions, analyze and address ROM deficits, analyze and address strength deficits, analyze and address soft tissue restrictions, analyze and cue for proper movement patterns, analyze and modify for postural abnormalities, and instruct in home and community integration to address functional deficits and attain remaining goals. Progress toward goals / Updated goals:  []  See Progress Note/Recertification    Short Term Goals: To be accomplished in 6 treatments  1 patient will have established and be I with HEP to aid with independence and self management at discharge  EVAL HEP issued at evaluation  CURRENT reports compliance 8/16/23    2 patient will have pain 5/10 to aid with increase tolerance to ADLS and regular daily activities at home and in the community  EVAL 7  CURRENT: ongoing,  6/10  8/22/23     Long Term Goals:  To be accomplished in 12 treatments  1 patient will have established and be I with HEP to aid with independence and self management at discharge  EVAL HEP issued at evaluation  CURRENT reports compliance 8/16/23    2 patient will have pain 2/10 to aid with increase tolerance to ADLS and regular daily activities at home and in the community  EVAL 7  CURRENT: ongoing, 6/10 8/22/23    3 patient will have FOTO improved to 67 to show significant and projected gains with improved tolerance to ADLS and activities  EVAL 51  CURRENT : assess at end of current POC    4 patient will have left knee MMT 4+ to aid with increase tolerance to her regular  ADLS and activities at home and in the community  EVAL MMT left knee F/E 4   CURRENT           Next PN/ RC due PN   2/37/37    recert 08/33/74  Auth due MADHU     PLAN  Yes  Continue plan of care  [x]  Upgrade activities as tolerated  []  Discharge due to :  []  Other:    Jeffery Shone, PTA    8/22/2023    9:50 AM    Future Appointments   Date Time Provider [address] [place] [address]   8/22/2023 10:10 AM Jeffery Shone, PTA [address]

## 2023-08-28 ENCOUNTER — APPOINTMENT (OUTPATIENT)
Facility: HOSPITAL | Age: 68
End: 2023-08-28
Payer: MEDICARE

## 2023-08-29 RX ORDER — SIMVASTATIN 20 MG
TABLET ORAL
Qty: 90 TABLET | Refills: 5 | Status: SHIPPED | OUTPATIENT
Start: 2023-08-29

## 2023-08-30 ENCOUNTER — APPOINTMENT (OUTPATIENT)
Facility: HOSPITAL | Age: 68
End: 2023-08-30
Payer: MEDICARE

## 2023-09-05 ENCOUNTER — APPOINTMENT (OUTPATIENT)
Facility: HOSPITAL | Age: 68
End: 2023-09-05
Payer: MEDICARE

## 2023-09-06 ENCOUNTER — HOSPITAL ENCOUNTER (OUTPATIENT)
Facility: HOSPITAL | Age: 68
Setting detail: RECURRING SERIES
Discharge: HOME | End: 2023-09-09
Payer: MEDICARE

## 2023-09-06 PROCEDURE — 97110 THERAPEUTIC EXERCISES: CPT

## 2023-09-06 PROCEDURE — 97112 NEUROMUSCULAR REEDUCATION: CPT

## 2023-09-06 NOTE — PROGRESS NOTES
PHYSICAL / OCCUPATIONAL THERAPY - DAILY TREATMENT NOTE (updated )    Patient Name: Zoltan Patel    Date: 2023    : 1955  Insurance: Payor: MEDICARE / Plan: MEDICARE PART A AND B / Product Type: *No Product type* /      Patient  verified Yes     Visit #   Current / Total 4 12   Time   In / Out 10:10 10:50   Pain   In / Out Left:4/10, right: 0/10 Left: 3/10, right: 0/10    Subjective Functional Status/Changes: Patient stated that Friday morning she got up and right knee locked up and felt popping. Patient denies an injury. Patient to ER this past  to get right knee checked out and had an X-ray done. Patient stated she was told the imaging indicated arthritis and an Old fracture. Patient stated she is following up with NP on Friday. Patient stated right knee is feeling better now. Changes to: Allergies, Med Hx, Sx Hx?   yes. Patient unsure of the medicine and will let PT know next time. TREATMENT AREA =  Pain in left knee [M25.562]    OBJECTIVE    Therapeutic Procedures: Tx Min Billable or 1:1 Min (if diff from Tx Min) Procedure, Rationale, Specifics   32 [phone] [id number] Therapeutic Exercise (timed):  increase ROM, strength, coordination, balance, and proprioception to improve patient's ability to progress to PLOF and address remaining functional goals. (see flow sheet as applicable)     Details if applicable:   supine: supine: left march-15x, hip adduction ball squeeze: 15x, hooklying Hip abduction with band: peach-15x,      8 8 34304 Neuromuscular Re-Education (timed):  improve balance, coordination, kinesthetic sense, posture, core stability and proprioception to improve patient's ability to develop conscious control of individual muscles and awareness of position of extremities in order to progress to PLOF and address remaining functional goals.  (see flow sheet as applicable)     Details if applicable:  S/L Hip abduction and clam-10x each to improve glut activation           Details if

## 2023-09-08 ENCOUNTER — HOSPITAL ENCOUNTER (OUTPATIENT)
Facility: HOSPITAL | Age: 68
Setting detail: RECURRING SERIES
Discharge: HOME | End: 2023-09-11
Payer: MEDICARE

## 2023-09-08 PROCEDURE — 97530 THERAPEUTIC ACTIVITIES: CPT

## 2023-09-08 NOTE — PROGRESS NOTES
units; 53-67 min = 4 units; 68-82 min = 5 units   Total Total     TOTAL TREATMENT TIME:       49     [x]  Patient Education billed concurrently with other procedures   [x] Review HEP    [] Progressed/Changed HEP, detail:    [] Other detail:       Objective Information/Functional Measures/Assessment  5xSTS: 22 seconds with plinth elevated slightly    Ms. Yong Austin presents to clinic for 5th visit including IE with report of 80% improvement with regard to her left knee. She reports increased ease of transferring in and out of her bathtub and when rising/sitting on a chair. She also reports decreased left knee pain since [facility] and reports increased limitations on her right knee >the left. This has likely contributed to her regressed FOTO score. Ms. Michael Bradford original diagnosis addressed her left knee pain. She presented to clinic today with a referral that includes her right knee as well. She would continue to benefit from skilled PT intervention 2x/week for 4 weeks to increase her bilateral knee strength and mobility for improved ease of ADL completion and improved QOL. Patient will continue to benefit from skilled PT / OT services to modify and progress therapeutic interventions, analyze and address functional mobility deficits, analyze and address ROM deficits, analyze and address strength deficits, analyze and address soft tissue restrictions, analyze and cue for proper movement patterns, analyze and modify for postural abnormalities, and analyze and address imbalance/dizziness to address functional deficits and attain remaining goals. Progress toward goals / Updated goals:  []  See Progress Note/Recertification    Short Term Goals:  To be accomplished in 6 treatments  1 patient will have established and be I with HEP to aid with independence and self management at discharge  EVAL HEP issued at evaluation  CURRENT reports compliance 8/16/23     2 patient will have pain 5/10 to aid with increase tolerance to ADLS and
B, PA    ** Signature, Date and Time must be completed for valid certification **  Please sign and fax to [facility] [phone].   Thank you

## 2023-09-12 ENCOUNTER — HOSPITAL ENCOUNTER (OUTPATIENT)
Facility: HOSPITAL | Age: 68
Discharge: HOME | End: 2023-09-15
Payer: MEDICARE

## 2023-09-12 LAB
25(OH)D3 SERPL-MCNC: 43.3 NG/ML (ref 30–100)
ALBUMIN SERPL-MCNC: 3.6 G/DL (ref 3.4–5)
ALBUMIN/GLOB SERPL: 1.1 (ref 0.8–1.7)
ALP SERPL-CCNC: 77 U/L (ref 45–117)
ALT SERPL-CCNC: 34 U/L (ref 13–56)
ANION GAP SERPL CALC-SCNC: 5 MMOL/L (ref 3–18)
AST SERPL-CCNC: 23 U/L (ref 10–38)
BILIRUB SERPL-MCNC: 0.4 MG/DL (ref 0.2–1)
BUN SERPL-MCNC: 11 MG/DL (ref 7–18)
BUN/CREAT SERPL: 15 (ref 12–20)
CALCIUM SERPL-MCNC: 8.6 MG/DL (ref 8.5–10.1)
CHLORIDE SERPL-SCNC: 102 MMOL/L (ref 100–111)
CHOLEST SERPL-MCNC: 155 MG/DL
CO2 SERPL-SCNC: 31 MMOL/L (ref 21–32)
CREAT SERPL-MCNC: 0.72 MG/DL (ref 0.6–1.3)
CREAT UR-MCNC: 171 MG/DL (ref 30–125)
GLOBULIN SER CALC-MCNC: 3.4 G/DL (ref 2–4)
GLUCOSE SERPL-MCNC: 126 MG/DL (ref 74–99)
HBA1C MFR BLD: 6.8 % (ref 4.2–5.6)
HDLC SERPL-MCNC: 67 MG/DL (ref 40–60)
HDLC SERPL: 2.3 (ref 0–5)
LDLC SERPL CALC-MCNC: 66.8 MG/DL (ref 0–100)
LIPID PANEL: ABNORMAL
MICROALBUMIN UR-MCNC: 1.31 MG/DL (ref 0–3)
MICROALBUMIN/CREAT UR-RTO: 8 MG/G (ref 0–30)
POTASSIUM SERPL-SCNC: 3.8 MMOL/L (ref 3.5–5.5)
PROT SERPL-MCNC: 7 G/DL (ref 6.4–8.2)
SODIUM SERPL-SCNC: 138 MMOL/L (ref 136–145)
TRIGL SERPL-MCNC: 106 MG/DL
VLDLC SERPL CALC-MCNC: 21.2 MG/DL

## 2023-09-12 PROCEDURE — 82043 UR ALBUMIN QUANTITATIVE: CPT

## 2023-09-12 PROCEDURE — 82570 ASSAY OF URINE CREATININE: CPT

## 2023-09-12 PROCEDURE — 80053 COMPREHEN METABOLIC PANEL: CPT

## 2023-09-12 PROCEDURE — 36415 COLL VENOUS BLD VENIPUNCTURE: CPT

## 2023-09-12 PROCEDURE — 83036 HEMOGLOBIN GLYCOSYLATED A1C: CPT

## 2023-09-12 PROCEDURE — 80061 LIPID PANEL: CPT

## 2023-09-12 PROCEDURE — 82306 VITAMIN D 25 HYDROXY: CPT

## 2023-09-13 ENCOUNTER — HOSPITAL ENCOUNTER (OUTPATIENT)
Facility: HOSPITAL | Age: 68
Setting detail: RECURRING SERIES
Discharge: HOME | End: 2023-09-16
Payer: MEDICARE

## 2023-09-13 PROCEDURE — 97110 THERAPEUTIC EXERCISES: CPT

## 2023-09-13 NOTE — PROGRESS NOTES
PHYSICAL / OCCUPATIONAL THERAPY - DAILY TREATMENT NOTE (updated )    Patient Name: Onofre Whitlock    Date: 2023    : 1955  Insurance: Payor: MEDICARE / Plan: MEDICARE PART A AND B / Product Type: *No Product type* /      Patient  verified Yes     Visit #   Current / Total 1 8   Time   In / Out 10:15 10:51   Pain   In / Out 3 0   Subjective Functional Status/Changes: Patient stated right knee is a little better, but still has some discomfort. Changes to: Allergies, Med Hx, Sx Hx?   no       TREATMENT AREA =  Pain in left knee [M25.562]  Pain in right knee [M25.561]    OBJECTIVE    Modalities Rationale:     Patient declined   Therapeutic Procedures: Tx Min Billable or 1:1 Min (if diff from Tx Min) Procedure, Rationale, Specifics   36 31 27048 Therapeutic Exercise (timed):  increase ROM, strength, coordination, balance, and proprioception to improve patient's ability to progress to PLOF and address remaining functional goals. (see flow sheet as applicable)    Details if applicable:             Details if applicable:            Details if applicable:           Details if applicable:            Details if applicable:     39 31 [facility] Totals Reminder: bill using total billable min of TIMED therapeutic procedures (example: do not include dry needle or estim unattended, both untimed codes, in totals to left)  8-22 min = 1 unit; 23-37 min = 2 units; 38-52 min = 3 units; 53-67 min = 4 units; 68-82 min = 5 units   Total Total     TOTAL TREATMENT TIME:        36     [x]  Patient Education billed concurrently with other procedures   [x] Review HEP    [] Progressed/Changed HEP, detail:    [] Other detail:       Objective Information/Functional Measures/Assessment    Therapist provided cues for correct technique and muscle activation with exercises. Patient had difficulty with straightening right knee. Patient reported decreased pain at end of the session.      Patient will continue to benefit from skilled PT /

## 2023-09-15 ENCOUNTER — HOSPITAL ENCOUNTER (OUTPATIENT)
Facility: HOSPITAL | Age: 68
Setting detail: RECURRING SERIES
Discharge: HOME | End: 2023-09-18
Payer: MEDICARE

## 2023-09-15 PROCEDURE — 97112 NEUROMUSCULAR REEDUCATION: CPT

## 2023-09-15 PROCEDURE — 97530 THERAPEUTIC ACTIVITIES: CPT

## 2023-09-15 PROCEDURE — 97110 THERAPEUTIC EXERCISES: CPT

## 2023-09-15 NOTE — PROGRESS NOTES
PHYSICAL / OCCUPATIONAL THERAPY - DAILY TREATMENT NOTE (updated )    Patient Name: Charis Sylvester    Date: 9/15/2023    : 1955  Insurance: Payor: MEDICARE / Plan: MEDICARE PART A AND B / Product Type: *No Product type* /      Patient  verified Yes     Visit #   Current / Total 2 8   Time   In / Out 1002 1050   Pain   In / Out 3 (R>L) 2   Subjective Functional Status/Changes: Patient presents with 2 ACE bandages around her right knee. She states that the brace that Dr. Magaly Perry ordered for her continues to slide. \"I've been walking a lot better; I've been skipping my home exercises to walk instead\"      TREATMENT AREA =  Pain in left knee [M25.562]  Pain in right knee [M25.561]    OBJECTIVE    Ice (UNBILLED):  location/position: right knee/long sitting with low towel under knee for patient comfort      Min Rationale   10 decrease inflammation and decrease pain to improve patient's ability to progress to PLOF and address remaining functional goals. Skin assessment post-treatment:   Intact     Therapeutic Procedures:    51182 Therapeutic Exercise (timed):  increase ROM, strength, coordination, balance, and proprioception to improve patient's ability to progress to PLOF and address remaining functional goals. Tx Min Billable or 1:1 Min   (if diff from Tx Min) Details:   15  See flow sheet as applicable     85742 Neuromuscular Re-Education (timed):  improve balance, coordination, kinesthetic sense, posture, core stability and proprioception to improve patient's ability to develop conscious control of individual muscles and awareness of position of extremities in order to progress to PLOF and address remaining functional goals.    Tx Min Billable or 1:1 Min   (if diff from Tx Min) Details:   8  See flow sheet as applicable: hip, glute, quad re-education      91376 Therapeutic Activity (timed):  use of dynamic activities replicating functional movements to increase ROM, strength, coordination, balance, and

## 2023-09-19 ENCOUNTER — HOSPITAL ENCOUNTER (OUTPATIENT)
Facility: HOSPITAL | Age: 68
Setting detail: RECURRING SERIES
Discharge: HOME | End: 2023-09-22
Payer: MEDICARE

## 2023-09-19 PROCEDURE — 97112 NEUROMUSCULAR REEDUCATION: CPT

## 2023-09-19 PROCEDURE — 97110 THERAPEUTIC EXERCISES: CPT

## 2023-09-19 PROCEDURE — 97530 THERAPEUTIC ACTIVITIES: CPT

## 2023-09-19 NOTE — PROGRESS NOTES
PHYSICAL / OCCUPATIONAL THERAPY - DAILY TREATMENT NOTE (updated )    Patient Name: Onofre Whitlock    Date: 2023    : 1955  Insurance: Payor: MEDICARE / Plan: MEDICARE PART A AND B / Product Type: *No Product type* /      Patient  verified Yes     Visit #   Current / Total 3 8   Time   In / Out 1051 1129   Pain   In / Out 1-2 1-2   Subjective Functional Status/Changes: Patient is contemplating getting a gel injection (she sees the MD again in six weeks). \"The doctor said the pain behind my knees is arthritis\". She states that she believes that therapy is helping. TREATMENT AREA =  Pain in left knee [M25.562]  Pain in right knee [M25.561]    OBJECTIVE    Ice (UNBILLED):  location/position: right knee/long sitting with low towel under knee for patient comfort      Min Rationale   PD decrease inflammation and decrease pain to improve patient's ability to progress to PLOF and address remaining functional goals. Skin assessment post-treatment:   Intact     Therapeutic Procedures:    54486 Therapeutic Exercise (timed):  increase ROM, strength, coordination, balance, and proprioception to improve patient's ability to progress to PLOF and address remaining functional goals. Tx Min Billable or 1:1 Min   (if diff from Tx Min) Details:   14  See flow sheet as applicable     77645 Neuromuscular Re-Education (timed):  improve balance, coordination, kinesthetic sense, posture, core stability and proprioception to improve patient's ability to develop conscious control of individual muscles and awareness of position of extremities in order to progress to PLOF and address remaining functional goals.    Tx Min Billable or 1:1 Min   (if diff from Tx Min) Details:   10  See flow sheet as applicable     18395 Therapeutic Activity (timed):  use of dynamic activities replicating functional movements to increase ROM, strength, coordination, balance, and proprioception in order to improve patient's ability to

## 2023-09-22 ENCOUNTER — HOSPITAL ENCOUNTER (OUTPATIENT)
Facility: HOSPITAL | Age: 68
Setting detail: RECURRING SERIES
Discharge: HOME | End: 2023-09-25
Payer: MEDICARE

## 2023-09-22 PROCEDURE — 97110 THERAPEUTIC EXERCISES: CPT

## 2023-09-22 PROCEDURE — 97112 NEUROMUSCULAR REEDUCATION: CPT

## 2023-09-22 PROCEDURE — 97530 THERAPEUTIC ACTIVITIES: CPT

## 2023-09-22 NOTE — PROGRESS NOTES
PHYSICAL / OCCUPATIONAL THERAPY - DAILY TREATMENT NOTE (updated )    Patient Name: Gayatri Hanna    Date: 2023    : 1955  Insurance: Payor: MEDICARE / Plan: MEDICARE PART A AND B / Product Type: *No Product type* /      Patient  verified Yes     Visit #   Current / Total 4 8   Time   In / Out 1005 1100   Pain   In / Out 3 1   Subjective Functional Status/Changes: Just stiff and all today, B knees - I think the weather has a lot to do with it     TREATMENT AREA =  Pain in left knee [M25.562]  Pain in right knee [M25.561]    OBJECTIVE    Modalities Rationale:     decrease pain and increase tissue extensibility to improve patient's ability to progress to PLOF and address remaining functional goals. min [] Estim Unattended, type/location:                                      []  w/ice    []  w/heat    min [] Estim Attended, type/location:                                     []  w/US     []  w/ice    []  w/heat    []  TENS insruct      min []  Mechanical Traction: type/lbs                   []  pro   []  sup   []  int   []  cont    []  before manual    []  after manual    min []  Ultrasound, settings/location:     10 min  unbill [x]  Ice  post    []  Heat    location/position: reclined B knees     min []  Paraffin,  details:     min []  Vasopneumatic Device, press/temp:     min []  Cindy Go / Gayle Kim: If using vaso (only need to measure limb vaso being performed on)      pre-treatment girth :       post-treatment girth :       measured at (landmark location) :      min []  Other:    Skin assessment post-treatment:   Redness, no adverse reactions      Therapeutic Procedures: Tx Min Billable or 1:1 Min (if diff from Tx Min) Procedure, Rationale, Specifics   [id number] [phone] [id number] Therapeutic Exercise (timed):  increase ROM, strength, coordination, balance, and proprioception to improve patient's ability to progress to PLOF and address remaining functional goals.  (see flow sheet as applicable)     Details

## 2023-09-26 ENCOUNTER — OFFICE VISIT (OUTPATIENT)
Age: 68
End: 2023-09-26

## 2023-09-26 VITALS
WEIGHT: 188 LBS | HEIGHT: 62 IN | DIASTOLIC BLOOD PRESSURE: 78 MMHG | OXYGEN SATURATION: 97 % | TEMPERATURE: 97.9 F | HEART RATE: 75 BPM | BODY MASS INDEX: 34.6 KG/M2 | SYSTOLIC BLOOD PRESSURE: 138 MMHG | RESPIRATION RATE: 16 BRPM

## 2023-09-26 DIAGNOSIS — G89.29 CHRONIC PAIN OF BOTH KNEES: ICD-10-CM

## 2023-09-26 DIAGNOSIS — E55.9 VITAMIN D DEFICIENCY: ICD-10-CM

## 2023-09-26 DIAGNOSIS — J30.89 NON-SEASONAL ALLERGIC RHINITIS, UNSPECIFIED TRIGGER: ICD-10-CM

## 2023-09-26 DIAGNOSIS — K76.0 NAFL (NONALCOHOLIC FATTY LIVER): ICD-10-CM

## 2023-09-26 DIAGNOSIS — K75.81 NASH (NONALCOHOLIC STEATOHEPATITIS): ICD-10-CM

## 2023-09-26 DIAGNOSIS — E66.9 OBESITY (BMI 30.0-34.9): ICD-10-CM

## 2023-09-26 DIAGNOSIS — J45.20 MILD INTERMITTENT ASTHMA WITHOUT COMPLICATION: ICD-10-CM

## 2023-09-26 DIAGNOSIS — E11.9 TYPE 2 DIABETES MELLITUS WITHOUT COMPLICATION, WITHOUT LONG-TERM CURRENT USE OF INSULIN (HCC): Primary | ICD-10-CM

## 2023-09-26 DIAGNOSIS — M15.9 PRIMARY OSTEOARTHRITIS INVOLVING MULTIPLE JOINTS: ICD-10-CM

## 2023-09-26 DIAGNOSIS — M25.561 CHRONIC PAIN OF BOTH KNEES: ICD-10-CM

## 2023-09-26 DIAGNOSIS — I10 ESSENTIAL HYPERTENSION: ICD-10-CM

## 2023-09-26 DIAGNOSIS — I47.19 AVNRT (AV NODAL RE-ENTRY TACHYCARDIA): ICD-10-CM

## 2023-09-26 DIAGNOSIS — E78.00 PURE HYPERCHOLESTEROLEMIA: ICD-10-CM

## 2023-09-26 DIAGNOSIS — M25.562 CHRONIC PAIN OF BOTH KNEES: ICD-10-CM

## 2023-09-26 NOTE — PROGRESS NOTES
Gayatri Hanna presents today for   Chief Complaint   Patient presents with    Diabetes    Follow-up       1. \"Have you been to the ER, urgent care clinic since your last visit? Hospitalized since your last visit? \" Yes on Sept 3 [facility] for knee pain, fell a week before. 2. \"Have you seen or consulted any other health care providers outside of the [address] [place] [address] since your last visit? \" no     3. For patients aged 43-73: Has the patient had a colonoscopy / FIT/ Cologuard? Yes - no Care Gap present      If the patient is female:    4. For patients aged 43-66: Has the patient had a mammogram within the past 2 years? Yes - no Care Gap present      5. For patients aged 21-65: Has the patient had a pap smear?  NA - based on age or sex
Hyperplastic polyp). Follow-up recommended for 5 years. She denies any abdominal pain, nausea, vomiting, melena, hematochezia, or change in bowel movements. She does take omeprazole occasionally for GERD symptoms. In 12/2017, she was referred by her gynecologist for evaluation by Dr. Tracie Cannon for microscopic hematuria, and urine cytology was negative. However, she states that she refused his recommendations to undergo a CT urogram or cystoscopy. She denies any dysuria, gross hematuria, or flank pain. Past Medical History:   Diagnosis Date    Abnormal stress echo 11/02/2012    Normal maximal stress echo study. EF 60%. Ex time 9 min 45 sec. Allergic rhinitis     Asthma     Colon polyps     Diabetes mellitus ([address])     GERD (gastroesophageal reflux disease)     History of pneumonia 01/2012    AFB smear positive. 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     Hyperlipidemia     Hypertension     Menopause     Plantar fasciitis     left    PSVT (paroxysmal supraventricular tachycardia) (HCC)     A-V desiree reentrant tachycardia     Past Surgical History:   Procedure Laterality Date    CERVICAL POLYP REMOVAL      COLONOSCOPY      polyp    CYST INCISION AND DRAINAGE Right 10 or more years    DILATION AND CURETTAGE OF UTERUS      GYN      polyp on cervix    POLYPECTOMY      from rectum    US ASP BREAST CYST LEFT Left 5/18/2022    US ASP BREAST CYST LEFT 5/18/2022 [facility] RAD US     Current Outpatient Medications   Medication Sig    diclofenac (CATAFLAM) 50 MG tablet Take 1 tablet by mouth 2 times daily as needed for Pain For knee pain    simvastatin (ZOCOR) 20 MG tablet take 1 tablet by mouth at bedtime    losartan (COZAAR) 25 MG tablet take 1 tablet by mouth once daily    albuterol sulfate HFA (PROVENTIL;VENTOLIN;PROAIR) 108 (90 Base) MCG/ACT inhaler inhale 2 puffs by mouth every 4 hours if needed for wheezing    fluticasone (FLONASE) 50 MCG/ACT nasal spray instill 2 sprays

## 2023-09-27 ENCOUNTER — HOSPITAL ENCOUNTER (OUTPATIENT)
Facility: HOSPITAL | Age: 68
Setting detail: RECURRING SERIES
Discharge: HOME | End: 2023-09-30
Payer: MEDICARE

## 2023-09-27 PROCEDURE — 97530 THERAPEUTIC ACTIVITIES: CPT

## 2023-09-27 PROCEDURE — 97112 NEUROMUSCULAR REEDUCATION: CPT

## 2023-09-27 PROCEDURE — 97110 THERAPEUTIC EXERCISES: CPT

## 2023-09-27 NOTE — PROGRESS NOTES
1/3/2024  9:45 AM [facility] LAB VISIT [facility] BS AMB   1/10/2024 10:00 AM Katherine Harris MD [facility] BS AMB

## 2023-09-30 PROBLEM — M15.0 PRIMARY OSTEOARTHRITIS INVOLVING MULTIPLE JOINTS: Status: ACTIVE | Noted: 2023-09-30

## 2023-09-30 PROBLEM — M15.9 PRIMARY OSTEOARTHRITIS INVOLVING MULTIPLE JOINTS: Status: ACTIVE | Noted: 2023-09-30

## 2023-09-30 RX ORDER — DICLOFENAC POTASSIUM 50 MG/1
50 TABLET, FILM COATED ORAL 2 TIMES DAILY PRN
COMMUNITY

## 2023-10-02 ENCOUNTER — APPOINTMENT (OUTPATIENT)
Facility: HOSPITAL | Age: 68
End: 2023-10-02
Payer: MEDICARE

## 2023-10-04 ENCOUNTER — HOSPITAL ENCOUNTER (OUTPATIENT)
Facility: HOSPITAL | Age: 68
Setting detail: RECURRING SERIES
Discharge: HOME | End: 2023-10-07
Payer: MEDICARE

## 2023-10-04 PROCEDURE — 97530 THERAPEUTIC ACTIVITIES: CPT

## 2023-10-04 PROCEDURE — 97112 NEUROMUSCULAR REEDUCATION: CPT

## 2023-10-04 PROCEDURE — 97110 THERAPEUTIC EXERCISES: CPT

## 2023-10-04 NOTE — PROGRESS NOTES
PT DISCHARGE DAILY NOTE AND SUMMARY     Date:10/4/2023  Patient name: Meka Tony Start of Care: 23   Referral source: Amarilys Green : 1955   Medical/Treatment Diagnosis: Pain in left knee [M25.562]  Pain in right knee [M25.561] Onset Date:23     Prior Hospitalization: see medical history Provider#: [id number]   Medications: Verified on Patient Summary List    Comorbidities:   OA left knee, had previous injections in B knees, asthma, back pain, HTN, arthritis, pre diabetic      Prior Level of Function: history of B knee pain with ability to [employer] and activities, no AD use, tolerated household and community activities, retired  Insurance: Payor: MEDICARE / Plan: MEDICARE PART A AND B / Product Type: *No Product type* /      Visits from Start of Care: 11 Missed Visits: 0    Reporting Period : 23 to 10/4/23    Patient  verified yes     Visit #   Current / Total 6 8   Time   In / Out 1012 1102   Pain   In / Out 2 2   Subjective Functional Status/Changes: Overall doing a lot better, walking better, don't need the ace wraps or ice     TREATMENT AREA =  Pain in left knee [M25.562]  Pain in right knee [M25.561]    OBJECTIVE    Modalities Rationale:     decrease pain and increase tissue extensibility to improve patient's ability to progress to PLOF and address remaining functional goals.      min [] Estim Unattended, type/location:                                      []  w/ice    []  w/heat    min [] Estim Attended, type/location:                                     []  w/US     []  w/ice    []  w/heat    []  TENS insruct      min []  Mechanical Traction: type/lbs                   []  pro   []  sup   []  int   []  cont    []  before manual    []  after manual    min []  Ultrasound, settings/location:     10 min  unbill [x]  Ice post     []  Heat    location/position:reclined, B knees     min []  Paraffin,  details:     min []  Vasopneumatic Device, press/temp:     min []  Sav Garcia /

## 2023-10-06 ENCOUNTER — TELEPHONE (OUTPATIENT)
Age: 68
End: 2023-10-06

## 2023-10-06 NOTE — TELEPHONE ENCOUNTER
Patient called and states she thinks her asthma is acting up, she woke up in the middle of the night coughing up clear phlegm and her nose is congested. She is calling to see if she can get a Z-Jay? She has not tested for covid. Pharmacy: [facility]  Patient can be reached at [phone]. Please advise, thank you.

## 2023-10-06 NOTE — TELEPHONE ENCOUNTER
Called and spoke with patient. Reports noting onset of sore throat, nasal congestion, postnasal drainage, and cough overnight. Noted some slight shortness of breath which responded to her albuterol inhaler given her history of asthma. Reports sputum is clear and denies any fever. Advised that would not treat with antibiotic given clear sputum and short duration of symptoms. Instructed to perform a home rapid antigen COVID-19 test as symptoms would be consistent with COVID infection. Advised to call back if positive and will prescribe Paxlovid. Advised that if negative, she should repeat testing in 48 hours if symptoms persist.  Instructed to use Mucinex for symptomatic treatment. Instructed to present to urgent care for worsening symptoms over the weekend.

## 2023-10-18 ENCOUNTER — OFFICE VISIT (OUTPATIENT)
Age: 68
End: 2023-10-18
Payer: MEDICARE

## 2023-10-18 VITALS
WEIGHT: 189 LBS | HEIGHT: 62 IN | OXYGEN SATURATION: 96 % | HEART RATE: 73 BPM | BODY MASS INDEX: 34.78 KG/M2 | DIASTOLIC BLOOD PRESSURE: 80 MMHG | SYSTOLIC BLOOD PRESSURE: 130 MMHG

## 2023-10-18 DIAGNOSIS — I10 PRIMARY HYPERTENSION: ICD-10-CM

## 2023-10-18 DIAGNOSIS — R00.2 PALPITATIONS: ICD-10-CM

## 2023-10-18 DIAGNOSIS — I47.10 SUPRAVENTRICULAR TACHYCARDIA: Primary | ICD-10-CM

## 2023-10-18 PROCEDURE — 1036F TOBACCO NON-USER: CPT | Performed by: INTERNAL MEDICINE

## 2023-10-18 PROCEDURE — G8399 PT W/DXA RESULTS DOCUMENT: HCPCS | Performed by: INTERNAL MEDICINE

## 2023-10-18 PROCEDURE — 3017F COLORECTAL CA SCREEN DOC REV: CPT | Performed by: INTERNAL MEDICINE

## 2023-10-18 PROCEDURE — G8428 CUR MEDS NOT DOCUMENT: HCPCS | Performed by: INTERNAL MEDICINE

## 2023-10-18 PROCEDURE — 3075F SYST BP GE 130 - 139MM HG: CPT | Performed by: INTERNAL MEDICINE

## 2023-10-18 PROCEDURE — 93000 ELECTROCARDIOGRAM COMPLETE: CPT | Performed by: INTERNAL MEDICINE

## 2023-10-18 PROCEDURE — G8484 FLU IMMUNIZE NO ADMIN: HCPCS | Performed by: INTERNAL MEDICINE

## 2023-10-18 PROCEDURE — 1123F ACP DISCUSS/DSCN MKR DOCD: CPT | Performed by: INTERNAL MEDICINE

## 2023-10-18 PROCEDURE — G8417 CALC BMI ABV UP PARAM F/U: HCPCS | Performed by: INTERNAL MEDICINE

## 2023-10-18 PROCEDURE — 99214 OFFICE O/P EST MOD 30 MIN: CPT | Performed by: INTERNAL MEDICINE

## 2023-10-18 PROCEDURE — 3079F DIAST BP 80-89 MM HG: CPT | Performed by: INTERNAL MEDICINE

## 2023-10-18 PROCEDURE — 1090F PRES/ABSN URINE INCON ASSESS: CPT | Performed by: INTERNAL MEDICINE

## 2023-10-18 NOTE — PROGRESS NOTES
History of Present Illness:  76 YOF here for follow up. She established care with me from Dr. Donnamarie Sicard after being seen for 25 years for recurrent SVT, but now well tolerated with Toprol. No significant palpitations. No new chest pain, dyspnea, PND, orthopnea or edema. Major limitation is bilateral knee pain and lower extremity paresthesias and is seen by orthopedic surgery with injections. Impression:  History of SVT, conservatively treated for 25 years with Toprol. Remote Adenosine use. Decided to be conservative as she has been quite stable. Hypertension, controlled. Dyslipidemia, maintained on statin. DJD of bilateral knees, limiting functional status at times, following closely with ortho. Plan:  Blood pressure is well controlled using Losartan and HCTZ. She is prediabetic by report. All questions answered and I will see back in six months. Wt Readings from Last 3 Encounters:   10/18/23 85.7 kg (189 lb)   09/26/23 85.3 kg (188 lb)   06/20/23 85.9 kg (189 lb 6.4 oz)     Past Medical History:   Diagnosis Date    Abnormal stress echo 11/02/2012    Normal maximal stress echo study. EF 60%. Ex time 9 min 45 sec. Allergic rhinitis     Asthma     Colon polyps     Diabetes mellitus ([address])     GERD (gastroesophageal reflux disease)     History of pneumonia 01/2012    AFB smear positive. 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     Hyperlipidemia     Hypertension     Menopause     Plantar fasciitis     left    PSVT (paroxysmal supraventricular tachycardia)     A-V desiree reentrant tachycardia       Current Outpatient Medications   Medication Sig Dispense Refill    diclofenac (CATAFLAM) 50 MG tablet Take 1 tablet by mouth 2 times daily as needed for Pain For knee pain      simvastatin (ZOCOR) 20 MG tablet take 1 tablet by mouth at bedtime 90 tablet 5    losartan (COZAAR) 25 MG tablet take 1 tablet by mouth once daily 90 tablet 3    albuterol sulfate HFA

## 2023-11-22 ENCOUNTER — TELEPHONE (OUTPATIENT)
Age: 68
End: 2023-11-22

## 2023-11-22 NOTE — TELEPHONE ENCOUNTER
Called and spoke with patient. Reports clinically improving since completing Medrol Dosepak this morning and using albuterol. Still coughing and not completely improved with Tessalon Perles. Recommended Mucinex DM. Answered all questions.

## 2023-11-22 NOTE — TELEPHONE ENCOUNTER
----- Message from Susandalila Her sent at 11/21/2023  3:20 PM EST -----  Subject: Message to Provider    QUESTIONS  Information for Provider? patient wants a z pack for cough wants a call   back. ---------------------------------------------------------------------------  --------------  Vashti Watson [facility]  [phone]; OK to leave message on voicemail  ---------------------------------------------------------------------------  --------------  SCRIPT ANSWERS  Relationship to Patient?  Self

## 2023-11-22 NOTE — TELEPHONE ENCOUNTER
Please call and get more information. What kind of symptoms if she having? Is her cough productive? How long as she had the symptoms? Any shortness of breath?   Did she do a COVID test?

## 2023-11-22 NOTE — TELEPHONE ENCOUNTER
Called patient, She states [facility]. COVID was negative, FLU was negative. They prescribed her an inhaler, tessalon perles, and a medrol pack. She finished the medrol pack yesterday. No fever.  C/o cough, sometimes productive with thick or foam like clear mucous, states tessalon perles not helping with cough, feels like the mucous is stuck in her chest.

## 2023-11-30 ENCOUNTER — TELEPHONE (OUTPATIENT)
Age: 68
End: 2023-11-30

## 2023-11-30 NOTE — TELEPHONE ENCOUNTER
Pt called and stated she has been seen by [facility] and was diagnosed with bronchitis and prescribed antibiotics. NOV 1/10/23     Would like someone to listen to her lungs and make sure they sound okay. No appointment available before her January appt.    Please advise

## 2023-12-05 ENCOUNTER — OFFICE VISIT (OUTPATIENT)
Age: 68
End: 2023-12-05
Payer: MEDICARE

## 2023-12-05 VITALS
BODY MASS INDEX: 34.78 KG/M2 | HEIGHT: 62 IN | WEIGHT: 189 LBS | TEMPERATURE: 99.2 F | RESPIRATION RATE: 16 BRPM | OXYGEN SATURATION: 96 % | HEART RATE: 77 BPM | SYSTOLIC BLOOD PRESSURE: 138 MMHG | DIASTOLIC BLOOD PRESSURE: 78 MMHG

## 2023-12-05 DIAGNOSIS — J45.31 MILD PERSISTENT ASTHMA WITH ACUTE EXACERBATION: ICD-10-CM

## 2023-12-05 DIAGNOSIS — J20.9 ACUTE BRONCHITIS, UNSPECIFIED ORGANISM: Primary | ICD-10-CM

## 2023-12-05 DIAGNOSIS — G89.29 CHRONIC PAIN OF BOTH KNEES: ICD-10-CM

## 2023-12-05 DIAGNOSIS — I10 ESSENTIAL HYPERTENSION: ICD-10-CM

## 2023-12-05 DIAGNOSIS — M25.561 CHRONIC PAIN OF BOTH KNEES: ICD-10-CM

## 2023-12-05 DIAGNOSIS — E11.9 TYPE 2 DIABETES MELLITUS WITHOUT COMPLICATION, WITHOUT LONG-TERM CURRENT USE OF INSULIN (HCC): ICD-10-CM

## 2023-12-05 DIAGNOSIS — M25.562 CHRONIC PAIN OF BOTH KNEES: ICD-10-CM

## 2023-12-05 DIAGNOSIS — M15.9 PRIMARY OSTEOARTHRITIS INVOLVING MULTIPLE JOINTS: ICD-10-CM

## 2023-12-05 DIAGNOSIS — J30.89 NON-SEASONAL ALLERGIC RHINITIS, UNSPECIFIED TRIGGER: ICD-10-CM

## 2023-12-05 DIAGNOSIS — E66.9 OBESITY (BMI 30.0-34.9): ICD-10-CM

## 2023-12-05 PROCEDURE — 3074F SYST BP LT 130 MM HG: CPT | Performed by: INTERNAL MEDICINE

## 2023-12-05 PROCEDURE — G8399 PT W/DXA RESULTS DOCUMENT: HCPCS | Performed by: INTERNAL MEDICINE

## 2023-12-05 PROCEDURE — 3044F HG A1C LEVEL LT 7.0%: CPT | Performed by: INTERNAL MEDICINE

## 2023-12-05 PROCEDURE — 1036F TOBACCO NON-USER: CPT | Performed by: INTERNAL MEDICINE

## 2023-12-05 PROCEDURE — G8417 CALC BMI ABV UP PARAM F/U: HCPCS | Performed by: INTERNAL MEDICINE

## 2023-12-05 PROCEDURE — G8427 DOCREV CUR MEDS BY ELIG CLIN: HCPCS | Performed by: INTERNAL MEDICINE

## 2023-12-05 PROCEDURE — 99214 OFFICE O/P EST MOD 30 MIN: CPT | Performed by: INTERNAL MEDICINE

## 2023-12-05 PROCEDURE — 3078F DIAST BP <80 MM HG: CPT | Performed by: INTERNAL MEDICINE

## 2023-12-05 PROCEDURE — 1123F ACP DISCUSS/DSCN MKR DOCD: CPT | Performed by: INTERNAL MEDICINE

## 2023-12-05 PROCEDURE — 2022F DILAT RTA XM EVC RTNOPTHY: CPT | Performed by: INTERNAL MEDICINE

## 2023-12-05 PROCEDURE — G8484 FLU IMMUNIZE NO ADMIN: HCPCS | Performed by: INTERNAL MEDICINE

## 2023-12-05 PROCEDURE — 3017F COLORECTAL CA SCREEN DOC REV: CPT | Performed by: INTERNAL MEDICINE

## 2023-12-05 PROCEDURE — 1090F PRES/ABSN URINE INCON ASSESS: CPT | Performed by: INTERNAL MEDICINE

## 2023-12-05 RX ORDER — LEVOFLOXACIN 500 MG/1
TABLET, FILM COATED ORAL
COMMUNITY
Start: 2023-11-28

## 2023-12-05 SDOH — ECONOMIC STABILITY: FOOD INSECURITY: WITHIN THE PAST 12 MONTHS, THE FOOD YOU BOUGHT JUST DIDN'T LAST AND YOU DIDN'T HAVE MONEY TO GET MORE.: NEVER TRUE

## 2023-12-05 SDOH — ECONOMIC STABILITY: INCOME INSECURITY: HOW HARD IS IT FOR YOU TO PAY FOR THE VERY BASICS LIKE FOOD, HOUSING, MEDICAL CARE, AND HEATING?: NOT HARD AT ALL

## 2023-12-05 SDOH — ECONOMIC STABILITY: FOOD INSECURITY: WITHIN THE PAST 12 MONTHS, YOU WORRIED THAT YOUR FOOD WOULD RUN OUT BEFORE YOU GOT MONEY TO BUY MORE.: NEVER TRUE

## 2023-12-05 ASSESSMENT — PATIENT HEALTH QUESTIONNAIRE - PHQ9
1. LITTLE INTEREST OR PLEASURE IN DOING THINGS: 0
SUM OF ALL RESPONSES TO PHQ QUESTIONS 1-9: 0
2. FEELING DOWN, DEPRESSED OR HOPELESS: 0
SUM OF ALL RESPONSES TO PHQ QUESTIONS 1-9: 0
SUM OF ALL RESPONSES TO PHQ9 QUESTIONS 1 & 2: 0

## 2023-12-06 NOTE — PROGRESS NOTES
Tali Ortiz presents today for   Chief Complaint   Patient presents with    Cough       1. \"Have you been to the ER, urgent care clinic since your last visit? Hospitalized since your last visit? \" Yes, urgent care diagnosed with bronchitis. Given a steroid pack. 2. \"Have you seen or consulted any other health care providers outside of the [address] [place] [address] since your last visit? \" no     3. For patients aged 43-73: Has the patient had a colonoscopy / FIT/ Cologuard? NA - based on age      If the patient is female:    4. For patients aged 43-66: Has the patient had a mammogram within the past 2 years? NA - based on age or sex      11. For patients aged 21-65: Has the patient had a pap smear?  NA - based on age or sex
in 4 weeks.

## 2024-01-03 ENCOUNTER — HOSPITAL ENCOUNTER (OUTPATIENT)
Facility: HOSPITAL | Age: 69
Setting detail: SPECIMEN
Discharge: HOME | End: 2024-01-06
Payer: MEDICARE

## 2024-01-03 DIAGNOSIS — E78.00 PURE HYPERCHOLESTEROLEMIA: ICD-10-CM

## 2024-01-03 DIAGNOSIS — K76.0 NAFL (NONALCOHOLIC FATTY LIVER): ICD-10-CM

## 2024-01-03 DIAGNOSIS — E55.9 VITAMIN D DEFICIENCY: ICD-10-CM

## 2024-01-03 DIAGNOSIS — E11.9 TYPE 2 DIABETES MELLITUS WITHOUT COMPLICATION, WITHOUT LONG-TERM CURRENT USE OF INSULIN (HCC): ICD-10-CM

## 2024-01-03 DIAGNOSIS — I10 ESSENTIAL HYPERTENSION: ICD-10-CM

## 2024-01-03 DIAGNOSIS — I47.19 AVNRT (AV NODAL RE-ENTRY TACHYCARDIA): ICD-10-CM

## 2024-01-03 DIAGNOSIS — K75.81 NASH (NONALCOHOLIC STEATOHEPATITIS): ICD-10-CM

## 2024-01-03 LAB
25(OH)D3 SERPL-MCNC: 35.5 NG/ML (ref 30–100)
ALBUMIN SERPL-MCNC: 3.7 G/DL (ref 3.4–5)
ALBUMIN/GLOB SERPL: 1.2 (ref 0.8–1.7)
ALP SERPL-CCNC: 77 U/L (ref 45–117)
ALT SERPL-CCNC: 35 U/L (ref 13–56)
ANION GAP SERPL CALC-SCNC: 4 MMOL/L (ref 3–18)
APPEARANCE UR: CLEAR
AST SERPL-CCNC: 22 U/L (ref 10–38)
BACTERIA URNS QL MICRO: ABNORMAL /HPF
BASOPHILS # BLD: 0.1 K/UL (ref 0–0.1)
BASOPHILS NFR BLD: 1 % (ref 0–2)
BILIRUB SERPL-MCNC: 0.5 MG/DL (ref 0.2–1)
BILIRUB UR QL: NEGATIVE
BUN SERPL-MCNC: 14 MG/DL (ref 7–18)
BUN/CREAT SERPL: 19 (ref 12–20)
CALCIUM SERPL-MCNC: 9 MG/DL (ref 8.5–10.1)
CHLORIDE SERPL-SCNC: 101 MMOL/L (ref 100–111)
CHOLEST SERPL-MCNC: 179 MG/DL
CO2 SERPL-SCNC: 31 MMOL/L (ref 21–32)
COLOR UR: YELLOW
CREAT SERPL-MCNC: 0.73 MG/DL (ref 0.6–1.3)
CREAT UR-MCNC: 56 MG/DL (ref 30–125)
DIFFERENTIAL METHOD BLD: NORMAL
EOSINOPHIL # BLD: 0.4 K/UL (ref 0–0.4)
EOSINOPHIL NFR BLD: 5 % (ref 0–5)
EPITH CASTS URNS QL MICRO: ABNORMAL /LPF (ref 0–5)
ERYTHROCYTE [DISTWIDTH] IN BLOOD BY AUTOMATED COUNT: 12.1 % (ref 11.6–14.5)
EST. AVERAGE GLUCOSE BLD GHB EST-MCNC: 160 MG/DL
GLOBULIN SER CALC-MCNC: 3.2 G/DL (ref 2–4)
GLUCOSE SERPL-MCNC: 115 MG/DL (ref 74–99)
GLUCOSE UR STRIP.AUTO-MCNC: NEGATIVE MG/DL
HBA1C MFR BLD: 7.2 % (ref 4.2–5.6)
HCT VFR BLD AUTO: 41.8 % (ref 35–45)
HDLC SERPL-MCNC: 71 MG/DL (ref 40–60)
HDLC SERPL: 2.5 (ref 0–5)
HGB BLD-MCNC: 13.5 G/DL (ref 12–16)
HGB UR QL STRIP: NEGATIVE
IMM GRANULOCYTES # BLD AUTO: 0 K/UL (ref 0–0.04)
IMM GRANULOCYTES NFR BLD AUTO: 0 % (ref 0–0.5)
KETONES UR QL STRIP.AUTO: NEGATIVE MG/DL
LDLC SERPL CALC-MCNC: 82.4 MG/DL (ref 0–100)
LEUKOCYTE ESTERASE UR QL STRIP.AUTO: NEGATIVE
LIPID PANEL: ABNORMAL
LYMPHOCYTES # BLD: 2.2 K/UL (ref 0.9–3.6)
LYMPHOCYTES NFR BLD: 24 % (ref 21–52)
MAGNESIUM SERPL-MCNC: 1.8 MG/DL (ref 1.6–2.6)
MCH RBC QN AUTO: 30 PG (ref 24–34)
MCHC RBC AUTO-ENTMCNC: 32.3 G/DL (ref 31–37)
MCV RBC AUTO: 92.9 FL (ref 78–100)
MICROALBUMIN UR-MCNC: <0.5 MG/DL (ref 0–3)
MICROALBUMIN/CREAT UR-RTO: NORMAL MG/G (ref 0–30)
MONOCYTES # BLD: 0.6 K/UL (ref 0.05–1.2)
MONOCYTES NFR BLD: 6 % (ref 3–10)
NEUTS SEG # BLD: 5.9 K/UL (ref 1.8–8)
NEUTS SEG NFR BLD: 65 % (ref 40–73)
NITRITE UR QL STRIP.AUTO: NEGATIVE
NRBC # BLD: 0 K/UL (ref 0–0.01)
NRBC BLD-RTO: 0 PER 100 WBC
PH UR STRIP: 6.5 (ref 5–8)
PLATELET # BLD AUTO: 269 K/UL (ref 135–420)
PMV BLD AUTO: 10.8 FL (ref 9.2–11.8)
POTASSIUM SERPL-SCNC: 3.9 MMOL/L (ref 3.5–5.5)
PROT SERPL-MCNC: 6.9 G/DL (ref 6.4–8.2)
PROT UR STRIP-MCNC: NEGATIVE MG/DL
RBC # BLD AUTO: 4.5 M/UL (ref 4.2–5.3)
RBC #/AREA URNS HPF: ABNORMAL /HPF (ref 0–5)
SODIUM SERPL-SCNC: 136 MMOL/L (ref 136–145)
SP GR UR REFRACTOMETRY: 1.01 (ref 1–1.03)
TRIGL SERPL-MCNC: 128 MG/DL
UROBILINOGEN UR QL STRIP.AUTO: 0.2 EU/DL (ref 0.2–1)
VLDLC SERPL CALC-MCNC: 25.6 MG/DL
WBC # BLD AUTO: 9.1 K/UL (ref 4.6–13.2)
WBC URNS QL MICRO: ABNORMAL /HPF (ref 0–4)

## 2024-01-03 PROCEDURE — 83036 HEMOGLOBIN GLYCOSYLATED A1C: CPT

## 2024-01-03 PROCEDURE — 81001 URINALYSIS AUTO W/SCOPE: CPT

## 2024-01-03 PROCEDURE — 80061 LIPID PANEL: CPT

## 2024-01-03 PROCEDURE — 83735 ASSAY OF MAGNESIUM: CPT

## 2024-01-03 PROCEDURE — 80053 COMPREHEN METABOLIC PANEL: CPT

## 2024-01-03 PROCEDURE — 36415 COLL VENOUS BLD VENIPUNCTURE: CPT

## 2024-01-03 PROCEDURE — 82043 UR ALBUMIN QUANTITATIVE: CPT

## 2024-01-03 PROCEDURE — 82306 VITAMIN D 25 HYDROXY: CPT

## 2024-01-03 PROCEDURE — 82570 ASSAY OF URINE CREATININE: CPT

## 2024-01-03 PROCEDURE — 85025 COMPLETE CBC W/AUTO DIFF WBC: CPT

## 2024-01-08 RX ORDER — METOPROLOL SUCCINATE 50 MG/1
TABLET, EXTENDED RELEASE ORAL
Qty: 90 TABLET | Refills: 3 | Status: SHIPPED | OUTPATIENT
Start: 2024-01-08

## 2024-01-10 ENCOUNTER — OFFICE VISIT (OUTPATIENT)
Age: 69
End: 2024-01-10

## 2024-01-10 VITALS
TEMPERATURE: 98.6 F | HEIGHT: 62 IN | RESPIRATION RATE: 16 BRPM | WEIGHT: 186 LBS | HEART RATE: 72 BPM | BODY MASS INDEX: 34.23 KG/M2 | SYSTOLIC BLOOD PRESSURE: 116 MMHG | OXYGEN SATURATION: 96 % | DIASTOLIC BLOOD PRESSURE: 68 MMHG

## 2024-01-10 DIAGNOSIS — G89.29 CHRONIC BILATERAL LOW BACK PAIN WITH LEFT-SIDED SCIATICA: ICD-10-CM

## 2024-01-10 DIAGNOSIS — M54.42 CHRONIC BILATERAL LOW BACK PAIN WITH LEFT-SIDED SCIATICA: ICD-10-CM

## 2024-01-10 DIAGNOSIS — Z71.89 ACP (ADVANCE CARE PLANNING): ICD-10-CM

## 2024-01-10 DIAGNOSIS — J45.20 MILD INTERMITTENT ASTHMA WITHOUT COMPLICATION: ICD-10-CM

## 2024-01-10 DIAGNOSIS — Z12.31 SCREENING MAMMOGRAM FOR BREAST CANCER: ICD-10-CM

## 2024-01-10 DIAGNOSIS — I10 ESSENTIAL HYPERTENSION: ICD-10-CM

## 2024-01-10 DIAGNOSIS — K76.0 NAFL (NONALCOHOLIC FATTY LIVER): ICD-10-CM

## 2024-01-10 DIAGNOSIS — E66.9 OBESITY (BMI 30.0-34.9): ICD-10-CM

## 2024-01-10 DIAGNOSIS — Z00.00 MEDICARE ANNUAL WELLNESS VISIT, SUBSEQUENT: ICD-10-CM

## 2024-01-10 DIAGNOSIS — M15.9 PRIMARY OSTEOARTHRITIS INVOLVING MULTIPLE JOINTS: ICD-10-CM

## 2024-01-10 DIAGNOSIS — E78.00 PURE HYPERCHOLESTEROLEMIA: ICD-10-CM

## 2024-01-10 DIAGNOSIS — E11.9 TYPE 2 DIABETES MELLITUS WITHOUT COMPLICATION, WITHOUT LONG-TERM CURRENT USE OF INSULIN (HCC): Primary | ICD-10-CM

## 2024-01-10 DIAGNOSIS — I47.19 AVNRT (AV NODAL RE-ENTRY TACHYCARDIA): ICD-10-CM

## 2024-01-10 RX ORDER — CLOTRIMAZOLE 1 G/ML
SOLUTION TOPICAL
COMMUNITY
Start: 2023-11-29

## 2024-01-10 SDOH — ECONOMIC STABILITY: FOOD INSECURITY: WITHIN THE PAST 12 MONTHS, YOU WORRIED THAT YOUR FOOD WOULD RUN OUT BEFORE YOU GOT MONEY TO BUY MORE.: NEVER TRUE

## 2024-01-10 SDOH — ECONOMIC STABILITY: INCOME INSECURITY: HOW HARD IS IT FOR YOU TO PAY FOR THE VERY BASICS LIKE FOOD, HOUSING, MEDICAL CARE, AND HEATING?: NOT VERY HARD

## 2024-01-10 SDOH — ECONOMIC STABILITY: FOOD INSECURITY: WITHIN THE PAST 12 MONTHS, THE FOOD YOU BOUGHT JUST DIDN'T LAST AND YOU DIDN'T HAVE MONEY TO GET MORE.: NEVER TRUE

## 2024-01-10 ASSESSMENT — PATIENT HEALTH QUESTIONNAIRE - PHQ9
SUM OF ALL RESPONSES TO PHQ QUESTIONS 1-9: 0
1. LITTLE INTEREST OR PLEASURE IN DOING THINGS: 0
2. FEELING DOWN, DEPRESSED OR HOPELESS: 0
SUM OF ALL RESPONSES TO PHQ QUESTIONS 1-9: 0
SUM OF ALL RESPONSES TO PHQ9 QUESTIONS 1 & 2: 0

## 2024-01-10 ASSESSMENT — LIFESTYLE VARIABLES
HOW OFTEN DO YOU HAVE A DRINK CONTAINING ALCOHOL: NEVER
HOW MANY STANDARD DRINKS CONTAINING ALCOHOL DO YOU HAVE ON A TYPICAL DAY: PATIENT DOES NOT DRINK

## 2024-01-10 NOTE — PROGRESS NOTES
HPI:   Judy Cason is a 68 y.o. year old female who presents today for a physical exam. She has a history of hypertension, hyperlipidemia, paroxysmal SVT, diabetes mellitus, GERD, asthma, elevated transaminases, atypical mycobacterial pneumonia, and noncompliance. She reports that she is doing reasonably well.      On 11/17/2023, she presented to [facility] for nasal congestion, postnasal drainage, and cough productive of clear foamy sputum.  Testing for influenza and COVID-19 were negative and she was treated with an albuterol inhaler, Tessalon Perles, and a prednisone Dosepak.  However, her cough persisted and she again presented to [facility] on 11/28/2023.  Chest x-ray was performed which was negative for pneumonia and she was diagnosed with bronchitis.  She was treated with Levaquin 500 mg daily for 10 days.  She reports that she completed the antibiotics with improvement.  She is continuing to use Flonase and her albuterol inhaler intermittently, but has not been using montelukast on a regular basis.  She does report noting some breathlessness at times but feels that Flonase provides the most benefit.    She reports that she was diagnosed with scalp psoriasis by Dr. Shin and has been prescribed clobetasol solution.  She also reports thickening of her bilateral thumb nails and was prescribed clotrimazole solution although has not yet seen any benefit.  She states that she believes she may have acquired the fungal infection from a manicure performed approximately 3 months ago.  She also reports that she is continuing to follow with Dr. Nugent of podiatry for her bilateral great toe ingrown nails.  She reports that the pain is much improved.    She reports today ongoing issues with bilateral knee pain (R>L) but has noted benefit since completing gel shots at Dr. Murphy's office.  She does report ongoing difficulty with bilateral low back pain and left leg pain when sitting for prolonged periods as well as

## 2024-01-10 NOTE — PROGRESS NOTES
Judy Cason presents today for   Chief Complaint   Patient presents with    Medicare AWV       1. \"Have you been to the ER, urgent care clinic since your last visit?  Hospitalized since your last visit?\" No     2. \"Have you seen or consulted any other health care providers outside of the [facility] since your last visit?\" no     3. For patients aged 45-75: Has the patient had a colonoscopy / FIT/ Cologuard? Yes - no Care Gap present      If the patient is female:    4. For patients aged 40-74: Has the patient had a mammogram within the past 2 years? Yes - no Care Gap present      5. For patients aged 21-65: Has the patient had a pap smear? Yes - Care Gap present. Rooming MA/LPN to request most recent results

## 2024-01-10 NOTE — PROGRESS NOTES
Medicare Annual Wellness Visit    Judy Cason is here for Medicare AWV    Assessment & Plan   Type 2 diabetes mellitus without complication, without long-term current use of insulin (HCC)  -     Comprehensive Metabolic Panel; Future  -     Hemoglobin A1C; Future  -     Magnesium; Future  -     Microalbumin / Creatinine Urine Ratio; Future  Essential hypertension  -     Comprehensive Metabolic Panel; Future  Pure hypercholesterolemia  -     Lipid Panel; Future  NAFL (nonalcoholic fatty liver)  AVNRT (AV desiree re-entry tachycardia)  -     Magnesium; Future  Mild intermittent asthma without complication  Chronic bilateral low back pain with left-sided sciatica  -     [facility] [place] [facility] [place]  Primary osteoarthritis involving multiple joints  Obesity (BMI 30.0-34.9)  Medicare annual wellness visit, subsequent  ACP (advance care planning)  Screening mammogram for breast cancer  -     HUE DIGITAL SCREEN W OR WO CAD BILATERAL; Future    Recommendations for Preventive Services Due: see orders and patient instructions/AVS.  Recommended screening schedule for the next 5-10 years is provided to the patient in written form: see Patient Instructions/AVS.     Return in about 3 months (around 4/10/2024), or if symptoms worsen or fail to improve.     Subjective   See office progress note for details.      Patient's complete Health Risk Assessment and screening values have been reviewed and are found in Flowsheets. The following problems were reviewed today and where indicated follow up appointments were made and/or referrals ordered.    Positive Risk Factor Screenings with Interventions:       Cognitive:   Clock Drawing Test (CDT): (!) Abnormal  Words recalled: 3 Words Recalled  Total Score: 3  Total Score Interpretation: Normal Mini-Cog  Interventions:  Patient declines any further evaluation or treatment            Activity, Diet, and Weight:  On average, how many days per week do you engage

## 2024-01-10 NOTE — PATIENT INSTRUCTIONS
help coping with MCI, you may want to get support from family, friends, a support group, or a counselor who works with people who have MCI.  Though the future isn't always clear, it can be good to plan ahead with instructions for your care. These are called advanced directives. Having a plan can help make sure that you get the care you want.  Current as of: May 1, 2023               Content Version: 13.9  © 2006-2023 [employer].   Care instructions adapted under license by [employer]. If you have questions about a medical condition or this instruction, always ask your healthcare professional. [employer] disclaims any warranty or liability for your use of this information.           Learning About Being Active as an Older Adult  Why is being active important as you get older?     Being active is one of the best things you can do for your health. And it's never too late to start. Being active--or getting active, if you aren't already--has definite benefits. It can:  Give you more energy,  Keep your mind sharp.  Improve balance to reduce your risk of falls.  Help you manage chronic illness with fewer medicines.  No matter how old you are, how fit you are, or what health problems you have, there is a form of activity that will work for you. And the more physical activity you can do, the better your overall health will be.  What kinds of activity can help you stay healthy?  Being more active will make your daily activities easier. Physical activity includes planned exercise and things you do in daily life. There are four types of activity:  Aerobic.  Doing aerobic activity makes your heart and lungs strong.  Includes walking, dancing, and gardening.  Aim for at least 2½ hours spread throughout the week.  It improves your energy and can help you sleep better.  Muscle-strengthening.  This type of activity can help maintain muscle and strengthen bones.  Includes climbing stairs, using

## 2024-01-10 NOTE — ACP (ADVANCE CARE PLANNING)
Advance Care Planning     Advance Care Planning (ACP) Physician/NP/PA Conversation    Date of Conversation: 1/10/2024  Conducted with: Patient with Decision Making Capacity    Healthcare Decision Maker:      Primary Decision Maker: Georgi Cason - Spouse - [phone]    Secondary Decision Maker: Anshu Cason - Child - [phone]    Secondary Decision Maker: Landon Cason - Child - [phone]    Click here to complete Healthcare Decision Makers including selection of the Healthcare Decision Maker Relationship (ie \"Primary\")  Today we confirmed healthcare decision-makers as her  and 2 sons    Care Preferences:    Hospitalization:  \"If your health worsens and it becomes clear that your chance of recovery is unlikely, what would be your preference regarding hospitalization?\"  The patient would prefer hospitalization.    Ventilation:  \"If you were unable to breath on your own and your chance of recovery was unlikely, what would be your preference about the use of a ventilator (breathing machine) if it was available to you?\"  The patient would desire the use of a ventilator.    Resuscitation:  \"In the event your heart stopped as a result of an underlying serious health condition, would you want attempts made to restart your heart, or would you prefer a natural death?\"  Yes, attempt to resuscitate.    treatment goals, benefit/burden of treatment options, ventilation preferences, hospitalization preferences, resuscitation preferences, and end of life care preferences (vegetative state/imminent death)    Conversation Outcomes / Follow-Up Plan:  ACP complete - no further action today  Reviewed DNR/DNI and patient elects Full Code (Attempt Resuscitation)    Length of Voluntary ACP Conversation in minutes:  16 minutes    Rain Gloria MD

## 2024-01-14 RX ORDER — CLOBETASOL PROPIONATE 0.46 MG/ML
SOLUTION TOPICAL 2 TIMES DAILY
COMMUNITY

## 2024-01-22 DIAGNOSIS — E11.9 TYPE 2 DIABETES MELLITUS WITHOUT COMPLICATION, WITHOUT LONG-TERM CURRENT USE OF INSULIN (HCC): Primary | ICD-10-CM

## 2024-01-22 RX ORDER — BLOOD SUGAR DIAGNOSTIC
STRIP MISCELLANEOUS
Qty: 100 STRIP | Refills: 5 | Status: SHIPPED | OUTPATIENT
Start: 2024-01-22

## 2024-02-05 RX ORDER — HYDROCHLOROTHIAZIDE 12.5 MG/1
TABLET ORAL
Qty: 90 TABLET | Refills: 3 | Status: SHIPPED | OUTPATIENT
Start: 2024-02-05

## 2024-02-08 ENCOUNTER — HOSPITAL ENCOUNTER (OUTPATIENT)
Facility: HOSPITAL | Age: 69
Discharge: HOME | End: 2024-02-08
Payer: MEDICARE

## 2024-02-08 ENCOUNTER — OFFICE VISIT (OUTPATIENT)
Age: 69
End: 2024-02-08
Payer: MEDICARE

## 2024-02-08 VITALS
BODY MASS INDEX: 34.23 KG/M2 | WEIGHT: 186 LBS | HEIGHT: 62 IN | TEMPERATURE: 97.2 F | RESPIRATION RATE: 16 BRPM | HEART RATE: 74 BPM | SYSTOLIC BLOOD PRESSURE: 134 MMHG | OXYGEN SATURATION: 97 % | DIASTOLIC BLOOD PRESSURE: 72 MMHG

## 2024-02-08 DIAGNOSIS — R05.1 ACUTE COUGH: ICD-10-CM

## 2024-02-08 DIAGNOSIS — E66.09 CLASS 1 OBESITY DUE TO EXCESS CALORIES WITH SERIOUS COMORBIDITY AND BODY MASS INDEX (BMI) OF 34.0 TO 34.9 IN ADULT: ICD-10-CM

## 2024-02-08 DIAGNOSIS — E11.9 TYPE 2 DIABETES MELLITUS WITHOUT COMPLICATION, WITHOUT LONG-TERM CURRENT USE OF INSULIN (HCC): ICD-10-CM

## 2024-02-08 DIAGNOSIS — I10 ESSENTIAL HYPERTENSION: ICD-10-CM

## 2024-02-08 DIAGNOSIS — I47.19 AVNRT (AV NODAL RE-ENTRY TACHYCARDIA): ICD-10-CM

## 2024-02-08 DIAGNOSIS — J45.31 MILD PERSISTENT ASTHMA WITH ACUTE EXACERBATION: ICD-10-CM

## 2024-02-08 DIAGNOSIS — R05.3 CHRONIC COUGH: ICD-10-CM

## 2024-02-08 DIAGNOSIS — G89.29 CHRONIC BILATERAL LOW BACK PAIN WITH LEFT-SIDED SCIATICA: ICD-10-CM

## 2024-02-08 DIAGNOSIS — M54.42 CHRONIC BILATERAL LOW BACK PAIN WITH LEFT-SIDED SCIATICA: ICD-10-CM

## 2024-02-08 DIAGNOSIS — J45.31 MILD PERSISTENT ASTHMA WITH ACUTE EXACERBATION: Primary | ICD-10-CM

## 2024-02-08 PROCEDURE — 1090F PRES/ABSN URINE INCON ASSESS: CPT | Performed by: INTERNAL MEDICINE

## 2024-02-08 PROCEDURE — G8417 CALC BMI ABV UP PARAM F/U: HCPCS | Performed by: INTERNAL MEDICINE

## 2024-02-08 PROCEDURE — 71046 X-RAY EXAM CHEST 2 VIEWS: CPT

## 2024-02-08 PROCEDURE — 2022F DILAT RTA XM EVC RTNOPTHY: CPT | Performed by: INTERNAL MEDICINE

## 2024-02-08 PROCEDURE — 3051F HG A1C>EQUAL 7.0%<8.0%: CPT | Performed by: INTERNAL MEDICINE

## 2024-02-08 PROCEDURE — 3075F SYST BP GE 130 - 139MM HG: CPT | Performed by: INTERNAL MEDICINE

## 2024-02-08 PROCEDURE — 1123F ACP DISCUSS/DSCN MKR DOCD: CPT | Performed by: INTERNAL MEDICINE

## 2024-02-08 PROCEDURE — 3017F COLORECTAL CA SCREEN DOC REV: CPT | Performed by: INTERNAL MEDICINE

## 2024-02-08 PROCEDURE — 3078F DIAST BP <80 MM HG: CPT | Performed by: INTERNAL MEDICINE

## 2024-02-08 PROCEDURE — 1036F TOBACCO NON-USER: CPT | Performed by: INTERNAL MEDICINE

## 2024-02-08 PROCEDURE — G8484 FLU IMMUNIZE NO ADMIN: HCPCS | Performed by: INTERNAL MEDICINE

## 2024-02-08 PROCEDURE — G8427 DOCREV CUR MEDS BY ELIG CLIN: HCPCS | Performed by: INTERNAL MEDICINE

## 2024-02-08 PROCEDURE — 99215 OFFICE O/P EST HI 40 MIN: CPT | Performed by: INTERNAL MEDICINE

## 2024-02-08 PROCEDURE — G8399 PT W/DXA RESULTS DOCUMENT: HCPCS | Performed by: INTERNAL MEDICINE

## 2024-02-08 RX ORDER — FLUTICASONE PROPIONATE AND SALMETEROL XINAFOATE 230; 21 UG/1; UG/1
2 AEROSOL, METERED RESPIRATORY (INHALATION) 2 TIMES DAILY
Qty: 12 G | Refills: 3 | Status: SHIPPED | OUTPATIENT
Start: 2024-02-08

## 2024-02-08 NOTE — PATIENT INSTRUCTIONS
curl your shoulders toward your knees using a smooth, slow motion. Keep your arms folded across your chest. If this bothers your neck, try putting your hands behind your neck (not your head), with your elbows spread apart.  Lie on your back with your knees bent and your feet flat on the floor. Tighten your belly muscles, and then push with your feet and raise your buttocks up a few inches. Hold this position 6 seconds as you continue to breathe normally, then lower yourself slowly to the floor. Repeat 8 to 12 times.  If you like group exercise, try Pilates or yoga. These classes have poses that strengthen the core muscles.  Lead a healthy lifestyle   Stay at a healthy weight to avoid strain on your back.  Do not smoke. Smoking increases the risk of osteoporosis, which weakens the spine. If you need help quitting, talk to your doctor about stop-smoking programs and medicines. These can increase your chances of quitting for good.  Where can you learn more?  Go to https://www.[employer].net/[employer]  Enter L315 in the search box to learn more about \"Learning About How to Have a Healthy Back.\"  Current as of: March 2, 2020               Content Version: 12.6  © 1245-8555 [employer].   Care instructions adapted under license by [employer] (which disclaims liability or warranty for this information). If you have questions about a medical condition or this instruction, always ask your healthcare professional. [employer] disclaims any warranty or liability for your use of this information.

## 2024-02-09 NOTE — PROGRESS NOTES
Judy Cason presents today for   Chief Complaint   Patient presents with    Back Pain       \"Have you been to the ER, urgent care clinic since your last visit?  Hospitalized since your last visit?\"    NO    “Have you seen or consulted any other health care providers outside of [facility] since your last visit?”    NO            
lightheadedness, or edema. 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 She has had approximately six severe episodes over the years, prompting presentation to the ED and treatment with IV Adenosine. She currently reports infrequent short episodes of palpitations and is being treated with metoprolol. She is followed by Dr. Schafer. She had an echocardiogram (10/2005) showing normal LV size and function (EF 60-65%), and no valvular pathology. In 11/2012, she underwent an exercise stress echocardiogram, which was normal at maximal exercise. She has a history of hyperlipidemia, treated with simvastatin from 10/2012 to 3/2015 at which time she stopped taking it due to myalgias. She restarted it on 8/2015 and continued to take it without difficulty until 3/2016, when it was noticed that she had transaminase elevation (AST 85/ ) and it was discontinued. Evaluation included hepatitis A, B, and C levels (negative), iron panel (normal), and RUQ ultrasound (3/23/2016) with limited sonographic window for the liver; only partially visualized but grossly unremarkable. Repeat hepatic panel (4/22/2016) showed AST 75 and ALT 98. Repeat lipid panel showed total chol 215/ / HDL 64/.  In 5/2016, she had an abdominal CT scan showing the liver to be normal in size with normal parenchymal density; no discrete mass or ascites, and no intrahepatic biliary dilatation. She restarted simvastatin 20 mg daily in 4/2018. She has been taking it without difficulty since restarting.  She underwent repeat evaluation by Dr. Barney for her elevated LFT's, and reports that lab evaluation was negative. She underwent an abdominal ultrasound (2/16/2021) which showed an echogenic lobulated contour liver suggestive of steatosis +/- cirrhosis; no focal lesions seen. Recommendation was for her to lose weight.     She has a history of diabetes mellitus, with impaired fasting

## 2024-02-11 PROBLEM — E66.811 CLASS 1 OBESITY DUE TO EXCESS CALORIES WITH SERIOUS COMORBIDITY IN ADULT: Status: ACTIVE | Noted: 2018-08-18

## 2024-02-11 PROBLEM — E66.09 CLASS 1 OBESITY DUE TO EXCESS CALORIES WITH SERIOUS COMORBIDITY IN ADULT: Status: ACTIVE | Noted: 2018-08-18

## 2024-02-13 ENCOUNTER — HOSPITAL ENCOUNTER (OUTPATIENT)
Facility: HOSPITAL | Age: 69
Setting detail: RECURRING SERIES
Discharge: HOME | End: 2024-02-16
Payer: MEDICARE

## 2024-02-13 PROCEDURE — 97162 PT EVAL MOD COMPLEX 30 MIN: CPT

## 2024-02-13 PROCEDURE — 97530 THERAPEUTIC ACTIVITIES: CPT

## 2024-02-13 NOTE — TELEPHONE ENCOUNTER
Ativan called into [facility].
Reviewed report generated by the [facility]. Does not demonstrate aberrancies or inconsistencies with regard to the prescribing of controlled medications to this patient by other providers. Last filled Ativan 6/27/2017 per .
What Type Of Note Output Would You Prefer (Optional)?: Standard Output
How Severe Are Your Spot(S)?: mild
Have Your Spot(S) Been Treated In The Past?: has not been treated
Hpi Title: Evaluation of Skin Lesions
Family Member: Mother

## 2024-02-13 NOTE — PROGRESS NOTES
PHYSICAL / OCCUPATIONAL THERAPY - DAILY TREATMENT NOTE (updated )  For Eval visit    Patient Name: Judy Cason    Date: 2024    : 1955  Insurance: Payor: MEDICARE / Plan: MEDICARE PART A AND B / Product Type: *No Product type* /      Patient  verified yes     Visit #   Current / Total 1 16   Time   In / Out 12:15pm 1:00pm   Pain   In / Out 6 7   Subjective Functional Status/Changes: See POC     TREATMENT AREA =  see POC    OBJECTIVE      35 min   Eval - untimed                      Therapeutic Procedures:    Tx Min Billable or 1:1 Min (if diff from Tx Min) Procedure, Rationale, Specifics        10        10 [id number] Therapeutic Activity (timed):  use of dynamic activities replicating functional movements to increase ROM, strength, coordination, balance, and proprioception in order to improve patient's ability to progress to PLOF and address remaining functional goals.  (see flow sheet as applicable)     Details if applicable:  HEP program and activity modification discussion            Details if applicable:            Details if applicable:            Details if applicable:            Details if applicable:     10 10 [facility] Totals Reminder: bill using total billable min of TIMED therapeutic procedures (example: do not include dry needle or estim unattended, both untimed codes, in totals to left)  8-22 min = 1 unit; 23-37 min = 2 units; 38-52 min = 3 units; 53-67 min = 4 units; 68-82 min = 5 units   Total Total     [x]  Patient Education billed concurrently with other procedures   [x] Review HEP    [] Progressed/Changed HEP, detail:    [] Other detail:       Objective Information/Functional Measures/Assessment    See POC    Patient will continue to benefit from skilled PT / OT services to modify and progress therapeutic interventions, analyze and address functional mobility deficits, analyze and address ROM deficits, analyze and address strength deficits, analyze and address soft tissue restrictions,

## 2024-02-13 NOTE — THERAPY EVALUATION
SB left 20-30 20%     Rotation right 5-10 10%     Rotation left 5-10 10%       Repeated Movements   Comments: no acute change in symptoms with repeated flexion or extension    Neuro Screen [x] WNL  Myotome/Dermatome/Reflexes: 1+ on LE reflexes bilaterally    Dural Mobility:  SLR Sitting: [] R    [] L    [] +    [] -  @ (degrees):           Supine: [] R    [x] L    [x] +    [] -  @ (degrees): 45  Slump Test: [x] R    [x] L    [x] +    [] -  @ (degrees):   Prone Knee Bend: [x] R    [x] L    [x] +    [] -     Palpation  [x] Min  [x] Mod  [] Severe    Location: L T9-11 spring testing  [] Min  [x] Mod  [] Severe    Location: L L-S1 acet jt  [x] Min  [] Mod  [] Severe    Location: L QL region    Strength   L(0-5) R (0-5) N/T   Hip Flexion (L1,2) 3+ 4- []   Knee Extension (L3,4) 4- 4 []   Ankle Dorsiflexion (L4) 4 4+ []   Great Toe Extension (L5) 4- 4 []   Ankle Plantarflexion (S1) 4- 4 []   Knee Flexion (S1,2) 4- 4 []   Upper Abdominals   []   Lower Abdominals   []   Paraspinals   []   Back Rotators   []   Gluteus George   []   Other   []     Special Tests    Sacroilliac:  Gaenslen's: [] R    [] L    [] +    [x] -     Compression: [] +    [x] -     Gapping:  [] +    [x] -     Thigh Thrust: [] R    [] L    [] +    [x] -     Leg Length: [] +    [] -   Position:         Hip: Jordana:  [] R    [] L    [] +    [x] -    Scour:  [] R    [] L    [] +    [x] -    Piriformis: [x] R    [x] L    [x] +    [] -     Deficits:         Luis's: [x] R    [x] L    [x] +    [] -     Nima: [x] R    [x] L    [x] +    [] -     Hamstrings 90/90: 40 degs    Gastrocsoleus (to neutral): Right: tight Left: tight       Global Muscular Weakness:  Abdominals: positive  Quadratus Lumborum: positive  Paraspinals: positive  Other:      ASSESSMENT/Changes in Function: Pt presents with T/S strain and lumbago with sciatica and would benefit from skilled PT to address ROM, strength, balance impairments to decrease pain and improve functional mobility.

## 2024-02-15 ENCOUNTER — HOSPITAL ENCOUNTER (OUTPATIENT)
Facility: HOSPITAL | Age: 69
Setting detail: RECURRING SERIES
Discharge: HOME | End: 2024-02-18
Payer: MEDICARE

## 2024-02-15 PROCEDURE — 97112 NEUROMUSCULAR REEDUCATION: CPT

## 2024-02-15 PROCEDURE — 97530 THERAPEUTIC ACTIVITIES: CPT

## 2024-02-15 PROCEDURE — 97110 THERAPEUTIC EXERCISES: CPT

## 2024-02-15 NOTE — PROGRESS NOTES
use of dynamic activities replicating functional movements to increase ROM, strength, coordination, balance, and proprioception in order to improve patient's ability to progress to PLOF and address remaining functional goals.  (see flow sheet as applicable)     Details if applicable:     10 10 66048 Neuromuscular Re-Education (timed):  improve balance, coordination, kinesthetic sense, posture, core stability and proprioception to improve patient's ability to develop conscious control of individual muscles and awareness of position of extremities in order to progress to PLOF and address remaining functional goals. (see flow sheet as applicable)     Details if applicable:            Details if applicable:            Details if applicable:     41 41 [facility] Totals Reminder: bill using total billable min of TIMED therapeutic procedures (example: do not include dry needle or estim unattended, both untimed codes, in totals to left)  8-22 min = 1 unit; 23-37 min = 2 units; 38-52 min = 3 units; 53-67 min = 4 units; 68-82 min = 5 units   Total Total     [x]  Patient Education billed concurrently with other procedures   [x] Review HEP    [] Progressed/Changed HEP, detail:    [] Other detail:       Objective Information/Functional Measures/Assessment    VC exercises and tech  First full day back.  Progressing as able and as expected   Added hamstring stretch 2x30\" and nerve glide 20 pumps    Added yellow band for no monies and rows and Ts     Patient will continue to benefit from skilled PT / OT services to modify and progress therapeutic interventions, analyze and address ROM deficits, analyze and address strength deficits, analyze and address soft tissue restrictions, analyze and cue for proper movement patterns, analyze and modify for postural abnormalities, and instruct in home and community integration to address functional deficits and attain remaining goals.    Progress toward goals / Updated goals:  []  See Progress

## 2024-02-20 ENCOUNTER — HOSPITAL ENCOUNTER (OUTPATIENT)
Facility: HOSPITAL | Age: 69
Setting detail: RECURRING SERIES
Discharge: HOME | End: 2024-02-23
Payer: MEDICARE

## 2024-02-20 PROCEDURE — 97535 SELF CARE MNGMENT TRAINING: CPT

## 2024-02-20 PROCEDURE — 97110 THERAPEUTIC EXERCISES: CPT

## 2024-02-20 PROCEDURE — 97112 NEUROMUSCULAR REEDUCATION: CPT

## 2024-02-20 NOTE — PROGRESS NOTES
Therapeutic Exercise (timed):  increase ROM, strength, coordination, balance, and proprioception to improve patient's ability to progress to PLOF and address remaining functional goals.   Tx Min Billable or 1:1 Min   (if diff from Tx Min) Details:   15 15 See flow sheet as applicable      37039 Neuromuscular Re-Education (timed):  improve balance, coordination, kinesthetic sense, posture, core stability and proprioception to improve patient's ability to develop conscious control of individual muscles and awareness of position of extremities in order to progress to PLOF and address remaining functional goals.   Tx Min Billable or 1:1 Min   (if diff from Tx Min) Details:   15 15 See flow sheet as applicable     32837 Self Care/Home Management (timed):  improve patient knowledge and understanding of pain reducing techniques, diagnosis/prognosis, and physical therapy expectations, procedures and progression  to improve patient's ability to progress to PLOF and address remaining functional goals.    Tx Min Billable or 1:1 Min   (if diff from Tx Min) Details:   9 9 See flow sheet as applicable       39 39 [facility] Totals Reminder: bill using total billable min of TIMED therapeutic procedures (example: do not include dry needle or estim unattended, both untimed codes, in totals to left)  8-22 min = 1 unit; 23-37 min = 2 units; 38-52 min = 3 units; 53-67 min = 4 units; 68-82 min = 5 units   Total Total     [x]  Patient Education billed concurrently with other procedures   [x] Review HEP    [] Progressed/Changed HEP, detail:    [] Other detail:       Objective Information/Functional Measures/Assessment    Patient presented with report of elevated pain levels that possibly began post last session but she also stating that she is unsure of what may have caused her pain to rise. Patient educated on typical timeframes for gel injection pain relief and appropriate use of pain medication/ knee brace usage for improved therapeutic

## 2024-02-23 ENCOUNTER — HOSPITAL ENCOUNTER (OUTPATIENT)
Facility: HOSPITAL | Age: 69
Setting detail: RECURRING SERIES
Discharge: HOME | End: 2024-02-26
Payer: MEDICARE

## 2024-02-23 PROCEDURE — 97112 NEUROMUSCULAR REEDUCATION: CPT

## 2024-02-23 PROCEDURE — 97110 THERAPEUTIC EXERCISES: CPT

## 2024-02-23 NOTE — PROGRESS NOTES
PHYSICAL / OCCUPATIONAL THERAPY - DAILY TREATMENT NOTE    Patient Name: Judy Cason    Date: 2024    : 1955  Insurance: Payor: MEDICARE / Plan: MEDICARE PART A AND B / Product Type: *No Product type* /      Patient  verified Yes     Visit #   Current / Total 4 16   Time   In / Out 126 pm  225 pm   Pain   In / Out 6/10 (left knee)  4/10    Subjective Functional Status/Changes: Patient reports that her left knee is hurting a lot today. Patient explains that her mid back is also bother her some.     TREATMENT AREA =  Lumbago with sciatica, left side [M54.42]  Pain in thoracic spine [M54.6]    OBJECTIVE    Modalities Rationale:     decrease pain and increase tissue extensibility to improve patient's ability to progress to PLOF and address remaining functional goals.     min [] Estim Unattended, type/location:                                      []  w/ice    []  w/heat    min [] Estim Attended, type/location:                                     []  w/US     []  w/ice    []  w/heat    []  TENS insruct      min []  Mechanical Traction: type/lbs                   []  pro   []  sup   []  int   []  cont    []  before manual    []  after manual    min []  Ultrasound, settings/location:     10 min  unbill []  Ice     [x]  Heat    location/position: Reclined to left knee     min []  Paraffin,  details:     min []  Vasopneumatic Device, press/temp:     min []  Whirlpool / Fluido:    If using vaso (only need to measure limb vaso being performed on)      pre-treatment girth :       post-treatment girth :       measured at (landmark location) :      min []  Other:    Skin assessment post-treatment:   Intact      Therapeutic Procedures:    Tx Min Billable or 1:1 Min (if diff from Tx Min) Procedure, Rationale, Specifics   34  16876 Therapeutic Exercise (timed):  increase ROM, strength, coordination, balance, and proprioception to improve patient's ability to progress to PLOF and address remaining functional goals.

## 2024-02-27 ENCOUNTER — TELEPHONE (OUTPATIENT)
Age: 69
End: 2024-02-27

## 2024-02-27 NOTE — TELEPHONE ENCOUNTER
----- Message from Davis Ana sent at 2/27/2024  1:21 PM EST -----  Subject: Message to Provider    QUESTIONS  Information for Provider? Pt is calling in stating she is not using the   new inhaler and went back to her regular one. Pt still has that cough with   left side high back pain. Pt would like something for this. Pt would like   a call back.   ---------------------------------------------------------------------------  --------------  CALL BACK INFO  [phone]; OK to leave message on voicemail  ---------------------------------------------------------------------------  --------------  SCRIPT ANSWERS  Relationship to Patient? Self

## 2024-02-28 RX ORDER — ALBUTEROL SULFATE 90 UG/1
AEROSOL, METERED RESPIRATORY (INHALATION)
Qty: 8.5 G | Refills: 3 | Status: SHIPPED | OUTPATIENT
Start: 2024-02-28

## 2024-02-28 NOTE — TELEPHONE ENCOUNTER
Called and spoke with patient. Reports that did not like how she felt on Advair so is not taking it. Using Flonase and Singulair and albuterol as needed. Advised to restart Advair and try taking it once per day. Will take it in the evening. Answered all questions.     Discussed that she was told that she will need left knee surgery for severe osteoarthritis and pain. Evaluated by Dr. Duran and considering surgery in 4/2024.

## 2024-02-29 ENCOUNTER — HOSPITAL ENCOUNTER (OUTPATIENT)
Facility: HOSPITAL | Age: 69
Setting detail: RECURRING SERIES
End: 2024-02-29
Payer: MEDICARE

## 2024-03-04 RX ORDER — GLIPIZIDE 10 MG/1
10 TABLET, FILM COATED, EXTENDED RELEASE ORAL 2 TIMES DAILY WITH MEALS
Qty: 180 TABLET | Refills: 0 | Status: SHIPPED | OUTPATIENT
Start: 2024-03-04

## 2024-03-05 ENCOUNTER — HOSPITAL ENCOUNTER (OUTPATIENT)
Facility: HOSPITAL | Age: 69
Setting detail: RECURRING SERIES
Discharge: HOME | End: 2024-03-08
Payer: MEDICARE

## 2024-03-05 PROCEDURE — 97110 THERAPEUTIC EXERCISES: CPT

## 2024-03-05 PROCEDURE — 97112 NEUROMUSCULAR REEDUCATION: CPT

## 2024-03-05 NOTE — PROGRESS NOTES
PHYSICAL / OCCUPATIONAL THERAPY - DAILY TREATMENT NOTE    Patient Name: Judy Cason    Date: 3/5/2024    : 1955  Insurance: Payor: MEDICARE / Plan: MEDICARE PART A AND B / Product Type: *No Product type* /      Patient  verified Yes     Visit #   Current / Total 5 16   Time   In / Out 1127 am  1223 pm    Pain   In / Out 7/10  5/10   Subjective Functional Status/Changes: Patient reports that her left knee and leg are really hurting today.      TREATMENT AREA =  Lumbago with sciatica, left side [M54.42]  Pain in thoracic spine [M54.6]    OBJECTIVE    Modalities Rationale:     decrease pain and increase tissue extensibility to improve patient's ability to progress to PLOF and address remaining functional goals.     min [] Estim Unattended, type/location:                                      []  w/ice    []  w/heat    min [] Estim Attended, type/location:                                     []  w/US     []  w/ice    []  w/heat    []  TENS insruct      min []  Mechanical Traction: type/lbs                   []  pro   []  sup   []  int   []  cont    []  before manual    []  after manual    min []  Ultrasound, settings/location:     10 min  unbill []  Ice     [x]  Heat    location/position: Reclined to left knee     min []  Paraffin,  details:     min []  Vasopneumatic Device, press/temp:     min []  Whirlpool / Fluido:    If using vaso (only need to measure limb vaso being performed on)      pre-treatment girth :       post-treatment girth :       measured at (landmark location) :      min []  Other:    Skin assessment post-treatment:   Intact      Therapeutic Procedures:    Tx Min Billable or 1:1 Min (if diff from Tx Min) Procedure, Rationale, Specifics   31  13070 Therapeutic Exercise (timed):  increase ROM, strength, coordination, balance, and proprioception to improve patient's ability to progress to PLOF and address remaining functional goals. (see flow sheet as applicable)     Details if applicable:

## 2024-03-07 ENCOUNTER — HOSPITAL ENCOUNTER (OUTPATIENT)
Facility: HOSPITAL | Age: 69
Setting detail: RECURRING SERIES
Discharge: HOME | End: 2024-03-10
Payer: MEDICARE

## 2024-03-07 PROCEDURE — 97140 MANUAL THERAPY 1/> REGIONS: CPT

## 2024-03-07 PROCEDURE — 97110 THERAPEUTIC EXERCISES: CPT

## 2024-03-07 NOTE — PROGRESS NOTES
PHYSICAL / OCCUPATIONAL THERAPY - DAILY TREATMENT NOTE    Patient Name: Judy Cason    Date: 3/7/2024    : 1955  Insurance: Payor: MEDICARE / Plan: MEDICARE PART A AND B / Product Type: *No Product type* /      Patient  verified Yes     Visit #   Current / Total 6 16   Time   In / Out 4:10 4:40   Pain   In / Out 8 6   Subjective Functional Status/Changes: \"I had EMG done and I don't feel like doing anything.\"     TREATMENT AREA =  Lumbago with sciatica, left side [M54.42]  Pain in thoracic spine [M54.6]    OBJECTIVE    Modalities Rationale:     decrease pain and increase tissue extensibility to improve patient's ability to progress to PLOF and address remaining functional goals.     min [] Estim Unattended, type/location:                                      []  w/ice    []  w/heat    min [] Estim Attended, type/location:                                     []  w/US     []  w/ice    []  w/heat    []  TENS insruct      min []  Mechanical Traction: type/lbs                   []  pro   []  sup   []  int   []  cont    []  before manual    []  after manual    min []  Ultrasound, settings/location:     10 min  unbill []  Ice     [x]  Heat    location/position: Semi reclined (B) LSP    min []  Paraffin,  details:     min []  Vasopneumatic Device, press/temp:     min []  Whirlpool / Fluido:    If using vaso (only need to measure limb vaso being performed on)      pre-treatment girth :       post-treatment girth :       measured at (landmark location) :      min []  Other:    Skin assessment post-treatment:   Intact      Therapeutic Procedures:    Tx Min Billable or 1:1 Min (if diff from Tx Min) Procedure, Rationale, Specifics   8  12128 Therapeutic Exercise (timed):  increase ROM, strength, coordination, balance, and proprioception to improve patient's ability to progress to PLOF and address remaining functional goals. (see flow sheet as applicable)     Details if applicable:       12  92646 Manual Therapy

## 2024-03-12 ENCOUNTER — HOSPITAL ENCOUNTER (OUTPATIENT)
Facility: HOSPITAL | Age: 69
Setting detail: RECURRING SERIES
Discharge: HOME | End: 2024-03-15
Payer: MEDICARE

## 2024-03-12 PROCEDURE — 97112 NEUROMUSCULAR REEDUCATION: CPT

## 2024-03-12 PROCEDURE — 97110 THERAPEUTIC EXERCISES: CPT

## 2024-03-12 NOTE — PROGRESS NOTES
PHYSICAL / OCCUPATIONAL THERAPY - DAILY TREATMENT NOTE    Patient Name: Judy Cason    Date: 3/12/2024    : 1955  Insurance: Payor: MEDICARE / Plan: MEDICARE PART A AND B / Product Type: *No Product type* /      Patient  verified Yes     Visit #   Current / Total 7 16   Time   In / Out 1129 am  1220 pm    Pain   In / Out 6/10  3/10    Subjective Functional Status/Changes: \"I'm feeling a little better.\"      TREATMENT AREA =  Lumbago with sciatica, left side [M54.42]  Pain in thoracic spine [M54.6]    OBJECTIVE    Modalities Rationale:     decrease pain and increase tissue extensibility to improve patient's ability to progress to PLOF and address remaining functional goals.     min [] Estim Unattended, type/location:                                      []  w/ice    []  w/heat    min [] Estim Attended, type/location:                                     []  w/US     []  w/ice    []  w/heat    []  TENS insruct      min []  Mechanical Traction: type/lbs                   []  pro   []  sup   []  int   []  cont    []  before manual    []  after manual    min []  Ultrasound, settings/location:     10 min  unbill []  Ice     [x]  Heat    location/position: Reclined to left knee and L/S    min []  Paraffin,  details:     min []  Vasopneumatic Device, press/temp:     min []  Whirlpool / Fluido:    If using vaso (only need to measure limb vaso being performed on)      pre-treatment girth :       post-treatment girth :       measured at (landmark location) :      min []  Other:    Skin assessment post-treatment:   Intact      Therapeutic Procedures:    Tx Min Billable or 1:1 Min (if diff from Tx Min) Procedure, Rationale, Specifics   26  97349 Therapeutic Exercise (timed):  increase ROM, strength, coordination, balance, and proprioception to improve patient's ability to progress to PLOF and address remaining functional goals. (see flow sheet as applicable)     Details if applicable:       15  54446 Neuromuscular

## 2024-03-14 ENCOUNTER — HOSPITAL ENCOUNTER (OUTPATIENT)
Facility: HOSPITAL | Age: 69
Setting detail: RECURRING SERIES
Discharge: HOME | End: 2024-03-17
Payer: MEDICARE

## 2024-03-14 PROCEDURE — 97530 THERAPEUTIC ACTIVITIES: CPT

## 2024-03-14 PROCEDURE — 97535 SELF CARE MNGMENT TRAINING: CPT

## 2024-03-14 PROCEDURE — 97110 THERAPEUTIC EXERCISES: CPT

## 2024-03-14 NOTE — PROGRESS NOTES
PT DISCHARGE DAILY NOTE AND SUMMARY     Date:3/14/2024  Patient Name: Judy Cason : 1955   Medical   Diagnosis: Other low back pain [M54.59] Treatment Diagnosis: M54.6  THORACIC PAIN and M54.42  LUMBAGO WITH SCIATICA, LEFT SIDE    Onset Date:  2023       Referral Source: Rain Gloria MD Start of Care (SOC): 2024   Prior Hospitalization: See medical history Provider #: [id number]   Prior Level of Function: Pt is independent.   Comorbidities: Asthma, HTN, arthritis      Insurance: Payor: MEDICARE / Plan: MEDICARE PART A AND B / Product Type: *No Product type* /      Visits from Start of Care: 8 Missed Visits: 1    Medicare: Reporting Period (date from last assessment to current assessment): 24 to 3/14/24    Patient  verified yes     Visit #   Current / Total 8 16   Time   In / Out 134 215   Pain   In / Out 4 3   Subjective Functional Status/Changes: Patient complains of Low back pain today      TREATMENT AREA =  Lumbago with sciatica, left side [M54.42]  Pain in thoracic spine [M54.6]    OBJECTIVE      Therapeutic Procedures:    Tx Min Billable or 1:1 Min (if diff from Tx Min) Procedure, Rationale, Specifics   15  21450 Therapeutic Exercise (timed):  increase ROM, strength, coordination, balance, and proprioception to improve patient's ability to progress to PLOF and address remaining functional goals. (see flow sheet as applicable)     Details if applicable:       18  10194 Therapeutic Activity (timed):  use of dynamic activities replicating functional movements to increase ROM, strength, coordination, balance, and proprioception in order to improve patient's ability to progress to PLOF and address remaining functional goals.  (see flow sheet as applicable)     Details if applicable:     8  15145 Self Care/Home Management (timed):  improve patient knowledge and understanding of pain reducing techniques, activity modification, and diagnosis/prognosis  to improve patient's ability to

## 2024-03-19 ENCOUNTER — APPOINTMENT (OUTPATIENT)
Facility: HOSPITAL | Age: 69
End: 2024-03-19
Payer: MEDICARE

## 2024-03-21 ENCOUNTER — HOSPITAL ENCOUNTER (OUTPATIENT)
Facility: HOSPITAL | Age: 69
Setting detail: RECURRING SERIES
End: 2024-03-21
Payer: MEDICARE

## 2024-03-28 ENCOUNTER — OFFICE VISIT (OUTPATIENT)
Age: 69
End: 2024-03-28

## 2024-03-28 VITALS
OXYGEN SATURATION: 97 % | DIASTOLIC BLOOD PRESSURE: 78 MMHG | WEIGHT: 189 LBS | SYSTOLIC BLOOD PRESSURE: 132 MMHG | HEIGHT: 62 IN | RESPIRATION RATE: 16 BRPM | TEMPERATURE: 98.9 F | BODY MASS INDEX: 34.78 KG/M2 | HEART RATE: 79 BPM

## 2024-03-28 DIAGNOSIS — M51.36 DDD (DEGENERATIVE DISC DISEASE), LUMBAR: ICD-10-CM

## 2024-03-28 DIAGNOSIS — J30.89 NON-SEASONAL ALLERGIC RHINITIS, UNSPECIFIED TRIGGER: ICD-10-CM

## 2024-03-28 DIAGNOSIS — E66.09 CLASS 1 OBESITY DUE TO EXCESS CALORIES WITH SERIOUS COMORBIDITY AND BODY MASS INDEX (BMI) OF 34.0 TO 34.9 IN ADULT: ICD-10-CM

## 2024-03-28 DIAGNOSIS — M54.32 LEFT SIDED SCIATICA: ICD-10-CM

## 2024-03-28 DIAGNOSIS — E11.9 TYPE 2 DIABETES MELLITUS WITHOUT COMPLICATION, WITHOUT LONG-TERM CURRENT USE OF INSULIN (HCC): ICD-10-CM

## 2024-03-28 DIAGNOSIS — J45.30 MILD PERSISTENT ASTHMA WITHOUT COMPLICATION: Primary | ICD-10-CM

## 2024-03-28 DIAGNOSIS — Z91.148 NONCOMPLIANCE WITH DIET AND MEDICATION REGIMEN: ICD-10-CM

## 2024-03-28 DIAGNOSIS — M17.0 PRIMARY OSTEOARTHRITIS OF BOTH KNEES: ICD-10-CM

## 2024-03-28 DIAGNOSIS — Z91.199 NONCOMPLIANCE WITH DIET AND MEDICATION REGIMEN: ICD-10-CM

## 2024-03-28 DIAGNOSIS — M25.562 CHRONIC PAIN OF LEFT KNEE: ICD-10-CM

## 2024-03-28 DIAGNOSIS — G89.29 CHRONIC PAIN OF LEFT KNEE: ICD-10-CM

## 2024-03-28 DIAGNOSIS — M43.16 SPONDYLOLISTHESIS, LUMBAR REGION: ICD-10-CM

## 2024-03-28 DIAGNOSIS — I10 ESSENTIAL HYPERTENSION: ICD-10-CM

## 2024-03-28 RX ORDER — HYDROCORTISONE AND ACETIC ACID 20.75; 10.375 MG/ML; MG/ML
4 SOLUTION AURICULAR (OTIC) 2 TIMES DAILY PRN
Qty: 10 ML | Refills: 1 | Status: SHIPPED | OUTPATIENT
Start: 2024-03-28 | End: 2024-04-07

## 2024-03-28 RX ORDER — GLIPIZIDE 10 MG/1
10 TABLET, FILM COATED, EXTENDED RELEASE ORAL DAILY
Qty: 1 TABLET | Refills: 0
Start: 2024-03-28

## 2024-03-28 NOTE — PATIENT INSTRUCTIONS
Incorporated.   Care instructions adapted under license by [employer] (which disclaims liability or warranty for this information). If you have questions about a medical condition or this instruction, always ask your healthcare professional. [employer] disclaims any warranty or liability for your use of this information.        Learning About Diabetes Food Guidelines  Your Care Instructions     Meal planning is important to manage diabetes. It helps keep your blood sugar at a target level (which you set with your doctor). You don't have to eat special foods. You can eat what your family eats, including sweets once in a while. But you do have to pay attention to how often you eat and how much you eat of certain foods.  You may want to work with a dietitian or a certified diabetes educator (CDE) to help you plan meals and snacks. A dietitian or CDE can also help you lose weight if that is one of your goals.  What should you know about eating carbs?  Managing the amount of carbohydrate (carbs) you eat is an important part of healthy meals when you have diabetes. Carbohydrate is found in many foods.  Learn which foods have carbs. And learn the amounts of carbs in different foods.  Bread, cereal, pasta, and rice have about 15 grams of carbs in a serving. A serving is 1 slice of bread (1 ounce), ½ cup of cooked cereal, or 1/3 cup of cooked pasta or rice.  Fruits have 15 grams of carbs in a serving. A serving is 1 small fresh fruit, such as an apple or orange; ½ of a banana; ½ cup of cooked or canned fruit; ½ cup of fruit juice; 1 cup of melon or raspberries; or 2 tablespoons of dried fruit.  Milk and no-sugar-added yogurt have 15 grams of carbs in a serving. A serving is 1 cup of milk or 2/3 cup of no-sugar-added yogurt.  Starchy vegetables have 15 grams of carbs in a serving. A serving is ½ cup of mashed potatoes or sweet potato; 1 cup winter squash; ½ of a small baked potato; ½ cup of cooked

## 2024-03-28 NOTE — PROGRESS NOTES
Judy Cason presents today for   Chief Complaint   Patient presents with    Back Pain       \"Have you been to the ER, urgent care clinic since your last visit?  Hospitalized since your last visit?\"    NO    “Have you seen or consulted any other health care providers outside of [facility] since your last visit?”    NO       Have you had a mammogram?”   scheduled    Date of last Mammogram: 3/27/2023          
will take it occasionally. She does use Claritin every day.  In 7/2019, she reported increasing difficulty with right ear fullness, post nasal drainage, hoarseness, and cough, and was referred to Dr. Fry. He performed a nasal laryngoscopy and found evidence of laryngopharyngeal reflux. She was started on daily omeprazole, and she states that she has noticed some improvement.      In 10/2017, she fell down the steps of her porch and had difficulty with right knee pain and swelling. She was evaluated by Dr. Saavedra in 12/2017, and right knee xray showed decreased medial joint space, and moderate degenerative changes. She received a cortisone injection, but did not notice any improvement. She underwent an MRI of the right knee (1/29/2018) which showed complete tear of posterior horn and root of the medial meniscus; tear of the body and posterior horn of the lateral meniscus; tricompartmental osteoarthritis most prominent in medial and patellofemoral compartments; moderate joint effusion; small Baker's cyst. It was recommended that she consider knee replacement and arthroscopy. However, she decided to seek a second opinion and was evaluated by Dr. Murphy. She also underwent an MRI of her left knee (3/23/2018) showing radial tear posterior horn medial meniscus with extrusion of the body. Also a radial tear in the mid body; lateral meniscus intrasubstance degeneration and probable small undersurface tear of the posterior horn; medial and patellofemoral compartments moderate chondral loss; s. ubchondral bone marrow edema in the medial tibial plateau, likely reactive. She is completed physical therapy for her bilateral knees and feels that it may have helped.      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 She was treated with Avelox, and repeat chest x-ray in 1/2012 showed complete resolution of the pneumonia. She denies any cough or

## 2024-04-01 ENCOUNTER — HOSPITAL ENCOUNTER (OUTPATIENT)
Facility: HOSPITAL | Age: 69
Discharge: HOME | End: 2024-04-04
Attending: INTERNAL MEDICINE
Payer: MEDICARE

## 2024-04-01 VITALS — BODY MASS INDEX: 34.56 KG/M2 | HEIGHT: 62 IN

## 2024-04-01 DIAGNOSIS — Z12.31 SCREENING MAMMOGRAM FOR BREAST CANCER: ICD-10-CM

## 2024-04-01 PROCEDURE — 77067 SCR MAMMO BI INCL CAD: CPT

## 2024-04-06 ENCOUNTER — HOSPITAL ENCOUNTER (OUTPATIENT)
Facility: HOSPITAL | Age: 69
End: 2024-04-06
Attending: ORTHOPAEDIC SURGERY
Payer: MEDICARE

## 2024-04-06 DIAGNOSIS — M54.32 SCIATICA OF LEFT SIDE: ICD-10-CM

## 2024-04-06 PROCEDURE — 72148 MRI LUMBAR SPINE W/O DYE: CPT

## 2024-04-12 ENCOUNTER — HOSPITAL ENCOUNTER (OUTPATIENT)
Facility: HOSPITAL | Age: 69
Setting detail: SPECIMEN
End: 2024-04-12
Payer: MEDICARE

## 2024-04-12 DIAGNOSIS — I47.19 AVNRT (AV NODAL RE-ENTRY TACHYCARDIA) (HCC): ICD-10-CM

## 2024-04-12 DIAGNOSIS — E11.9 TYPE 2 DIABETES MELLITUS WITHOUT COMPLICATION, WITHOUT LONG-TERM CURRENT USE OF INSULIN (HCC): ICD-10-CM

## 2024-04-12 DIAGNOSIS — E78.00 PURE HYPERCHOLESTEROLEMIA: ICD-10-CM

## 2024-04-12 DIAGNOSIS — I10 ESSENTIAL HYPERTENSION: ICD-10-CM

## 2024-04-12 LAB
ALBUMIN SERPL-MCNC: 3.5 G/DL (ref 3.4–5)
ALBUMIN/GLOB SERPL: 1 (ref 0.8–1.7)
ALP SERPL-CCNC: 69 U/L (ref 45–117)
ALT SERPL-CCNC: 35 U/L (ref 13–56)
ANION GAP SERPL CALC-SCNC: 4 MMOL/L (ref 3–18)
AST SERPL-CCNC: 25 U/L (ref 10–38)
BILIRUB SERPL-MCNC: 0.4 MG/DL (ref 0.2–1)
BUN SERPL-MCNC: 9 MG/DL (ref 7–18)
BUN/CREAT SERPL: 13 (ref 12–20)
CALCIUM SERPL-MCNC: 8.9 MG/DL (ref 8.5–10.1)
CHLORIDE SERPL-SCNC: 104 MMOL/L (ref 100–111)
CHOLEST SERPL-MCNC: 160 MG/DL
CO2 SERPL-SCNC: 31 MMOL/L (ref 21–32)
CREAT SERPL-MCNC: 0.69 MG/DL (ref 0.6–1.3)
CREAT UR-MCNC: 97 MG/DL (ref 30–125)
EST. AVERAGE GLUCOSE BLD GHB EST-MCNC: 148 MG/DL
GLOBULIN SER CALC-MCNC: 3.6 G/DL (ref 2–4)
GLUCOSE SERPL-MCNC: 114 MG/DL (ref 74–99)
HBA1C MFR BLD: 6.8 % (ref 4.2–5.6)
HDLC SERPL-MCNC: 60 MG/DL (ref 40–60)
HDLC SERPL: 2.7 (ref 0–5)
LDLC SERPL CALC-MCNC: 77.4 MG/DL (ref 0–100)
LIPID PANEL: NORMAL
MAGNESIUM SERPL-MCNC: 1.8 MG/DL (ref 1.6–2.6)
MICROALBUMIN UR-MCNC: 0.63 MG/DL (ref 0–3)
MICROALBUMIN/CREAT UR-RTO: 6 MG/G (ref 0–30)
POTASSIUM SERPL-SCNC: 3.9 MMOL/L (ref 3.5–5.5)
PROT SERPL-MCNC: 7.1 G/DL (ref 6.4–8.2)
SODIUM SERPL-SCNC: 139 MMOL/L (ref 136–145)
TRIGL SERPL-MCNC: 113 MG/DL
VLDLC SERPL CALC-MCNC: 22.6 MG/DL

## 2024-04-12 PROCEDURE — 36415 COLL VENOUS BLD VENIPUNCTURE: CPT

## 2024-04-12 PROCEDURE — 80061 LIPID PANEL: CPT

## 2024-04-12 PROCEDURE — 80053 COMPREHEN METABOLIC PANEL: CPT

## 2024-04-12 PROCEDURE — 82570 ASSAY OF URINE CREATININE: CPT

## 2024-04-12 PROCEDURE — 82043 UR ALBUMIN QUANTITATIVE: CPT

## 2024-04-12 PROCEDURE — 83036 HEMOGLOBIN GLYCOSYLATED A1C: CPT

## 2024-04-12 PROCEDURE — 83735 ASSAY OF MAGNESIUM: CPT

## 2024-04-23 ENCOUNTER — OFFICE VISIT (OUTPATIENT)
Age: 69
End: 2024-04-23

## 2024-04-23 VITALS
HEIGHT: 62 IN | BODY MASS INDEX: 33.68 KG/M2 | SYSTOLIC BLOOD PRESSURE: 124 MMHG | TEMPERATURE: 99.3 F | RESPIRATION RATE: 16 BRPM | WEIGHT: 183 LBS | OXYGEN SATURATION: 95 % | HEART RATE: 80 BPM | DIASTOLIC BLOOD PRESSURE: 68 MMHG

## 2024-04-23 DIAGNOSIS — E55.9 VITAMIN D DEFICIENCY: ICD-10-CM

## 2024-04-23 DIAGNOSIS — E11.9 TYPE 2 DIABETES MELLITUS WITHOUT COMPLICATION, WITHOUT LONG-TERM CURRENT USE OF INSULIN (HCC): ICD-10-CM

## 2024-04-23 DIAGNOSIS — M51.36 DDD (DEGENERATIVE DISC DISEASE), LUMBAR: ICD-10-CM

## 2024-04-23 DIAGNOSIS — I10 ESSENTIAL HYPERTENSION: Primary | ICD-10-CM

## 2024-04-23 DIAGNOSIS — J30.89 NON-SEASONAL ALLERGIC RHINITIS, UNSPECIFIED TRIGGER: ICD-10-CM

## 2024-04-23 DIAGNOSIS — M43.16 SPONDYLOLISTHESIS, LUMBAR REGION: ICD-10-CM

## 2024-04-23 DIAGNOSIS — Z91.148 NONCOMPLIANCE WITH DIET AND MEDICATION REGIMEN: ICD-10-CM

## 2024-04-23 DIAGNOSIS — E66.09 CLASS 1 OBESITY DUE TO EXCESS CALORIES WITH SERIOUS COMORBIDITY AND BODY MASS INDEX (BMI) OF 32.0 TO 32.9 IN ADULT: ICD-10-CM

## 2024-04-23 DIAGNOSIS — M25.562 CHRONIC PAIN OF LEFT KNEE: ICD-10-CM

## 2024-04-23 DIAGNOSIS — J45.30 MILD PERSISTENT ASTHMA WITHOUT COMPLICATION: ICD-10-CM

## 2024-04-23 DIAGNOSIS — M17.0 PRIMARY OSTEOARTHRITIS OF BOTH KNEES: ICD-10-CM

## 2024-04-23 DIAGNOSIS — K76.0 NAFL (NONALCOHOLIC FATTY LIVER): ICD-10-CM

## 2024-04-23 DIAGNOSIS — I47.19 AVNRT (AV NODAL RE-ENTRY TACHYCARDIA) (HCC): ICD-10-CM

## 2024-04-23 DIAGNOSIS — G89.29 CHRONIC PAIN OF LEFT KNEE: ICD-10-CM

## 2024-04-23 DIAGNOSIS — E78.00 PURE HYPERCHOLESTEROLEMIA: ICD-10-CM

## 2024-04-23 DIAGNOSIS — Z91.199 NONCOMPLIANCE WITH DIET AND MEDICATION REGIMEN: ICD-10-CM

## 2024-04-23 NOTE — PATIENT INSTRUCTIONS
packages. The labels tell you how much sodium is in each serving. Make sure that you look at the serving size. If you eat more than the serving size, you have eaten more sodium.  Food labels also tell you the Percent Daily Value for sodium. Choose products with low Percent Daily Values for sodium.  Be aware that sodium can come in forms other than salt, including monosodium glutamate (MSG), sodium citrate, and sodium bicarbonate (baking soda). MSG is often added to Asian food. When you eat out, you can sometimes ask for food without MSG or added salt.  Buy low-sodium foods  Buy foods that are labeled \"unsalted\" (no salt added), \"sodium-free\" (less than 5 mg of sodium per serving), or \"low-sodium\" (less than 140 mg of sodium per serving). Foods labeled \"reduced-sodium\" and \"light sodium\" may still have too much sodium. Be sure to read the label to see how much sodium you are getting.  Buy fresh vegetables, or frozen vegetables without added sauces. Buy low-sodium versions of canned vegetables, soups, and other canned goods.  Prepare low-sodium meals  Cut back on the amount of salt you use in cooking. This will help you adjust to the taste. Do not add salt after cooking. One teaspoon of salt has about 2,300 mg of sodium.  Take the salt shaker off the table.  Flavor your food with garlic, lemon juice, onion, vinegar, herbs, and spices. Do not use soy sauce, lite soy sauce, steak sauce, onion salt, garlic salt, celery salt, mustard, or ketchup on your food.  Use low-sodium salad dressings, sauces, and ketchup. Or make your own salad dressings and sauces without adding salt.  Use less salt (or none) when recipes call for it. You can often use half the salt a recipe calls for without losing flavor. Other foods such as rice, pasta, and grains do not need added salt.  Rinse canned vegetables, and cook them in fresh water. This removes some--but not all--of the salt.  Avoid water that is naturally high in sodium or that has

## 2024-04-23 NOTE — PROGRESS NOTES
Judy Cason presents today for   Chief Complaint   Patient presents with    Follow-up       \"Have you been to the ER, urgent care clinic since your last visit?  Hospitalized since your last visit?\"    NO    “Have you seen or consulted any other health care providers outside of [facility] since your last visit?”    NO            
lateral meniscus intrasubstance degeneration and probable small undersurface tear of the posterior horn; medial and patellofemoral compartments moderate chondral loss; s. ubchondral bone marrow edema in the medial tibial plateau, likely reactive. She is completed physical therapy for her bilateral knees and feels that it may have helped.      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 She was treated with Avelox, and repeat chest x-ray in 1/2012 showed complete resolution of the pneumonia. She denies any cough or shortness of breath.        She had a screening colonoscopy in 12/2006 by Dr. Barney showing a 5 mm sessile polyp in the rectum (pathology: hyperplastic). She had a repeat colonoscopy in 12/2016 which showed moderate sigmoid diverticulosis and a 6 mm sessile ascending colon polyp (pathology: serrated adenoma). She had a repeat screening colonoscopy in 4/2021 by Dr. Barney showing a single six 6 mm polyp in the distal sigmoid colon (pathology: Hyperplastic polyp).  Follow-up recommended for 5 years. She denies any abdominal pain, nausea, vomiting, melena, hematochezia, or change in bowel movements. She does take omeprazole occasionally for GERD symptoms.     In 12/2017, she was referred by her gynecologist for evaluation by Dr. Rizvi for microscopic hematuria, and urine cytology was negative. However, she states that she refused his recommendations to undergo a CT urogram or cystoscopy. She denies any dysuria, gross hematuria, or flank pain.     She reports that she was diagnosed with scalp psoriasis by Dr. Shin in 1/2024 and has been prescribed clobetasol solution.         Past Medical History:   Diagnosis Date    Abnormal stress echo 11/02/2012    Normal maximal stress echo study.  EF 60%.  Ex time 9 min 45 sec.    Allergic rhinitis     Asthma     Colon polyps     Diabetes mellitus (HCC)     GERD (gastroesophageal reflux

## 2024-04-26 ENCOUNTER — OFFICE VISIT (OUTPATIENT)
Age: 69
End: 2024-04-26
Payer: MEDICARE

## 2024-04-26 VITALS — HEIGHT: 62 IN | WEIGHT: 179 LBS | BODY MASS INDEX: 32.94 KG/M2

## 2024-04-26 DIAGNOSIS — M17.12 OSTEOARTHRITIS OF LEFT KNEE, UNSPECIFIED OSTEOARTHRITIS TYPE: Primary | ICD-10-CM

## 2024-04-26 DIAGNOSIS — M17.11 OSTEOARTHRITIS OF RIGHT KNEE, UNSPECIFIED OSTEOARTHRITIS TYPE: ICD-10-CM

## 2024-04-26 PROCEDURE — 3017F COLORECTAL CA SCREEN DOC REV: CPT | Performed by: ORTHOPAEDIC SURGERY

## 2024-04-26 PROCEDURE — 1036F TOBACCO NON-USER: CPT | Performed by: ORTHOPAEDIC SURGERY

## 2024-04-26 PROCEDURE — 1090F PRES/ABSN URINE INCON ASSESS: CPT | Performed by: ORTHOPAEDIC SURGERY

## 2024-04-26 PROCEDURE — G8399 PT W/DXA RESULTS DOCUMENT: HCPCS | Performed by: ORTHOPAEDIC SURGERY

## 2024-04-26 PROCEDURE — 99203 OFFICE O/P NEW LOW 30 MIN: CPT | Performed by: ORTHOPAEDIC SURGERY

## 2024-04-26 PROCEDURE — G8417 CALC BMI ABV UP PARAM F/U: HCPCS | Performed by: ORTHOPAEDIC SURGERY

## 2024-04-26 PROCEDURE — G8427 DOCREV CUR MEDS BY ELIG CLIN: HCPCS | Performed by: ORTHOPAEDIC SURGERY

## 2024-04-26 PROCEDURE — 1123F ACP DISCUSS/DSCN MKR DOCD: CPT | Performed by: ORTHOPAEDIC SURGERY

## 2024-04-26 NOTE — PROGRESS NOTES
osteoarthritis type Yes    Osteoarthritis of right knee, unspecified osteoarthritis type             HPI:  The patient is here with a chief complaint of left knee pain, throbbing, burning pain.  Pain is 4/10.  Denies any complaints.    X-rays of the left knee are positive for OA, moderate-to-severe in nature.  X-rays are positive for severe OA actually in both knees.    Assessment/Plan:  Plan at this point, I did talk to her about Jiffy knee replacement.  She wants to wait.  She will continue conservative treatment.  If she gets worse, she is to give me a call.  No restrictions in the meantime and we will go from there.     As part of continued conservative pain management options the patient was advised to utilize Tylenol or OTC NSAIDS as long as it is not medically contraindicated.     Return to Office:    Follow-up and Dispositions    Return if symptoms worsen or fail to improve.        Scribed by Christopher Jaffe MD as dictated by Christopher Jaffe MD.  Documentation, performed by, True and Accepted Christopher Jaffe MD

## 2024-04-27 PROBLEM — K75.81 NASH (NONALCOHOLIC STEATOHEPATITIS): Status: RESOLVED | Noted: 2021-03-07 | Resolved: 2024-04-27

## 2024-05-01 ENCOUNTER — OFFICE VISIT (OUTPATIENT)
Age: 69
End: 2024-05-01
Payer: MEDICARE

## 2024-05-01 VITALS
BODY MASS INDEX: 34.04 KG/M2 | WEIGHT: 185 LBS | HEIGHT: 62 IN | DIASTOLIC BLOOD PRESSURE: 70 MMHG | OXYGEN SATURATION: 96 % | SYSTOLIC BLOOD PRESSURE: 126 MMHG | HEART RATE: 71 BPM

## 2024-05-01 DIAGNOSIS — I10 PRIMARY HYPERTENSION: ICD-10-CM

## 2024-05-01 DIAGNOSIS — R00.2 PALPITATIONS: ICD-10-CM

## 2024-05-01 DIAGNOSIS — I47.10 SUPRAVENTRICULAR TACHYCARDIA (HCC): Primary | ICD-10-CM

## 2024-05-01 PROCEDURE — 1090F PRES/ABSN URINE INCON ASSESS: CPT | Performed by: INTERNAL MEDICINE

## 2024-05-01 PROCEDURE — G8428 CUR MEDS NOT DOCUMENT: HCPCS | Performed by: INTERNAL MEDICINE

## 2024-05-01 PROCEDURE — G8399 PT W/DXA RESULTS DOCUMENT: HCPCS | Performed by: INTERNAL MEDICINE

## 2024-05-01 PROCEDURE — 3078F DIAST BP <80 MM HG: CPT | Performed by: INTERNAL MEDICINE

## 2024-05-01 PROCEDURE — 1036F TOBACCO NON-USER: CPT | Performed by: INTERNAL MEDICINE

## 2024-05-01 PROCEDURE — 93000 ELECTROCARDIOGRAM COMPLETE: CPT | Performed by: INTERNAL MEDICINE

## 2024-05-01 PROCEDURE — 3017F COLORECTAL CA SCREEN DOC REV: CPT | Performed by: INTERNAL MEDICINE

## 2024-05-01 PROCEDURE — 1123F ACP DISCUSS/DSCN MKR DOCD: CPT | Performed by: INTERNAL MEDICINE

## 2024-05-01 PROCEDURE — 3074F SYST BP LT 130 MM HG: CPT | Performed by: INTERNAL MEDICINE

## 2024-05-01 PROCEDURE — 99214 OFFICE O/P EST MOD 30 MIN: CPT | Performed by: INTERNAL MEDICINE

## 2024-05-01 PROCEDURE — G8417 CALC BMI ABV UP PARAM F/U: HCPCS | Performed by: INTERNAL MEDICINE

## 2024-05-01 NOTE — PROGRESS NOTES
History of Present Illness:  69 year-old female here for followup.  She established care with me from Dr. Schafer after being seen for 25+ years for recurrent SVT, well tolerated on Toprol.  She has been having progressive left knee pain and is talking about knee surgery with Dr. Jaffe, but would like to hold off at least until after vacation this summer.  She is able to go up a couple of flights of stairs without significant chest pain or shortness of breath.      Impression:   History of SVT conservatively treated for 26 years on Toprol.    Hypertension, controlled.    Dyslipidemia on statin.   Degenerative joint disease in bilateral knees, contemplating left knee surgery.      Plan:  I am going to obtain a follow-up echocardiogram since it has been a number of years and she is able to go up a couple of flights of stairs and as long as the EF is stable and there is no change in her tachycardia symptoms, she could proceed with her knee surgery low risk.        Wt Readings from Last 3 Encounters:   05/01/24 83.9 kg (185 lb)   04/26/24 81.2 kg (179 lb)   04/23/24 83 kg (183 lb)     Past Medical History:   Diagnosis Date    Abnormal stress echo 11/02/2012    Normal maximal stress echo study.  EF 60%.  Ex time 9 min 45 sec.    Allergic rhinitis     Asthma     Colon polyps     Diabetes mellitus (HCC)     GERD (gastroesophageal reflux disease)     History of pneumonia 01/2012    AFB smear positive. 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    Hyperlipidemia     Hypertension     Menopause     Plantar fasciitis     left    PSVT (paroxysmal supraventricular tachycardia) ([facility])     A-V desiree reentrant tachycardia       Current Outpatient Medications   Medication Sig Dispense Refill    glipiZIDE (GLUCOTROL XL) 10 MG extended release tablet Take 1 tablet by mouth daily 1 tablet 0    albuterol sulfate HFA (PROVENTIL;VENTOLIN;PROAIR) 108 (90 Base) MCG/ACT inhaler inhale 2 puffs by mouth every 4

## 2024-05-27 RX ORDER — LOSARTAN POTASSIUM 25 MG/1
TABLET ORAL
Qty: 90 TABLET | Refills: 3 | Status: SHIPPED | OUTPATIENT
Start: 2024-05-27

## 2024-05-28 RX ORDER — POTASSIUM CHLORIDE 20 MEQ/1
TABLET, EXTENDED RELEASE ORAL
Qty: 90 TABLET | Refills: 3 | OUTPATIENT
Start: 2024-05-28

## 2024-05-29 RX ORDER — FLUTICASONE PROPIONATE 50 MCG
SPRAY, SUSPENSION (ML) NASAL
Qty: 48 G | Refills: 3 | Status: SHIPPED | OUTPATIENT
Start: 2024-05-29

## 2024-06-05 RX ORDER — POTASSIUM CHLORIDE 20 MEQ/1
TABLET, EXTENDED RELEASE ORAL
Qty: 90 TABLET | Refills: 5 | OUTPATIENT
Start: 2024-06-05

## 2024-06-24 RX ORDER — MONTELUKAST SODIUM 10 MG/1
10 TABLET ORAL NIGHTLY
Qty: 90 TABLET | Refills: 3 | Status: SHIPPED | OUTPATIENT
Start: 2024-06-24

## 2024-08-20 ENCOUNTER — HOSPITAL ENCOUNTER (OUTPATIENT)
Facility: HOSPITAL | Age: 69
Setting detail: SPECIMEN
Discharge: HOME | End: 2024-08-23
Payer: MEDICARE

## 2024-08-20 DIAGNOSIS — E11.9 TYPE 2 DIABETES MELLITUS WITHOUT COMPLICATION, WITHOUT LONG-TERM CURRENT USE OF INSULIN (HCC): ICD-10-CM

## 2024-08-20 DIAGNOSIS — E55.9 VITAMIN D DEFICIENCY: ICD-10-CM

## 2024-08-20 DIAGNOSIS — I10 ESSENTIAL HYPERTENSION: ICD-10-CM

## 2024-08-20 DIAGNOSIS — E78.00 PURE HYPERCHOLESTEROLEMIA: ICD-10-CM

## 2024-08-20 DIAGNOSIS — K76.0 NAFL (NONALCOHOLIC FATTY LIVER): ICD-10-CM

## 2024-08-20 LAB
25(OH)D3 SERPL-MCNC: 39.2 NG/ML (ref 30–100)
ALBUMIN SERPL-MCNC: 3.5 G/DL (ref 3.4–5)
ALBUMIN/GLOB SERPL: 1 (ref 0.8–1.7)
ALP SERPL-CCNC: 73 U/L (ref 45–117)
ALT SERPL-CCNC: 29 U/L (ref 13–56)
ANION GAP SERPL CALC-SCNC: 5 MMOL/L (ref 3–18)
APPEARANCE UR: CLEAR
AST SERPL-CCNC: 19 U/L (ref 10–38)
BACTERIA URNS QL MICRO: ABNORMAL /HPF
BASOPHILS # BLD: 0.1 K/UL (ref 0–0.1)
BASOPHILS NFR BLD: 1 % (ref 0–2)
BILIRUB SERPL-MCNC: 0.5 MG/DL (ref 0.2–1)
BILIRUB UR QL: NEGATIVE
BUN SERPL-MCNC: 13 MG/DL (ref 7–18)
BUN/CREAT SERPL: 19 (ref 12–20)
CALCIUM SERPL-MCNC: 8.9 MG/DL (ref 8.5–10.1)
CHLORIDE SERPL-SCNC: 102 MMOL/L (ref 100–111)
CHOLEST SERPL-MCNC: 151 MG/DL
CO2 SERPL-SCNC: 29 MMOL/L (ref 21–32)
COLOR UR: YELLOW
CREAT SERPL-MCNC: 0.7 MG/DL (ref 0.6–1.3)
CREAT UR-MCNC: 132 MG/DL (ref 30–125)
DIFFERENTIAL METHOD BLD: NORMAL
EOSINOPHIL # BLD: 0.4 K/UL (ref 0–0.4)
EOSINOPHIL NFR BLD: 4 % (ref 0–5)
EPITH CASTS URNS QL MICRO: ABNORMAL /LPF (ref 0–5)
ERYTHROCYTE [DISTWIDTH] IN BLOOD BY AUTOMATED COUNT: 11.9 % (ref 11.6–14.5)
EST. AVERAGE GLUCOSE BLD GHB EST-MCNC: 148 MG/DL
GLOBULIN SER CALC-MCNC: 3.5 G/DL (ref 2–4)
GLUCOSE SERPL-MCNC: 101 MG/DL (ref 74–99)
GLUCOSE UR STRIP.AUTO-MCNC: NEGATIVE MG/DL
HBA1C MFR BLD: 6.8 % (ref 4.2–5.6)
HCT VFR BLD AUTO: 41.4 % (ref 35–45)
HDLC SERPL-MCNC: 65 MG/DL (ref 40–60)
HDLC SERPL: 2.3 (ref 0–5)
HGB BLD-MCNC: 13.6 G/DL (ref 12–16)
HGB UR QL STRIP: NEGATIVE
IMM GRANULOCYTES # BLD AUTO: 0 K/UL (ref 0–0.04)
IMM GRANULOCYTES NFR BLD AUTO: 0 % (ref 0–0.5)
KETONES UR QL STRIP.AUTO: NEGATIVE MG/DL
LDLC SERPL CALC-MCNC: 66.8 MG/DL (ref 0–100)
LEUKOCYTE ESTERASE UR QL STRIP.AUTO: ABNORMAL
LIPID PANEL: ABNORMAL
LYMPHOCYTES # BLD: 2.3 K/UL (ref 0.9–3.6)
LYMPHOCYTES NFR BLD: 27 % (ref 21–52)
MCH RBC QN AUTO: 30 PG (ref 24–34)
MCHC RBC AUTO-ENTMCNC: 32.9 G/DL (ref 31–37)
MCV RBC AUTO: 91.4 FL (ref 78–100)
MICROALBUMIN UR-MCNC: 2.58 MG/DL (ref 0–3)
MICROALBUMIN/CREAT UR-RTO: 20 MG/G (ref 0–30)
MONOCYTES # BLD: 0.6 K/UL (ref 0.05–1.2)
MONOCYTES NFR BLD: 7 % (ref 3–10)
NEUTS SEG # BLD: 5.2 K/UL (ref 1.8–8)
NEUTS SEG NFR BLD: 61 % (ref 40–73)
NITRITE UR QL STRIP.AUTO: NEGATIVE
NRBC # BLD: 0 K/UL (ref 0–0.01)
NRBC BLD-RTO: 0 PER 100 WBC
PH UR STRIP: 6 (ref 5–8)
PLATELET # BLD AUTO: 255 K/UL (ref 135–420)
PMV BLD AUTO: 10.6 FL (ref 9.2–11.8)
POTASSIUM SERPL-SCNC: 3.7 MMOL/L (ref 3.5–5.5)
PROT SERPL-MCNC: 7 G/DL (ref 6.4–8.2)
PROT UR STRIP-MCNC: NEGATIVE MG/DL
RBC # BLD AUTO: 4.53 M/UL (ref 4.2–5.3)
RBC #/AREA URNS HPF: ABNORMAL /HPF (ref 0–5)
SODIUM SERPL-SCNC: 136 MMOL/L (ref 136–145)
SP GR UR REFRACTOMETRY: 1.02 (ref 1–1.03)
TRIGL SERPL-MCNC: 96 MG/DL
UROBILINOGEN UR QL STRIP.AUTO: 0.2 EU/DL (ref 0.2–1)
VLDLC SERPL CALC-MCNC: 19.2 MG/DL
WBC # BLD AUTO: 8.5 K/UL (ref 4.6–13.2)
WBC URNS QL MICRO: ABNORMAL /HPF (ref 0–4)

## 2024-08-20 PROCEDURE — 36415 COLL VENOUS BLD VENIPUNCTURE: CPT

## 2024-08-20 PROCEDURE — 80061 LIPID PANEL: CPT

## 2024-08-20 PROCEDURE — 85025 COMPLETE CBC W/AUTO DIFF WBC: CPT

## 2024-08-20 PROCEDURE — 82306 VITAMIN D 25 HYDROXY: CPT

## 2024-08-20 PROCEDURE — 82043 UR ALBUMIN QUANTITATIVE: CPT

## 2024-08-20 PROCEDURE — 82570 ASSAY OF URINE CREATININE: CPT

## 2024-08-20 PROCEDURE — 81001 URINALYSIS AUTO W/SCOPE: CPT

## 2024-08-20 PROCEDURE — 83036 HEMOGLOBIN GLYCOSYLATED A1C: CPT

## 2024-08-20 PROCEDURE — 80053 COMPREHEN METABOLIC PANEL: CPT

## 2024-09-03 ENCOUNTER — OFFICE VISIT (OUTPATIENT)
Facility: CLINIC | Age: 69
End: 2024-09-03

## 2024-09-03 VITALS
RESPIRATION RATE: 14 BRPM | TEMPERATURE: 98.4 F | HEART RATE: 76 BPM | HEIGHT: 62 IN | SYSTOLIC BLOOD PRESSURE: 124 MMHG | OXYGEN SATURATION: 95 % | DIASTOLIC BLOOD PRESSURE: 68 MMHG | WEIGHT: 185 LBS | BODY MASS INDEX: 34.04 KG/M2

## 2024-09-03 DIAGNOSIS — M48.062 SPINAL STENOSIS OF LUMBAR REGION WITH NEUROGENIC CLAUDICATION: ICD-10-CM

## 2024-09-03 DIAGNOSIS — M25.562 CHRONIC PAIN OF LEFT KNEE: ICD-10-CM

## 2024-09-03 DIAGNOSIS — G89.29 CHRONIC PAIN OF LEFT KNEE: ICD-10-CM

## 2024-09-03 DIAGNOSIS — E78.00 PURE HYPERCHOLESTEROLEMIA: ICD-10-CM

## 2024-09-03 DIAGNOSIS — K76.0 NAFL (NONALCOHOLIC FATTY LIVER): ICD-10-CM

## 2024-09-03 DIAGNOSIS — F41.9 ANXIETY: ICD-10-CM

## 2024-09-03 DIAGNOSIS — E66.09 CLASS 1 OBESITY DUE TO EXCESS CALORIES WITH SERIOUS COMORBIDITY AND BODY MASS INDEX (BMI) OF 33.0 TO 33.9 IN ADULT: ICD-10-CM

## 2024-09-03 DIAGNOSIS — Z91.148 NONCOMPLIANCE WITH DIET AND MEDICATION REGIMEN: ICD-10-CM

## 2024-09-03 DIAGNOSIS — M17.0 PRIMARY OSTEOARTHRITIS OF BOTH KNEES: ICD-10-CM

## 2024-09-03 DIAGNOSIS — E11.9 TYPE 2 DIABETES MELLITUS WITHOUT COMPLICATION, WITHOUT LONG-TERM CURRENT USE OF INSULIN (HCC): Primary | ICD-10-CM

## 2024-09-03 DIAGNOSIS — Z91.199 NONCOMPLIANCE WITH DIET AND MEDICATION REGIMEN: ICD-10-CM

## 2024-09-03 DIAGNOSIS — J45.30 MILD PERSISTENT ASTHMA WITHOUT COMPLICATION: ICD-10-CM

## 2024-09-03 DIAGNOSIS — I10 ESSENTIAL HYPERTENSION: ICD-10-CM

## 2024-09-03 DIAGNOSIS — I47.19 AVNRT (AV NODAL RE-ENTRY TACHYCARDIA) (HCC): ICD-10-CM

## 2024-09-03 RX ORDER — DICLOFENAC SODIUM 25 MG/1
25 TABLET, DELAYED RELEASE ORAL 2 TIMES DAILY
COMMUNITY
End: 2024-09-03 | Stop reason: DRUGHIGH

## 2024-09-03 RX ORDER — LORAZEPAM 1 MG/1
1 TABLET ORAL EVERY 8 HOURS PRN
Qty: 25 TABLET | Refills: 0 | Status: SHIPPED | OUTPATIENT
Start: 2024-09-03 | End: 2024-10-03

## 2024-09-03 RX ORDER — ESTRADIOL 0.1 MG/G
2 CREAM VAGINAL
Status: SHIPPED | COMMUNITY
Start: 2024-09-03

## 2024-09-03 RX ORDER — ESTRADIOL 0.1 MG/G
2 CREAM VAGINAL DAILY
COMMUNITY
End: 2024-09-03 | Stop reason: DRUGHIGH

## 2024-09-03 RX ORDER — DICLOFENAC POTASSIUM 50 MG/1
50 TABLET, FILM COATED ORAL 2 TIMES DAILY PRN
COMMUNITY

## 2024-09-03 NOTE — PATIENT INSTRUCTIONS
[employer] (which disclaims liability or warranty for this information). If you have questions about a medical condition or this instruction, always ask your healthcare professional. [employer] disclaims any warranty or liability for your use of this information.

## 2024-09-03 NOTE — PROGRESS NOTES
Judy Cason presents today for   Chief Complaint   Patient presents with    Follow-up       \"Have you been to the ER, urgent care clinic since your last visit?  Hospitalized since your last visit?\"    NO    “Have you seen or consulted any other health care providers outside of [facility] since your last visit?”    NO            
reports that she was diagnosed with scalp psoriasis by Dr. Shin in 1/2024 and has been prescribed clobetasol solution.         Past Medical History:   Diagnosis Date    Abnormal stress echo 11/02/2012    Normal maximal stress echo study.  EF 60%.  Ex time 9 min 45 sec.    Allergic rhinitis     Asthma     Colon polyps     Diabetes mellitus (HCC)     GERD (gastroesophageal reflux disease)     History of pneumonia 01/2012    AFB smear positive. 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    Hyperlipidemia     Hypertension     Menopause     Plantar fasciitis     left    PSVT (paroxysmal supraventricular tachycardia) (HCC)     A-V desiree reentrant tachycardia     Past Surgical History:   Procedure Laterality Date    CERVICAL POLYP REMOVAL      COLONOSCOPY      polyp    CYST INCISION AND DRAINAGE Right 10 or more years    DILATION AND CURETTAGE OF UTERUS      GYN      polyp on cervix    POLYPECTOMY      from rectum    US ASP BREAST CYST LEFT Left 5/18/2022    US ASP BREAST CYST LEFT 5/18/2022 [facility] RAD US     Current Outpatient Medications   Medication Sig    diclofenac (CATAFLAM) 50 MG tablet Take 1 tablet by mouth 2 times daily as needed for Pain    estradiol (ESTRACE) 0.1 MG/GM vaginal cream Place 2 g vaginally Twice a Week    Roflumilast 0.3 % CREA Apply topically daily for scalp psoriasis    dulaglutide (TRULICITY) 0.75 MG/0.5ML SOPN SC injection Inject 0.5 mLs into the skin once a week    montelukast (SINGULAIR) 10 MG tablet take 1 tablet by mouth nightly    fluticasone (FLONASE) 50 MCG/ACT nasal spray instill 2 sprays into each nostril once daily    losartan (COZAAR) 25 MG tablet take 1 tablet by mouth once daily    glipiZIDE (GLUCOTROL XL) 10 MG extended release tablet Take 1 tablet by mouth daily    albuterol sulfate HFA (PROVENTIL;VENTOLIN;PROAIR) 108 (90 Base) MCG/ACT inhaler inhale 2 puffs by mouth every 4 hours if needed for wheezing    hydroCHLOROthiazide 12.5 MG tablet take

## 2024-09-04 ENCOUNTER — TELEPHONE (OUTPATIENT)
Facility: CLINIC | Age: 69
End: 2024-09-04

## 2024-09-04 RX ORDER — GLIPIZIDE 10 MG/1
10 TABLET, FILM COATED, EXTENDED RELEASE ORAL 2 TIMES DAILY
Qty: 1 TABLET | Refills: 0 | Status: SHIPPED
Start: 2024-09-04

## 2024-09-04 NOTE — ADDENDUM NOTE
Addended by: MARITZA DENNEY on: 9/4/2024 04:03 PM     Modules accepted: Orders     KONSTANTINI    Pt to [facility] ER today for SOB/chest pain

## 2024-09-04 NOTE — TELEPHONE ENCOUNTER
Please let her know that Trulicity was approved for prior authorization and see if she will change her mind and begin using it.    Also please let her know:  Roflumilast was via cream that she received from Dr. Shin.  She showed me the tube yesterday at her visit.      Carboxymethylcellulose is an ophthalmic gel which she seems to have been treated with in the past.  If she is no longer using it, it can be discontinued from her medication list

## 2024-09-04 NOTE — TELEPHONE ENCOUNTER
Prior authorization was completed and approved this morning. Please have patient see which pharmacy has it in stock and we can send it there.

## 2024-09-04 NOTE — TELEPHONE ENCOUNTER
Pt said Dr Gloria prescribed her trulicity  yesterday pt states her pharmacy does not have it and also her ins. Will not cover it so she is going to take Glipizide 1 in the morning and 1 at night     Pt also asked about medications she said she seen on her AVS   Rofmulastine 0.3% cream   Carboxymethlcellulose  1%  She said she has never used them she is asking to clarify

## 2024-09-05 NOTE — PATIENT INSTRUCTIONS
Chief Complaint   Patient presents with    RECHECK     Recurrent iritis of right eye +1 more       Vitals:    09/05/24 0754   BP: 103/68   BP Location: Left arm   Patient Position: Sitting   Cuff Size: Adult Large   Pulse: 62   Resp: 16   SpO2: 97%       There is no height or weight on file to calculate BMI.      Alex Gallagher MA     Medicare Wellness Visit, Female The best way to live healthy is to have a lifestyle where you eat a well-balanced diet, exercise regularly, limit alcohol use, and quit all forms of tobacco/nicotine, if applicable. Regular preventive services are another way to keep healthy. Preventive services (vaccines, screening tests, monitoring & exams) can help personalize your care plan, which helps you manage your own care. Screening tests can find health problems at the earliest stages, when they are easiest to treat. [facility] follows the current, evidence-based guidelines published by the [facility] [employer] ([facility]) when recommending preventive services for our patients. Because we follow these guidelines, sometimes recommendations change over time as research supports it. (For example, mammograms used to be recommended annually. Even though Medicare will still pay for an annual mammogram, the newer guidelines recommend a mammogram every two years for women of average risk). Of course, you and your doctor may decide to screen more often for some diseases, based on your risk and your co-morbidities (chronic disease you are already diagnosed with). Preventive services for you include: - Medicare offers their members a free annual wellness visit, which is time for you and your primary care provider to discuss and plan for your preventive service needs. Take advantage of this benefit every year! 
-All adults over the age of 72 should receive the recommended pneumonia vaccines. Current USPSTF guidelines recommend a series of two vaccines for the best pneumonia protection.  
-All adults should have a flu vaccine yearly and a tetanus vaccine every 10 years.  
-All adults age 48 and older should receive the shingles vaccines (series of two vaccines). -All adults age 38-68 who are overweight should have a diabetes screening test once every three years. -All adults born between [birth year] and 1965 should be screened once for Hepatitis C. 
-Other screening tests and preventive services for persons with diabetes include: an eye exam to screen for diabetic retinopathy, a kidney function test, a foot exam, and stricter control over your cholesterol.  
-Cardiovascular screening for adults with routine risk involves an electrocardiogram (ECG) at intervals determined by your doctor.  
-Colorectal cancer screenings should be done for adults age 54-65 with no increased risk factors for colorectal cancer. There are a number of acceptable methods of screening for this type of cancer. Each test has its own benefits and drawbacks. Discuss with your doctor what is most appropriate for you during your annual wellness visit. The different tests include: colonoscopy (considered the best screening method), a fecal occult blood test, a fecal DNA test, and sigmoidoscopy. 
 
-A bone mass density test is recommended when a woman turns 65 to screen for osteoporosis. This test is only recommended one time, as a screening. Some providers will use this same test as a disease monitoring tool if you already have osteoporosis. -Breast cancer screenings are recommended every other year for women of normal risk, age 54-69. 
-Cervical cancer screenings for women over age 72 are only recommended with certain risk factors. Here is a list of your current Health Maintenance items (your personalized list of preventive services) with a due date: 
Health Maintenance Due Topic Date Due  
 Flu Vaccine  08/01/2020 Medicare Wellness Visit, Female The best way to live healthy is to have a lifestyle where you eat a well-balanced diet, exercise regularly, limit alcohol use, and quit all forms of tobacco/nicotine, if applicable. Regular preventive services are another way to keep healthy.  Preventive services (vaccines, screening tests, monitoring & exams) can help personalize your care plan, which helps you manage your own care. Screening tests can find health problems at the earliest stages, when they are easiest to treat. [facility] follows the current, evidence-based guidelines published by the [facility] [employer] ([facility]) when recommending preventive services for our patients. Because we follow these guidelines, sometimes recommendations change over time as research supports it. (For example, mammograms used to be recommended annually. Even though Medicare will still pay for an annual mammogram, the newer guidelines recommend a mammogram every two years for women of average risk). Of course, you and your doctor may decide to screen more often for some diseases, based on your risk and your co-morbidities (chronic disease you are already diagnosed with). Preventive services for you include: - Medicare offers their members a free annual wellness visit, which is time for you and your primary care provider to discuss and plan for your preventive service needs. Take advantage of this benefit every year! 
-All adults over the age of 72 should receive the recommended pneumonia vaccines. Current USPSTF guidelines recommend a series of two vaccines for the best pneumonia protection.  
-All adults should have a flu vaccine yearly and a tetanus vaccine every 10 years.  
-All adults age 48 and older should receive the shingles vaccines (series of two vaccines).      
-All adults age 38-68 who are overweight should have a diabetes screening test once every three years.  
-All adults born between 80 and 1965 should be screened once for Hepatitis C. 
-Other screening tests and preventive services for persons with diabetes include: an eye exam to screen for diabetic retinopathy, a kidney function test, a foot exam, and stricter control over your cholesterol.  
-Cardiovascular screening for adults with routine risk involves an electrocardiogram (ECG) at intervals determined by your doctor.  
-Colorectal cancer screenings should be done for adults age 54-65 with no increased risk factors for colorectal cancer. There are a number of acceptable methods of screening for this type of cancer. Each test has its own benefits and drawbacks. Discuss with your doctor what is most appropriate for you during your annual wellness visit. The different tests include: colonoscopy (considered the best screening method), a fecal occult blood test, a fecal DNA test, and sigmoidoscopy. 
 
-A bone mass density test is recommended when a woman turns 65 to screen for osteoporosis. This test is only recommended one time, as a screening. Some providers will use this same test as a disease monitoring tool if you already have osteoporosis. -Breast cancer screenings are recommended every other year for women of normal risk, age 54-69. 
-Cervical cancer screenings for women over age 72 are only recommended with certain risk factors. Here is a list of your current Health Maintenance items (your personalized list of preventive services) with a due date: 
Health Maintenance Due Topic Date Due  
 Flu Vaccine  08/01/2020

## 2024-09-07 PROBLEM — B37.2 CANDIDAL DERMATITIS: Status: RESOLVED | Noted: 2023-03-25 | Resolved: 2024-09-07

## 2024-09-07 PROBLEM — M48.062 SPINAL STENOSIS OF LUMBAR REGION WITH NEUROGENIC CLAUDICATION: Status: ACTIVE | Noted: 2024-09-07

## 2024-09-20 ENCOUNTER — TELEPHONE (OUTPATIENT)
Facility: CLINIC | Age: 69
End: 2024-09-20

## 2024-10-07 ENCOUNTER — TELEPHONE (OUTPATIENT)
Facility: CLINIC | Age: 69
End: 2024-10-07

## 2024-10-07 RX ORDER — GLIPIZIDE 10 MG/1
10 TABLET, FILM COATED, EXTENDED RELEASE ORAL 2 TIMES DAILY WITH MEALS
Qty: 180 TABLET | Refills: 0 | Status: SHIPPED | OUTPATIENT
Start: 2024-10-07

## 2024-10-08 ENCOUNTER — TELEPHONE (OUTPATIENT)
Facility: CLINIC | Age: 69
End: 2024-10-08

## 2024-10-08 NOTE — TELEPHONE ENCOUNTER
Pt called in just to let Dr. Gloria know that she had a fall on Saturday and went to the ER. Pt said that she now has bruises and has a knot on her head that is in between the size of a golf ball and a baseball. Pt woke up this morning with a black eye. Pt already has an appt for ED follow up on 10/10, but just wanted the Dr to know what was going on.

## 2024-10-10 ENCOUNTER — OFFICE VISIT (OUTPATIENT)
Facility: CLINIC | Age: 69
End: 2024-10-10

## 2024-10-10 VITALS
DIASTOLIC BLOOD PRESSURE: 76 MMHG | RESPIRATION RATE: 16 BRPM | BODY MASS INDEX: 33.68 KG/M2 | HEIGHT: 62 IN | SYSTOLIC BLOOD PRESSURE: 128 MMHG | HEART RATE: 72 BPM | TEMPERATURE: 98.9 F | OXYGEN SATURATION: 97 % | WEIGHT: 183 LBS

## 2024-10-10 DIAGNOSIS — S00.03XD: ICD-10-CM

## 2024-10-10 DIAGNOSIS — E11.9 TYPE 2 DIABETES MELLITUS WITHOUT COMPLICATION, WITHOUT LONG-TERM CURRENT USE OF INSULIN (HCC): ICD-10-CM

## 2024-10-10 DIAGNOSIS — E66.09 CLASS 1 OBESITY DUE TO EXCESS CALORIES WITH SERIOUS COMORBIDITY AND BODY MASS INDEX (BMI) OF 33.0 TO 33.9 IN ADULT: ICD-10-CM

## 2024-10-10 DIAGNOSIS — S09.90XD CLOSED HEAD INJURY, SUBSEQUENT ENCOUNTER: ICD-10-CM

## 2024-10-10 DIAGNOSIS — I10 ESSENTIAL HYPERTENSION: ICD-10-CM

## 2024-10-10 DIAGNOSIS — Z91.199 NONCOMPLIANCE WITH DIET AND MEDICATION REGIMEN: ICD-10-CM

## 2024-10-10 DIAGNOSIS — E66.811 CLASS 1 OBESITY DUE TO EXCESS CALORIES WITH SERIOUS COMORBIDITY AND BODY MASS INDEX (BMI) OF 33.0 TO 33.9 IN ADULT: ICD-10-CM

## 2024-10-10 DIAGNOSIS — E78.00 PURE HYPERCHOLESTEROLEMIA: ICD-10-CM

## 2024-10-10 DIAGNOSIS — J45.31 MILD PERSISTENT ASTHMA WITH ACUTE EXACERBATION: ICD-10-CM

## 2024-10-10 DIAGNOSIS — Z91.148 NONCOMPLIANCE WITH DIET AND MEDICATION REGIMEN: ICD-10-CM

## 2024-10-10 DIAGNOSIS — Z09 HOSPITAL DISCHARGE FOLLOW-UP: ICD-10-CM
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
## 2024-10-10 DIAGNOSIS — Z23 ENCOUNTER FOR IMMUNIZATION: ICD-10-CM

## 2024-10-10 RX ORDER — BUDESONIDE AND FORMOTEROL FUMARATE DIHYDRATE 160; 4.5 UG/1; UG/1
2 AEROSOL RESPIRATORY (INHALATION) 2 TIMES DAILY
Qty: 10.2 G | Refills: 3 | Status: SHIPPED | OUTPATIENT
Start: 2024-10-10

## 2024-10-10 NOTE — PROGRESS NOTES
Judy Cason presents today for   Chief Complaint   Patient presents with    Follow-Up from Hospital       \"Have you been to the ER, urgent care clinic since your last visit?  Hospitalized since your last visit?\"    Yes  10/05/2024  [facility]    “Have you seen or consulted any other health care providers outside of [facility] since your last visit?”    NO            
provided in 8/2024.  15. Obesity. Weight decreased 2 pounds today.  Admits to not exercising and not following a diabetic diet or dietary changes needed to lose weight.  Completed nutrition counseling previously without improvement.  Discussed treatment with GLP-1 agonist and initially agreeable, but decided not to proceed as discussed.  Emphasized importance of continued attempts at lifestyle modifications, including heart healthy diabetic diet, regular exercise, and weight loss.  Not interested in medically supervised weight loss program. Will continue to address.  16. Health maintenance. Completed Moderna COVID-19 vaccine series and received one Moderna booster dose and the updated bivalent booster dose. Received 2/2 doses of Shingrix vaccine and Tdap vaccine. Discussed recommended vaccines for fall including influenza vaccine and updated COVID vaccine.  Already completed RSV vaccine.  Will give influenza vaccine today.  Other immunizations up to date. Colonoscopy up-to-date. Mammogram up-to-date as discussed. Continue regular eye exams with Dr. Garcia. Vitamin D level remains improved today after doubling maintenance dose supplement. Repeat bone density study (3/24/2022) within normal limits. Medicare wellness visit up-to-date.      Patient understands recommendations and agrees with plan.  Follow-up as previously scheduled.         Time spent in preparing for the visit, including review of history, tests done prior to arrival, additional time reviewing clinical data, imaging, outside records and test results:  5  minutes.  Time spent in counseling with patient and/or family members regarding care plan: 30 minutes.  Time spent on same-day documentation, ordering tests, treatments, and referring patient for further care: 10 minutes.   Time spent on visit does not include time for documentation not completed on day of visit.

## 2024-10-23 RX ORDER — SIMVASTATIN 20 MG
TABLET ORAL
Qty: 90 TABLET | Refills: 5 | Status: SHIPPED | OUTPATIENT
Start: 2024-10-23

## 2024-10-31 ENCOUNTER — HOSPITAL ENCOUNTER (OUTPATIENT)
Facility: HOSPITAL | Age: 69
Setting detail: RECURRING SERIES
Discharge: HOME | End: 2024-11-03
Payer: MEDICARE

## 2024-10-31 PROCEDURE — 97535 SELF CARE MNGMENT TRAINING: CPT

## 2024-10-31 PROCEDURE — 97530 THERAPEUTIC ACTIVITIES: CPT

## 2024-10-31 PROCEDURE — 97162 PT EVAL MOD COMPLEX 30 MIN: CPT

## 2024-10-31 NOTE — PROGRESS NOTES
PHYSICAL / OCCUPATIONAL THERAPY - DAILY TREATMENT NOTE (updated )  For Eval visit    Patient Name: Judy Cason    Date: 10/31/2024    : 1955  Insurance: Payor: MEDICARE / Plan: MEDICARE PART A AND B / Product Type: *No Product type* /      Patient  verified yes     Visit #   Current / Total 1 16   Time   In / Out 10:10 am  10:48 am   Pain   In / Out 5 5   Subjective Functional Status/Changes: See POC     TREATMENT AREA =  see POC    OBJECTIVE      14 min   Eval - untimed                      Therapeutic Procedures:    Tx Min Billable or 1:1 Min (if diff from Tx Min) Procedure, Rationale, Specifics        14  92707 Therapeutic Activity (timed):  use of dynamic activities replicating functional movements to increase ROM, strength, coordination, balance, and proprioception in order to improve patient's ability to progress to PLOF and address remaining functional goals.  (see flow sheet as applicable)     Details if applicable:            10  34216 Self Care/Home Management (timed):  improve patient knowledge and understanding of home injury/symptom/pain management, positioning, posture/ergonomics, and activity modification  to improve patient's ability to progress to PLOF and address remaining functional goals.  (see flow sheet as applicable)     Details if applicable:            Details if applicable:            Details if applicable:     24  MC BC Totals Reminder: bill using total billable min of TIMED therapeutic procedures (example: do not include dry needle or estim unattended, both untimed codes, in totals to left)  8-22 min = 1 unit; 23-37 min = 2 units; 38-52 min = 3 units; 53-67 min = 4 units; 68-82 min = 5 units   Total Total     [x]  Patient Education billed concurrently with other procedures   [x] Review HEP    [] Progressed/Changed HEP, detail:    [] Other detail:       Objective Information/Functional Measures/Assessment    See POC    Patient will continue to benefit from skilled PT / OT  services to modify and progress therapeutic interventions, analyze and address functional mobility deficits, analyze and address ROM deficits, analyze and address strength deficits, analyze and address soft tissue restrictions, analyze and cue for proper movement patterns, and analyze and modify for postural abnormalities to address functional deficits and attain remaining goals.    Progress toward goals / Updated goals:  [x]  See POC    Short Term Goals: To be accomplished in  3-4weeks:  1. Independent with HEP.  EVAL: N/A  2. Decrease max pain to <2/10 to assist with patient being able to sleep through the night on her L side if she wants.  EVAL: 9/10  3. Improve L shoulder flexion and abduction AROM to at least 145 degs to greated ease reaching into upper cabinets with less pain.   EVAL: L Shoulder AROM: Flexion  = 130 degs    Abduction = 130 degs     Long Term Goals: To be accomplished in  6-8  weeks:  1.  Decrease max pain to < 1/10 to assist with being able to reach at drive through to get medication or at the bank without shoulder pain.   EVAL: 9/10  2.  Improve Quick DASH Score by 25% points in order to show significant functional improvement.  EVAL: 43%  3.  Improve  B/L shoulder strength to at least 4+/5 in all directions to assist with ease in carrying groceries and laundry basket without shoulder pain.   EVAL:  see assessment    Frequency / Duration:  Patient to be seen  1-2 times per week for 6-8  weeks:      Next PN due 11/30/24; RC 12/31/24  Auth due [facility] med nec    PLAN  yes Continue plan of care  []  Other:      Liliya Langley, PT    10/31/2024    11:39 AM  If an interpreting service was utilized for treatment of this patient, the contents of this document represent the material reviewed with the patient via the .     Future Appointments   Date Time Provider Department Center   11/4/2024  2:50 PM [facility]   11/6/2024 10:10 AM [facility]

## 2024-10-31 NOTE — THERAPY EVALUATION
[facility]  [address], [address], [place] [address]  Ph:[phone] Fx:[phone]  Plan of Care / Statement of Necessity for Physical Therapy Services     Patient Name: Judy Cason : 1955   Medical   Diagnosis: Left shoulder pain [M25.512]  Treatment Diagnosis:  M25.512  LEFT SHOULDER PAIN   Onset Date: 10/5/24     Referral Source: Geoff Murphy MD Start of Care (SOC): 10/31/2024   Prior Hospitalization: See medical history Provider #: [id number]   Prior Level of Function: Independent with ADLs and IADLs   Comorbidities:  Respiratory disorders, Diabetes mellitus, Musculoskeletal disorders, and Other: HTN     Not post operative    Evaluation Complexity:  History:  MEDIUM  Complexity : 1-2 comorbidities / personal factors will impact the outcome/ POC ; Examination:  MEDIUM Complexity : 3 Standardized tests and measures addressin body structure, function, activity limitation and / or participation in recreation  ;Presentation:  MEDIUM Complexity : Evolving with changing characteristics  ;Clinical Decision Making: QuickDASH: Disability Arm, Shoulder, Hand = 43 % ; (41%- 60% Moderate Disability) = MODERATE Complexity  Overall Complexity Rating: MEDIUM  Problem List: pain affecting function, decrease ROM, decrease strength, impaired gait/balance, decrease ADL/functional abilities, decrease activity tolerance, decrease flexibility/joint mobility, and decrease transfer abilities    Treatment Plan may include any combination of the followin Therapeutic Exercise, 77610 Neuromuscular Re-Education, 37908 Manual Therapy, 72179 Therapeutic Activity, 15882 Self Care/Home Management, and 56050 Ultrasound  Patient / Family readiness to learn indicated by: asking questions, trying to perform skills, interest, and return verbalization   Persons(s) to be included in education: patient (P)  Barriers to Learning/Limitations: none  Measures taken if barriers  swelling noted in bursa    Special Tests:  Adson's Test  [] Pos   [] Neg Yergason's Test  [] Pos   [x] Neg  Priya's Test  [] Pos   [] Neg Loup's Sign    [] Pos   [x] Neg  Neer's Test  [x] Pos   [] Neg Clunk Test  [] Pos   [x] Neg  Hawkin's Test  [x] Pos   [] Neg AC Joint  [x] Pos   [] Neg  Speed's Test  [x] Pos   [] Neg SC Joint  [] Pos   [x] Neg  Empty Can  [x] Pos   [] Neg Pectoral Tightness [x] Pos   [] Neg  Anterior Apprehension [] Pos   [x] Neg   Posterior Apprehension [] Pos   [x] Neg    ASSESSMENT/Changes in Function: Pt presents with L shoulder bursitis and impingement and would benefit from skilled PT to address ROM, flexibility, and strength impairments to decrease pain and improve functional mobility.     Patient will continue to benefit from skilled PT services to modify and progress therapeutic interventions, analyze and address functional mobility deficits, analyze and address ROM deficits, analyze and address strength deficits, analyze and address soft tissue restrictions, analyze and cue for proper movement patterns, and analyze and modify for postural abnormalities to address functional deficits and attain remaining goals.       PLAN    Short Term Goals: To be accomplished in  3-4weeks:  1. Independent with HEP.  EVAL: N/A  2. Decrease max pain to <2/10 to assist with patient being able to sleep through the night on her L side if she wants.  EVAL: 9/10  3. Improve L shoulder flexion and abduction AROM to at least 145 degs to greated ease reaching into upper cabinets with less pain.   EVAL: L Shoulder AROM: Flexion  = 130 degs    Abduction = 130 degs     Long Term Goals: To be accomplished in  6-8  weeks:  1.  Decrease max pain to < 1/10 to assist with being able to reach at drive through to get medication or at the bank without shoulder pain.   EVAL: 9/10  2.  Improve Quick DASH Score by 25% points in order to show significant functional improvement.  EVAL: 43%  3.  Improve  B/L shoulder strength

## 2024-11-04 ENCOUNTER — HOSPITAL ENCOUNTER (OUTPATIENT)
Facility: HOSPITAL | Age: 69
Setting detail: RECURRING SERIES
Discharge: HOME | End: 2024-11-07
Payer: MEDICARE

## 2024-11-04 PROCEDURE — 97112 NEUROMUSCULAR REEDUCATION: CPT

## 2024-11-04 PROCEDURE — 97110 THERAPEUTIC EXERCISES: CPT

## 2024-11-04 PROCEDURE — 97140 MANUAL THERAPY 1/> REGIONS: CPT

## 2024-11-04 NOTE — PROGRESS NOTES
restrictions, analyze and cue for proper movement patterns, and analyze and modify for postural abnormalities to address functional deficits and attain remaining goals.    Progress toward goals / Updated goals:  []  See Progress Note/Recertification        Short Term Goals: To be accomplished in  3-4weeks:    1. Independent with HEP.  EVAL: N/A  2. Decrease max pain to <2/10 to assist with patient being able to sleep through the night on her L side if she wants.  EVAL: 9/10  3. Improve L shoulder flexion and abduction AROM to at least 145 degs to greated ease reaching into upper cabinets with less pain.   EVAL: L Shoulder AROM: Flexion  = 130 degs    Abduction = 130 degs      Long Term Goals: To be accomplished in  6-8  weeks:  1.  Decrease max pain to < 1/10 to assist with being able to reach at drive through to get medication or at the bank without shoulder pain.   EVAL: 9/10  2.  Improve Quick DASH Score by 25% points in order to show significant functional improvement.  EVAL: 43%  3.  Improve  B/L shoulder strength to at least 4+/5 in all directions to assist with ease in carrying groceries and laundry basket without shoulder pain.   EVAL:  see assessment     Frequency / Duration:   Patient to be seen  1-2 times per week for 6-8  weeks:       Patient/ Caregiver education and instruction: Diagnosis, prognosis, self care, activity modification, and exercises [x]  Plan of care has been reviewed with PTA     Certification Period: 10/31/24 - 12/31/24       Next PN due  11/30/24 , RC due 12/31/24  Auth due (visit number/ date)  [facility]     PLAN  - Continue Plan of Care    Gloria Lucas PT    11/4/2024    3:02 PM  If an interpreting service was utilized for treatment of this patient, the contents of this document represent the material reviewed with the patient via the .     Future Appointments   Date Time Provider Department Center   11/6/2024 10:10 AM [facility]   11/7/2024 11:00 AM

## 2024-11-06 ENCOUNTER — HOSPITAL ENCOUNTER (OUTPATIENT)
Facility: HOSPITAL | Age: 69
Setting detail: RECURRING SERIES
Discharge: HOME | End: 2024-11-09
Payer: MEDICARE

## 2024-11-06 PROCEDURE — 97140 MANUAL THERAPY 1/> REGIONS: CPT

## 2024-11-06 PROCEDURE — 97110 THERAPEUTIC EXERCISES: CPT

## 2024-11-06 PROCEDURE — 97530 THERAPEUTIC ACTIVITIES: CPT

## 2024-11-06 NOTE — PROGRESS NOTES
Activity (timed):  use of dynamic activities replicating functional movements to increase ROM, strength, coordination, balance, and proprioception in order to improve patient's ability to progress to PLOF and address remaining functional goals.  (see flow sheet as applicable)     Details if applicable:     13 13 54210 Manual Therapy (timed):  decrease pain, increase ROM, increase tissue extensibility, and decrease trigger points to improve patient's ability to progress to PLOF and address remaining functional goals.  The manual therapy interventions were performed at a separate and distinct time from the therapeutic activities interventions . (see flow sheet as applicable)     Details if applicable:  reclined L shoulder ROM  all planes with gentle LAD and oscillations and grade 3 mobs, CFM distal lateral arm at deltoid insertion           Details if applicable:            Details if applicable:     50 50 MC BC Totals Reminder: bill using total billable min of TIMED therapeutic procedures (example: do not include dry needle or estim unattended, both untimed codes, in totals to left)  8-22 min = 1 unit; 23-37 min = 2 units; 38-52 min = 3 units; 53-67 min = 4 units; 68-82 min = 5 units   Total Total     [x]  Patient Education billed concurrently with other procedures   [x] Review HEP    [] Progressed/Changed HEP, detail:    [] Other detail:       Objective Information/Functional Measures/Assessment    VC exercises and tech  Progressing as able and expected    Patient will continue to benefit from skilled PT / OT services to modify and progress therapeutic interventions, analyze and address ROM deficits, analyze and address strength deficits, analyze and address soft tissue restrictions, analyze and cue for proper movement patterns, analyze and modify for postural abnormalities, and instruct in home and community integration to address functional deficits and attain remaining goals.    Progress toward goals / Updated

## 2024-11-07 ENCOUNTER — OFFICE VISIT (OUTPATIENT)
Age: 69
End: 2024-11-07

## 2024-11-07 VITALS
SYSTOLIC BLOOD PRESSURE: 140 MMHG | WEIGHT: 185 LBS | HEIGHT: 62 IN | OXYGEN SATURATION: 97 % | HEART RATE: 76 BPM | DIASTOLIC BLOOD PRESSURE: 80 MMHG | BODY MASS INDEX: 34.04 KG/M2

## 2024-11-07 DIAGNOSIS — I47.10 SVT (SUPRAVENTRICULAR TACHYCARDIA) (HCC): Primary | ICD-10-CM

## 2024-11-07 DIAGNOSIS — E78.5 DYSLIPIDEMIA: ICD-10-CM

## 2024-11-07 DIAGNOSIS — I10 PRIMARY HYPERTENSION: ICD-10-CM

## 2024-11-07 RX ORDER — POTASSIUM CHLORIDE 1500 MG/1
20 TABLET, EXTENDED RELEASE ORAL DAILY
Qty: 90 TABLET | Refills: 5 | Status: SHIPPED | OUTPATIENT
Start: 2024-11-07

## 2024-11-08 ENCOUNTER — TELEPHONE (OUTPATIENT)
Facility: CLINIC | Age: 69
End: 2024-11-08

## 2024-11-08 NOTE — TELEPHONE ENCOUNTER
Pt called wanting to know if its ok to take terbinafine 250mg with her other medications?      Please advise

## 2024-11-08 NOTE — TELEPHONE ENCOUNTER
Please advise that would NOT recommend that she take terbinafine due to her underlying fatty liver disease and abnormal LFTs in the past.  Terbinafine has known liver side effects and should be avoided in patients with underlying liver issues.

## 2024-11-13 ENCOUNTER — HOSPITAL ENCOUNTER (OUTPATIENT)
Facility: HOSPITAL | Age: 69
Setting detail: RECURRING SERIES
Discharge: HOME | End: 2024-11-16
Payer: MEDICARE

## 2024-11-13 PROCEDURE — 97110 THERAPEUTIC EXERCISES: CPT

## 2024-11-13 PROCEDURE — 97530 THERAPEUTIC ACTIVITIES: CPT

## 2024-11-13 PROCEDURE — 97140 MANUAL THERAPY 1/> REGIONS: CPT

## 2024-11-13 NOTE — PROGRESS NOTES
PHYSICAL / OCCUPATIONAL THERAPY - DAILY TREATMENT NOTE    Patient Name: Judy Cason    Date: 2024    : 1955  Insurance: Payor: MEDICARE / Plan: MEDICARE PART A AND B / Product Type: *No Product type* /      Patient  verified Yes     Visit #   Current / Total 4 16   Time   In / Out 1050 am  1145 am   Pain   In / Out 5/10 with movement   0/10    Subjective Functional Status/Changes: Patient states that her shoulder hurts more when moving it around. Patient explains that most of her pain is down her arm.      TREATMENT AREA =  Left shoulder pain [M25.512]     OBJECTIVE    Modalities Rationale:     decrease inflammation and decrease pain to improve patient's ability to progress to PLOF and address remaining functional goals.     min [] Estim Unattended, type/location:                                      []  w/ice    []  w/heat    min [] Estim Attended, type/location:                                     []  w/US     []  w/ice    []  w/heat    []  TENS insruct      min []  Mechanical Traction: type/lbs                   []  pro   []  sup   []  int   []  cont    []  before manual    []  after manual    min []  Ultrasound, settings/location:     10 min  unbill [x]  Ice     []  Heat    location/position: Reclined to left shoulder    min []  Paraffin,  details:     min []  Vasopneumatic Device, press/temp:     min []  Whirlpool / Fluido:    If using vaso (only need to measure limb vaso being performed on)      pre-treatment girth :       post-treatment girth :       measured at (landmark location) :      min []  Other:    Skin assessment post-treatment:   Intact      Therapeutic Procedures:    Tx Min Billable or 1:1 Min (if diff from Tx Min) Procedure, Rationale, Specifics   20  85204 Therapeutic Exercise (timed):  increase ROM, strength, coordination, balance, and proprioception to improve patient's ability to progress to PLOF and address remaining functional goals. (see flow sheet as applicable)

## 2024-11-18 ENCOUNTER — HOSPITAL ENCOUNTER (OUTPATIENT)
Facility: HOSPITAL | Age: 69
Setting detail: RECURRING SERIES
Discharge: HOME | End: 2024-11-21
Payer: MEDICARE

## 2024-11-18 PROCEDURE — 97530 THERAPEUTIC ACTIVITIES: CPT

## 2024-11-18 PROCEDURE — 97140 MANUAL THERAPY 1/> REGIONS: CPT

## 2024-11-18 PROCEDURE — 97110 THERAPEUTIC EXERCISES: CPT

## 2024-11-18 NOTE — PROGRESS NOTES
functional deficits and attain remaining goals.    Progress toward goals / Updated goals:  []  See Progress Note/Recertification    Short Term Goals: To be accomplished in  3-4weeks:     1. Independent with HEP.  EVAL: N/A  CURRENT reports compliance 11/18/24     2. Decrease max pain to <2/10 to assist with patient being able to sleep through the night on her L side if she wants.  EVAL: 9/10  CURRENT 4 at arrival 11/18/24     3. Improve L shoulder flexion and abduction AROM to at least 145 degs to greated ease reaching into upper cabinets with less pain.   EVAL: L Shoulder AROM: Flexion  = 130 degs    Abduction = 130 degs   CURRENT NA 11/13/24   NA   11/18/24     Long Term Goals: To be accomplished in  6-8  weeks:  1.  Decrease max pain to < 1/10 to assist with being able to reach at drive through to get medication or at the bank without shoulder pain.   EVAL: 9/10  CURRENT 4 at arrival 11/18/24     2.  Improve Quick DASH Score by 25% points in order to show significant functional improvement.  EVAL: 43%  CURRENT NA 11/18/24     3.  Improve  B/L shoulder strength to at least 4+/5 in all directions to assist with ease in carrying groceries and laundry basket without shoulder pain.   EVAL:  see assessment  CURRENT NA 11/18/24     Next PN/ RC due 11/30/2024   Auth due (visit number/ date) [facility] 12/31/2024    PLAN  - Continue Plan of Care  - Upgrade activities as tolerated    Neela Aguirre PT    11/18/2024    11:38 AM  If an interpreting service was utilized for treatment of this patient, the contents of this document represent the material reviewed with the patient via the .     Future Appointments   Date Time Provider Department Center   11/20/2024 10:50 AM [facility]   11/25/2024 10:50 AM [facility]   12/13/2024 10:45 AM IOC LAB VISIT [facility] DEP   12/17/2024  1:00 PM Rain Gloria MD [facility] DEP   5/22/2025 10:40 AM Ba Bishop MD

## 2024-11-20 ENCOUNTER — HOSPITAL ENCOUNTER (OUTPATIENT)
Facility: HOSPITAL | Age: 69
Setting detail: RECURRING SERIES
Discharge: HOME | End: 2024-11-23
Payer: MEDICARE

## 2024-11-20 PROCEDURE — 97530 THERAPEUTIC ACTIVITIES: CPT

## 2024-11-20 PROCEDURE — 97140 MANUAL THERAPY 1/> REGIONS: CPT

## 2024-11-20 PROCEDURE — 97110 THERAPEUTIC EXERCISES: CPT

## 2024-11-20 NOTE — PROGRESS NOTES
Date Time Provider Department Center   11/20/2024 10:50 AM [facility]   11/25/2024 10:50 AM [facility]   12/13/2024 10:45 AM IOC LAB VISIT [facility] DEP   12/17/2024  1:00 PM Rain Gloria MD [facility] DEP   5/22/2025 10:40 AM Ba Bishop MD [facility] BS AMB

## 2024-11-25 ENCOUNTER — HOSPITAL ENCOUNTER (OUTPATIENT)
Facility: HOSPITAL | Age: 69
Setting detail: RECURRING SERIES
Discharge: HOME | End: 2024-11-28
Payer: MEDICARE

## 2024-11-25 PROCEDURE — 97112 NEUROMUSCULAR REEDUCATION: CPT

## 2024-11-25 PROCEDURE — 97110 THERAPEUTIC EXERCISES: CPT

## 2024-11-25 PROCEDURE — 97530 THERAPEUTIC ACTIVITIES: CPT

## 2024-11-25 PROCEDURE — 97140 MANUAL THERAPY 1/> REGIONS: CPT

## 2024-11-25 NOTE — PROGRESS NOTES
Neela Aguirre, PT    11/25/2024    10:56 AM  If an interpreting service was utilized for treatment of this patient, the contents of this document represent the material reviewed with the patient via the .     Future Appointments   Date Time Provider Department Center   12/13/2024 10:45 AM IOC LAB VISIT [facility] DEP   12/17/2024  1:00 PM Rain Gloria MD [facility] DEP   5/22/2025 10:40 AM Ba Bishop MD [facility] BS AMB

## 2024-11-25 NOTE — THERAPY RECERTIFICATION
Gains: pain has improved, movement has improved - I can reach better at the drive thru at pharmacy,   Functional Deficits: just the pain affecting things like opening jars and raking leaves and trying not to lift heavy things with it.   % improvement: 60-70%  Pain   Average: 3/10       Best: 2/10     Worst: 6/10  Patient Goal: continue to improve \"I want to get rid of my shoulder pain so I can sleep on my L side and use my arm without pain.\"     Payor: MEDICARE / Plan: MEDICARE PART A AND B / Product Type: *No Product type* /     Medicare, cannot change goals, cannot adjust frequency/duration, no signature required   Reporting Period: (date from last Prog Note/Eval to current Prog Note/Recert)  10/31/24 - 11/25/24    RECOMMENDATIONS  Patient would benefit from the continuation of skilled rehab interventions, per initial Plan of Care or most recent Medicare Recert, for functional progress to achieving above stated clinically significant goals.    If you have any questions/comments please contact us directly.  Thank you for allowing us to assist in the care of your patient.    Neela Aguirre, PT       11/25/2024       11:25 AM

## 2024-12-02 ENCOUNTER — APPOINTMENT (OUTPATIENT)
Facility: HOSPITAL | Age: 69
End: 2024-12-02
Payer: MEDICARE

## 2024-12-03 ENCOUNTER — HOSPITAL ENCOUNTER (OUTPATIENT)
Facility: HOSPITAL | Age: 69
Setting detail: RECURRING SERIES
Discharge: HOME | End: 2024-12-06
Payer: MEDICARE

## 2024-12-03 PROCEDURE — 97530 THERAPEUTIC ACTIVITIES: CPT

## 2024-12-03 PROCEDURE — 97110 THERAPEUTIC EXERCISES: CPT

## 2024-12-03 PROCEDURE — 97140 MANUAL THERAPY 1/> REGIONS: CPT

## 2024-12-03 NOTE — PROGRESS NOTES
deficits, analyze and address strength deficits, analyze and address soft tissue restrictions, analyze and cue for proper movement patterns, analyze and modify for postural abnormalities, and instruct in home and community integration to address functional deficits and attain remaining goals.    Progress toward goals / Updated goals:  []  See Progress Note/Recertification    Short Term Goals: To be accomplished in  3-4weeks:  1. Independent with HEP.  EVAL: N/A  CURRENT reports compliance 12/3/24     2. Decrease max pain to <2/10 to assist with patient being able to sleep through the night on her L side if she wants.  EVAL: 9/10  CURRENT: Patient reports 3/10 pain today. 12/3/24     3. Improve L shoulder flexion and abduction AROM to at least 145 degs to greated ease reaching into upper cabinets with less pain.   EVAL: L Shoulder AROM: Flexion  = 130 degs    Abduction = 130 degs   CURRENT   L Shoulder AROM: Flexion  = 130 degs  with pain   Abduction = 140 degs   11/25/24     NA 12/3/24     Long Term Goals: To be accomplished in  6-8  weeks:  1.  Decrease max pain to < 1/10 to assist with being able to reach at drive through to get medication or at the bank without shoulder pain.   EVAL: 9/10  CURRENT: Patient reports 3/10 pain today. 12/3/24     2.  Improve Quick DASH Score by 25% points in order to show significant functional improvement.  EVAL: 43%  CURRENT 18% 11/25/24     NA 12/3/24     3.  Improve  B/L shoulder strength to at least 4+/5 in all directions to assist with ease in carrying groceries and laundry basket without shoulder pain.   EVAL:  see assessment  CURRENT  MMT R shoulder F/ABD/ER/IR 5     L shoulder F 4   abd   4     ER 4+ and IR 4+     NA 12/3/24          Next PN/ RC due recert due 12/25/24  Auth due (visit number/ date) [facility] 12/31/24    PLAN  - Continue Plan of Care  - Upgrade activities as tolerated    Neela Aguirre, PT    12/3/2024    1:35 PM  If an interpreting service was utilized for

## 2024-12-06 ENCOUNTER — HOSPITAL ENCOUNTER (OUTPATIENT)
Facility: HOSPITAL | Age: 69
Setting detail: RECURRING SERIES
Discharge: HOME | End: 2024-12-09
Payer: MEDICARE

## 2024-12-06 PROCEDURE — 97110 THERAPEUTIC EXERCISES: CPT

## 2024-12-06 PROCEDURE — 97140 MANUAL THERAPY 1/> REGIONS: CPT

## 2024-12-06 PROCEDURE — 97530 THERAPEUTIC ACTIVITIES: CPT

## 2024-12-06 NOTE — PROGRESS NOTES
PHYSICAL / OCCUPATIONAL THERAPY - DAILY TREATMENT NOTE    Patient Name: Judy Cason    Date: 2024    : 1955  Insurance: Payor: MEDICARE / Plan: MEDICARE PART A AND B / Product Type: *No Product type* /      Patient  verified Yes     Visit #   Current / Total 9 16   Time   In / Out 11:27 12:21   Pain   In / Out 2 2   Subjective Functional Status/Changes: \"Not really any pain.\"     TREATMENT AREA =  Left shoulder pain [M25.512]     OBJECTIVE    Modalities Rationale:     decrease pain to improve patient's ability to progress to PLOF and address remaining functional goals.     min [] Estim Unattended, type/location:                                      []  w/ice    []  w/heat    min [] Estim Attended, type/location:                                     []  w/US     []  w/ice    []  w/heat    []  TENS insruct      min []  Mechanical Traction: type/lbs                   []  pro   []  sup   []  int   []  cont    []  before manual    []  after manual    min []  Ultrasound, settings/location:     10 min  unbill [x]  Ice     []  Heat    location/position: Semi reclined  left shoulder    min []  Paraffin,  details:     min []  Vasopneumatic Device, press/temp:     min []  Whirlpool / Fluido:    If using vaso (only need to measure limb vaso being performed on)      pre-treatment girth :       post-treatment girth :       measured at (landmark location) :      min []  Other:    Skin assessment post-treatment:   Intact      Therapeutic Procedures:    Tx Min Billable or 1:1 Min (if diff from Tx Min) Procedure, Rationale, Specifics   24  66797 Therapeutic Exercise (timed):  increase ROM, strength, coordination, balance, and proprioception to improve patient's ability to progress to PLOF and address remaining functional goals. (see flow sheet as applicable)     Details if applicable:       10  64963 Therapeutic Activity (timed):  use of dynamic activities replicating functional movements to increase ROM, strength,

## 2024-12-11 ENCOUNTER — HOSPITAL ENCOUNTER (OUTPATIENT)
Facility: HOSPITAL | Age: 69
Setting detail: RECURRING SERIES
Discharge: HOME | End: 2024-12-14
Payer: MEDICARE

## 2024-12-11 PROCEDURE — 97530 THERAPEUTIC ACTIVITIES: CPT

## 2024-12-11 PROCEDURE — 97110 THERAPEUTIC EXERCISES: CPT

## 2024-12-11 PROCEDURE — 97140 MANUAL THERAPY 1/> REGIONS: CPT

## 2024-12-11 NOTE — PROGRESS NOTES
use of dynamic activities replicating functional movements to increase ROM, strength, coordination, balance, and proprioception in order to improve patient's ability to progress to PLOF and address remaining functional goals.  (see flow sheet as applicable)     Details if applicable:     10  41063 Manual Therapy (timed):  decrease pain, increase ROM, increase tissue extensibility, and decrease trigger points to improve patient's ability to progress to PLOF and address remaining functional goals.  The manual therapy interventions were performed at a separate and distinct time from the therapeutic activities interventions . (see flow sheet as applicable)     Details if applicable:  STM to left shoulder musculature, PROM to GH joint in all planes           Details if applicable:            Details if applicable:     46  MC BC Totals Reminder: bill using total billable min of TIMED therapeutic procedures (example: do not include dry needle or estim unattended, both untimed codes, in totals to left)  8-22 min = 1 unit; 23-37 min = 2 units; 38-52 min = 3 units; 53-67 min = 4 units; 68-82 min = 5 units   Total Total     [x]  Patient Education billed concurrently with other procedures   [x] Review HEP    [] Progressed/Changed HEP, detail:    [] Other detail:       Objective Information/Functional Measures/Assessment    Patient is progressing as expected with PT. PT performed exercises, as per flow sheet, to further assist with increasing left shoulder strength and ROM for performing functional task with ease. Patient reported a slight increase in left shoulder discomfort with active ROM into flexion and abduction. Patient tolerated all other exercises well. Left shoulder ROM continues to improve with PT. Left UT tightness was noted today and decreased following manual therapy. Decreased left shoulder pain was reported at the end of today's session. Will continue to progress patient as tolerated and able.     Patient will

## 2024-12-13 ENCOUNTER — HOSPITAL ENCOUNTER (OUTPATIENT)
Facility: HOSPITAL | Age: 69
Setting detail: SPECIMEN
Discharge: HOME | End: 2024-12-16
Payer: MEDICARE

## 2024-12-13 DIAGNOSIS — I10 ESSENTIAL HYPERTENSION: ICD-10-CM

## 2024-12-13 DIAGNOSIS — E78.00 PURE HYPERCHOLESTEROLEMIA: ICD-10-CM

## 2024-12-13 DIAGNOSIS — E11.9 TYPE 2 DIABETES MELLITUS WITHOUT COMPLICATION, WITHOUT LONG-TERM CURRENT USE OF INSULIN (HCC): ICD-10-CM

## 2024-12-13 LAB
ALBUMIN SERPL-MCNC: 3.6 G/DL (ref 3.4–5)
ALBUMIN/GLOB SERPL: 1 (ref 0.8–1.7)
ALP SERPL-CCNC: 87 U/L (ref 45–117)
ALT SERPL-CCNC: 31 U/L (ref 13–56)
ANION GAP SERPL CALC-SCNC: 6 MMOL/L (ref 3–18)
AST SERPL-CCNC: 23 U/L (ref 10–38)
BILIRUB SERPL-MCNC: 0.4 MG/DL (ref 0.2–1)
BUN SERPL-MCNC: 14 MG/DL (ref 7–18)
BUN/CREAT SERPL: 19 (ref 12–20)
CALCIUM SERPL-MCNC: 9.1 MG/DL (ref 8.5–10.1)
CHLORIDE SERPL-SCNC: 102 MMOL/L (ref 100–111)
CHOLEST SERPL-MCNC: 173 MG/DL
CO2 SERPL-SCNC: 30 MMOL/L (ref 21–32)
CREAT SERPL-MCNC: 0.74 MG/DL (ref 0.6–1.3)
CREAT UR-MCNC: 53 MG/DL (ref 30–125)
EST. AVERAGE GLUCOSE BLD GHB EST-MCNC: 183 MG/DL
GLOBULIN SER CALC-MCNC: 3.6 G/DL (ref 2–4)
GLUCOSE SERPL-MCNC: 146 MG/DL (ref 74–99)
HBA1C MFR BLD: 8 % (ref 4.2–5.6)
HDLC SERPL-MCNC: 71 MG/DL (ref 40–60)
HDLC SERPL: 2.4 (ref 0–5)
LDLC SERPL CALC-MCNC: 77.8 MG/DL (ref 0–100)
LIPID PANEL: ABNORMAL
MICROALBUMIN UR-MCNC: <0.5 MG/DL (ref 0–3)
MICROALBUMIN/CREAT UR-RTO: NORMAL MG/G (ref 0–30)
POTASSIUM SERPL-SCNC: 3.8 MMOL/L (ref 3.5–5.5)
PROT SERPL-MCNC: 7.2 G/DL (ref 6.4–8.2)
SODIUM SERPL-SCNC: 138 MMOL/L (ref 136–145)
TRIGL SERPL-MCNC: 121 MG/DL
VLDLC SERPL CALC-MCNC: 24.2 MG/DL

## 2024-12-13 PROCEDURE — 36415 COLL VENOUS BLD VENIPUNCTURE: CPT

## 2024-12-13 PROCEDURE — 82570 ASSAY OF URINE CREATININE: CPT

## 2024-12-13 PROCEDURE — 80053 COMPREHEN METABOLIC PANEL: CPT

## 2024-12-13 PROCEDURE — 83036 HEMOGLOBIN GLYCOSYLATED A1C: CPT

## 2024-12-13 PROCEDURE — 80061 LIPID PANEL: CPT

## 2024-12-13 PROCEDURE — 82043 UR ALBUMIN QUANTITATIVE: CPT

## 2024-12-16 ENCOUNTER — APPOINTMENT (OUTPATIENT)
Facility: HOSPITAL | Age: 69
End: 2024-12-16
Payer: MEDICARE

## 2024-12-17 ENCOUNTER — OFFICE VISIT (OUTPATIENT)
Facility: CLINIC | Age: 69
End: 2024-12-17

## 2024-12-17 VITALS
RESPIRATION RATE: 16 BRPM | SYSTOLIC BLOOD PRESSURE: 128 MMHG | DIASTOLIC BLOOD PRESSURE: 70 MMHG | WEIGHT: 185 LBS | HEIGHT: 62 IN | TEMPERATURE: 98.4 F | HEART RATE: 75 BPM | BODY MASS INDEX: 34.04 KG/M2 | OXYGEN SATURATION: 96 %

## 2024-12-17 DIAGNOSIS — E66.09 CLASS 1 OBESITY DUE TO EXCESS CALORIES WITH SERIOUS COMORBIDITY AND BODY MASS INDEX (BMI) OF 33.0 TO 33.9 IN ADULT: ICD-10-CM

## 2024-12-17 DIAGNOSIS — Z91.148 NONCOMPLIANCE WITH DIET AND MEDICATION REGIMEN: ICD-10-CM

## 2024-12-17 DIAGNOSIS — K76.0 NAFL (NONALCOHOLIC FATTY LIVER): ICD-10-CM

## 2024-12-17 DIAGNOSIS — I47.19 AVNRT (AV NODAL RE-ENTRY TACHYCARDIA) (HCC): ICD-10-CM

## 2024-12-17 DIAGNOSIS — E11.65 TYPE 2 DIABETES MELLITUS WITH HYPERGLYCEMIA, WITHOUT LONG-TERM CURRENT USE OF INSULIN (HCC): Primary | ICD-10-CM

## 2024-12-17 DIAGNOSIS — J45.31 MILD PERSISTENT ASTHMA WITH ACUTE EXACERBATION: ICD-10-CM

## 2024-12-17 DIAGNOSIS — E66.811 CLASS 1 OBESITY DUE TO EXCESS CALORIES WITH SERIOUS COMORBIDITY AND BODY MASS INDEX (BMI) OF 33.0 TO 33.9 IN ADULT: ICD-10-CM

## 2024-12-17 DIAGNOSIS — I10 ESSENTIAL HYPERTENSION: ICD-10-CM

## 2024-12-17 DIAGNOSIS — E78.00 PURE HYPERCHOLESTEROLEMIA: ICD-10-CM

## 2024-12-17 DIAGNOSIS — Z91.199 NONCOMPLIANCE WITH DIET AND MEDICATION REGIMEN: ICD-10-CM

## 2024-12-17 DIAGNOSIS — M15.0 PRIMARY OSTEOARTHRITIS INVOLVING MULTIPLE JOINTS: ICD-10-CM

## 2024-12-17 RX ORDER — GLIPIZIDE 10 MG/1
10 TABLET, FILM COATED, EXTENDED RELEASE ORAL 2 TIMES DAILY WITH MEALS
Qty: 180 TABLET | Refills: 3 | Status: SHIPPED | OUTPATIENT
Start: 2024-12-17

## 2024-12-17 NOTE — PROGRESS NOTES
Judy Cason presents today for   Chief Complaint   Patient presents with    3 Month Follow-Up       \"Have you been to the ER, urgent care clinic since your last visit?  Hospitalized since your last visit?\"    Yes  12/15/2024  [facility]  Cough/cold    “Have you seen or consulted any other health care providers outside of [facility] since your last visit?”    NO            
and sciatica and is continuing to perform HEP as recommended by physical therapy.    On 11/17/2023, she presented to [facility] for nasal congestion, postnasal drainage, and cough productive of clear foamy sputum.  Testing for influenza and COVID-19 were negative and she was treated with an albuterol inhaler, Tessalon Perles, and a prednisone Dosepak.  However, her cough persisted and she again presented to [facility] on 11/28/2023.  Chest x-ray was performed which was negative for pneumonia and she was diagnosed with bronchitis.  She was treated with Levaquin 500 mg daily for 10 days.  She reported that she completed the antibiotics with improvement.      On 9/3/2023, she presented to [facility] ED for evaluation of right knee pain which developed following a fall in her garden 4 days prior.  She just got pain in the lateral aspect of her right knee worse with standing and ambulation.  Evaluation included right knee x-ray showing moderate osteoarthritic changes probable chronically fractured enthesophyte along the superior margin of the patella.  She was provided with an Ace wrap and prescribed Voltaren gel.  She was advised to follow-up with Dr. Murphy and continue with physical therapy which she had been receiving for her left knee.      On 12/5/2022, she contacted the office and reported noticing a red discharge in her undergarments for 3-4 days.  She reported no hematuria, but did describe pinkish-red coloring when wiping after urination.  She denied any melena or hematochezia.  She underwent evaluation by Dr. Nancy Cummings on 12/6/2022 and reported that she was found to have blood in her urine. She was prescribed estrogen cream to use twice weekly and clotrimazole/ betamethasone to apply for \"vaginal dryness\". She was scheduled for a transvaginal ultrasound and referred to urology, and subsequently scheduled an appointment with Dr. Diana Fairbanks in 4/2023.  She presented for evaluation on

## 2024-12-18 ENCOUNTER — HOSPITAL ENCOUNTER (OUTPATIENT)
Facility: HOSPITAL | Age: 69
Setting detail: RECURRING SERIES
Discharge: HOME | End: 2024-12-21
Payer: MEDICARE

## 2024-12-18 PROCEDURE — 97140 MANUAL THERAPY 1/> REGIONS: CPT

## 2024-12-18 PROCEDURE — 97110 THERAPEUTIC EXERCISES: CPT

## 2024-12-18 NOTE — PROGRESS NOTES
ROM, and increase tissue extensibility to improve patient's ability to progress to PLOF and address remaining functional goals.  The manual therapy interventions were performed at a separate and distinct time from the therapeutic activities interventions . (see flow sheet as applicable)     Details if applicable:  STM GHJT mob 2-3 with distraction  at end range left shoulder          Details if applicable:            Details if applicable:     30  MC BC Totals Reminder: bill using total billable min of TIMED therapeutic procedures (example: do not include dry needle or estim unattended, both untimed codes, in totals to left)  8-22 min = 1 unit; 23-37 min = 2 units; 38-52 min = 3 units; 53-67 min = 4 units; 68-82 min = 5 units   Total Total     [x]  Patient Education billed concurrently with other procedures   [x] Review HEP    [] Progressed/Changed HEP, detail:    [] Other detail:       Objective Information/Functional Measures/Assessment  Pt responded well to each strengthening there ex.  Pt requested to perform least amount of there ex due to not feeling well. Pt presents with min tightness with full shoulder flexion. Pt benefited with treatment due to no pain.      Patient will continue to benefit from skilled PT / OT services to modify and progress therapeutic interventions, analyze and address functional mobility deficits, analyze and address ROM deficits, analyze and address strength deficits, analyze and address soft tissue restrictions, analyze and cue for proper movement patterns, analyze and modify for postural abnormalities, and instruct in home and community integration to address functional deficits and attain remaining goals.    Progress toward goals / Updated goals:  []  See Progress Note/Recertification  1. Independent with HEP.  EVAL: N/A  PN: reports compliance   CURRENT      2. Decrease max pain to <2/10 to assist with patient being able to sleep through the night on her L side if she

## 2024-12-23 ENCOUNTER — HOSPITAL ENCOUNTER (OUTPATIENT)
Facility: HOSPITAL | Age: 69
Discharge: HOME | End: 2024-12-26
Payer: MEDICARE

## 2024-12-23 ENCOUNTER — TELEPHONE (OUTPATIENT)
Facility: HOSPITAL | Age: 69
End: 2024-12-23

## 2024-12-23 ENCOUNTER — APPOINTMENT (OUTPATIENT)
Facility: HOSPITAL | Age: 69
End: 2024-12-23
Payer: MEDICARE

## 2024-12-23 DIAGNOSIS — S40.012D CONTUSION OF LEFT SHOULDER, SUBSEQUENT ENCOUNTER: ICD-10-CM

## 2024-12-23 DIAGNOSIS — M75.22 BICEPS TENDINITIS OF LEFT UPPER EXTREMITY: ICD-10-CM

## 2024-12-23 DIAGNOSIS — M75.52 SUBDELTOID BURSITIS OF LEFT SHOULDER JOINT: ICD-10-CM

## 2024-12-23 PROCEDURE — 73221 MRI JOINT UPR EXTREM W/O DYE: CPT

## 2025-01-09 ENCOUNTER — TELEPHONE (OUTPATIENT)
Facility: CLINIC | Age: 70
End: 2025-01-09

## 2025-01-09 ENCOUNTER — HOSPITAL ENCOUNTER (OUTPATIENT)
Facility: HOSPITAL | Age: 70
Setting detail: SPECIMEN
Discharge: HOME | End: 2025-01-12
Payer: MEDICARE

## 2025-01-09 DIAGNOSIS — E11.65 TYPE 2 DIABETES MELLITUS WITH HYPERGLYCEMIA, WITHOUT LONG-TERM CURRENT USE OF INSULIN (HCC): ICD-10-CM

## 2025-01-09 LAB
ALBUMIN SERPL-MCNC: 3.7 G/DL (ref 3.4–5)
ANION GAP SERPL CALC-SCNC: 3 MMOL/L (ref 3–18)
BUN SERPL-MCNC: 14 MG/DL (ref 7–18)
BUN/CREAT SERPL: 19 (ref 12–20)
CALCIUM SERPL-MCNC: 8 MG/DL (ref 8.5–10.1)
CHLORIDE SERPL-SCNC: 101 MMOL/L (ref 100–111)
CO2 SERPL-SCNC: 30 MMOL/L (ref 21–32)
CREAT SERPL-MCNC: 0.74 MG/DL (ref 0.6–1.3)
CREAT UR-MCNC: 134 MG/DL (ref 30–125)
GLUCOSE SERPL-MCNC: 129 MG/DL (ref 74–99)
MICROALBUMIN UR-MCNC: 1.22 MG/DL (ref 0–3)
MICROALBUMIN/CREAT UR-RTO: 9 MG/G (ref 0–30)
PHOSPHATE SERPL-MCNC: 3.3 MG/DL (ref 2.5–4.9)
POTASSIUM SERPL-SCNC: 3.8 MMOL/L (ref 3.5–5.5)
SODIUM SERPL-SCNC: 134 MMOL/L (ref 136–145)

## 2025-01-09 PROCEDURE — 36415 COLL VENOUS BLD VENIPUNCTURE: CPT

## 2025-01-09 PROCEDURE — 82570 ASSAY OF URINE CREATININE: CPT

## 2025-01-09 PROCEDURE — 80069 RENAL FUNCTION PANEL: CPT

## 2025-01-09 PROCEDURE — 82043 UR ALBUMIN QUANTITATIVE: CPT

## 2025-01-09 NOTE — TELEPHONE ENCOUNTER
Hospital Outpatient Visit on 01/09/2025   Component Date Value Ref Range Status    Sodium 01/09/2025 134 (L)  136 - 145 mmol/L Final    Potassium 01/09/2025 3.8  3.5 - 5.5 mmol/L Final    Chloride 01/09/2025 101  100 - 111 mmol/L Final    CO2 01/09/2025 30  21 - 32 mmol/L Final    Anion Gap 01/09/2025 3  3.0 - 18 mmol/L Final    Glucose 01/09/2025 129 (H)  74 - 99 mg/dL Final    BUN 01/09/2025 14  7.0 - 18 MG/DL Final    Creatinine 01/09/2025 0.74  0.6 - 1.3 MG/DL Final    BUN/Creatinine Ratio 01/09/2025 19  12 - 20   Final    Est, Glom Filt Rate 01/09/2025 88  >60 ml/min/1.73m2 Final    Calcium 01/09/2025 8.0 (L)  8.5 - 10.1 MG/DL Final    Phosphorus 01/09/2025 3.3  2.5 - 4.9 MG/DL Final    Albumin 01/09/2025 3.7  3.4 - 5.0 g/dL Final    Albumin Urine 01/09/2025 1.22  0 - 3.0 MG/DL Final    Creatinine, Ur 01/09/2025 134.00 (H)  30 - 125 mg/dL Final    Albumin/Creatinine Ratio 01/09/2025 9  0 - 30 mg/g Final     Please let the patient know that her kidney function and potassium level has remained stable with initiation of Jardiance 10 mg daily.  Her calcium level was noted to be low on her labs at 8.0 and please encourage her to increase her calcium intake.

## 2025-01-13 RX ORDER — METOPROLOL SUCCINATE 50 MG/1
TABLET, EXTENDED RELEASE ORAL
Qty: 90 TABLET | Refills: 3 | Status: SHIPPED | OUTPATIENT
Start: 2025-01-13

## 2025-01-27 RX ORDER — HYDROCHLOROTHIAZIDE 12.5 MG/1
TABLET ORAL
Qty: 90 TABLET | Refills: 3 | Status: SHIPPED | OUTPATIENT
Start: 2025-01-27

## 2025-03-19 ENCOUNTER — HOSPITAL ENCOUNTER (OUTPATIENT)
Facility: HOSPITAL | Age: 70
Setting detail: SPECIMEN
Discharge: HOME | End: 2025-03-22
Payer: MEDICARE

## 2025-03-19 DIAGNOSIS — I10 ESSENTIAL HYPERTENSION: ICD-10-CM

## 2025-03-19 DIAGNOSIS — E11.65 TYPE 2 DIABETES MELLITUS WITH HYPERGLYCEMIA, WITHOUT LONG-TERM CURRENT USE OF INSULIN (HCC): ICD-10-CM

## 2025-03-19 DIAGNOSIS — E78.00 PURE HYPERCHOLESTEROLEMIA: ICD-10-CM

## 2025-03-19 LAB
ALBUMIN SERPL-MCNC: 3.7 G/DL (ref 3.4–5)
ALBUMIN/GLOB SERPL: 1.2 (ref 0.8–1.7)
ALP SERPL-CCNC: 80 U/L (ref 45–117)
ALT SERPL-CCNC: 31 U/L (ref 13–56)
ANION GAP SERPL CALC-SCNC: 5 MMOL/L (ref 3–18)
AST SERPL-CCNC: 24 U/L (ref 10–38)
BILIRUB SERPL-MCNC: 0.6 MG/DL (ref 0.2–1)
BUN SERPL-MCNC: 14 MG/DL (ref 7–18)
BUN/CREAT SERPL: 19 (ref 12–20)
CALCIUM SERPL-MCNC: 9 MG/DL (ref 8.5–10.1)
CHLORIDE SERPL-SCNC: 102 MMOL/L (ref 100–111)
CHOLEST SERPL-MCNC: 164 MG/DL
CO2 SERPL-SCNC: 30 MMOL/L (ref 21–32)
CREAT SERPL-MCNC: 0.72 MG/DL (ref 0.6–1.3)
CREAT UR-MCNC: 90 MG/DL (ref 30–125)
EST. AVERAGE GLUCOSE BLD GHB EST-MCNC: 151 MG/DL
GLOBULIN SER CALC-MCNC: 3.2 G/DL (ref 2–4)
GLUCOSE SERPL-MCNC: 91 MG/DL (ref 74–99)
HBA1C MFR BLD: 6.9 % (ref 4.2–5.6)
HDLC SERPL-MCNC: 67 MG/DL (ref 40–60)
HDLC SERPL: 2.4 (ref 0–5)
LDLC SERPL CALC-MCNC: 68.4 MG/DL (ref 0–100)
LIPID PANEL: ABNORMAL
MICROALBUMIN UR-MCNC: 0.54 MG/DL (ref 0–3)
MICROALBUMIN/CREAT UR-RTO: 6 MG/G (ref 0–30)
POTASSIUM SERPL-SCNC: 3.7 MMOL/L (ref 3.5–5.5)
PROT SERPL-MCNC: 6.9 G/DL (ref 6.4–8.2)
SODIUM SERPL-SCNC: 137 MMOL/L (ref 136–145)
TRIGL SERPL-MCNC: 143 MG/DL
VLDLC SERPL CALC-MCNC: 28.6 MG/DL

## 2025-03-19 PROCEDURE — 83036 HEMOGLOBIN GLYCOSYLATED A1C: CPT

## 2025-03-19 PROCEDURE — 80061 LIPID PANEL: CPT

## 2025-03-19 PROCEDURE — 82043 UR ALBUMIN QUANTITATIVE: CPT

## 2025-03-19 PROCEDURE — 80053 COMPREHEN METABOLIC PANEL: CPT

## 2025-03-19 PROCEDURE — 82570 ASSAY OF URINE CREATININE: CPT

## 2025-03-19 PROCEDURE — 36415 COLL VENOUS BLD VENIPUNCTURE: CPT

## 2025-03-27 ENCOUNTER — OFFICE VISIT (OUTPATIENT)
Facility: CLINIC | Age: 70
End: 2025-03-27

## 2025-03-27 ENCOUNTER — TELEPHONE (OUTPATIENT)
Facility: CLINIC | Age: 70
End: 2025-03-27

## 2025-03-27 VITALS
BODY MASS INDEX: 33.31 KG/M2 | HEART RATE: 69 BPM | SYSTOLIC BLOOD PRESSURE: 118 MMHG | DIASTOLIC BLOOD PRESSURE: 70 MMHG | HEIGHT: 62 IN | WEIGHT: 181 LBS | RESPIRATION RATE: 14 BRPM | TEMPERATURE: 98.6 F | OXYGEN SATURATION: 94 %

## 2025-03-27 DIAGNOSIS — J45.31 MILD PERSISTENT ASTHMA WITH ACUTE EXACERBATION: ICD-10-CM

## 2025-03-27 DIAGNOSIS — Z71.89 ACP (ADVANCE CARE PLANNING): ICD-10-CM

## 2025-03-27 DIAGNOSIS — E66.811 CLASS 1 OBESITY DUE TO EXCESS CALORIES WITH SERIOUS COMORBIDITY AND BODY MASS INDEX (BMI) OF 33.0 TO 33.9 IN ADULT: ICD-10-CM

## 2025-03-27 DIAGNOSIS — I10 ESSENTIAL HYPERTENSION: ICD-10-CM

## 2025-03-27 DIAGNOSIS — Z00.00 MEDICARE ANNUAL WELLNESS VISIT, SUBSEQUENT: ICD-10-CM

## 2025-03-27 DIAGNOSIS — E66.09 CLASS 1 OBESITY DUE TO EXCESS CALORIES WITH SERIOUS COMORBIDITY AND BODY MASS INDEX (BMI) OF 33.0 TO 33.9 IN ADULT: ICD-10-CM

## 2025-03-27 DIAGNOSIS — K76.0 NAFL (NONALCOHOLIC FATTY LIVER): ICD-10-CM

## 2025-03-27 DIAGNOSIS — Z91.148 NONCOMPLIANCE WITH DIET AND MEDICATION REGIMEN: ICD-10-CM

## 2025-03-27 DIAGNOSIS — E11.9 TYPE 2 DIABETES MELLITUS WITHOUT COMPLICATION, WITHOUT LONG-TERM CURRENT USE OF INSULIN: Primary | ICD-10-CM

## 2025-03-27 DIAGNOSIS — I47.19 AVNRT (AV NODAL RE-ENTRY TACHYCARDIA): ICD-10-CM

## 2025-03-27 DIAGNOSIS — E55.9 VITAMIN D DEFICIENCY: ICD-10-CM

## 2025-03-27 DIAGNOSIS — M15.0 PRIMARY OSTEOARTHRITIS INVOLVING MULTIPLE JOINTS: ICD-10-CM

## 2025-03-27 DIAGNOSIS — E78.00 PURE HYPERCHOLESTEROLEMIA: ICD-10-CM

## 2025-03-27 DIAGNOSIS — Z91.199 NONCOMPLIANCE WITH DIET AND MEDICATION REGIMEN: ICD-10-CM

## 2025-03-27 RX ORDER — GLIPIZIDE 10 MG/1
10 TABLET, FILM COATED, EXTENDED RELEASE ORAL DAILY
Qty: 1 TABLET | Refills: 0 | Status: SHIPPED
Start: 2025-03-27

## 2025-03-27 SDOH — ECONOMIC STABILITY: FOOD INSECURITY: WITHIN THE PAST 12 MONTHS, YOU WORRIED THAT YOUR FOOD WOULD RUN OUT BEFORE YOU GOT MONEY TO BUY MORE.: NEVER TRUE

## 2025-03-27 SDOH — ECONOMIC STABILITY: FOOD INSECURITY: WITHIN THE PAST 12 MONTHS, THE FOOD YOU BOUGHT JUST DIDN'T LAST AND YOU DIDN'T HAVE MONEY TO GET MORE.: NEVER TRUE

## 2025-03-27 ASSESSMENT — PATIENT HEALTH QUESTIONNAIRE - PHQ9
SUM OF ALL RESPONSES TO PHQ QUESTIONS 1-9: 0
2. FEELING DOWN, DEPRESSED OR HOPELESS: NOT AT ALL
SUM OF ALL RESPONSES TO PHQ QUESTIONS 1-9: 0
SUM OF ALL RESPONSES TO PHQ QUESTIONS 1-9: 0
1. LITTLE INTEREST OR PLEASURE IN DOING THINGS: NOT AT ALL
SUM OF ALL RESPONSES TO PHQ QUESTIONS 1-9: 0

## 2025-03-27 NOTE — PROGRESS NOTES
Judy Cason presents today for   Chief Complaint   Patient presents with    Medicare AWV       \"Have you been to the ER, urgent care clinic since your last visit?  Hospitalized since your last visit?\"    NO    “Have you seen or consulted any other health care providers outside of [facility] since your last visit?”    NO            
Medicare Annual Wellness Visit    Judy Cason is here for Medicare AWV    Assessment & Plan   Type 2 diabetes mellitus without complication, without long-term current use of insulin (HCC)  -      DIABETES FOOT EXAM  -     Hemoglobin A1C; Future  -     Albumin/Creatinine Ratio, Urine; Future  Essential hypertension  -     CBC with Auto Differential; Future  -     Comprehensive Metabolic Panel; Future  Pure hypercholesterolemia  -     Lipid Panel; Future  AVNRT (AV desiree re-entry tachycardia)  -     Magnesium; Future  NAFL (nonalcoholic fatty liver)  Mild persistent asthma with acute exacerbation  Primary osteoarthritis involving multiple joints  Class 1 obesity due to excess calories with serious comorbidity and body mass index (BMI) of 33.0 to 33.9 in adult  Noncompliance with diet and medication regimen  Medicare annual wellness visit, subsequent  ACP (advance care planning)  Vitamin D deficiency  -     Vitamin D 25 Hydroxy; Future       Return in about 3 months (around 6/27/2025), or if symptoms worsen or fail to improve.     Subjective   See office progress note for details.      Patient's complete Health Risk Assessment and screening values have been reviewed and are found in Flowsheets. The following problems were reviewed today and where indicated follow up appointments were made and/or referrals ordered.    Positive Risk Factor Screenings with Interventions:              Inactivity:  On average, how many days per week do you engage in moderate to strenuous exercise (like a brisk walk)?: 2 days (!) Abnormal  On average, how many minutes do you engage in exercise at this level?: 60 min  Interventions:  Encouraged increase activity as tolerated     Abnormal BMI (obese):  Body mass index is 33.11 kg/m². (!) Abnormal  Interventions:  Encouraged to continue lifestyle applications and attempt at weight loss.  Declining GLP-1 agonist therapy today.          Objective   Vitals:    03/27/25 1230 03/27/25 1306   BP: 
L3/4 with moderate spinal canal and bilateral foraminal stenosis.  She reported that she had a follow-up appointment with Dr. Murphy on 4/16/2024 and was advised to continue with conservative measures.  She reported overall improvement in her back pain and sciatica and is continuing to perform HEP as recommended by physical therapy.    On 11/17/2023, she presented to [facility] for nasal congestion, postnasal drainage, and cough productive of clear foamy sputum.  Testing for influenza and COVID-19 were negative and she was treated with an albuterol inhaler, Tessalon Perles, and a prednisone Dosepak.  However, her cough persisted and she again presented to [facility] on 11/28/2023.  Chest x-ray was performed which was negative for pneumonia and she was diagnosed with bronchitis.  She was treated with Levaquin 500 mg daily for 10 days.  She reported that she completed the antibiotics with improvement.      On 9/3/2023, she presented to [facility] ED for evaluation of right knee pain which developed following a fall in her garden 4 days prior.  She just got pain in the lateral aspect of her right knee worse with standing and ambulation.  Evaluation included right knee x-ray showing moderate osteoarthritic changes probable chronically fractured enthesophyte along the superior margin of the patella.  She was provided with an Ace wrap and prescribed Voltaren gel.  She was advised to follow-up with Dr. Murphy and continue with physical therapy which she had been receiving for her left knee.      On 12/5/2022, she contacted the office and reported noticing a red discharge in her undergarments for 3-4 days.  She reported no hematuria, but did describe pinkish-red coloring when wiping after urination.  She denied any melena or hematochezia.  She underwent evaluation by Dr. Nancy Cummings on 12/6/2022 and reported that she was found to have blood in her urine. She was prescribed estrogen cream to use twice weekly

## 2025-03-27 NOTE — ACP (ADVANCE CARE PLANNING)
Advance Care Planning     Advance Care Planning (ACP) Physician/NP/PA Conversation    Date of Conversation: 3/27/2025  Conducted with: Patient with Decision Making Capacity    Healthcare Decision Maker:      Primary Decision Maker: Georgi Cason - Spouse - [phone]    Secondary Decision Maker: Anshu Cason - Child - [phone]    Secondary Decision Maker: Landon Cason - Child - [phone]    Click here to complete Healthcare Decision Makers including selection of the Healthcare Decision Maker Relationship (ie \"Primary\")  Today we confirmed healthcare decision makers as her  and 2 sons    Care Preferences:    Hospitalization:  \"If your health worsens and it becomes clear that your chance of recovery is unlikely, what would be your preference regarding hospitalization?\"  The patient would prefer hospitalization.    Ventilation:  \"If you were unable to breath on your own and your chance of recovery was unlikely, what would be your preference about the use of a ventilator (breathing machine) if it was available to you?\"  The patient would desire the use of a ventilator.    Resuscitation:  \"In the event your heart stopped as a result of an underlying serious health condition, would you want attempts made to restart your heart, or would you prefer a natural death?\"  Yes, attempt to resuscitate.    treatment goals, benefit/burden of treatment options, ventilation preferences, hospitalization preferences, resuscitation preferences, and end of life care preferences (vegetative state/imminent death)    Conversation Outcomes / Follow-Up Plan:  ACP in process - completing/providing documents.  Patient has advanced directive on file but only designates her  as a primary decision-maker.  Her sons are not on the form.  Provided with new paperwork today and requested that she complete and return to the office to be scanned into the chart.    Patient states that she would wish resuscitation efforts only as

## 2025-03-27 NOTE — TELEPHONE ENCOUNTER
Please request diabetic eye exam from Dr. Garcia. Patient reports being seen on 11/5/2024.  Thank you.

## 2025-03-27 NOTE — PATIENT INSTRUCTIONS
or do other exercise. This will help lower your blood sugar.  What else should you know?  Limit saturated fat, such as the fat from meat and dairy products. This is a healthy choice because people who have diabetes are at higher risk of heart disease. So choose lean cuts of meat and nonfat or low-fat dairy products. Use olive or canola oil instead of butter or shortening when cooking.  Don't skip meals. Your blood sugar may drop too low if you skip meals and take insulin or certain medicines for diabetes.  Check with your doctor before you drink alcohol. Alcohol can cause your blood sugar to drop too low. Alcohol can also cause a bad reaction if you take certain diabetes medicines.  Follow-up care is a key part of your treatment and safety. Be sure to make and go to all appointments, and call your doctor if you are having problems. It's also a good idea to know your test results and keep a list of the medicines you take.  Where can you learn more?  Go to https://www.[employer].Fifteen Reasons/Megathreadonnections  Enter I147 in the search box to learn more about \"Learning About Diabetes Food Guidelines.\"  Current as of: December 20, 2019               Content Version: 12.6  © 8877-8694 [employer].   Care instructions adapted under license by [employer] (which disclaims liability or warranty for this information). If you have questions about a medical condition or this instruction, always ask your healthcare professional. [employer] disclaims any warranty or liability for your use of this information.        High Cholesterol: Care Instructions  Your Care Instructions     Cholesterol is a type of fat in your blood. It is needed for many body functions, such as making new cells. Cholesterol is made by your body. It also comes from food you eat. High cholesterol means that you have too much of the fat in your blood. This raises your risk of a heart attack and stroke.  LDL and HDL are part of

## 2025-04-25 NOTE — TELEPHONE ENCOUNTER
Last OV: 1/10/2024  Next OV: 4/23/2024  Last refill: 11/14/2022   Instructions: This plan will send the code FBSE to the PM system.  DO NOT or CHANGE the price. Price (Do Not Change): 0.00 Detail Level: Detailed

## 2025-04-29 ENCOUNTER — TELEPHONE (OUTPATIENT)
Facility: CLINIC | Age: 70
End: 2025-04-29

## 2025-04-29 DIAGNOSIS — E11.9 TYPE 2 DIABETES MELLITUS WITHOUT COMPLICATION, WITHOUT LONG-TERM CURRENT USE OF INSULIN (HCC): Primary | ICD-10-CM

## 2025-04-29 DIAGNOSIS — E11.65 TYPE 2 DIABETES MELLITUS WITH HYPERGLYCEMIA, WITHOUT LONG-TERM CURRENT USE OF INSULIN (HCC): ICD-10-CM

## 2025-04-29 RX ORDER — BLOOD-GLUCOSE METER
KIT MISCELLANEOUS
Qty: 1 KIT | Refills: 0 | Status: SHIPPED | OUTPATIENT
Start: 2025-04-29

## 2025-04-29 NOTE — TELEPHONE ENCOUNTER
Called patient, she stated her monitor has stopped working, her  replaced the battery and it still wont work or turn on. Needs replacement monitor, she would like it sent to [facility] on [address].

## 2025-05-16 ENCOUNTER — HOSPITAL ENCOUNTER (OUTPATIENT)
Facility: HOSPITAL | Age: 70
Discharge: HOME | End: 2025-05-19
Attending: INTERNAL MEDICINE
Payer: MEDICARE

## 2025-05-16 VITALS — BODY MASS INDEX: 32.76 KG/M2 | HEIGHT: 62 IN | WEIGHT: 178 LBS

## 2025-05-16 DIAGNOSIS — Z12.31 VISIT FOR SCREENING MAMMOGRAM: ICD-10-CM

## 2025-05-16 PROCEDURE — 77063 BREAST TOMOSYNTHESIS BI: CPT

## 2025-05-22 ENCOUNTER — OFFICE VISIT (OUTPATIENT)
Age: 70
End: 2025-05-22
Payer: MEDICARE

## 2025-05-22 VITALS
SYSTOLIC BLOOD PRESSURE: 128 MMHG | HEIGHT: 62 IN | WEIGHT: 180 LBS | HEART RATE: 83 BPM | BODY MASS INDEX: 33.13 KG/M2 | DIASTOLIC BLOOD PRESSURE: 70 MMHG | OXYGEN SATURATION: 96 %

## 2025-05-22 DIAGNOSIS — E78.5 DYSLIPIDEMIA: ICD-10-CM

## 2025-05-22 DIAGNOSIS — I47.10 SVT (SUPRAVENTRICULAR TACHYCARDIA): Primary | ICD-10-CM

## 2025-05-22 DIAGNOSIS — E08.8 DIABETES MELLITUS DUE TO UNDERLYING CONDITION WITH UNSPECIFIED COMPLICATIONS (HCC): ICD-10-CM

## 2025-05-22 DIAGNOSIS — I10 PRIMARY HYPERTENSION: ICD-10-CM

## 2025-05-22 PROCEDURE — G8399 PT W/DXA RESULTS DOCUMENT: HCPCS | Performed by: INTERNAL MEDICINE

## 2025-05-22 PROCEDURE — 1126F AMNT PAIN NOTED NONE PRSNT: CPT | Performed by: INTERNAL MEDICINE

## 2025-05-22 PROCEDURE — 3078F DIAST BP <80 MM HG: CPT | Performed by: INTERNAL MEDICINE

## 2025-05-22 PROCEDURE — 1036F TOBACCO NON-USER: CPT | Performed by: INTERNAL MEDICINE

## 2025-05-22 PROCEDURE — 1123F ACP DISCUSS/DSCN MKR DOCD: CPT | Performed by: INTERNAL MEDICINE

## 2025-05-22 PROCEDURE — 99214 OFFICE O/P EST MOD 30 MIN: CPT | Performed by: INTERNAL MEDICINE

## 2025-05-22 PROCEDURE — G8427 DOCREV CUR MEDS BY ELIG CLIN: HCPCS | Performed by: INTERNAL MEDICINE

## 2025-05-22 PROCEDURE — 3017F COLORECTAL CA SCREEN DOC REV: CPT | Performed by: INTERNAL MEDICINE

## 2025-05-22 PROCEDURE — 1090F PRES/ABSN URINE INCON ASSESS: CPT | Performed by: INTERNAL MEDICINE

## 2025-05-22 PROCEDURE — G8417 CALC BMI ABV UP PARAM F/U: HCPCS | Performed by: INTERNAL MEDICINE

## 2025-05-22 PROCEDURE — 1159F MED LIST DOCD IN RCRD: CPT | Performed by: INTERNAL MEDICINE

## 2025-05-22 PROCEDURE — 3074F SYST BP LT 130 MM HG: CPT | Performed by: INTERNAL MEDICINE

## 2025-05-22 NOTE — PROGRESS NOTES
succinate (TOPROL XL) 50 MG extended release tablet take 1 tablet by mouth once daily 90 tablet 3    empagliflozin (JARDIANCE) 10 MG tablet Take 1 tablet by mouth daily 90 tablet 1    potassium chloride (KLOR-CON M) 20 MEQ extended release tablet Take 1 tablet by mouth daily 90 tablet 5    simvastatin (ZOCOR) 20 MG tablet take 1 tablet by mouth at bedtime 90 tablet 5    budesonide-formoterol (SYMBICORT) 160-4.5 MCG/ACT AERO Inhale 2 puffs into the lungs 2 times daily 10.2 g 3    diclofenac (CATAFLAM) 50 MG tablet Take 1 tablet by mouth 2 times daily as needed for Pain      estradiol (ESTRACE) 0.1 MG/GM vaginal cream Place 2 g vaginally Twice a Week      Roflumilast 0.3 % CREA Apply topically daily for scalp psoriasis      montelukast (SINGULAIR) 10 MG tablet take 1 tablet by mouth nightly 90 tablet 3    fluticasone (FLONASE) 50 MCG/ACT nasal spray instill 2 sprays into each nostril once daily 48 g 3    losartan (COZAAR) 25 MG tablet take 1 tablet by mouth once daily 90 tablet 3    blood glucose test strips (ONETOUCH ULTRA) strip use 1 TEST STRIP to TEST BLOOD SUGAR once daily 100 strip 5    clobetasol (TEMOVATE) 0.05 % external solution Apply topically 2 times daily Apply topically 2 times daily.      clotrimazole (LOTRIMIN) 1 % external solution APPLY UNDER THUMBNAILS after meals and at bedtime four times a day      vitamin D 25 MCG (1000 UT) CAPS Take 2 capsules by mouth daily      clotrimazole-betamethasone (LOTRISONE) 1-0.05 % cream Apply  to both ear canals and affected part of outer ear twice a day with a finger.      loratadine (CLARITIN) 10 MG tablet Take 1 tablet by mouth daily      albuterol sulfate HFA (PROVENTIL;VENTOLIN;PROAIR) 108 (90 Base) MCG/ACT inhaler inhale 2 puffs by mouth every 4 hours if needed for wheezing 8.5 g 3     No current facility-administered medications for this visit.       Social History   reports that she has never smoked. She has been exposed to tobacco smoke. She has never used

## 2025-06-04 ENCOUNTER — TELEPHONE (OUTPATIENT)
Facility: CLINIC | Age: 70
End: 2025-06-04

## 2025-06-04 ENCOUNTER — APPOINTMENT (OUTPATIENT)
Facility: HOSPITAL | Age: 70
End: 2025-06-04
Payer: MEDICARE

## 2025-06-04 DIAGNOSIS — E66.09 CLASS 1 OBESITY DUE TO EXCESS CALORIES WITH SERIOUS COMORBIDITY AND BODY MASS INDEX (BMI) OF 32.0 TO 32.9 IN ADULT: ICD-10-CM

## 2025-06-04 DIAGNOSIS — E66.811 CLASS 1 OBESITY DUE TO EXCESS CALORIES WITH SERIOUS COMORBIDITY AND BODY MASS INDEX (BMI) OF 32.0 TO 32.9 IN ADULT: ICD-10-CM

## 2025-06-04 DIAGNOSIS — K76.0 METABOLIC DYSFUNCTION-ASSOCIATED STEATOTIC LIVER DISEASE (MASLD): Primary | ICD-10-CM

## 2025-06-04 RX ORDER — SIMVASTATIN 20 MG
20 TABLET ORAL NIGHTLY
Qty: 90 TABLET | Refills: 3 | Status: SHIPPED | OUTPATIENT
Start: 2025-06-04

## 2025-06-04 RX ORDER — METOPROLOL SUCCINATE 50 MG/1
50 TABLET, EXTENDED RELEASE ORAL DAILY
Qty: 90 TABLET | Refills: 3 | Status: SHIPPED | OUTPATIENT
Start: 2025-06-04

## 2025-06-04 RX ORDER — POTASSIUM CHLORIDE 1500 MG/1
20 TABLET, EXTENDED RELEASE ORAL DAILY
Qty: 90 TABLET | Refills: 3 | Status: SHIPPED | OUTPATIENT
Start: 2025-06-04

## 2025-06-04 NOTE — TELEPHONE ENCOUNTER
Pt called stated she went to go see a skin doctor she have a fungus on her nail they wanted  to prescribe her some medicine but she don't remember the name of the medication she not for sure could she take it. But she didn't get it

## 2025-06-05 NOTE — TELEPHONE ENCOUNTER
Called patient, she stated she has had a fungal nail infection for about a year. Went to a dermatologist and they recommended an oral antifungal for 3-6 months. Patient states PCP told her not to take this previously due to possible liver issues. Please advise.

## 2025-06-06 NOTE — TELEPHONE ENCOUNTER
Reviewed note from Dr. Kaur from visit on 6//2025.  Diagnosed multiple fingernails with onychomycosis and recommended treatment with terbinafine.  However informed that only a 25% 5-year clearance rate since it is very difficult to treat.  Patient with known history of MASLD and elevated LFTs, and last abdominal ultrasound (2/16/2021) showed echogenic lobulated contour of the liver suggestive of steatosis and possible cirrhosis; patent portal vein with proper flow direction.    Advised that would recommend further evaluation of liver for advanced fibrosis given ultrasound findings prior to approving treatment with terbinafine.  Patient agreeable and order placed for FibroScan.

## 2025-06-10 ENCOUNTER — HOSPITAL ENCOUNTER (OUTPATIENT)
Facility: HOSPITAL | Age: 70
Setting detail: RECURRING SERIES
Discharge: HOME | End: 2025-06-13
Payer: MEDICARE

## 2025-06-10 PROCEDURE — 97110 THERAPEUTIC EXERCISES: CPT

## 2025-06-10 PROCEDURE — 97535 SELF CARE MNGMENT TRAINING: CPT

## 2025-06-10 PROCEDURE — 97161 PT EVAL LOW COMPLEX 20 MIN: CPT

## 2025-06-10 NOTE — THERAPY EVALUATION
[facility]  [address], [address], [place] [address]  Ph:[phone] Fx:[phone]  Plan of Care / Statement of Necessity for Physical Therapy Services     Patient Name: Judy Cason : 1955   Medical   Diagnosis: Nontraumatic tear of left rotator cuff  Primary osteoarthritis of left knee  Pain in left shoulder Treatment Diagnosis:  M25.512  LEFT SHOULDER PAIN   Onset Date: 2024     Referral Source: Geoff Murphy MD Start of Care (SOC): 6/10/2025   Prior Hospitalization: See medical history Provider #: [id number]   Prior Level of Function: Independent with ADLs and IADLs, takes care of grandchildren and participates in non-work activities.   Comorbidities:  Respiratory disorders, Diabetes mellitus, Musculoskeletal disorders, and Other: high cholesterol and high blood pressure     Not post operative, standard auth procedure    Evaluation Complexity:  History:  HIGH Complexity :3+ comorbidities / personal factors will impact the outcome/ POC ; Examination:  MEDIUM Complexity : 3 Standardized tests and measures addressin body structure, function, activity limitation and / or participation in recreation  ;Presentation:  MEDIUM Complexity : Evolving with changing characteristics  ;Clinical Decision Making: QuickDASH: Disability Arm, Shoulder, Hand = 29 % ; (0% - 40% Normal to Mild Disability) = LOW Complexity  Overall Complexity Rating: LOW   Problem List: pain affecting function, decrease ROM, decrease strength, decrease ADL/functional abilities, decrease activity tolerance, and decrease flexibility/joint mobility   Treatment Plan may include any combination of the followin Therapeutic Exercise, 22526 Neuromuscular Re-Education, 79761 Manual Therapy, 83194 Therapeutic Activity, and 94173 Self Care/Home Management  Patient / Family readiness to learn indicated by: asking questions, trying to perform skills, interest, return verbalization

## 2025-06-10 NOTE — PROGRESS NOTES
analyze and address functional mobility deficits, analyze and address ROM deficits, analyze and address strength deficits, analyze and address soft tissue restrictions, analyze and cue for proper movement patterns, analyze and modify for postural abnormalities, and instruct in home and community integration to address functional deficits and attain remaining goals.    Progress toward goals / Updated goals:  [x]  See POC    Short Term Goals: To be accomplished in  3-4 weeks:  1. Independent with HEP.  EVAL: N/A  2. Decrease max pain to < 3/10 to assist with sleeping through the night without tossing and turning due to significant pain.  EVAL: 8/10 max pain; has trouble turning over in bed and wakes up to turn.  3. Increase L shoulder abduction AROM to at least 0-150 degs to be able to reach into overhead cabinets and wash her hair while bathing.  EVAL: 0-124 AROM; uses R arm to reach into cabinets and wash her hair due to limited ROM of L shoulder.     Long Term Goals: To be accomplished in  6-8  weeks:  1.  Decrease max pain to <2/10 to assist with reaching into overhead cabinets and perform her hobbies like gardening and household chores.   EVAL: 8/10 max pain; has trouble lifting her arm and performing hobbies and household tasks.   2. Improve QuickDASH Score by 15 points in order to show significant functional improvement.  EVAL: 29%   3.  Increase L shoulder strength in all planes to at least 4+/5 to carry grocery bags into the house   EVAL: Flexion, abduction, and internal rotation = 3+/5              External rotation = 4-/5    Next PN due 7/10/2025; RC due 8/10/2025  Auth due [facility] med necessity     PLAN  yes Continue plan of care  []  Other:      Liliya Langley, PT    6/10/2025    2:24 PM  If an interpreting service was utilized for treatment of this patient, the contents of this document represent the material reviewed with the patient via the .     Future Appointments   Date Time Provider

## 2025-06-12 ENCOUNTER — HOSPITAL ENCOUNTER (OUTPATIENT)
Facility: HOSPITAL | Age: 70
Discharge: HOME | End: 2025-06-15
Attending: INTERNAL MEDICINE
Payer: MEDICARE

## 2025-06-12 ENCOUNTER — TELEPHONE (OUTPATIENT)
Facility: CLINIC | Age: 70
End: 2025-06-12

## 2025-06-12 DIAGNOSIS — E66.811 CLASS 1 OBESITY DUE TO EXCESS CALORIES WITH SERIOUS COMORBIDITY AND BODY MASS INDEX (BMI) OF 32.0 TO 32.9 IN ADULT: ICD-10-CM

## 2025-06-12 DIAGNOSIS — E66.09 CLASS 1 OBESITY DUE TO EXCESS CALORIES WITH SERIOUS COMORBIDITY AND BODY MASS INDEX (BMI) OF 32.0 TO 32.9 IN ADULT: ICD-10-CM

## 2025-06-12 DIAGNOSIS — K76.0 METABOLIC DYSFUNCTION-ASSOCIATED STEATOTIC LIVER DISEASE (MASLD): ICD-10-CM

## 2025-06-12 PROCEDURE — 76705 ECHO EXAM OF ABDOMEN: CPT

## 2025-06-12 RX ORDER — LOSARTAN POTASSIUM 25 MG/1
25 TABLET ORAL DAILY
Qty: 90 TABLET | Refills: 3 | Status: SHIPPED | OUTPATIENT
Start: 2025-06-12

## 2025-06-12 NOTE — TELEPHONE ENCOUNTER
PCP: Rain Gloria MD    Refill Request     LAST OFFICE VISIT: 03/27/25      Medication: losartan (COZAAR) 25 MG tablet     Dispense: 90  take 1 tablet by mouth once daily     Pharmacy [facility]/PHARMACY #[id number] - [place] [id number] [facility] - P [phone] - F [phone] [[id number]]       Future Appointments   Date Time Provider Department Center   6/18/2025  3:30 PM [facility]   6/20/2025  2:50 PM [facility]   7/1/2025  9:45 AM IOC LAB VISIT [facility] DEP   7/1/2025 11:30 AM [facility]   7/8/2025 10:30 AM Rain Gloria MD [facility] DEP   5/13/2026 10:40 AM Ba Bishop MD [facility] BS AMB

## 2025-06-13 ENCOUNTER — TELEPHONE (OUTPATIENT)
Facility: CLINIC | Age: 70
End: 2025-06-13

## 2025-06-13 RX ORDER — FLUTICASONE PROPIONATE 50 MCG
2 SPRAY, SUSPENSION (ML) NASAL DAILY
Qty: 48 G | Refills: 3 | Status: SHIPPED | OUTPATIENT
Start: 2025-06-13

## 2025-06-13 NOTE — TELEPHONE ENCOUNTER
Refill request via fax     Medication: fluticasone (FLONASE) 50 MCG/ACT nasal spray   Quantity: 48 g  Pharmacy: [facility]/PHARMACY #[id number] [place] [id number] [facility] [phone] - F [phone] [[id number]]   Last Fill: 05/29/2024    PCP: Rain Gloria MD    LAST OFFICE VISIT: 03/27/2025      Future Appointments   Date Time Provider Department Center   6/18/2025  3:30 PM John Thomas PTA [facility]   6/20/2025  2:50 PM Eugenia Moctezuma PTA [facility]   7/1/2025  9:45 AM IOC LAB VISIT [facility] DEP   7/1/2025 11:30 AM John Thomas PTA [facility]   7/8/2025 10:30 AM Rain Gloria MD [facility] DEP   5/13/2026 10:40 AM Ba Bishop MD [facility] BS AMB

## 2025-06-18 ENCOUNTER — HOSPITAL ENCOUNTER (OUTPATIENT)
Facility: HOSPITAL | Age: 70
Setting detail: RECURRING SERIES
Discharge: HOME | End: 2025-06-21
Payer: MEDICARE

## 2025-06-18 PROCEDURE — 97530 THERAPEUTIC ACTIVITIES: CPT

## 2025-06-18 PROCEDURE — 97110 THERAPEUTIC EXERCISES: CPT

## 2025-06-18 PROCEDURE — 97140 MANUAL THERAPY 1/> REGIONS: CPT

## 2025-06-18 NOTE — PROGRESS NOTES
PHYSICAL / OCCUPATIONAL THERAPY - DAILY TREATMENT NOTE    Patient Name: Judy Cason    Date: 2025    : 1955  Insurance: Payor: MEDICARE / Plan: MEDICARE PART A AND B / Product Type: *No Product type* /      Patient  verified Yes     Visit #   Current / Total 2 16   Time   In / Out 335 pm  413 pm    Pain   In / Out 0/10  0/10    Subjective Functional Status/Changes: Patient reports not having any pain at the moment. Patient reports that most her pain occurs at night when sleeping.      TREATMENT AREA =  Nontraumatic tear of left rotator cuff  Primary osteoarthritis of left knee  Pain in left shoulder    OBJECTIVE         Therapeutic Procedures:    Tx Min Billable or 1:1 Min (if diff from Tx Min) Procedure, Rationale, Specifics   15  98975 Therapeutic Exercise (timed):  increase ROM, strength, coordination, balance, and proprioception to improve patient's ability to progress to PLOF and address remaining functional goals. (see flow sheet as applicable)     Details if applicable:       15  96665 Therapeutic Activity (timed):  use of dynamic activities replicating functional movements to increase ROM, strength, coordination, balance, and proprioception in order to improve patient's ability to progress to PLOF and address remaining functional goals.  (see flow sheet as applicable)     Details if applicable:     8  47360 Manual Therapy (timed):  decrease pain, increase ROM, increase tissue extensibility, and decrease trigger points to improve patient's ability to progress to PLOF and address remaining functional goals.  The manual therapy interventions were performed at a separate and distinct time from the therapeutic activities interventions . (see flow sheet as applicable)     Details if applicable:            Details if applicable:            Details if applicable:     38  [facility] Totals Reminder: bill using total billable min of TIMED therapeutic procedures (example: do not include dry needle or estim

## 2025-06-19 ENCOUNTER — RESULTS FOLLOW-UP (OUTPATIENT)
Facility: CLINIC | Age: 70
End: 2025-06-19

## 2025-06-19 DIAGNOSIS — K74.00: ICD-10-CM

## 2025-06-19 DIAGNOSIS — K76.0 METABOLIC DYSFUNCTION-ASSOCIATED STEATOTIC LIVER DISEASE (MASLD): Primary | ICD-10-CM

## 2025-06-19 DIAGNOSIS — K76.0: ICD-10-CM

## 2025-06-19 DIAGNOSIS — R93.5 ABNORMAL ABDOMINAL ULTRASOUND: ICD-10-CM

## 2025-06-19 NOTE — TELEPHONE ENCOUNTER
US Result (most recent):  US ABDOMEN LIMITED W ELASTOGRAPHY 06/12/2025    Narrative  EXAM: ULRASOUND RIGHT UPPER QUADRANT WITH ELASTOGRAPHY   CPT CODE: 77179, 0346T    CLINICAL INDICATION/HISTORY: Metabolic dysfunction and associated steatotic  liver disease.    COMPARISON: CT urogram of 8 January 2023.    TECHNIQUE: Realtime sonography of the abdominal viscera of the right upper  quadrant.    FINDINGS: The liver is of diffusely increased echogenicity without focal  abnormalities.  The gallbladder is unremarkable.  The biliary ducts are not  dilated and the maximum common bile duct diameter is 4 mm.  The pancreas is  incompletely seen but the visualized portion is unremarkable.  Portal venous  flow is hepatopetal on spectral and color flow pulsed Doppler interrogation.  Incidental examination of the right kidney is unremarkable.  Overall renal  length is 10.7 cm.  No significant free fluid is seen. A sonographic Saleem's  sign is not present.    Using Edmodo LOGIQ E9, liver stiffness standard measurements were obtained of the  right lobe of the liver.    Mean liver stiffness value = 1.39 (+/- 0.07) m/s.  Recommend consultation with  hepatologist.    Liver Fibrosis Classification         Metavir Score      Shear Wave Velocity  (m/s)    Normal                                       F0  0.82 - 1.23  Normal-to-mild                         F0 - F1                   1.23 -  1.38  Mild-to-moderate                     F2 - F3                   1.38 -  2.00  Moderate-to-severe                F3 - F4                   2.00 - 2.65  Severe                                        F4  >  2.65    Clinical degree of fibrosis using the Metavir Score (Tin Bledsoe.  Assessment of liver stiffness in Hepatitis C population.  [facility], [place], Mar  2013).    **False positive results can occur with acute hepatitis exacerbation,  extrahepatic cholestasis, beta-blockers, multi-disease etiology liver disease,  post-prandial, CHF.  Because

## 2025-06-20 ENCOUNTER — APPOINTMENT (OUTPATIENT)
Facility: HOSPITAL | Age: 70
End: 2025-06-20
Payer: MEDICARE

## 2025-06-20 NOTE — RESULT ENCOUNTER NOTE
Called patient, she wants to cut back on all the medication she is taking. Advised will discuss at office visit in 2 weeks. Patient agreeable.

## 2025-06-27 ENCOUNTER — TELEPHONE (OUTPATIENT)
Facility: CLINIC | Age: 70
End: 2025-06-27

## 2025-06-27 DIAGNOSIS — F41.9 ANXIETY: Primary | ICD-10-CM

## 2025-06-27 DIAGNOSIS — E11.65 TYPE 2 DIABETES MELLITUS WITH HYPERGLYCEMIA, WITHOUT LONG-TERM CURRENT USE OF INSULIN (HCC): ICD-10-CM

## 2025-06-27 RX ORDER — LORAZEPAM 1 MG/1
1 TABLET ORAL 2 TIMES DAILY PRN
Qty: 30 TABLET | Refills: 0 | Status: SHIPPED | OUTPATIENT
Start: 2025-06-27 | End: 2025-07-27

## 2025-06-27 NOTE — TELEPHONE ENCOUNTER
PCP: Rain Gloria MD    Refill Request Patient is requesting for only 15 pills of Jardiance, because she wants to see if Dr. Gloria will take her off this medication at her next visit.    LAST OFFICE VISIT: 03/27/25      Medication: empagliflozin (JARDIANCE) 10 MG tablet  Dispense: 15    LORazepam (ATIVAN) 1 MG tablet       Pharmacy [facility]/PHARMACY #[id number] [place] [id number] [facility] [phone] -  [phone] [[id number]]       Future Appointments   Date Time Provider Department Center   7/1/2025  9:45 AM IOC LAB VISIT [facility] DEP   7/1/2025 11:30 AM [facility]   7/8/2025 10:30 AM Rain Gloria MD [facility] DEP   5/13/2026 10:40 AM Ba Bishop MD [facility] BS AMB

## 2025-07-01 ENCOUNTER — HOSPITAL ENCOUNTER (OUTPATIENT)
Facility: HOSPITAL | Age: 70
Setting detail: SPECIMEN
Discharge: HOME | End: 2025-07-04
Payer: MEDICARE

## 2025-07-01 ENCOUNTER — TELEPHONE (OUTPATIENT)
Facility: CLINIC | Age: 70
End: 2025-07-01

## 2025-07-01 ENCOUNTER — HOSPITAL ENCOUNTER (OUTPATIENT)
Facility: HOSPITAL | Age: 70
Setting detail: RECURRING SERIES
Discharge: HOME | End: 2025-07-04
Payer: MEDICARE

## 2025-07-01 DIAGNOSIS — I47.19 AVNRT (AV NODAL RE-ENTRY TACHYCARDIA): ICD-10-CM

## 2025-07-01 DIAGNOSIS — E78.00 PURE HYPERCHOLESTEROLEMIA: ICD-10-CM

## 2025-07-01 DIAGNOSIS — E55.9 VITAMIN D DEFICIENCY: ICD-10-CM

## 2025-07-01 DIAGNOSIS — I10 ESSENTIAL HYPERTENSION: ICD-10-CM

## 2025-07-01 DIAGNOSIS — E11.9 TYPE 2 DIABETES MELLITUS WITHOUT COMPLICATION, WITHOUT LONG-TERM CURRENT USE OF INSULIN (HCC): ICD-10-CM

## 2025-07-01 LAB
25(OH)D3 SERPL-MCNC: 41.5 NG/ML (ref 30–100)
ALBUMIN SERPL-MCNC: 3.7 G/DL (ref 3.4–5)
ALBUMIN/GLOB SERPL: 1.2 (ref 0.8–1.7)
ALP SERPL-CCNC: 71 U/L (ref 45–117)
ALT SERPL-CCNC: 27 U/L (ref 10–35)
ANION GAP SERPL CALC-SCNC: 12 MMOL/L (ref 3–18)
AST SERPL-CCNC: 27 U/L (ref 10–38)
BASOPHILS # BLD: 0.05 K/UL (ref 0–0.1)
BASOPHILS NFR BLD: 0.7 % (ref 0–2)
BILIRUB SERPL-MCNC: 0.4 MG/DL (ref 0.2–1)
BUN SERPL-MCNC: 12 MG/DL (ref 6–23)
BUN/CREAT SERPL: 19 (ref 12–20)
CALCIUM SERPL-MCNC: 9.4 MG/DL (ref 8.5–10.1)
CHLORIDE SERPL-SCNC: 103 MMOL/L (ref 98–107)
CHOLEST SERPL-MCNC: 160 MG/DL
CO2 SERPL-SCNC: 26 MMOL/L (ref 21–32)
CREAT SERPL-MCNC: 0.64 MG/DL (ref 0.6–1.3)
CREAT UR-MCNC: 113 MG/DL (ref 30–125)
DIFFERENTIAL METHOD BLD: NORMAL
EOSINOPHIL # BLD: 0.35 K/UL (ref 0–0.4)
EOSINOPHIL NFR BLD: 4.6 % (ref 0–5)
ERYTHROCYTE [DISTWIDTH] IN BLOOD BY AUTOMATED COUNT: 12.6 % (ref 11.6–14.5)
EST. AVERAGE GLUCOSE BLD GHB EST-MCNC: 156 MG/DL
GLOBULIN SER CALC-MCNC: 3 G/DL (ref 2–4)
GLUCOSE SERPL-MCNC: 84 MG/DL (ref 74–108)
HBA1C MFR BLD: 7.1 % (ref 4.2–5.6)
HCT VFR BLD AUTO: 43.8 % (ref 35–45)
HDLC SERPL-MCNC: 58 MG/DL (ref 40–60)
HDLC SERPL: 2.8 (ref 0–5)
HGB BLD-MCNC: 13.6 G/DL (ref 12–16)
IMM GRANULOCYTES # BLD AUTO: 0.02 K/UL (ref 0–0.04)
IMM GRANULOCYTES NFR BLD AUTO: 0.3 % (ref 0–0.5)
LDLC SERPL CALC-MCNC: 77 MG/DL (ref 0–100)
LYMPHOCYTES # BLD: 2.27 K/UL (ref 0.9–3.6)
LYMPHOCYTES NFR BLD: 29.5 % (ref 21–52)
MAGNESIUM SERPL-MCNC: 1.9 MG/DL (ref 1.6–2.6)
MCH RBC QN AUTO: 28.3 PG (ref 24–34)
MCHC RBC AUTO-ENTMCNC: 31.1 G/DL (ref 31–37)
MCV RBC AUTO: 91.3 FL (ref 78–100)
MICROALBUMIN UR-MCNC: <1.2 MG/DL (ref 0–3)
MICROALBUMIN/CREAT UR-RTO: NORMAL MG/G (ref 0–30)
MONOCYTES # BLD: 0.48 K/UL (ref 0.05–1.2)
MONOCYTES NFR BLD: 6.2 % (ref 3–10)
NEUTS SEG # BLD: 4.52 K/UL (ref 1.8–8)
NEUTS SEG NFR BLD: 58.7 % (ref 40–73)
NRBC # BLD: 0 K/UL (ref 0–0.01)
NRBC BLD-RTO: 0 PER 100 WBC
PLATELET # BLD AUTO: 249 K/UL (ref 135–420)
PMV BLD AUTO: 11 FL (ref 9.2–11.8)
POTASSIUM SERPL-SCNC: 4 MMOL/L (ref 3.5–5.5)
PROT SERPL-MCNC: 6.7 G/DL (ref 6.4–8.2)
RBC # BLD AUTO: 4.8 M/UL (ref 4.2–5.3)
SODIUM SERPL-SCNC: 140 MMOL/L (ref 136–145)
TRIGL SERPL-MCNC: 127 MG/DL (ref 0–150)
VLDLC SERPL CALC-MCNC: 25 MG/DL
WBC # BLD AUTO: 7.7 K/UL (ref 4.6–13.2)

## 2025-07-01 PROCEDURE — 82570 ASSAY OF URINE CREATININE: CPT

## 2025-07-01 PROCEDURE — 97140 MANUAL THERAPY 1/> REGIONS: CPT

## 2025-07-01 PROCEDURE — 80053 COMPREHEN METABOLIC PANEL: CPT

## 2025-07-01 PROCEDURE — 97530 THERAPEUTIC ACTIVITIES: CPT

## 2025-07-01 PROCEDURE — 82043 UR ALBUMIN QUANTITATIVE: CPT

## 2025-07-01 PROCEDURE — 36415 COLL VENOUS BLD VENIPUNCTURE: CPT

## 2025-07-01 PROCEDURE — 85025 COMPLETE CBC W/AUTO DIFF WBC: CPT

## 2025-07-01 PROCEDURE — 83735 ASSAY OF MAGNESIUM: CPT

## 2025-07-01 PROCEDURE — 97110 THERAPEUTIC EXERCISES: CPT

## 2025-07-01 PROCEDURE — 82306 VITAMIN D 25 HYDROXY: CPT

## 2025-07-01 PROCEDURE — 83036 HEMOGLOBIN GLYCOSYLATED A1C: CPT

## 2025-07-01 PROCEDURE — 80061 LIPID PANEL: CPT

## 2025-07-01 NOTE — TELEPHONE ENCOUNTER
Patient had labs today and stopped at check out to get a message to provider   Left eye is red and itchy and patient has been experiencing dry eyes. She uses eye drops frequently.     Patient is also experiencing redness in her genital area. No burning sensation or uti symptoms. Patient states that it is uncomfortable and slightly itchy.  She uses estrogen cream on it to soothe. She feels like she needs to put a cool damp rag on her skin.  Patient thinks it is  a side effect of jardiance and is requesting to come off all medicaitons.     Patient has an appointment on July 8th  Please advise     Cb# [phone]

## 2025-07-01 NOTE — PROGRESS NOTES
PHYSICAL / OCCUPATIONAL THERAPY - DAILY TREATMENT NOTE    Patient Name: Judy Cason    Date: 2025    : 1955  Insurance: Payor: MEDICARE / Plan: MEDICARE PART A AND B / Product Type: *No Product type* /      Patient  verified Yes     Visit #   Current / Total 3 16   Time   In / Out 1133 am  1215 pm    Pain   In / Out 0/10  0/10    Subjective Functional Status/Changes: Patient reports that her shoulder is a little sore today.      TREATMENT AREA =  Nontraumatic tear of left rotator cuff  Primary osteoarthritis of left knee  Pain in left shoulder    OBJECTIVE         Therapeutic Procedures:    Tx Min Billable or 1:1 Min (if diff from Tx Min) Procedure, Rationale, Specifics   12  20998 Therapeutic Exercise (timed):  increase ROM, strength, coordination, balance, and proprioception to improve patient's ability to progress to PLOF and address remaining functional goals. (see flow sheet as applicable)     Details if applicable:       20  33730 Therapeutic Activity (timed):  use of dynamic activities replicating functional movements to increase ROM, strength, coordination, balance, and proprioception in order to improve patient's ability to progress to PLOF and address remaining functional goals.  (see flow sheet as applicable)     Details if applicable:     10  88303 Manual Therapy (timed):  decrease pain, increase ROM, increase tissue extensibility, and decrease trigger points to improve patient's ability to progress to PLOF and address remaining functional goals.  The manual therapy interventions were performed at a separate and distinct time from the therapeutic activities interventions . (see flow sheet as applicable)     Details if applicable:  STM to left shoulder musculature, PROM to left GH joint           Details if applicable:            Details if applicable:     42  [facility] Totals Reminder: bill using total billable min of TIMED therapeutic procedures (example: do not include dry needle or estim

## 2025-07-02 ENCOUNTER — TELEPHONE (OUTPATIENT)
Facility: CLINIC | Age: 70
End: 2025-07-02

## 2025-07-02 NOTE — TELEPHONE ENCOUNTER
Called and spoke with patient.  Describes that she has been experiencing increased redness and pruritus in the vaginal area for several days.  Reports that she was at the [place] last week and was wearing a bathing suit in the pool most of the time.  She has been using estradiol cream as prescribed, but advised that this is likely a recurrence of yeast infection due to candidal dermatitis which she had previously.  Advised likely due to moisture and wearing wet bathing suit.  She was able to locate her prescription for clotrimazole-betamethasone cream and advised that she should apply this to help with pruritus and rash.  Also discussed preventive measures including avoidance of moisture.  Will reevaluate in the office next week.  Answered all questions.

## 2025-07-02 NOTE — TELEPHONE ENCOUNTER
Patient called stating she came in yesterday for lab work and stated that she has been having a rash and itchiness down in her vaginal area for the last couple days. She did use the estradiol cream and it seemed to make it worse. Patient believes its from her taking Jardiance even though the rash just showed up the last couple days and she has been taking that medicine for awhile now. I informed patient that I will talk to the Doctor and she what she wanted to do. Per Dr. Gloria she will call patient back after her last appointment of the day today.

## 2025-07-08 ENCOUNTER — OFFICE VISIT (OUTPATIENT)
Facility: CLINIC | Age: 70
End: 2025-07-08

## 2025-07-08 VITALS
HEART RATE: 73 BPM | HEIGHT: 62 IN | RESPIRATION RATE: 14 BRPM | OXYGEN SATURATION: 95 % | BODY MASS INDEX: 32.94 KG/M2 | WEIGHT: 179 LBS | SYSTOLIC BLOOD PRESSURE: 112 MMHG | DIASTOLIC BLOOD PRESSURE: 60 MMHG | TEMPERATURE: 98.7 F

## 2025-07-08 DIAGNOSIS — E66.811 CLASS 1 OBESITY DUE TO EXCESS CALORIES WITH SERIOUS COMORBIDITY AND BODY MASS INDEX (BMI) OF 32.0 TO 32.9 IN ADULT: ICD-10-CM

## 2025-07-08 DIAGNOSIS — I10 ESSENTIAL HYPERTENSION: ICD-10-CM

## 2025-07-08 DIAGNOSIS — M15.0 PRIMARY OSTEOARTHRITIS INVOLVING MULTIPLE JOINTS: ICD-10-CM

## 2025-07-08 DIAGNOSIS — E66.09 CLASS 1 OBESITY DUE TO EXCESS CALORIES WITH SERIOUS COMORBIDITY AND BODY MASS INDEX (BMI) OF 32.0 TO 32.9 IN ADULT: ICD-10-CM

## 2025-07-08 DIAGNOSIS — E11.65 TYPE 2 DIABETES MELLITUS WITH HYPERGLYCEMIA, WITHOUT LONG-TERM CURRENT USE OF INSULIN (HCC): Primary | ICD-10-CM

## 2025-07-08 DIAGNOSIS — K74.00: ICD-10-CM

## 2025-07-08 DIAGNOSIS — I47.19 AVNRT (AV NODAL RE-ENTRY TACHYCARDIA): ICD-10-CM

## 2025-07-08 DIAGNOSIS — F41.9 ANXIETY: ICD-10-CM

## 2025-07-08 DIAGNOSIS — K76.0 METABOLIC DYSFUNCTION-ASSOCIATED STEATOTIC LIVER DISEASE (MASLD): ICD-10-CM

## 2025-07-08 DIAGNOSIS — K76.0: ICD-10-CM

## 2025-07-08 DIAGNOSIS — E78.00 PURE HYPERCHOLESTEROLEMIA: ICD-10-CM

## 2025-07-08 RX ORDER — FLUCONAZOLE 150 MG/1
150 TABLET ORAL ONCE
Qty: 1 TABLET | Refills: 0 | Status: SHIPPED | OUTPATIENT
Start: 2025-07-08 | End: 2025-07-08

## 2025-07-08 NOTE — PROGRESS NOTES
HPI:   Judy Cason is a 70 y.o. year old female who presents today for routine follow-up visit. She has a history of hypertension, hyperlipidemia, paroxysmal SVT, diabetes mellitus, GERD, asthma, elevated transaminases, atypical mycobacterial pneumonia, and noncompliance. She reports that she is doing fairly well.      On 6/4/2025, she underwent evaluation by Dr. Kaur who diagnosed multiple fingernails with onychomycosis and recommendation was to begin treatment with terbinafine.  However, concern was raised regarding treatment given known history of MASLD with elevated LFTs.  FibroScan testing was performed (6/12/2025) showing diffuse hepatic steatosis without focal abnormality, mild-moderate liver fibrosis (Metavir score F2-F3).  She was advised not to begin treatment with terbinafine given liver findings, and reports that she is now scheduled for evaluation by Dr. Gurwinder Cummings next month.    She acknowledges dietary noncompliance and her weight remains essentially unchanged from her last visit.  She is continuing on Jardiance 10 mg daily and glipizide 10 mg daily.  She discusses that she just returned from vacation in [place] and has noted vaginal itching, burning, and redness.  She states that she was wearing a bathing suit and swimming in the pool during the week.  She was advised to restart clotrimazole/betamethasone cream and reports some improvement today although states that she is continuing to experience vaginal pruritus.    She reported ongoing pain in her left shoulder following her fall in 10/2024, and completed a left shoulder MRI (12/23/2024) showing no fracture or osseous stress related change, moderate rotator cuff tendinosis with posttraumatic contusion-edema/partial-thickness and partial with interstitial tearing, moderate proximal long head biceps tendinosis, and moderate/severe subacromial/subdeltoid bursitis.  She reports today that that she is continuing to follow with Dr. Murphy

## 2025-07-08 NOTE — PROGRESS NOTES
Judy Casno presents today for   Chief Complaint   Patient presents with    3 Month Follow-Up       \"Have you been to the ER, urgent care clinic since your last visit?  Hospitalized since your last visit?\"    NO    “Have you seen or consulted any other health care providers outside of [facility] since your last visit?”    NO

## 2025-07-09 ENCOUNTER — HOSPITAL ENCOUNTER (OUTPATIENT)
Facility: HOSPITAL | Age: 70
Setting detail: RECURRING SERIES
Discharge: HOME | End: 2025-07-12
Payer: MEDICARE

## 2025-07-09 PROCEDURE — 97530 THERAPEUTIC ACTIVITIES: CPT

## 2025-07-09 PROCEDURE — 97110 THERAPEUTIC EXERCISES: CPT

## 2025-07-09 NOTE — PROGRESS NOTES
[facility]  [address], [address], [place] [address]  Ph:[phone] Fx:[phone]  PHYSICAL THERAPY PROGRESS NOTE  Patient Name: Judy Cason : 1955   Medical/Treatment Diagnosis: Nontraumatic tear of left rotator cuff  Primary osteoarthritis of left knee  Pain in left shoulder   Referral Source: Geoff Muprhy MD     Date of Initial Visit: 6/10/2025 Attended Visits: 4 Missed Visits: 0     SUMMARY OF TREATMENT  Pt has been seen for 4 therapy sessions. Pt reports improvements in her overall pain and ROM since starting therapy. Her left shoulder ABD AROM has significantly improved. She continues to have weakness in the left UE as noted with MMT. She has pain in the left UE with sleeping and intermittent catching sensations with movement of the left UE. Pt will benefit from skilled physical therapy to improve pain, symptoms, and activity tolerance to restore function.         CURRENT STATUS  Short Term Goals: To be accomplished in  3-4 weeks:  1. Independent with HEP.  EVAL: N/A  Current: MET, reports compliance 2025    2. Decrease max pain to < 3/10 to assist with sleeping through the night without tossing and turning due to significant pain.  EVAL: 8/10 max pain; has trouble turning over in bed and wakes up to turn.  Current: progressing, 5/10 at worst 2025    3. Increase L shoulder abduction AROM to at least 0-150 degs to be able to reach into overhead cabinets and wash her hair while bathing.  EVAL: 0-124 AROM; uses R arm to reach into cabinets and wash her hair due to limited ROM of L shoulder.   Current: progressing, 148 degs 2025     Long Term Goals: To be accomplished in  6-8  weeks:  1.  Decrease max pain to <2/10 to assist with reaching into overhead cabinets and perform her hobbies like gardening and household chores.   EVAL: 8/10 max pain; has trouble lifting her arm and performing hobbies and household tasks.   Current:

## 2025-07-11 ENCOUNTER — HOSPITAL ENCOUNTER (OUTPATIENT)
Facility: HOSPITAL | Age: 70
Setting detail: RECURRING SERIES
Discharge: HOME | End: 2025-07-14
Payer: MEDICARE

## 2025-07-11 PROBLEM — K76.0 METABOLIC DYSFUNCTION-ASSOCIATED STEATOTIC LIVER DISEASE (MASLD): Status: ACTIVE | Noted: 2021-03-07

## 2025-07-11 PROBLEM — Z91.199 NONCOMPLIANCE WITH DIET AND MEDICATION REGIMEN: Status: RESOLVED | Noted: 2018-12-02 | Resolved: 2025-07-11

## 2025-07-11 PROBLEM — K74.00: Status: ACTIVE | Noted: 2025-07-11

## 2025-07-11 PROBLEM — Z91.148 NONCOMPLIANCE WITH DIET AND MEDICATION REGIMEN: Status: RESOLVED | Noted: 2018-12-02 | Resolved: 2025-07-11

## 2025-07-11 PROBLEM — K76.0: Status: ACTIVE | Noted: 2025-07-11

## 2025-07-11 PROCEDURE — 97530 THERAPEUTIC ACTIVITIES: CPT

## 2025-07-11 PROCEDURE — 97140 MANUAL THERAPY 1/> REGIONS: CPT

## 2025-07-11 PROCEDURE — 97110 THERAPEUTIC EXERCISES: CPT

## 2025-07-11 NOTE — PROGRESS NOTES
PHYSICAL / OCCUPATIONAL THERAPY - DAILY TREATMENT NOTE    Patient Name: Judy Cason    Date: 2025    : 1955  Insurance: Payor: MEDICARE / Plan: MEDICARE PART A AND B / Product Type: *No Product type* /      Patient  verified Yes     Visit #   Current / Total 5 16   Time   In / Out 10:50 11:30   Pain   In / Out 0 0   Subjective Functional Status/Changes: \"I'm getting better.\" \"But I can still feel it.\"     TREATMENT AREA =  Nontraumatic tear of left rotator cuff  Primary osteoarthritis of left knee  Pain in left shoulder    OBJECTIVE         Therapeutic Procedures:    Tx Min Billable or 1:1 Min (if diff from Tx Min) Procedure, Rationale, Specifics   16  02957 Therapeutic Exercise (timed):  increase ROM, strength, coordination, balance, and proprioception to improve patient's ability to progress to PLOF and address remaining functional goals. (see flow sheet as applicable)     Details if applicable:       13  80723 Therapeutic Activity (timed):  use of dynamic activities replicating functional movements to increase ROM, strength, coordination, balance, and proprioception in order to improve patient's ability to progress to PLOF and address remaining functional goals.  (see flow sheet as applicable)     Details if applicable:     11  71259 Manual Therapy (timed):  decrease pain, increase ROM, and increase tissue extensibility to improve patient's ability to progress to PLOF and address remaining functional goals.  The manual therapy interventions were performed at a separate and distinct time from the therapeutic activities interventions . (see flow sheet as applicable)     Details if applicable:  STM  GH JT mob 3 with distraction at range  left shoulder  semi reclined          Details if applicable:            Details if applicable:     40  [facility] Totals Reminder: bill using total billable min of TIMED therapeutic procedures (example: do not include dry needle or estim unattended, both untimed codes, in

## 2025-07-16 ENCOUNTER — HOSPITAL ENCOUNTER (OUTPATIENT)
Facility: HOSPITAL | Age: 70
Setting detail: RECURRING SERIES
Discharge: HOME | End: 2025-07-19
Payer: MEDICARE

## 2025-07-16 PROCEDURE — 97140 MANUAL THERAPY 1/> REGIONS: CPT

## 2025-07-16 PROCEDURE — 97110 THERAPEUTIC EXERCISES: CPT

## 2025-07-16 PROCEDURE — 97530 THERAPEUTIC ACTIVITIES: CPT

## 2025-07-16 NOTE — PROGRESS NOTES
PHYSICAL / OCCUPATIONAL THERAPY - DAILY TREATMENT NOTE    Patient Name: Judy Cason    Date: 2025    : 1955  Insurance: Payor: MEDICARE / Plan: MEDICARE PART A AND B / Product Type: *No Product type* /      Patient  verified Yes     Visit #   Current / Total 6 16   Time   In / Out 10:10 11:04   Pain   In / Out 0 0   Subjective Functional Status/Changes: \"No pain.\"     TREATMENT AREA =  Nontraumatic tear of left rotator cuff  Primary osteoarthritis of left knee  Pain in left shoulder    OBJECTIVE    Modalities Rationale:     decrease  soreness to improve patient's ability to progress to PLOF and address remaining functional goals.     min [] Estim Unattended, type/location:                                      []  w/ice    []  w/heat    min [] Estim Attended, type/location:                                     []  w/US     []  w/ice    []  w/heat    []  TENS insruct      min []  Mechanical Traction: type/lbs                   []  pro   []  sup   []  int   []  cont    []  before manual    []  after manual    min []  Ultrasound, settings/location:     10 min  unbill [x]  Ice     []  Heat    location/position: Semi reclined  left shoulder    min []  Paraffin,  details:     min []  Vasopneumatic Device, press/temp:     min []  Whirlpool / Fluido:    If using vaso (only need to measure limb vaso being performed on)      pre-treatment girth :       post-treatment girth :       measured at (landmark location) :      min []  Other:    Skin assessment post-treatment:   Intact      Therapeutic Procedures:    Tx Min Billable or 1:1 Min (if diff from Tx Min) Procedure, Rationale, Specifics   21  42433 Therapeutic Exercise (timed):  increase ROM, strength, coordination, balance, and proprioception to improve patient's ability to progress to PLOF and address remaining functional goals. (see flow sheet as applicable)     Details if applicable:       8  18995 Therapeutic Activity (timed):  use of dynamic activities

## 2025-07-21 ENCOUNTER — HOSPITAL ENCOUNTER (OUTPATIENT)
Facility: HOSPITAL | Age: 70
Setting detail: RECURRING SERIES
Discharge: HOME | End: 2025-07-24
Payer: MEDICARE

## 2025-07-21 PROCEDURE — 97530 THERAPEUTIC ACTIVITIES: CPT

## 2025-07-21 PROCEDURE — 97140 MANUAL THERAPY 1/> REGIONS: CPT

## 2025-07-21 PROCEDURE — 97110 THERAPEUTIC EXERCISES: CPT

## 2025-07-21 NOTE — THERAPY RECERTIFICATION
[facility]  [address], [address], [place] [address]  Ph:[phone] Fx:[phone]  PHYSICAL THERAPY PROGRESS NOTE  Patient Name: Judy Cason : 1955   Medical/Treatment Diagnosis: Nontraumatic tear of left rotator cuff  Primary osteoarthritis of left knee  Pain in left shoulder   Referral Source: Geoff Murphy MD     Date of Initial Visit: 6/10/25 Attended Visits: 7 Missed Visits: 0     SUMMARY OF TREATMENT  Mrs. Cason has been seen for 7 treatments and has shown good progress with PT. Pt reports 80% improvement. Pain level on average is 2/10. DASH has decreased from 29 to 14 which shows improved in functional mobility. Pain level on average is 2/10. Pt  is  sleeping better, decreased pain,increase in left shoulder AROM, perform household chores, and lift light/medium weights. Pt's limitations are lifting heavy items and  reaching outwards (horizontal abduction).     CURRENT STATUS  1. Independent with HEP.  EVAL: N/A  PN: MET, reports compliance 2025     2. Decrease max pain to < 3/10 to assist with sleeping through the night without tossing and turning due to significant pain.  EVAL: 8/10 max pain; has trouble turning over in bed and wakes up to turn.  PN:  5  progressing      3. Increase L shoulder abduction AROM to at least 0-150 degs to be able to reach into overhead cabinets and wash her hair while bathing.  EVAL: 0-124 AROM; uses R arm to reach into cabinets and wash her hair due to limited ROM of L shoulder.   PN:  148 degs   progressing      Long Term Goals: To be accomplished in  6-8  weeks:  1.  Decrease max pain to <2/10 to assist with reaching into overhead cabinets and perform her hobbies like gardening and household chores.   EVAL: 8/10 max pain; has trouble lifting her arm and performing hobbies and household tasks.   Current:  3   progressing      2. Improve QuickDASH Score by 15 points in order to show significant

## 2025-07-21 NOTE — PROGRESS NOTES
External rotation = 4-/5  Current: not met, 7/9/2025  MMT:                                                                            Left   Shoulder Flex 3+/5   Shoulder ABD 3+/5   Shoulder ER 4-/5   Shoulder IR 4-/5   PN  shoulder   flex  4      Abd  4    ER 4-  IR   4+   progressing         Next PN/ RC due 8/10/25  Auth due (visit number/ date)          PLAN  - Continue Plan of Care  - Other : FAX MD NOTE    Eugenia Moctezuma PTA    7/21/2025    10:56 AM  If an interpreting service was utilized for treatment of this patient, the contents of this document represent the material reviewed with the patient via the .     Future Appointments   Date Time Provider Department Center   7/23/2025 10:50 AM [facility]   7/29/2025 10:50 AM [facility] Fox[facility]   7/31/2025 10:50 AM Cesilia Fox, PT [facility]   10/10/2025 10:30 AM [facility] LAB VISIT [facility]   10/16/2025 10:30 AM Rain Gloria MD [facility]   5/13/2026 10:40 AM Ba Bishop MD [facility] AMB

## 2025-07-23 ENCOUNTER — HOSPITAL ENCOUNTER (OUTPATIENT)
Facility: HOSPITAL | Age: 70
Setting detail: RECURRING SERIES
Discharge: HOME | End: 2025-07-26
Payer: MEDICARE

## 2025-07-23 PROCEDURE — 97530 THERAPEUTIC ACTIVITIES: CPT

## 2025-07-23 PROCEDURE — 97110 THERAPEUTIC EXERCISES: CPT

## 2025-07-23 PROCEDURE — 97140 MANUAL THERAPY 1/> REGIONS: CPT

## 2025-07-23 NOTE — PROGRESS NOTES
PHYSICAL / OCCUPATIONAL THERAPY - DAILY TREATMENT NOTE    Patient Name: Judy Cason    Date: 2025    : 1955  Insurance: Payor: MEDICARE / Plan: MEDICARE PART A AND B / Product Type: *No Product type* /      Patient  verified Yes     Visit #   Current / Total 8 16   Time   In / Out 1056 am  1155 am   Pain   In / Out 0/10  0/10    Subjective Functional Status/Changes: \"My shoulder has been feeling really good.\"     TREATMENT AREA =  Nontraumatic tear of left rotator cuff  Primary osteoarthritis of left knee  Pain in left shoulder    OBJECTIVE         Therapeutic Procedures:    Tx Min Billable or 1:1 Min (if diff from Tx Min) Procedure, Rationale, Specifics   17  01892 Therapeutic Exercise (timed):  increase ROM, strength, coordination, balance, and proprioception to improve patient's ability to progress to PLOF and address remaining functional goals. (see flow sheet as applicable)     Details if applicable:       10  18649 Manual Therapy (timed):  decrease pain, increase ROM, increase tissue extensibility, and decrease trigger points to improve patient's ability to progress to PLOF and address remaining functional goals.  The manual therapy interventions were performed at a separate and distinct time from the therapeutic activities interventions . (see flow sheet as applicable)     Details if applicable:   STM to left shoulder musculature, PROM to left GH joint    22  68449 Therapeutic Activity (timed):  use of dynamic activities replicating functional movements to increase ROM, strength, coordination, balance, and proprioception in order to improve patient's ability to progress to PLOF and address remaining functional goals.  (see flow sheet as applicable)     Details if applicable:            Details if applicable:            Details if applicable:     49  [facility] Totals Reminder: bill using total billable min of TIMED therapeutic procedures (example: do not include dry needle or estim unattended, both

## 2025-07-29 ENCOUNTER — HOSPITAL ENCOUNTER (OUTPATIENT)
Facility: HOSPITAL | Age: 70
Setting detail: RECURRING SERIES
Discharge: HOME | End: 2025-08-01
Payer: MEDICARE

## 2025-07-29 PROCEDURE — 97110 THERAPEUTIC EXERCISES: CPT

## 2025-07-29 PROCEDURE — 97112 NEUROMUSCULAR REEDUCATION: CPT

## 2025-07-29 NOTE — PROGRESS NOTES
PHYSICAL / OCCUPATIONAL THERAPY - DAILY TREATMENT NOTE    Patient Name: Judy Cason    Date: 2025    : 1955  Insurance: Payor: MEDICARE / Plan: MEDICARE PART A AND B / Product Type: *No Product type* /      Patient  verified Yes     Visit #   Current / Total 9 16   Time   In / Out 10:53 11:30   Pain   In / Out 0 0   Subjective Functional Status/Changes: Pt reports no pain currently.      TREATMENT AREA =  Nontraumatic tear of left rotator cuff  Primary osteoarthritis of left knee  Pain in left shoulder    OBJECTIVE  Therapeutic Procedures:    Tx Min Billable or 1:1 Min (if diff from Tx Min) Procedure, Rationale, Specifics   10 8 32316 Therapeutic Exercise (timed):  increase ROM, strength, coordination, balance, and proprioception to improve patient's ability to progress to PLOF and address remaining functional goals. (see flow sheet as applicable)     Details if applicable:        53483 Neuromuscular Re-Education (timed):  improve balance, coordination, kinesthetic sense, posture, core stability and proprioception to improve patient's ability to develop conscious control of individual muscles and awareness of position of extremities in order to progress to PLOF and address remaining functional goals. (see flow sheet as applicable)     Details if applicable:     37 39 [facility] Totals Reminder: bill using total billable min of TIMED therapeutic procedures (example: do not include dry needle or estim unattended, both untimed codes, in totals to left)  8-22 min = 1 unit; 23-37 min = 2 units; 38-52 min = 3 units; 53-67 min = 4 units; 68-82 min = 5 units   Total Total     Charge Capture    [x]  Patient Education billed concurrently with other procedures   [x] Review HEP    [] Progressed/Changed HEP, detail:    [] Other detail:       Objective Information/Functional Measures/Assessment  Reported no pain post session today. Held manual today to see how pt responds to more strengthening exercises. Unable to

## 2025-07-31 ENCOUNTER — HOSPITAL ENCOUNTER (OUTPATIENT)
Facility: HOSPITAL | Age: 70
Setting detail: RECURRING SERIES
End: 2025-07-31
Payer: MEDICARE

## 2025-07-31 PROCEDURE — 97110 THERAPEUTIC EXERCISES: CPT

## 2025-07-31 PROCEDURE — 97530 THERAPEUTIC ACTIVITIES: CPT

## 2025-07-31 NOTE — PROGRESS NOTES
Physical Therapy Discharge Instructions      [facility] - [address], [address]  [place] [address] [phone] fax    Patient: Judy Cason  : 1955      Continue Home Exercise Program 4-7 times per week             Follow up with MD:   [x] As needed        Recommendations:   [x]   Return to activity with home program        Cesilia Fox, PT 2025 8:46 AM

## 2025-07-31 NOTE — PROGRESS NOTES
PT DISCHARGE DAILY NOTE AND SUMMARY 3-25    If signature is requested please return to:    [facility]  Fax: [phone]    Date:2025  Patient Name: Judy Cason : 1955   Medical   Diagnosis: Nontraumatic tear of left rotator cuff  Primary osteoarthritis of left knee  Pain in left shoulder Treatment Diagnosis:  M25.512  LEFT SHOULDER PAIN   Onset Date: 2024       Referral Source: Geoff Murphy MD Start of Care (SOC): 6/10/2025   Prior Hospitalization: See medical history Provider #: [id number]   Prior Level of Function: Independent with ADLs and IADLs, takes care of grandchildren and participates in non-work activities.   Comorbidities:  Respiratory disorders, Diabetes mellitus, Musculoskeletal disorders, and Other: high cholesterol and high blood pressure     Insurance: Payor: MEDICARE / Plan: MEDICARE PART A AND B / Product Type: *No Product type* /      Visits from Start of Care: 10 Missed Visits: 0    Medicare: Reporting Period (date from last assessment to current assessment): 2025-2025    Objective Information/Functional Measures/Assessment  Functional gains: sleeping, ROM, pain, activity tolerance  Functional deficits: intermittent soreness  At best pain: 0/10  At worst pain: 0/10    Pt was seen for 10 therapy sessions. Pt demonstrated/reported improvements in overall sleeping tolerance, ROM, and pain since starting therapy. Her QuickDASH score improved significant since start of care, indicating improvement in function. She continues to have some strength limitations in the left shoulder but updated her HEP to continue strengthening at home. Pt states she feels ready for d/c. Pt is d/c'ed from therapy at this time to HEP secondary to improvement in symptoms/pain and ability to independently perform HEP to manage current deficits.      GOALS  Short Term Goals: To be accomplished in  3-4 weeks:  1. Independent with HEP.  EVAL: N/A  Current: MET, reports

## 2025-08-07 ENCOUNTER — TELEPHONE (OUTPATIENT)
Facility: CLINIC | Age: 70
End: 2025-08-07

## 2025-08-18 ENCOUNTER — TELEPHONE (OUTPATIENT)
Facility: CLINIC | Age: 70
End: 2025-08-18

## 2025-08-18 DIAGNOSIS — F41.9 ANXIETY: Primary | ICD-10-CM

## 2025-08-19 RX ORDER — ALBUTEROL SULFATE 90 UG/1
INHALANT RESPIRATORY (INHALATION)
Qty: 8.5 G | Refills: 0 | Status: SHIPPED | OUTPATIENT
Start: 2025-08-19

## 2025-08-19 RX ORDER — LORAZEPAM 1 MG/1
1 TABLET ORAL 3 TIMES DAILY PRN
Qty: 30 TABLET | Refills: 0 | Status: SHIPPED | OUTPATIENT
Start: 2025-08-19 | End: 2025-09-18

## 2025-08-19 RX ORDER — HYDROCHLOROTHIAZIDE 12.5 MG/1
12.5 TABLET ORAL DAILY
Qty: 30 TABLET | Refills: 0 | Status: SHIPPED | OUTPATIENT
Start: 2025-08-19

## 2025-08-19 RX ORDER — GLIPIZIDE 10 MG/1
10 TABLET, FILM COATED, EXTENDED RELEASE ORAL DAILY
Qty: 30 TABLET | Refills: 0 | Status: SHIPPED | OUTPATIENT
Start: 2025-08-19

## 2025-08-20 ENCOUNTER — TELEPHONE (OUTPATIENT)
Facility: CLINIC | Age: 70
End: 2025-08-20

## 2025-09-05 ENCOUNTER — TELEPHONE (OUTPATIENT)
Facility: CLINIC | Age: 70
End: 2025-09-05